# Patient Record
Sex: FEMALE | Race: WHITE | NOT HISPANIC OR LATINO | Employment: OTHER | URBAN - METROPOLITAN AREA
[De-identification: names, ages, dates, MRNs, and addresses within clinical notes are randomized per-mention and may not be internally consistent; named-entity substitution may affect disease eponyms.]

---

## 2020-05-27 DIAGNOSIS — N63.0 BREAST MASS: Primary | ICD-10-CM

## 2020-06-08 ENCOUNTER — HOSPITAL ENCOUNTER (OUTPATIENT)
Dept: RADIOLOGY | Facility: HOSPITAL | Age: 40
Discharge: HOME | End: 2020-06-08
Attending: OBSTETRICS & GYNECOLOGY
Payer: COMMERCIAL

## 2020-06-08 DIAGNOSIS — N63.0 BREAST MASS: ICD-10-CM

## 2020-06-08 PROCEDURE — 77062 BREAST TOMOSYNTHESIS BI: CPT | Mod: TC,PO

## 2020-06-08 PROCEDURE — 76642 ULTRASOUND BREAST LIMITED: CPT | Mod: TC,50,PO

## 2020-06-23 ENCOUNTER — HOSPITAL ENCOUNTER (OUTPATIENT)
Dept: RADIOLOGY | Facility: HOSPITAL | Age: 40
Discharge: HOME | End: 2020-06-23
Attending: OBSTETRICS & GYNECOLOGY
Payer: COMMERCIAL

## 2020-06-23 DIAGNOSIS — R92.8 ABNORMAL MAMMOGRAM: ICD-10-CM

## 2020-06-23 PROCEDURE — 19081 BX BREAST 1ST LESION STRTCTC: CPT | Mod: PO,LT

## 2020-06-23 NOTE — PLAN OF CARE
Tolerated well, minimal bleeding at site.  Steri-strips, gauze and tegaderm dressing placed to procedural site-left breast.  Ice pack provided.  Verbal and written discharge instructions given and patient verbalized understanding. All questions answered  Denies pain or nausea.  Discharged to home via private vehicle

## 2020-07-21 ENCOUNTER — TELEPHONE (OUTPATIENT)
Dept: RADIOLOGY | Facility: HOSPITAL | Age: 40
End: 2020-07-21

## 2020-07-21 NOTE — TELEPHONE ENCOUNTER
I called Dr. Gilmore's office to make sure she received the addendum to patient's breast bx. The nurse stated the addendum was received and the patient does not want to see a surgeon.

## 2020-11-05 ENCOUNTER — TELEPHONE (OUTPATIENT)
Dept: ADMINISTRATIVE | Facility: CLINIC | Age: 40
End: 2020-11-05

## 2020-11-10 ENCOUNTER — TELEPHONE (OUTPATIENT)
Dept: ADMINISTRATIVE | Facility: CLINIC | Age: 40
End: 2020-11-10

## 2020-11-17 ENCOUNTER — TELEPHONE (OUTPATIENT)
Dept: SURGERY | Facility: CLINIC | Age: 40
End: 2020-11-17

## 2020-11-17 NOTE — TELEPHONE ENCOUNTER
Received message that pt wanted to cancel appt with  for tomorrow. Called pt to confirm this & see if she would like to reschedule, no answer, left voicemail.

## 2021-01-15 ENCOUNTER — CLINICAL SUPPORT (OUTPATIENT)
Dept: URGENT CARE | Facility: CLINIC | Age: 41
End: 2021-01-15
Payer: COMMERCIAL

## 2021-01-15 DIAGNOSIS — Z11.52 ENCOUNTER FOR SCREENING FOR COVID-19: Primary | ICD-10-CM

## 2021-01-15 LAB
CTP QC/QA: YES
SARS-COV-2 RDRP RESP QL NAA+PROBE: NEGATIVE

## 2021-01-15 PROCEDURE — U0002: ICD-10-PCS | Mod: QW,S$GLB,, | Performed by: FAMILY MEDICINE

## 2021-01-15 PROCEDURE — U0002 COVID-19 LAB TEST NON-CDC: HCPCS | Mod: QW,S$GLB,, | Performed by: FAMILY MEDICINE

## 2021-01-19 PROBLEM — D05.12 DUCTAL CARCINOMA IN SITU OF LEFT BREAST: Status: ACTIVE | Noted: 2021-01-19

## 2021-01-20 ENCOUNTER — TELEPHONE (OUTPATIENT)
Dept: SURGERY | Facility: CLINIC | Age: 41
End: 2021-01-20

## 2021-01-25 ENCOUNTER — DOCUMENTATION ONLY (OUTPATIENT)
Dept: SURGERY | Facility: CLINIC | Age: 41
End: 2021-01-25

## 2021-01-25 DIAGNOSIS — C50.412 MALIGNANT NEOPLASM OF UPPER-OUTER QUADRANT OF LEFT FEMALE BREAST: Primary | ICD-10-CM

## 2021-01-27 ENCOUNTER — HOSPITAL ENCOUNTER (OUTPATIENT)
Dept: RADIOLOGY | Facility: HOSPITAL | Age: 41
Discharge: HOME | End: 2021-01-27
Attending: SURGERY
Payer: COMMERCIAL

## 2021-01-27 DIAGNOSIS — C50.412 MALIGNANT NEOPLASM OF UPPER-OUTER QUADRANT OF LEFT FEMALE BREAST: ICD-10-CM

## 2021-01-27 PROCEDURE — A9577 INJ MULTIHANCE: HCPCS | Performed by: SURGERY

## 2021-01-27 PROCEDURE — 77049 MRI BREAST C-+ W/CAD BI: CPT | Mod: 26,,, | Performed by: RADIOLOGY

## 2021-01-27 PROCEDURE — 25500020 PHARM REV CODE 255: Performed by: SURGERY

## 2021-01-27 PROCEDURE — 77049 MRI BREAST C-+ W/CAD BI: CPT | Mod: TC

## 2021-01-27 PROCEDURE — 77049 MRI BREAST W/WO CONTRAST, W/CAD, BILATERAL: ICD-10-PCS | Mod: 26,,, | Performed by: RADIOLOGY

## 2021-01-27 RX ADMIN — GADOBENATE DIMEGLUMINE 12 ML: 529 INJECTION, SOLUTION INTRAVENOUS at 11:01

## 2021-01-28 DIAGNOSIS — C50.412 MALIGNANT NEOPLASM OF UPPER-OUTER QUADRANT OF LEFT FEMALE BREAST, UNSPECIFIED ESTROGEN RECEPTOR STATUS: Primary | ICD-10-CM

## 2021-01-28 PROBLEM — C50.912 INVASIVE DUCTAL CARCINOMA OF BREAST, LEFT: Status: ACTIVE | Noted: 2021-01-28

## 2021-01-29 ENCOUNTER — DOCUMENTATION ONLY (OUTPATIENT)
Dept: SURGERY | Facility: CLINIC | Age: 41
End: 2021-01-29

## 2021-02-02 ENCOUNTER — HOSPITAL ENCOUNTER (OUTPATIENT)
Dept: RADIOLOGY | Facility: HOSPITAL | Age: 41
Discharge: HOME | End: 2021-02-02
Attending: SURGERY
Payer: COMMERCIAL

## 2021-02-02 DIAGNOSIS — C50.412 MALIGNANT NEOPLASM OF UPPER-OUTER QUADRANT OF LEFT FEMALE BREAST, UNSPECIFIED ESTROGEN RECEPTOR STATUS: ICD-10-CM

## 2021-02-02 PROBLEM — Z17.1 MALIGNANT NEOPLASM OF UPPER-OUTER QUADRANT OF LEFT BREAST IN FEMALE, ESTROGEN RECEPTOR NEGATIVE: Status: ACTIVE | Noted: 2021-01-28

## 2021-02-02 PROCEDURE — 78815 PET IMAGE W/CT SKULL-THIGH: CPT | Mod: 26,PS,, | Performed by: RADIOLOGY

## 2021-02-02 PROCEDURE — 78815 PET IMAGE W/CT SKULL-THIGH: CPT | Mod: TC

## 2021-02-02 PROCEDURE — 78815 NM PET CT ROUTINE: ICD-10-PCS | Mod: 26,PS,, | Performed by: RADIOLOGY

## 2021-02-03 ENCOUNTER — DOCUMENTATION ONLY (OUTPATIENT)
Dept: SURGERY | Facility: CLINIC | Age: 41
End: 2021-02-03

## 2021-02-03 ENCOUNTER — OFFICE VISIT (OUTPATIENT)
Dept: HEMATOLOGY/ONCOLOGY | Facility: CLINIC | Age: 41
End: 2021-02-03
Payer: COMMERCIAL

## 2021-02-03 ENCOUNTER — OFFICE VISIT (OUTPATIENT)
Dept: SURGERY | Facility: CLINIC | Age: 41
End: 2021-02-03
Payer: COMMERCIAL

## 2021-02-03 VITALS
SYSTOLIC BLOOD PRESSURE: 120 MMHG | RESPIRATION RATE: 18 BRPM | HEART RATE: 73 BPM | HEIGHT: 67 IN | WEIGHT: 121.5 LBS | TEMPERATURE: 97 F | BODY MASS INDEX: 19.07 KG/M2 | DIASTOLIC BLOOD PRESSURE: 60 MMHG | OXYGEN SATURATION: 100 %

## 2021-02-03 DIAGNOSIS — Z17.1 MALIGNANT NEOPLASM OF UPPER-OUTER QUADRANT OF LEFT BREAST IN FEMALE, ESTROGEN RECEPTOR NEGATIVE: Primary | ICD-10-CM

## 2021-02-03 DIAGNOSIS — C50.412 MALIGNANT NEOPLASM OF UPPER-OUTER QUADRANT OF LEFT BREAST IN FEMALE, ESTROGEN RECEPTOR NEGATIVE: Primary | ICD-10-CM

## 2021-02-03 DIAGNOSIS — C50.412 MALIGNANT NEOPLASM OF UPPER-OUTER QUADRANT OF LEFT FEMALE BREAST, UNSPECIFIED ESTROGEN RECEPTOR STATUS: Primary | ICD-10-CM

## 2021-02-03 PROCEDURE — 3008F PR BODY MASS INDEX (BMI) DOCUMENTED: ICD-10-PCS | Mod: CPTII,S$GLB,, | Performed by: INTERNAL MEDICINE

## 2021-02-03 PROCEDURE — 99999 PR PBB SHADOW E&M-EST. PATIENT-LVL I: CPT | Mod: PBBFAC,,, | Performed by: SURGERY

## 2021-02-03 PROCEDURE — 3008F BODY MASS INDEX DOCD: CPT | Mod: CPTII,S$GLB,, | Performed by: INTERNAL MEDICINE

## 2021-02-03 PROCEDURE — 99205 OFFICE O/P NEW HI 60 MIN: CPT | Mod: S$GLB,,, | Performed by: SURGERY

## 2021-02-03 PROCEDURE — 1126F PR PAIN SEVERITY QUANTIFIED, NO PAIN PRESENT: ICD-10-PCS | Mod: S$GLB,,, | Performed by: INTERNAL MEDICINE

## 2021-02-03 PROCEDURE — 99205 OFFICE O/P NEW HI 60 MIN: CPT | Mod: S$GLB,,, | Performed by: INTERNAL MEDICINE

## 2021-02-03 PROCEDURE — 99205 PR OFFICE/OUTPT VISIT, NEW, LEVL V, 60-74 MIN: ICD-10-PCS | Mod: S$GLB,,, | Performed by: SURGERY

## 2021-02-03 PROCEDURE — 99999 PR PBB SHADOW E&M-EST. PATIENT-LVL I: ICD-10-PCS | Mod: PBBFAC,,, | Performed by: SURGERY

## 2021-02-03 PROCEDURE — 99999 PR PBB SHADOW E&M-EST. PATIENT-LVL IV: ICD-10-PCS | Mod: PBBFAC,,, | Performed by: INTERNAL MEDICINE

## 2021-02-03 PROCEDURE — 99999 PR PBB SHADOW E&M-EST. PATIENT-LVL IV: CPT | Mod: PBBFAC,,, | Performed by: INTERNAL MEDICINE

## 2021-02-03 PROCEDURE — 1126F AMNT PAIN NOTED NONE PRSNT: CPT | Mod: S$GLB,,, | Performed by: INTERNAL MEDICINE

## 2021-02-03 PROCEDURE — 99205 PR OFFICE/OUTPT VISIT, NEW, LEVL V, 60-74 MIN: ICD-10-PCS | Mod: S$GLB,,, | Performed by: INTERNAL MEDICINE

## 2021-02-04 ENCOUNTER — TELEPHONE (OUTPATIENT)
Dept: INFUSION THERAPY | Facility: HOSPITAL | Age: 41
End: 2021-02-04

## 2021-02-04 ENCOUNTER — TELEPHONE (OUTPATIENT)
Dept: RADIOLOGY | Facility: HOSPITAL | Age: 41
End: 2021-02-04

## 2021-02-04 DIAGNOSIS — C50.412 MALIGNANT NEOPLASM OF UPPER-OUTER QUADRANT OF LEFT BREAST IN FEMALE, ESTROGEN RECEPTOR NEGATIVE: Primary | ICD-10-CM

## 2021-02-04 DIAGNOSIS — Z17.1 MALIGNANT NEOPLASM OF UPPER-OUTER QUADRANT OF LEFT BREAST IN FEMALE, ESTROGEN RECEPTOR NEGATIVE: Primary | ICD-10-CM

## 2021-02-05 ENCOUNTER — DOCUMENTATION ONLY (OUTPATIENT)
Dept: SURGERY | Facility: CLINIC | Age: 41
End: 2021-02-05

## 2021-02-05 DIAGNOSIS — C50.412 MALIGNANT NEOPLASM OF UPPER-OUTER QUADRANT OF LEFT FEMALE BREAST, UNSPECIFIED ESTROGEN RECEPTOR STATUS: Primary | ICD-10-CM

## 2021-02-08 ENCOUNTER — PATIENT MESSAGE (OUTPATIENT)
Dept: HEMATOLOGY/ONCOLOGY | Facility: CLINIC | Age: 41
End: 2021-02-08

## 2021-02-08 ENCOUNTER — CLINICAL SUPPORT (OUTPATIENT)
Dept: REHABILITATION | Facility: HOSPITAL | Age: 41
End: 2021-02-08
Attending: SURGERY
Payer: COMMERCIAL

## 2021-02-08 ENCOUNTER — PATIENT MESSAGE (OUTPATIENT)
Dept: SURGERY | Facility: CLINIC | Age: 41
End: 2021-02-08

## 2021-02-08 ENCOUNTER — LAB VISIT (OUTPATIENT)
Dept: LAB | Facility: HOSPITAL | Age: 41
End: 2021-02-08
Attending: SURGERY
Payer: COMMERCIAL

## 2021-02-08 ENCOUNTER — TELEPHONE (OUTPATIENT)
Dept: HEMATOLOGY/ONCOLOGY | Facility: CLINIC | Age: 41
End: 2021-02-08

## 2021-02-08 ENCOUNTER — TELEPHONE (OUTPATIENT)
Dept: RADIOLOGY | Facility: HOSPITAL | Age: 41
End: 2021-02-08

## 2021-02-08 DIAGNOSIS — C50.412 MALIGNANT NEOPLASM OF UPPER-OUTER QUADRANT OF LEFT FEMALE BREAST, UNSPECIFIED ESTROGEN RECEPTOR STATUS: Primary | ICD-10-CM

## 2021-02-08 DIAGNOSIS — C50.412 MALIGNANT NEOPLASM OF UPPER-OUTER QUADRANT OF LEFT FEMALE BREAST, UNSPECIFIED ESTROGEN RECEPTOR STATUS: ICD-10-CM

## 2021-02-08 DIAGNOSIS — R26.89 DECREASED FUNCTIONAL MOBILITY: ICD-10-CM

## 2021-02-08 DIAGNOSIS — M25.60 DECREASED RANGE OF MOTION: ICD-10-CM

## 2021-02-08 PROCEDURE — 88321 CONSLTJ&REPRT SLD PREP ELSWR: CPT | Mod: ,,, | Performed by: STUDENT IN AN ORGANIZED HEALTH CARE EDUCATION/TRAINING PROGRAM

## 2021-02-08 PROCEDURE — 88321 PR  MICROSLIDE CONSULT: ICD-10-PCS | Mod: ,,, | Performed by: STUDENT IN AN ORGANIZED HEALTH CARE EDUCATION/TRAINING PROGRAM

## 2021-02-08 PROCEDURE — 97161 PT EVAL LOW COMPLEX 20 MIN: CPT | Mod: PN

## 2021-02-08 PROCEDURE — 88321 CONSLTJ&REPRT SLD PREP ELSWR: CPT | Performed by: STUDENT IN AN ORGANIZED HEALTH CARE EDUCATION/TRAINING PROGRAM

## 2021-02-08 PROCEDURE — 97110 THERAPEUTIC EXERCISES: CPT | Mod: PN

## 2021-02-09 ENCOUNTER — HOSPITAL ENCOUNTER (OUTPATIENT)
Dept: CARDIOLOGY | Facility: CLINIC | Age: 41
Discharge: HOME | End: 2021-02-09
Payer: COMMERCIAL

## 2021-02-09 ENCOUNTER — LAB VISIT (OUTPATIENT)
Dept: INTERNAL MEDICINE | Facility: CLINIC | Age: 41
End: 2021-02-09
Payer: COMMERCIAL

## 2021-02-09 ENCOUNTER — CLINICAL SUPPORT (OUTPATIENT)
Dept: HEMATOLOGY/ONCOLOGY | Facility: CLINIC | Age: 41
End: 2021-02-09
Payer: COMMERCIAL

## 2021-02-09 ENCOUNTER — OFFICE VISIT (OUTPATIENT)
Dept: HEMATOLOGY/ONCOLOGY | Facility: CLINIC | Age: 41
End: 2021-02-09
Payer: COMMERCIAL

## 2021-02-09 ENCOUNTER — HOSPITAL ENCOUNTER (OUTPATIENT)
Dept: CARDIOLOGY | Facility: HOSPITAL | Age: 41
Discharge: HOME | End: 2021-02-09
Attending: INTERNAL MEDICINE
Payer: COMMERCIAL

## 2021-02-09 VITALS
RESPIRATION RATE: 16 BRPM | DIASTOLIC BLOOD PRESSURE: 66 MMHG | SYSTOLIC BLOOD PRESSURE: 115 MMHG | TEMPERATURE: 98 F | HEIGHT: 67 IN | WEIGHT: 123.44 LBS | BODY MASS INDEX: 19.37 KG/M2 | HEART RATE: 76 BPM | OXYGEN SATURATION: 98 %

## 2021-02-09 VITALS
HEIGHT: 67 IN | WEIGHT: 121 LBS | HEART RATE: 58 BPM | SYSTOLIC BLOOD PRESSURE: 110 MMHG | DIASTOLIC BLOOD PRESSURE: 78 MMHG | BODY MASS INDEX: 18.99 KG/M2

## 2021-02-09 DIAGNOSIS — Z71.3 NUTRITIONAL COUNSELING: ICD-10-CM

## 2021-02-09 DIAGNOSIS — C50.412 MALIGNANT NEOPLASM OF UPPER-OUTER QUADRANT OF LEFT BREAST IN FEMALE, ESTROGEN RECEPTOR NEGATIVE: ICD-10-CM

## 2021-02-09 DIAGNOSIS — C50.412 MALIGNANT NEOPLASM OF UPPER-OUTER QUADRANT OF LEFT BREAST IN FEMALE, ESTROGEN RECEPTOR NEGATIVE: Primary | ICD-10-CM

## 2021-02-09 DIAGNOSIS — D05.12 DUCTAL CARCINOMA IN SITU OF LEFT BREAST: ICD-10-CM

## 2021-02-09 DIAGNOSIS — Z01.818 PREOP TESTING: ICD-10-CM

## 2021-02-09 DIAGNOSIS — Z17.1 MALIGNANT NEOPLASM OF UPPER-OUTER QUADRANT OF LEFT BREAST IN FEMALE, ESTROGEN RECEPTOR NEGATIVE: Primary | ICD-10-CM

## 2021-02-09 DIAGNOSIS — Z17.1 MALIGNANT NEOPLASM OF UPPER-OUTER QUADRANT OF LEFT BREAST IN FEMALE, ESTROGEN RECEPTOR NEGATIVE: ICD-10-CM

## 2021-02-09 LAB
ASCENDING AORTA: 2.41 CM
AV INDEX (PROSTH): 0.75
AV MEAN GRADIENT: 3 MMHG
AV PEAK GRADIENT: 5 MMHG
AV VALVE AREA: 2.34 CM2
AV VELOCITY RATIO: 0.84
BSA FOR ECHO PROCEDURE: 1.61 M2
CV ECHO LV RWT: 0.23 CM
DOP CALC AO PEAK VEL: 1.09 M/S
DOP CALC AO VTI: 25.04 CM
DOP CALC LVOT AREA: 3.1 CM2
DOP CALC LVOT DIAMETER: 2 CM
DOP CALC LVOT PEAK VEL: 0.92 M/S
DOP CALC LVOT STROKE VOLUME: 58.62 CM3
DOP CALCLVOT PEAK VEL VTI: 18.67 CM
E WAVE DECELERATION TIME: 203.98 MSEC
E/A RATIO: 2.38
E/E' RATIO: 5.68 M/S
ECHO LV POSTERIOR WALL: 0.53 CM (ref 0.6–1.1)
FRACTIONAL SHORTENING: 31 % (ref 28–44)
INTERVENTRICULAR SEPTUM: 0.59 CM (ref 0.6–1.1)
IVRT: 74.22 MSEC
LA MAJOR: 4.74 CM
LA MINOR: 4.69 CM
LA WIDTH: 3.76 CM
LEFT ATRIUM SIZE: 3.15 CM
LEFT ATRIUM VOLUME INDEX MOD: 24.1 ML/M2
LEFT ATRIUM VOLUME INDEX: 29.1 ML/M2
LEFT ATRIUM VOLUME MOD: 39.34 CM3
LEFT ATRIUM VOLUME: 47.47 CM3
LEFT INTERNAL DIMENSION IN SYSTOLE: 3.21 CM (ref 2.1–4)
LEFT VENTRICLE DIASTOLIC VOLUME INDEX: 60.6 ML/M2
LEFT VENTRICLE DIASTOLIC VOLUME: 98.77 ML
LEFT VENTRICLE MASS INDEX: 47 G/M2
LEFT VENTRICLE SYSTOLIC VOLUME INDEX: 25.4 ML/M2
LEFT VENTRICLE SYSTOLIC VOLUME: 41.41 ML
LEFT VENTRICULAR INTERNAL DIMENSION IN DIASTOLE: 4.63 CM (ref 3.5–6)
LEFT VENTRICULAR MASS: 76.19 G
LV LATERAL E/E' RATIO: 4 M/S
LV SEPTAL E/E' RATIO: 9.78 M/S
MV A" WAVE DURATION": 12.56 MSEC
MV PEAK A VEL: 0.37 M/S
MV PEAK E VEL: 0.88 M/S
PISA MRMAX VEL: 0.05 M/S
PISA TR MAX VEL: 2.34 M/S
PULM VEIN S/D RATIO: 0.85
PV PEAK D VEL: 0.74 M/S
PV PEAK S VEL: 0.63 M/S
RA MAJOR: 4.07 CM
RA PRESSURE: 3 MMHG
RA WIDTH: 3.69 CM
RETIRED EF AND QEF - SEE NOTES: 60 %
RIGHT VENTRICULAR END-DIASTOLIC DIMENSION: 2.86 CM
RV TISSUE DOPPLER FREE WALL SYSTOLIC VELOCITY 1 (APICAL 4 CHAMBER VIEW): 14.22 CM/S
SINUS: 2.59 CM
STJ: 2.37 CM
TDI LATERAL: 0.22 M/S
TDI SEPTAL: 0.09 M/S
TDI: 0.16 M/S
TR MAX PG: 22 MMHG
TRICUSPID ANNULAR PLANE SYSTOLIC EXCURSION: 1.79 CM
TV REST PULMONARY ARTERY PRESSURE: 25 MMHG

## 2021-02-09 PROCEDURE — 93005 ELECTROCARDIOGRAM TRACING: CPT | Mod: S$GLB,,, | Performed by: INTERNAL MEDICINE

## 2021-02-09 PROCEDURE — 1126F AMNT PAIN NOTED NONE PRSNT: CPT | Mod: S$GLB,,, | Performed by: INTERNAL MEDICINE

## 2021-02-09 PROCEDURE — 1126F PR PAIN SEVERITY QUANTIFIED, NO PAIN PRESENT: ICD-10-PCS | Mod: S$GLB,,, | Performed by: INTERNAL MEDICINE

## 2021-02-09 PROCEDURE — U0005 INFEC AGEN DETEC AMPLI PROBE: HCPCS

## 2021-02-09 PROCEDURE — 3008F BODY MASS INDEX DOCD: CPT | Mod: CPTII,S$GLB,, | Performed by: INTERNAL MEDICINE

## 2021-02-09 PROCEDURE — 93005 EKG 12-LEAD: ICD-10-PCS | Mod: S$GLB,,, | Performed by: INTERNAL MEDICINE

## 2021-02-09 PROCEDURE — 97802 PR MED NUTR THER, 1ST, INDIV, EA 15 MIN: ICD-10-PCS | Mod: S$GLB,,, | Performed by: DIETITIAN, REGISTERED

## 2021-02-09 PROCEDURE — 93306 TTE W/DOPPLER COMPLETE: CPT

## 2021-02-09 PROCEDURE — 99999 PR PBB SHADOW E&M-EST. PATIENT-LVL III: CPT | Mod: PBBFAC,,, | Performed by: INTERNAL MEDICINE

## 2021-02-09 PROCEDURE — U0003 INFECTIOUS AGENT DETECTION BY NUCLEIC ACID (DNA OR RNA); SEVERE ACUTE RESPIRATORY SYNDROME CORONAVIRUS 2 (SARS-COV-2) (CORONAVIRUS DISEASE [COVID-19]), AMPLIFIED PROBE TECHNIQUE, MAKING USE OF HIGH THROUGHPUT TECHNOLOGIES AS DESCRIBED BY CMS-2020-01-R: HCPCS

## 2021-02-09 PROCEDURE — 93306 ECHO (CUPID ONLY): ICD-10-PCS | Mod: 26,,, | Performed by: INTERNAL MEDICINE

## 2021-02-09 PROCEDURE — 99999 PR PBB SHADOW E&M-EST. PATIENT-LVL I: ICD-10-PCS | Mod: PBBFAC,,, | Performed by: DIETITIAN, REGISTERED

## 2021-02-09 PROCEDURE — 93306 TTE W/DOPPLER COMPLETE: CPT | Mod: 26,,, | Performed by: INTERNAL MEDICINE

## 2021-02-09 PROCEDURE — 99215 OFFICE O/P EST HI 40 MIN: CPT | Mod: S$GLB,,, | Performed by: INTERNAL MEDICINE

## 2021-02-09 PROCEDURE — 93356 MYOCRD STRAIN IMG SPCKL TRCK: CPT | Mod: ,,, | Performed by: INTERNAL MEDICINE

## 2021-02-09 PROCEDURE — 99999 PR PBB SHADOW E&M-EST. PATIENT-LVL III: ICD-10-PCS | Mod: PBBFAC,,, | Performed by: INTERNAL MEDICINE

## 2021-02-09 PROCEDURE — 99215 PR OFFICE/OUTPT VISIT, EST, LEVL V, 40-54 MIN: ICD-10-PCS | Mod: S$GLB,,, | Performed by: INTERNAL MEDICINE

## 2021-02-09 PROCEDURE — 93010 EKG 12-LEAD: ICD-10-PCS | Mod: S$GLB,,, | Performed by: INTERNAL MEDICINE

## 2021-02-09 PROCEDURE — 99999 PR PBB SHADOW E&M-EST. PATIENT-LVL I: CPT | Mod: PBBFAC,,, | Performed by: DIETITIAN, REGISTERED

## 2021-02-09 PROCEDURE — 93356 ECHO (CUPID ONLY): ICD-10-PCS | Mod: ,,, | Performed by: INTERNAL MEDICINE

## 2021-02-09 PROCEDURE — 93010 ELECTROCARDIOGRAM REPORT: CPT | Mod: S$GLB,,, | Performed by: INTERNAL MEDICINE

## 2021-02-09 PROCEDURE — 97802 MEDICAL NUTRITION INDIV IN: CPT | Mod: S$GLB,,, | Performed by: DIETITIAN, REGISTERED

## 2021-02-09 PROCEDURE — 3008F PR BODY MASS INDEX (BMI) DOCUMENTED: ICD-10-PCS | Mod: CPTII,S$GLB,, | Performed by: INTERNAL MEDICINE

## 2021-02-10 ENCOUNTER — TELEPHONE (OUTPATIENT)
Dept: HEMATOLOGY/ONCOLOGY | Facility: CLINIC | Age: 41
End: 2021-02-10

## 2021-02-10 ENCOUNTER — PATIENT MESSAGE (OUTPATIENT)
Dept: HEMATOLOGY/ONCOLOGY | Facility: CLINIC | Age: 41
End: 2021-02-10

## 2021-02-10 LAB
FINAL PATHOLOGIC DIAGNOSIS: NORMAL
SARS-COV-2 RNA RESP QL NAA+PROBE: NOT DETECTED

## 2021-02-11 ENCOUNTER — TELEPHONE (OUTPATIENT)
Dept: HEMATOLOGY/ONCOLOGY | Facility: CLINIC | Age: 41
End: 2021-02-11

## 2021-02-11 ENCOUNTER — PATIENT MESSAGE (OUTPATIENT)
Dept: HEMATOLOGY/ONCOLOGY | Facility: CLINIC | Age: 41
End: 2021-02-11

## 2021-02-11 ENCOUNTER — TELEPHONE (OUTPATIENT)
Dept: OBSTETRICS AND GYNECOLOGY | Facility: CLINIC | Age: 41
End: 2021-02-11

## 2021-02-11 DIAGNOSIS — Z51.11 CHEMOTHERAPY MANAGEMENT, ENCOUNTER FOR: Primary | ICD-10-CM

## 2021-02-12 ENCOUNTER — PATIENT MESSAGE (OUTPATIENT)
Dept: HEMATOLOGY/ONCOLOGY | Facility: CLINIC | Age: 41
End: 2021-02-12

## 2021-02-12 ENCOUNTER — CLINICAL SUPPORT (OUTPATIENT)
Dept: HEMATOLOGY/ONCOLOGY | Facility: CLINIC | Age: 41
End: 2021-02-12
Payer: COMMERCIAL

## 2021-02-12 DIAGNOSIS — Z17.1 MALIGNANT NEOPLASM OF UPPER-OUTER QUADRANT OF LEFT BREAST IN FEMALE, ESTROGEN RECEPTOR NEGATIVE: ICD-10-CM

## 2021-02-12 DIAGNOSIS — C50.412 MALIGNANT NEOPLASM OF UPPER-OUTER QUADRANT OF LEFT BREAST IN FEMALE, ESTROGEN RECEPTOR NEGATIVE: ICD-10-CM

## 2021-02-12 RX ORDER — LIDOCAINE AND PRILOCAINE 25; 25 MG/G; MG/G
CREAM TOPICAL
Qty: 30 G | Refills: 0 | Status: SHIPPED | OUTPATIENT
Start: 2021-02-12 | End: 2021-06-08 | Stop reason: SDUPTHER

## 2021-02-15 ENCOUNTER — OFFICE VISIT (OUTPATIENT)
Dept: HEMATOLOGY/ONCOLOGY | Facility: CLINIC | Age: 41
End: 2021-02-15
Payer: COMMERCIAL

## 2021-02-15 DIAGNOSIS — D05.12 DUCTAL CARCINOMA IN SITU OF LEFT BREAST: ICD-10-CM

## 2021-02-15 DIAGNOSIS — T45.1X5A CHEMOTHERAPY INDUCED NAUSEA AND VOMITING: ICD-10-CM

## 2021-02-15 DIAGNOSIS — Z17.1 MALIGNANT NEOPLASM OF UPPER-OUTER QUADRANT OF LEFT BREAST IN FEMALE, ESTROGEN RECEPTOR NEGATIVE: Primary | ICD-10-CM

## 2021-02-15 DIAGNOSIS — C50.412 MALIGNANT NEOPLASM OF UPPER-OUTER QUADRANT OF LEFT BREAST IN FEMALE, ESTROGEN RECEPTOR NEGATIVE: Primary | ICD-10-CM

## 2021-02-15 DIAGNOSIS — B00.1 FEVER BLISTER: Primary | ICD-10-CM

## 2021-02-15 DIAGNOSIS — R11.2 CHEMOTHERAPY INDUCED NAUSEA AND VOMITING: ICD-10-CM

## 2021-02-15 PROCEDURE — 99215 PR OFFICE/OUTPT VISIT, EST, LEVL V, 40-54 MIN: ICD-10-PCS | Mod: 95,,, | Performed by: NURSE PRACTITIONER

## 2021-02-15 PROCEDURE — 99215 OFFICE O/P EST HI 40 MIN: CPT | Mod: 95,,, | Performed by: NURSE PRACTITIONER

## 2021-02-15 RX ORDER — PROMETHAZINE HYDROCHLORIDE 25 MG/1
25 TABLET ORAL EVERY 6 HOURS PRN
Qty: 30 TABLET | Refills: 2 | Status: SHIPPED | OUTPATIENT
Start: 2021-02-15 | End: 2021-02-22

## 2021-02-15 RX ORDER — VALACYCLOVIR HYDROCHLORIDE 500 MG/1
500 TABLET, FILM COATED ORAL 2 TIMES DAILY
Qty: 10 TABLET | Refills: 0 | Status: SHIPPED | OUTPATIENT
Start: 2021-02-15 | End: 2021-04-07 | Stop reason: SDUPTHER

## 2021-02-15 RX ORDER — LORAZEPAM 0.5 MG/1
0.5 TABLET ORAL EVERY 8 HOURS PRN
Qty: 30 TABLET | Refills: 0 | Status: SHIPPED | OUTPATIENT
Start: 2021-02-15 | End: 2021-03-16 | Stop reason: SDUPTHER

## 2021-02-15 RX ORDER — ONDANSETRON 8 MG/1
8 TABLET, ORALLY DISINTEGRATING ORAL EVERY 8 HOURS PRN
Qty: 30 TABLET | Refills: 2 | Status: SHIPPED | OUTPATIENT
Start: 2021-02-15 | End: 2021-05-20 | Stop reason: SDUPTHER

## 2021-02-17 ENCOUNTER — PATIENT MESSAGE (OUTPATIENT)
Dept: HEMATOLOGY/ONCOLOGY | Facility: CLINIC | Age: 41
End: 2021-02-17

## 2021-02-18 ENCOUNTER — CLINICAL SUPPORT (OUTPATIENT)
Dept: REHABILITATION | Facility: HOSPITAL | Age: 41
End: 2021-02-18
Payer: COMMERCIAL

## 2021-02-18 DIAGNOSIS — R26.89 DECREASED FUNCTIONAL MOBILITY: ICD-10-CM

## 2021-02-18 DIAGNOSIS — M25.60 DECREASED RANGE OF MOTION: ICD-10-CM

## 2021-02-18 PROCEDURE — 97110 THERAPEUTIC EXERCISES: CPT | Mod: PN,97

## 2021-02-22 ENCOUNTER — CLINICAL SUPPORT (OUTPATIENT)
Dept: REHABILITATION | Facility: HOSPITAL | Age: 41
End: 2021-02-22
Payer: COMMERCIAL

## 2021-02-22 DIAGNOSIS — M25.60 DECREASED RANGE OF MOTION: ICD-10-CM

## 2021-02-22 DIAGNOSIS — R26.89 DECREASED FUNCTIONAL MOBILITY: ICD-10-CM

## 2021-02-22 PROCEDURE — 97140 MANUAL THERAPY 1/> REGIONS: CPT | Mod: PN,97

## 2021-02-22 PROCEDURE — 97110 THERAPEUTIC EXERCISES: CPT | Mod: PN

## 2021-02-24 ENCOUNTER — PATIENT MESSAGE (OUTPATIENT)
Dept: HEMATOLOGY/ONCOLOGY | Facility: CLINIC | Age: 41
End: 2021-02-24

## 2021-02-24 ENCOUNTER — LAB VISIT (OUTPATIENT)
Dept: LAB | Facility: HOSPITAL | Age: 41
End: 2021-02-24
Attending: NURSE PRACTITIONER
Payer: COMMERCIAL

## 2021-02-24 DIAGNOSIS — C50.412 MALIGNANT NEOPLASM OF UPPER-OUTER QUADRANT OF LEFT BREAST IN FEMALE, ESTROGEN RECEPTOR NEGATIVE: ICD-10-CM

## 2021-02-24 DIAGNOSIS — Z17.1 MALIGNANT NEOPLASM OF UPPER-OUTER QUADRANT OF LEFT BREAST IN FEMALE, ESTROGEN RECEPTOR NEGATIVE: ICD-10-CM

## 2021-02-24 LAB
ALBUMIN SERPL BCP-MCNC: 4.4 G/DL (ref 3.5–5.2)
ALP SERPL-CCNC: 67 U/L (ref 55–135)
ALT SERPL W/O P-5'-P-CCNC: 19 U/L (ref 10–44)
ANION GAP SERPL CALC-SCNC: 11 MMOL/L (ref 8–16)
AST SERPL-CCNC: 16 U/L (ref 10–40)
BILIRUB SERPL-MCNC: 0.2 MG/DL (ref 0.1–1)
BUN SERPL-MCNC: 8 MG/DL (ref 6–20)
CALCIUM SERPL-MCNC: 9.6 MG/DL (ref 8.7–10.5)
CHLORIDE SERPL-SCNC: 102 MMOL/L (ref 95–110)
CO2 SERPL-SCNC: 26 MMOL/L (ref 23–29)
CREAT SERPL-MCNC: 0.7 MG/DL (ref 0.5–1.4)
ERYTHROCYTE [DISTWIDTH] IN BLOOD BY AUTOMATED COUNT: 12.4 % (ref 11.5–14.5)
EST. GFR  (AFRICAN AMERICAN): >60 ML/MIN/1.73 M^2
EST. GFR  (NON AFRICAN AMERICAN): >60 ML/MIN/1.73 M^2
GLUCOSE SERPL-MCNC: 94 MG/DL (ref 70–110)
HCT VFR BLD AUTO: 40.8 % (ref 37–48.5)
HGB BLD-MCNC: 13.5 G/DL (ref 12–16)
IMM GRANULOCYTES # BLD AUTO: 0.02 K/UL (ref 0–0.04)
MCH RBC QN AUTO: 30.9 PG (ref 27–31)
MCHC RBC AUTO-ENTMCNC: 33.1 G/DL (ref 32–36)
MCV RBC AUTO: 93 FL (ref 82–98)
NEUTROPHILS # BLD AUTO: 4.4 K/UL (ref 1.8–7.7)
PLATELET # BLD AUTO: 224 K/UL (ref 150–350)
PMV BLD AUTO: 10.7 FL (ref 9.2–12.9)
POTASSIUM SERPL-SCNC: 4 MMOL/L (ref 3.5–5.1)
PROT SERPL-MCNC: 7.1 G/DL (ref 6–8.4)
RBC # BLD AUTO: 4.37 M/UL (ref 4–5.4)
SODIUM SERPL-SCNC: 139 MMOL/L (ref 136–145)
WBC # BLD AUTO: 8.43 K/UL (ref 3.9–12.7)

## 2021-02-24 PROCEDURE — 80053 COMPREHEN METABOLIC PANEL: CPT | Mod: PO

## 2021-02-24 PROCEDURE — 36415 COLL VENOUS BLD VENIPUNCTURE: CPT | Mod: PN

## 2021-02-24 PROCEDURE — 84443 ASSAY THYROID STIM HORMONE: CPT

## 2021-02-24 PROCEDURE — 85027 COMPLETE CBC AUTOMATED: CPT | Mod: PO

## 2021-02-24 PROCEDURE — 84439 ASSAY OF FREE THYROXINE: CPT

## 2021-02-25 ENCOUNTER — OFFICE VISIT (OUTPATIENT)
Dept: HEMATOLOGY/ONCOLOGY | Facility: CLINIC | Age: 41
End: 2021-02-25
Payer: COMMERCIAL

## 2021-02-25 ENCOUNTER — PATIENT MESSAGE (OUTPATIENT)
Dept: HEMATOLOGY/ONCOLOGY | Facility: CLINIC | Age: 41
End: 2021-02-25

## 2021-02-25 ENCOUNTER — INFUSION (OUTPATIENT)
Dept: INFUSION THERAPY | Facility: HOSPITAL | Age: 41
End: 2021-02-25
Attending: INTERNAL MEDICINE
Payer: COMMERCIAL

## 2021-02-25 VITALS
HEIGHT: 67 IN | BODY MASS INDEX: 20.03 KG/M2 | TEMPERATURE: 98 F | RESPIRATION RATE: 18 BRPM | HEART RATE: 68 BPM | WEIGHT: 127.63 LBS | SYSTOLIC BLOOD PRESSURE: 108 MMHG | DIASTOLIC BLOOD PRESSURE: 74 MMHG

## 2021-02-25 VITALS
RESPIRATION RATE: 18 BRPM | DIASTOLIC BLOOD PRESSURE: 59 MMHG | WEIGHT: 127.63 LBS | TEMPERATURE: 98 F | BODY MASS INDEX: 20.03 KG/M2 | HEART RATE: 67 BPM | SYSTOLIC BLOOD PRESSURE: 124 MMHG | OXYGEN SATURATION: 100 % | HEIGHT: 67 IN

## 2021-02-25 DIAGNOSIS — D05.12 DUCTAL CARCINOMA IN SITU OF LEFT BREAST: ICD-10-CM

## 2021-02-25 DIAGNOSIS — Z17.1 MALIGNANT NEOPLASM OF UPPER-OUTER QUADRANT OF LEFT BREAST IN FEMALE, ESTROGEN RECEPTOR NEGATIVE: Primary | ICD-10-CM

## 2021-02-25 DIAGNOSIS — N64.4 BREAST PAIN, LEFT: ICD-10-CM

## 2021-02-25 DIAGNOSIS — C50.412 MALIGNANT NEOPLASM OF UPPER-OUTER QUADRANT OF LEFT BREAST IN FEMALE, ESTROGEN RECEPTOR NEGATIVE: Primary | ICD-10-CM

## 2021-02-25 LAB
T4 FREE SERPL-MCNC: 1.04 NG/DL (ref 0.71–1.51)
TSH SERPL DL<=0.005 MIU/L-ACNC: 0.67 UIU/ML (ref 0.4–4)

## 2021-02-25 PROCEDURE — 63600175 PHARM REV CODE 636 W HCPCS: Performed by: INTERNAL MEDICINE

## 2021-02-25 PROCEDURE — 99215 OFFICE O/P EST HI 40 MIN: CPT | Mod: S$GLB,,, | Performed by: NURSE PRACTITIONER

## 2021-02-25 PROCEDURE — 96375 TX/PRO/DX INJ NEW DRUG ADDON: CPT

## 2021-02-25 PROCEDURE — 99999 PR PBB SHADOW E&M-EST. PATIENT-LVL III: ICD-10-PCS | Mod: PBBFAC,,, | Performed by: NURSE PRACTITIONER

## 2021-02-25 PROCEDURE — 3008F BODY MASS INDEX DOCD: CPT | Mod: CPTII,S$GLB,, | Performed by: NURSE PRACTITIONER

## 2021-02-25 PROCEDURE — 99215 PR OFFICE/OUTPT VISIT, EST, LEVL V, 40-54 MIN: ICD-10-PCS | Mod: S$GLB,,, | Performed by: NURSE PRACTITIONER

## 2021-02-25 PROCEDURE — 96413 CHEMO IV INFUSION 1 HR: CPT

## 2021-02-25 PROCEDURE — 96367 TX/PROPH/DG ADDL SEQ IV INF: CPT

## 2021-02-25 PROCEDURE — 1125F AMNT PAIN NOTED PAIN PRSNT: CPT | Mod: S$GLB,,, | Performed by: NURSE PRACTITIONER

## 2021-02-25 PROCEDURE — 25000003 PHARM REV CODE 250: Performed by: INTERNAL MEDICINE

## 2021-02-25 PROCEDURE — 3008F PR BODY MASS INDEX (BMI) DOCUMENTED: ICD-10-PCS | Mod: CPTII,S$GLB,, | Performed by: NURSE PRACTITIONER

## 2021-02-25 PROCEDURE — 96415 CHEMO IV INFUSION ADDL HR: CPT

## 2021-02-25 PROCEDURE — 96417 CHEMO IV INFUS EACH ADDL SEQ: CPT

## 2021-02-25 PROCEDURE — 96361 HYDRATE IV INFUSION ADD-ON: CPT

## 2021-02-25 PROCEDURE — 99999 PR PBB SHADOW E&M-EST. PATIENT-LVL III: CPT | Mod: PBBFAC,,, | Performed by: NURSE PRACTITIONER

## 2021-02-25 PROCEDURE — 1125F PR PAIN SEVERITY QUANTIFIED, PAIN PRESENT: ICD-10-PCS | Mod: S$GLB,,, | Performed by: NURSE PRACTITIONER

## 2021-02-25 PROCEDURE — A4216 STERILE WATER/SALINE, 10 ML: HCPCS | Performed by: INTERNAL MEDICINE

## 2021-02-25 RX ORDER — SODIUM CHLORIDE 0.9 % (FLUSH) 0.9 %
10 SYRINGE (ML) INJECTION
Status: CANCELLED | OUTPATIENT
Start: 2021-02-26

## 2021-02-25 RX ORDER — SODIUM CHLORIDE 0.9 % (FLUSH) 0.9 %
10 SYRINGE (ML) INJECTION
Status: DISCONTINUED | OUTPATIENT
Start: 2021-02-25 | End: 2021-02-25 | Stop reason: HOSPADM

## 2021-02-25 RX ORDER — FAMOTIDINE 10 MG/ML
20 INJECTION INTRAVENOUS
Status: CANCELLED
Start: 2021-02-26

## 2021-02-25 RX ORDER — FAMOTIDINE 10 MG/ML
20 INJECTION INTRAVENOUS
Status: COMPLETED | OUTPATIENT
Start: 2021-02-25 | End: 2021-02-25

## 2021-02-25 RX ORDER — HEPARIN 100 UNIT/ML
500 SYRINGE INTRAVENOUS
Status: CANCELLED | OUTPATIENT
Start: 2021-02-25

## 2021-02-25 RX ORDER — SODIUM CHLORIDE 0.9 % (FLUSH) 0.9 %
10 SYRINGE (ML) INJECTION
Status: CANCELLED | OUTPATIENT
Start: 2021-02-25

## 2021-02-25 RX ORDER — HEPARIN 100 UNIT/ML
500 SYRINGE INTRAVENOUS
Status: CANCELLED | OUTPATIENT
Start: 2021-02-26

## 2021-02-25 RX ORDER — FAMOTIDINE 10 MG/ML
20 INJECTION INTRAVENOUS
Status: CANCELLED
Start: 2021-02-25

## 2021-02-25 RX ORDER — HEPARIN 100 UNIT/ML
500 SYRINGE INTRAVENOUS
Status: DISCONTINUED | OUTPATIENT
Start: 2021-02-25 | End: 2021-02-25 | Stop reason: HOSPADM

## 2021-02-25 RX ORDER — FAMOTIDINE 10 MG/ML
20 INJECTION INTRAVENOUS
Status: CANCELLED
Start: 2021-03-11

## 2021-02-25 RX ORDER — HEPARIN 100 UNIT/ML
500 SYRINGE INTRAVENOUS
Status: CANCELLED | OUTPATIENT
Start: 2021-03-11

## 2021-02-25 RX ORDER — SODIUM CHLORIDE 0.9 % (FLUSH) 0.9 %
10 SYRINGE (ML) INJECTION
Status: CANCELLED | OUTPATIENT
Start: 2021-03-11

## 2021-02-25 RX ADMIN — APREPITANT 130 MG: 130 INJECTION, EMULSION INTRAVENOUS at 11:02

## 2021-02-25 RX ADMIN — SODIUM CHLORIDE 500 ML: 0.9 INJECTION, SOLUTION INTRAVENOUS at 02:02

## 2021-02-25 RX ADMIN — DEXAMETHASONE SODIUM PHOSPHATE 0.25 MG: 4 INJECTION, SOLUTION INTRA-ARTICULAR; INTRALESIONAL; INTRAMUSCULAR; INTRAVENOUS; SOFT TISSUE at 10:02

## 2021-02-25 RX ADMIN — HEPARIN 500 UNITS: 100 SYRINGE at 04:02

## 2021-02-25 RX ADMIN — PACLITAXEL 132 MG: 6 INJECTION, SOLUTION, CONCENTRATE INTRAVENOUS at 11:02

## 2021-02-25 RX ADMIN — DIPHENHYDRAMINE HYDROCHLORIDE 50 MG: 50 INJECTION, SOLUTION INTRAMUSCULAR; INTRAVENOUS at 10:02

## 2021-02-25 RX ADMIN — Medication 10 ML: at 04:02

## 2021-02-25 RX ADMIN — FAMOTIDINE 20 MG: 10 INJECTION INTRAVENOUS at 11:02

## 2021-02-25 RX ADMIN — CARBOPLATIN 605 MG: 10 INJECTION INTRAVENOUS at 01:02

## 2021-02-25 RX ADMIN — SODIUM CHLORIDE 200 MG: 9 INJECTION, SOLUTION INTRAVENOUS at 09:02

## 2021-02-26 ENCOUNTER — TELEPHONE (OUTPATIENT)
Dept: HEMATOLOGY/ONCOLOGY | Facility: CLINIC | Age: 41
End: 2021-02-26

## 2021-02-26 ENCOUNTER — PATIENT MESSAGE (OUTPATIENT)
Dept: HEMATOLOGY/ONCOLOGY | Facility: CLINIC | Age: 41
End: 2021-02-26

## 2021-02-26 DIAGNOSIS — C50.412 MALIGNANT NEOPLASM OF UPPER-OUTER QUADRANT OF LEFT FEMALE BREAST, UNSPECIFIED ESTROGEN RECEPTOR STATUS: Primary | ICD-10-CM

## 2021-02-26 DIAGNOSIS — R11.0 CHEMOTHERAPY-INDUCED NAUSEA: Primary | ICD-10-CM

## 2021-02-26 DIAGNOSIS — Z17.1 MALIGNANT NEOPLASM OF UPPER-OUTER QUADRANT OF LEFT BREAST IN FEMALE, ESTROGEN RECEPTOR NEGATIVE: ICD-10-CM

## 2021-02-26 DIAGNOSIS — C50.412 MALIGNANT NEOPLASM OF UPPER-OUTER QUADRANT OF LEFT BREAST IN FEMALE, ESTROGEN RECEPTOR NEGATIVE: ICD-10-CM

## 2021-02-26 DIAGNOSIS — T45.1X5A CHEMOTHERAPY-INDUCED NAUSEA: Primary | ICD-10-CM

## 2021-02-26 DIAGNOSIS — R11.0 CHEMOTHERAPY-INDUCED NAUSEA: ICD-10-CM

## 2021-02-26 DIAGNOSIS — T45.1X5A CHEMOTHERAPY-INDUCED NAUSEA: ICD-10-CM

## 2021-02-26 RX ORDER — PROMETHAZINE HYDROCHLORIDE 25 MG/1
25 TABLET ORAL EVERY 6 HOURS PRN
Qty: 30 TABLET | Refills: 2 | Status: SHIPPED | OUTPATIENT
Start: 2021-02-26 | End: 2021-02-26 | Stop reason: SDUPTHER

## 2021-02-26 RX ORDER — PROMETHAZINE HYDROCHLORIDE 25 MG/1
25 TABLET ORAL EVERY 6 HOURS PRN
Qty: 30 TABLET | Refills: 2 | Status: SHIPPED | OUTPATIENT
Start: 2021-02-26 | End: 2021-03-05

## 2021-02-28 ENCOUNTER — PATIENT MESSAGE (OUTPATIENT)
Dept: HEMATOLOGY/ONCOLOGY | Facility: CLINIC | Age: 41
End: 2021-02-28

## 2021-02-28 DIAGNOSIS — T45.1X5A CHEMOTHERAPY INDUCED NAUSEA AND VOMITING: Primary | ICD-10-CM

## 2021-02-28 DIAGNOSIS — R11.2 CHEMOTHERAPY INDUCED NAUSEA AND VOMITING: Primary | ICD-10-CM

## 2021-03-01 ENCOUNTER — PATIENT MESSAGE (OUTPATIENT)
Dept: HEMATOLOGY/ONCOLOGY | Facility: CLINIC | Age: 41
End: 2021-03-01

## 2021-03-01 RX ORDER — OLANZAPINE 5 MG/1
5 TABLET ORAL NIGHTLY
Qty: 30 TABLET | Refills: 2 | Status: SHIPPED | OUTPATIENT
Start: 2021-03-01 | End: 2021-03-16 | Stop reason: SDUPTHER

## 2021-03-03 ENCOUNTER — TELEPHONE (OUTPATIENT)
Dept: HEMATOLOGY/ONCOLOGY | Facility: CLINIC | Age: 41
End: 2021-03-03

## 2021-03-03 ENCOUNTER — INFUSION (OUTPATIENT)
Dept: INFUSION THERAPY | Facility: HOSPITAL | Age: 41
End: 2021-03-03
Attending: INTERNAL MEDICINE
Payer: COMMERCIAL

## 2021-03-03 ENCOUNTER — CLINICAL SUPPORT (OUTPATIENT)
Dept: REHABILITATION | Facility: HOSPITAL | Age: 41
End: 2021-03-03
Payer: COMMERCIAL

## 2021-03-03 DIAGNOSIS — Z17.1 MALIGNANT NEOPLASM OF UPPER-OUTER QUADRANT OF LEFT BREAST IN FEMALE, ESTROGEN RECEPTOR NEGATIVE: Primary | ICD-10-CM

## 2021-03-03 DIAGNOSIS — M25.60 DECREASED RANGE OF MOTION: ICD-10-CM

## 2021-03-03 DIAGNOSIS — C50.412 MALIGNANT NEOPLASM OF UPPER-OUTER QUADRANT OF LEFT BREAST IN FEMALE, ESTROGEN RECEPTOR NEGATIVE: Primary | ICD-10-CM

## 2021-03-03 DIAGNOSIS — R26.89 DECREASED FUNCTIONAL MOBILITY: ICD-10-CM

## 2021-03-03 LAB
ALBUMIN SERPL BCP-MCNC: 4.3 G/DL (ref 3.5–5.2)
ALP SERPL-CCNC: 68 U/L (ref 38–145)
ALT SERPL W/O P-5'-P-CCNC: 15 U/L (ref 0–35)
ANION GAP SERPL CALC-SCNC: 8 MMOL/L (ref 8–16)
AST SERPL-CCNC: 25 U/L (ref 14–36)
BILIRUB SERPL-MCNC: 0.2 MG/DL (ref 0.2–1.3)
CALCIUM SERPL-MCNC: 9 MG/DL (ref 8.4–10.2)
CHLORIDE SERPL-SCNC: 102 MMOL/L (ref 95–110)
CO2 SERPL-SCNC: 27 MMOL/L (ref 22–31)
CREAT SERPL-MCNC: 0.61 MG/DL (ref 0.5–1.4)
ERYTHROCYTE [DISTWIDTH] IN BLOOD BY AUTOMATED COUNT: 11.8 % (ref 11.5–14.5)
EST. GFR  (AFRICAN AMERICAN): >60 ML/MIN/1.73 M^2
EST. GFR  (NON AFRICAN AMERICAN): >60 ML/MIN/1.73 M^2
GLUCOSE SERPL-MCNC: 107 MG/DL (ref 70–110)
HCT VFR BLD AUTO: 39 % (ref 37–48.5)
HGB BLD-MCNC: 13.1 G/DL (ref 12–16)
IMM GRANULOCYTES # BLD AUTO: 0.08 K/UL (ref 0–0.04)
MCH RBC QN AUTO: 30.8 PG (ref 27–31)
MCHC RBC AUTO-ENTMCNC: 33.6 G/DL (ref 32–36)
MCV RBC AUTO: 92 FL (ref 82–98)
NEUTROPHILS # BLD AUTO: 5.7 K/UL (ref 1.8–7.7)
PLATELET # BLD AUTO: 258 K/UL (ref 150–350)
PMV BLD AUTO: 10 FL (ref 9.2–12.9)
POTASSIUM SERPL-SCNC: 4 MMOL/L (ref 3.5–5.1)
PROT SERPL-MCNC: 7.2 G/DL (ref 6–8.4)
RBC # BLD AUTO: 4.26 M/UL (ref 4–5.4)
SODIUM SERPL-SCNC: 137 MMOL/L (ref 136–145)
UUN UR-MCNC: 15 MG/DL (ref 7–18)
WBC # BLD AUTO: 8.24 K/UL (ref 3.9–12.7)

## 2021-03-03 PROCEDURE — 85027 COMPLETE CBC AUTOMATED: CPT | Performed by: NURSE PRACTITIONER

## 2021-03-03 PROCEDURE — 25000003 PHARM REV CODE 250: Mod: PN | Performed by: INTERNAL MEDICINE

## 2021-03-03 PROCEDURE — A4216 STERILE WATER/SALINE, 10 ML: HCPCS | Mod: PN | Performed by: INTERNAL MEDICINE

## 2021-03-03 PROCEDURE — 80053 COMPREHEN METABOLIC PANEL: CPT | Performed by: NURSE PRACTITIONER

## 2021-03-03 PROCEDURE — 85027 COMPLETE CBC AUTOMATED: CPT | Mod: PN | Performed by: NURSE PRACTITIONER

## 2021-03-03 PROCEDURE — 36591 DRAW BLOOD OFF VENOUS DEVICE: CPT | Mod: PN

## 2021-03-03 PROCEDURE — 80053 COMPREHEN METABOLIC PANEL: CPT | Mod: PN | Performed by: NURSE PRACTITIONER

## 2021-03-03 PROCEDURE — 97110 THERAPEUTIC EXERCISES: CPT | Mod: PN,97

## 2021-03-03 PROCEDURE — 63600175 PHARM REV CODE 636 W HCPCS: Mod: PN | Performed by: INTERNAL MEDICINE

## 2021-03-03 RX ORDER — SODIUM CHLORIDE 0.9 % (FLUSH) 0.9 %
10 SYRINGE (ML) INJECTION
Status: DISCONTINUED | OUTPATIENT
Start: 2021-03-03 | End: 2021-03-03 | Stop reason: HOSPADM

## 2021-03-03 RX ORDER — SODIUM CHLORIDE 0.9 % (FLUSH) 0.9 %
10 SYRINGE (ML) INJECTION
Status: CANCELLED | OUTPATIENT
Start: 2021-03-03

## 2021-03-03 RX ORDER — HEPARIN 100 UNIT/ML
500 SYRINGE INTRAVENOUS
Status: DISCONTINUED | OUTPATIENT
Start: 2021-03-03 | End: 2021-03-03 | Stop reason: HOSPADM

## 2021-03-03 RX ORDER — HEPARIN 100 UNIT/ML
500 SYRINGE INTRAVENOUS
Status: CANCELLED | OUTPATIENT
Start: 2021-03-03

## 2021-03-03 RX ADMIN — Medication 10 ML: at 01:03

## 2021-03-03 RX ADMIN — HEPARIN 500 UNITS: 100 SYRINGE at 01:03

## 2021-03-04 ENCOUNTER — INFUSION (OUTPATIENT)
Dept: INFUSION THERAPY | Facility: HOSPITAL | Age: 41
End: 2021-03-04
Payer: COMMERCIAL

## 2021-03-04 VITALS
DIASTOLIC BLOOD PRESSURE: 84 MMHG | HEIGHT: 67 IN | BODY MASS INDEX: 20.1 KG/M2 | RESPIRATION RATE: 18 BRPM | WEIGHT: 128.06 LBS | TEMPERATURE: 98 F | HEART RATE: 82 BPM | SYSTOLIC BLOOD PRESSURE: 132 MMHG

## 2021-03-04 DIAGNOSIS — E86.0 DEHYDRATION: ICD-10-CM

## 2021-03-04 DIAGNOSIS — C50.412 MALIGNANT NEOPLASM OF UPPER-OUTER QUADRANT OF LEFT BREAST IN FEMALE, ESTROGEN RECEPTOR NEGATIVE: Primary | ICD-10-CM

## 2021-03-04 DIAGNOSIS — Z17.1 MALIGNANT NEOPLASM OF UPPER-OUTER QUADRANT OF LEFT BREAST IN FEMALE, ESTROGEN RECEPTOR NEGATIVE: Primary | ICD-10-CM

## 2021-03-04 PROCEDURE — A4216 STERILE WATER/SALINE, 10 ML: HCPCS | Performed by: INTERNAL MEDICINE

## 2021-03-04 PROCEDURE — 96367 TX/PROPH/DG ADDL SEQ IV INF: CPT

## 2021-03-04 PROCEDURE — 96413 CHEMO IV INFUSION 1 HR: CPT

## 2021-03-04 PROCEDURE — 63600175 PHARM REV CODE 636 W HCPCS: Performed by: INTERNAL MEDICINE

## 2021-03-04 PROCEDURE — 96375 TX/PRO/DX INJ NEW DRUG ADDON: CPT

## 2021-03-04 PROCEDURE — 96415 CHEMO IV INFUSION ADDL HR: CPT

## 2021-03-04 PROCEDURE — 25000003 PHARM REV CODE 250: Performed by: INTERNAL MEDICINE

## 2021-03-04 RX ORDER — SODIUM CHLORIDE 9 MG/ML
INJECTION, SOLUTION INTRAVENOUS CONTINUOUS
Status: CANCELLED
Start: 2021-03-04

## 2021-03-04 RX ORDER — SODIUM CHLORIDE 9 MG/ML
INJECTION, SOLUTION INTRAVENOUS CONTINUOUS
Status: DISCONTINUED | OUTPATIENT
Start: 2021-03-04 | End: 2021-03-04 | Stop reason: HOSPADM

## 2021-03-04 RX ORDER — FAMOTIDINE 10 MG/ML
20 INJECTION INTRAVENOUS
Status: COMPLETED | OUTPATIENT
Start: 2021-03-04 | End: 2021-03-04

## 2021-03-04 RX ORDER — SODIUM CHLORIDE 0.9 % (FLUSH) 0.9 %
10 SYRINGE (ML) INJECTION
Status: DISCONTINUED | OUTPATIENT
Start: 2021-03-04 | End: 2021-03-04 | Stop reason: HOSPADM

## 2021-03-04 RX ORDER — HEPARIN 100 UNIT/ML
500 SYRINGE INTRAVENOUS
Status: DISCONTINUED | OUTPATIENT
Start: 2021-03-04 | End: 2021-03-04 | Stop reason: HOSPADM

## 2021-03-04 RX ORDER — HEPARIN 100 UNIT/ML
500 SYRINGE INTRAVENOUS
Status: CANCELLED | OUTPATIENT
Start: 2021-03-04

## 2021-03-04 RX ORDER — SODIUM CHLORIDE 0.9 % (FLUSH) 0.9 %
10 SYRINGE (ML) INJECTION
Status: CANCELLED | OUTPATIENT
Start: 2021-03-04

## 2021-03-04 RX ADMIN — Medication 10 ML: at 02:03

## 2021-03-04 RX ADMIN — SODIUM CHLORIDE: 0.9 INJECTION, SOLUTION INTRAVENOUS at 09:03

## 2021-03-04 RX ADMIN — PACLITAXEL 132 MG: 6 INJECTION, SOLUTION, CONCENTRATE INTRAVENOUS at 10:03

## 2021-03-04 RX ADMIN — FAMOTIDINE 20 MG: 10 INJECTION INTRAVENOUS at 10:03

## 2021-03-04 RX ADMIN — DIPHENHYDRAMINE HYDROCHLORIDE 50 MG: 50 INJECTION, SOLUTION INTRAMUSCULAR; INTRAVENOUS at 10:03

## 2021-03-04 RX ADMIN — DEXAMETHASONE SODIUM PHOSPHATE 10 MG: 4 INJECTION, SOLUTION INTRA-ARTICULAR; INTRALESIONAL; INTRAMUSCULAR; INTRAVENOUS; SOFT TISSUE at 10:03

## 2021-03-04 RX ADMIN — HEPARIN 500 UNITS: 100 SYRINGE at 02:03

## 2021-03-04 RX ADMIN — SODIUM CHLORIDE: 0.9 INJECTION, SOLUTION INTRAVENOUS at 01:03

## 2021-03-05 ENCOUNTER — PATIENT MESSAGE (OUTPATIENT)
Dept: HEMATOLOGY/ONCOLOGY | Facility: CLINIC | Age: 41
End: 2021-03-05

## 2021-03-08 ENCOUNTER — PATIENT MESSAGE (OUTPATIENT)
Dept: HEMATOLOGY/ONCOLOGY | Facility: CLINIC | Age: 41
End: 2021-03-08

## 2021-03-08 ENCOUNTER — TELEPHONE (OUTPATIENT)
Dept: SURGERY | Facility: CLINIC | Age: 41
End: 2021-03-08

## 2021-03-08 DIAGNOSIS — Z15.09 BRCA2 GENE MUTATION POSITIVE IN FEMALE: Primary | ICD-10-CM

## 2021-03-08 DIAGNOSIS — Z15.02 BRCA2 GENE MUTATION POSITIVE IN FEMALE: Primary | ICD-10-CM

## 2021-03-08 DIAGNOSIS — Z15.01 BRCA2 GENE MUTATION POSITIVE IN FEMALE: Primary | ICD-10-CM

## 2021-03-10 ENCOUNTER — INFUSION (OUTPATIENT)
Dept: INFUSION THERAPY | Facility: HOSPITAL | Age: 41
End: 2021-03-10
Attending: INTERNAL MEDICINE
Payer: COMMERCIAL

## 2021-03-10 ENCOUNTER — CLINICAL SUPPORT (OUTPATIENT)
Dept: REHABILITATION | Facility: HOSPITAL | Age: 41
End: 2021-03-10
Payer: COMMERCIAL

## 2021-03-10 DIAGNOSIS — R26.89 DECREASED FUNCTIONAL MOBILITY: ICD-10-CM

## 2021-03-10 DIAGNOSIS — C50.412 MALIGNANT NEOPLASM OF UPPER-OUTER QUADRANT OF LEFT BREAST IN FEMALE, ESTROGEN RECEPTOR NEGATIVE: Primary | ICD-10-CM

## 2021-03-10 DIAGNOSIS — Z17.1 MALIGNANT NEOPLASM OF UPPER-OUTER QUADRANT OF LEFT BREAST IN FEMALE, ESTROGEN RECEPTOR NEGATIVE: Primary | ICD-10-CM

## 2021-03-10 DIAGNOSIS — M25.60 DECREASED RANGE OF MOTION: ICD-10-CM

## 2021-03-10 LAB
25(OH)D3+25(OH)D2 SERPL-MCNC: 62 NG/ML (ref 30–96)
ALBUMIN SERPL BCP-MCNC: 3.9 G/DL (ref 3.5–5.2)
ALP SERPL-CCNC: 63 U/L (ref 38–145)
ALT SERPL W/O P-5'-P-CCNC: 26 U/L (ref 0–35)
ANION GAP SERPL CALC-SCNC: 6 MMOL/L (ref 8–16)
AST SERPL-CCNC: 29 U/L (ref 14–36)
BILIRUB SERPL-MCNC: 0.2 MG/DL (ref 0.2–1.3)
CALCIUM SERPL-MCNC: 8.8 MG/DL (ref 8.4–10.2)
CHLORIDE SERPL-SCNC: 104 MMOL/L (ref 95–110)
CO2 SERPL-SCNC: 29 MMOL/L (ref 22–31)
CREAT SERPL-MCNC: 0.52 MG/DL (ref 0.5–1.4)
ERYTHROCYTE [DISTWIDTH] IN BLOOD BY AUTOMATED COUNT: 12.4 % (ref 11.5–14.5)
EST. GFR  (AFRICAN AMERICAN): >60 ML/MIN/1.73 M^2
EST. GFR  (NON AFRICAN AMERICAN): >60 ML/MIN/1.73 M^2
GLUCOSE SERPL-MCNC: 95 MG/DL (ref 70–110)
HCT VFR BLD AUTO: 34.6 % (ref 37–48.5)
HGB BLD-MCNC: 11.5 G/DL (ref 12–16)
IMM GRANULOCYTES # BLD AUTO: 0.03 K/UL (ref 0–0.04)
MCH RBC QN AUTO: 31.3 PG (ref 27–31)
MCHC RBC AUTO-ENTMCNC: 33.2 G/DL (ref 32–36)
MCV RBC AUTO: 94 FL (ref 82–98)
NEUTROPHILS # BLD AUTO: 2 K/UL (ref 1.8–7.7)
PLATELET # BLD AUTO: 269 K/UL (ref 150–350)
PMV BLD AUTO: 9.5 FL (ref 9.2–12.9)
POTASSIUM SERPL-SCNC: 3.9 MMOL/L (ref 3.5–5.1)
PROT SERPL-MCNC: 6.5 G/DL (ref 6–8.4)
RBC # BLD AUTO: 3.68 M/UL (ref 4–5.4)
SODIUM SERPL-SCNC: 139 MMOL/L (ref 136–145)
UUN UR-MCNC: 11 MG/DL (ref 7–18)
WBC # BLD AUTO: 4.85 K/UL (ref 3.9–12.7)

## 2021-03-10 PROCEDURE — 80053 COMPREHEN METABOLIC PANEL: CPT | Mod: PN | Performed by: INTERNAL MEDICINE

## 2021-03-10 PROCEDURE — 82306 VITAMIN D 25 HYDROXY: CPT | Performed by: INTERNAL MEDICINE

## 2021-03-10 PROCEDURE — A4216 STERILE WATER/SALINE, 10 ML: HCPCS | Mod: PN | Performed by: INTERNAL MEDICINE

## 2021-03-10 PROCEDURE — 85027 COMPLETE CBC AUTOMATED: CPT | Mod: PN | Performed by: INTERNAL MEDICINE

## 2021-03-10 PROCEDURE — 36591 DRAW BLOOD OFF VENOUS DEVICE: CPT | Mod: PN

## 2021-03-10 PROCEDURE — 82306 VITAMIN D 25 HYDROXY: CPT | Mod: PN | Performed by: INTERNAL MEDICINE

## 2021-03-10 PROCEDURE — 25000003 PHARM REV CODE 250: Mod: PN | Performed by: INTERNAL MEDICINE

## 2021-03-10 PROCEDURE — 97110 THERAPEUTIC EXERCISES: CPT | Mod: PN,97

## 2021-03-10 PROCEDURE — 63600175 PHARM REV CODE 636 W HCPCS: Mod: PN | Performed by: INTERNAL MEDICINE

## 2021-03-10 PROCEDURE — 80053 COMPREHEN METABOLIC PANEL: CPT | Performed by: INTERNAL MEDICINE

## 2021-03-10 PROCEDURE — 85027 COMPLETE CBC AUTOMATED: CPT | Performed by: INTERNAL MEDICINE

## 2021-03-10 RX ORDER — HEPARIN 100 UNIT/ML
500 SYRINGE INTRAVENOUS
Status: CANCELLED | OUTPATIENT
Start: 2021-03-10

## 2021-03-10 RX ORDER — SODIUM CHLORIDE 0.9 % (FLUSH) 0.9 %
10 SYRINGE (ML) INJECTION
Status: CANCELLED | OUTPATIENT
Start: 2021-03-10

## 2021-03-10 RX ORDER — HEPARIN 100 UNIT/ML
500 SYRINGE INTRAVENOUS
Status: DISCONTINUED | OUTPATIENT
Start: 2021-03-10 | End: 2021-03-10 | Stop reason: HOSPADM

## 2021-03-10 RX ORDER — SODIUM CHLORIDE 0.9 % (FLUSH) 0.9 %
10 SYRINGE (ML) INJECTION
Status: DISCONTINUED | OUTPATIENT
Start: 2021-03-10 | End: 2021-03-10 | Stop reason: HOSPADM

## 2021-03-10 RX ADMIN — Medication 10 ML: at 04:03

## 2021-03-10 RX ADMIN — HEPARIN 500 UNITS: 100 SYRINGE at 04:03

## 2021-03-11 ENCOUNTER — OFFICE VISIT (OUTPATIENT)
Dept: HEMATOLOGY/ONCOLOGY | Facility: CLINIC | Age: 41
End: 2021-03-11
Payer: COMMERCIAL

## 2021-03-11 ENCOUNTER — PATIENT MESSAGE (OUTPATIENT)
Dept: HEMATOLOGY/ONCOLOGY | Facility: CLINIC | Age: 41
End: 2021-03-11

## 2021-03-11 ENCOUNTER — INFUSION (OUTPATIENT)
Dept: INFUSION THERAPY | Facility: HOSPITAL | Age: 41
End: 2021-03-11
Attending: INTERNAL MEDICINE
Payer: COMMERCIAL

## 2021-03-11 VITALS
HEIGHT: 67 IN | HEART RATE: 80 BPM | RESPIRATION RATE: 18 BRPM | DIASTOLIC BLOOD PRESSURE: 73 MMHG | OXYGEN SATURATION: 98 % | BODY MASS INDEX: 20.76 KG/M2 | WEIGHT: 132.25 LBS | TEMPERATURE: 98 F | SYSTOLIC BLOOD PRESSURE: 127 MMHG

## 2021-03-11 VITALS
RESPIRATION RATE: 17 BRPM | HEART RATE: 70 BPM | SYSTOLIC BLOOD PRESSURE: 132 MMHG | DIASTOLIC BLOOD PRESSURE: 74 MMHG | TEMPERATURE: 98 F | HEIGHT: 67 IN | BODY MASS INDEX: 20.69 KG/M2 | WEIGHT: 131.81 LBS

## 2021-03-11 VITALS
HEART RATE: 71 BPM | DIASTOLIC BLOOD PRESSURE: 68 MMHG | WEIGHT: 131.81 LBS | HEIGHT: 67 IN | SYSTOLIC BLOOD PRESSURE: 120 MMHG | BODY MASS INDEX: 20.69 KG/M2

## 2021-03-11 DIAGNOSIS — Z15.01 BRCA2 GENE MUTATION POSITIVE: ICD-10-CM

## 2021-03-11 DIAGNOSIS — Z17.1 MALIGNANT NEOPLASM OF UPPER-OUTER QUADRANT OF LEFT BREAST IN FEMALE, ESTROGEN RECEPTOR NEGATIVE: Primary | ICD-10-CM

## 2021-03-11 DIAGNOSIS — Z15.09 BRCA2 GENE MUTATION POSITIVE: ICD-10-CM

## 2021-03-11 DIAGNOSIS — D05.12 DUCTAL CARCINOMA IN SITU OF LEFT BREAST: ICD-10-CM

## 2021-03-11 DIAGNOSIS — T45.1X5A CHEMOTHERAPY-INDUCED NAUSEA: ICD-10-CM

## 2021-03-11 DIAGNOSIS — C50.412 MALIGNANT NEOPLASM OF UPPER-OUTER QUADRANT OF LEFT BREAST IN FEMALE, ESTROGEN RECEPTOR NEGATIVE: Primary | ICD-10-CM

## 2021-03-11 DIAGNOSIS — C50.412 MALIGNANT NEOPLASM OF UPPER-OUTER QUADRANT OF LEFT FEMALE BREAST, UNSPECIFIED ESTROGEN RECEPTOR STATUS: ICD-10-CM

## 2021-03-11 DIAGNOSIS — R11.0 CHEMOTHERAPY-INDUCED NAUSEA: ICD-10-CM

## 2021-03-11 DIAGNOSIS — T45.1X5A ANEMIA ASSOCIATED WITH CHEMOTHERAPY: ICD-10-CM

## 2021-03-11 DIAGNOSIS — Z17.1 MALIGNANT NEOPLASM OF UPPER-OUTER QUADRANT OF LEFT BREAST IN FEMALE, ESTROGEN RECEPTOR NEGATIVE: ICD-10-CM

## 2021-03-11 DIAGNOSIS — D64.81 ANEMIA ASSOCIATED WITH CHEMOTHERAPY: ICD-10-CM

## 2021-03-11 DIAGNOSIS — C50.412 MALIGNANT NEOPLASM OF UPPER-OUTER QUADRANT OF LEFT BREAST IN FEMALE, ESTROGEN RECEPTOR NEGATIVE: ICD-10-CM

## 2021-03-11 DIAGNOSIS — F41.9 ANXIETY: Primary | ICD-10-CM

## 2021-03-11 PROCEDURE — 96413 CHEMO IV INFUSION 1 HR: CPT

## 2021-03-11 PROCEDURE — 96375 TX/PRO/DX INJ NEW DRUG ADDON: CPT

## 2021-03-11 PROCEDURE — 99214 PR OFFICE/OUTPT VISIT, EST, LEVL IV, 30-39 MIN: ICD-10-PCS | Mod: S$GLB,,, | Performed by: INTERNAL MEDICINE

## 2021-03-11 PROCEDURE — 1126F PR PAIN SEVERITY QUANTIFIED, NO PAIN PRESENT: ICD-10-PCS | Mod: S$GLB,,, | Performed by: PHYSICIAN ASSISTANT

## 2021-03-11 PROCEDURE — 63600175 PHARM REV CODE 636 W HCPCS: Performed by: INTERNAL MEDICINE

## 2021-03-11 PROCEDURE — 1126F PR PAIN SEVERITY QUANTIFIED, NO PAIN PRESENT: ICD-10-PCS | Mod: S$GLB,,, | Performed by: INTERNAL MEDICINE

## 2021-03-11 PROCEDURE — 99999 PR PBB SHADOW E&M-EST. PATIENT-LVL IV: CPT | Mod: PBBFAC,,, | Performed by: INTERNAL MEDICINE

## 2021-03-11 PROCEDURE — 3008F BODY MASS INDEX DOCD: CPT | Mod: CPTII,S$GLB,, | Performed by: INTERNAL MEDICINE

## 2021-03-11 PROCEDURE — 3008F PR BODY MASS INDEX (BMI) DOCUMENTED: ICD-10-PCS | Mod: CPTII,S$GLB,, | Performed by: PHYSICIAN ASSISTANT

## 2021-03-11 PROCEDURE — 99999 PR PBB SHADOW E&M-EST. PATIENT-LVL V: ICD-10-PCS | Mod: PBBFAC,,, | Performed by: PHYSICIAN ASSISTANT

## 2021-03-11 PROCEDURE — 99999 PR PBB SHADOW E&M-EST. PATIENT-LVL V: CPT | Mod: PBBFAC,,, | Performed by: PHYSICIAN ASSISTANT

## 2021-03-11 PROCEDURE — 99204 OFFICE O/P NEW MOD 45 MIN: CPT | Mod: S$GLB,,, | Performed by: PHYSICIAN ASSISTANT

## 2021-03-11 PROCEDURE — 96367 TX/PROPH/DG ADDL SEQ IV INF: CPT

## 2021-03-11 PROCEDURE — 1126F AMNT PAIN NOTED NONE PRSNT: CPT | Mod: S$GLB,,, | Performed by: PHYSICIAN ASSISTANT

## 2021-03-11 PROCEDURE — 3008F BODY MASS INDEX DOCD: CPT | Mod: CPTII,S$GLB,, | Performed by: PHYSICIAN ASSISTANT

## 2021-03-11 PROCEDURE — 99214 OFFICE O/P EST MOD 30 MIN: CPT | Mod: S$GLB,,, | Performed by: INTERNAL MEDICINE

## 2021-03-11 PROCEDURE — 99204 PR OFFICE/OUTPT VISIT, NEW, LEVL IV, 45-59 MIN: ICD-10-PCS | Mod: S$GLB,,, | Performed by: PHYSICIAN ASSISTANT

## 2021-03-11 PROCEDURE — 3008F PR BODY MASS INDEX (BMI) DOCUMENTED: ICD-10-PCS | Mod: CPTII,S$GLB,, | Performed by: INTERNAL MEDICINE

## 2021-03-11 PROCEDURE — 99999 PR PBB SHADOW E&M-EST. PATIENT-LVL IV: ICD-10-PCS | Mod: PBBFAC,,, | Performed by: INTERNAL MEDICINE

## 2021-03-11 PROCEDURE — 25000003 PHARM REV CODE 250: Performed by: INTERNAL MEDICINE

## 2021-03-11 PROCEDURE — 1126F AMNT PAIN NOTED NONE PRSNT: CPT | Mod: S$GLB,,, | Performed by: INTERNAL MEDICINE

## 2021-03-11 PROCEDURE — A4216 STERILE WATER/SALINE, 10 ML: HCPCS | Performed by: INTERNAL MEDICINE

## 2021-03-11 RX ORDER — SODIUM CHLORIDE 0.9 % (FLUSH) 0.9 %
10 SYRINGE (ML) INJECTION
Status: DISCONTINUED | OUTPATIENT
Start: 2021-03-11 | End: 2021-03-11 | Stop reason: HOSPADM

## 2021-03-11 RX ORDER — HEPARIN 100 UNIT/ML
500 SYRINGE INTRAVENOUS
Status: DISCONTINUED | OUTPATIENT
Start: 2021-03-11 | End: 2021-03-11 | Stop reason: HOSPADM

## 2021-03-11 RX ORDER — FAMOTIDINE 10 MG/ML
20 INJECTION INTRAVENOUS
Status: COMPLETED | OUTPATIENT
Start: 2021-03-11 | End: 2021-03-11

## 2021-03-11 RX ADMIN — DEXAMETHASONE SODIUM PHOSPHATE 10 MG: 4 INJECTION, SOLUTION INTRA-ARTICULAR; INTRALESIONAL; INTRAMUSCULAR; INTRAVENOUS; SOFT TISSUE at 11:03

## 2021-03-11 RX ADMIN — HEPARIN 500 UNITS: 100 SYRINGE at 03:03

## 2021-03-11 RX ADMIN — SODIUM CHLORIDE: 0.9 INJECTION, SOLUTION INTRAVENOUS at 11:03

## 2021-03-11 RX ADMIN — DIPHENHYDRAMINE HYDROCHLORIDE 50 MG: 50 INJECTION, SOLUTION INTRAMUSCULAR; INTRAVENOUS at 12:03

## 2021-03-11 RX ADMIN — Medication 10 ML: at 03:03

## 2021-03-11 RX ADMIN — PACLITAXEL 132 MG: 6 INJECTION, SOLUTION, CONCENTRATE INTRAVENOUS at 12:03

## 2021-03-11 RX ADMIN — FAMOTIDINE 20 MG: 10 INJECTION INTRAVENOUS at 11:03

## 2021-03-12 ENCOUNTER — PATIENT MESSAGE (OUTPATIENT)
Dept: HEMATOLOGY/ONCOLOGY | Facility: CLINIC | Age: 41
End: 2021-03-12

## 2021-03-14 ENCOUNTER — PATIENT MESSAGE (OUTPATIENT)
Dept: HEMATOLOGY/ONCOLOGY | Facility: CLINIC | Age: 41
End: 2021-03-14

## 2021-03-15 PROBLEM — Z15.01 BRCA2 GENE MUTATION POSITIVE: Status: ACTIVE | Noted: 2021-03-15

## 2021-03-15 PROBLEM — Z15.09 BRCA2 GENE MUTATION POSITIVE: Status: ACTIVE | Noted: 2021-03-15

## 2021-03-16 DIAGNOSIS — Z17.1 MALIGNANT NEOPLASM OF UPPER-OUTER QUADRANT OF LEFT BREAST IN FEMALE, ESTROGEN RECEPTOR NEGATIVE: ICD-10-CM

## 2021-03-16 DIAGNOSIS — T45.1X5A CHEMOTHERAPY INDUCED NAUSEA AND VOMITING: ICD-10-CM

## 2021-03-16 DIAGNOSIS — C50.412 MALIGNANT NEOPLASM OF UPPER-OUTER QUADRANT OF LEFT BREAST IN FEMALE, ESTROGEN RECEPTOR NEGATIVE: ICD-10-CM

## 2021-03-16 DIAGNOSIS — R11.2 CHEMOTHERAPY INDUCED NAUSEA AND VOMITING: ICD-10-CM

## 2021-03-16 RX ORDER — OLANZAPINE 5 MG/1
5 TABLET ORAL NIGHTLY
Qty: 30 TABLET | Refills: 2 | Status: SHIPPED | OUTPATIENT
Start: 2021-03-16 | End: 2021-05-01 | Stop reason: SDUPTHER

## 2021-03-16 RX ORDER — LORAZEPAM 0.5 MG/1
0.5 TABLET ORAL EVERY 8 HOURS PRN
Qty: 30 TABLET | Refills: 0 | Status: SHIPPED | OUTPATIENT
Start: 2021-03-16 | End: 2021-05-20 | Stop reason: SDUPTHER

## 2021-03-17 ENCOUNTER — TELEPHONE (OUTPATIENT)
Dept: HEMATOLOGY/ONCOLOGY | Facility: CLINIC | Age: 41
End: 2021-03-17

## 2021-03-17 ENCOUNTER — CLINICAL SUPPORT (OUTPATIENT)
Dept: URGENT CARE | Facility: CLINIC | Age: 41
End: 2021-03-17
Payer: COMMERCIAL

## 2021-03-17 ENCOUNTER — INFUSION (OUTPATIENT)
Dept: INFUSION THERAPY | Facility: HOSPITAL | Age: 41
End: 2021-03-17
Attending: INTERNAL MEDICINE
Payer: COMMERCIAL

## 2021-03-17 ENCOUNTER — PATIENT MESSAGE (OUTPATIENT)
Dept: HEMATOLOGY/ONCOLOGY | Facility: CLINIC | Age: 41
End: 2021-03-17

## 2021-03-17 DIAGNOSIS — Z17.1 MALIGNANT NEOPLASM OF UPPER-OUTER QUADRANT OF LEFT BREAST IN FEMALE, ESTROGEN RECEPTOR NEGATIVE: Primary | ICD-10-CM

## 2021-03-17 DIAGNOSIS — C50.412 MALIGNANT NEOPLASM OF UPPER-OUTER QUADRANT OF LEFT BREAST IN FEMALE, ESTROGEN RECEPTOR NEGATIVE: Primary | ICD-10-CM

## 2021-03-17 DIAGNOSIS — Z11.52 ENCOUNTER FOR SCREENING FOR COVID-19: Primary | ICD-10-CM

## 2021-03-17 LAB
ALBUMIN SERPL BCP-MCNC: 4.3 G/DL (ref 3.5–5.2)
ALP SERPL-CCNC: 71 U/L (ref 38–145)
ALT SERPL W/O P-5'-P-CCNC: 44 U/L (ref 0–35)
ANION GAP SERPL CALC-SCNC: 8 MMOL/L (ref 8–16)
AST SERPL-CCNC: 37 U/L (ref 14–36)
BILIRUB SERPL-MCNC: 0.2 MG/DL (ref 0.2–1.3)
CALCIUM SERPL-MCNC: 9.2 MG/DL (ref 8.4–10.2)
CHLORIDE SERPL-SCNC: 102 MMOL/L (ref 95–110)
CO2 SERPL-SCNC: 27 MMOL/L (ref 22–31)
CREAT SERPL-MCNC: 0.68 MG/DL (ref 0.5–1.4)
CTP QC/QA: YES
ERYTHROCYTE [DISTWIDTH] IN BLOOD BY AUTOMATED COUNT: 12.9 % (ref 11.5–14.5)
EST. GFR  (AFRICAN AMERICAN): >60 ML/MIN/1.73 M^2
EST. GFR  (NON AFRICAN AMERICAN): >60 ML/MIN/1.73 M^2
GLUCOSE SERPL-MCNC: 92 MG/DL (ref 70–110)
HCT VFR BLD AUTO: 37.1 % (ref 37–48.5)
HGB BLD-MCNC: 12 G/DL (ref 12–16)
IMM GRANULOCYTES # BLD AUTO: 0.06 K/UL (ref 0–0.04)
MCH RBC QN AUTO: 30.6 PG (ref 27–31)
MCHC RBC AUTO-ENTMCNC: 32.3 G/DL (ref 32–36)
MCV RBC AUTO: 95 FL (ref 82–98)
NEUTROPHILS # BLD AUTO: 2.2 K/UL (ref 1.8–7.7)
PLATELET # BLD AUTO: 256 K/UL (ref 150–350)
PMV BLD AUTO: 9.6 FL (ref 9.2–12.9)
POTASSIUM SERPL-SCNC: 3.8 MMOL/L (ref 3.5–5.1)
PROT SERPL-MCNC: 7 G/DL (ref 6–8.4)
RBC # BLD AUTO: 3.92 M/UL (ref 4–5.4)
SARS-COV-2 RDRP RESP QL NAA+PROBE: NEGATIVE
SODIUM SERPL-SCNC: 137 MMOL/L (ref 136–145)
UUN UR-MCNC: 13 MG/DL (ref 7–18)
WBC # BLD AUTO: 5.53 K/UL (ref 3.9–12.7)

## 2021-03-17 PROCEDURE — 85027 COMPLETE CBC AUTOMATED: CPT | Performed by: NURSE PRACTITIONER

## 2021-03-17 PROCEDURE — U0002 COVID-19 LAB TEST NON-CDC: HCPCS | Mod: QW,S$GLB,, | Performed by: FAMILY MEDICINE

## 2021-03-17 PROCEDURE — 36591 DRAW BLOOD OFF VENOUS DEVICE: CPT | Mod: PN

## 2021-03-17 PROCEDURE — A4216 STERILE WATER/SALINE, 10 ML: HCPCS | Mod: PN | Performed by: INTERNAL MEDICINE

## 2021-03-17 PROCEDURE — 25000003 PHARM REV CODE 250: Mod: PN | Performed by: INTERNAL MEDICINE

## 2021-03-17 PROCEDURE — 80053 COMPREHEN METABOLIC PANEL: CPT | Performed by: NURSE PRACTITIONER

## 2021-03-17 PROCEDURE — 85027 COMPLETE CBC AUTOMATED: CPT | Mod: PN | Performed by: NURSE PRACTITIONER

## 2021-03-17 PROCEDURE — 80053 COMPREHEN METABOLIC PANEL: CPT | Mod: PN | Performed by: NURSE PRACTITIONER

## 2021-03-17 PROCEDURE — U0002: ICD-10-PCS | Mod: QW,S$GLB,, | Performed by: FAMILY MEDICINE

## 2021-03-17 RX ORDER — SODIUM CHLORIDE 0.9 % (FLUSH) 0.9 %
10 SYRINGE (ML) INJECTION
Status: DISCONTINUED | OUTPATIENT
Start: 2021-03-17 | End: 2021-03-17 | Stop reason: HOSPADM

## 2021-03-17 RX ORDER — HEPARIN 100 UNIT/ML
500 SYRINGE INTRAVENOUS
Status: CANCELLED | OUTPATIENT
Start: 2021-03-17

## 2021-03-17 RX ORDER — SODIUM CHLORIDE 0.9 % (FLUSH) 0.9 %
10 SYRINGE (ML) INJECTION
Status: CANCELLED | OUTPATIENT
Start: 2021-03-17

## 2021-03-17 RX ADMIN — Medication 10 ML: at 04:03

## 2021-03-18 ENCOUNTER — DOCUMENTATION ONLY (OUTPATIENT)
Dept: HEMATOLOGY/ONCOLOGY | Facility: CLINIC | Age: 41
End: 2021-03-18

## 2021-03-19 ENCOUNTER — PATIENT MESSAGE (OUTPATIENT)
Dept: HEMATOLOGY/ONCOLOGY | Facility: CLINIC | Age: 41
End: 2021-03-19

## 2021-03-19 DIAGNOSIS — E07.9 THYROID DISORDER: Primary | ICD-10-CM

## 2021-03-19 RX ORDER — SODIUM CHLORIDE 0.9 % (FLUSH) 0.9 %
10 SYRINGE (ML) INJECTION
Status: CANCELLED | OUTPATIENT
Start: 2021-03-30

## 2021-03-19 RX ORDER — HEPARIN 100 UNIT/ML
500 SYRINGE INTRAVENOUS
Status: CANCELLED | OUTPATIENT
Start: 2021-03-22

## 2021-03-19 RX ORDER — HEPARIN 100 UNIT/ML
500 SYRINGE INTRAVENOUS
Status: CANCELLED | OUTPATIENT
Start: 2021-03-30

## 2021-03-19 RX ORDER — SODIUM CHLORIDE 0.9 % (FLUSH) 0.9 %
10 SYRINGE (ML) INJECTION
Status: CANCELLED | OUTPATIENT
Start: 2021-03-22

## 2021-03-19 RX ORDER — FAMOTIDINE 10 MG/ML
20 INJECTION INTRAVENOUS
Status: CANCELLED
Start: 2021-03-30

## 2021-03-19 RX ORDER — SODIUM CHLORIDE 0.9 % (FLUSH) 0.9 %
10 SYRINGE (ML) INJECTION
Status: CANCELLED | OUTPATIENT
Start: 2021-04-06

## 2021-03-19 RX ORDER — FAMOTIDINE 10 MG/ML
20 INJECTION INTRAVENOUS
Status: CANCELLED
Start: 2021-04-06

## 2021-03-19 RX ORDER — HEPARIN 100 UNIT/ML
500 SYRINGE INTRAVENOUS
Status: CANCELLED | OUTPATIENT
Start: 2021-04-06

## 2021-03-19 RX ORDER — FAMOTIDINE 10 MG/ML
20 INJECTION INTRAVENOUS
Status: CANCELLED
Start: 2021-03-22

## 2021-03-20 ENCOUNTER — PATIENT MESSAGE (OUTPATIENT)
Dept: HEMATOLOGY/ONCOLOGY | Facility: CLINIC | Age: 41
End: 2021-03-20

## 2021-03-22 ENCOUNTER — CLINICAL SUPPORT (OUTPATIENT)
Dept: HEMATOLOGY/ONCOLOGY | Facility: CLINIC | Age: 41
End: 2021-03-22
Payer: COMMERCIAL

## 2021-03-22 ENCOUNTER — OFFICE VISIT (OUTPATIENT)
Dept: HEMATOLOGY/ONCOLOGY | Facility: CLINIC | Age: 41
End: 2021-03-22
Payer: COMMERCIAL

## 2021-03-22 ENCOUNTER — INFUSION (OUTPATIENT)
Dept: INFUSION THERAPY | Facility: HOSPITAL | Age: 41
End: 2021-03-22
Attending: INTERNAL MEDICINE
Payer: COMMERCIAL

## 2021-03-22 ENCOUNTER — LAB VISIT (OUTPATIENT)
Dept: LAB | Facility: HOSPITAL | Age: 41
End: 2021-03-22
Attending: INTERNAL MEDICINE
Payer: COMMERCIAL

## 2021-03-22 VITALS
DIASTOLIC BLOOD PRESSURE: 74 MMHG | RESPIRATION RATE: 18 BRPM | TEMPERATURE: 98 F | HEART RATE: 74 BPM | WEIGHT: 136.69 LBS | BODY MASS INDEX: 21.45 KG/M2 | HEIGHT: 67 IN | SYSTOLIC BLOOD PRESSURE: 132 MMHG

## 2021-03-22 VITALS
WEIGHT: 136.69 LBS | OXYGEN SATURATION: 100 % | HEART RATE: 69 BPM | HEIGHT: 67 IN | RESPIRATION RATE: 18 BRPM | DIASTOLIC BLOOD PRESSURE: 76 MMHG | BODY MASS INDEX: 21.45 KG/M2 | TEMPERATURE: 98 F | SYSTOLIC BLOOD PRESSURE: 122 MMHG

## 2021-03-22 DIAGNOSIS — Z17.1 MALIGNANT NEOPLASM OF UPPER-OUTER QUADRANT OF LEFT BREAST IN FEMALE, ESTROGEN RECEPTOR NEGATIVE: Primary | ICD-10-CM

## 2021-03-22 DIAGNOSIS — Z17.1 MALIGNANT NEOPLASM OF UPPER-OUTER QUADRANT OF LEFT BREAST IN FEMALE, ESTROGEN RECEPTOR NEGATIVE: ICD-10-CM

## 2021-03-22 DIAGNOSIS — T45.1X5A ANEMIA ASSOCIATED WITH CHEMOTHERAPY: ICD-10-CM

## 2021-03-22 DIAGNOSIS — Z13.9 ENCOUNTER FOR SCREENING: Primary | ICD-10-CM

## 2021-03-22 DIAGNOSIS — C50.412 MALIGNANT NEOPLASM OF UPPER-OUTER QUADRANT OF LEFT BREAST IN FEMALE, ESTROGEN RECEPTOR NEGATIVE: ICD-10-CM

## 2021-03-22 DIAGNOSIS — R11.0 CHEMOTHERAPY-INDUCED NAUSEA: ICD-10-CM

## 2021-03-22 DIAGNOSIS — Z15.01 BRCA2 GENE MUTATION POSITIVE: ICD-10-CM

## 2021-03-22 DIAGNOSIS — D64.81 ANEMIA ASSOCIATED WITH CHEMOTHERAPY: ICD-10-CM

## 2021-03-22 DIAGNOSIS — T45.1X5A CHEMOTHERAPY-INDUCED NAUSEA: ICD-10-CM

## 2021-03-22 DIAGNOSIS — C50.412 MALIGNANT NEOPLASM OF UPPER-OUTER QUADRANT OF LEFT BREAST IN FEMALE, ESTROGEN RECEPTOR NEGATIVE: Primary | ICD-10-CM

## 2021-03-22 DIAGNOSIS — Z51.11 ENCOUNTER FOR ANTINEOPLASTIC CHEMOTHERAPY: ICD-10-CM

## 2021-03-22 DIAGNOSIS — E07.9 THYROID DISORDER: ICD-10-CM

## 2021-03-22 DIAGNOSIS — Z15.09 BRCA2 GENE MUTATION POSITIVE: ICD-10-CM

## 2021-03-22 LAB
ALBUMIN SERPL BCP-MCNC: 3.7 G/DL (ref 3.5–5.2)
ALP SERPL-CCNC: 87 U/L (ref 55–135)
ALT SERPL W/O P-5'-P-CCNC: 39 U/L (ref 10–44)
ANION GAP SERPL CALC-SCNC: 11 MMOL/L (ref 8–16)
AST SERPL-CCNC: 22 U/L (ref 10–40)
BILIRUB SERPL-MCNC: 0.2 MG/DL (ref 0.1–1)
BUN SERPL-MCNC: 13 MG/DL (ref 6–20)
CALCIUM SERPL-MCNC: 8.7 MG/DL (ref 8.7–10.5)
CHLORIDE SERPL-SCNC: 108 MMOL/L (ref 95–110)
CO2 SERPL-SCNC: 24 MMOL/L (ref 23–29)
CREAT SERPL-MCNC: 0.7 MG/DL (ref 0.5–1.4)
EST. GFR  (AFRICAN AMERICAN): >60 ML/MIN/1.73 M^2
EST. GFR  (NON AFRICAN AMERICAN): >60 ML/MIN/1.73 M^2
GLUCOSE SERPL-MCNC: 66 MG/DL (ref 70–110)
POTASSIUM SERPL-SCNC: 4.2 MMOL/L (ref 3.5–5.1)
PROT SERPL-MCNC: 7 G/DL (ref 6–8.4)
SARS-COV-2 RDRP RESP QL NAA+PROBE: NEGATIVE
SODIUM SERPL-SCNC: 143 MMOL/L (ref 136–145)
T4 FREE SERPL-MCNC: 0.93 NG/DL (ref 0.71–1.51)
TSH SERPL DL<=0.005 MIU/L-ACNC: 1.68 UIU/ML (ref 0.4–4)

## 2021-03-22 PROCEDURE — 96375 TX/PRO/DX INJ NEW DRUG ADDON: CPT

## 2021-03-22 PROCEDURE — 99999 PR PBB SHADOW E&M-EST. PATIENT-LVL III: CPT | Mod: PBBFAC,,, | Performed by: NURSE PRACTITIONER

## 2021-03-22 PROCEDURE — 36415 COLL VENOUS BLD VENIPUNCTURE: CPT | Performed by: INTERNAL MEDICINE

## 2021-03-22 PROCEDURE — 84439 ASSAY OF FREE THYROXINE: CPT | Performed by: NURSE PRACTITIONER

## 2021-03-22 PROCEDURE — 25000003 PHARM REV CODE 250: Performed by: INTERNAL MEDICINE

## 2021-03-22 PROCEDURE — 96367 TX/PROPH/DG ADDL SEQ IV INF: CPT

## 2021-03-22 PROCEDURE — 3008F PR BODY MASS INDEX (BMI) DOCUMENTED: ICD-10-PCS | Mod: CPTII,S$GLB,, | Performed by: NURSE PRACTITIONER

## 2021-03-22 PROCEDURE — 96413 CHEMO IV INFUSION 1 HR: CPT

## 2021-03-22 PROCEDURE — 80053 COMPREHEN METABOLIC PANEL: CPT | Performed by: INTERNAL MEDICINE

## 2021-03-22 PROCEDURE — U0002 COVID-19 LAB TEST NON-CDC: HCPCS | Performed by: NURSE PRACTITIONER

## 2021-03-22 PROCEDURE — 96361 HYDRATE IV INFUSION ADD-ON: CPT

## 2021-03-22 PROCEDURE — 99999 PR PBB SHADOW E&M-EST. PATIENT-LVL III: ICD-10-PCS | Mod: PBBFAC,,, | Performed by: NURSE PRACTITIONER

## 2021-03-22 PROCEDURE — 3008F BODY MASS INDEX DOCD: CPT | Mod: CPTII,S$GLB,, | Performed by: NURSE PRACTITIONER

## 2021-03-22 PROCEDURE — 99215 OFFICE O/P EST HI 40 MIN: CPT | Mod: S$GLB,,, | Performed by: NURSE PRACTITIONER

## 2021-03-22 PROCEDURE — 99215 PR OFFICE/OUTPT VISIT, EST, LEVL V, 40-54 MIN: ICD-10-PCS | Mod: S$GLB,,, | Performed by: NURSE PRACTITIONER

## 2021-03-22 PROCEDURE — 96360 HYDRATION IV INFUSION INIT: CPT

## 2021-03-22 PROCEDURE — 84443 ASSAY THYROID STIM HORMONE: CPT | Performed by: NURSE PRACTITIONER

## 2021-03-22 PROCEDURE — 96417 CHEMO IV INFUS EACH ADDL SEQ: CPT

## 2021-03-22 PROCEDURE — A4216 STERILE WATER/SALINE, 10 ML: HCPCS | Performed by: INTERNAL MEDICINE

## 2021-03-22 PROCEDURE — 63600175 PHARM REV CODE 636 W HCPCS: Performed by: INTERNAL MEDICINE

## 2021-03-22 PROCEDURE — 25000003 PHARM REV CODE 250: Performed by: NURSE PRACTITIONER

## 2021-03-22 RX ORDER — FAMOTIDINE 10 MG/ML
20 INJECTION INTRAVENOUS
Status: COMPLETED | OUTPATIENT
Start: 2021-03-22 | End: 2021-03-22

## 2021-03-22 RX ORDER — SODIUM CHLORIDE 0.9 % (FLUSH) 0.9 %
10 SYRINGE (ML) INJECTION
Status: DISCONTINUED | OUTPATIENT
Start: 2021-03-22 | End: 2021-03-22 | Stop reason: HOSPADM

## 2021-03-22 RX ORDER — HEPARIN 100 UNIT/ML
500 SYRINGE INTRAVENOUS
Status: DISCONTINUED | OUTPATIENT
Start: 2021-03-22 | End: 2021-03-22 | Stop reason: HOSPADM

## 2021-03-22 RX ADMIN — SODIUM CHLORIDE 200 MG: 9 INJECTION, SOLUTION INTRAVENOUS at 10:03

## 2021-03-22 RX ADMIN — APREPITANT 130 MG: 130 INJECTION, EMULSION INTRAVENOUS at 11:03

## 2021-03-22 RX ADMIN — DIPHENHYDRAMINE HYDROCHLORIDE 50 MG: 50 INJECTION, SOLUTION INTRAMUSCULAR; INTRAVENOUS at 11:03

## 2021-03-22 RX ADMIN — FAMOTIDINE 20 MG: 10 INJECTION INTRAVENOUS at 11:03

## 2021-03-22 RX ADMIN — HEPARIN 500 UNITS: 100 SYRINGE at 04:03

## 2021-03-22 RX ADMIN — SODIUM CHLORIDE 1000 ML: 0.9 INJECTION, SOLUTION INTRAVENOUS at 01:03

## 2021-03-22 RX ADMIN — Medication 10 ML: at 04:03

## 2021-03-22 RX ADMIN — SODIUM CHLORIDE: 0.9 INJECTION, SOLUTION INTRAVENOUS at 10:03

## 2021-03-22 RX ADMIN — CARBOPLATIN 630 MG: 10 INJECTION INTRAVENOUS at 01:03

## 2021-03-22 RX ADMIN — DEXAMETHASONE SODIUM PHOSPHATE 0.25 MG: 4 INJECTION, SOLUTION INTRA-ARTICULAR; INTRALESIONAL; INTRAMUSCULAR; INTRAVENOUS; SOFT TISSUE at 11:03

## 2021-03-22 RX ADMIN — PACLITAXEL 138 MG: 6 INJECTION, SOLUTION, CONCENTRATE INTRAVENOUS at 12:03

## 2021-03-23 ENCOUNTER — PATIENT MESSAGE (OUTPATIENT)
Dept: HEMATOLOGY/ONCOLOGY | Facility: CLINIC | Age: 41
End: 2021-03-23

## 2021-03-23 DIAGNOSIS — Z13.9 ENCOUNTER FOR SCREENING: Primary | ICD-10-CM

## 2021-03-24 ENCOUNTER — PATIENT MESSAGE (OUTPATIENT)
Dept: HEMATOLOGY/ONCOLOGY | Facility: CLINIC | Age: 41
End: 2021-03-24

## 2021-03-24 ENCOUNTER — OFFICE VISIT (OUTPATIENT)
Dept: HEMATOLOGY/ONCOLOGY | Facility: CLINIC | Age: 41
End: 2021-03-24
Payer: COMMERCIAL

## 2021-03-24 ENCOUNTER — TELEPHONE (OUTPATIENT)
Dept: DERMATOLOGY | Facility: CLINIC | Age: 41
End: 2021-03-24

## 2021-03-24 DIAGNOSIS — Z15.09 MONOALLELIC MUTATION OF BRCA2 GENE: ICD-10-CM

## 2021-03-24 DIAGNOSIS — Z71.83 ENCOUNTER FOR NONPROCREATIVE GENETIC COUNSELING: Primary | ICD-10-CM

## 2021-03-24 DIAGNOSIS — Z15.01 MONOALLELIC MUTATION OF BRCA2 GENE: ICD-10-CM

## 2021-03-24 DIAGNOSIS — C50.412 MALIGNANT NEOPLASM OF UPPER-OUTER QUADRANT OF LEFT BREAST IN FEMALE, ESTROGEN RECEPTOR NEGATIVE: ICD-10-CM

## 2021-03-24 DIAGNOSIS — Z17.1 MALIGNANT NEOPLASM OF UPPER-OUTER QUADRANT OF LEFT BREAST IN FEMALE, ESTROGEN RECEPTOR NEGATIVE: ICD-10-CM

## 2021-03-24 PROCEDURE — 99215 OFFICE O/P EST HI 40 MIN: CPT | Mod: 95,,, | Performed by: NURSE PRACTITIONER

## 2021-03-24 PROCEDURE — 99417 PR PROLONGED SVC, OUTPT, W/WO DIRECT PT CONTACT,  EA ADDTL 15 MIN: ICD-10-PCS | Mod: 95,,, | Performed by: NURSE PRACTITIONER

## 2021-03-24 PROCEDURE — 99215 PR OFFICE/OUTPT VISIT, EST, LEVL V, 40-54 MIN: ICD-10-PCS | Mod: 95,,, | Performed by: NURSE PRACTITIONER

## 2021-03-24 PROCEDURE — 99417 PROLNG OP E/M EACH 15 MIN: CPT | Mod: 95,,, | Performed by: NURSE PRACTITIONER

## 2021-03-25 ENCOUNTER — PATIENT MESSAGE (OUTPATIENT)
Dept: HEMATOLOGY/ONCOLOGY | Facility: CLINIC | Age: 41
End: 2021-03-25

## 2021-03-25 ENCOUNTER — PATIENT MESSAGE (OUTPATIENT)
Dept: GYNECOLOGIC ONCOLOGY | Facility: CLINIC | Age: 41
End: 2021-03-25

## 2021-03-25 ENCOUNTER — TELEPHONE (OUTPATIENT)
Dept: GYNECOLOGIC ONCOLOGY | Facility: CLINIC | Age: 41
End: 2021-03-25

## 2021-03-26 ENCOUNTER — PATIENT MESSAGE (OUTPATIENT)
Dept: HEMATOLOGY/ONCOLOGY | Facility: CLINIC | Age: 41
End: 2021-03-26

## 2021-03-26 ENCOUNTER — TELEPHONE (OUTPATIENT)
Dept: HEMATOLOGY/ONCOLOGY | Facility: CLINIC | Age: 41
End: 2021-03-26

## 2021-03-26 ENCOUNTER — PATIENT MESSAGE (OUTPATIENT)
Dept: REHABILITATION | Facility: HOSPITAL | Age: 41
End: 2021-03-26

## 2021-03-26 DIAGNOSIS — C50.412 MALIGNANT NEOPLASM OF UPPER-OUTER QUADRANT OF LEFT BREAST IN FEMALE, ESTROGEN RECEPTOR NEGATIVE: Primary | ICD-10-CM

## 2021-03-26 DIAGNOSIS — Z17.1 MALIGNANT NEOPLASM OF UPPER-OUTER QUADRANT OF LEFT BREAST IN FEMALE, ESTROGEN RECEPTOR NEGATIVE: Primary | ICD-10-CM

## 2021-03-30 ENCOUNTER — INFUSION (OUTPATIENT)
Dept: INFUSION THERAPY | Facility: HOSPITAL | Age: 41
End: 2021-03-30
Attending: INTERNAL MEDICINE
Payer: COMMERCIAL

## 2021-03-30 ENCOUNTER — OFFICE VISIT (OUTPATIENT)
Dept: HEMATOLOGY/ONCOLOGY | Facility: CLINIC | Age: 41
End: 2021-03-30
Payer: COMMERCIAL

## 2021-03-30 VITALS
TEMPERATURE: 98 F | BODY MASS INDEX: 21.77 KG/M2 | WEIGHT: 138.69 LBS | SYSTOLIC BLOOD PRESSURE: 122 MMHG | HEIGHT: 67 IN | HEART RATE: 70 BPM | DIASTOLIC BLOOD PRESSURE: 82 MMHG

## 2021-03-30 VITALS
DIASTOLIC BLOOD PRESSURE: 73 MMHG | HEART RATE: 73 BPM | SYSTOLIC BLOOD PRESSURE: 121 MMHG | RESPIRATION RATE: 18 BRPM | TEMPERATURE: 99 F

## 2021-03-30 DIAGNOSIS — R74.01 ELEVATED TRANSAMINASE LEVEL: ICD-10-CM

## 2021-03-30 DIAGNOSIS — C50.412 MALIGNANT NEOPLASM OF UPPER-OUTER QUADRANT OF LEFT BREAST IN FEMALE, ESTROGEN RECEPTOR NEGATIVE: Primary | ICD-10-CM

## 2021-03-30 DIAGNOSIS — R14.0 ABDOMINAL BLOATING: ICD-10-CM

## 2021-03-30 DIAGNOSIS — Z17.1 MALIGNANT NEOPLASM OF UPPER-OUTER QUADRANT OF LEFT BREAST IN FEMALE, ESTROGEN RECEPTOR NEGATIVE: Primary | ICD-10-CM

## 2021-03-30 DIAGNOSIS — T45.1X5A CHEMOTHERAPY-INDUCED NAUSEA: ICD-10-CM

## 2021-03-30 DIAGNOSIS — Z15.01 BRCA2 GENE MUTATION POSITIVE: Chronic | ICD-10-CM

## 2021-03-30 DIAGNOSIS — T45.1X5A ANEMIA ASSOCIATED WITH CHEMOTHERAPY: ICD-10-CM

## 2021-03-30 DIAGNOSIS — D64.81 ANEMIA ASSOCIATED WITH CHEMOTHERAPY: ICD-10-CM

## 2021-03-30 DIAGNOSIS — R11.0 CHEMOTHERAPY-INDUCED NAUSEA: ICD-10-CM

## 2021-03-30 DIAGNOSIS — Z13.9 ENCOUNTER FOR SCREENING: ICD-10-CM

## 2021-03-30 DIAGNOSIS — Z15.09 BRCA2 GENE MUTATION POSITIVE: Chronic | ICD-10-CM

## 2021-03-30 LAB
ALBUMIN SERPL BCP-MCNC: 3.9 G/DL (ref 3.5–5.2)
ALP SERPL-CCNC: 86 U/L (ref 55–135)
ALT SERPL W/O P-5'-P-CCNC: 48 U/L (ref 10–44)
ANION GAP SERPL CALC-SCNC: 8 MMOL/L (ref 8–16)
AST SERPL-CCNC: 27 U/L (ref 10–40)
BILIRUB SERPL-MCNC: 0.2 MG/DL (ref 0.1–1)
BUN SERPL-MCNC: 11 MG/DL (ref 6–20)
CALCIUM SERPL-MCNC: 8.8 MG/DL (ref 8.7–10.5)
CHLORIDE SERPL-SCNC: 106 MMOL/L (ref 95–110)
CO2 SERPL-SCNC: 26 MMOL/L (ref 23–29)
CREAT SERPL-MCNC: 0.6 MG/DL (ref 0.5–1.4)
ERYTHROCYTE [DISTWIDTH] IN BLOOD BY AUTOMATED COUNT: 13.2 % (ref 11.5–14.5)
EST. GFR  (AFRICAN AMERICAN): >60 ML/MIN/1.73 M^2
EST. GFR  (NON AFRICAN AMERICAN): >60 ML/MIN/1.73 M^2
ESTIMATED AVG GLUCOSE: 100 MG/DL (ref 68–131)
FOLATE SERPL-MCNC: 8.8 NG/ML (ref 4–24)
GLUCOSE SERPL-MCNC: 90 MG/DL (ref 70–110)
HBA1C MFR BLD: 5.1 % (ref 4–5.6)
HCT VFR BLD AUTO: 34.4 % (ref 37–48.5)
HCYS SERPL-SCNC: 5.1 UMOL/L (ref 4–15.5)
HGB BLD-MCNC: 11.4 G/DL (ref 12–16)
IMM GRANULOCYTES # BLD AUTO: 0.06 K/UL (ref 0–0.04)
MCH RBC QN AUTO: 30.6 PG (ref 27–31)
MCHC RBC AUTO-ENTMCNC: 33.1 G/DL (ref 32–36)
MCV RBC AUTO: 93 FL (ref 82–98)
NEUTROPHILS # BLD AUTO: 2.9 K/UL (ref 1.8–7.7)
PLATELET # BLD AUTO: 172 K/UL (ref 150–450)
PMV BLD AUTO: 9.6 FL (ref 9.2–12.9)
POTASSIUM SERPL-SCNC: 4 MMOL/L (ref 3.5–5.1)
PROT SERPL-MCNC: 7.1 G/DL (ref 6–8.4)
RBC # BLD AUTO: 3.72 M/UL (ref 4–5.4)
SARS-COV-2 IGG SERPLBLD QL IA.RAPID: NEGATIVE
SODIUM SERPL-SCNC: 140 MMOL/L (ref 136–145)
VIT B12 SERPL-MCNC: >2000 PG/ML (ref 210–950)
WBC # BLD AUTO: 5.67 K/UL (ref 3.9–12.7)

## 2021-03-30 PROCEDURE — 1126F AMNT PAIN NOTED NONE PRSNT: CPT | Mod: S$GLB,,, | Performed by: NURSE PRACTITIONER

## 2021-03-30 PROCEDURE — 80053 COMPREHEN METABOLIC PANEL: CPT | Performed by: NURSE PRACTITIONER

## 2021-03-30 PROCEDURE — 25000003 PHARM REV CODE 250: Performed by: INTERNAL MEDICINE

## 2021-03-30 PROCEDURE — 85027 COMPLETE CBC AUTOMATED: CPT | Performed by: NURSE PRACTITIONER

## 2021-03-30 PROCEDURE — 3008F PR BODY MASS INDEX (BMI) DOCUMENTED: ICD-10-PCS | Mod: CPTII,S$GLB,, | Performed by: NURSE PRACTITIONER

## 2021-03-30 PROCEDURE — 96375 TX/PRO/DX INJ NEW DRUG ADDON: CPT

## 2021-03-30 PROCEDURE — 63600175 PHARM REV CODE 636 W HCPCS: Performed by: INTERNAL MEDICINE

## 2021-03-30 PROCEDURE — 99999 PR PBB SHADOW E&M-EST. PATIENT-LVL III: CPT | Mod: PBBFAC,,, | Performed by: NURSE PRACTITIONER

## 2021-03-30 PROCEDURE — 1126F PR PAIN SEVERITY QUANTIFIED, NO PAIN PRESENT: ICD-10-PCS | Mod: S$GLB,,, | Performed by: NURSE PRACTITIONER

## 2021-03-30 PROCEDURE — 36415 COLL VENOUS BLD VENIPUNCTURE: CPT | Performed by: NURSE PRACTITIONER

## 2021-03-30 PROCEDURE — 96367 TX/PROPH/DG ADDL SEQ IV INF: CPT

## 2021-03-30 PROCEDURE — 25000003 PHARM REV CODE 250: Performed by: NURSE PRACTITIONER

## 2021-03-30 PROCEDURE — 82746 ASSAY OF FOLIC ACID SERUM: CPT | Performed by: NURSE PRACTITIONER

## 2021-03-30 PROCEDURE — A4216 STERILE WATER/SALINE, 10 ML: HCPCS | Performed by: INTERNAL MEDICINE

## 2021-03-30 PROCEDURE — 96361 HYDRATE IV INFUSION ADD-ON: CPT

## 2021-03-30 PROCEDURE — 3008F BODY MASS INDEX DOCD: CPT | Mod: CPTII,S$GLB,, | Performed by: NURSE PRACTITIONER

## 2021-03-30 PROCEDURE — 86769 SARS-COV-2 COVID-19 ANTIBODY: CPT | Performed by: NURSE PRACTITIONER

## 2021-03-30 PROCEDURE — 96413 CHEMO IV INFUSION 1 HR: CPT

## 2021-03-30 PROCEDURE — 99215 PR OFFICE/OUTPT VISIT, EST, LEVL V, 40-54 MIN: ICD-10-PCS | Mod: S$GLB,,, | Performed by: NURSE PRACTITIONER

## 2021-03-30 PROCEDURE — 82607 VITAMIN B-12: CPT | Performed by: NURSE PRACTITIONER

## 2021-03-30 PROCEDURE — 83036 HEMOGLOBIN GLYCOSYLATED A1C: CPT | Performed by: NURSE PRACTITIONER

## 2021-03-30 PROCEDURE — 83090 ASSAY OF HOMOCYSTEINE: CPT | Performed by: NURSE PRACTITIONER

## 2021-03-30 PROCEDURE — 63600175 PHARM REV CODE 636 W HCPCS: Performed by: NURSE PRACTITIONER

## 2021-03-30 PROCEDURE — 99215 OFFICE O/P EST HI 40 MIN: CPT | Mod: S$GLB,,, | Performed by: NURSE PRACTITIONER

## 2021-03-30 PROCEDURE — 99999 PR PBB SHADOW E&M-EST. PATIENT-LVL III: ICD-10-PCS | Mod: PBBFAC,,, | Performed by: NURSE PRACTITIONER

## 2021-03-30 RX ORDER — HEPARIN 100 UNIT/ML
500 SYRINGE INTRAVENOUS
Status: DISCONTINUED | OUTPATIENT
Start: 2021-03-30 | End: 2021-03-30 | Stop reason: HOSPADM

## 2021-03-30 RX ORDER — HEPARIN 100 UNIT/ML
500 SYRINGE INTRAVENOUS
Status: CANCELLED | OUTPATIENT
Start: 2021-03-30

## 2021-03-30 RX ORDER — ONDANSETRON 2 MG/ML
4 INJECTION INTRAMUSCULAR; INTRAVENOUS
Status: COMPLETED | OUTPATIENT
Start: 2021-03-30 | End: 2021-03-30

## 2021-03-30 RX ORDER — SODIUM CHLORIDE 0.9 % (FLUSH) 0.9 %
10 SYRINGE (ML) INJECTION
Status: CANCELLED | OUTPATIENT
Start: 2021-03-30

## 2021-03-30 RX ORDER — FAMOTIDINE 10 MG/ML
20 INJECTION INTRAVENOUS
Status: COMPLETED | OUTPATIENT
Start: 2021-03-30 | End: 2021-03-30

## 2021-03-30 RX ORDER — SODIUM CHLORIDE 0.9 % (FLUSH) 0.9 %
10 SYRINGE (ML) INJECTION
Status: DISCONTINUED | OUTPATIENT
Start: 2021-03-30 | End: 2021-03-30 | Stop reason: HOSPADM

## 2021-03-30 RX ADMIN — PACLITAXEL 138 MG: 6 INJECTION, SOLUTION INTRAVENOUS at 12:03

## 2021-03-30 RX ADMIN — SODIUM CHLORIDE 500 ML: 0.9 INJECTION, SOLUTION INTRAVENOUS at 01:03

## 2021-03-30 RX ADMIN — Medication 10 ML: at 08:03

## 2021-03-30 RX ADMIN — HEPARIN 500 UNITS: 100 SYRINGE at 08:03

## 2021-03-30 RX ADMIN — SODIUM CHLORIDE: 0.9 INJECTION, SOLUTION INTRAVENOUS at 11:03

## 2021-03-30 RX ADMIN — FAMOTIDINE 20 MG: 10 INJECTION INTRAVENOUS at 12:03

## 2021-03-30 RX ADMIN — DIPHENHYDRAMINE HYDROCHLORIDE 50 MG: 50 INJECTION, SOLUTION INTRAMUSCULAR; INTRAVENOUS at 11:03

## 2021-03-30 RX ADMIN — DEXAMETHASONE SODIUM PHOSPHATE 10 MG: 4 INJECTION, SOLUTION INTRA-ARTICULAR; INTRALESIONAL; INTRAMUSCULAR; INTRAVENOUS; SOFT TISSUE at 12:03

## 2021-03-30 RX ADMIN — ONDANSETRON 4 MG: 2 INJECTION, SOLUTION INTRAMUSCULAR; INTRAVENOUS at 12:03

## 2021-03-31 ENCOUNTER — TELEPHONE (OUTPATIENT)
Dept: GYNECOLOGIC ONCOLOGY | Facility: CLINIC | Age: 41
End: 2021-03-31

## 2021-03-31 ENCOUNTER — PATIENT MESSAGE (OUTPATIENT)
Dept: HEMATOLOGY/ONCOLOGY | Facility: CLINIC | Age: 41
End: 2021-03-31

## 2021-04-05 ENCOUNTER — TELEPHONE (OUTPATIENT)
Dept: HEMATOLOGY/ONCOLOGY | Facility: CLINIC | Age: 41
End: 2021-04-05

## 2021-04-05 ENCOUNTER — TELEPHONE (OUTPATIENT)
Dept: GYNECOLOGIC ONCOLOGY | Facility: CLINIC | Age: 41
End: 2021-04-05

## 2021-04-06 ENCOUNTER — INFUSION (OUTPATIENT)
Dept: INFUSION THERAPY | Facility: HOSPITAL | Age: 41
End: 2021-04-06
Attending: INTERNAL MEDICINE
Payer: COMMERCIAL

## 2021-04-06 ENCOUNTER — OFFICE VISIT (OUTPATIENT)
Dept: HEMATOLOGY/ONCOLOGY | Facility: CLINIC | Age: 41
End: 2021-04-06
Payer: COMMERCIAL

## 2021-04-06 VITALS
SYSTOLIC BLOOD PRESSURE: 121 MMHG | BODY MASS INDEX: 21.93 KG/M2 | HEIGHT: 67 IN | OXYGEN SATURATION: 98 % | HEART RATE: 73 BPM | RESPIRATION RATE: 18 BRPM | WEIGHT: 139.75 LBS | TEMPERATURE: 97 F | DIASTOLIC BLOOD PRESSURE: 70 MMHG

## 2021-04-06 VITALS
SYSTOLIC BLOOD PRESSURE: 116 MMHG | TEMPERATURE: 98 F | DIASTOLIC BLOOD PRESSURE: 71 MMHG | RESPIRATION RATE: 18 BRPM | HEART RATE: 74 BPM

## 2021-04-06 DIAGNOSIS — C50.412 MALIGNANT NEOPLASM OF UPPER-OUTER QUADRANT OF LEFT BREAST IN FEMALE, ESTROGEN RECEPTOR NEGATIVE: Primary | ICD-10-CM

## 2021-04-06 DIAGNOSIS — Z17.1 MALIGNANT NEOPLASM OF UPPER-OUTER QUADRANT OF LEFT BREAST IN FEMALE, ESTROGEN RECEPTOR NEGATIVE: Primary | ICD-10-CM

## 2021-04-06 DIAGNOSIS — Z15.09 BRCA2 GENE MUTATION POSITIVE: Chronic | ICD-10-CM

## 2021-04-06 DIAGNOSIS — D64.81 ANEMIA ASSOCIATED WITH CHEMOTHERAPY: ICD-10-CM

## 2021-04-06 DIAGNOSIS — T45.1X5A ANEMIA ASSOCIATED WITH CHEMOTHERAPY: ICD-10-CM

## 2021-04-06 DIAGNOSIS — Z15.01 BRCA2 GENE MUTATION POSITIVE: Chronic | ICD-10-CM

## 2021-04-06 LAB
ALBUMIN SERPL BCP-MCNC: 3.7 G/DL (ref 3.5–5.2)
ALP SERPL-CCNC: 86 U/L (ref 55–135)
ALT SERPL W/O P-5'-P-CCNC: 48 U/L (ref 10–44)
ANION GAP SERPL CALC-SCNC: 10 MMOL/L (ref 8–16)
AST SERPL-CCNC: 25 U/L (ref 10–40)
BILIRUB SERPL-MCNC: 0.2 MG/DL (ref 0.1–1)
BUN SERPL-MCNC: 13 MG/DL (ref 6–20)
CALCIUM SERPL-MCNC: 9 MG/DL (ref 8.7–10.5)
CHLORIDE SERPL-SCNC: 106 MMOL/L (ref 95–110)
CO2 SERPL-SCNC: 24 MMOL/L (ref 23–29)
CREAT SERPL-MCNC: 0.6 MG/DL (ref 0.5–1.4)
ERYTHROCYTE [DISTWIDTH] IN BLOOD BY AUTOMATED COUNT: 13.7 % (ref 11.5–14.5)
EST. GFR  (AFRICAN AMERICAN): >60 ML/MIN/1.73 M^2
EST. GFR  (NON AFRICAN AMERICAN): >60 ML/MIN/1.73 M^2
GLUCOSE SERPL-MCNC: 94 MG/DL (ref 70–110)
HCT VFR BLD AUTO: 32.8 % (ref 37–48.5)
HGB BLD-MCNC: 10.9 G/DL (ref 12–16)
IMM GRANULOCYTES # BLD AUTO: 0.07 K/UL (ref 0–0.04)
MCH RBC QN AUTO: 31.4 PG (ref 27–31)
MCHC RBC AUTO-ENTMCNC: 33.2 G/DL (ref 32–36)
MCV RBC AUTO: 95 FL (ref 82–98)
NEUTROPHILS # BLD AUTO: 1.8 K/UL (ref 1.8–7.7)
PLATELET # BLD AUTO: 244 K/UL (ref 150–450)
PMV BLD AUTO: 9.3 FL (ref 9.2–12.9)
POTASSIUM SERPL-SCNC: 4.1 MMOL/L (ref 3.5–5.1)
PROT SERPL-MCNC: 6.7 G/DL (ref 6–8.4)
RBC # BLD AUTO: 3.47 M/UL (ref 4–5.4)
SODIUM SERPL-SCNC: 140 MMOL/L (ref 136–145)
WBC # BLD AUTO: 4.57 K/UL (ref 3.9–12.7)

## 2021-04-06 PROCEDURE — 1126F AMNT PAIN NOTED NONE PRSNT: CPT | Mod: S$GLB,,, | Performed by: INTERNAL MEDICINE

## 2021-04-06 PROCEDURE — 63600175 PHARM REV CODE 636 W HCPCS: Performed by: INTERNAL MEDICINE

## 2021-04-06 PROCEDURE — A4216 STERILE WATER/SALINE, 10 ML: HCPCS | Performed by: INTERNAL MEDICINE

## 2021-04-06 PROCEDURE — 25000003 PHARM REV CODE 250: Performed by: INTERNAL MEDICINE

## 2021-04-06 PROCEDURE — 96361 HYDRATE IV INFUSION ADD-ON: CPT

## 2021-04-06 PROCEDURE — 96375 TX/PRO/DX INJ NEW DRUG ADDON: CPT

## 2021-04-06 PROCEDURE — 99999 PR PBB SHADOW E&M-EST. PATIENT-LVL III: CPT | Mod: PBBFAC,,, | Performed by: INTERNAL MEDICINE

## 2021-04-06 PROCEDURE — 96413 CHEMO IV INFUSION 1 HR: CPT

## 2021-04-06 PROCEDURE — 99215 PR OFFICE/OUTPT VISIT, EST, LEVL V, 40-54 MIN: ICD-10-PCS | Mod: S$GLB,,, | Performed by: INTERNAL MEDICINE

## 2021-04-06 PROCEDURE — 99215 OFFICE O/P EST HI 40 MIN: CPT | Mod: S$GLB,,, | Performed by: INTERNAL MEDICINE

## 2021-04-06 PROCEDURE — 3008F PR BODY MASS INDEX (BMI) DOCUMENTED: ICD-10-PCS | Mod: CPTII,S$GLB,, | Performed by: INTERNAL MEDICINE

## 2021-04-06 PROCEDURE — 1126F PR PAIN SEVERITY QUANTIFIED, NO PAIN PRESENT: ICD-10-PCS | Mod: S$GLB,,, | Performed by: INTERNAL MEDICINE

## 2021-04-06 PROCEDURE — 3008F BODY MASS INDEX DOCD: CPT | Mod: CPTII,S$GLB,, | Performed by: INTERNAL MEDICINE

## 2021-04-06 PROCEDURE — 80053 COMPREHEN METABOLIC PANEL: CPT | Performed by: NURSE PRACTITIONER

## 2021-04-06 PROCEDURE — 99999 PR PBB SHADOW E&M-EST. PATIENT-LVL III: ICD-10-PCS | Mod: PBBFAC,,, | Performed by: INTERNAL MEDICINE

## 2021-04-06 PROCEDURE — 36415 COLL VENOUS BLD VENIPUNCTURE: CPT | Performed by: NURSE PRACTITIONER

## 2021-04-06 PROCEDURE — 85027 COMPLETE CBC AUTOMATED: CPT | Performed by: NURSE PRACTITIONER

## 2021-04-06 PROCEDURE — 96367 TX/PROPH/DG ADDL SEQ IV INF: CPT

## 2021-04-06 RX ORDER — FAMOTIDINE 10 MG/ML
20 INJECTION INTRAVENOUS
Status: COMPLETED | OUTPATIENT
Start: 2021-04-06 | End: 2021-04-06

## 2021-04-06 RX ORDER — SODIUM CHLORIDE 0.9 % (FLUSH) 0.9 %
10 SYRINGE (ML) INJECTION
Status: DISCONTINUED | OUTPATIENT
Start: 2021-04-06 | End: 2021-04-06 | Stop reason: HOSPADM

## 2021-04-06 RX ORDER — HEPARIN 100 UNIT/ML
500 SYRINGE INTRAVENOUS
Status: CANCELLED | OUTPATIENT
Start: 2021-04-06

## 2021-04-06 RX ORDER — HEPARIN 100 UNIT/ML
500 SYRINGE INTRAVENOUS
Status: DISCONTINUED | OUTPATIENT
Start: 2021-04-06 | End: 2021-04-06 | Stop reason: HOSPADM

## 2021-04-06 RX ORDER — SODIUM CHLORIDE 0.9 % (FLUSH) 0.9 %
10 SYRINGE (ML) INJECTION
Status: CANCELLED | OUTPATIENT
Start: 2021-04-06

## 2021-04-06 RX ADMIN — Medication 10 ML: at 09:04

## 2021-04-06 RX ADMIN — HEPARIN 500 UNITS: 100 SYRINGE at 02:04

## 2021-04-06 RX ADMIN — DIPHENHYDRAMINE HYDROCHLORIDE 50 MG: 50 INJECTION, SOLUTION INTRAMUSCULAR; INTRAVENOUS at 11:04

## 2021-04-06 RX ADMIN — FAMOTIDINE 20 MG: 10 INJECTION INTRAVENOUS at 11:04

## 2021-04-06 RX ADMIN — HEPARIN 500 UNITS: 100 SYRINGE at 09:04

## 2021-04-06 RX ADMIN — SODIUM CHLORIDE: 0.9 INJECTION, SOLUTION INTRAVENOUS at 11:04

## 2021-04-06 RX ADMIN — SODIUM CHLORIDE 500 ML: 0.9 INJECTION, SOLUTION INTRAVENOUS at 01:04

## 2021-04-06 RX ADMIN — DEXAMETHASONE SODIUM PHOSPHATE 10 MG: 4 INJECTION, SOLUTION INTRA-ARTICULAR; INTRALESIONAL; INTRAMUSCULAR; INTRAVENOUS; SOFT TISSUE at 11:04

## 2021-04-06 RX ADMIN — PACLITAXEL 138 MG: 6 INJECTION, SOLUTION INTRAVENOUS at 12:04

## 2021-04-06 RX ADMIN — Medication 10 ML: at 02:04

## 2021-04-07 ENCOUNTER — PATIENT MESSAGE (OUTPATIENT)
Dept: HEMATOLOGY/ONCOLOGY | Facility: CLINIC | Age: 41
End: 2021-04-07

## 2021-04-07 DIAGNOSIS — B00.1 FEVER BLISTER: ICD-10-CM

## 2021-04-07 RX ORDER — VALACYCLOVIR HYDROCHLORIDE 500 MG/1
500 TABLET, FILM COATED ORAL 2 TIMES DAILY
Qty: 10 TABLET | Refills: 0 | Status: SHIPPED | OUTPATIENT
Start: 2021-04-07 | End: 2021-04-12

## 2021-04-08 ENCOUNTER — PATIENT MESSAGE (OUTPATIENT)
Dept: HEMATOLOGY/ONCOLOGY | Facility: CLINIC | Age: 41
End: 2021-04-08

## 2021-04-09 ENCOUNTER — PATIENT MESSAGE (OUTPATIENT)
Dept: HEMATOLOGY/ONCOLOGY | Facility: CLINIC | Age: 41
End: 2021-04-09

## 2021-04-09 DIAGNOSIS — Z17.1 MALIGNANT NEOPLASM OF UPPER-OUTER QUADRANT OF LEFT BREAST IN FEMALE, ESTROGEN RECEPTOR NEGATIVE: Primary | ICD-10-CM

## 2021-04-09 DIAGNOSIS — C50.412 MALIGNANT NEOPLASM OF UPPER-OUTER QUADRANT OF LEFT BREAST IN FEMALE, ESTROGEN RECEPTOR NEGATIVE: Primary | ICD-10-CM

## 2021-04-11 ENCOUNTER — PATIENT MESSAGE (OUTPATIENT)
Dept: HEMATOLOGY/ONCOLOGY | Facility: CLINIC | Age: 41
End: 2021-04-11

## 2021-04-13 ENCOUNTER — INFUSION (OUTPATIENT)
Dept: INFUSION THERAPY | Facility: HOSPITAL | Age: 41
End: 2021-04-13
Payer: COMMERCIAL

## 2021-04-13 ENCOUNTER — OFFICE VISIT (OUTPATIENT)
Dept: HEMATOLOGY/ONCOLOGY | Facility: CLINIC | Age: 41
End: 2021-04-13
Payer: COMMERCIAL

## 2021-04-13 VITALS
DIASTOLIC BLOOD PRESSURE: 66 MMHG | SYSTOLIC BLOOD PRESSURE: 110 MMHG | HEART RATE: 72 BPM | OXYGEN SATURATION: 100 % | TEMPERATURE: 98 F | RESPIRATION RATE: 16 BRPM

## 2021-04-13 VITALS
SYSTOLIC BLOOD PRESSURE: 118 MMHG | DIASTOLIC BLOOD PRESSURE: 65 MMHG | TEMPERATURE: 98 F | BODY MASS INDEX: 22.66 KG/M2 | RESPIRATION RATE: 16 BRPM | WEIGHT: 144.38 LBS | HEIGHT: 67 IN | OXYGEN SATURATION: 100 % | HEART RATE: 76 BPM

## 2021-04-13 DIAGNOSIS — T45.1X5A ANEMIA ASSOCIATED WITH CHEMOTHERAPY: ICD-10-CM

## 2021-04-13 DIAGNOSIS — Z15.01 BRCA2 GENE MUTATION POSITIVE: Chronic | ICD-10-CM

## 2021-04-13 DIAGNOSIS — Z17.1 MALIGNANT NEOPLASM OF UPPER-OUTER QUADRANT OF LEFT BREAST IN FEMALE, ESTROGEN RECEPTOR NEGATIVE: Primary | ICD-10-CM

## 2021-04-13 DIAGNOSIS — C50.412 MALIGNANT NEOPLASM OF UPPER-OUTER QUADRANT OF LEFT BREAST IN FEMALE, ESTROGEN RECEPTOR NEGATIVE: Primary | ICD-10-CM

## 2021-04-13 DIAGNOSIS — D64.81 ANEMIA ASSOCIATED WITH CHEMOTHERAPY: ICD-10-CM

## 2021-04-13 DIAGNOSIS — D05.12 DUCTAL CARCINOMA IN SITU OF LEFT BREAST: ICD-10-CM

## 2021-04-13 DIAGNOSIS — Z15.09 BRCA2 GENE MUTATION POSITIVE: Chronic | ICD-10-CM

## 2021-04-13 LAB
ALBUMIN SERPL BCP-MCNC: 3.6 G/DL (ref 3.5–5.2)
ALP SERPL-CCNC: 88 U/L (ref 55–135)
ALT SERPL W/O P-5'-P-CCNC: 36 U/L (ref 10–44)
ANION GAP SERPL CALC-SCNC: 8 MMOL/L (ref 8–16)
AST SERPL-CCNC: 17 U/L (ref 10–40)
BILIRUB SERPL-MCNC: 0.2 MG/DL (ref 0.1–1)
BUN SERPL-MCNC: 12 MG/DL (ref 6–20)
CALCIUM SERPL-MCNC: 8.8 MG/DL (ref 8.7–10.5)
CHLORIDE SERPL-SCNC: 108 MMOL/L (ref 95–110)
CO2 SERPL-SCNC: 24 MMOL/L (ref 23–29)
CREAT SERPL-MCNC: 0.6 MG/DL (ref 0.5–1.4)
ERYTHROCYTE [DISTWIDTH] IN BLOOD BY AUTOMATED COUNT: 14.2 % (ref 11.5–14.5)
EST. GFR  (AFRICAN AMERICAN): >60 ML/MIN/1.73 M^2
EST. GFR  (NON AFRICAN AMERICAN): >60 ML/MIN/1.73 M^2
GLUCOSE SERPL-MCNC: 93 MG/DL (ref 70–110)
HCT VFR BLD AUTO: 32.5 % (ref 37–48.5)
HGB BLD-MCNC: 11.2 G/DL (ref 12–16)
IMM GRANULOCYTES # BLD AUTO: 0.12 K/UL (ref 0–0.04)
MCH RBC QN AUTO: 32.2 PG (ref 27–31)
MCHC RBC AUTO-ENTMCNC: 34.5 G/DL (ref 32–36)
MCV RBC AUTO: 93 FL (ref 82–98)
NEUTROPHILS # BLD AUTO: 2.1 K/UL (ref 1.8–7.7)
PLATELET # BLD AUTO: 212 K/UL (ref 150–450)
PMV BLD AUTO: 9.2 FL (ref 9.2–12.9)
POTASSIUM SERPL-SCNC: 4.2 MMOL/L (ref 3.5–5.1)
PROT SERPL-MCNC: 6.6 G/DL (ref 6–8.4)
RBC # BLD AUTO: 3.48 M/UL (ref 4–5.4)
SODIUM SERPL-SCNC: 140 MMOL/L (ref 136–145)
T4 FREE SERPL-MCNC: 0.88 NG/DL (ref 0.71–1.51)
TSH SERPL DL<=0.005 MIU/L-ACNC: 0.48 UIU/ML (ref 0.4–4)
WBC # BLD AUTO: 4.98 K/UL (ref 3.9–12.7)

## 2021-04-13 PROCEDURE — 1126F AMNT PAIN NOTED NONE PRSNT: CPT | Mod: S$GLB,,, | Performed by: NURSE PRACTITIONER

## 2021-04-13 PROCEDURE — 99999 PR PBB SHADOW E&M-EST. PATIENT-LVL III: CPT | Mod: PBBFAC,,, | Performed by: NURSE PRACTITIONER

## 2021-04-13 PROCEDURE — 96361 HYDRATE IV INFUSION ADD-ON: CPT

## 2021-04-13 PROCEDURE — 84443 ASSAY THYROID STIM HORMONE: CPT | Performed by: INTERNAL MEDICINE

## 2021-04-13 PROCEDURE — 84439 ASSAY OF FREE THYROXINE: CPT | Performed by: INTERNAL MEDICINE

## 2021-04-13 PROCEDURE — 3008F PR BODY MASS INDEX (BMI) DOCUMENTED: ICD-10-PCS | Mod: CPTII,S$GLB,, | Performed by: NURSE PRACTITIONER

## 2021-04-13 PROCEDURE — A4216 STERILE WATER/SALINE, 10 ML: HCPCS | Performed by: INTERNAL MEDICINE

## 2021-04-13 PROCEDURE — 63600175 PHARM REV CODE 636 W HCPCS: Performed by: INTERNAL MEDICINE

## 2021-04-13 PROCEDURE — 96375 TX/PRO/DX INJ NEW DRUG ADDON: CPT

## 2021-04-13 PROCEDURE — 25000003 PHARM REV CODE 250: Performed by: INTERNAL MEDICINE

## 2021-04-13 PROCEDURE — 99214 PR OFFICE/OUTPT VISIT, EST, LEVL IV, 30-39 MIN: ICD-10-PCS | Mod: S$GLB,,, | Performed by: NURSE PRACTITIONER

## 2021-04-13 PROCEDURE — 1126F PR PAIN SEVERITY QUANTIFIED, NO PAIN PRESENT: ICD-10-PCS | Mod: S$GLB,,, | Performed by: NURSE PRACTITIONER

## 2021-04-13 PROCEDURE — 63600175 PHARM REV CODE 636 W HCPCS: Mod: JG | Performed by: INTERNAL MEDICINE

## 2021-04-13 PROCEDURE — 3008F BODY MASS INDEX DOCD: CPT | Mod: CPTII,S$GLB,, | Performed by: NURSE PRACTITIONER

## 2021-04-13 PROCEDURE — 99999 PR PBB SHADOW E&M-EST. PATIENT-LVL III: ICD-10-PCS | Mod: PBBFAC,,, | Performed by: NURSE PRACTITIONER

## 2021-04-13 PROCEDURE — 85027 COMPLETE CBC AUTOMATED: CPT | Performed by: INTERNAL MEDICINE

## 2021-04-13 PROCEDURE — 96367 TX/PROPH/DG ADDL SEQ IV INF: CPT

## 2021-04-13 PROCEDURE — 96413 CHEMO IV INFUSION 1 HR: CPT

## 2021-04-13 PROCEDURE — 80053 COMPREHEN METABOLIC PANEL: CPT | Performed by: INTERNAL MEDICINE

## 2021-04-13 PROCEDURE — 96417 CHEMO IV INFUS EACH ADDL SEQ: CPT

## 2021-04-13 PROCEDURE — 99214 OFFICE O/P EST MOD 30 MIN: CPT | Mod: S$GLB,,, | Performed by: NURSE PRACTITIONER

## 2021-04-13 RX ORDER — SODIUM CHLORIDE 0.9 % (FLUSH) 0.9 %
10 SYRINGE (ML) INJECTION
Status: CANCELLED | OUTPATIENT
Start: 2021-04-13

## 2021-04-13 RX ORDER — HEPARIN 100 UNIT/ML
500 SYRINGE INTRAVENOUS
Status: CANCELLED | OUTPATIENT
Start: 2021-04-13

## 2021-04-13 RX ORDER — FAMOTIDINE 10 MG/ML
20 INJECTION INTRAVENOUS
Status: COMPLETED | OUTPATIENT
Start: 2021-04-13 | End: 2021-04-13

## 2021-04-13 RX ORDER — HEPARIN 100 UNIT/ML
500 SYRINGE INTRAVENOUS
Status: CANCELLED | OUTPATIENT
Start: 2021-04-27

## 2021-04-13 RX ORDER — HEPARIN 100 UNIT/ML
500 SYRINGE INTRAVENOUS
Status: DISCONTINUED | OUTPATIENT
Start: 2021-04-13 | End: 2021-04-13 | Stop reason: HOSPADM

## 2021-04-13 RX ORDER — FAMOTIDINE 10 MG/ML
20 INJECTION INTRAVENOUS
Status: CANCELLED
Start: 2021-04-20

## 2021-04-13 RX ORDER — SODIUM CHLORIDE 0.9 % (FLUSH) 0.9 %
10 SYRINGE (ML) INJECTION
Status: DISCONTINUED | OUTPATIENT
Start: 2021-04-13 | End: 2021-04-13 | Stop reason: HOSPADM

## 2021-04-13 RX ORDER — HEPARIN 100 UNIT/ML
500 SYRINGE INTRAVENOUS
Status: CANCELLED | OUTPATIENT
Start: 2021-04-20

## 2021-04-13 RX ORDER — SODIUM CHLORIDE 0.9 % (FLUSH) 0.9 %
10 SYRINGE (ML) INJECTION
Status: CANCELLED | OUTPATIENT
Start: 2021-04-20

## 2021-04-13 RX ORDER — FAMOTIDINE 10 MG/ML
20 INJECTION INTRAVENOUS
Status: CANCELLED
Start: 2021-04-27

## 2021-04-13 RX ORDER — FAMOTIDINE 10 MG/ML
20 INJECTION INTRAVENOUS
Status: CANCELLED
Start: 2021-04-13

## 2021-04-13 RX ORDER — SODIUM CHLORIDE 0.9 % (FLUSH) 0.9 %
10 SYRINGE (ML) INJECTION
Status: CANCELLED | OUTPATIENT
Start: 2021-04-27

## 2021-04-13 RX ADMIN — SODIUM CHLORIDE 200 MG: 9 INJECTION, SOLUTION INTRAVENOUS at 11:04

## 2021-04-13 RX ADMIN — Medication 10 ML: at 09:04

## 2021-04-13 RX ADMIN — SODIUM CHLORIDE: 0.9 INJECTION, SOLUTION INTRAVENOUS at 10:04

## 2021-04-13 RX ADMIN — DEXAMETHASONE SODIUM PHOSPHATE 0.25 MG: 4 INJECTION, SOLUTION INTRA-ARTICULAR; INTRALESIONAL; INTRAMUSCULAR; INTRAVENOUS; SOFT TISSUE at 11:04

## 2021-04-13 RX ADMIN — CARBOPLATIN 750 MG: 10 INJECTION INTRAVENOUS at 01:04

## 2021-04-13 RX ADMIN — HEPARIN 500 UNITS: 100 SYRINGE at 02:04

## 2021-04-13 RX ADMIN — APREPITANT 130 MG: 130 INJECTION, EMULSION INTRAVENOUS at 11:04

## 2021-04-13 RX ADMIN — FAMOTIDINE 20 MG: 10 INJECTION INTRAVENOUS at 11:04

## 2021-04-13 RX ADMIN — SODIUM CHLORIDE 1000 ML: 0.9 INJECTION, SOLUTION INTRAVENOUS at 10:04

## 2021-04-13 RX ADMIN — HEPARIN 500 UNITS: 100 SYRINGE at 09:04

## 2021-04-13 RX ADMIN — PACLITAXEL 138 MG: 6 INJECTION, SOLUTION, CONCENTRATE INTRAVENOUS at 12:04

## 2021-04-13 RX ADMIN — DIPHENHYDRAMINE HYDROCHLORIDE 50 MG: 50 INJECTION, SOLUTION INTRAMUSCULAR; INTRAVENOUS at 12:04

## 2021-04-14 ENCOUNTER — PATIENT MESSAGE (OUTPATIENT)
Dept: HEMATOLOGY/ONCOLOGY | Facility: CLINIC | Age: 41
End: 2021-04-14

## 2021-04-14 ENCOUNTER — TELEPHONE (OUTPATIENT)
Dept: HEMATOLOGY/ONCOLOGY | Facility: CLINIC | Age: 41
End: 2021-04-14

## 2021-04-15 ENCOUNTER — PATIENT MESSAGE (OUTPATIENT)
Dept: HEMATOLOGY/ONCOLOGY | Facility: CLINIC | Age: 41
End: 2021-04-15

## 2021-04-16 ENCOUNTER — TELEPHONE (OUTPATIENT)
Dept: ENDOSCOPY | Facility: HOSPITAL | Age: 41
End: 2021-04-16

## 2021-04-16 ENCOUNTER — PATIENT MESSAGE (OUTPATIENT)
Dept: ENDOSCOPY | Facility: HOSPITAL | Age: 41
End: 2021-04-16

## 2021-04-20 ENCOUNTER — INFUSION (OUTPATIENT)
Dept: INFUSION THERAPY | Facility: HOSPITAL | Age: 41
End: 2021-04-20
Attending: INTERNAL MEDICINE
Payer: COMMERCIAL

## 2021-04-20 ENCOUNTER — OFFICE VISIT (OUTPATIENT)
Dept: HEMATOLOGY/ONCOLOGY | Facility: CLINIC | Age: 41
End: 2021-04-20
Payer: COMMERCIAL

## 2021-04-20 VITALS
TEMPERATURE: 98 F | HEART RATE: 79 BPM | DIASTOLIC BLOOD PRESSURE: 73 MMHG | RESPIRATION RATE: 18 BRPM | SYSTOLIC BLOOD PRESSURE: 128 MMHG

## 2021-04-20 VITALS
SYSTOLIC BLOOD PRESSURE: 129 MMHG | BODY MASS INDEX: 22.7 KG/M2 | DIASTOLIC BLOOD PRESSURE: 71 MMHG | OXYGEN SATURATION: 100 % | HEART RATE: 73 BPM | RESPIRATION RATE: 18 BRPM | WEIGHT: 144.63 LBS | HEIGHT: 67 IN | TEMPERATURE: 98 F

## 2021-04-20 DIAGNOSIS — D05.12 DUCTAL CARCINOMA IN SITU OF LEFT BREAST: ICD-10-CM

## 2021-04-20 DIAGNOSIS — Z15.01 BRCA2 GENE MUTATION POSITIVE: Chronic | ICD-10-CM

## 2021-04-20 DIAGNOSIS — T45.1X5A ANEMIA ASSOCIATED WITH CHEMOTHERAPY: ICD-10-CM

## 2021-04-20 DIAGNOSIS — Z17.1 MALIGNANT NEOPLASM OF UPPER-OUTER QUADRANT OF LEFT BREAST IN FEMALE, ESTROGEN RECEPTOR NEGATIVE: Primary | ICD-10-CM

## 2021-04-20 DIAGNOSIS — Z15.09 BRCA2 GENE MUTATION POSITIVE: Chronic | ICD-10-CM

## 2021-04-20 DIAGNOSIS — C50.412 MALIGNANT NEOPLASM OF UPPER-OUTER QUADRANT OF LEFT BREAST IN FEMALE, ESTROGEN RECEPTOR NEGATIVE: Primary | ICD-10-CM

## 2021-04-20 DIAGNOSIS — D64.81 ANEMIA ASSOCIATED WITH CHEMOTHERAPY: ICD-10-CM

## 2021-04-20 LAB
ALBUMIN SERPL BCP-MCNC: 3.8 G/DL (ref 3.5–5.2)
ALP SERPL-CCNC: 87 U/L (ref 55–135)
ALT SERPL W/O P-5'-P-CCNC: 33 U/L (ref 10–44)
ANION GAP SERPL CALC-SCNC: 6 MMOL/L (ref 8–16)
AST SERPL-CCNC: 21 U/L (ref 10–40)
BASOPHILS # BLD AUTO: 0.01 K/UL (ref 0–0.2)
BASOPHILS NFR BLD: 0.2 % (ref 0–1.9)
BILIRUB SERPL-MCNC: 0.3 MG/DL (ref 0.1–1)
BUN SERPL-MCNC: 13 MG/DL (ref 6–20)
CALCIUM SERPL-MCNC: 9 MG/DL (ref 8.7–10.5)
CHLORIDE SERPL-SCNC: 104 MMOL/L (ref 95–110)
CO2 SERPL-SCNC: 28 MMOL/L (ref 23–29)
CREAT SERPL-MCNC: 0.5 MG/DL (ref 0.5–1.4)
DIFFERENTIAL METHOD: ABNORMAL
EOSINOPHIL # BLD AUTO: 0.1 K/UL (ref 0–0.5)
EOSINOPHIL NFR BLD: 2.1 % (ref 0–8)
ERYTHROCYTE [DISTWIDTH] IN BLOOD BY AUTOMATED COUNT: 14.1 % (ref 11.5–14.5)
EST. GFR  (AFRICAN AMERICAN): >60 ML/MIN/1.73 M^2
EST. GFR  (NON AFRICAN AMERICAN): >60 ML/MIN/1.73 M^2
GLUCOSE SERPL-MCNC: 87 MG/DL (ref 70–110)
HCT VFR BLD AUTO: 32.6 % (ref 37–48.5)
HGB BLD-MCNC: 11.2 G/DL (ref 12–16)
IMM GRANULOCYTES # BLD AUTO: 0.02 K/UL (ref 0–0.04)
IMM GRANULOCYTES NFR BLD AUTO: 0.5 % (ref 0–0.5)
LYMPHOCYTES # BLD AUTO: 1.8 K/UL (ref 1–4.8)
LYMPHOCYTES NFR BLD: 41.3 % (ref 18–48)
MCH RBC QN AUTO: 32.2 PG (ref 27–31)
MCHC RBC AUTO-ENTMCNC: 34.4 G/DL (ref 32–36)
MCV RBC AUTO: 94 FL (ref 82–98)
MONOCYTES # BLD AUTO: 0.3 K/UL (ref 0.3–1)
MONOCYTES NFR BLD: 7.8 % (ref 4–15)
NEUTROPHILS # BLD AUTO: 2.1 K/UL (ref 1.8–7.7)
NEUTROPHILS NFR BLD: 48.1 % (ref 38–73)
NRBC BLD-RTO: 0 /100 WBC
PLATELET # BLD AUTO: 193 K/UL (ref 150–450)
PMV BLD AUTO: 9.6 FL (ref 9.2–12.9)
POTASSIUM SERPL-SCNC: 4.1 MMOL/L (ref 3.5–5.1)
PROT SERPL-MCNC: 7 G/DL (ref 6–8.4)
RBC # BLD AUTO: 3.48 M/UL (ref 4–5.4)
SODIUM SERPL-SCNC: 138 MMOL/L (ref 136–145)
WBC # BLD AUTO: 4.38 K/UL (ref 3.9–12.7)

## 2021-04-20 PROCEDURE — A4216 STERILE WATER/SALINE, 10 ML: HCPCS | Performed by: INTERNAL MEDICINE

## 2021-04-20 PROCEDURE — 96361 HYDRATE IV INFUSION ADD-ON: CPT

## 2021-04-20 PROCEDURE — 63600175 PHARM REV CODE 636 W HCPCS: Performed by: INTERNAL MEDICINE

## 2021-04-20 PROCEDURE — 99215 PR OFFICE/OUTPT VISIT, EST, LEVL V, 40-54 MIN: ICD-10-PCS | Mod: S$GLB,,, | Performed by: NURSE PRACTITIONER

## 2021-04-20 PROCEDURE — 99999 PR PBB SHADOW E&M-EST. PATIENT-LVL III: CPT | Mod: PBBFAC,,, | Performed by: NURSE PRACTITIONER

## 2021-04-20 PROCEDURE — 99215 OFFICE O/P EST HI 40 MIN: CPT | Mod: S$GLB,,, | Performed by: NURSE PRACTITIONER

## 2021-04-20 PROCEDURE — 80053 COMPREHEN METABOLIC PANEL: CPT | Performed by: INTERNAL MEDICINE

## 2021-04-20 PROCEDURE — 1126F PR PAIN SEVERITY QUANTIFIED, NO PAIN PRESENT: ICD-10-PCS | Mod: S$GLB,,, | Performed by: NURSE PRACTITIONER

## 2021-04-20 PROCEDURE — 96375 TX/PRO/DX INJ NEW DRUG ADDON: CPT

## 2021-04-20 PROCEDURE — 99999 PR PBB SHADOW E&M-EST. PATIENT-LVL III: ICD-10-PCS | Mod: PBBFAC,,, | Performed by: NURSE PRACTITIONER

## 2021-04-20 PROCEDURE — 96413 CHEMO IV INFUSION 1 HR: CPT

## 2021-04-20 PROCEDURE — 25000003 PHARM REV CODE 250: Performed by: INTERNAL MEDICINE

## 2021-04-20 PROCEDURE — 1126F AMNT PAIN NOTED NONE PRSNT: CPT | Mod: S$GLB,,, | Performed by: NURSE PRACTITIONER

## 2021-04-20 PROCEDURE — 96367 TX/PROPH/DG ADDL SEQ IV INF: CPT

## 2021-04-20 PROCEDURE — 85025 COMPLETE CBC W/AUTO DIFF WBC: CPT | Performed by: INTERNAL MEDICINE

## 2021-04-20 PROCEDURE — 3008F PR BODY MASS INDEX (BMI) DOCUMENTED: ICD-10-PCS | Mod: CPTII,S$GLB,, | Performed by: NURSE PRACTITIONER

## 2021-04-20 PROCEDURE — 3008F BODY MASS INDEX DOCD: CPT | Mod: CPTII,S$GLB,, | Performed by: NURSE PRACTITIONER

## 2021-04-20 RX ORDER — FAMOTIDINE 10 MG/ML
20 INJECTION INTRAVENOUS
Status: COMPLETED | OUTPATIENT
Start: 2021-04-20 | End: 2021-04-20

## 2021-04-20 RX ORDER — SODIUM CHLORIDE 0.9 % (FLUSH) 0.9 %
10 SYRINGE (ML) INJECTION
Status: DISCONTINUED | OUTPATIENT
Start: 2021-04-20 | End: 2021-04-20 | Stop reason: HOSPADM

## 2021-04-20 RX ORDER — DEXTROAMPHETAMINE SACCHARATE, AMPHETAMINE ASPARTATE, DEXTROAMPHETAMINE SULFATE AND AMPHETAMINE SULFATE 2.5; 2.5; 2.5; 2.5 MG/1; MG/1; MG/1; MG/1
1 TABLET ORAL DAILY
COMMUNITY
Start: 2021-04-19 | End: 2023-05-30

## 2021-04-20 RX ORDER — HEPARIN 100 UNIT/ML
500 SYRINGE INTRAVENOUS
Status: CANCELLED | OUTPATIENT
Start: 2021-04-20

## 2021-04-20 RX ORDER — HEPARIN 100 UNIT/ML
500 SYRINGE INTRAVENOUS
Status: DISCONTINUED | OUTPATIENT
Start: 2021-04-20 | End: 2021-04-20 | Stop reason: HOSPADM

## 2021-04-20 RX ORDER — SODIUM CHLORIDE 0.9 % (FLUSH) 0.9 %
10 SYRINGE (ML) INJECTION
Status: CANCELLED | OUTPATIENT
Start: 2021-04-20

## 2021-04-20 RX ADMIN — Medication 10 ML: at 08:04

## 2021-04-20 RX ADMIN — DEXAMETHASONE SODIUM PHOSPHATE 10 MG: 4 INJECTION, SOLUTION INTRAMUSCULAR; INTRAVENOUS at 11:04

## 2021-04-20 RX ADMIN — SODIUM CHLORIDE 500 ML: 0.9 INJECTION, SOLUTION INTRAVENOUS at 01:04

## 2021-04-20 RX ADMIN — DIPHENHYDRAMINE HYDROCHLORIDE 50 MG: 50 INJECTION INTRAMUSCULAR; INTRAVENOUS at 11:04

## 2021-04-20 RX ADMIN — SODIUM CHLORIDE: 0.9 INJECTION, SOLUTION INTRAVENOUS at 11:04

## 2021-04-20 RX ADMIN — HEPARIN 500 UNITS: 100 SYRINGE at 02:04

## 2021-04-20 RX ADMIN — HEPARIN 500 UNITS: 100 SYRINGE at 08:04

## 2021-04-20 RX ADMIN — Medication 10 ML: at 02:04

## 2021-04-20 RX ADMIN — FAMOTIDINE 20 MG: 10 INJECTION INTRAVENOUS at 11:04

## 2021-04-20 RX ADMIN — PACLITAXEL 138 MG: 6 INJECTION, SOLUTION, CONCENTRATE INTRAVENOUS at 12:04

## 2021-04-22 ENCOUNTER — PATIENT MESSAGE (OUTPATIENT)
Dept: HEMATOLOGY/ONCOLOGY | Facility: CLINIC | Age: 41
End: 2021-04-22

## 2021-04-22 ENCOUNTER — TELEPHONE (OUTPATIENT)
Dept: HEMATOLOGY/ONCOLOGY | Facility: CLINIC | Age: 41
End: 2021-04-22

## 2021-04-23 ENCOUNTER — PATIENT MESSAGE (OUTPATIENT)
Dept: HEMATOLOGY/ONCOLOGY | Facility: CLINIC | Age: 41
End: 2021-04-23

## 2021-04-26 ENCOUNTER — TELEPHONE (OUTPATIENT)
Dept: SURGERY | Facility: CLINIC | Age: 41
End: 2021-04-26

## 2021-04-26 ENCOUNTER — PATIENT MESSAGE (OUTPATIENT)
Dept: HEMATOLOGY/ONCOLOGY | Facility: CLINIC | Age: 41
End: 2021-04-26

## 2021-04-26 ENCOUNTER — TELEPHONE (OUTPATIENT)
Dept: HEMATOLOGY/ONCOLOGY | Facility: CLINIC | Age: 41
End: 2021-04-26

## 2021-04-27 ENCOUNTER — INFUSION (OUTPATIENT)
Dept: INFUSION THERAPY | Facility: HOSPITAL | Age: 41
End: 2021-04-27
Payer: COMMERCIAL

## 2021-04-27 ENCOUNTER — PATIENT MESSAGE (OUTPATIENT)
Dept: HEMATOLOGY/ONCOLOGY | Facility: CLINIC | Age: 41
End: 2021-04-27

## 2021-04-27 ENCOUNTER — INFUSION (OUTPATIENT)
Dept: INFUSION THERAPY | Facility: HOSPITAL | Age: 41
End: 2021-04-27
Attending: INTERNAL MEDICINE
Payer: COMMERCIAL

## 2021-04-27 ENCOUNTER — OFFICE VISIT (OUTPATIENT)
Dept: HEMATOLOGY/ONCOLOGY | Facility: CLINIC | Age: 41
End: 2021-04-27
Payer: COMMERCIAL

## 2021-04-27 VITALS
SYSTOLIC BLOOD PRESSURE: 134 MMHG | HEIGHT: 67 IN | BODY MASS INDEX: 23.18 KG/M2 | RESPIRATION RATE: 18 BRPM | TEMPERATURE: 98 F | HEART RATE: 94 BPM | DIASTOLIC BLOOD PRESSURE: 73 MMHG | WEIGHT: 147.69 LBS | OXYGEN SATURATION: 95 %

## 2021-04-27 VITALS
DIASTOLIC BLOOD PRESSURE: 73 MMHG | SYSTOLIC BLOOD PRESSURE: 115 MMHG | TEMPERATURE: 98 F | HEART RATE: 74 BPM | RESPIRATION RATE: 18 BRPM

## 2021-04-27 DIAGNOSIS — C50.412 MALIGNANT NEOPLASM OF UPPER-OUTER QUADRANT OF LEFT BREAST IN FEMALE, ESTROGEN RECEPTOR NEGATIVE: Primary | ICD-10-CM

## 2021-04-27 DIAGNOSIS — R11.0 CHEMOTHERAPY-INDUCED NAUSEA: ICD-10-CM

## 2021-04-27 DIAGNOSIS — T45.1X5A ANEMIA ASSOCIATED WITH CHEMOTHERAPY: ICD-10-CM

## 2021-04-27 DIAGNOSIS — Z15.09 BRCA2 GENE MUTATION POSITIVE: Chronic | ICD-10-CM

## 2021-04-27 DIAGNOSIS — D64.81 ANEMIA ASSOCIATED WITH CHEMOTHERAPY: ICD-10-CM

## 2021-04-27 DIAGNOSIS — Z17.1 MALIGNANT NEOPLASM OF UPPER-OUTER QUADRANT OF LEFT BREAST IN FEMALE, ESTROGEN RECEPTOR NEGATIVE: Primary | ICD-10-CM

## 2021-04-27 DIAGNOSIS — D05.12 DUCTAL CARCINOMA IN SITU OF LEFT BREAST: ICD-10-CM

## 2021-04-27 DIAGNOSIS — Z15.01 BRCA2 GENE MUTATION POSITIVE: Chronic | ICD-10-CM

## 2021-04-27 DIAGNOSIS — T45.1X5A CHEMOTHERAPY-INDUCED NAUSEA: ICD-10-CM

## 2021-04-27 LAB
ALBUMIN SERPL BCP-MCNC: 3.6 G/DL (ref 3.5–5.2)
ALP SERPL-CCNC: 87 U/L (ref 55–135)
ALT SERPL W/O P-5'-P-CCNC: 41 U/L (ref 10–44)
ANION GAP SERPL CALC-SCNC: 7 MMOL/L (ref 8–16)
AST SERPL-CCNC: 21 U/L (ref 10–40)
BASOPHILS # BLD AUTO: 0.02 K/UL (ref 0–0.2)
BASOPHILS NFR BLD: 0.5 % (ref 0–1.9)
BILIRUB SERPL-MCNC: 0.2 MG/DL (ref 0.1–1)
BUN SERPL-MCNC: 12 MG/DL (ref 6–20)
CALCIUM SERPL-MCNC: 8.7 MG/DL (ref 8.7–10.5)
CHLORIDE SERPL-SCNC: 108 MMOL/L (ref 95–110)
CO2 SERPL-SCNC: 28 MMOL/L (ref 23–29)
CREAT SERPL-MCNC: 0.6 MG/DL (ref 0.5–1.4)
DIFFERENTIAL METHOD: ABNORMAL
EOSINOPHIL # BLD AUTO: 0.1 K/UL (ref 0–0.5)
EOSINOPHIL NFR BLD: 1.9 % (ref 0–8)
ERYTHROCYTE [DISTWIDTH] IN BLOOD BY AUTOMATED COUNT: 15.2 % (ref 11.5–14.5)
EST. GFR  (AFRICAN AMERICAN): >60 ML/MIN/1.73 M^2
EST. GFR  (NON AFRICAN AMERICAN): >60 ML/MIN/1.73 M^2
GLUCOSE SERPL-MCNC: 96 MG/DL (ref 70–110)
HCT VFR BLD AUTO: 29.8 % (ref 37–48.5)
HGB BLD-MCNC: 10.2 G/DL (ref 12–16)
IMM GRANULOCYTES # BLD AUTO: 0.14 K/UL (ref 0–0.04)
IMM GRANULOCYTES NFR BLD AUTO: 3.3 % (ref 0–0.5)
LYMPHOCYTES # BLD AUTO: 1.9 K/UL (ref 1–4.8)
LYMPHOCYTES NFR BLD: 44.4 % (ref 18–48)
MCH RBC QN AUTO: 32.1 PG (ref 27–31)
MCHC RBC AUTO-ENTMCNC: 34.2 G/DL (ref 32–36)
MCV RBC AUTO: 94 FL (ref 82–98)
MONOCYTES # BLD AUTO: 0.5 K/UL (ref 0.3–1)
MONOCYTES NFR BLD: 11.2 % (ref 4–15)
NEUTROPHILS # BLD AUTO: 1.7 K/UL (ref 1.8–7.7)
NEUTROPHILS NFR BLD: 38.7 % (ref 38–73)
NRBC BLD-RTO: 1 /100 WBC
PLATELET # BLD AUTO: 208 K/UL (ref 150–450)
PMV BLD AUTO: 9.1 FL (ref 9.2–12.9)
POTASSIUM SERPL-SCNC: 3.9 MMOL/L (ref 3.5–5.1)
PROT SERPL-MCNC: 6.6 G/DL (ref 6–8.4)
RBC # BLD AUTO: 3.18 M/UL (ref 4–5.4)
SODIUM SERPL-SCNC: 143 MMOL/L (ref 136–145)
WBC # BLD AUTO: 4.3 K/UL (ref 3.9–12.7)

## 2021-04-27 PROCEDURE — 1126F PR PAIN SEVERITY QUANTIFIED, NO PAIN PRESENT: ICD-10-PCS | Mod: S$GLB,,, | Performed by: INTERNAL MEDICINE

## 2021-04-27 PROCEDURE — 96361 HYDRATE IV INFUSION ADD-ON: CPT

## 2021-04-27 PROCEDURE — 3008F PR BODY MASS INDEX (BMI) DOCUMENTED: ICD-10-PCS | Mod: CPTII,S$GLB,, | Performed by: INTERNAL MEDICINE

## 2021-04-27 PROCEDURE — 1126F AMNT PAIN NOTED NONE PRSNT: CPT | Mod: S$GLB,,, | Performed by: INTERNAL MEDICINE

## 2021-04-27 PROCEDURE — 63600175 PHARM REV CODE 636 W HCPCS: Performed by: INTERNAL MEDICINE

## 2021-04-27 PROCEDURE — 25000003 PHARM REV CODE 250: Performed by: INTERNAL MEDICINE

## 2021-04-27 PROCEDURE — 96367 TX/PROPH/DG ADDL SEQ IV INF: CPT

## 2021-04-27 PROCEDURE — 96413 CHEMO IV INFUSION 1 HR: CPT

## 2021-04-27 PROCEDURE — A4216 STERILE WATER/SALINE, 10 ML: HCPCS | Performed by: INTERNAL MEDICINE

## 2021-04-27 PROCEDURE — 85025 COMPLETE CBC W/AUTO DIFF WBC: CPT | Performed by: INTERNAL MEDICINE

## 2021-04-27 PROCEDURE — 99999 PR PBB SHADOW E&M-EST. PATIENT-LVL IV: CPT | Mod: PBBFAC,,, | Performed by: INTERNAL MEDICINE

## 2021-04-27 PROCEDURE — 99999 PR PBB SHADOW E&M-EST. PATIENT-LVL IV: ICD-10-PCS | Mod: PBBFAC,,, | Performed by: INTERNAL MEDICINE

## 2021-04-27 PROCEDURE — 3008F BODY MASS INDEX DOCD: CPT | Mod: CPTII,S$GLB,, | Performed by: INTERNAL MEDICINE

## 2021-04-27 PROCEDURE — 96375 TX/PRO/DX INJ NEW DRUG ADDON: CPT

## 2021-04-27 PROCEDURE — 80053 COMPREHEN METABOLIC PANEL: CPT | Performed by: INTERNAL MEDICINE

## 2021-04-27 PROCEDURE — 99214 OFFICE O/P EST MOD 30 MIN: CPT | Mod: S$GLB,,, | Performed by: INTERNAL MEDICINE

## 2021-04-27 PROCEDURE — 99214 PR OFFICE/OUTPT VISIT, EST, LEVL IV, 30-39 MIN: ICD-10-PCS | Mod: S$GLB,,, | Performed by: INTERNAL MEDICINE

## 2021-04-27 RX ORDER — HEPARIN 100 UNIT/ML
500 SYRINGE INTRAVENOUS
Status: CANCELLED | OUTPATIENT
Start: 2021-04-27

## 2021-04-27 RX ORDER — SODIUM CHLORIDE 0.9 % (FLUSH) 0.9 %
10 SYRINGE (ML) INJECTION
Status: DISCONTINUED | OUTPATIENT
Start: 2021-04-27 | End: 2021-04-27 | Stop reason: HOSPADM

## 2021-04-27 RX ORDER — FAMOTIDINE 10 MG/ML
20 INJECTION INTRAVENOUS
Status: COMPLETED | OUTPATIENT
Start: 2021-04-27 | End: 2021-04-27

## 2021-04-27 RX ORDER — HEPARIN 100 UNIT/ML
500 SYRINGE INTRAVENOUS
Status: DISCONTINUED | OUTPATIENT
Start: 2021-04-27 | End: 2021-04-27 | Stop reason: HOSPADM

## 2021-04-27 RX ORDER — SODIUM CHLORIDE 0.9 % (FLUSH) 0.9 %
10 SYRINGE (ML) INJECTION
Status: CANCELLED | OUTPATIENT
Start: 2021-04-27

## 2021-04-27 RX ADMIN — HEPARIN 500 UNITS: 100 SYRINGE at 01:04

## 2021-04-27 RX ADMIN — DEXAMETHASONE SODIUM PHOSPHATE 10 MG: 4 INJECTION, SOLUTION INTRA-ARTICULAR; INTRALESIONAL; INTRAMUSCULAR; INTRAVENOUS; SOFT TISSUE at 10:04

## 2021-04-27 RX ADMIN — Medication 10 ML: at 08:04

## 2021-04-27 RX ADMIN — DIPHENHYDRAMINE HYDROCHLORIDE 50 MG: 50 INJECTION, SOLUTION INTRAMUSCULAR; INTRAVENOUS at 10:04

## 2021-04-27 RX ADMIN — FAMOTIDINE 20 MG: 10 INJECTION INTRAVENOUS at 10:04

## 2021-04-27 RX ADMIN — Medication 10 ML: at 01:04

## 2021-04-27 RX ADMIN — SODIUM CHLORIDE 500 ML: 0.9 INJECTION, SOLUTION INTRAVENOUS at 12:04

## 2021-04-27 RX ADMIN — SODIUM CHLORIDE: 0.9 INJECTION, SOLUTION INTRAVENOUS at 10:04

## 2021-04-27 RX ADMIN — PACLITAXEL 144 MG: 6 INJECTION, SOLUTION INTRAVENOUS at 11:04

## 2021-04-27 RX ADMIN — HEPARIN 500 UNITS: 100 SYRINGE at 08:04

## 2021-04-28 ENCOUNTER — DOCUMENTATION ONLY (OUTPATIENT)
Dept: INFUSION THERAPY | Facility: HOSPITAL | Age: 41
End: 2021-04-28

## 2021-04-28 ENCOUNTER — PATIENT MESSAGE (OUTPATIENT)
Dept: HEMATOLOGY/ONCOLOGY | Facility: CLINIC | Age: 41
End: 2021-04-28

## 2021-04-28 PROBLEM — M25.60 DECREASED RANGE OF MOTION: Status: RESOLVED | Noted: 2021-02-08 | Resolved: 2021-04-28

## 2021-04-28 PROBLEM — R26.89 DECREASED FUNCTIONAL MOBILITY: Status: RESOLVED | Noted: 2021-02-08 | Resolved: 2021-04-28

## 2021-04-29 ENCOUNTER — DOCUMENTATION ONLY (OUTPATIENT)
Dept: INFUSION THERAPY | Facility: HOSPITAL | Age: 41
End: 2021-04-29

## 2021-04-29 DIAGNOSIS — C80.1 MALIGNANT (PRIMARY) NEOPLASM, UNSPECIFIED: ICD-10-CM

## 2021-04-30 ENCOUNTER — PATIENT MESSAGE (OUTPATIENT)
Dept: HEMATOLOGY/ONCOLOGY | Facility: CLINIC | Age: 41
End: 2021-04-30

## 2021-05-04 ENCOUNTER — INFUSION (OUTPATIENT)
Dept: INFUSION THERAPY | Facility: HOSPITAL | Age: 41
End: 2021-05-04
Attending: INTERNAL MEDICINE
Payer: COMMERCIAL

## 2021-05-04 ENCOUNTER — OFFICE VISIT (OUTPATIENT)
Dept: HEMATOLOGY/ONCOLOGY | Facility: CLINIC | Age: 41
End: 2021-05-04
Payer: COMMERCIAL

## 2021-05-04 VITALS
DIASTOLIC BLOOD PRESSURE: 76 MMHG | WEIGHT: 147.06 LBS | HEART RATE: 96 BPM | BODY MASS INDEX: 23.08 KG/M2 | HEIGHT: 67 IN | TEMPERATURE: 98 F | RESPIRATION RATE: 16 BRPM | OXYGEN SATURATION: 99 % | SYSTOLIC BLOOD PRESSURE: 135 MMHG

## 2021-05-04 VITALS
HEART RATE: 72 BPM | RESPIRATION RATE: 18 BRPM | TEMPERATURE: 98 F | SYSTOLIC BLOOD PRESSURE: 102 MMHG | DIASTOLIC BLOOD PRESSURE: 65 MMHG

## 2021-05-04 DIAGNOSIS — T45.1X5A ANEMIA ASSOCIATED WITH CHEMOTHERAPY: ICD-10-CM

## 2021-05-04 DIAGNOSIS — Z17.1 MALIGNANT NEOPLASM OF UPPER-OUTER QUADRANT OF LEFT BREAST IN FEMALE, ESTROGEN RECEPTOR NEGATIVE: Primary | ICD-10-CM

## 2021-05-04 DIAGNOSIS — C50.412 MALIGNANT NEOPLASM OF UPPER-OUTER QUADRANT OF LEFT BREAST IN FEMALE, ESTROGEN RECEPTOR NEGATIVE: Primary | ICD-10-CM

## 2021-05-04 DIAGNOSIS — K21.9 GASTROESOPHAGEAL REFLUX DISEASE WITHOUT ESOPHAGITIS: ICD-10-CM

## 2021-05-04 DIAGNOSIS — Z15.09 BRCA2 GENE MUTATION POSITIVE: Chronic | ICD-10-CM

## 2021-05-04 DIAGNOSIS — D05.12 DUCTAL CARCINOMA IN SITU OF LEFT BREAST: ICD-10-CM

## 2021-05-04 DIAGNOSIS — Z15.01 BRCA2 GENE MUTATION POSITIVE: Chronic | ICD-10-CM

## 2021-05-04 DIAGNOSIS — D64.81 ANEMIA ASSOCIATED WITH CHEMOTHERAPY: ICD-10-CM

## 2021-05-04 LAB
ALBUMIN SERPL BCP-MCNC: 3.8 G/DL (ref 3.5–5.2)
ALP SERPL-CCNC: 96 U/L (ref 55–135)
ALT SERPL W/O P-5'-P-CCNC: 40 U/L (ref 10–44)
ANION GAP SERPL CALC-SCNC: 8 MMOL/L (ref 8–16)
ANISOCYTOSIS BLD QL SMEAR: SLIGHT
AST SERPL-CCNC: 22 U/L (ref 10–40)
BASOPHILS NFR BLD: 1 % (ref 0–1.9)
BILIRUB SERPL-MCNC: 0.2 MG/DL (ref 0.1–1)
BUN SERPL-MCNC: 14 MG/DL (ref 6–20)
CALCIUM SERPL-MCNC: 9.5 MG/DL (ref 8.7–10.5)
CHLORIDE SERPL-SCNC: 105 MMOL/L (ref 95–110)
CO2 SERPL-SCNC: 27 MMOL/L (ref 23–29)
CREAT SERPL-MCNC: 0.6 MG/DL (ref 0.5–1.4)
DIFFERENTIAL METHOD: ABNORMAL
EOSINOPHIL NFR BLD: 2 % (ref 0–8)
ERYTHROCYTE [DISTWIDTH] IN BLOOD BY AUTOMATED COUNT: 16.7 % (ref 11.5–14.5)
EST. GFR  (AFRICAN AMERICAN): >60 ML/MIN/1.73 M^2
EST. GFR  (NON AFRICAN AMERICAN): >60 ML/MIN/1.73 M^2
GLUCOSE SERPL-MCNC: 81 MG/DL (ref 70–110)
HCT VFR BLD AUTO: 31.7 % (ref 37–48.5)
HGB BLD-MCNC: 10.8 G/DL (ref 12–16)
IMM GRANULOCYTES # BLD AUTO: ABNORMAL K/UL (ref 0–0.04)
IMM GRANULOCYTES NFR BLD AUTO: ABNORMAL % (ref 0–0.5)
LYMPHOCYTES NFR BLD: 36 % (ref 18–48)
MCH RBC QN AUTO: 32.5 PG (ref 27–31)
MCHC RBC AUTO-ENTMCNC: 34.1 G/DL (ref 32–36)
MCV RBC AUTO: 96 FL (ref 82–98)
MONOCYTES NFR BLD: 4 % (ref 4–15)
MYELOCYTES NFR BLD MANUAL: 1 %
NEUTROPHILS NFR BLD: 56 % (ref 38–73)
NRBC BLD-RTO: 1 /100 WBC
PLATELET # BLD AUTO: 184 K/UL (ref 150–450)
PLATELET BLD QL SMEAR: ABNORMAL
PMV BLD AUTO: 9 FL (ref 9.2–12.9)
POTASSIUM SERPL-SCNC: 4.2 MMOL/L (ref 3.5–5.1)
PROT SERPL-MCNC: 7.1 G/DL (ref 6–8.4)
RBC # BLD AUTO: 3.32 M/UL (ref 4–5.4)
SODIUM SERPL-SCNC: 140 MMOL/L (ref 136–145)
T4 FREE SERPL-MCNC: 0.88 NG/DL (ref 0.71–1.51)
TSH SERPL DL<=0.005 MIU/L-ACNC: 0.46 UIU/ML (ref 0.4–4)
WBC # BLD AUTO: 5.59 K/UL (ref 3.9–12.7)

## 2021-05-04 PROCEDURE — 96361 HYDRATE IV INFUSION ADD-ON: CPT

## 2021-05-04 PROCEDURE — 1126F AMNT PAIN NOTED NONE PRSNT: CPT | Mod: S$GLB,,, | Performed by: NURSE PRACTITIONER

## 2021-05-04 PROCEDURE — 99999 PR PBB SHADOW E&M-EST. PATIENT-LVL III: ICD-10-PCS | Mod: PBBFAC,,, | Performed by: NURSE PRACTITIONER

## 2021-05-04 PROCEDURE — 84439 ASSAY OF FREE THYROXINE: CPT | Performed by: INTERNAL MEDICINE

## 2021-05-04 PROCEDURE — 99999 PR PBB SHADOW E&M-EST. PATIENT-LVL III: CPT | Mod: PBBFAC,,, | Performed by: NURSE PRACTITIONER

## 2021-05-04 PROCEDURE — 63600175 PHARM REV CODE 636 W HCPCS: Performed by: INTERNAL MEDICINE

## 2021-05-04 PROCEDURE — A4216 STERILE WATER/SALINE, 10 ML: HCPCS | Performed by: INTERNAL MEDICINE

## 2021-05-04 PROCEDURE — 85027 COMPLETE CBC AUTOMATED: CPT | Performed by: INTERNAL MEDICINE

## 2021-05-04 PROCEDURE — 96417 CHEMO IV INFUS EACH ADDL SEQ: CPT

## 2021-05-04 PROCEDURE — 96375 TX/PRO/DX INJ NEW DRUG ADDON: CPT

## 2021-05-04 PROCEDURE — 96413 CHEMO IV INFUSION 1 HR: CPT

## 2021-05-04 PROCEDURE — 85007 BL SMEAR W/DIFF WBC COUNT: CPT | Performed by: INTERNAL MEDICINE

## 2021-05-04 PROCEDURE — 1126F PR PAIN SEVERITY QUANTIFIED, NO PAIN PRESENT: ICD-10-PCS | Mod: S$GLB,,, | Performed by: NURSE PRACTITIONER

## 2021-05-04 PROCEDURE — 96367 TX/PROPH/DG ADDL SEQ IV INF: CPT

## 2021-05-04 PROCEDURE — 25000003 PHARM REV CODE 250: Performed by: INTERNAL MEDICINE

## 2021-05-04 PROCEDURE — 99215 PR OFFICE/OUTPT VISIT, EST, LEVL V, 40-54 MIN: ICD-10-PCS | Mod: S$GLB,,, | Performed by: NURSE PRACTITIONER

## 2021-05-04 PROCEDURE — 99215 OFFICE O/P EST HI 40 MIN: CPT | Mod: S$GLB,,, | Performed by: NURSE PRACTITIONER

## 2021-05-04 PROCEDURE — 84443 ASSAY THYROID STIM HORMONE: CPT | Performed by: INTERNAL MEDICINE

## 2021-05-04 PROCEDURE — 80053 COMPREHEN METABOLIC PANEL: CPT | Performed by: INTERNAL MEDICINE

## 2021-05-04 PROCEDURE — 3008F PR BODY MASS INDEX (BMI) DOCUMENTED: ICD-10-PCS | Mod: CPTII,S$GLB,, | Performed by: NURSE PRACTITIONER

## 2021-05-04 PROCEDURE — 3008F BODY MASS INDEX DOCD: CPT | Mod: CPTII,S$GLB,, | Performed by: NURSE PRACTITIONER

## 2021-05-04 RX ORDER — HEPARIN 100 UNIT/ML
500 SYRINGE INTRAVENOUS
Status: CANCELLED | OUTPATIENT
Start: 2021-05-04

## 2021-05-04 RX ORDER — SODIUM CHLORIDE 0.9 % (FLUSH) 0.9 %
10 SYRINGE (ML) INJECTION
Status: CANCELLED | OUTPATIENT
Start: 2021-05-04

## 2021-05-04 RX ORDER — PROMETHAZINE HYDROCHLORIDE 25 MG/1
25 TABLET ORAL EVERY 6 HOURS PRN
COMMUNITY
Start: 2021-04-30 | End: 2022-03-17

## 2021-05-04 RX ORDER — SODIUM CHLORIDE 0.9 % (FLUSH) 0.9 %
10 SYRINGE (ML) INJECTION
Status: DISCONTINUED | OUTPATIENT
Start: 2021-05-04 | End: 2021-05-04 | Stop reason: HOSPADM

## 2021-05-04 RX ORDER — SODIUM CHLORIDE 0.9 % (FLUSH) 0.9 %
10 SYRINGE (ML) INJECTION
Status: CANCELLED | OUTPATIENT
Start: 2021-05-11

## 2021-05-04 RX ORDER — FAMOTIDINE 10 MG/ML
20 INJECTION INTRAVENOUS
Status: COMPLETED | OUTPATIENT
Start: 2021-05-04 | End: 2021-05-04

## 2021-05-04 RX ORDER — FAMOTIDINE 10 MG/ML
20 INJECTION INTRAVENOUS
Status: CANCELLED
Start: 2021-05-11

## 2021-05-04 RX ORDER — HEPARIN 100 UNIT/ML
500 SYRINGE INTRAVENOUS
Status: DISCONTINUED | OUTPATIENT
Start: 2021-05-04 | End: 2021-05-04 | Stop reason: HOSPADM

## 2021-05-04 RX ORDER — SODIUM CHLORIDE 0.9 % (FLUSH) 0.9 %
10 SYRINGE (ML) INJECTION
Status: CANCELLED | OUTPATIENT
Start: 2021-05-18

## 2021-05-04 RX ORDER — FAMOTIDINE 10 MG/ML
20 INJECTION INTRAVENOUS
Status: CANCELLED
Start: 2021-05-18

## 2021-05-04 RX ORDER — FAMOTIDINE 10 MG/ML
20 INJECTION INTRAVENOUS
Status: CANCELLED
Start: 2021-05-04

## 2021-05-04 RX ORDER — HEPARIN 100 UNIT/ML
500 SYRINGE INTRAVENOUS
Status: CANCELLED | OUTPATIENT
Start: 2021-05-18

## 2021-05-04 RX ORDER — HEPARIN 100 UNIT/ML
500 SYRINGE INTRAVENOUS
Status: CANCELLED | OUTPATIENT
Start: 2021-05-11

## 2021-05-04 RX ADMIN — HEPARIN 500 UNITS: 100 SYRINGE at 05:05

## 2021-05-04 RX ADMIN — PACLITAXEL 144 MG: 6 INJECTION, SOLUTION INTRAVENOUS at 01:05

## 2021-05-04 RX ADMIN — DEXAMETHASONE SODIUM PHOSPHATE: 4 INJECTION, SOLUTION INTRA-ARTICULAR; INTRALESIONAL; INTRAMUSCULAR; INTRAVENOUS; SOFT TISSUE at 01:05

## 2021-05-04 RX ADMIN — DIPHENHYDRAMINE HYDROCHLORIDE 50 MG: 50 INJECTION, SOLUTION INTRAMUSCULAR; INTRAVENOUS at 01:05

## 2021-05-04 RX ADMIN — SODIUM CHLORIDE 1000 ML: 0.9 INJECTION, SOLUTION INTRAVENOUS at 03:05

## 2021-05-04 RX ADMIN — HEPARIN 500 UNITS: 100 SYRINGE at 10:05

## 2021-05-04 RX ADMIN — Medication 10 ML: at 05:05

## 2021-05-04 RX ADMIN — SODIUM CHLORIDE 200 MG: 9 INJECTION, SOLUTION INTRAVENOUS at 12:05

## 2021-05-04 RX ADMIN — Medication 10 ML: at 10:05

## 2021-05-04 RX ADMIN — APREPITANT 130 MG: 130 INJECTION, EMULSION INTRAVENOUS at 12:05

## 2021-05-04 RX ADMIN — FAMOTIDINE 20 MG: 10 INJECTION INTRAVENOUS at 12:05

## 2021-05-04 RX ADMIN — SODIUM CHLORIDE: 0.9 INJECTION, SOLUTION INTRAVENOUS at 12:05

## 2021-05-04 RX ADMIN — CARBOPLATIN 750 MG: 10 INJECTION INTRAVENOUS at 03:05

## 2021-05-07 ENCOUNTER — TELEPHONE (OUTPATIENT)
Dept: HEMATOLOGY/ONCOLOGY | Facility: CLINIC | Age: 41
End: 2021-05-07

## 2021-05-10 ENCOUNTER — PATIENT MESSAGE (OUTPATIENT)
Dept: INFUSION THERAPY | Facility: HOSPITAL | Age: 41
End: 2021-05-10

## 2021-05-10 ENCOUNTER — PATIENT MESSAGE (OUTPATIENT)
Dept: RESEARCH | Facility: HOSPITAL | Age: 41
End: 2021-05-10

## 2021-05-11 ENCOUNTER — INFUSION (OUTPATIENT)
Dept: INFUSION THERAPY | Facility: HOSPITAL | Age: 41
End: 2021-05-11
Attending: INTERNAL MEDICINE
Payer: COMMERCIAL

## 2021-05-11 ENCOUNTER — OFFICE VISIT (OUTPATIENT)
Dept: HEMATOLOGY/ONCOLOGY | Facility: CLINIC | Age: 41
End: 2021-05-11
Payer: COMMERCIAL

## 2021-05-11 ENCOUNTER — INFUSION (OUTPATIENT)
Dept: INFUSION THERAPY | Facility: HOSPITAL | Age: 41
End: 2021-05-11
Payer: COMMERCIAL

## 2021-05-11 VITALS
RESPIRATION RATE: 18 BRPM | HEIGHT: 67 IN | BODY MASS INDEX: 23.77 KG/M2 | OXYGEN SATURATION: 98 % | WEIGHT: 151.44 LBS | TEMPERATURE: 98 F | DIASTOLIC BLOOD PRESSURE: 76 MMHG | SYSTOLIC BLOOD PRESSURE: 139 MMHG | HEART RATE: 84 BPM

## 2021-05-11 VITALS
SYSTOLIC BLOOD PRESSURE: 112 MMHG | RESPIRATION RATE: 18 BRPM | DIASTOLIC BLOOD PRESSURE: 76 MMHG | HEART RATE: 80 BPM | TEMPERATURE: 98 F

## 2021-05-11 DIAGNOSIS — T45.1X5A ANEMIA ASSOCIATED WITH CHEMOTHERAPY: ICD-10-CM

## 2021-05-11 DIAGNOSIS — D64.81 ANEMIA ASSOCIATED WITH CHEMOTHERAPY: ICD-10-CM

## 2021-05-11 DIAGNOSIS — Z15.09 BRCA2 GENE MUTATION POSITIVE: Chronic | ICD-10-CM

## 2021-05-11 DIAGNOSIS — C50.412 MALIGNANT NEOPLASM OF UPPER-OUTER QUADRANT OF LEFT BREAST IN FEMALE, ESTROGEN RECEPTOR NEGATIVE: Primary | ICD-10-CM

## 2021-05-11 DIAGNOSIS — B00.1 FEVER BLISTER: Primary | ICD-10-CM

## 2021-05-11 DIAGNOSIS — C50.412 MALIGNANT NEOPLASM OF UPPER-OUTER QUADRANT OF LEFT BREAST IN FEMALE, ESTROGEN RECEPTOR NEGATIVE: ICD-10-CM

## 2021-05-11 DIAGNOSIS — Z17.1 MALIGNANT NEOPLASM OF UPPER-OUTER QUADRANT OF LEFT BREAST IN FEMALE, ESTROGEN RECEPTOR NEGATIVE: ICD-10-CM

## 2021-05-11 DIAGNOSIS — T45.1X5A CHEMOTHERAPY-INDUCED NAUSEA: ICD-10-CM

## 2021-05-11 DIAGNOSIS — R11.0 CHEMOTHERAPY-INDUCED NAUSEA: ICD-10-CM

## 2021-05-11 DIAGNOSIS — Z17.1 MALIGNANT NEOPLASM OF UPPER-OUTER QUADRANT OF LEFT BREAST IN FEMALE, ESTROGEN RECEPTOR NEGATIVE: Primary | ICD-10-CM

## 2021-05-11 DIAGNOSIS — Z15.01 BRCA2 GENE MUTATION POSITIVE: Chronic | ICD-10-CM

## 2021-05-11 LAB
ALBUMIN SERPL BCP-MCNC: 3.9 G/DL (ref 3.5–5.2)
ALP SERPL-CCNC: 88 U/L (ref 55–135)
ALT SERPL W/O P-5'-P-CCNC: 32 U/L (ref 10–44)
ANION GAP SERPL CALC-SCNC: 9 MMOL/L (ref 8–16)
AST SERPL-CCNC: 18 U/L (ref 10–40)
BILIRUB SERPL-MCNC: 0.2 MG/DL (ref 0.1–1)
BUN SERPL-MCNC: 16 MG/DL (ref 6–20)
CALCIUM SERPL-MCNC: 9.5 MG/DL (ref 8.7–10.5)
CHLORIDE SERPL-SCNC: 105 MMOL/L (ref 95–110)
CO2 SERPL-SCNC: 28 MMOL/L (ref 23–29)
CREAT SERPL-MCNC: 0.6 MG/DL (ref 0.5–1.4)
ERYTHROCYTE [DISTWIDTH] IN BLOOD BY AUTOMATED COUNT: 16.2 % (ref 11.5–14.5)
EST. GFR  (AFRICAN AMERICAN): >60 ML/MIN/1.73 M^2
EST. GFR  (NON AFRICAN AMERICAN): >60 ML/MIN/1.73 M^2
GLUCOSE SERPL-MCNC: 90 MG/DL (ref 70–110)
HCT VFR BLD AUTO: 31.3 % (ref 37–48.5)
HGB BLD-MCNC: 10.6 G/DL (ref 12–16)
IMM GRANULOCYTES # BLD AUTO: 0.1 K/UL (ref 0–0.04)
MCH RBC QN AUTO: 32.4 PG (ref 27–31)
MCHC RBC AUTO-ENTMCNC: 33.9 G/DL (ref 32–36)
MCV RBC AUTO: 96 FL (ref 82–98)
NEUTROPHILS # BLD AUTO: 2.1 K/UL (ref 1.8–7.7)
PLATELET # BLD AUTO: 163 K/UL (ref 150–450)
PMV BLD AUTO: 9.5 FL (ref 9.2–12.9)
POTASSIUM SERPL-SCNC: 3.9 MMOL/L (ref 3.5–5.1)
PROT SERPL-MCNC: 7.1 G/DL (ref 6–8.4)
RBC # BLD AUTO: 3.27 M/UL (ref 4–5.4)
SODIUM SERPL-SCNC: 142 MMOL/L (ref 136–145)
T4 FREE SERPL-MCNC: 0.8 NG/DL (ref 0.71–1.51)
TSH SERPL DL<=0.005 MIU/L-ACNC: 1.3 UIU/ML (ref 0.4–4)
WBC # BLD AUTO: 5.01 K/UL (ref 3.9–12.7)

## 2021-05-11 PROCEDURE — 25000003 PHARM REV CODE 250: Performed by: INTERNAL MEDICINE

## 2021-05-11 PROCEDURE — 1126F AMNT PAIN NOTED NONE PRSNT: CPT | Mod: S$GLB,,, | Performed by: INTERNAL MEDICINE

## 2021-05-11 PROCEDURE — 99214 OFFICE O/P EST MOD 30 MIN: CPT | Mod: S$GLB,,, | Performed by: INTERNAL MEDICINE

## 2021-05-11 PROCEDURE — 96361 HYDRATE IV INFUSION ADD-ON: CPT

## 2021-05-11 PROCEDURE — 96367 TX/PROPH/DG ADDL SEQ IV INF: CPT

## 2021-05-11 PROCEDURE — 96413 CHEMO IV INFUSION 1 HR: CPT

## 2021-05-11 PROCEDURE — A4216 STERILE WATER/SALINE, 10 ML: HCPCS | Performed by: INTERNAL MEDICINE

## 2021-05-11 PROCEDURE — 99999 PR PBB SHADOW E&M-EST. PATIENT-LVL IV: ICD-10-PCS | Mod: PBBFAC,,, | Performed by: INTERNAL MEDICINE

## 2021-05-11 PROCEDURE — 1126F PR PAIN SEVERITY QUANTIFIED, NO PAIN PRESENT: ICD-10-PCS | Mod: S$GLB,,, | Performed by: INTERNAL MEDICINE

## 2021-05-11 PROCEDURE — 99214 PR OFFICE/OUTPT VISIT, EST, LEVL IV, 30-39 MIN: ICD-10-PCS | Mod: S$GLB,,, | Performed by: INTERNAL MEDICINE

## 2021-05-11 PROCEDURE — 85027 COMPLETE CBC AUTOMATED: CPT | Performed by: INTERNAL MEDICINE

## 2021-05-11 PROCEDURE — 63600175 PHARM REV CODE 636 W HCPCS: Performed by: INTERNAL MEDICINE

## 2021-05-11 PROCEDURE — 84439 ASSAY OF FREE THYROXINE: CPT | Performed by: INTERNAL MEDICINE

## 2021-05-11 PROCEDURE — 99999 PR PBB SHADOW E&M-EST. PATIENT-LVL IV: CPT | Mod: PBBFAC,,, | Performed by: INTERNAL MEDICINE

## 2021-05-11 PROCEDURE — 3008F BODY MASS INDEX DOCD: CPT | Mod: CPTII,S$GLB,, | Performed by: INTERNAL MEDICINE

## 2021-05-11 PROCEDURE — 96375 TX/PRO/DX INJ NEW DRUG ADDON: CPT

## 2021-05-11 PROCEDURE — 80053 COMPREHEN METABOLIC PANEL: CPT | Performed by: INTERNAL MEDICINE

## 2021-05-11 PROCEDURE — 84443 ASSAY THYROID STIM HORMONE: CPT | Performed by: INTERNAL MEDICINE

## 2021-05-11 PROCEDURE — 3008F PR BODY MASS INDEX (BMI) DOCUMENTED: ICD-10-PCS | Mod: CPTII,S$GLB,, | Performed by: INTERNAL MEDICINE

## 2021-05-11 RX ORDER — HEPARIN 100 UNIT/ML
500 SYRINGE INTRAVENOUS
Status: DISCONTINUED | OUTPATIENT
Start: 2021-05-11 | End: 2021-05-11 | Stop reason: HOSPADM

## 2021-05-11 RX ORDER — SODIUM CHLORIDE 0.9 % (FLUSH) 0.9 %
10 SYRINGE (ML) INJECTION
Status: DISCONTINUED | OUTPATIENT
Start: 2021-05-11 | End: 2021-05-11 | Stop reason: HOSPADM

## 2021-05-11 RX ORDER — SODIUM CHLORIDE 0.9 % (FLUSH) 0.9 %
10 SYRINGE (ML) INJECTION
Status: CANCELLED | OUTPATIENT
Start: 2021-05-11

## 2021-05-11 RX ORDER — FAMOTIDINE 10 MG/ML
20 INJECTION INTRAVENOUS
Status: COMPLETED | OUTPATIENT
Start: 2021-05-11 | End: 2021-05-11

## 2021-05-11 RX ORDER — HEPARIN 100 UNIT/ML
500 SYRINGE INTRAVENOUS
Status: CANCELLED | OUTPATIENT
Start: 2021-05-11

## 2021-05-11 RX ORDER — VALACYCLOVIR HYDROCHLORIDE 500 MG/1
500 TABLET, FILM COATED ORAL ONCE
Qty: 1 TABLET | Refills: 0 | Status: SHIPPED | OUTPATIENT
Start: 2021-05-11 | End: 2021-05-18 | Stop reason: SDUPTHER

## 2021-05-11 RX ADMIN — HEPARIN 500 UNITS: 100 SYRINGE at 05:05

## 2021-05-11 RX ADMIN — Medication 10 ML: at 12:05

## 2021-05-11 RX ADMIN — FAMOTIDINE 20 MG: 10 INJECTION INTRAVENOUS at 03:05

## 2021-05-11 RX ADMIN — DEXAMETHASONE SODIUM PHOSPHATE 10 MG: 4 INJECTION, SOLUTION INTRAMUSCULAR; INTRAVENOUS at 03:05

## 2021-05-11 RX ADMIN — PACLITAXEL 144 MG: 6 INJECTION, SOLUTION, CONCENTRATE INTRAVENOUS at 03:05

## 2021-05-11 RX ADMIN — HEPARIN 500 UNITS: 100 SYRINGE at 12:05

## 2021-05-11 RX ADMIN — SODIUM CHLORIDE: 0.9 INJECTION, SOLUTION INTRAVENOUS at 03:05

## 2021-05-11 RX ADMIN — DIPHENHYDRAMINE HYDROCHLORIDE 50 MG: 50 INJECTION INTRAMUSCULAR; INTRAVENOUS at 03:05

## 2021-05-11 RX ADMIN — SODIUM CHLORIDE 500 ML: 0.9 INJECTION, SOLUTION INTRAVENOUS at 05:05

## 2021-05-17 ENCOUNTER — PATIENT MESSAGE (OUTPATIENT)
Dept: HEMATOLOGY/ONCOLOGY | Facility: CLINIC | Age: 41
End: 2021-05-17

## 2021-05-18 ENCOUNTER — PATIENT MESSAGE (OUTPATIENT)
Dept: HEMATOLOGY/ONCOLOGY | Facility: CLINIC | Age: 41
End: 2021-05-18

## 2021-05-18 ENCOUNTER — INFUSION (OUTPATIENT)
Dept: INFUSION THERAPY | Facility: HOSPITAL | Age: 41
End: 2021-05-18
Payer: COMMERCIAL

## 2021-05-18 ENCOUNTER — INFUSION (OUTPATIENT)
Dept: INFUSION THERAPY | Facility: HOSPITAL | Age: 41
End: 2021-05-18
Attending: INTERNAL MEDICINE
Payer: COMMERCIAL

## 2021-05-18 ENCOUNTER — PATIENT MESSAGE (OUTPATIENT)
Dept: OPHTHALMOLOGY | Facility: CLINIC | Age: 41
End: 2021-05-18

## 2021-05-18 VITALS
WEIGHT: 151.44 LBS | RESPIRATION RATE: 18 BRPM | DIASTOLIC BLOOD PRESSURE: 72 MMHG | HEART RATE: 70 BPM | TEMPERATURE: 98 F | HEIGHT: 67 IN | BODY MASS INDEX: 23.77 KG/M2 | SYSTOLIC BLOOD PRESSURE: 110 MMHG

## 2021-05-18 DIAGNOSIS — Z17.1 MALIGNANT NEOPLASM OF UPPER-OUTER QUADRANT OF LEFT BREAST IN FEMALE, ESTROGEN RECEPTOR NEGATIVE: Primary | ICD-10-CM

## 2021-05-18 DIAGNOSIS — B00.1 FEVER BLISTER: ICD-10-CM

## 2021-05-18 DIAGNOSIS — C50.412 MALIGNANT NEOPLASM OF UPPER-OUTER QUADRANT OF LEFT BREAST IN FEMALE, ESTROGEN RECEPTOR NEGATIVE: Primary | ICD-10-CM

## 2021-05-18 LAB
ALBUMIN SERPL BCP-MCNC: 3.7 G/DL (ref 3.5–5.2)
ALP SERPL-CCNC: 80 U/L (ref 55–135)
ALT SERPL W/O P-5'-P-CCNC: 30 U/L (ref 10–44)
ANION GAP SERPL CALC-SCNC: 8 MMOL/L (ref 8–16)
AST SERPL-CCNC: 18 U/L (ref 10–40)
BILIRUB SERPL-MCNC: 0.2 MG/DL (ref 0.1–1)
BUN SERPL-MCNC: 15 MG/DL (ref 6–20)
CALCIUM SERPL-MCNC: 9.6 MG/DL (ref 8.7–10.5)
CHLORIDE SERPL-SCNC: 103 MMOL/L (ref 95–110)
CO2 SERPL-SCNC: 28 MMOL/L (ref 23–29)
CREAT SERPL-MCNC: 0.7 MG/DL (ref 0.5–1.4)
ERYTHROCYTE [DISTWIDTH] IN BLOOD BY AUTOMATED COUNT: 18.2 % (ref 11.5–14.5)
EST. GFR  (AFRICAN AMERICAN): >60 ML/MIN/1.73 M^2
EST. GFR  (NON AFRICAN AMERICAN): >60 ML/MIN/1.73 M^2
GLUCOSE SERPL-MCNC: 109 MG/DL (ref 70–110)
HCT VFR BLD AUTO: 28.8 % (ref 37–48.5)
HGB BLD-MCNC: 9.5 G/DL (ref 12–16)
IMM GRANULOCYTES # BLD AUTO: 0.26 K/UL (ref 0–0.04)
MCH RBC QN AUTO: 32.4 PG (ref 27–31)
MCHC RBC AUTO-ENTMCNC: 33 G/DL (ref 32–36)
MCV RBC AUTO: 98 FL (ref 82–98)
NEUTROPHILS # BLD AUTO: 2.1 K/UL (ref 1.8–7.7)
PLATELET # BLD AUTO: 209 K/UL (ref 150–450)
PMV BLD AUTO: 9.2 FL (ref 9.2–12.9)
POTASSIUM SERPL-SCNC: 3.9 MMOL/L (ref 3.5–5.1)
PROT SERPL-MCNC: 6.7 G/DL (ref 6–8.4)
RBC # BLD AUTO: 2.93 M/UL (ref 4–5.4)
SODIUM SERPL-SCNC: 139 MMOL/L (ref 136–145)
WBC # BLD AUTO: 4.51 K/UL (ref 3.9–12.7)

## 2021-05-18 PROCEDURE — 96411 CHEMO IV PUSH ADDL DRUG: CPT

## 2021-05-18 PROCEDURE — 96413 CHEMO IV INFUSION 1 HR: CPT

## 2021-05-18 PROCEDURE — 25000003 PHARM REV CODE 250: Performed by: INTERNAL MEDICINE

## 2021-05-18 PROCEDURE — 80053 COMPREHEN METABOLIC PANEL: CPT | Performed by: INTERNAL MEDICINE

## 2021-05-18 PROCEDURE — 63600175 PHARM REV CODE 636 W HCPCS: Performed by: INTERNAL MEDICINE

## 2021-05-18 PROCEDURE — A4216 STERILE WATER/SALINE, 10 ML: HCPCS | Performed by: INTERNAL MEDICINE

## 2021-05-18 PROCEDURE — 85027 COMPLETE CBC AUTOMATED: CPT | Performed by: INTERNAL MEDICINE

## 2021-05-18 PROCEDURE — 96367 TX/PROPH/DG ADDL SEQ IV INF: CPT

## 2021-05-18 PROCEDURE — 96361 HYDRATE IV INFUSION ADD-ON: CPT

## 2021-05-18 PROCEDURE — 96375 TX/PRO/DX INJ NEW DRUG ADDON: CPT

## 2021-05-18 RX ORDER — SODIUM CHLORIDE 0.9 % (FLUSH) 0.9 %
10 SYRINGE (ML) INJECTION
Status: DISCONTINUED | OUTPATIENT
Start: 2021-05-18 | End: 2021-05-18 | Stop reason: HOSPADM

## 2021-05-18 RX ORDER — FAMOTIDINE 10 MG/ML
20 INJECTION INTRAVENOUS
Status: COMPLETED | OUTPATIENT
Start: 2021-05-18 | End: 2021-05-18

## 2021-05-18 RX ORDER — HEPARIN 100 UNIT/ML
500 SYRINGE INTRAVENOUS
Status: DISCONTINUED | OUTPATIENT
Start: 2021-05-18 | End: 2021-05-18 | Stop reason: HOSPADM

## 2021-05-18 RX ORDER — SODIUM CHLORIDE 0.9 % (FLUSH) 0.9 %
10 SYRINGE (ML) INJECTION
Status: CANCELLED | OUTPATIENT
Start: 2021-05-18

## 2021-05-18 RX ORDER — HEPARIN 100 UNIT/ML
500 SYRINGE INTRAVENOUS
Status: CANCELLED | OUTPATIENT
Start: 2021-05-18

## 2021-05-18 RX ORDER — VALACYCLOVIR HYDROCHLORIDE 500 MG/1
500 TABLET, FILM COATED ORAL ONCE
Qty: 1 TABLET | Refills: 0 | Status: SHIPPED | OUTPATIENT
Start: 2021-05-18 | End: 2021-05-24 | Stop reason: SDUPTHER

## 2021-05-18 RX ADMIN — SODIUM CHLORIDE 500 ML: 0.9 INJECTION, SOLUTION INTRAVENOUS at 03:05

## 2021-05-18 RX ADMIN — SODIUM CHLORIDE: 0.9 INJECTION, SOLUTION INTRAVENOUS at 01:05

## 2021-05-18 RX ADMIN — PACLITAXEL 144 MG: 6 INJECTION, SOLUTION, CONCENTRATE INTRAVENOUS at 02:05

## 2021-05-18 RX ADMIN — Medication 10 ML: at 11:05

## 2021-05-18 RX ADMIN — DIPHENHYDRAMINE HYDROCHLORIDE 50 MG: 50 INJECTION, SOLUTION INTRAMUSCULAR; INTRAVENOUS at 02:05

## 2021-05-18 RX ADMIN — DEXAMETHASONE SODIUM PHOSPHATE 10 MG: 4 INJECTION, SOLUTION INTRA-ARTICULAR; INTRALESIONAL; INTRAMUSCULAR; INTRAVENOUS; SOFT TISSUE at 01:05

## 2021-05-18 RX ADMIN — FAMOTIDINE 20 MG: 10 INJECTION INTRAVENOUS at 01:05

## 2021-05-18 RX ADMIN — HEPARIN 500 UNITS: 100 SYRINGE at 11:05

## 2021-05-19 ENCOUNTER — PATIENT MESSAGE (OUTPATIENT)
Dept: HEMATOLOGY/ONCOLOGY | Facility: CLINIC | Age: 41
End: 2021-05-19

## 2021-05-20 ENCOUNTER — PATIENT MESSAGE (OUTPATIENT)
Dept: HEMATOLOGY/ONCOLOGY | Facility: CLINIC | Age: 41
End: 2021-05-20

## 2021-05-20 DIAGNOSIS — Z17.1 MALIGNANT NEOPLASM OF UPPER-OUTER QUADRANT OF LEFT BREAST IN FEMALE, ESTROGEN RECEPTOR NEGATIVE: ICD-10-CM

## 2021-05-20 DIAGNOSIS — C50.412 MALIGNANT NEOPLASM OF UPPER-OUTER QUADRANT OF LEFT BREAST IN FEMALE, ESTROGEN RECEPTOR NEGATIVE: ICD-10-CM

## 2021-05-20 DIAGNOSIS — R11.2 CHEMOTHERAPY INDUCED NAUSEA AND VOMITING: ICD-10-CM

## 2021-05-20 DIAGNOSIS — T45.1X5A CHEMOTHERAPY INDUCED NAUSEA AND VOMITING: ICD-10-CM

## 2021-05-20 RX ORDER — HEPARIN 100 UNIT/ML
500 SYRINGE INTRAVENOUS
Status: CANCELLED | OUTPATIENT
Start: 2021-05-25

## 2021-05-20 RX ORDER — SODIUM CHLORIDE 0.9 % (FLUSH) 0.9 %
10 SYRINGE (ML) INJECTION
Status: CANCELLED | OUTPATIENT
Start: 2021-05-25

## 2021-05-20 RX ORDER — DOXORUBICIN HYDROCHLORIDE 2 MG/ML
60 INJECTION, SOLUTION INTRAVENOUS ONCE
Status: CANCELLED
Start: 2021-05-25

## 2021-05-21 ENCOUNTER — PATIENT MESSAGE (OUTPATIENT)
Dept: HEMATOLOGY/ONCOLOGY | Facility: CLINIC | Age: 41
End: 2021-05-21

## 2021-05-21 RX ORDER — LORAZEPAM 0.5 MG/1
0.5 TABLET ORAL EVERY 8 HOURS PRN
Qty: 30 TABLET | Refills: 0 | Status: SHIPPED | OUTPATIENT
Start: 2021-05-21 | End: 2022-01-18 | Stop reason: SDUPTHER

## 2021-05-21 RX ORDER — ONDANSETRON 8 MG/1
8 TABLET, ORALLY DISINTEGRATING ORAL EVERY 8 HOURS PRN
Qty: 30 TABLET | Refills: 2 | Status: SHIPPED | OUTPATIENT
Start: 2021-05-21 | End: 2021-06-30 | Stop reason: SDUPTHER

## 2021-05-22 ENCOUNTER — PATIENT MESSAGE (OUTPATIENT)
Dept: HEMATOLOGY/ONCOLOGY | Facility: CLINIC | Age: 41
End: 2021-05-22

## 2021-05-24 ENCOUNTER — PATIENT MESSAGE (OUTPATIENT)
Dept: HEMATOLOGY/ONCOLOGY | Facility: CLINIC | Age: 41
End: 2021-05-24

## 2021-05-24 ENCOUNTER — TELEPHONE (OUTPATIENT)
Dept: HEMATOLOGY/ONCOLOGY | Facility: CLINIC | Age: 41
End: 2021-05-24

## 2021-05-24 ENCOUNTER — TELEPHONE (OUTPATIENT)
Dept: SURGERY | Facility: CLINIC | Age: 41
End: 2021-05-24

## 2021-05-24 DIAGNOSIS — Z15.01 BRCA2 GENE MUTATION POSITIVE: Primary | ICD-10-CM

## 2021-05-24 DIAGNOSIS — Z15.09 BRCA2 GENE MUTATION POSITIVE: Primary | ICD-10-CM

## 2021-05-24 DIAGNOSIS — B00.1 FEVER BLISTER: ICD-10-CM

## 2021-05-24 RX ORDER — VALACYCLOVIR HYDROCHLORIDE 500 MG/1
500 TABLET, FILM COATED ORAL ONCE
Qty: 30 TABLET | Refills: 0 | Status: SHIPPED | OUTPATIENT
Start: 2021-05-24 | End: 2022-03-17

## 2021-05-25 ENCOUNTER — INFUSION (OUTPATIENT)
Dept: INFUSION THERAPY | Facility: HOSPITAL | Age: 41
End: 2021-05-25
Attending: INTERNAL MEDICINE
Payer: COMMERCIAL

## 2021-05-25 ENCOUNTER — PATIENT MESSAGE (OUTPATIENT)
Dept: HEMATOLOGY/ONCOLOGY | Facility: CLINIC | Age: 41
End: 2021-05-25

## 2021-05-25 ENCOUNTER — OFFICE VISIT (OUTPATIENT)
Dept: HEMATOLOGY/ONCOLOGY | Facility: CLINIC | Age: 41
End: 2021-05-25
Payer: COMMERCIAL

## 2021-05-25 ENCOUNTER — INFUSION (OUTPATIENT)
Dept: INFUSION THERAPY | Facility: HOSPITAL | Age: 41
End: 2021-05-25
Payer: COMMERCIAL

## 2021-05-25 VITALS
RESPIRATION RATE: 18 BRPM | HEIGHT: 67 IN | TEMPERATURE: 98 F | SYSTOLIC BLOOD PRESSURE: 127 MMHG | WEIGHT: 160.06 LBS | BODY MASS INDEX: 25.12 KG/M2 | DIASTOLIC BLOOD PRESSURE: 66 MMHG | HEART RATE: 79 BPM

## 2021-05-25 VITALS
BODY MASS INDEX: 25.12 KG/M2 | HEART RATE: 82 BPM | RESPIRATION RATE: 18 BRPM | TEMPERATURE: 98 F | OXYGEN SATURATION: 99 % | WEIGHT: 160.06 LBS | HEIGHT: 67 IN | DIASTOLIC BLOOD PRESSURE: 72 MMHG | SYSTOLIC BLOOD PRESSURE: 142 MMHG

## 2021-05-25 DIAGNOSIS — D64.81 ANEMIA ASSOCIATED WITH CHEMOTHERAPY: ICD-10-CM

## 2021-05-25 DIAGNOSIS — T45.1X5A CHEMOTHERAPY-INDUCED NAUSEA: ICD-10-CM

## 2021-05-25 DIAGNOSIS — C50.412 MALIGNANT NEOPLASM OF UPPER-OUTER QUADRANT OF LEFT BREAST IN FEMALE, ESTROGEN RECEPTOR NEGATIVE: Primary | ICD-10-CM

## 2021-05-25 DIAGNOSIS — Z15.01 BRCA2 GENE MUTATION POSITIVE: Chronic | ICD-10-CM

## 2021-05-25 DIAGNOSIS — Z17.1 MALIGNANT NEOPLASM OF UPPER-OUTER QUADRANT OF LEFT BREAST IN FEMALE, ESTROGEN RECEPTOR NEGATIVE: Primary | ICD-10-CM

## 2021-05-25 DIAGNOSIS — R11.0 CHEMOTHERAPY-INDUCED NAUSEA: ICD-10-CM

## 2021-05-25 DIAGNOSIS — Z15.09 BRCA2 GENE MUTATION POSITIVE: Chronic | ICD-10-CM

## 2021-05-25 DIAGNOSIS — D05.12 DUCTAL CARCINOMA IN SITU OF LEFT BREAST: ICD-10-CM

## 2021-05-25 DIAGNOSIS — T45.1X5A ANEMIA ASSOCIATED WITH CHEMOTHERAPY: ICD-10-CM

## 2021-05-25 LAB
ALBUMIN SERPL BCP-MCNC: 3.8 G/DL (ref 3.5–5.2)
ALP SERPL-CCNC: 79 U/L (ref 55–135)
ALT SERPL W/O P-5'-P-CCNC: 23 U/L (ref 10–44)
ANION GAP SERPL CALC-SCNC: 11 MMOL/L (ref 8–16)
ANISOCYTOSIS BLD QL SMEAR: SLIGHT
AST SERPL-CCNC: 15 U/L (ref 10–40)
BASOPHILS NFR BLD: 0 % (ref 0–1.9)
BILIRUB SERPL-MCNC: 0.2 MG/DL (ref 0.1–1)
BUN SERPL-MCNC: 14 MG/DL (ref 6–20)
CALCIUM SERPL-MCNC: 9.7 MG/DL (ref 8.7–10.5)
CHLORIDE SERPL-SCNC: 106 MMOL/L (ref 95–110)
CO2 SERPL-SCNC: 24 MMOL/L (ref 23–29)
CREAT SERPL-MCNC: 0.7 MG/DL (ref 0.5–1.4)
DIFFERENTIAL METHOD: ABNORMAL
EOSINOPHIL NFR BLD: 0 % (ref 0–8)
ERYTHROCYTE [DISTWIDTH] IN BLOOD BY AUTOMATED COUNT: 19.5 % (ref 11.5–14.5)
EST. GFR  (AFRICAN AMERICAN): >60 ML/MIN/1.73 M^2
EST. GFR  (NON AFRICAN AMERICAN): >60 ML/MIN/1.73 M^2
GLUCOSE SERPL-MCNC: 88 MG/DL (ref 70–110)
HCT VFR BLD AUTO: 30.5 % (ref 37–48.5)
HGB BLD-MCNC: 10.1 G/DL (ref 12–16)
IMM GRANULOCYTES # BLD AUTO: ABNORMAL K/UL (ref 0–0.04)
IMM GRANULOCYTES NFR BLD AUTO: ABNORMAL % (ref 0–0.5)
LYMPHOCYTES NFR BLD: 32 % (ref 18–48)
MCH RBC QN AUTO: 33.8 PG (ref 27–31)
MCHC RBC AUTO-ENTMCNC: 33.1 G/DL (ref 32–36)
MCV RBC AUTO: 102 FL (ref 82–98)
METAMYELOCYTES NFR BLD MANUAL: 1 %
MONOCYTES NFR BLD: 4 % (ref 4–15)
MYELOCYTES NFR BLD MANUAL: 1 %
NEUTROPHILS NFR BLD: 59 % (ref 38–73)
NEUTS BAND NFR BLD MANUAL: 2 %
NRBC BLD-RTO: 2 /100 WBC
OVALOCYTES BLD QL SMEAR: ABNORMAL
PLATELET # BLD AUTO: 175 K/UL (ref 150–450)
PLATELET BLD QL SMEAR: ABNORMAL
PMV BLD AUTO: 9.5 FL (ref 9.2–12.9)
POIKILOCYTOSIS BLD QL SMEAR: SLIGHT
POLYCHROMASIA BLD QL SMEAR: ABNORMAL
POTASSIUM SERPL-SCNC: 4.2 MMOL/L (ref 3.5–5.1)
PROMYELOCYTES NFR BLD MANUAL: 1 %
PROT SERPL-MCNC: 7 G/DL (ref 6–8.4)
RBC # BLD AUTO: 2.99 M/UL (ref 4–5.4)
SODIUM SERPL-SCNC: 141 MMOL/L (ref 136–145)
WBC # BLD AUTO: 5.5 K/UL (ref 3.9–12.7)

## 2021-05-25 PROCEDURE — 99999 PR PBB SHADOW E&M-EST. PATIENT-LVL III: ICD-10-PCS | Mod: PBBFAC,,, | Performed by: INTERNAL MEDICINE

## 2021-05-25 PROCEDURE — A4216 STERILE WATER/SALINE, 10 ML: HCPCS | Performed by: INTERNAL MEDICINE

## 2021-05-25 PROCEDURE — 1126F PR PAIN SEVERITY QUANTIFIED, NO PAIN PRESENT: ICD-10-PCS | Mod: S$GLB,,, | Performed by: INTERNAL MEDICINE

## 2021-05-25 PROCEDURE — 3008F BODY MASS INDEX DOCD: CPT | Mod: CPTII,S$GLB,, | Performed by: INTERNAL MEDICINE

## 2021-05-25 PROCEDURE — 99999 PR PBB SHADOW E&M-EST. PATIENT-LVL III: CPT | Mod: PBBFAC,,, | Performed by: INTERNAL MEDICINE

## 2021-05-25 PROCEDURE — 25000003 PHARM REV CODE 250: Performed by: INTERNAL MEDICINE

## 2021-05-25 PROCEDURE — 85027 COMPLETE CBC AUTOMATED: CPT | Performed by: INTERNAL MEDICINE

## 2021-05-25 PROCEDURE — 99214 PR OFFICE/OUTPT VISIT, EST, LEVL IV, 30-39 MIN: ICD-10-PCS | Mod: S$GLB,,, | Performed by: INTERNAL MEDICINE

## 2021-05-25 PROCEDURE — 96367 TX/PROPH/DG ADDL SEQ IV INF: CPT

## 2021-05-25 PROCEDURE — 3008F PR BODY MASS INDEX (BMI) DOCUMENTED: ICD-10-PCS | Mod: CPTII,S$GLB,, | Performed by: INTERNAL MEDICINE

## 2021-05-25 PROCEDURE — 99214 OFFICE O/P EST MOD 30 MIN: CPT | Mod: S$GLB,,, | Performed by: INTERNAL MEDICINE

## 2021-05-25 PROCEDURE — 1126F AMNT PAIN NOTED NONE PRSNT: CPT | Mod: S$GLB,,, | Performed by: INTERNAL MEDICINE

## 2021-05-25 PROCEDURE — 96375 TX/PRO/DX INJ NEW DRUG ADDON: CPT

## 2021-05-25 PROCEDURE — 63600175 PHARM REV CODE 636 W HCPCS: Performed by: INTERNAL MEDICINE

## 2021-05-25 PROCEDURE — 96411 CHEMO IV PUSH ADDL DRUG: CPT

## 2021-05-25 PROCEDURE — 85007 BL SMEAR W/DIFF WBC COUNT: CPT | Performed by: INTERNAL MEDICINE

## 2021-05-25 PROCEDURE — 80053 COMPREHEN METABOLIC PANEL: CPT | Performed by: INTERNAL MEDICINE

## 2021-05-25 PROCEDURE — 96413 CHEMO IV INFUSION 1 HR: CPT

## 2021-05-25 RX ORDER — SODIUM CHLORIDE 0.9 % (FLUSH) 0.9 %
10 SYRINGE (ML) INJECTION
Status: DISCONTINUED | OUTPATIENT
Start: 2021-05-25 | End: 2021-05-25 | Stop reason: HOSPADM

## 2021-05-25 RX ORDER — SODIUM CHLORIDE 0.9 % (FLUSH) 0.9 %
10 SYRINGE (ML) INJECTION
Status: CANCELLED | OUTPATIENT
Start: 2021-05-25

## 2021-05-25 RX ORDER — DOXORUBICIN HYDROCHLORIDE 2 MG/ML
60 INJECTION, SOLUTION INTRAVENOUS ONCE
Status: COMPLETED | OUTPATIENT
Start: 2021-05-25 | End: 2021-05-25

## 2021-05-25 RX ORDER — HEPARIN 100 UNIT/ML
500 SYRINGE INTRAVENOUS
Status: DISCONTINUED | OUTPATIENT
Start: 2021-05-25 | End: 2021-05-25 | Stop reason: HOSPADM

## 2021-05-25 RX ORDER — HEPARIN 100 UNIT/ML
500 SYRINGE INTRAVENOUS
Status: CANCELLED | OUTPATIENT
Start: 2021-05-25

## 2021-05-25 RX ORDER — LORAZEPAM 2 MG/ML
1 INJECTION INTRAMUSCULAR ONCE
Status: CANCELLED
Start: 2021-05-25

## 2021-05-25 RX ORDER — LORAZEPAM 2 MG/ML
1 INJECTION INTRAMUSCULAR ONCE
Status: COMPLETED | OUTPATIENT
Start: 2021-05-25 | End: 2021-05-25

## 2021-05-25 RX ORDER — DEXAMETHASONE 4 MG/1
4 TABLET ORAL EVERY 12 HOURS
Qty: 20 TABLET | Refills: 0 | Status: SHIPPED | OUTPATIENT
Start: 2021-05-25 | End: 2021-05-27

## 2021-05-25 RX ADMIN — DEXAMETHASONE SODIUM PHOSPHATE 0.25 MG: 4 INJECTION, SOLUTION INTRA-ARTICULAR; INTRALESIONAL; INTRAMUSCULAR; INTRAVENOUS; SOFT TISSUE at 12:05

## 2021-05-25 RX ADMIN — CYCLOPHOSPHAMIDE 1100 MG: 200 INJECTION, SOLUTION INTRAVENOUS at 01:05

## 2021-05-25 RX ADMIN — LORAZEPAM 1 MG: 2 INJECTION INTRAMUSCULAR; INTRAVENOUS at 12:05

## 2021-05-25 RX ADMIN — HEPARIN 500 UNITS: 100 SYRINGE at 04:05

## 2021-05-25 RX ADMIN — DOXORUBICIN HYDROCHLORIDE 112 MG: 2 INJECTION, SOLUTION INTRAVENOUS at 12:05

## 2021-05-25 RX ADMIN — APREPITANT 130 MG: 130 INJECTION, EMULSION INTRAVENOUS at 12:05

## 2021-05-25 RX ADMIN — SODIUM CHLORIDE 500 ML: 0.9 INJECTION, SOLUTION INTRAVENOUS at 02:05

## 2021-05-25 RX ADMIN — SODIUM CHLORIDE: 0.9 INJECTION, SOLUTION INTRAVENOUS at 11:05

## 2021-05-25 RX ADMIN — SODIUM CHLORIDE 200 MG: 9 INJECTION, SOLUTION INTRAVENOUS at 11:05

## 2021-05-25 RX ADMIN — HEPARIN 500 UNITS: 100 SYRINGE at 09:05

## 2021-05-25 RX ADMIN — Medication 10 ML: at 04:05

## 2021-05-25 RX ADMIN — Medication 10 ML: at 09:05

## 2021-05-26 ENCOUNTER — PATIENT MESSAGE (OUTPATIENT)
Dept: HEMATOLOGY/ONCOLOGY | Facility: CLINIC | Age: 41
End: 2021-05-26

## 2021-05-27 ENCOUNTER — PATIENT MESSAGE (OUTPATIENT)
Dept: HEMATOLOGY/ONCOLOGY | Facility: CLINIC | Age: 41
End: 2021-05-27

## 2021-05-27 ENCOUNTER — INFUSION (OUTPATIENT)
Dept: INFUSION THERAPY | Facility: HOSPITAL | Age: 41
End: 2021-05-27
Attending: INTERNAL MEDICINE
Payer: COMMERCIAL

## 2021-05-27 VITALS — HEART RATE: 74 BPM | DIASTOLIC BLOOD PRESSURE: 65 MMHG | RESPIRATION RATE: 16 BRPM | SYSTOLIC BLOOD PRESSURE: 122 MMHG

## 2021-05-27 DIAGNOSIS — R11.0 NAUSEA: ICD-10-CM

## 2021-05-27 DIAGNOSIS — C50.412 MALIGNANT NEOPLASM OF UPPER-OUTER QUADRANT OF LEFT BREAST IN FEMALE, ESTROGEN RECEPTOR NEGATIVE: Primary | ICD-10-CM

## 2021-05-27 DIAGNOSIS — Z17.1 MALIGNANT NEOPLASM OF UPPER-OUTER QUADRANT OF LEFT BREAST IN FEMALE, ESTROGEN RECEPTOR NEGATIVE: Primary | ICD-10-CM

## 2021-05-27 PROCEDURE — 25000003 PHARM REV CODE 250: Mod: PN | Performed by: NURSE PRACTITIONER

## 2021-05-27 PROCEDURE — A4216 STERILE WATER/SALINE, 10 ML: HCPCS | Mod: PN | Performed by: INTERNAL MEDICINE

## 2021-05-27 PROCEDURE — 25000003 PHARM REV CODE 250: Mod: PN | Performed by: INTERNAL MEDICINE

## 2021-05-27 PROCEDURE — 96360 HYDRATION IV INFUSION INIT: CPT | Mod: PN

## 2021-05-27 RX ORDER — ONDANSETRON 2 MG/ML
8 INJECTION INTRAMUSCULAR; INTRAVENOUS
Status: DISCONTINUED | OUTPATIENT
Start: 2021-05-27 | End: 2021-05-27 | Stop reason: HOSPADM

## 2021-05-27 RX ORDER — SODIUM CHLORIDE 0.9 % (FLUSH) 0.9 %
10 SYRINGE (ML) INJECTION
Status: CANCELLED | OUTPATIENT
Start: 2021-05-27

## 2021-05-27 RX ORDER — SODIUM CHLORIDE 0.9 % (FLUSH) 0.9 %
10 SYRINGE (ML) INJECTION
Status: DISCONTINUED | OUTPATIENT
Start: 2021-05-27 | End: 2021-05-27 | Stop reason: HOSPADM

## 2021-05-27 RX ORDER — HEPARIN 100 UNIT/ML
500 SYRINGE INTRAVENOUS
Status: CANCELLED | OUTPATIENT
Start: 2021-05-27

## 2021-05-27 RX ORDER — HEPARIN 100 UNIT/ML
500 SYRINGE INTRAVENOUS
Status: DISCONTINUED | OUTPATIENT
Start: 2021-05-27 | End: 2021-05-27 | Stop reason: HOSPADM

## 2021-05-27 RX ADMIN — SODIUM CHLORIDE 1000 ML: 0.9 INJECTION, SOLUTION INTRAVENOUS at 01:05

## 2021-05-27 RX ADMIN — Medication 10 ML: at 01:05

## 2021-05-27 RX ADMIN — Medication 10 ML: at 03:05

## 2021-05-28 ENCOUNTER — PATIENT MESSAGE (OUTPATIENT)
Dept: HEMATOLOGY/ONCOLOGY | Facility: CLINIC | Age: 41
End: 2021-05-28

## 2021-05-28 ENCOUNTER — PATIENT MESSAGE (OUTPATIENT)
Dept: GASTROENTEROLOGY | Facility: CLINIC | Age: 41
End: 2021-05-28

## 2021-05-28 ENCOUNTER — TELEPHONE (OUTPATIENT)
Dept: ENDOSCOPY | Facility: HOSPITAL | Age: 41
End: 2021-05-28

## 2021-05-28 ENCOUNTER — PATIENT MESSAGE (OUTPATIENT)
Dept: SURGERY | Facility: CLINIC | Age: 41
End: 2021-05-28

## 2021-05-28 ENCOUNTER — TELEPHONE (OUTPATIENT)
Dept: SURGERY | Facility: CLINIC | Age: 41
End: 2021-05-28

## 2021-05-28 ENCOUNTER — PATIENT MESSAGE (OUTPATIENT)
Dept: DERMATOLOGY | Facility: CLINIC | Age: 41
End: 2021-05-28

## 2021-05-28 DIAGNOSIS — Z09 POST CHEMO EVALUATION: Primary | ICD-10-CM

## 2021-06-01 ENCOUNTER — PATIENT MESSAGE (OUTPATIENT)
Dept: HEMATOLOGY/ONCOLOGY | Facility: CLINIC | Age: 41
End: 2021-06-01

## 2021-06-02 ENCOUNTER — PATIENT MESSAGE (OUTPATIENT)
Dept: HEMATOLOGY/ONCOLOGY | Facility: CLINIC | Age: 41
End: 2021-06-02

## 2021-06-03 ENCOUNTER — PATIENT MESSAGE (OUTPATIENT)
Dept: HEMATOLOGY/ONCOLOGY | Facility: CLINIC | Age: 41
End: 2021-06-03

## 2021-06-08 ENCOUNTER — PATIENT MESSAGE (OUTPATIENT)
Dept: HEMATOLOGY/ONCOLOGY | Facility: CLINIC | Age: 41
End: 2021-06-08

## 2021-06-08 DIAGNOSIS — Z51.11 CHEMOTHERAPY MANAGEMENT, ENCOUNTER FOR: ICD-10-CM

## 2021-06-08 RX ORDER — LIDOCAINE AND PRILOCAINE 25; 25 MG/G; MG/G
CREAM TOPICAL
Qty: 30 G | Refills: 0 | Status: SHIPPED | OUTPATIENT
Start: 2021-06-08 | End: 2021-09-05 | Stop reason: SDUPTHER

## 2021-06-12 ENCOUNTER — PATIENT MESSAGE (OUTPATIENT)
Dept: HEMATOLOGY/ONCOLOGY | Facility: CLINIC | Age: 41
End: 2021-06-12

## 2021-06-15 ENCOUNTER — INFUSION (OUTPATIENT)
Dept: INFUSION THERAPY | Facility: HOSPITAL | Age: 41
End: 2021-06-15
Attending: INTERNAL MEDICINE
Payer: COMMERCIAL

## 2021-06-15 ENCOUNTER — OFFICE VISIT (OUTPATIENT)
Dept: HEMATOLOGY/ONCOLOGY | Facility: CLINIC | Age: 41
End: 2021-06-15
Payer: COMMERCIAL

## 2021-06-15 VITALS
OXYGEN SATURATION: 97 % | SYSTOLIC BLOOD PRESSURE: 121 MMHG | TEMPERATURE: 98 F | DIASTOLIC BLOOD PRESSURE: 82 MMHG | WEIGHT: 159.81 LBS | HEART RATE: 102 BPM | HEIGHT: 67 IN | RESPIRATION RATE: 16 BRPM | BODY MASS INDEX: 25.08 KG/M2

## 2021-06-15 VITALS
DIASTOLIC BLOOD PRESSURE: 68 MMHG | HEART RATE: 85 BPM | RESPIRATION RATE: 18 BRPM | SYSTOLIC BLOOD PRESSURE: 131 MMHG | TEMPERATURE: 98 F

## 2021-06-15 DIAGNOSIS — Z17.1 MALIGNANT NEOPLASM OF UPPER-OUTER QUADRANT OF LEFT BREAST IN FEMALE, ESTROGEN RECEPTOR NEGATIVE: Primary | ICD-10-CM

## 2021-06-15 DIAGNOSIS — R45.4 IRRITABILITY: ICD-10-CM

## 2021-06-15 DIAGNOSIS — T45.1X5A ANEMIA ASSOCIATED WITH CHEMOTHERAPY: ICD-10-CM

## 2021-06-15 DIAGNOSIS — C50.412 MALIGNANT NEOPLASM OF UPPER-OUTER QUADRANT OF LEFT BREAST IN FEMALE, ESTROGEN RECEPTOR NEGATIVE: Primary | ICD-10-CM

## 2021-06-15 DIAGNOSIS — D05.10 DUCTAL CARCINOMA IN SITU (DCIS) OF BREAST, UNSPECIFIED LATERALITY: ICD-10-CM

## 2021-06-15 DIAGNOSIS — Z15.09 BRCA2 GENE MUTATION POSITIVE: Chronic | ICD-10-CM

## 2021-06-15 DIAGNOSIS — R21 RASH: ICD-10-CM

## 2021-06-15 DIAGNOSIS — R74.01 ELEVATED TRANSAMINASE LEVEL: ICD-10-CM

## 2021-06-15 DIAGNOSIS — D64.81 ANEMIA ASSOCIATED WITH CHEMOTHERAPY: ICD-10-CM

## 2021-06-15 DIAGNOSIS — Z15.01 BRCA2 GENE MUTATION POSITIVE: Chronic | ICD-10-CM

## 2021-06-15 LAB
ALBUMIN SERPL BCP-MCNC: 3.8 G/DL (ref 3.5–5.2)
ALP SERPL-CCNC: 95 U/L (ref 55–135)
ALT SERPL W/O P-5'-P-CCNC: 48 U/L (ref 10–44)
ANION GAP SERPL CALC-SCNC: 10 MMOL/L (ref 8–16)
AST SERPL-CCNC: 31 U/L (ref 10–40)
BASOPHILS # BLD AUTO: 0.04 K/UL (ref 0–0.2)
BASOPHILS NFR BLD: 0.8 % (ref 0–1.9)
BILIRUB SERPL-MCNC: 0.3 MG/DL (ref 0.1–1)
BUN SERPL-MCNC: 6 MG/DL (ref 6–20)
CALCIUM SERPL-MCNC: 9.8 MG/DL (ref 8.7–10.5)
CHLORIDE SERPL-SCNC: 105 MMOL/L (ref 95–110)
CO2 SERPL-SCNC: 26 MMOL/L (ref 23–29)
CREAT SERPL-MCNC: 0.7 MG/DL (ref 0.5–1.4)
DIFFERENTIAL METHOD: ABNORMAL
EOSINOPHIL # BLD AUTO: 0 K/UL (ref 0–0.5)
EOSINOPHIL NFR BLD: 0.6 % (ref 0–8)
ERYTHROCYTE [DISTWIDTH] IN BLOOD BY AUTOMATED COUNT: 15.8 % (ref 11.5–14.5)
EST. GFR  (AFRICAN AMERICAN): >60 ML/MIN/1.73 M^2
EST. GFR  (NON AFRICAN AMERICAN): >60 ML/MIN/1.73 M^2
GLUCOSE SERPL-MCNC: 92 MG/DL (ref 70–110)
HCT VFR BLD AUTO: 33 % (ref 37–48.5)
HGB BLD-MCNC: 11.4 G/DL (ref 12–16)
IMM GRANULOCYTES # BLD AUTO: 0.16 K/UL (ref 0–0.04)
IMM GRANULOCYTES NFR BLD AUTO: 3.1 % (ref 0–0.5)
LYMPHOCYTES # BLD AUTO: 2.1 K/UL (ref 1–4.8)
LYMPHOCYTES NFR BLD: 40.9 % (ref 18–48)
MCH RBC QN AUTO: 33.9 PG (ref 27–31)
MCHC RBC AUTO-ENTMCNC: 34.5 G/DL (ref 32–36)
MCV RBC AUTO: 98 FL (ref 82–98)
MONOCYTES # BLD AUTO: 0.9 K/UL (ref 0.3–1)
MONOCYTES NFR BLD: 17 % (ref 4–15)
NEUTROPHILS # BLD AUTO: 2 K/UL (ref 1.8–7.7)
NEUTROPHILS NFR BLD: 37.6 % (ref 38–73)
NRBC BLD-RTO: 0 /100 WBC
PLATELET # BLD AUTO: 297 K/UL (ref 150–450)
PMV BLD AUTO: 9.2 FL (ref 9.2–12.9)
POTASSIUM SERPL-SCNC: 4 MMOL/L (ref 3.5–5.1)
PROT SERPL-MCNC: 6.8 G/DL (ref 6–8.4)
RBC # BLD AUTO: 3.36 M/UL (ref 4–5.4)
SODIUM SERPL-SCNC: 141 MMOL/L (ref 136–145)
WBC # BLD AUTO: 5.23 K/UL (ref 3.9–12.7)

## 2021-06-15 PROCEDURE — 96375 TX/PRO/DX INJ NEW DRUG ADDON: CPT

## 2021-06-15 PROCEDURE — 99215 PR OFFICE/OUTPT VISIT, EST, LEVL V, 40-54 MIN: ICD-10-PCS | Mod: S$GLB,,, | Performed by: NURSE PRACTITIONER

## 2021-06-15 PROCEDURE — 1126F PR PAIN SEVERITY QUANTIFIED, NO PAIN PRESENT: ICD-10-PCS | Mod: S$GLB,,, | Performed by: NURSE PRACTITIONER

## 2021-06-15 PROCEDURE — 96367 TX/PROPH/DG ADDL SEQ IV INF: CPT

## 2021-06-15 PROCEDURE — 25000003 PHARM REV CODE 250: Performed by: INTERNAL MEDICINE

## 2021-06-15 PROCEDURE — 80053 COMPREHEN METABOLIC PANEL: CPT | Performed by: INTERNAL MEDICINE

## 2021-06-15 PROCEDURE — 96417 CHEMO IV INFUS EACH ADDL SEQ: CPT

## 2021-06-15 PROCEDURE — 1126F AMNT PAIN NOTED NONE PRSNT: CPT | Mod: S$GLB,,, | Performed by: NURSE PRACTITIONER

## 2021-06-15 PROCEDURE — 96413 CHEMO IV INFUSION 1 HR: CPT

## 2021-06-15 PROCEDURE — 96361 HYDRATE IV INFUSION ADD-ON: CPT

## 2021-06-15 PROCEDURE — A4216 STERILE WATER/SALINE, 10 ML: HCPCS | Performed by: INTERNAL MEDICINE

## 2021-06-15 PROCEDURE — 99999 PR PBB SHADOW E&M-EST. PATIENT-LVL III: ICD-10-PCS | Mod: PBBFAC,,, | Performed by: NURSE PRACTITIONER

## 2021-06-15 PROCEDURE — 3008F BODY MASS INDEX DOCD: CPT | Mod: CPTII,S$GLB,, | Performed by: NURSE PRACTITIONER

## 2021-06-15 PROCEDURE — 99999 PR PBB SHADOW E&M-EST. PATIENT-LVL III: CPT | Mod: PBBFAC,,, | Performed by: NURSE PRACTITIONER

## 2021-06-15 PROCEDURE — 85025 COMPLETE CBC W/AUTO DIFF WBC: CPT | Performed by: INTERNAL MEDICINE

## 2021-06-15 PROCEDURE — 3008F PR BODY MASS INDEX (BMI) DOCUMENTED: ICD-10-PCS | Mod: CPTII,S$GLB,, | Performed by: NURSE PRACTITIONER

## 2021-06-15 PROCEDURE — 99215 OFFICE O/P EST HI 40 MIN: CPT | Mod: S$GLB,,, | Performed by: NURSE PRACTITIONER

## 2021-06-15 PROCEDURE — 25000003 PHARM REV CODE 250: Performed by: NURSE PRACTITIONER

## 2021-06-15 PROCEDURE — 63600175 PHARM REV CODE 636 W HCPCS: Performed by: INTERNAL MEDICINE

## 2021-06-15 PROCEDURE — 96411 CHEMO IV PUSH ADDL DRUG: CPT

## 2021-06-15 RX ORDER — DOXORUBICIN HYDROCHLORIDE 2 MG/ML
60 INJECTION, SOLUTION INTRAVENOUS ONCE
Status: COMPLETED | OUTPATIENT
Start: 2021-06-15 | End: 2021-06-15

## 2021-06-15 RX ORDER — LORAZEPAM 2 MG/ML
1 INJECTION INTRAMUSCULAR ONCE
Status: COMPLETED | OUTPATIENT
Start: 2021-06-15 | End: 2021-06-15

## 2021-06-15 RX ORDER — HEPARIN 100 UNIT/ML
500 SYRINGE INTRAVENOUS
Status: CANCELLED | OUTPATIENT
Start: 2021-06-15

## 2021-06-15 RX ORDER — SODIUM CHLORIDE 0.9 % (FLUSH) 0.9 %
10 SYRINGE (ML) INJECTION
Status: DISCONTINUED | OUTPATIENT
Start: 2021-06-15 | End: 2021-06-15 | Stop reason: HOSPADM

## 2021-06-15 RX ORDER — DOXORUBICIN HYDROCHLORIDE 2 MG/ML
60 INJECTION, SOLUTION INTRAVENOUS ONCE
Status: CANCELLED
Start: 2021-06-15

## 2021-06-15 RX ORDER — LORAZEPAM 2 MG/ML
1 INJECTION INTRAMUSCULAR ONCE
Status: CANCELLED
Start: 2021-06-15

## 2021-06-15 RX ORDER — SODIUM CHLORIDE 0.9 % (FLUSH) 0.9 %
10 SYRINGE (ML) INJECTION
Status: CANCELLED | OUTPATIENT
Start: 2021-06-15

## 2021-06-15 RX ORDER — HEPARIN 100 UNIT/ML
500 SYRINGE INTRAVENOUS
Status: DISCONTINUED | OUTPATIENT
Start: 2021-06-15 | End: 2021-06-15 | Stop reason: HOSPADM

## 2021-06-15 RX ADMIN — CYCLOPHOSPHAMIDE 1100 MG: 200 INJECTION, SOLUTION INTRAVENOUS at 12:06

## 2021-06-15 RX ADMIN — HEPARIN 500 UNITS: 100 SYRINGE at 08:06

## 2021-06-15 RX ADMIN — APREPITANT 130 MG: 130 INJECTION, EMULSION INTRAVENOUS at 12:06

## 2021-06-15 RX ADMIN — LORAZEPAM 1 MG: 2 INJECTION INTRAMUSCULAR; INTRAVENOUS at 12:06

## 2021-06-15 RX ADMIN — DOXORUBICIN HYDROCHLORIDE 112 MG: 2 INJECTION, SOLUTION INTRAVENOUS at 12:06

## 2021-06-15 RX ADMIN — HEPARIN 500 UNITS: 100 SYRINGE at 03:06

## 2021-06-15 RX ADMIN — SODIUM CHLORIDE: 0.9 INJECTION, SOLUTION INTRAVENOUS at 11:06

## 2021-06-15 RX ADMIN — SODIUM CHLORIDE 1000 ML: 0.9 INJECTION, SOLUTION INTRAVENOUS at 01:06

## 2021-06-15 RX ADMIN — DEXAMETHASONE SODIUM PHOSPHATE 0.25 MG: 4 INJECTION, SOLUTION INTRA-ARTICULAR; INTRALESIONAL; INTRAMUSCULAR; INTRAVENOUS; SOFT TISSUE at 12:06

## 2021-06-15 RX ADMIN — Medication 10 ML: at 08:06

## 2021-06-15 RX ADMIN — SODIUM CHLORIDE 200 MG: 9 INJECTION, SOLUTION INTRAVENOUS at 11:06

## 2021-06-16 ENCOUNTER — PATIENT MESSAGE (OUTPATIENT)
Dept: HEMATOLOGY/ONCOLOGY | Facility: CLINIC | Age: 41
End: 2021-06-16

## 2021-06-16 ENCOUNTER — INFUSION (OUTPATIENT)
Dept: INFUSION THERAPY | Facility: HOSPITAL | Age: 41
End: 2021-06-16
Attending: INTERNAL MEDICINE
Payer: COMMERCIAL

## 2021-06-16 VITALS
RESPIRATION RATE: 18 BRPM | WEIGHT: 159.81 LBS | HEIGHT: 67 IN | TEMPERATURE: 98 F | HEART RATE: 20 BPM | SYSTOLIC BLOOD PRESSURE: 108 MMHG | DIASTOLIC BLOOD PRESSURE: 69 MMHG | BODY MASS INDEX: 25.08 KG/M2

## 2021-06-16 DIAGNOSIS — Z17.1 MALIGNANT NEOPLASM OF UPPER-OUTER QUADRANT OF LEFT BREAST IN FEMALE, ESTROGEN RECEPTOR NEGATIVE: Primary | ICD-10-CM

## 2021-06-16 DIAGNOSIS — C50.412 MALIGNANT NEOPLASM OF UPPER-OUTER QUADRANT OF LEFT BREAST IN FEMALE, ESTROGEN RECEPTOR NEGATIVE: Primary | ICD-10-CM

## 2021-06-16 PROCEDURE — 25000003 PHARM REV CODE 250: Mod: PN | Performed by: NURSE PRACTITIONER

## 2021-06-16 PROCEDURE — 96360 HYDRATION IV INFUSION INIT: CPT | Mod: PN

## 2021-06-16 RX ORDER — SODIUM CHLORIDE 0.9 % (FLUSH) 0.9 %
10 SYRINGE (ML) INJECTION
Status: DISCONTINUED | OUTPATIENT
Start: 2021-06-16 | End: 2021-06-16 | Stop reason: HOSPADM

## 2021-06-16 RX ORDER — SODIUM CHLORIDE 9 MG/ML
1000 INJECTION, SOLUTION INTRAVENOUS ONCE
Status: COMPLETED | OUTPATIENT
Start: 2021-06-16 | End: 2021-06-16

## 2021-06-16 RX ORDER — HEPARIN 100 UNIT/ML
500 SYRINGE INTRAVENOUS
Status: DISCONTINUED | OUTPATIENT
Start: 2021-06-16 | End: 2021-06-16 | Stop reason: HOSPADM

## 2021-06-16 RX ORDER — HEPARIN 100 UNIT/ML
500 SYRINGE INTRAVENOUS
Status: CANCELLED | OUTPATIENT
Start: 2021-06-16

## 2021-06-16 RX ORDER — SODIUM CHLORIDE 0.9 % (FLUSH) 0.9 %
10 SYRINGE (ML) INJECTION
Status: CANCELLED | OUTPATIENT
Start: 2021-06-16

## 2021-06-16 RX ADMIN — SODIUM CHLORIDE 1000 ML: 0.9 INJECTION, SOLUTION INTRAVENOUS at 04:06

## 2021-06-17 ENCOUNTER — PATIENT MESSAGE (OUTPATIENT)
Dept: HEMATOLOGY/ONCOLOGY | Facility: CLINIC | Age: 41
End: 2021-06-17

## 2021-06-18 ENCOUNTER — PATIENT MESSAGE (OUTPATIENT)
Dept: HEMATOLOGY/ONCOLOGY | Facility: CLINIC | Age: 41
End: 2021-06-18

## 2021-06-21 ENCOUNTER — TELEPHONE (OUTPATIENT)
Dept: SURGERY | Facility: CLINIC | Age: 41
End: 2021-06-21

## 2021-06-21 ENCOUNTER — TELEPHONE (OUTPATIENT)
Dept: HEMATOLOGY/ONCOLOGY | Facility: CLINIC | Age: 41
End: 2021-06-21

## 2021-06-22 ENCOUNTER — TELEPHONE (OUTPATIENT)
Dept: DERMATOLOGY | Facility: CLINIC | Age: 41
End: 2021-06-22

## 2021-06-23 ENCOUNTER — PATIENT MESSAGE (OUTPATIENT)
Dept: HEMATOLOGY/ONCOLOGY | Facility: CLINIC | Age: 41
End: 2021-06-23

## 2021-06-23 ENCOUNTER — OFFICE VISIT (OUTPATIENT)
Dept: URGENT CARE | Facility: CLINIC | Age: 41
End: 2021-06-23
Payer: COMMERCIAL

## 2021-06-23 ENCOUNTER — INFUSION (OUTPATIENT)
Dept: INFUSION THERAPY | Facility: HOSPITAL | Age: 41
End: 2021-06-23
Attending: INTERNAL MEDICINE
Payer: COMMERCIAL

## 2021-06-23 VITALS
TEMPERATURE: 99 F | HEIGHT: 67 IN | DIASTOLIC BLOOD PRESSURE: 75 MMHG | BODY MASS INDEX: 24.63 KG/M2 | SYSTOLIC BLOOD PRESSURE: 107 MMHG | HEART RATE: 81 BPM | WEIGHT: 156.94 LBS | RESPIRATION RATE: 16 BRPM

## 2021-06-23 VITALS
WEIGHT: 159 LBS | HEART RATE: 110 BPM | DIASTOLIC BLOOD PRESSURE: 72 MMHG | TEMPERATURE: 98 F | SYSTOLIC BLOOD PRESSURE: 101 MMHG | BODY MASS INDEX: 24.96 KG/M2 | OXYGEN SATURATION: 97 % | HEIGHT: 67 IN

## 2021-06-23 DIAGNOSIS — Z17.1 MALIGNANT NEOPLASM OF UPPER-OUTER QUADRANT OF LEFT BREAST IN FEMALE, ESTROGEN RECEPTOR NEGATIVE: Primary | ICD-10-CM

## 2021-06-23 DIAGNOSIS — C50.412 MALIGNANT NEOPLASM OF UPPER-OUTER QUADRANT OF LEFT BREAST IN FEMALE, ESTROGEN RECEPTOR NEGATIVE: Primary | ICD-10-CM

## 2021-06-23 DIAGNOSIS — R53.83 FATIGUE, UNSPECIFIED TYPE: Primary | ICD-10-CM

## 2021-06-23 LAB
CTP QC/QA: YES
SARS-COV-2 RDRP RESP QL NAA+PROBE: NEGATIVE

## 2021-06-23 PROCEDURE — U0002 COVID-19 LAB TEST NON-CDC: HCPCS | Mod: QW,S$GLB,, | Performed by: INTERNAL MEDICINE

## 2021-06-23 PROCEDURE — 25000003 PHARM REV CODE 250: Mod: PN | Performed by: INTERNAL MEDICINE

## 2021-06-23 PROCEDURE — U0002: ICD-10-PCS | Mod: QW,S$GLB,, | Performed by: INTERNAL MEDICINE

## 2021-06-23 PROCEDURE — 25000003 PHARM REV CODE 250: Mod: PN | Performed by: NURSE PRACTITIONER

## 2021-06-23 PROCEDURE — 3008F PR BODY MASS INDEX (BMI) DOCUMENTED: ICD-10-PCS | Mod: CPTII,S$GLB,, | Performed by: INTERNAL MEDICINE

## 2021-06-23 PROCEDURE — 3008F BODY MASS INDEX DOCD: CPT | Mod: CPTII,S$GLB,, | Performed by: INTERNAL MEDICINE

## 2021-06-23 PROCEDURE — 99213 PR OFFICE/OUTPT VISIT, EST, LEVL III, 20-29 MIN: ICD-10-PCS | Mod: S$GLB,CS,, | Performed by: INTERNAL MEDICINE

## 2021-06-23 PROCEDURE — 63600175 PHARM REV CODE 636 W HCPCS: Mod: PN | Performed by: INTERNAL MEDICINE

## 2021-06-23 PROCEDURE — 96360 HYDRATION IV INFUSION INIT: CPT | Mod: PN

## 2021-06-23 PROCEDURE — A4216 STERILE WATER/SALINE, 10 ML: HCPCS | Mod: PN | Performed by: INTERNAL MEDICINE

## 2021-06-23 PROCEDURE — 99213 OFFICE O/P EST LOW 20 MIN: CPT | Mod: S$GLB,CS,, | Performed by: INTERNAL MEDICINE

## 2021-06-23 RX ORDER — HEPARIN 100 UNIT/ML
500 SYRINGE INTRAVENOUS
Status: DISCONTINUED | OUTPATIENT
Start: 2021-06-23 | End: 2021-06-23 | Stop reason: HOSPADM

## 2021-06-23 RX ORDER — SODIUM CHLORIDE 0.9 % (FLUSH) 0.9 %
10 SYRINGE (ML) INJECTION
Status: DISCONTINUED | OUTPATIENT
Start: 2021-06-23 | End: 2021-06-23 | Stop reason: HOSPADM

## 2021-06-23 RX ORDER — SODIUM CHLORIDE 0.9 % (FLUSH) 0.9 %
10 SYRINGE (ML) INJECTION
Status: CANCELLED | OUTPATIENT
Start: 2021-06-23

## 2021-06-23 RX ORDER — HEPARIN 100 UNIT/ML
500 SYRINGE INTRAVENOUS
Status: CANCELLED | OUTPATIENT
Start: 2021-06-23

## 2021-06-23 RX ADMIN — HEPARIN 500 UNITS: 100 SYRINGE at 05:06

## 2021-06-23 RX ADMIN — SODIUM CHLORIDE 1000 ML: 0.9 INJECTION, SOLUTION INTRAVENOUS at 04:06

## 2021-06-23 RX ADMIN — Medication 10 ML: at 05:06

## 2021-06-24 ENCOUNTER — LAB VISIT (OUTPATIENT)
Dept: LAB | Facility: HOSPITAL | Age: 41
End: 2021-06-24
Attending: INTERNAL MEDICINE
Payer: COMMERCIAL

## 2021-06-24 ENCOUNTER — INFUSION (OUTPATIENT)
Dept: INFUSION THERAPY | Facility: HOSPITAL | Age: 41
End: 2021-06-24
Attending: INTERNAL MEDICINE
Payer: COMMERCIAL

## 2021-06-24 VITALS — TEMPERATURE: 98 F

## 2021-06-24 DIAGNOSIS — C50.412 MALIGNANT NEOPLASM OF UPPER-OUTER QUADRANT OF LEFT BREAST IN FEMALE, ESTROGEN RECEPTOR NEGATIVE: Primary | ICD-10-CM

## 2021-06-24 DIAGNOSIS — Z17.1 MALIGNANT NEOPLASM OF UPPER-OUTER QUADRANT OF LEFT BREAST IN FEMALE, ESTROGEN RECEPTOR NEGATIVE: ICD-10-CM

## 2021-06-24 DIAGNOSIS — D64.81 ANEMIA ASSOCIATED WITH CHEMOTHERAPY: ICD-10-CM

## 2021-06-24 DIAGNOSIS — Z17.1 MALIGNANT NEOPLASM OF UPPER-OUTER QUADRANT OF LEFT BREAST IN FEMALE, ESTROGEN RECEPTOR NEGATIVE: Primary | ICD-10-CM

## 2021-06-24 DIAGNOSIS — T45.1X5A ANEMIA ASSOCIATED WITH CHEMOTHERAPY: Primary | ICD-10-CM

## 2021-06-24 DIAGNOSIS — T45.1X5A ANEMIA ASSOCIATED WITH CHEMOTHERAPY: ICD-10-CM

## 2021-06-24 DIAGNOSIS — C50.412 MALIGNANT NEOPLASM OF UPPER-OUTER QUADRANT OF LEFT BREAST IN FEMALE, ESTROGEN RECEPTOR NEGATIVE: ICD-10-CM

## 2021-06-24 DIAGNOSIS — D64.81 ANEMIA ASSOCIATED WITH CHEMOTHERAPY: Primary | ICD-10-CM

## 2021-06-24 LAB
25(OH)D3+25(OH)D2 SERPL-MCNC: 32 NG/ML (ref 30–96)
ALBUMIN SERPL BCP-MCNC: 3.6 G/DL (ref 3.5–5.2)
ALP SERPL-CCNC: 71 U/L (ref 55–135)
ALT SERPL W/O P-5'-P-CCNC: 16 U/L (ref 10–44)
ANION GAP SERPL CALC-SCNC: 12 MMOL/L (ref 8–16)
ANISOCYTOSIS BLD QL SMEAR: SLIGHT
AST SERPL-CCNC: 15 U/L (ref 10–40)
BASOPHILS # BLD AUTO: 0.01 K/UL (ref 0–0.2)
BASOPHILS NFR BLD: 0.4 % (ref 0–1.9)
BILIRUB SERPL-MCNC: 0.3 MG/DL (ref 0.1–1)
BUN SERPL-MCNC: 8 MG/DL (ref 6–20)
CALCIUM SERPL-MCNC: 9.1 MG/DL (ref 8.7–10.5)
CHLORIDE SERPL-SCNC: 102 MMOL/L (ref 95–110)
CO2 SERPL-SCNC: 22 MMOL/L (ref 23–29)
CREAT SERPL-MCNC: 0.7 MG/DL (ref 0.5–1.4)
DIFFERENTIAL METHOD: ABNORMAL
EOSINOPHIL # BLD AUTO: 0 K/UL (ref 0–0.5)
EOSINOPHIL NFR BLD: 1.8 % (ref 0–8)
ERYTHROCYTE [DISTWIDTH] IN BLOOD BY AUTOMATED COUNT: 13.3 % (ref 11.5–14.5)
EST. GFR  (AFRICAN AMERICAN): >60 ML/MIN/1.73 M^2
EST. GFR  (NON AFRICAN AMERICAN): >60 ML/MIN/1.73 M^2
GIANT PLATELETS BLD QL SMEAR: PRESENT
GLUCOSE SERPL-MCNC: 88 MG/DL (ref 70–110)
HCT VFR BLD AUTO: 29.2 % (ref 37–48.5)
HGB BLD-MCNC: 10.1 G/DL (ref 12–16)
IMM GRANULOCYTES # BLD AUTO: 0.01 K/UL (ref 0–0.04)
IMM GRANULOCYTES NFR BLD AUTO: 0.4 % (ref 0–0.5)
IRON SERPL-MCNC: 84 UG/DL (ref 30–160)
LYMPHOCYTES # BLD AUTO: 1.2 K/UL (ref 1–4.8)
LYMPHOCYTES NFR BLD: 53.1 % (ref 18–48)
MAGNESIUM SERPL-MCNC: 1.9 MG/DL (ref 1.6–2.6)
MCH RBC QN AUTO: 33.6 PG (ref 27–31)
MCHC RBC AUTO-ENTMCNC: 34.6 G/DL (ref 32–36)
MCV RBC AUTO: 97 FL (ref 82–98)
MONOCYTES # BLD AUTO: 0.1 K/UL (ref 0.3–1)
MONOCYTES NFR BLD: 4.4 % (ref 4–15)
NEUTROPHILS # BLD AUTO: 0.9 K/UL (ref 1.8–7.7)
NEUTROPHILS NFR BLD: 39.9 % (ref 38–73)
NRBC BLD-RTO: 0 /100 WBC
PLATELET # BLD AUTO: 127 K/UL (ref 150–450)
PLATELET BLD QL SMEAR: ABNORMAL
PMV BLD AUTO: 10.2 FL (ref 9.2–12.9)
POIKILOCYTOSIS BLD QL SMEAR: SLIGHT
POTASSIUM SERPL-SCNC: 3.6 MMOL/L (ref 3.5–5.1)
PROT SERPL-MCNC: 6.3 G/DL (ref 6–8.4)
RBC # BLD AUTO: 3.01 M/UL (ref 4–5.4)
SATURATED IRON: 28 % (ref 20–50)
SODIUM SERPL-SCNC: 136 MMOL/L (ref 136–145)
T4 FREE SERPL-MCNC: 0.91 NG/DL (ref 0.71–1.51)
TOTAL IRON BINDING CAPACITY: 300 UG/DL (ref 250–450)
TRANSFERRIN SERPL-MCNC: 203 MG/DL (ref 200–375)
TSH SERPL DL<=0.005 MIU/L-ACNC: 1.18 UIU/ML (ref 0.4–4)
VIT B12 SERPL-MCNC: >2000 PG/ML (ref 210–950)
WBC # BLD AUTO: 2.28 K/UL (ref 3.9–12.7)

## 2021-06-24 PROCEDURE — 84443 ASSAY THYROID STIM HORMONE: CPT | Performed by: INTERNAL MEDICINE

## 2021-06-24 PROCEDURE — 83540 ASSAY OF IRON: CPT | Performed by: INTERNAL MEDICINE

## 2021-06-24 PROCEDURE — 82306 VITAMIN D 25 HYDROXY: CPT | Performed by: INTERNAL MEDICINE

## 2021-06-24 PROCEDURE — 85025 COMPLETE CBC W/AUTO DIFF WBC: CPT | Mod: PN | Performed by: INTERNAL MEDICINE

## 2021-06-24 PROCEDURE — 83735 ASSAY OF MAGNESIUM: CPT | Mod: PN | Performed by: INTERNAL MEDICINE

## 2021-06-24 PROCEDURE — A4216 STERILE WATER/SALINE, 10 ML: HCPCS | Mod: PN | Performed by: INTERNAL MEDICINE

## 2021-06-24 PROCEDURE — 84439 ASSAY OF FREE THYROXINE: CPT | Performed by: INTERNAL MEDICINE

## 2021-06-24 PROCEDURE — 36591 DRAW BLOOD OFF VENOUS DEVICE: CPT | Mod: PN

## 2021-06-24 PROCEDURE — 80053 COMPREHEN METABOLIC PANEL: CPT | Mod: PN | Performed by: INTERNAL MEDICINE

## 2021-06-24 PROCEDURE — 82607 VITAMIN B-12: CPT | Performed by: INTERNAL MEDICINE

## 2021-06-24 PROCEDURE — 36415 COLL VENOUS BLD VENIPUNCTURE: CPT | Mod: PN | Performed by: INTERNAL MEDICINE

## 2021-06-24 PROCEDURE — 25000003 PHARM REV CODE 250: Mod: PN | Performed by: INTERNAL MEDICINE

## 2021-06-24 RX ORDER — HEPARIN 100 UNIT/ML
500 SYRINGE INTRAVENOUS
Status: DISCONTINUED | OUTPATIENT
Start: 2021-06-24 | End: 2021-06-24 | Stop reason: HOSPADM

## 2021-06-24 RX ORDER — SODIUM CHLORIDE 0.9 % (FLUSH) 0.9 %
10 SYRINGE (ML) INJECTION
Status: DISCONTINUED | OUTPATIENT
Start: 2021-06-24 | End: 2021-06-24 | Stop reason: HOSPADM

## 2021-06-24 RX ORDER — SODIUM CHLORIDE 0.9 % (FLUSH) 0.9 %
10 SYRINGE (ML) INJECTION
Status: CANCELLED | OUTPATIENT
Start: 2021-06-24

## 2021-06-24 RX ORDER — HEPARIN 100 UNIT/ML
500 SYRINGE INTRAVENOUS
Status: CANCELLED | OUTPATIENT
Start: 2021-06-24

## 2021-06-24 RX ADMIN — Medication 10 ML: at 04:06

## 2021-06-25 ENCOUNTER — PATIENT MESSAGE (OUTPATIENT)
Dept: HEMATOLOGY/ONCOLOGY | Facility: CLINIC | Age: 41
End: 2021-06-25

## 2021-06-25 RX ORDER — SODIUM CHLORIDE 0.9 % (FLUSH) 0.9 %
10 SYRINGE (ML) INJECTION
Status: DISCONTINUED | OUTPATIENT
Start: 2021-06-25 | End: 2022-03-17

## 2021-06-28 ENCOUNTER — PATIENT MESSAGE (OUTPATIENT)
Dept: HEMATOLOGY/ONCOLOGY | Facility: CLINIC | Age: 41
End: 2021-06-28

## 2021-06-29 ENCOUNTER — PATIENT MESSAGE (OUTPATIENT)
Dept: HEMATOLOGY/ONCOLOGY | Facility: CLINIC | Age: 41
End: 2021-06-29

## 2021-06-30 ENCOUNTER — PATIENT MESSAGE (OUTPATIENT)
Dept: HEMATOLOGY/ONCOLOGY | Facility: CLINIC | Age: 41
End: 2021-06-30

## 2021-06-30 DIAGNOSIS — Z17.1 MALIGNANT NEOPLASM OF UPPER-OUTER QUADRANT OF LEFT BREAST IN FEMALE, ESTROGEN RECEPTOR NEGATIVE: ICD-10-CM

## 2021-06-30 DIAGNOSIS — T45.1X5A CHEMOTHERAPY INDUCED NAUSEA AND VOMITING: ICD-10-CM

## 2021-06-30 DIAGNOSIS — C50.412 MALIGNANT NEOPLASM OF UPPER-OUTER QUADRANT OF LEFT BREAST IN FEMALE, ESTROGEN RECEPTOR NEGATIVE: ICD-10-CM

## 2021-06-30 DIAGNOSIS — R11.2 CHEMOTHERAPY INDUCED NAUSEA AND VOMITING: ICD-10-CM

## 2021-07-01 RX ORDER — ONDANSETRON 8 MG/1
8 TABLET, ORALLY DISINTEGRATING ORAL EVERY 8 HOURS PRN
Qty: 30 TABLET | Refills: 2 | Status: SHIPPED | OUTPATIENT
Start: 2021-07-01 | End: 2021-07-13 | Stop reason: SDUPTHER

## 2021-07-06 ENCOUNTER — INFUSION (OUTPATIENT)
Dept: INFUSION THERAPY | Facility: HOSPITAL | Age: 41
End: 2021-07-06
Attending: INTERNAL MEDICINE
Payer: COMMERCIAL

## 2021-07-06 ENCOUNTER — OFFICE VISIT (OUTPATIENT)
Dept: HEMATOLOGY/ONCOLOGY | Facility: CLINIC | Age: 41
End: 2021-07-06
Payer: COMMERCIAL

## 2021-07-06 VITALS
RESPIRATION RATE: 18 BRPM | SYSTOLIC BLOOD PRESSURE: 117 MMHG | HEART RATE: 103 BPM | TEMPERATURE: 98 F | DIASTOLIC BLOOD PRESSURE: 65 MMHG

## 2021-07-06 VITALS
HEIGHT: 67 IN | RESPIRATION RATE: 16 BRPM | SYSTOLIC BLOOD PRESSURE: 103 MMHG | DIASTOLIC BLOOD PRESSURE: 69 MMHG | TEMPERATURE: 99 F | OXYGEN SATURATION: 98 % | BODY MASS INDEX: 24.25 KG/M2 | HEART RATE: 102 BPM | WEIGHT: 154.5 LBS

## 2021-07-06 DIAGNOSIS — C50.412 MALIGNANT NEOPLASM OF UPPER-OUTER QUADRANT OF LEFT BREAST IN FEMALE, ESTROGEN RECEPTOR NEGATIVE: Primary | ICD-10-CM

## 2021-07-06 DIAGNOSIS — T45.1X5A ANEMIA ASSOCIATED WITH CHEMOTHERAPY: ICD-10-CM

## 2021-07-06 DIAGNOSIS — D64.81 ANEMIA ASSOCIATED WITH CHEMOTHERAPY: ICD-10-CM

## 2021-07-06 DIAGNOSIS — T45.1X5A CHEMOTHERAPY-INDUCED NAUSEA: ICD-10-CM

## 2021-07-06 DIAGNOSIS — Z17.1 MALIGNANT NEOPLASM OF UPPER-OUTER QUADRANT OF LEFT BREAST IN FEMALE, ESTROGEN RECEPTOR NEGATIVE: Primary | ICD-10-CM

## 2021-07-06 DIAGNOSIS — R11.0 CHEMOTHERAPY-INDUCED NAUSEA: ICD-10-CM

## 2021-07-06 DIAGNOSIS — R74.01 ELEVATED TRANSAMINASE LEVEL: ICD-10-CM

## 2021-07-06 LAB
ALBUMIN SERPL BCP-MCNC: 3.5 G/DL (ref 3.5–5.2)
ALP SERPL-CCNC: 68 U/L (ref 55–135)
ALT SERPL W/O P-5'-P-CCNC: 61 U/L (ref 10–44)
ANION GAP SERPL CALC-SCNC: 9 MMOL/L (ref 8–16)
AST SERPL-CCNC: 37 U/L (ref 10–40)
BASOPHILS # BLD AUTO: 0.03 K/UL (ref 0–0.2)
BASOPHILS NFR BLD: 0.6 % (ref 0–1.9)
BILIRUB SERPL-MCNC: 0.3 MG/DL (ref 0.1–1)
BUN SERPL-MCNC: 4 MG/DL (ref 6–20)
CALCIUM SERPL-MCNC: 9.8 MG/DL (ref 8.7–10.5)
CHLORIDE SERPL-SCNC: 103 MMOL/L (ref 95–110)
CO2 SERPL-SCNC: 25 MMOL/L (ref 23–29)
CREAT SERPL-MCNC: 0.6 MG/DL (ref 0.5–1.4)
DIFFERENTIAL METHOD: ABNORMAL
EOSINOPHIL # BLD AUTO: 0 K/UL (ref 0–0.5)
EOSINOPHIL NFR BLD: 0.4 % (ref 0–8)
ERYTHROCYTE [DISTWIDTH] IN BLOOD BY AUTOMATED COUNT: 13.4 % (ref 11.5–14.5)
EST. GFR  (AFRICAN AMERICAN): >60 ML/MIN/1.73 M^2
EST. GFR  (NON AFRICAN AMERICAN): >60 ML/MIN/1.73 M^2
GLUCOSE SERPL-MCNC: 96 MG/DL (ref 70–110)
HCT VFR BLD AUTO: 32.7 % (ref 37–48.5)
HGB BLD-MCNC: 11 G/DL (ref 12–16)
IMM GRANULOCYTES # BLD AUTO: 0.18 K/UL (ref 0–0.04)
IMM GRANULOCYTES NFR BLD AUTO: 3.8 % (ref 0–0.5)
LYMPHOCYTES # BLD AUTO: 1.8 K/UL (ref 1–4.8)
LYMPHOCYTES NFR BLD: 37.4 % (ref 18–48)
MCH RBC QN AUTO: 32.4 PG (ref 27–31)
MCHC RBC AUTO-ENTMCNC: 33.6 G/DL (ref 32–36)
MCV RBC AUTO: 96 FL (ref 82–98)
MONOCYTES # BLD AUTO: 1 K/UL (ref 0.3–1)
MONOCYTES NFR BLD: 21.6 % (ref 4–15)
NEUTROPHILS # BLD AUTO: 1.7 K/UL (ref 1.8–7.7)
NEUTROPHILS NFR BLD: 36.2 % (ref 38–73)
NRBC BLD-RTO: 0 /100 WBC
PLATELET # BLD AUTO: 445 K/UL (ref 150–450)
PMV BLD AUTO: 8.8 FL (ref 9.2–12.9)
POTASSIUM SERPL-SCNC: 3.8 MMOL/L (ref 3.5–5.1)
PROT SERPL-MCNC: 6.3 G/DL (ref 6–8.4)
RBC # BLD AUTO: 3.4 M/UL (ref 4–5.4)
SODIUM SERPL-SCNC: 137 MMOL/L (ref 136–145)
T4 FREE SERPL-MCNC: 0.95 NG/DL (ref 0.71–1.51)
TSH SERPL DL<=0.005 MIU/L-ACNC: 1.14 UIU/ML (ref 0.4–4)
WBC # BLD AUTO: 4.76 K/UL (ref 3.9–12.7)

## 2021-07-06 PROCEDURE — 1126F AMNT PAIN NOTED NONE PRSNT: CPT | Mod: S$GLB,,, | Performed by: NURSE PRACTITIONER

## 2021-07-06 PROCEDURE — 96413 CHEMO IV INFUSION 1 HR: CPT

## 2021-07-06 PROCEDURE — 96417 CHEMO IV INFUS EACH ADDL SEQ: CPT

## 2021-07-06 PROCEDURE — 96375 TX/PRO/DX INJ NEW DRUG ADDON: CPT

## 2021-07-06 PROCEDURE — 1126F PR PAIN SEVERITY QUANTIFIED, NO PAIN PRESENT: ICD-10-PCS | Mod: S$GLB,,, | Performed by: NURSE PRACTITIONER

## 2021-07-06 PROCEDURE — 96361 HYDRATE IV INFUSION ADD-ON: CPT

## 2021-07-06 PROCEDURE — 63600175 PHARM REV CODE 636 W HCPCS: Mod: JG | Performed by: INTERNAL MEDICINE

## 2021-07-06 PROCEDURE — 63600175 PHARM REV CODE 636 W HCPCS: Performed by: INTERNAL MEDICINE

## 2021-07-06 PROCEDURE — A4216 STERILE WATER/SALINE, 10 ML: HCPCS | Performed by: INTERNAL MEDICINE

## 2021-07-06 PROCEDURE — 99215 OFFICE O/P EST HI 40 MIN: CPT | Mod: S$GLB,,, | Performed by: NURSE PRACTITIONER

## 2021-07-06 PROCEDURE — 99999 PR PBB SHADOW E&M-EST. PATIENT-LVL III: ICD-10-PCS | Mod: PBBFAC,,, | Performed by: NURSE PRACTITIONER

## 2021-07-06 PROCEDURE — 3008F BODY MASS INDEX DOCD: CPT | Mod: CPTII,S$GLB,, | Performed by: NURSE PRACTITIONER

## 2021-07-06 PROCEDURE — 84439 ASSAY OF FREE THYROXINE: CPT | Performed by: INTERNAL MEDICINE

## 2021-07-06 PROCEDURE — 84443 ASSAY THYROID STIM HORMONE: CPT | Performed by: INTERNAL MEDICINE

## 2021-07-06 PROCEDURE — 25000003 PHARM REV CODE 250: Performed by: INTERNAL MEDICINE

## 2021-07-06 PROCEDURE — 99215 PR OFFICE/OUTPT VISIT, EST, LEVL V, 40-54 MIN: ICD-10-PCS | Mod: S$GLB,,, | Performed by: NURSE PRACTITIONER

## 2021-07-06 PROCEDURE — 96367 TX/PROPH/DG ADDL SEQ IV INF: CPT

## 2021-07-06 PROCEDURE — 80053 COMPREHEN METABOLIC PANEL: CPT | Performed by: INTERNAL MEDICINE

## 2021-07-06 PROCEDURE — 85025 COMPLETE CBC W/AUTO DIFF WBC: CPT | Performed by: INTERNAL MEDICINE

## 2021-07-06 PROCEDURE — 96411 CHEMO IV PUSH ADDL DRUG: CPT

## 2021-07-06 PROCEDURE — 25000003 PHARM REV CODE 250: Performed by: NURSE PRACTITIONER

## 2021-07-06 PROCEDURE — 3008F PR BODY MASS INDEX (BMI) DOCUMENTED: ICD-10-PCS | Mod: CPTII,S$GLB,, | Performed by: NURSE PRACTITIONER

## 2021-07-06 PROCEDURE — 99999 PR PBB SHADOW E&M-EST. PATIENT-LVL III: CPT | Mod: PBBFAC,,, | Performed by: NURSE PRACTITIONER

## 2021-07-06 RX ORDER — LORAZEPAM 2 MG/ML
1 INJECTION INTRAMUSCULAR ONCE
Status: CANCELLED
Start: 2021-07-06

## 2021-07-06 RX ORDER — SODIUM CHLORIDE 0.9 % (FLUSH) 0.9 %
10 SYRINGE (ML) INJECTION
Status: DISCONTINUED | OUTPATIENT
Start: 2021-07-06 | End: 2021-07-06 | Stop reason: HOSPADM

## 2021-07-06 RX ORDER — SODIUM CHLORIDE 0.9 % (FLUSH) 0.9 %
10 SYRINGE (ML) INJECTION
Status: CANCELLED | OUTPATIENT
Start: 2021-07-06

## 2021-07-06 RX ORDER — HEPARIN 100 UNIT/ML
500 SYRINGE INTRAVENOUS
Status: DISCONTINUED | OUTPATIENT
Start: 2021-07-06 | End: 2021-07-06 | Stop reason: HOSPADM

## 2021-07-06 RX ORDER — HEPARIN 100 UNIT/ML
500 SYRINGE INTRAVENOUS
Status: CANCELLED | OUTPATIENT
Start: 2021-07-06

## 2021-07-06 RX ORDER — DOXORUBICIN HYDROCHLORIDE 2 MG/ML
60 INJECTION, SOLUTION INTRAVENOUS ONCE
Status: COMPLETED | OUTPATIENT
Start: 2021-07-06 | End: 2021-07-06

## 2021-07-06 RX ORDER — DOXORUBICIN HYDROCHLORIDE 2 MG/ML
60 INJECTION, SOLUTION INTRAVENOUS ONCE
Status: CANCELLED
Start: 2021-07-06

## 2021-07-06 RX ORDER — LORAZEPAM 2 MG/ML
1 INJECTION INTRAMUSCULAR ONCE
Status: COMPLETED | OUTPATIENT
Start: 2021-07-06 | End: 2021-07-06

## 2021-07-06 RX ADMIN — APREPITANT 130 MG: 130 INJECTION, EMULSION INTRAVENOUS at 01:07

## 2021-07-06 RX ADMIN — SODIUM CHLORIDE 200 MG: 9 INJECTION, SOLUTION INTRAVENOUS at 03:07

## 2021-07-06 RX ADMIN — Medication 10 ML: at 09:07

## 2021-07-06 RX ADMIN — CYCLOPHOSPHAMIDE 1100 MG: 200 INJECTION, SOLUTION INTRAVENOUS at 03:07

## 2021-07-06 RX ADMIN — HEPARIN 500 UNITS: 100 SYRINGE at 05:07

## 2021-07-06 RX ADMIN — PALONOSETRON HYDROCHLORIDE: 0.25 INJECTION INTRAVENOUS at 01:07

## 2021-07-06 RX ADMIN — SODIUM CHLORIDE 1000 ML: 0.9 INJECTION, SOLUTION INTRAVENOUS at 03:07

## 2021-07-06 RX ADMIN — LORAZEPAM 1 MG: 2 INJECTION INTRAMUSCULAR; INTRAVENOUS at 01:07

## 2021-07-06 RX ADMIN — SODIUM CHLORIDE: 0.9 INJECTION, SOLUTION INTRAVENOUS at 01:07

## 2021-07-06 RX ADMIN — HEPARIN 500 UNITS: 100 SYRINGE at 09:07

## 2021-07-06 RX ADMIN — DOXORUBICIN HYDROCHLORIDE 110 MG: 2 INJECTION, SOLUTION INTRAVENOUS at 02:07

## 2021-07-07 ENCOUNTER — INFUSION (OUTPATIENT)
Dept: INFUSION THERAPY | Facility: HOSPITAL | Age: 41
End: 2021-07-07
Attending: INTERNAL MEDICINE
Payer: COMMERCIAL

## 2021-07-07 VITALS
TEMPERATURE: 98 F | HEART RATE: 97 BPM | SYSTOLIC BLOOD PRESSURE: 118 MMHG | WEIGHT: 155.19 LBS | DIASTOLIC BLOOD PRESSURE: 63 MMHG | RESPIRATION RATE: 16 BRPM | HEIGHT: 67 IN | BODY MASS INDEX: 24.36 KG/M2

## 2021-07-07 DIAGNOSIS — Z17.1 MALIGNANT NEOPLASM OF UPPER-OUTER QUADRANT OF LEFT BREAST IN FEMALE, ESTROGEN RECEPTOR NEGATIVE: Primary | ICD-10-CM

## 2021-07-07 DIAGNOSIS — C50.412 MALIGNANT NEOPLASM OF UPPER-OUTER QUADRANT OF LEFT BREAST IN FEMALE, ESTROGEN RECEPTOR NEGATIVE: Primary | ICD-10-CM

## 2021-07-07 PROCEDURE — 25000003 PHARM REV CODE 250: Mod: PN | Performed by: NURSE PRACTITIONER

## 2021-07-07 PROCEDURE — 25000003 PHARM REV CODE 250: Mod: PN | Performed by: INTERNAL MEDICINE

## 2021-07-07 PROCEDURE — A4216 STERILE WATER/SALINE, 10 ML: HCPCS | Mod: PN | Performed by: INTERNAL MEDICINE

## 2021-07-07 PROCEDURE — 96360 HYDRATION IV INFUSION INIT: CPT | Mod: PN

## 2021-07-07 RX ORDER — HEPARIN 100 UNIT/ML
500 SYRINGE INTRAVENOUS
Status: CANCELLED | OUTPATIENT
Start: 2021-07-07

## 2021-07-07 RX ORDER — SODIUM CHLORIDE 0.9 % (FLUSH) 0.9 %
10 SYRINGE (ML) INJECTION
Status: DISCONTINUED | OUTPATIENT
Start: 2021-07-07 | End: 2021-07-07 | Stop reason: HOSPADM

## 2021-07-07 RX ORDER — SODIUM CHLORIDE 0.9 % (FLUSH) 0.9 %
10 SYRINGE (ML) INJECTION
Status: CANCELLED | OUTPATIENT
Start: 2021-07-07

## 2021-07-07 RX ADMIN — SODIUM CHLORIDE 1000 ML: 0.9 INJECTION, SOLUTION INTRAVENOUS at 04:07

## 2021-07-07 RX ADMIN — Medication 10 ML: at 05:07

## 2021-07-09 ENCOUNTER — TELEPHONE (OUTPATIENT)
Dept: HEMATOLOGY/ONCOLOGY | Facility: CLINIC | Age: 41
End: 2021-07-09

## 2021-07-09 ENCOUNTER — INFUSION (OUTPATIENT)
Dept: INFUSION THERAPY | Facility: HOSPITAL | Age: 41
End: 2021-07-09
Attending: INTERNAL MEDICINE
Payer: COMMERCIAL

## 2021-07-09 ENCOUNTER — PATIENT MESSAGE (OUTPATIENT)
Dept: HEMATOLOGY/ONCOLOGY | Facility: CLINIC | Age: 41
End: 2021-07-09

## 2021-07-09 VITALS
HEART RATE: 79 BPM | RESPIRATION RATE: 18 BRPM | SYSTOLIC BLOOD PRESSURE: 116 MMHG | TEMPERATURE: 98 F | BODY MASS INDEX: 24.36 KG/M2 | WEIGHT: 155.19 LBS | DIASTOLIC BLOOD PRESSURE: 79 MMHG | HEIGHT: 67 IN

## 2021-07-09 DIAGNOSIS — R06.6 HICCUPS: Primary | ICD-10-CM

## 2021-07-09 DIAGNOSIS — Z17.1 MALIGNANT NEOPLASM OF UPPER-OUTER QUADRANT OF LEFT BREAST IN FEMALE, ESTROGEN RECEPTOR NEGATIVE: Primary | ICD-10-CM

## 2021-07-09 DIAGNOSIS — C50.412 MALIGNANT NEOPLASM OF UPPER-OUTER QUADRANT OF LEFT BREAST IN FEMALE, ESTROGEN RECEPTOR NEGATIVE: Primary | ICD-10-CM

## 2021-07-09 PROCEDURE — 96360 HYDRATION IV INFUSION INIT: CPT | Mod: PN

## 2021-07-09 PROCEDURE — A4216 STERILE WATER/SALINE, 10 ML: HCPCS | Mod: PN | Performed by: INTERNAL MEDICINE

## 2021-07-09 PROCEDURE — 25000003 PHARM REV CODE 250: Mod: PN | Performed by: NURSE PRACTITIONER

## 2021-07-09 PROCEDURE — 25000003 PHARM REV CODE 250: Mod: PN | Performed by: INTERNAL MEDICINE

## 2021-07-09 RX ORDER — SODIUM CHLORIDE 0.9 % (FLUSH) 0.9 %
10 SYRINGE (ML) INJECTION
Status: DISCONTINUED | OUTPATIENT
Start: 2021-07-09 | End: 2021-07-09 | Stop reason: HOSPADM

## 2021-07-09 RX ORDER — SODIUM CHLORIDE 0.9 % (FLUSH) 0.9 %
10 SYRINGE (ML) INJECTION
Status: CANCELLED | OUTPATIENT
Start: 2021-07-09

## 2021-07-09 RX ORDER — HEPARIN 100 UNIT/ML
500 SYRINGE INTRAVENOUS
Status: CANCELLED | OUTPATIENT
Start: 2021-07-09

## 2021-07-09 RX ORDER — DICYCLOMINE HYDROCHLORIDE 10 MG/1
10 CAPSULE ORAL 4 TIMES DAILY PRN
Qty: 60 CAPSULE | Refills: 0 | Status: SHIPPED | OUTPATIENT
Start: 2021-07-09 | End: 2022-03-17

## 2021-07-09 RX ADMIN — SODIUM CHLORIDE 1000 ML: 0.9 INJECTION, SOLUTION INTRAVENOUS at 12:07

## 2021-07-09 RX ADMIN — Medication 10 ML: at 01:07

## 2021-07-12 ENCOUNTER — TELEPHONE (OUTPATIENT)
Dept: HEMATOLOGY/ONCOLOGY | Facility: CLINIC | Age: 41
End: 2021-07-12

## 2021-07-12 ENCOUNTER — DOCUMENTATION ONLY (OUTPATIENT)
Dept: INFUSION THERAPY | Facility: HOSPITAL | Age: 41
End: 2021-07-12

## 2021-07-12 ENCOUNTER — PATIENT MESSAGE (OUTPATIENT)
Dept: INFUSION THERAPY | Facility: HOSPITAL | Age: 41
End: 2021-07-12

## 2021-07-13 ENCOUNTER — PATIENT MESSAGE (OUTPATIENT)
Dept: HEMATOLOGY/ONCOLOGY | Facility: CLINIC | Age: 41
End: 2021-07-13

## 2021-07-13 ENCOUNTER — OFFICE VISIT (OUTPATIENT)
Dept: GYNECOLOGIC ONCOLOGY | Facility: CLINIC | Age: 41
End: 2021-07-13
Payer: COMMERCIAL

## 2021-07-13 VITALS
SYSTOLIC BLOOD PRESSURE: 108 MMHG | HEART RATE: 81 BPM | BODY MASS INDEX: 24.9 KG/M2 | WEIGHT: 158.94 LBS | DIASTOLIC BLOOD PRESSURE: 60 MMHG

## 2021-07-13 DIAGNOSIS — C50.412 MALIGNANT NEOPLASM OF UPPER-OUTER QUADRANT OF LEFT BREAST IN FEMALE, ESTROGEN RECEPTOR NEGATIVE: Primary | ICD-10-CM

## 2021-07-13 DIAGNOSIS — T45.1X5A CHEMOTHERAPY INDUCED NAUSEA AND VOMITING: ICD-10-CM

## 2021-07-13 DIAGNOSIS — Z15.01 BRCA2 GENE MUTATION POSITIVE IN FEMALE: Primary | ICD-10-CM

## 2021-07-13 DIAGNOSIS — R11.2 CHEMOTHERAPY INDUCED NAUSEA AND VOMITING: ICD-10-CM

## 2021-07-13 DIAGNOSIS — Z17.1 MALIGNANT NEOPLASM OF UPPER-OUTER QUADRANT OF LEFT BREAST IN FEMALE, ESTROGEN RECEPTOR NEGATIVE: Primary | ICD-10-CM

## 2021-07-13 DIAGNOSIS — Z15.09 BRCA2 GENE MUTATION POSITIVE IN FEMALE: Primary | ICD-10-CM

## 2021-07-13 DIAGNOSIS — C50.412 MALIGNANT NEOPLASM OF UPPER-OUTER QUADRANT OF LEFT BREAST IN FEMALE, ESTROGEN RECEPTOR NEGATIVE: ICD-10-CM

## 2021-07-13 DIAGNOSIS — Z15.02 BRCA2 GENE MUTATION POSITIVE IN FEMALE: Primary | ICD-10-CM

## 2021-07-13 DIAGNOSIS — Z17.1 MALIGNANT NEOPLASM OF UPPER-OUTER QUADRANT OF LEFT BREAST IN FEMALE, ESTROGEN RECEPTOR NEGATIVE: ICD-10-CM

## 2021-07-13 PROCEDURE — 99999 PR PBB SHADOW E&M-EST. PATIENT-LVL III: ICD-10-PCS | Mod: PBBFAC,,, | Performed by: OBSTETRICS & GYNECOLOGY

## 2021-07-13 PROCEDURE — 3008F PR BODY MASS INDEX (BMI) DOCUMENTED: ICD-10-PCS | Mod: CPTII,S$GLB,, | Performed by: OBSTETRICS & GYNECOLOGY

## 2021-07-13 PROCEDURE — 99244 PR OFFICE CONSULTATION,LEVEL IV: ICD-10-PCS | Mod: S$GLB,,, | Performed by: OBSTETRICS & GYNECOLOGY

## 2021-07-13 PROCEDURE — 3008F BODY MASS INDEX DOCD: CPT | Mod: CPTII,S$GLB,, | Performed by: OBSTETRICS & GYNECOLOGY

## 2021-07-13 PROCEDURE — 99999 PR PBB SHADOW E&M-EST. PATIENT-LVL III: CPT | Mod: PBBFAC,,, | Performed by: OBSTETRICS & GYNECOLOGY

## 2021-07-13 PROCEDURE — 1126F PR PAIN SEVERITY QUANTIFIED, NO PAIN PRESENT: ICD-10-PCS | Mod: S$GLB,,, | Performed by: OBSTETRICS & GYNECOLOGY

## 2021-07-13 PROCEDURE — 99244 OFF/OP CNSLTJ NEW/EST MOD 40: CPT | Mod: S$GLB,,, | Performed by: OBSTETRICS & GYNECOLOGY

## 2021-07-13 PROCEDURE — 1126F AMNT PAIN NOTED NONE PRSNT: CPT | Mod: S$GLB,,, | Performed by: OBSTETRICS & GYNECOLOGY

## 2021-07-14 ENCOUNTER — PATIENT MESSAGE (OUTPATIENT)
Dept: HEMATOLOGY/ONCOLOGY | Facility: CLINIC | Age: 41
End: 2021-07-14

## 2021-07-14 ENCOUNTER — TELEPHONE (OUTPATIENT)
Dept: INFUSION THERAPY | Facility: HOSPITAL | Age: 41
End: 2021-07-14

## 2021-07-14 DIAGNOSIS — R11.2 CHEMOTHERAPY INDUCED NAUSEA AND VOMITING: ICD-10-CM

## 2021-07-14 DIAGNOSIS — T45.1X5A CHEMOTHERAPY INDUCED NAUSEA AND VOMITING: ICD-10-CM

## 2021-07-14 RX ORDER — ONDANSETRON 8 MG/1
8 TABLET, ORALLY DISINTEGRATING ORAL EVERY 8 HOURS PRN
Qty: 30 TABLET | Refills: 2 | Status: SHIPPED | OUTPATIENT
Start: 2021-07-14 | End: 2022-02-07 | Stop reason: SDUPTHER

## 2021-07-15 ENCOUNTER — TELEPHONE (OUTPATIENT)
Dept: GYNECOLOGIC ONCOLOGY | Facility: CLINIC | Age: 41
End: 2021-07-15

## 2021-07-15 ENCOUNTER — PATIENT MESSAGE (OUTPATIENT)
Dept: GYNECOLOGIC ONCOLOGY | Facility: CLINIC | Age: 41
End: 2021-07-15

## 2021-07-15 ENCOUNTER — PATIENT MESSAGE (OUTPATIENT)
Dept: HEMATOLOGY/ONCOLOGY | Facility: CLINIC | Age: 41
End: 2021-07-15

## 2021-07-15 ENCOUNTER — PATIENT MESSAGE (OUTPATIENT)
Dept: OPHTHALMOLOGY | Facility: CLINIC | Age: 41
End: 2021-07-15

## 2021-07-15 DIAGNOSIS — Z15.09 BRCA2 GENE MUTATION POSITIVE IN FEMALE: Primary | ICD-10-CM

## 2021-07-15 DIAGNOSIS — Z15.01 BRCA2 GENE MUTATION POSITIVE IN FEMALE: Primary | ICD-10-CM

## 2021-07-15 DIAGNOSIS — Z15.02 BRCA2 GENE MUTATION POSITIVE IN FEMALE: Primary | ICD-10-CM

## 2021-07-15 RX ORDER — OLANZAPINE 5 MG/1
5 TABLET ORAL NIGHTLY
Qty: 30 TABLET | Refills: 10 | Status: ON HOLD | OUTPATIENT
Start: 2021-07-15 | End: 2021-08-17 | Stop reason: SDUPTHER

## 2021-07-16 ENCOUNTER — TELEPHONE (OUTPATIENT)
Dept: HEMATOLOGY/ONCOLOGY | Facility: CLINIC | Age: 41
End: 2021-07-16

## 2021-07-16 DIAGNOSIS — Z17.1 MALIGNANT NEOPLASM OF UPPER-OUTER QUADRANT OF LEFT BREAST IN FEMALE, ESTROGEN RECEPTOR NEGATIVE: Primary | ICD-10-CM

## 2021-07-16 DIAGNOSIS — C50.412 MALIGNANT NEOPLASM OF UPPER-OUTER QUADRANT OF LEFT BREAST IN FEMALE, ESTROGEN RECEPTOR NEGATIVE: Primary | ICD-10-CM

## 2021-07-19 ENCOUNTER — PATIENT MESSAGE (OUTPATIENT)
Dept: HEMATOLOGY/ONCOLOGY | Facility: CLINIC | Age: 41
End: 2021-07-19

## 2021-07-21 ENCOUNTER — PATIENT MESSAGE (OUTPATIENT)
Dept: HEMATOLOGY/ONCOLOGY | Facility: CLINIC | Age: 41
End: 2021-07-21

## 2021-07-21 ENCOUNTER — TELEPHONE (OUTPATIENT)
Dept: HEMATOLOGY/ONCOLOGY | Facility: CLINIC | Age: 41
End: 2021-07-21

## 2021-07-21 PROBLEM — R19.7 DIARRHEA: Status: ACTIVE | Noted: 2021-07-21

## 2021-07-21 PROBLEM — E86.1 INTRAVASCULAR VOLUME DEPLETION: Status: ACTIVE | Noted: 2021-07-21

## 2021-07-21 PROBLEM — E87.1 HYPONATREMIA: Status: ACTIVE | Noted: 2021-07-21

## 2021-07-22 ENCOUNTER — PATIENT MESSAGE (OUTPATIENT)
Dept: HEMATOLOGY/ONCOLOGY | Facility: CLINIC | Age: 41
End: 2021-07-22

## 2021-07-23 ENCOUNTER — PATIENT MESSAGE (OUTPATIENT)
Dept: HEMATOLOGY/ONCOLOGY | Facility: CLINIC | Age: 41
End: 2021-07-23

## 2021-07-23 ENCOUNTER — PATIENT MESSAGE (OUTPATIENT)
Dept: GYNECOLOGIC ONCOLOGY | Facility: CLINIC | Age: 41
End: 2021-07-23

## 2021-07-25 ENCOUNTER — PATIENT MESSAGE (OUTPATIENT)
Dept: HEMATOLOGY/ONCOLOGY | Facility: CLINIC | Age: 41
End: 2021-07-25

## 2021-07-26 ENCOUNTER — PATIENT MESSAGE (OUTPATIENT)
Dept: HEMATOLOGY/ONCOLOGY | Facility: CLINIC | Age: 41
End: 2021-07-26

## 2021-07-26 ENCOUNTER — PATIENT MESSAGE (OUTPATIENT)
Dept: SURGERY | Facility: CLINIC | Age: 41
End: 2021-07-26

## 2021-07-26 ENCOUNTER — PATIENT MESSAGE (OUTPATIENT)
Dept: GYNECOLOGIC ONCOLOGY | Facility: CLINIC | Age: 41
End: 2021-07-26

## 2021-07-26 DIAGNOSIS — R19.7 DIARRHEA, UNSPECIFIED TYPE: Primary | ICD-10-CM

## 2021-07-26 RX ORDER — DIPHENOXYLATE HYDROCHLORIDE AND ATROPINE SULFATE 2.5; .025 MG/1; MG/1
1 TABLET ORAL 4 TIMES DAILY PRN
Qty: 30 TABLET | Refills: 1 | Status: SHIPPED | OUTPATIENT
Start: 2021-07-26 | End: 2022-03-17

## 2021-07-27 ENCOUNTER — TELEPHONE (OUTPATIENT)
Dept: INFUSION THERAPY | Facility: HOSPITAL | Age: 41
End: 2021-07-27

## 2021-07-27 ENCOUNTER — TELEPHONE (OUTPATIENT)
Dept: HEMATOLOGY/ONCOLOGY | Facility: CLINIC | Age: 41
End: 2021-07-27

## 2021-07-27 ENCOUNTER — INFUSION (OUTPATIENT)
Dept: INFUSION THERAPY | Facility: HOSPITAL | Age: 41
End: 2021-07-27
Attending: INTERNAL MEDICINE
Payer: COMMERCIAL

## 2021-07-27 ENCOUNTER — PATIENT MESSAGE (OUTPATIENT)
Dept: HEMATOLOGY/ONCOLOGY | Facility: CLINIC | Age: 41
End: 2021-07-27

## 2021-07-27 VITALS — TEMPERATURE: 99 F

## 2021-07-27 DIAGNOSIS — C50.412 MALIGNANT NEOPLASM OF UPPER-OUTER QUADRANT OF LEFT BREAST IN FEMALE, ESTROGEN RECEPTOR NEGATIVE: Primary | ICD-10-CM

## 2021-07-27 DIAGNOSIS — Z17.1 MALIGNANT NEOPLASM OF UPPER-OUTER QUADRANT OF LEFT BREAST IN FEMALE, ESTROGEN RECEPTOR NEGATIVE: Primary | ICD-10-CM

## 2021-07-27 LAB
ALBUMIN SERPL BCP-MCNC: 3.3 G/DL (ref 3.5–5.2)
ALP SERPL-CCNC: 61 U/L (ref 55–135)
ALT SERPL W/O P-5'-P-CCNC: 42 U/L (ref 10–44)
ANION GAP SERPL CALC-SCNC: 10 MMOL/L (ref 8–16)
AST SERPL-CCNC: 42 U/L (ref 10–40)
BASOPHILS # BLD AUTO: 0.02 K/UL (ref 0–0.2)
BASOPHILS NFR BLD: 0.3 % (ref 0–1.9)
BILIRUB SERPL-MCNC: 0.4 MG/DL (ref 0.1–1)
BUN SERPL-MCNC: 3 MG/DL (ref 6–20)
CALCIUM SERPL-MCNC: 9.7 MG/DL (ref 8.7–10.5)
CHLORIDE SERPL-SCNC: 104 MMOL/L (ref 95–110)
CO2 SERPL-SCNC: 25 MMOL/L (ref 23–29)
CREAT SERPL-MCNC: 0.6 MG/DL (ref 0.5–1.4)
DIFFERENTIAL METHOD: ABNORMAL
EOSINOPHIL # BLD AUTO: 0 K/UL (ref 0–0.5)
EOSINOPHIL NFR BLD: 0.2 % (ref 0–8)
ERYTHROCYTE [DISTWIDTH] IN BLOOD BY AUTOMATED COUNT: 13.6 % (ref 11.5–14.5)
EST. GFR  (AFRICAN AMERICAN): >60 ML/MIN/1.73 M^2
EST. GFR  (NON AFRICAN AMERICAN): >60 ML/MIN/1.73 M^2
GLUCOSE SERPL-MCNC: 102 MG/DL (ref 70–110)
HCT VFR BLD AUTO: 29.7 % (ref 37–48.5)
HGB BLD-MCNC: 10 G/DL (ref 12–16)
IMM GRANULOCYTES # BLD AUTO: 0.3 K/UL (ref 0–0.04)
IMM GRANULOCYTES NFR BLD AUTO: 4.7 % (ref 0–0.5)
LYMPHOCYTES # BLD AUTO: 1.5 K/UL (ref 1–4.8)
LYMPHOCYTES NFR BLD: 23 % (ref 18–48)
MCH RBC QN AUTO: 31.8 PG (ref 27–31)
MCHC RBC AUTO-ENTMCNC: 33.7 G/DL (ref 32–36)
MCV RBC AUTO: 95 FL (ref 82–98)
MONOCYTES # BLD AUTO: 1.3 K/UL (ref 0.3–1)
MONOCYTES NFR BLD: 20.7 % (ref 4–15)
NEUTROPHILS # BLD AUTO: 3.2 K/UL (ref 1.8–7.7)
NEUTROPHILS NFR BLD: 51.1 % (ref 38–73)
NRBC BLD-RTO: 0 /100 WBC
PLATELET # BLD AUTO: 414 K/UL (ref 150–450)
PMV BLD AUTO: 8.5 FL (ref 9.2–12.9)
POTASSIUM SERPL-SCNC: 3.8 MMOL/L (ref 3.5–5.1)
PROT SERPL-MCNC: 6 G/DL (ref 6–8.4)
RBC # BLD AUTO: 3.14 M/UL (ref 4–5.4)
SODIUM SERPL-SCNC: 139 MMOL/L (ref 136–145)
WBC # BLD AUTO: 6.34 K/UL (ref 3.9–12.7)

## 2021-07-27 PROCEDURE — 84443 ASSAY THYROID STIM HORMONE: CPT | Performed by: INTERNAL MEDICINE

## 2021-07-27 PROCEDURE — 63600175 PHARM REV CODE 636 W HCPCS: Mod: PN | Performed by: INTERNAL MEDICINE

## 2021-07-27 PROCEDURE — 25000003 PHARM REV CODE 250: Mod: PN | Performed by: INTERNAL MEDICINE

## 2021-07-27 PROCEDURE — 36591 DRAW BLOOD OFF VENOUS DEVICE: CPT | Mod: PN

## 2021-07-27 PROCEDURE — 84439 ASSAY OF FREE THYROXINE: CPT | Performed by: INTERNAL MEDICINE

## 2021-07-27 PROCEDURE — 85025 COMPLETE CBC W/AUTO DIFF WBC: CPT | Mod: PN | Performed by: INTERNAL MEDICINE

## 2021-07-27 PROCEDURE — 80053 COMPREHEN METABOLIC PANEL: CPT | Mod: PN | Performed by: INTERNAL MEDICINE

## 2021-07-27 PROCEDURE — A4216 STERILE WATER/SALINE, 10 ML: HCPCS | Mod: PN | Performed by: INTERNAL MEDICINE

## 2021-07-27 RX ORDER — HEPARIN 100 UNIT/ML
500 SYRINGE INTRAVENOUS
Status: DISCONTINUED | OUTPATIENT
Start: 2021-07-27 | End: 2021-07-27 | Stop reason: HOSPADM

## 2021-07-27 RX ORDER — HEPARIN 100 UNIT/ML
500 SYRINGE INTRAVENOUS
Status: CANCELLED | OUTPATIENT
Start: 2021-07-27

## 2021-07-27 RX ORDER — SODIUM CHLORIDE 0.9 % (FLUSH) 0.9 %
10 SYRINGE (ML) INJECTION
Status: CANCELLED | OUTPATIENT
Start: 2021-07-27

## 2021-07-27 RX ORDER — SODIUM CHLORIDE 0.9 % (FLUSH) 0.9 %
10 SYRINGE (ML) INJECTION
Status: DISCONTINUED | OUTPATIENT
Start: 2021-07-27 | End: 2021-07-27 | Stop reason: HOSPADM

## 2021-07-28 ENCOUNTER — PATIENT MESSAGE (OUTPATIENT)
Dept: HEMATOLOGY/ONCOLOGY | Facility: CLINIC | Age: 41
End: 2021-07-28

## 2021-07-28 LAB
T4 FREE SERPL-MCNC: 1.06 NG/DL (ref 0.71–1.51)
TSH SERPL DL<=0.005 MIU/L-ACNC: 0.88 UIU/ML (ref 0.4–4)

## 2021-07-28 RX ADMIN — SODIUM CHLORIDE, PRESERVATIVE FREE 500 UNITS: 5 INJECTION INTRAVENOUS at 10:07

## 2021-07-28 RX ADMIN — Medication 10 ML: at 10:07

## 2021-07-29 DIAGNOSIS — Z15.09 BRCA2 GENE MUTATION POSITIVE: Chronic | ICD-10-CM

## 2021-07-29 DIAGNOSIS — Z15.01 BRCA2 GENE MUTATION POSITIVE: Chronic | ICD-10-CM

## 2021-07-29 DIAGNOSIS — D05.12 DUCTAL CARCINOMA IN SITU OF LEFT BREAST: Primary | ICD-10-CM

## 2021-07-29 DIAGNOSIS — C50.412 MALIGNANT NEOPLASM OF UPPER-OUTER QUADRANT OF LEFT BREAST IN FEMALE, ESTROGEN RECEPTOR NEGATIVE: ICD-10-CM

## 2021-07-29 DIAGNOSIS — Z17.1 MALIGNANT NEOPLASM OF UPPER-OUTER QUADRANT OF LEFT BREAST IN FEMALE, ESTROGEN RECEPTOR NEGATIVE: ICD-10-CM

## 2021-07-30 ENCOUNTER — PATIENT MESSAGE (OUTPATIENT)
Dept: HEMATOLOGY/ONCOLOGY | Facility: CLINIC | Age: 41
End: 2021-07-30

## 2021-07-30 ENCOUNTER — TELEPHONE (OUTPATIENT)
Dept: INFUSION THERAPY | Facility: HOSPITAL | Age: 41
End: 2021-07-30

## 2021-08-01 ENCOUNTER — PATIENT MESSAGE (OUTPATIENT)
Dept: HEMATOLOGY/ONCOLOGY | Facility: CLINIC | Age: 41
End: 2021-08-01

## 2021-08-03 ENCOUNTER — TELEPHONE (OUTPATIENT)
Dept: HEMATOLOGY/ONCOLOGY | Facility: CLINIC | Age: 41
End: 2021-08-03

## 2021-08-03 ENCOUNTER — OFFICE VISIT (OUTPATIENT)
Dept: DERMATOLOGY | Facility: CLINIC | Age: 41
End: 2021-08-03
Payer: COMMERCIAL

## 2021-08-03 VITALS — WEIGHT: 150 LBS | HEIGHT: 67 IN | BODY MASS INDEX: 23.54 KG/M2

## 2021-08-03 DIAGNOSIS — L65.9 ALOPECIA: ICD-10-CM

## 2021-08-03 DIAGNOSIS — L82.1 SEBORRHEIC KERATOSES: ICD-10-CM

## 2021-08-03 DIAGNOSIS — L91.8 CUTANEOUS SKIN TAGS: Primary | ICD-10-CM

## 2021-08-03 DIAGNOSIS — L81.4 LENTIGINES: ICD-10-CM

## 2021-08-03 DIAGNOSIS — D22.9 MULTIPLE BENIGN NEVI: ICD-10-CM

## 2021-08-03 PROCEDURE — 3008F PR BODY MASS INDEX (BMI) DOCUMENTED: ICD-10-PCS | Mod: CPTII,S$GLB,, | Performed by: DERMATOLOGY

## 2021-08-03 PROCEDURE — 99203 PR OFFICE/OUTPT VISIT, NEW, LEVL III, 30-44 MIN: ICD-10-PCS | Mod: S$GLB,,, | Performed by: DERMATOLOGY

## 2021-08-03 PROCEDURE — 1126F AMNT PAIN NOTED NONE PRSNT: CPT | Mod: CPTII,S$GLB,, | Performed by: DERMATOLOGY

## 2021-08-03 PROCEDURE — 3008F BODY MASS INDEX DOCD: CPT | Mod: CPTII,S$GLB,, | Performed by: DERMATOLOGY

## 2021-08-03 PROCEDURE — 99999 PR PBB SHADOW E&M-EST. PATIENT-LVL III: ICD-10-PCS | Mod: PBBFAC,,, | Performed by: DERMATOLOGY

## 2021-08-03 PROCEDURE — 1159F PR MEDICATION LIST DOCUMENTED IN MEDICAL RECORD: ICD-10-PCS | Mod: CPTII,S$GLB,, | Performed by: DERMATOLOGY

## 2021-08-03 PROCEDURE — 3044F PR MOST RECENT HEMOGLOBIN A1C LEVEL <7.0%: ICD-10-PCS | Mod: CPTII,S$GLB,, | Performed by: DERMATOLOGY

## 2021-08-03 PROCEDURE — 1159F MED LIST DOCD IN RCRD: CPT | Mod: CPTII,S$GLB,, | Performed by: DERMATOLOGY

## 2021-08-03 PROCEDURE — 1126F PR PAIN SEVERITY QUANTIFIED, NO PAIN PRESENT: ICD-10-PCS | Mod: CPTII,S$GLB,, | Performed by: DERMATOLOGY

## 2021-08-03 PROCEDURE — 3044F HG A1C LEVEL LT 7.0%: CPT | Mod: CPTII,S$GLB,, | Performed by: DERMATOLOGY

## 2021-08-03 PROCEDURE — 99999 PR PBB SHADOW E&M-EST. PATIENT-LVL III: CPT | Mod: PBBFAC,,, | Performed by: DERMATOLOGY

## 2021-08-03 PROCEDURE — 99203 OFFICE O/P NEW LOW 30 MIN: CPT | Mod: S$GLB,,, | Performed by: DERMATOLOGY

## 2021-08-05 ENCOUNTER — PATIENT MESSAGE (OUTPATIENT)
Dept: HEMATOLOGY/ONCOLOGY | Facility: CLINIC | Age: 41
End: 2021-08-05

## 2021-08-06 ENCOUNTER — PATIENT MESSAGE (OUTPATIENT)
Dept: HEMATOLOGY/ONCOLOGY | Facility: CLINIC | Age: 41
End: 2021-08-06

## 2021-08-08 ENCOUNTER — PATIENT MESSAGE (OUTPATIENT)
Dept: HEMATOLOGY/ONCOLOGY | Facility: CLINIC | Age: 41
End: 2021-08-08

## 2021-08-09 ENCOUNTER — PATIENT MESSAGE (OUTPATIENT)
Dept: HEMATOLOGY/ONCOLOGY | Facility: CLINIC | Age: 41
End: 2021-08-09

## 2021-08-09 ENCOUNTER — TELEPHONE (OUTPATIENT)
Dept: HEMATOLOGY/ONCOLOGY | Facility: CLINIC | Age: 41
End: 2021-08-09

## 2021-08-10 ENCOUNTER — TELEPHONE (OUTPATIENT)
Dept: HEMATOLOGY/ONCOLOGY | Facility: CLINIC | Age: 41
End: 2021-08-10

## 2021-08-10 ENCOUNTER — PATIENT MESSAGE (OUTPATIENT)
Dept: HEMATOLOGY/ONCOLOGY | Facility: CLINIC | Age: 41
End: 2021-08-10

## 2021-08-13 ENCOUNTER — HOSPITAL ENCOUNTER (INPATIENT)
Facility: HOSPITAL | Age: 41
LOS: 4 days | Discharge: HOME | DRG: 392 | End: 2021-08-17
Attending: INTERNAL MEDICINE | Admitting: INTERNAL MEDICINE
Payer: COMMERCIAL

## 2021-08-13 ENCOUNTER — PATIENT MESSAGE (OUTPATIENT)
Dept: HEMATOLOGY/ONCOLOGY | Facility: CLINIC | Age: 41
End: 2021-08-13

## 2021-08-13 ENCOUNTER — TELEPHONE (OUTPATIENT)
Dept: HEMATOLOGY/ONCOLOGY | Facility: CLINIC | Age: 41
End: 2021-08-13

## 2021-08-13 DIAGNOSIS — K52.3 COLITIS, INDETERMINATE: ICD-10-CM

## 2021-08-13 DIAGNOSIS — R07.9 CHEST PAIN: ICD-10-CM

## 2021-08-13 DIAGNOSIS — T45.1X5A CHEMOTHERAPY INDUCED NAUSEA AND VOMITING: ICD-10-CM

## 2021-08-13 DIAGNOSIS — R11.2 CHEMOTHERAPY INDUCED NAUSEA AND VOMITING: ICD-10-CM

## 2021-08-13 DIAGNOSIS — Z91.89 AT RISK FOR PROLONGED QT INTERVAL SYNDROME: ICD-10-CM

## 2021-08-13 DIAGNOSIS — R19.7 DIARRHEA, UNSPECIFIED TYPE: Primary | ICD-10-CM

## 2021-08-13 DIAGNOSIS — R79.89 ELEVATED LFTS: ICD-10-CM

## 2021-08-13 DIAGNOSIS — R11.2 INTRACTABLE VOMITING WITH NAUSEA, UNSPECIFIED VOMITING TYPE: ICD-10-CM

## 2021-08-13 DIAGNOSIS — E86.0 DEHYDRATION: ICD-10-CM

## 2021-08-13 LAB
ALBUMIN SERPL BCP-MCNC: 2.8 G/DL (ref 3.5–5.2)
ALP SERPL-CCNC: 50 U/L (ref 55–135)
ALT SERPL W/O P-5'-P-CCNC: 68 U/L (ref 10–44)
ANION GAP SERPL CALC-SCNC: 9 MMOL/L (ref 8–16)
ANISOCYTOSIS BLD QL SMEAR: SLIGHT
AST SERPL-CCNC: 125 U/L (ref 10–40)
BASOPHILS # BLD AUTO: 0.01 K/UL (ref 0–0.2)
BASOPHILS NFR BLD: 0.4 % (ref 0–1.9)
BILIRUB SERPL-MCNC: 0.6 MG/DL (ref 0.1–1)
BUN SERPL-MCNC: <3 MG/DL (ref 6–20)
CALCIUM SERPL-MCNC: 8.2 MG/DL (ref 8.7–10.5)
CHLORIDE SERPL-SCNC: 104 MMOL/L (ref 95–110)
CO2 SERPL-SCNC: 23 MMOL/L (ref 23–29)
CREAT SERPL-MCNC: 0.4 MG/DL (ref 0.5–1.4)
DIFFERENTIAL METHOD: ABNORMAL
EOSINOPHIL # BLD AUTO: 0.4 K/UL (ref 0–0.5)
EOSINOPHIL NFR BLD: 14.4 % (ref 0–8)
ERYTHROCYTE [DISTWIDTH] IN BLOOD BY AUTOMATED COUNT: 13.7 % (ref 11.5–14.5)
EST. GFR  (AFRICAN AMERICAN): >60 ML/MIN/1.73 M^2
EST. GFR  (NON AFRICAN AMERICAN): >60 ML/MIN/1.73 M^2
GLUCOSE SERPL-MCNC: 88 MG/DL (ref 70–110)
HCT VFR BLD AUTO: 27.3 % (ref 37–48.5)
HGB BLD-MCNC: 9.3 G/DL (ref 12–16)
IMM GRANULOCYTES # BLD AUTO: 0.01 K/UL (ref 0–0.04)
IMM GRANULOCYTES NFR BLD AUTO: 0.4 % (ref 0–0.5)
LACTATE SERPL-SCNC: 0.9 MMOL/L (ref 0.5–2.2)
LYMPHOCYTES # BLD AUTO: 1.1 K/UL (ref 1–4.8)
LYMPHOCYTES NFR BLD: 39.9 % (ref 18–48)
MAGNESIUM SERPL-MCNC: 1.4 MG/DL (ref 1.6–2.6)
MCH RBC QN AUTO: 31.5 PG (ref 27–31)
MCHC RBC AUTO-ENTMCNC: 34.1 G/DL (ref 32–36)
MCV RBC AUTO: 93 FL (ref 82–98)
MONOCYTES # BLD AUTO: 0.4 K/UL (ref 0.3–1)
MONOCYTES NFR BLD: 15.9 % (ref 4–15)
NEUTROPHILS # BLD AUTO: 0.8 K/UL (ref 1.8–7.7)
NEUTROPHILS NFR BLD: 29 % (ref 38–73)
NRBC BLD-RTO: 0 /100 WBC
OVALOCYTES BLD QL SMEAR: ABNORMAL
PHOSPHATE SERPL-MCNC: 4.7 MG/DL (ref 2.7–4.5)
PLATELET # BLD AUTO: 186 K/UL (ref 150–450)
PLATELET BLD QL SMEAR: ABNORMAL
PMV BLD AUTO: 9.6 FL (ref 9.2–12.9)
POIKILOCYTOSIS BLD QL SMEAR: SLIGHT
POLYCHROMASIA BLD QL SMEAR: ABNORMAL
POTASSIUM SERPL-SCNC: 3.1 MMOL/L (ref 3.5–5.1)
PROT SERPL-MCNC: 4.6 G/DL (ref 6–8.4)
RBC # BLD AUTO: 2.95 M/UL (ref 4–5.4)
SCHISTOCYTES BLD QL SMEAR: ABNORMAL
SODIUM SERPL-SCNC: 136 MMOL/L (ref 136–145)
WBC # BLD AUTO: 2.71 K/UL (ref 3.9–12.7)

## 2021-08-13 PROCEDURE — 80053 COMPREHEN METABOLIC PANEL: CPT | Performed by: STUDENT IN AN ORGANIZED HEALTH CARE EDUCATION/TRAINING PROGRAM

## 2021-08-13 PROCEDURE — 93010 ELECTROCARDIOGRAM REPORT: CPT | Mod: ,,, | Performed by: INTERNAL MEDICINE

## 2021-08-13 PROCEDURE — 93010 EKG 12-LEAD: ICD-10-PCS | Mod: ,,, | Performed by: INTERNAL MEDICINE

## 2021-08-13 PROCEDURE — 83735 ASSAY OF MAGNESIUM: CPT | Performed by: STUDENT IN AN ORGANIZED HEALTH CARE EDUCATION/TRAINING PROGRAM

## 2021-08-13 PROCEDURE — 25000003 PHARM REV CODE 250: Performed by: STUDENT IN AN ORGANIZED HEALTH CARE EDUCATION/TRAINING PROGRAM

## 2021-08-13 PROCEDURE — 86900 BLOOD TYPING SEROLOGIC ABO: CPT | Performed by: STUDENT IN AN ORGANIZED HEALTH CARE EDUCATION/TRAINING PROGRAM

## 2021-08-13 PROCEDURE — 84100 ASSAY OF PHOSPHORUS: CPT | Performed by: STUDENT IN AN ORGANIZED HEALTH CARE EDUCATION/TRAINING PROGRAM

## 2021-08-13 PROCEDURE — 25000003 PHARM REV CODE 250: Performed by: INTERNAL MEDICINE

## 2021-08-13 PROCEDURE — 93005 ELECTROCARDIOGRAM TRACING: CPT

## 2021-08-13 PROCEDURE — 85025 COMPLETE CBC W/AUTO DIFF WBC: CPT | Performed by: STUDENT IN AN ORGANIZED HEALTH CARE EDUCATION/TRAINING PROGRAM

## 2021-08-13 PROCEDURE — 83605 ASSAY OF LACTIC ACID: CPT | Performed by: STUDENT IN AN ORGANIZED HEALTH CARE EDUCATION/TRAINING PROGRAM

## 2021-08-13 PROCEDURE — 27000207 HC ISOLATION

## 2021-08-13 PROCEDURE — 63600175 PHARM REV CODE 636 W HCPCS: Performed by: STUDENT IN AN ORGANIZED HEALTH CARE EDUCATION/TRAINING PROGRAM

## 2021-08-13 PROCEDURE — 20600001 HC STEP DOWN PRIVATE ROOM

## 2021-08-13 RX ORDER — LORAZEPAM 0.5 MG/1
0.5 TABLET ORAL EVERY 8 HOURS PRN
Status: DISCONTINUED | OUTPATIENT
Start: 2021-08-13 | End: 2021-08-14

## 2021-08-13 RX ORDER — MAGNESIUM SULFATE HEPTAHYDRATE 40 MG/ML
2 INJECTION, SOLUTION INTRAVENOUS ONCE
Status: COMPLETED | OUTPATIENT
Start: 2021-08-13 | End: 2021-08-14

## 2021-08-13 RX ORDER — DEXTROSE MONOHYDRATE, SODIUM CHLORIDE, AND POTASSIUM CHLORIDE 50; 2.98; 4.5 G/1000ML; G/1000ML; G/1000ML
INJECTION, SOLUTION INTRAVENOUS CONTINUOUS
Status: DISCONTINUED | OUTPATIENT
Start: 2021-08-13 | End: 2021-08-14

## 2021-08-13 RX ORDER — ONDANSETRON 8 MG/1
8 TABLET, ORALLY DISINTEGRATING ORAL EVERY 8 HOURS
Status: DISCONTINUED | OUTPATIENT
Start: 2021-08-13 | End: 2021-08-14

## 2021-08-13 RX ORDER — SODIUM CHLORIDE 0.9 % (FLUSH) 0.9 %
10 SYRINGE (ML) INJECTION
Status: DISCONTINUED | OUTPATIENT
Start: 2021-08-13 | End: 2021-08-17 | Stop reason: HOSPADM

## 2021-08-13 RX ORDER — ONDANSETRON 8 MG/1
8 TABLET, ORALLY DISINTEGRATING ORAL EVERY 8 HOURS PRN
Status: DISCONTINUED | OUTPATIENT
Start: 2021-08-13 | End: 2021-08-13

## 2021-08-13 RX ORDER — ONDANSETRON 4 MG/1
4 TABLET, ORALLY DISINTEGRATING ORAL EVERY 8 HOURS
Status: DISCONTINUED | OUTPATIENT
Start: 2021-08-13 | End: 2021-08-13

## 2021-08-13 RX ORDER — IBUPROFEN 200 MG
24 TABLET ORAL
Status: DISCONTINUED | OUTPATIENT
Start: 2021-08-13 | End: 2021-08-17 | Stop reason: HOSPADM

## 2021-08-13 RX ORDER — SODIUM CHLORIDE 9 MG/ML
INJECTION, SOLUTION INTRAVENOUS
Status: DISCONTINUED | OUTPATIENT
Start: 2021-08-13 | End: 2021-08-17 | Stop reason: HOSPADM

## 2021-08-13 RX ORDER — IBUPROFEN 200 MG
16 TABLET ORAL
Status: DISCONTINUED | OUTPATIENT
Start: 2021-08-13 | End: 2021-08-17 | Stop reason: HOSPADM

## 2021-08-13 RX ORDER — TALC
6 POWDER (GRAM) TOPICAL NIGHTLY
Status: DISCONTINUED | OUTPATIENT
Start: 2021-08-13 | End: 2021-08-17 | Stop reason: HOSPADM

## 2021-08-13 RX ORDER — GLUCAGON 1 MG
1 KIT INJECTION
Status: DISCONTINUED | OUTPATIENT
Start: 2021-08-13 | End: 2021-08-17 | Stop reason: HOSPADM

## 2021-08-13 RX ORDER — PROCHLORPERAZINE EDISYLATE 5 MG/ML
5 INJECTION INTRAMUSCULAR; INTRAVENOUS EVERY 6 HOURS PRN
Status: DISCONTINUED | OUTPATIENT
Start: 2021-08-13 | End: 2021-08-16

## 2021-08-13 RX ORDER — POTASSIUM CHLORIDE 29.8 MG/ML
40 INJECTION INTRAVENOUS ONCE
Status: DISCONTINUED | OUTPATIENT
Start: 2021-08-13 | End: 2021-08-13

## 2021-08-13 RX ORDER — HEPARIN SODIUM 5000 [USP'U]/ML
5000 INJECTION, SOLUTION INTRAVENOUS; SUBCUTANEOUS EVERY 8 HOURS
Status: DISCONTINUED | OUTPATIENT
Start: 2021-08-13 | End: 2021-08-15

## 2021-08-13 RX ORDER — OLANZAPINE 2.5 MG/1
5 TABLET ORAL NIGHTLY PRN
Status: DISCONTINUED | OUTPATIENT
Start: 2021-08-13 | End: 2021-08-13

## 2021-08-13 RX ORDER — POTASSIUM CHLORIDE 7.45 MG/ML
10 INJECTION INTRAVENOUS
Status: COMPLETED | OUTPATIENT
Start: 2021-08-13 | End: 2021-08-14

## 2021-08-13 RX ORDER — TALC
6 POWDER (GRAM) TOPICAL NIGHTLY PRN
Status: DISCONTINUED | OUTPATIENT
Start: 2021-08-13 | End: 2021-08-13

## 2021-08-13 RX ORDER — SCOLOPAMINE TRANSDERMAL SYSTEM 1 MG/1
1 PATCH, EXTENDED RELEASE TRANSDERMAL
Status: DISCONTINUED | OUTPATIENT
Start: 2021-08-13 | End: 2021-08-14

## 2021-08-13 RX ORDER — PROCHLORPERAZINE EDISYLATE 5 MG/ML
5 INJECTION INTRAMUSCULAR; INTRAVENOUS EVERY 6 HOURS PRN
Status: DISCONTINUED | OUTPATIENT
Start: 2021-08-13 | End: 2021-08-13

## 2021-08-13 RX ADMIN — SODIUM CHLORIDE: 0.9 INJECTION, SOLUTION INTRAVENOUS at 11:08

## 2021-08-13 RX ADMIN — POTASSIUM CHLORIDE 10 MEQ: 10 INJECTION, SOLUTION INTRAVENOUS at 11:08

## 2021-08-13 RX ADMIN — MELATONIN TAB 3 MG 6 MG: 3 TAB at 11:08

## 2021-08-13 RX ADMIN — MAGNESIUM SULFATE 2 G: 2 INJECTION INTRAVENOUS at 11:08

## 2021-08-13 RX ADMIN — SCOPOLAMINE 1 PATCH: 1.5 PATCH, EXTENDED RELEASE TRANSDERMAL at 11:08

## 2021-08-13 RX ADMIN — HEPARIN SODIUM 5000 UNITS: 5000 INJECTION INTRAVENOUS; SUBCUTANEOUS at 09:08

## 2021-08-13 RX ADMIN — ONDANSETRON 8 MG: 8 TABLET, ORALLY DISINTEGRATING ORAL at 11:08

## 2021-08-14 PROBLEM — E83.42 HYPOMAGNESEMIA: Status: ACTIVE | Noted: 2021-08-14

## 2021-08-14 PROBLEM — R94.31: Status: ACTIVE | Noted: 2021-08-14

## 2021-08-14 PROBLEM — R11.2 NAUSEA AND VOMITING: Status: ACTIVE | Noted: 2021-03-11

## 2021-08-14 PROBLEM — K52.3 COLITIS, INDETERMINATE: Status: ACTIVE | Noted: 2021-08-14

## 2021-08-14 PROBLEM — R79.89 ELEVATED LFTS: Status: ACTIVE | Noted: 2021-08-14

## 2021-08-14 PROBLEM — E87.6 HYPOKALEMIA: Status: ACTIVE | Noted: 2021-08-14

## 2021-08-14 LAB
ABO + RH BLD: NORMAL
ALBUMIN SERPL BCP-MCNC: 2.5 G/DL (ref 3.5–5.2)
ALP SERPL-CCNC: 50 U/L (ref 55–135)
ALT SERPL W/O P-5'-P-CCNC: 66 U/L (ref 10–44)
ANION GAP SERPL CALC-SCNC: 6 MMOL/L (ref 8–16)
AST SERPL-CCNC: 112 U/L (ref 10–40)
BASOPHILS # BLD AUTO: 0.01 K/UL (ref 0–0.2)
BASOPHILS NFR BLD: 0.4 % (ref 0–1.9)
BILIRUB SERPL-MCNC: 0.5 MG/DL (ref 0.1–1)
BLD GP AB SCN CELLS X3 SERPL QL: NORMAL
BUN SERPL-MCNC: <3 MG/DL (ref 6–20)
CALCIUM SERPL-MCNC: 8.5 MG/DL (ref 8.7–10.5)
CHLORIDE SERPL-SCNC: 106 MMOL/L (ref 95–110)
CO2 SERPL-SCNC: 24 MMOL/L (ref 23–29)
CREAT SERPL-MCNC: 0.5 MG/DL (ref 0.5–1.4)
DIFFERENTIAL METHOD: ABNORMAL
EOSINOPHIL # BLD AUTO: 0.5 K/UL (ref 0–0.5)
EOSINOPHIL NFR BLD: 18 % (ref 0–8)
ERYTHROCYTE [DISTWIDTH] IN BLOOD BY AUTOMATED COUNT: 13.8 % (ref 11.5–14.5)
EST. GFR  (AFRICAN AMERICAN): >60 ML/MIN/1.73 M^2
EST. GFR  (NON AFRICAN AMERICAN): >60 ML/MIN/1.73 M^2
GLUCOSE SERPL-MCNC: 98 MG/DL (ref 70–110)
HCT VFR BLD AUTO: 26.6 % (ref 37–48.5)
HGB BLD-MCNC: 9 G/DL (ref 12–16)
IMM GRANULOCYTES # BLD AUTO: 0.01 K/UL (ref 0–0.04)
IMM GRANULOCYTES NFR BLD AUTO: 0.4 % (ref 0–0.5)
LYMPHOCYTES # BLD AUTO: 1 K/UL (ref 1–4.8)
LYMPHOCYTES NFR BLD: 36.1 % (ref 18–48)
MAGNESIUM SERPL-MCNC: 1.7 MG/DL (ref 1.6–2.6)
MCH RBC QN AUTO: 31 PG (ref 27–31)
MCHC RBC AUTO-ENTMCNC: 33.8 G/DL (ref 32–36)
MCV RBC AUTO: 92 FL (ref 82–98)
MONOCYTES # BLD AUTO: 0.5 K/UL (ref 0.3–1)
MONOCYTES NFR BLD: 18.4 % (ref 4–15)
NEUTROPHILS # BLD AUTO: 0.7 K/UL (ref 1.8–7.7)
NEUTROPHILS NFR BLD: 26.7 % (ref 38–73)
NRBC BLD-RTO: 0 /100 WBC
PHOSPHATE SERPL-MCNC: 4.4 MG/DL (ref 2.7–4.5)
PLATELET # BLD AUTO: 173 K/UL (ref 150–450)
PMV BLD AUTO: 9.4 FL (ref 9.2–12.9)
POTASSIUM SERPL-SCNC: 3.9 MMOL/L (ref 3.5–5.1)
PROT SERPL-MCNC: 4.3 G/DL (ref 6–8.4)
RBC # BLD AUTO: 2.9 M/UL (ref 4–5.4)
SODIUM SERPL-SCNC: 136 MMOL/L (ref 136–145)
TROPONIN I SERPL DL<=0.01 NG/ML-MCNC: 0.12 NG/ML (ref 0–0.03)
TROPONIN I SERPL DL<=0.01 NG/ML-MCNC: 0.13 NG/ML (ref 0–0.03)
TSH SERPL DL<=0.005 MIU/L-ACNC: 2.6 UIU/ML (ref 0.4–4)
WBC # BLD AUTO: 2.66 K/UL (ref 3.9–12.7)

## 2021-08-14 PROCEDURE — 93010 EKG 12-LEAD: ICD-10-PCS | Mod: ,,, | Performed by: INTERNAL MEDICINE

## 2021-08-14 PROCEDURE — 86704 HEP B CORE ANTIBODY TOTAL: CPT | Performed by: INTERNAL MEDICINE

## 2021-08-14 PROCEDURE — 83735 ASSAY OF MAGNESIUM: CPT | Performed by: STUDENT IN AN ORGANIZED HEALTH CARE EDUCATION/TRAINING PROGRAM

## 2021-08-14 PROCEDURE — 84100 ASSAY OF PHOSPHORUS: CPT | Performed by: STUDENT IN AN ORGANIZED HEALTH CARE EDUCATION/TRAINING PROGRAM

## 2021-08-14 PROCEDURE — 87340 HEPATITIS B SURFACE AG IA: CPT | Performed by: INTERNAL MEDICINE

## 2021-08-14 PROCEDURE — 99223 1ST HOSP IP/OBS HIGH 75: CPT | Mod: ,,, | Performed by: INTERNAL MEDICINE

## 2021-08-14 PROCEDURE — 25000003 PHARM REV CODE 250: Performed by: STUDENT IN AN ORGANIZED HEALTH CARE EDUCATION/TRAINING PROGRAM

## 2021-08-14 PROCEDURE — 84443 ASSAY THYROID STIM HORMONE: CPT

## 2021-08-14 PROCEDURE — 85025 COMPLETE CBC W/AUTO DIFF WBC: CPT | Performed by: STUDENT IN AN ORGANIZED HEALTH CARE EDUCATION/TRAINING PROGRAM

## 2021-08-14 PROCEDURE — 20600001 HC STEP DOWN PRIVATE ROOM

## 2021-08-14 PROCEDURE — 97530 THERAPEUTIC ACTIVITIES: CPT

## 2021-08-14 PROCEDURE — 84484 ASSAY OF TROPONIN QUANT: CPT | Performed by: STUDENT IN AN ORGANIZED HEALTH CARE EDUCATION/TRAINING PROGRAM

## 2021-08-14 PROCEDURE — 99223 PR INITIAL HOSPITAL CARE,LEVL III: ICD-10-PCS | Mod: ,,, | Performed by: INTERNAL MEDICINE

## 2021-08-14 PROCEDURE — 97165 OT EVAL LOW COMPLEX 30 MIN: CPT

## 2021-08-14 PROCEDURE — 86706 HEP B SURFACE ANTIBODY: CPT | Performed by: INTERNAL MEDICINE

## 2021-08-14 PROCEDURE — 63600175 PHARM REV CODE 636 W HCPCS

## 2021-08-14 PROCEDURE — 93005 ELECTROCARDIOGRAM TRACING: CPT

## 2021-08-14 PROCEDURE — 86803 HEPATITIS C AB TEST: CPT | Performed by: INTERNAL MEDICINE

## 2021-08-14 PROCEDURE — 93010 ELECTROCARDIOGRAM REPORT: CPT | Mod: ,,, | Performed by: INTERNAL MEDICINE

## 2021-08-14 PROCEDURE — 80053 COMPREHEN METABOLIC PANEL: CPT | Performed by: STUDENT IN AN ORGANIZED HEALTH CARE EDUCATION/TRAINING PROGRAM

## 2021-08-14 PROCEDURE — 80074 ACUTE HEPATITIS PANEL: CPT | Performed by: STUDENT IN AN ORGANIZED HEALTH CARE EDUCATION/TRAINING PROGRAM

## 2021-08-14 PROCEDURE — 25000003 PHARM REV CODE 250

## 2021-08-14 PROCEDURE — 97161 PT EVAL LOW COMPLEX 20 MIN: CPT

## 2021-08-14 PROCEDURE — 27000207 HC ISOLATION

## 2021-08-14 PROCEDURE — 63600175 PHARM REV CODE 636 W HCPCS: Performed by: STUDENT IN AN ORGANIZED HEALTH CARE EDUCATION/TRAINING PROGRAM

## 2021-08-14 RX ORDER — DEXAMETHASONE SODIUM PHOSPHATE 4 MG/ML
4 INJECTION, SOLUTION INTRA-ARTICULAR; INTRALESIONAL; INTRAMUSCULAR; INTRAVENOUS; SOFT TISSUE DAILY
Status: DISCONTINUED | OUTPATIENT
Start: 2021-08-15 | End: 2021-08-15

## 2021-08-14 RX ORDER — ONDANSETRON 2 MG/ML
8 INJECTION INTRAMUSCULAR; INTRAVENOUS EVERY 8 HOURS PRN
Status: DISCONTINUED | OUTPATIENT
Start: 2021-08-14 | End: 2021-08-14

## 2021-08-14 RX ORDER — OLANZAPINE 5 MG/1
5 TABLET ORAL DAILY
Status: DISCONTINUED | OUTPATIENT
Start: 2021-08-14 | End: 2021-08-15

## 2021-08-14 RX ORDER — ONDANSETRON 2 MG/ML
8 INJECTION INTRAMUSCULAR; INTRAVENOUS EVERY 8 HOURS
Status: DISCONTINUED | OUTPATIENT
Start: 2021-08-14 | End: 2021-08-16

## 2021-08-14 RX ORDER — DEXAMETHASONE SODIUM PHOSPHATE 4 MG/ML
4 INJECTION, SOLUTION INTRA-ARTICULAR; INTRALESIONAL; INTRAMUSCULAR; INTRAVENOUS; SOFT TISSUE ONCE
Status: COMPLETED | OUTPATIENT
Start: 2021-08-14 | End: 2021-08-14

## 2021-08-14 RX ORDER — DEXTROSE MONOHYDRATE, SODIUM CHLORIDE, AND POTASSIUM CHLORIDE 50; 2.98; 4.5 G/1000ML; G/1000ML; G/1000ML
INJECTION, SOLUTION INTRAVENOUS CONTINUOUS
Status: DISCONTINUED | OUTPATIENT
Start: 2021-08-14 | End: 2021-08-14

## 2021-08-14 RX ADMIN — OLANZAPINE 5 MG: 5 TABLET, FILM COATED ORAL at 09:08

## 2021-08-14 RX ADMIN — ONDANSETRON 8 MG: 2 INJECTION INTRAMUSCULAR; INTRAVENOUS at 01:08

## 2021-08-14 RX ADMIN — DEXAMETHASONE SODIUM PHOSPHATE 4 MG: 4 INJECTION INTRA-ARTICULAR; INTRALESIONAL; INTRAMUSCULAR; INTRAVENOUS; SOFT TISSUE at 12:08

## 2021-08-14 RX ADMIN — HEPARIN SODIUM 5000 UNITS: 5000 INJECTION INTRAVENOUS; SUBCUTANEOUS at 09:08

## 2021-08-14 RX ADMIN — POTASSIUM CHLORIDE 10 MEQ: 10 INJECTION, SOLUTION INTRAVENOUS at 01:08

## 2021-08-14 RX ADMIN — ONDANSETRON 8 MG: 2 INJECTION INTRAMUSCULAR; INTRAVENOUS at 09:08

## 2021-08-14 RX ADMIN — DEXTROSE MONOHYDRATE, SODIUM CHLORIDE, AND POTASSIUM CHLORIDE: 50; 4.5; 2.98 INJECTION, SOLUTION INTRAVENOUS at 10:08

## 2021-08-14 RX ADMIN — POTASSIUM CHLORIDE 10 MEQ: 10 INJECTION, SOLUTION INTRAVENOUS at 02:08

## 2021-08-14 RX ADMIN — ONDANSETRON 8 MG: 8 TABLET, ORALLY DISINTEGRATING ORAL at 05:08

## 2021-08-14 RX ADMIN — SODIUM CHLORIDE, SODIUM LACTATE, POTASSIUM CHLORIDE, AND CALCIUM CHLORIDE 1000 ML: .6; .31; .03; .02 INJECTION, SOLUTION INTRAVENOUS at 12:08

## 2021-08-14 RX ADMIN — HEPARIN SODIUM 5000 UNITS: 5000 INJECTION INTRAVENOUS; SUBCUTANEOUS at 01:08

## 2021-08-14 RX ADMIN — PROCHLORPERAZINE EDISYLATE 5 MG: 5 INJECTION INTRAMUSCULAR; INTRAVENOUS at 12:08

## 2021-08-14 RX ADMIN — MELATONIN TAB 3 MG 6 MG: 3 TAB at 09:08

## 2021-08-14 RX ADMIN — HEPARIN SODIUM 5000 UNITS: 5000 INJECTION INTRAVENOUS; SUBCUTANEOUS at 05:08

## 2021-08-14 RX ADMIN — POTASSIUM CHLORIDE 10 MEQ: 10 INJECTION, SOLUTION INTRAVENOUS at 12:08

## 2021-08-14 RX ADMIN — DEXTROSE MONOHYDRATE, SODIUM CHLORIDE, AND POTASSIUM CHLORIDE: 50; 4.5; 2.98 INJECTION, SOLUTION INTRAVENOUS at 12:08

## 2021-08-15 ENCOUNTER — ANESTHESIA EVENT (OUTPATIENT)
Dept: ENDOSCOPY | Facility: HOSPITAL | Age: 41
DRG: 392 | End: 2021-08-15
Payer: COMMERCIAL

## 2021-08-15 LAB
ALBUMIN SERPL BCP-MCNC: 2.9 G/DL (ref 3.5–5.2)
ALP SERPL-CCNC: 58 U/L (ref 55–135)
ALT SERPL W/O P-5'-P-CCNC: 100 U/L (ref 10–44)
ANION GAP SERPL CALC-SCNC: 7 MMOL/L (ref 8–16)
AST SERPL-CCNC: 137 U/L (ref 10–40)
BASOPHILS # BLD AUTO: 0 K/UL (ref 0–0.2)
BASOPHILS NFR BLD: 0 % (ref 0–1.9)
BILIRUB SERPL-MCNC: 0.5 MG/DL (ref 0.1–1)
BUN SERPL-MCNC: <3 MG/DL (ref 6–20)
CALCIUM SERPL-MCNC: 8.7 MG/DL (ref 8.7–10.5)
CHLORIDE SERPL-SCNC: 108 MMOL/L (ref 95–110)
CO2 SERPL-SCNC: 25 MMOL/L (ref 23–29)
CORTIS SERPL-MCNC: <1 UG/DL (ref 4.3–22.4)
CREAT SERPL-MCNC: 0.5 MG/DL (ref 0.5–1.4)
DIFFERENTIAL METHOD: ABNORMAL
EOSINOPHIL # BLD AUTO: 0 K/UL (ref 0–0.5)
EOSINOPHIL NFR BLD: 0 % (ref 0–8)
ERYTHROCYTE [DISTWIDTH] IN BLOOD BY AUTOMATED COUNT: 13.7 % (ref 11.5–14.5)
EST. GFR  (AFRICAN AMERICAN): >60 ML/MIN/1.73 M^2
EST. GFR  (NON AFRICAN AMERICAN): >60 ML/MIN/1.73 M^2
GLUCOSE SERPL-MCNC: 117 MG/DL (ref 70–110)
HCT VFR BLD AUTO: 28.8 % (ref 37–48.5)
HGB BLD-MCNC: 9.7 G/DL (ref 12–16)
IMM GRANULOCYTES # BLD AUTO: 0.01 K/UL (ref 0–0.04)
IMM GRANULOCYTES NFR BLD AUTO: 0.4 % (ref 0–0.5)
LYMPHOCYTES # BLD AUTO: 0.8 K/UL (ref 1–4.8)
LYMPHOCYTES NFR BLD: 29.7 % (ref 18–48)
MAGNESIUM SERPL-MCNC: 1.5 MG/DL (ref 1.6–2.6)
MCH RBC QN AUTO: 31.7 PG (ref 27–31)
MCHC RBC AUTO-ENTMCNC: 33.7 G/DL (ref 32–36)
MCV RBC AUTO: 94 FL (ref 82–98)
MONOCYTES # BLD AUTO: 0.3 K/UL (ref 0.3–1)
MONOCYTES NFR BLD: 10 % (ref 4–15)
NEUTROPHILS # BLD AUTO: 1.7 K/UL (ref 1.8–7.7)
NEUTROPHILS NFR BLD: 59.9 % (ref 38–73)
NRBC BLD-RTO: 0 /100 WBC
PHOSPHATE SERPL-MCNC: 3.6 MG/DL (ref 2.7–4.5)
PLATELET # BLD AUTO: 244 K/UL (ref 150–450)
PMV BLD AUTO: 9.8 FL (ref 9.2–12.9)
POTASSIUM SERPL-SCNC: 4 MMOL/L (ref 3.5–5.1)
PROT SERPL-MCNC: 5 G/DL (ref 6–8.4)
RBC # BLD AUTO: 3.06 M/UL (ref 4–5.4)
SARS-COV-2 RDRP RESP QL NAA+PROBE: NEGATIVE
SODIUM SERPL-SCNC: 140 MMOL/L (ref 136–145)
WBC # BLD AUTO: 2.79 K/UL (ref 3.9–12.7)

## 2021-08-15 PROCEDURE — 25000003 PHARM REV CODE 250: Performed by: STUDENT IN AN ORGANIZED HEALTH CARE EDUCATION/TRAINING PROGRAM

## 2021-08-15 PROCEDURE — 85025 COMPLETE CBC W/AUTO DIFF WBC: CPT | Performed by: STUDENT IN AN ORGANIZED HEALTH CARE EDUCATION/TRAINING PROGRAM

## 2021-08-15 PROCEDURE — 25000003 PHARM REV CODE 250

## 2021-08-15 PROCEDURE — 93010 ELECTROCARDIOGRAM REPORT: CPT | Mod: ,,, | Performed by: INTERNAL MEDICINE

## 2021-08-15 PROCEDURE — 99233 PR SUBSEQUENT HOSPITAL CARE,LEVL III: ICD-10-PCS | Mod: ,,, | Performed by: INTERNAL MEDICINE

## 2021-08-15 PROCEDURE — 63600175 PHARM REV CODE 636 W HCPCS

## 2021-08-15 PROCEDURE — 93005 ELECTROCARDIOGRAM TRACING: CPT

## 2021-08-15 PROCEDURE — 82533 TOTAL CORTISOL: CPT | Performed by: INTERNAL MEDICINE

## 2021-08-15 PROCEDURE — 84100 ASSAY OF PHOSPHORUS: CPT | Performed by: STUDENT IN AN ORGANIZED HEALTH CARE EDUCATION/TRAINING PROGRAM

## 2021-08-15 PROCEDURE — 80053 COMPREHEN METABOLIC PANEL: CPT | Performed by: STUDENT IN AN ORGANIZED HEALTH CARE EDUCATION/TRAINING PROGRAM

## 2021-08-15 PROCEDURE — 63600175 PHARM REV CODE 636 W HCPCS: Performed by: STUDENT IN AN ORGANIZED HEALTH CARE EDUCATION/TRAINING PROGRAM

## 2021-08-15 PROCEDURE — 99233 SBSQ HOSP IP/OBS HIGH 50: CPT | Mod: ,,, | Performed by: INTERNAL MEDICINE

## 2021-08-15 PROCEDURE — U0002 COVID-19 LAB TEST NON-CDC: HCPCS

## 2021-08-15 PROCEDURE — 25000003 PHARM REV CODE 250: Performed by: INTERNAL MEDICINE

## 2021-08-15 PROCEDURE — 93010 EKG 12-LEAD: ICD-10-PCS | Mod: ,,, | Performed by: INTERNAL MEDICINE

## 2021-08-15 PROCEDURE — 20600001 HC STEP DOWN PRIVATE ROOM

## 2021-08-15 PROCEDURE — 83735 ASSAY OF MAGNESIUM: CPT | Performed by: STUDENT IN AN ORGANIZED HEALTH CARE EDUCATION/TRAINING PROGRAM

## 2021-08-15 RX ORDER — DICYCLOMINE HYDROCHLORIDE 10 MG/1
10 CAPSULE ORAL 4 TIMES DAILY PRN
Status: DISCONTINUED | OUTPATIENT
Start: 2021-08-15 | End: 2021-08-17 | Stop reason: HOSPADM

## 2021-08-15 RX ORDER — OLANZAPINE 2.5 MG/1
10 TABLET ORAL DAILY
Status: DISCONTINUED | OUTPATIENT
Start: 2021-08-15 | End: 2021-08-17 | Stop reason: HOSPADM

## 2021-08-15 RX ORDER — TRAZODONE HYDROCHLORIDE 50 MG/1
50 TABLET ORAL ONCE
Status: COMPLETED | OUTPATIENT
Start: 2021-08-15 | End: 2021-08-15

## 2021-08-15 RX ORDER — POLYETHYLENE GLYCOL 3350, SODIUM SULFATE ANHYDROUS, SODIUM BICARBONATE, SODIUM CHLORIDE, POTASSIUM CHLORIDE 236; 22.74; 6.74; 5.86; 2.97 G/4L; G/4L; G/4L; G/4L; G/4L
4000 POWDER, FOR SOLUTION ORAL ONCE
Status: COMPLETED | OUTPATIENT
Start: 2021-08-15 | End: 2021-08-15

## 2021-08-15 RX ORDER — DEXAMETHASONE 4 MG/1
4 TABLET ORAL DAILY
Status: DISCONTINUED | OUTPATIENT
Start: 2021-08-16 | End: 2021-08-17

## 2021-08-15 RX ADMIN — OLANZAPINE 10 MG: 2.5 TABLET ORAL at 09:08

## 2021-08-15 RX ADMIN — TRAZODONE HYDROCHLORIDE 50 MG: 50 TABLET ORAL at 09:08

## 2021-08-15 RX ADMIN — DEXAMETHASONE SODIUM PHOSPHATE 4 MG: 4 INJECTION INTRA-ARTICULAR; INTRALESIONAL; INTRAMUSCULAR; INTRAVENOUS; SOFT TISSUE at 08:08

## 2021-08-15 RX ADMIN — ONDANSETRON 8 MG: 2 INJECTION INTRAMUSCULAR; INTRAVENOUS at 02:08

## 2021-08-15 RX ADMIN — ONDANSETRON 8 MG: 2 INJECTION INTRAMUSCULAR; INTRAVENOUS at 05:08

## 2021-08-15 RX ADMIN — ONDANSETRON 8 MG: 2 INJECTION INTRAMUSCULAR; INTRAVENOUS at 10:08

## 2021-08-15 RX ADMIN — POLYETHYLENE GLYCOL 3350, SODIUM SULFATE ANHYDROUS, SODIUM BICARBONATE, SODIUM CHLORIDE, POTASSIUM CHLORIDE 4000 ML: 236; 22.74; 6.74; 5.86; 2.97 POWDER, FOR SOLUTION ORAL at 06:08

## 2021-08-15 RX ADMIN — HEPARIN SODIUM 5000 UNITS: 5000 INJECTION INTRAVENOUS; SUBCUTANEOUS at 05:08

## 2021-08-16 ENCOUNTER — ANESTHESIA (OUTPATIENT)
Dept: ENDOSCOPY | Facility: HOSPITAL | Age: 41
DRG: 392 | End: 2021-08-16
Payer: COMMERCIAL

## 2021-08-16 LAB
ABO + RH BLD: NORMAL
ALBUMIN SERPL BCP-MCNC: 3.1 G/DL (ref 3.5–5.2)
ALP SERPL-CCNC: 48 U/L (ref 55–135)
ALT SERPL W/O P-5'-P-CCNC: 62 U/L (ref 10–44)
ANION GAP SERPL CALC-SCNC: 11 MMOL/L (ref 8–16)
AST SERPL-CCNC: 51 U/L (ref 10–40)
BASOPHILS # BLD AUTO: 0.02 K/UL (ref 0–0.2)
BASOPHILS NFR BLD: 0.4 % (ref 0–1.9)
BILIRUB SERPL-MCNC: 0.4 MG/DL (ref 0.1–1)
BLD GP AB SCN CELLS X3 SERPL QL: NORMAL
BUN SERPL-MCNC: 5 MG/DL (ref 6–20)
CALCIUM SERPL-MCNC: 8.8 MG/DL (ref 8.7–10.5)
CHLORIDE SERPL-SCNC: 107 MMOL/L (ref 95–110)
CO2 SERPL-SCNC: 24 MMOL/L (ref 23–29)
CREAT SERPL-MCNC: 0.6 MG/DL (ref 0.5–1.4)
DIFFERENTIAL METHOD: ABNORMAL
EOSINOPHIL # BLD AUTO: 0 K/UL (ref 0–0.5)
EOSINOPHIL NFR BLD: 0.2 % (ref 0–8)
ERYTHROCYTE [DISTWIDTH] IN BLOOD BY AUTOMATED COUNT: 14.2 % (ref 11.5–14.5)
EST. GFR  (AFRICAN AMERICAN): >60 ML/MIN/1.73 M^2
EST. GFR  (NON AFRICAN AMERICAN): >60 ML/MIN/1.73 M^2
GLUCOSE SERPL-MCNC: 89 MG/DL (ref 70–110)
HAV IGM SERPL QL IA: NEGATIVE
HBV CORE AB SERPL QL IA: NEGATIVE
HBV CORE IGM SERPL QL IA: NEGATIVE
HBV SURFACE AB SER-ACNC: NEGATIVE M[IU]/ML
HBV SURFACE AG SERPL QL IA: NEGATIVE
HBV SURFACE AG SERPL QL IA: NEGATIVE
HCT VFR BLD AUTO: 26.4 % (ref 37–48.5)
HCV AB SERPL QL IA: NEGATIVE
HCV AB SERPL QL IA: NEGATIVE
HGB BLD-MCNC: 8.6 G/DL (ref 12–16)
IMM GRANULOCYTES # BLD AUTO: 0.04 K/UL (ref 0–0.04)
IMM GRANULOCYTES NFR BLD AUTO: 0.7 % (ref 0–0.5)
LYMPHOCYTES # BLD AUTO: 1.6 K/UL (ref 1–4.8)
LYMPHOCYTES NFR BLD: 27.8 % (ref 18–48)
MAGNESIUM SERPL-MCNC: 1.8 MG/DL (ref 1.6–2.6)
MCH RBC QN AUTO: 30.9 PG (ref 27–31)
MCHC RBC AUTO-ENTMCNC: 32.6 G/DL (ref 32–36)
MCV RBC AUTO: 95 FL (ref 82–98)
MONOCYTES # BLD AUTO: 0.7 K/UL (ref 0.3–1)
MONOCYTES NFR BLD: 12.1 % (ref 4–15)
NEUTROPHILS # BLD AUTO: 3.3 K/UL (ref 1.8–7.7)
NEUTROPHILS NFR BLD: 58.8 % (ref 38–73)
NRBC BLD-RTO: 0 /100 WBC
PHOSPHATE SERPL-MCNC: 4.4 MG/DL (ref 2.7–4.5)
PLATELET # BLD AUTO: 227 K/UL (ref 150–450)
PMV BLD AUTO: 9.3 FL (ref 9.2–12.9)
POTASSIUM SERPL-SCNC: 3.4 MMOL/L (ref 3.5–5.1)
PROT SERPL-MCNC: 5 G/DL (ref 6–8.4)
RBC # BLD AUTO: 2.78 M/UL (ref 4–5.4)
SODIUM SERPL-SCNC: 142 MMOL/L (ref 136–145)
WBC # BLD AUTO: 5.62 K/UL (ref 3.9–12.7)

## 2021-08-16 PROCEDURE — 88305 TISSUE EXAM BY PATHOLOGIST: ICD-10-PCS | Mod: 26,,, | Performed by: PATHOLOGY

## 2021-08-16 PROCEDURE — 63600175 PHARM REV CODE 636 W HCPCS: Performed by: INTERNAL MEDICINE

## 2021-08-16 PROCEDURE — 86900 BLOOD TYPING SEROLOGIC ABO: CPT | Performed by: STUDENT IN AN ORGANIZED HEALTH CARE EDUCATION/TRAINING PROGRAM

## 2021-08-16 PROCEDURE — D9220A PRA ANESTHESIA: ICD-10-PCS | Mod: ANES,,, | Performed by: ANESTHESIOLOGY

## 2021-08-16 PROCEDURE — 37000009 HC ANESTHESIA EA ADD 15 MINS: Performed by: INTERNAL MEDICINE

## 2021-08-16 PROCEDURE — 99232 PR SUBSEQUENT HOSPITAL CARE,LEVL II: ICD-10-PCS | Mod: ,,, | Performed by: INTERNAL MEDICINE

## 2021-08-16 PROCEDURE — 94761 N-INVAS EAR/PLS OXIMETRY MLT: CPT

## 2021-08-16 PROCEDURE — 88305 TISSUE EXAM BY PATHOLOGIST: CPT | Mod: 59 | Performed by: PATHOLOGY

## 2021-08-16 PROCEDURE — 99233 SBSQ HOSP IP/OBS HIGH 50: CPT | Mod: ,,, | Performed by: INTERNAL MEDICINE

## 2021-08-16 PROCEDURE — 88342 IMHCHEM/IMCYTCHM 1ST ANTB: CPT | Performed by: PATHOLOGY

## 2021-08-16 PROCEDURE — 45380 COLONOSCOPY AND BIOPSY: CPT | Performed by: INTERNAL MEDICINE

## 2021-08-16 PROCEDURE — 80053 COMPREHEN METABOLIC PANEL: CPT | Performed by: STUDENT IN AN ORGANIZED HEALTH CARE EDUCATION/TRAINING PROGRAM

## 2021-08-16 PROCEDURE — 93010 EKG 12-LEAD: ICD-10-PCS | Mod: ,,, | Performed by: INTERNAL MEDICINE

## 2021-08-16 PROCEDURE — 93005 ELECTROCARDIOGRAM TRACING: CPT

## 2021-08-16 PROCEDURE — 99233 PR SUBSEQUENT HOSPITAL CARE,LEVL III: ICD-10-PCS | Mod: ,,, | Performed by: INTERNAL MEDICINE

## 2021-08-16 PROCEDURE — D9220A PRA ANESTHESIA: Mod: CRNA,,, | Performed by: NURSE ANESTHETIST, CERTIFIED REGISTERED

## 2021-08-16 PROCEDURE — 37000008 HC ANESTHESIA 1ST 15 MINUTES: Performed by: INTERNAL MEDICINE

## 2021-08-16 PROCEDURE — 88342 IMHCHEM/IMCYTCHM 1ST ANTB: CPT | Mod: 26,,, | Performed by: PATHOLOGY

## 2021-08-16 PROCEDURE — 43239 EGD BIOPSY SINGLE/MULTIPLE: CPT | Performed by: INTERNAL MEDICINE

## 2021-08-16 PROCEDURE — 63600175 PHARM REV CODE 636 W HCPCS

## 2021-08-16 PROCEDURE — 43239 EGD BIOPSY SINGLE/MULTIPLE: CPT | Mod: 51,,, | Performed by: INTERNAL MEDICINE

## 2021-08-16 PROCEDURE — D9220A PRA ANESTHESIA: Mod: ANES,,, | Performed by: ANESTHESIOLOGY

## 2021-08-16 PROCEDURE — 88342 CHG IMMUNOCYTOCHEMISTRY: ICD-10-PCS | Mod: 26,,, | Performed by: PATHOLOGY

## 2021-08-16 PROCEDURE — 93010 ELECTROCARDIOGRAM REPORT: CPT | Mod: ,,, | Performed by: INTERNAL MEDICINE

## 2021-08-16 PROCEDURE — 27201012 HC FORCEPS, HOT/COLD, DISP: Performed by: INTERNAL MEDICINE

## 2021-08-16 PROCEDURE — 63600175 PHARM REV CODE 636 W HCPCS: Performed by: NURSE ANESTHETIST, CERTIFIED REGISTERED

## 2021-08-16 PROCEDURE — 99232 SBSQ HOSP IP/OBS MODERATE 35: CPT | Mod: ,,, | Performed by: INTERNAL MEDICINE

## 2021-08-16 PROCEDURE — D9220A PRA ANESTHESIA: ICD-10-PCS | Mod: CRNA,,, | Performed by: NURSE ANESTHETIST, CERTIFIED REGISTERED

## 2021-08-16 PROCEDURE — 83735 ASSAY OF MAGNESIUM: CPT | Performed by: STUDENT IN AN ORGANIZED HEALTH CARE EDUCATION/TRAINING PROGRAM

## 2021-08-16 PROCEDURE — 25000003 PHARM REV CODE 250: Performed by: NURSE ANESTHETIST, CERTIFIED REGISTERED

## 2021-08-16 PROCEDURE — 84100 ASSAY OF PHOSPHORUS: CPT | Performed by: STUDENT IN AN ORGANIZED HEALTH CARE EDUCATION/TRAINING PROGRAM

## 2021-08-16 PROCEDURE — 20600001 HC STEP DOWN PRIVATE ROOM

## 2021-08-16 PROCEDURE — 86481 TB AG RESPONSE T-CELL SUSP: CPT

## 2021-08-16 PROCEDURE — 85025 COMPLETE CBC W/AUTO DIFF WBC: CPT | Performed by: STUDENT IN AN ORGANIZED HEALTH CARE EDUCATION/TRAINING PROGRAM

## 2021-08-16 PROCEDURE — 25000003 PHARM REV CODE 250: Performed by: STUDENT IN AN ORGANIZED HEALTH CARE EDUCATION/TRAINING PROGRAM

## 2021-08-16 PROCEDURE — 88305 TISSUE EXAM BY PATHOLOGIST: CPT | Mod: 26,,, | Performed by: PATHOLOGY

## 2021-08-16 PROCEDURE — 45380 PR COLONOSCOPY,BIOPSY: ICD-10-PCS | Mod: ,,, | Performed by: INTERNAL MEDICINE

## 2021-08-16 PROCEDURE — 45380 COLONOSCOPY AND BIOPSY: CPT | Mod: ,,, | Performed by: INTERNAL MEDICINE

## 2021-08-16 PROCEDURE — 25000003 PHARM REV CODE 250

## 2021-08-16 PROCEDURE — 43239 PR EGD, FLEX, W/BIOPSY, SGL/MULTI: ICD-10-PCS | Mod: 51,,, | Performed by: INTERNAL MEDICINE

## 2021-08-16 RX ORDER — ONDANSETRON 2 MG/ML
8 INJECTION INTRAMUSCULAR; INTRAVENOUS EVERY 8 HOURS PRN
Status: DISCONTINUED | OUTPATIENT
Start: 2021-08-16 | End: 2021-08-17 | Stop reason: HOSPADM

## 2021-08-16 RX ORDER — TRAZODONE HYDROCHLORIDE 50 MG/1
50 TABLET ORAL NIGHTLY
Status: COMPLETED | OUTPATIENT
Start: 2021-08-16 | End: 2021-08-16

## 2021-08-16 RX ORDER — MAGNESIUM SULFATE HEPTAHYDRATE 40 MG/ML
2 INJECTION, SOLUTION INTRAVENOUS ONCE
Status: COMPLETED | OUTPATIENT
Start: 2021-08-16 | End: 2021-08-16

## 2021-08-16 RX ORDER — LIDOCAINE HYDROCHLORIDE 20 MG/ML
INJECTION, SOLUTION EPIDURAL; INFILTRATION; INTRACAUDAL; PERINEURAL
Status: DISCONTINUED | OUTPATIENT
Start: 2021-08-16 | End: 2021-08-16

## 2021-08-16 RX ORDER — POTASSIUM CHLORIDE 7.45 MG/ML
10 INJECTION INTRAVENOUS ONCE
Status: COMPLETED | OUTPATIENT
Start: 2021-08-16 | End: 2021-08-16

## 2021-08-16 RX ORDER — SODIUM CHLORIDE 0.9 % (FLUSH) 0.9 %
10 SYRINGE (ML) INJECTION
Status: DISCONTINUED | OUTPATIENT
Start: 2021-08-16 | End: 2021-08-16 | Stop reason: HOSPADM

## 2021-08-16 RX ORDER — PROPOFOL 10 MG/ML
VIAL (ML) INTRAVENOUS CONTINUOUS PRN
Status: DISCONTINUED | OUTPATIENT
Start: 2021-08-16 | End: 2021-08-16

## 2021-08-16 RX ORDER — PROCHLORPERAZINE EDISYLATE 5 MG/ML
5 INJECTION INTRAMUSCULAR; INTRAVENOUS EVERY 6 HOURS PRN
Status: DISCONTINUED | OUTPATIENT
Start: 2021-08-16 | End: 2021-08-17 | Stop reason: HOSPADM

## 2021-08-16 RX ORDER — PROPOFOL 10 MG/ML
VIAL (ML) INTRAVENOUS
Status: DISCONTINUED | OUTPATIENT
Start: 2021-08-16 | End: 2021-08-16

## 2021-08-16 RX ADMIN — DEXAMETHASONE 4 MG: 4 TABLET ORAL at 08:08

## 2021-08-16 RX ADMIN — GLYCOPYRROLATE 0.1 MG: 0.2 INJECTION, SOLUTION INTRAMUSCULAR; INTRAVITREAL at 11:08

## 2021-08-16 RX ADMIN — OLANZAPINE 10 MG: 2.5 TABLET ORAL at 08:08

## 2021-08-16 RX ADMIN — POTASSIUM CHLORIDE 10 MEQ: 7.46 INJECTION, SOLUTION INTRAVENOUS at 02:08

## 2021-08-16 RX ADMIN — TRAZODONE HYDROCHLORIDE 50 MG: 50 TABLET ORAL at 08:08

## 2021-08-16 RX ADMIN — PROPOFOL 50 MG: 10 INJECTION, EMULSION INTRAVENOUS at 11:08

## 2021-08-16 RX ADMIN — ONDANSETRON 8 MG: 2 INJECTION INTRAMUSCULAR; INTRAVENOUS at 02:08

## 2021-08-16 RX ADMIN — SODIUM CHLORIDE: 9 INJECTION, SOLUTION INTRAVENOUS at 11:08

## 2021-08-16 RX ADMIN — MAGNESIUM SULFATE 2 G: 2 INJECTION INTRAVENOUS at 08:08

## 2021-08-16 RX ADMIN — PROPOFOL 20 MG: 10 INJECTION, EMULSION INTRAVENOUS at 11:08

## 2021-08-16 RX ADMIN — Medication 150 MCG/KG/MIN: at 11:08

## 2021-08-16 RX ADMIN — LIDOCAINE HYDROCHLORIDE 100 MG: 20 INJECTION, SOLUTION EPIDURAL; INFILTRATION; INTRACAUDAL at 11:08

## 2021-08-17 ENCOUNTER — PATIENT MESSAGE (OUTPATIENT)
Dept: HEMATOLOGY/ONCOLOGY | Facility: CLINIC | Age: 41
End: 2021-08-17

## 2021-08-17 VITALS
WEIGHT: 159.81 LBS | TEMPERATURE: 98 F | BODY MASS INDEX: 25.08 KG/M2 | HEART RATE: 77 BPM | HEIGHT: 67 IN | SYSTOLIC BLOOD PRESSURE: 95 MMHG | DIASTOLIC BLOOD PRESSURE: 55 MMHG | OXYGEN SATURATION: 98 % | RESPIRATION RATE: 17 BRPM

## 2021-08-17 LAB
ALBUMIN SERPL BCP-MCNC: 3.1 G/DL (ref 3.5–5.2)
ALP SERPL-CCNC: 53 U/L (ref 55–135)
ALT SERPL W/O P-5'-P-CCNC: 55 U/L (ref 10–44)
ANION GAP SERPL CALC-SCNC: 7 MMOL/L (ref 8–16)
AST SERPL-CCNC: 40 U/L (ref 10–40)
BASOPHILS # BLD AUTO: 0.02 K/UL (ref 0–0.2)
BASOPHILS NFR BLD: 0.4 % (ref 0–1.9)
BILIRUB SERPL-MCNC: 0.3 MG/DL (ref 0.1–1)
BUN SERPL-MCNC: 7 MG/DL (ref 6–20)
CALCIUM SERPL-MCNC: 8.8 MG/DL (ref 8.7–10.5)
CHLORIDE SERPL-SCNC: 109 MMOL/L (ref 95–110)
CO2 SERPL-SCNC: 27 MMOL/L (ref 23–29)
CREAT SERPL-MCNC: 0.6 MG/DL (ref 0.5–1.4)
DIFFERENTIAL METHOD: ABNORMAL
EOSINOPHIL # BLD AUTO: 0 K/UL (ref 0–0.5)
EOSINOPHIL NFR BLD: 0.2 % (ref 0–8)
ERYTHROCYTE [DISTWIDTH] IN BLOOD BY AUTOMATED COUNT: 13.9 % (ref 11.5–14.5)
EST. GFR  (AFRICAN AMERICAN): >60 ML/MIN/1.73 M^2
EST. GFR  (NON AFRICAN AMERICAN): >60 ML/MIN/1.73 M^2
GLUCOSE SERPL-MCNC: 84 MG/DL (ref 70–110)
HCT VFR BLD AUTO: 27.2 % (ref 37–48.5)
HGB BLD-MCNC: 8.8 G/DL (ref 12–16)
IMM GRANULOCYTES # BLD AUTO: 0.07 K/UL (ref 0–0.04)
IMM GRANULOCYTES NFR BLD AUTO: 1.3 % (ref 0–0.5)
LYMPHOCYTES # BLD AUTO: 1.6 K/UL (ref 1–4.8)
LYMPHOCYTES NFR BLD: 29.7 % (ref 18–48)
MAGNESIUM SERPL-MCNC: 2 MG/DL (ref 1.6–2.6)
MCH RBC QN AUTO: 31.2 PG (ref 27–31)
MCHC RBC AUTO-ENTMCNC: 32.4 G/DL (ref 32–36)
MCV RBC AUTO: 97 FL (ref 82–98)
MONOCYTES # BLD AUTO: 0.6 K/UL (ref 0.3–1)
MONOCYTES NFR BLD: 10.9 % (ref 4–15)
NEUTROPHILS # BLD AUTO: 3.1 K/UL (ref 1.8–7.7)
NEUTROPHILS NFR BLD: 57.5 % (ref 38–73)
NRBC BLD-RTO: 0 /100 WBC
PHOSPHATE SERPL-MCNC: 4.6 MG/DL (ref 2.7–4.5)
PLATELET # BLD AUTO: 240 K/UL (ref 150–450)
PMV BLD AUTO: 9.3 FL (ref 9.2–12.9)
POTASSIUM SERPL-SCNC: 3.6 MMOL/L (ref 3.5–5.1)
PROT SERPL-MCNC: 5.1 G/DL (ref 6–8.4)
RBC # BLD AUTO: 2.82 M/UL (ref 4–5.4)
SODIUM SERPL-SCNC: 143 MMOL/L (ref 136–145)
WBC # BLD AUTO: 5.42 K/UL (ref 3.9–12.7)

## 2021-08-17 PROCEDURE — 85025 COMPLETE CBC W/AUTO DIFF WBC: CPT | Performed by: STUDENT IN AN ORGANIZED HEALTH CARE EDUCATION/TRAINING PROGRAM

## 2021-08-17 PROCEDURE — 84100 ASSAY OF PHOSPHORUS: CPT | Performed by: STUDENT IN AN ORGANIZED HEALTH CARE EDUCATION/TRAINING PROGRAM

## 2021-08-17 PROCEDURE — 93010 EKG 12-LEAD: ICD-10-PCS | Mod: ,,, | Performed by: INTERNAL MEDICINE

## 2021-08-17 PROCEDURE — 99239 HOSP IP/OBS DSCHRG MGMT >30: CPT | Mod: ,,, | Performed by: INTERNAL MEDICINE

## 2021-08-17 PROCEDURE — 99239 PR HOSPITAL DISCHARGE DAY,>30 MIN: ICD-10-PCS | Mod: ,,, | Performed by: INTERNAL MEDICINE

## 2021-08-17 PROCEDURE — 97535 SELF CARE MNGMENT TRAINING: CPT

## 2021-08-17 PROCEDURE — 63600175 PHARM REV CODE 636 W HCPCS

## 2021-08-17 PROCEDURE — 83735 ASSAY OF MAGNESIUM: CPT | Performed by: STUDENT IN AN ORGANIZED HEALTH CARE EDUCATION/TRAINING PROGRAM

## 2021-08-17 PROCEDURE — 93005 ELECTROCARDIOGRAM TRACING: CPT

## 2021-08-17 PROCEDURE — 93010 ELECTROCARDIOGRAM REPORT: CPT | Mod: ,,, | Performed by: INTERNAL MEDICINE

## 2021-08-17 PROCEDURE — 63600175 PHARM REV CODE 636 W HCPCS: Performed by: INTERNAL MEDICINE

## 2021-08-17 PROCEDURE — 80053 COMPREHEN METABOLIC PANEL: CPT | Performed by: STUDENT IN AN ORGANIZED HEALTH CARE EDUCATION/TRAINING PROGRAM

## 2021-08-17 PROCEDURE — 97116 GAIT TRAINING THERAPY: CPT | Mod: CQ

## 2021-08-17 PROCEDURE — 93005 ELECTROCARDIOGRAM TRACING: CPT | Performed by: INTERNAL MEDICINE

## 2021-08-17 RX ORDER — SCOLOPAMINE TRANSDERMAL SYSTEM 1 MG/1
1 PATCH, EXTENDED RELEASE TRANSDERMAL
Qty: 10 PATCH | Refills: 0 | Status: CANCELLED | OUTPATIENT
Start: 2021-08-17

## 2021-08-17 RX ORDER — DEXAMETHASONE 1 MG/1
TABLET ORAL
Qty: 6 TABLET | Refills: 0 | Status: SHIPPED | OUTPATIENT
Start: 2021-08-17 | End: 2021-08-21

## 2021-08-17 RX ORDER — OLANZAPINE 10 MG/1
10 TABLET ORAL NIGHTLY
Qty: 30 TABLET | Refills: 0 | Status: SHIPPED | OUTPATIENT
Start: 2021-08-17 | End: 2022-03-17

## 2021-08-17 RX ORDER — HEPARIN 100 UNIT/ML
5 SYRINGE INTRAVENOUS ONCE
Status: COMPLETED | OUTPATIENT
Start: 2021-08-17 | End: 2021-08-17

## 2021-08-17 RX ADMIN — DEXAMETHASONE 4 MG: 4 TABLET ORAL at 08:08

## 2021-08-17 RX ADMIN — HEPARIN 500 UNITS: 100 SYRINGE at 02:08

## 2021-08-18 ENCOUNTER — TELEPHONE (OUTPATIENT)
Dept: SURGERY | Facility: CLINIC | Age: 41
End: 2021-08-18

## 2021-08-18 LAB — M TB IFN-G BLD-IMP: NEGATIVE

## 2021-08-19 ENCOUNTER — PATIENT MESSAGE (OUTPATIENT)
Dept: HEMATOLOGY/ONCOLOGY | Facility: CLINIC | Age: 41
End: 2021-08-19

## 2021-08-20 ENCOUNTER — PATIENT MESSAGE (OUTPATIENT)
Dept: HEMATOLOGY/ONCOLOGY | Facility: CLINIC | Age: 41
End: 2021-08-20

## 2021-08-20 LAB
FINAL PATHOLOGIC DIAGNOSIS: NORMAL
GROSS: NORMAL
Lab: NORMAL

## 2021-08-23 ENCOUNTER — PATIENT MESSAGE (OUTPATIENT)
Dept: HEMATOLOGY/ONCOLOGY | Facility: CLINIC | Age: 41
End: 2021-08-23

## 2021-08-24 DIAGNOSIS — T45.1X5A CHEMOTHERAPY INDUCED NAUSEA AND VOMITING: Primary | ICD-10-CM

## 2021-08-24 DIAGNOSIS — R11.2 CHEMOTHERAPY INDUCED NAUSEA AND VOMITING: Primary | ICD-10-CM

## 2021-08-25 ENCOUNTER — TELEPHONE (OUTPATIENT)
Dept: HEMATOLOGY/ONCOLOGY | Facility: CLINIC | Age: 41
End: 2021-08-25

## 2021-08-25 ENCOUNTER — INFUSION (OUTPATIENT)
Dept: INFUSION THERAPY | Facility: HOSPITAL | Age: 41
End: 2021-08-25
Attending: INTERNAL MEDICINE
Payer: COMMERCIAL

## 2021-08-25 ENCOUNTER — PATIENT MESSAGE (OUTPATIENT)
Dept: HEMATOLOGY/ONCOLOGY | Facility: CLINIC | Age: 41
End: 2021-08-25

## 2021-08-25 ENCOUNTER — HOSPITAL ENCOUNTER (OUTPATIENT)
Dept: RADIOLOGY | Facility: HOSPITAL | Age: 41
Discharge: HOME | End: 2021-08-25
Attending: NURSE PRACTITIONER
Payer: COMMERCIAL

## 2021-08-25 VITALS
WEIGHT: 143.94 LBS | TEMPERATURE: 98 F | HEIGHT: 67 IN | BODY MASS INDEX: 22.59 KG/M2 | HEART RATE: 82 BPM | SYSTOLIC BLOOD PRESSURE: 88 MMHG | DIASTOLIC BLOOD PRESSURE: 58 MMHG

## 2021-08-25 DIAGNOSIS — Z17.1 MALIGNANT NEOPLASM OF UPPER-OUTER QUADRANT OF LEFT BREAST IN FEMALE, ESTROGEN RECEPTOR NEGATIVE: ICD-10-CM

## 2021-08-25 DIAGNOSIS — Z17.1 MALIGNANT NEOPLASM OF UPPER-OUTER QUADRANT OF LEFT BREAST IN FEMALE, ESTROGEN RECEPTOR NEGATIVE: Primary | ICD-10-CM

## 2021-08-25 DIAGNOSIS — J06.9 VIRAL UPPER RESPIRATORY TRACT INFECTION: Primary | ICD-10-CM

## 2021-08-25 DIAGNOSIS — C50.412 MALIGNANT NEOPLASM OF UPPER-OUTER QUADRANT OF LEFT BREAST IN FEMALE, ESTROGEN RECEPTOR NEGATIVE: ICD-10-CM

## 2021-08-25 DIAGNOSIS — C50.412 MALIGNANT NEOPLASM OF UPPER-OUTER QUADRANT OF LEFT BREAST IN FEMALE, ESTROGEN RECEPTOR NEGATIVE: Primary | ICD-10-CM

## 2021-08-25 DIAGNOSIS — Z15.09 BRCA2 GENE MUTATION POSITIVE: ICD-10-CM

## 2021-08-25 DIAGNOSIS — D05.12 DUCTAL CARCINOMA IN SITU OF LEFT BREAST: ICD-10-CM

## 2021-08-25 DIAGNOSIS — Z15.01 BRCA2 GENE MUTATION POSITIVE: ICD-10-CM

## 2021-08-25 LAB — GLUCOSE SERPL-MCNC: 88 MG/DL (ref 70–110)

## 2021-08-25 PROCEDURE — A9552 F18 FDG: HCPCS | Mod: PN

## 2021-08-25 PROCEDURE — 78815 PET IMAGE W/CT SKULL-THIGH: CPT | Mod: 26,PS,, | Performed by: RADIOLOGY

## 2021-08-25 PROCEDURE — 78815 NM PET CT ROUTINE: ICD-10-PCS | Mod: 26,PS,, | Performed by: RADIOLOGY

## 2021-08-25 PROCEDURE — 78815 PET IMAGE W/CT SKULL-THIGH: CPT | Mod: TC,PN

## 2021-08-25 PROCEDURE — 25000003 PHARM REV CODE 250: Mod: PN | Performed by: NURSE PRACTITIONER

## 2021-08-25 PROCEDURE — 96360 HYDRATION IV INFUSION INIT: CPT | Mod: PN

## 2021-08-25 RX ORDER — AZITHROMYCIN 250 MG/1
TABLET, FILM COATED ORAL
Qty: 6 TABLET | Refills: 0 | Status: SHIPPED | OUTPATIENT
Start: 2021-08-25 | End: 2021-08-30

## 2021-08-25 RX ORDER — SODIUM CHLORIDE 0.9 % (FLUSH) 0.9 %
10 SYRINGE (ML) INJECTION
Status: DISCONTINUED | OUTPATIENT
Start: 2021-08-25 | End: 2021-08-25 | Stop reason: HOSPADM

## 2021-08-25 RX ORDER — SODIUM CHLORIDE 0.9 % (FLUSH) 0.9 %
10 SYRINGE (ML) INJECTION
Status: CANCELLED | OUTPATIENT
Start: 2021-08-25

## 2021-08-25 RX ORDER — SODIUM CHLORIDE 9 MG/ML
1000 INJECTION, SOLUTION INTRAVENOUS ONCE
Status: COMPLETED | OUTPATIENT
Start: 2021-08-25 | End: 2021-08-25

## 2021-08-25 RX ORDER — HEPARIN 100 UNIT/ML
500 SYRINGE INTRAVENOUS
Status: CANCELLED | OUTPATIENT
Start: 2021-08-25

## 2021-08-25 RX ADMIN — SODIUM CHLORIDE 1000 ML: 0.9 INJECTION, SOLUTION INTRAVENOUS at 01:08

## 2021-08-31 ENCOUNTER — PATIENT MESSAGE (OUTPATIENT)
Dept: HEMATOLOGY/ONCOLOGY | Facility: CLINIC | Age: 41
End: 2021-08-31

## 2021-09-02 ENCOUNTER — OFFICE VISIT (OUTPATIENT)
Dept: HEMATOLOGY/ONCOLOGY | Facility: CLINIC | Age: 41
End: 2021-09-02
Payer: COMMERCIAL

## 2021-09-02 ENCOUNTER — PATIENT MESSAGE (OUTPATIENT)
Dept: HEMATOLOGY/ONCOLOGY | Facility: CLINIC | Age: 41
End: 2021-09-02

## 2021-09-02 ENCOUNTER — INFUSION (OUTPATIENT)
Dept: INFUSION THERAPY | Facility: HOSPITAL | Age: 41
End: 2021-09-02
Payer: COMMERCIAL

## 2021-09-02 DIAGNOSIS — C50.412 MALIGNANT NEOPLASM OF UPPER-OUTER QUADRANT OF LEFT BREAST IN FEMALE, ESTROGEN RECEPTOR NEGATIVE: Primary | ICD-10-CM

## 2021-09-02 DIAGNOSIS — C50.412 MALIGNANT NEOPLASM OF UPPER-OUTER QUADRANT OF LEFT BREAST IN FEMALE, ESTROGEN RECEPTOR NEGATIVE: ICD-10-CM

## 2021-09-02 DIAGNOSIS — R79.89 ELEVATED LFTS: ICD-10-CM

## 2021-09-02 DIAGNOSIS — T45.1X5A ANEMIA ASSOCIATED WITH CHEMOTHERAPY: ICD-10-CM

## 2021-09-02 DIAGNOSIS — D64.81 ANEMIA ASSOCIATED WITH CHEMOTHERAPY: ICD-10-CM

## 2021-09-02 DIAGNOSIS — E88.09 HYPOALBUMINEMIA: ICD-10-CM

## 2021-09-02 DIAGNOSIS — R11.2 NAUSEA AND VOMITING, INTRACTABILITY OF VOMITING NOT SPECIFIED, UNSPECIFIED VOMITING TYPE: ICD-10-CM

## 2021-09-02 DIAGNOSIS — E83.39 HYPERPHOSPHATEMIA: ICD-10-CM

## 2021-09-02 DIAGNOSIS — R19.7 DIARRHEA, UNSPECIFIED TYPE: ICD-10-CM

## 2021-09-02 DIAGNOSIS — Z17.1 MALIGNANT NEOPLASM OF UPPER-OUTER QUADRANT OF LEFT BREAST IN FEMALE, ESTROGEN RECEPTOR NEGATIVE: ICD-10-CM

## 2021-09-02 DIAGNOSIS — Z17.1 MALIGNANT NEOPLASM OF UPPER-OUTER QUADRANT OF LEFT BREAST IN FEMALE, ESTROGEN RECEPTOR NEGATIVE: Primary | ICD-10-CM

## 2021-09-02 DIAGNOSIS — R53.83 FATIGUE, UNSPECIFIED TYPE: ICD-10-CM

## 2021-09-02 LAB
ALBUMIN SERPL BCP-MCNC: 3.5 G/DL (ref 3.5–5.2)
ALP SERPL-CCNC: 91 U/L (ref 55–135)
ALT SERPL W/O P-5'-P-CCNC: 36 U/L (ref 10–44)
ANION GAP SERPL CALC-SCNC: 9 MMOL/L (ref 8–16)
AST SERPL-CCNC: 39 U/L (ref 10–40)
BASOPHILS # BLD AUTO: 0.02 K/UL (ref 0–0.2)
BASOPHILS NFR BLD: 0.4 % (ref 0–1.9)
BILIRUB SERPL-MCNC: 1 MG/DL (ref 0.1–1)
BUN SERPL-MCNC: 7 MG/DL (ref 6–20)
CALCIUM SERPL-MCNC: 9.7 MG/DL (ref 8.7–10.5)
CHLORIDE SERPL-SCNC: 92 MMOL/L (ref 95–110)
CO2 SERPL-SCNC: 24 MMOL/L (ref 23–29)
CREAT SERPL-MCNC: 0.6 MG/DL (ref 0.5–1.4)
DIFFERENTIAL METHOD: ABNORMAL
EOSINOPHIL # BLD AUTO: 0.3 K/UL (ref 0–0.5)
EOSINOPHIL NFR BLD: 5.2 % (ref 0–8)
ERYTHROCYTE [DISTWIDTH] IN BLOOD BY AUTOMATED COUNT: 13 % (ref 11.5–14.5)
EST. GFR  (AFRICAN AMERICAN): >60 ML/MIN/1.73 M^2
EST. GFR  (NON AFRICAN AMERICAN): >60 ML/MIN/1.73 M^2
GLUCOSE SERPL-MCNC: 93 MG/DL (ref 70–110)
HCT VFR BLD AUTO: 32.8 % (ref 37–48.5)
HGB BLD-MCNC: 11.6 G/DL (ref 12–16)
IMM GRANULOCYTES # BLD AUTO: 0.01 K/UL (ref 0–0.04)
IMM GRANULOCYTES NFR BLD AUTO: 0.2 % (ref 0–0.5)
LYMPHOCYTES # BLD AUTO: 1.6 K/UL (ref 1–4.8)
LYMPHOCYTES NFR BLD: 34 % (ref 18–48)
MAGNESIUM SERPL-MCNC: 1.4 MG/DL (ref 1.6–2.6)
MCH RBC QN AUTO: 31.4 PG (ref 27–31)
MCHC RBC AUTO-ENTMCNC: 35.4 G/DL (ref 32–36)
MCV RBC AUTO: 89 FL (ref 82–98)
MONOCYTES # BLD AUTO: 0.8 K/UL (ref 0.3–1)
MONOCYTES NFR BLD: 15.9 % (ref 4–15)
NEUTROPHILS # BLD AUTO: 2.1 K/UL (ref 1.8–7.7)
NEUTROPHILS NFR BLD: 44.3 % (ref 38–73)
NRBC BLD-RTO: 0 /100 WBC
PHOSPHATE SERPL-MCNC: 4.5 MG/DL (ref 2.7–4.5)
PLATELET # BLD AUTO: 217 K/UL (ref 150–450)
PMV BLD AUTO: 8.9 FL (ref 9.2–12.9)
POTASSIUM SERPL-SCNC: 3.2 MMOL/L (ref 3.5–5.1)
PROT SERPL-MCNC: 6.3 G/DL (ref 6–8.4)
RBC # BLD AUTO: 3.69 M/UL (ref 4–5.4)
SODIUM SERPL-SCNC: 125 MMOL/L (ref 136–145)
WBC # BLD AUTO: 4.83 K/UL (ref 3.9–12.7)

## 2021-09-02 PROCEDURE — 1111F DSCHRG MED/CURRENT MED MERGE: CPT | Mod: CPTII,,, | Performed by: NURSE PRACTITIONER

## 2021-09-02 PROCEDURE — 80053 COMPREHEN METABOLIC PANEL: CPT | Performed by: NURSE PRACTITIONER

## 2021-09-02 PROCEDURE — 99215 OFFICE O/P EST HI 40 MIN: CPT | Mod: 95,,, | Performed by: NURSE PRACTITIONER

## 2021-09-02 PROCEDURE — 1111F PR DISCHARGE MEDS RECONCILED W/ CURRENT OUTPATIENT MED LIST: ICD-10-PCS | Mod: CPTII,,, | Performed by: NURSE PRACTITIONER

## 2021-09-02 PROCEDURE — 3044F PR MOST RECENT HEMOGLOBIN A1C LEVEL <7.0%: ICD-10-PCS | Mod: CPTII,,, | Performed by: NURSE PRACTITIONER

## 2021-09-02 PROCEDURE — 36415 COLL VENOUS BLD VENIPUNCTURE: CPT | Performed by: NURSE PRACTITIONER

## 2021-09-02 PROCEDURE — 85025 COMPLETE CBC W/AUTO DIFF WBC: CPT | Performed by: NURSE PRACTITIONER

## 2021-09-02 PROCEDURE — 3044F HG A1C LEVEL LT 7.0%: CPT | Mod: CPTII,,, | Performed by: NURSE PRACTITIONER

## 2021-09-02 PROCEDURE — 99215 PR OFFICE/OUTPT VISIT, EST, LEVL V, 40-54 MIN: ICD-10-PCS | Mod: 95,,, | Performed by: NURSE PRACTITIONER

## 2021-09-02 PROCEDURE — 83735 ASSAY OF MAGNESIUM: CPT | Performed by: NURSE PRACTITIONER

## 2021-09-02 PROCEDURE — 84100 ASSAY OF PHOSPHORUS: CPT | Performed by: NURSE PRACTITIONER

## 2021-09-02 PROCEDURE — 36591 DRAW BLOOD OFF VENOUS DEVICE: CPT

## 2021-09-02 RX ORDER — PANTOPRAZOLE SODIUM 40 MG/1
40 TABLET, DELAYED RELEASE ORAL DAILY
Qty: 30 TABLET | Refills: 1 | Status: SHIPPED | OUTPATIENT
Start: 2021-09-02 | End: 2021-09-03 | Stop reason: SDUPTHER

## 2021-09-02 RX ORDER — PREDNISONE 10 MG/1
TABLET ORAL
Qty: 95 TABLET | Refills: 0 | Status: SHIPPED | OUTPATIENT
Start: 2021-09-02 | End: 2021-10-07

## 2021-09-03 ENCOUNTER — PATIENT MESSAGE (OUTPATIENT)
Dept: HEMATOLOGY/ONCOLOGY | Facility: CLINIC | Age: 41
End: 2021-09-03

## 2021-09-03 ENCOUNTER — OFFICE VISIT (OUTPATIENT)
Dept: HEMATOLOGY/ONCOLOGY | Facility: CLINIC | Age: 41
End: 2021-09-03
Payer: COMMERCIAL

## 2021-09-03 DIAGNOSIS — C50.412 MALIGNANT NEOPLASM OF UPPER-OUTER QUADRANT OF LEFT BREAST IN FEMALE, ESTROGEN RECEPTOR NEGATIVE: Primary | ICD-10-CM

## 2021-09-03 DIAGNOSIS — Z17.1 MALIGNANT NEOPLASM OF UPPER-OUTER QUADRANT OF LEFT BREAST IN FEMALE, ESTROGEN RECEPTOR NEGATIVE: Primary | ICD-10-CM

## 2021-09-03 PROCEDURE — 1111F DSCHRG MED/CURRENT MED MERGE: CPT | Mod: CPTII,,, | Performed by: NURSE PRACTITIONER

## 2021-09-03 PROCEDURE — 3044F PR MOST RECENT HEMOGLOBIN A1C LEVEL <7.0%: ICD-10-PCS | Mod: CPTII,,, | Performed by: NURSE PRACTITIONER

## 2021-09-03 PROCEDURE — 99213 PR OFFICE/OUTPT VISIT, EST, LEVL III, 20-29 MIN: ICD-10-PCS | Mod: 95,,, | Performed by: NURSE PRACTITIONER

## 2021-09-03 PROCEDURE — 1111F PR DISCHARGE MEDS RECONCILED W/ CURRENT OUTPATIENT MED LIST: ICD-10-PCS | Mod: CPTII,,, | Performed by: NURSE PRACTITIONER

## 2021-09-03 PROCEDURE — 3044F HG A1C LEVEL LT 7.0%: CPT | Mod: CPTII,,, | Performed by: NURSE PRACTITIONER

## 2021-09-03 PROCEDURE — 99213 OFFICE O/P EST LOW 20 MIN: CPT | Mod: 95,,, | Performed by: NURSE PRACTITIONER

## 2021-09-03 RX ORDER — PANTOPRAZOLE SODIUM 40 MG/1
40 TABLET, DELAYED RELEASE ORAL DAILY
Qty: 30 TABLET | Refills: 1 | Status: SHIPPED | OUTPATIENT
Start: 2021-09-03 | End: 2022-03-17

## 2021-09-04 ENCOUNTER — PATIENT MESSAGE (OUTPATIENT)
Dept: HEMATOLOGY/ONCOLOGY | Facility: CLINIC | Age: 41
End: 2021-09-04

## 2021-09-05 ENCOUNTER — PATIENT MESSAGE (OUTPATIENT)
Dept: HEMATOLOGY/ONCOLOGY | Facility: CLINIC | Age: 41
End: 2021-09-05

## 2021-09-07 ENCOUNTER — TELEPHONE (OUTPATIENT)
Dept: HEMATOLOGY/ONCOLOGY | Facility: CLINIC | Age: 41
End: 2021-09-07

## 2021-09-07 ENCOUNTER — PATIENT MESSAGE (OUTPATIENT)
Dept: HEMATOLOGY/ONCOLOGY | Facility: CLINIC | Age: 41
End: 2021-09-07

## 2021-09-07 ENCOUNTER — INFUSION (OUTPATIENT)
Dept: INFUSION THERAPY | Facility: HOSPITAL | Age: 41
End: 2021-09-07
Payer: COMMERCIAL

## 2021-09-07 ENCOUNTER — OFFICE VISIT (OUTPATIENT)
Dept: HEMATOLOGY/ONCOLOGY | Facility: CLINIC | Age: 41
End: 2021-09-07
Payer: COMMERCIAL

## 2021-09-07 VITALS
TEMPERATURE: 97 F | RESPIRATION RATE: 18 BRPM | DIASTOLIC BLOOD PRESSURE: 70 MMHG | HEART RATE: 75 BPM | OXYGEN SATURATION: 100 % | SYSTOLIC BLOOD PRESSURE: 121 MMHG

## 2021-09-07 DIAGNOSIS — E88.09 HYPOALBUMINEMIA: ICD-10-CM

## 2021-09-07 DIAGNOSIS — Z17.1 MALIGNANT NEOPLASM OF UPPER-OUTER QUADRANT OF LEFT BREAST IN FEMALE, ESTROGEN RECEPTOR NEGATIVE: Primary | ICD-10-CM

## 2021-09-07 DIAGNOSIS — C50.412 MALIGNANT NEOPLASM OF UPPER-OUTER QUADRANT OF LEFT BREAST IN FEMALE, ESTROGEN RECEPTOR NEGATIVE: Primary | ICD-10-CM

## 2021-09-07 DIAGNOSIS — D64.81 ANEMIA ASSOCIATED WITH CHEMOTHERAPY: ICD-10-CM

## 2021-09-07 DIAGNOSIS — E87.6 HYPOKALEMIA: ICD-10-CM

## 2021-09-07 DIAGNOSIS — T45.1X5A ANEMIA ASSOCIATED WITH CHEMOTHERAPY: ICD-10-CM

## 2021-09-07 LAB
ALBUMIN SERPL BCP-MCNC: 3.4 G/DL (ref 3.5–5.2)
ALP SERPL-CCNC: 83 U/L (ref 55–135)
ALT SERPL W/O P-5'-P-CCNC: 21 U/L (ref 10–44)
ANION GAP SERPL CALC-SCNC: 12 MMOL/L (ref 8–16)
AST SERPL-CCNC: 14 U/L (ref 10–40)
BASOPHILS # BLD AUTO: 0.03 K/UL (ref 0–0.2)
BASOPHILS NFR BLD: 0.4 % (ref 0–1.9)
BILIRUB SERPL-MCNC: 0.2 MG/DL (ref 0.1–1)
BUN SERPL-MCNC: 9 MG/DL (ref 6–20)
CALCIUM SERPL-MCNC: 9.3 MG/DL (ref 8.7–10.5)
CHLORIDE SERPL-SCNC: 109 MMOL/L (ref 95–110)
CO2 SERPL-SCNC: 21 MMOL/L (ref 23–29)
CORTIS SERPL-MCNC: 9.3 UG/DL
CREAT SERPL-MCNC: 0.7 MG/DL (ref 0.5–1.4)
DIFFERENTIAL METHOD: ABNORMAL
EOSINOPHIL # BLD AUTO: 0 K/UL (ref 0–0.5)
EOSINOPHIL NFR BLD: 0.5 % (ref 0–8)
ERYTHROCYTE [DISTWIDTH] IN BLOOD BY AUTOMATED COUNT: 13.2 % (ref 11.5–14.5)
EST. GFR  (AFRICAN AMERICAN): >60 ML/MIN/1.73 M^2
EST. GFR  (NON AFRICAN AMERICAN): >60 ML/MIN/1.73 M^2
GLUCOSE SERPL-MCNC: 95 MG/DL (ref 70–110)
HCT VFR BLD AUTO: 31.3 % (ref 37–48.5)
HGB BLD-MCNC: 10.4 G/DL (ref 12–16)
IMM GRANULOCYTES # BLD AUTO: 0.15 K/UL (ref 0–0.04)
IMM GRANULOCYTES NFR BLD AUTO: 1.8 % (ref 0–0.5)
LYMPHOCYTES # BLD AUTO: 2.3 K/UL (ref 1–4.8)
LYMPHOCYTES NFR BLD: 27.3 % (ref 18–48)
MAGNESIUM SERPL-MCNC: 1.7 MG/DL (ref 1.6–2.6)
MCH RBC QN AUTO: 31.9 PG (ref 27–31)
MCHC RBC AUTO-ENTMCNC: 33.2 G/DL (ref 32–36)
MCV RBC AUTO: 96 FL (ref 82–98)
MONOCYTES # BLD AUTO: 0.8 K/UL (ref 0.3–1)
MONOCYTES NFR BLD: 10.1 % (ref 4–15)
NEUTROPHILS # BLD AUTO: 5 K/UL (ref 1.8–7.7)
NEUTROPHILS NFR BLD: 59.9 % (ref 38–73)
NRBC BLD-RTO: 0 /100 WBC
PLATELET # BLD AUTO: 306 K/UL (ref 150–450)
PMV BLD AUTO: 9.1 FL (ref 9.2–12.9)
POTASSIUM SERPL-SCNC: 3 MMOL/L (ref 3.5–5.1)
PROT SERPL-MCNC: 6 G/DL (ref 6–8.4)
RBC # BLD AUTO: 3.26 M/UL (ref 4–5.4)
SODIUM SERPL-SCNC: 142 MMOL/L (ref 136–145)
T4 FREE SERPL-MCNC: 0.88 NG/DL (ref 0.71–1.51)
TSH SERPL DL<=0.005 MIU/L-ACNC: 0.57 UIU/ML (ref 0.4–4)
WBC # BLD AUTO: 8.29 K/UL (ref 3.9–12.7)

## 2021-09-07 PROCEDURE — 84443 ASSAY THYROID STIM HORMONE: CPT | Performed by: NURSE PRACTITIONER

## 2021-09-07 PROCEDURE — 36591 DRAW BLOOD OFF VENOUS DEVICE: CPT

## 2021-09-07 PROCEDURE — 99215 PR OFFICE/OUTPT VISIT, EST, LEVL V, 40-54 MIN: ICD-10-PCS | Mod: 95,,, | Performed by: NURSE PRACTITIONER

## 2021-09-07 PROCEDURE — 25000003 PHARM REV CODE 250: Performed by: INTERNAL MEDICINE

## 2021-09-07 PROCEDURE — A4216 STERILE WATER/SALINE, 10 ML: HCPCS | Performed by: INTERNAL MEDICINE

## 2021-09-07 PROCEDURE — 3044F HG A1C LEVEL LT 7.0%: CPT | Mod: CPTII,,, | Performed by: NURSE PRACTITIONER

## 2021-09-07 PROCEDURE — 83735 ASSAY OF MAGNESIUM: CPT | Performed by: NURSE PRACTITIONER

## 2021-09-07 PROCEDURE — 99215 OFFICE O/P EST HI 40 MIN: CPT | Mod: 95,,, | Performed by: NURSE PRACTITIONER

## 2021-09-07 PROCEDURE — 85025 COMPLETE CBC W/AUTO DIFF WBC: CPT | Performed by: INTERNAL MEDICINE

## 2021-09-07 PROCEDURE — 1111F DSCHRG MED/CURRENT MED MERGE: CPT | Mod: CPTII,,, | Performed by: NURSE PRACTITIONER

## 2021-09-07 PROCEDURE — 36415 COLL VENOUS BLD VENIPUNCTURE: CPT | Performed by: INTERNAL MEDICINE

## 2021-09-07 PROCEDURE — 63600175 PHARM REV CODE 636 W HCPCS: Performed by: INTERNAL MEDICINE

## 2021-09-07 PROCEDURE — 84439 ASSAY OF FREE THYROXINE: CPT | Performed by: NURSE PRACTITIONER

## 2021-09-07 PROCEDURE — 82533 TOTAL CORTISOL: CPT | Performed by: NURSE PRACTITIONER

## 2021-09-07 PROCEDURE — 80053 COMPREHEN METABOLIC PANEL: CPT | Performed by: INTERNAL MEDICINE

## 2021-09-07 PROCEDURE — 3044F PR MOST RECENT HEMOGLOBIN A1C LEVEL <7.0%: ICD-10-PCS | Mod: CPTII,,, | Performed by: NURSE PRACTITIONER

## 2021-09-07 PROCEDURE — 1111F PR DISCHARGE MEDS RECONCILED W/ CURRENT OUTPATIENT MED LIST: ICD-10-PCS | Mod: CPTII,,, | Performed by: NURSE PRACTITIONER

## 2021-09-07 RX ORDER — SODIUM CHLORIDE 0.9 % (FLUSH) 0.9 %
10 SYRINGE (ML) INJECTION
Status: DISCONTINUED | OUTPATIENT
Start: 2021-09-07 | End: 2021-09-07 | Stop reason: HOSPADM

## 2021-09-07 RX ORDER — SODIUM CHLORIDE 0.9 % (FLUSH) 0.9 %
10 SYRINGE (ML) INJECTION
Status: CANCELLED | OUTPATIENT
Start: 2021-09-07

## 2021-09-07 RX ORDER — HEPARIN 100 UNIT/ML
500 SYRINGE INTRAVENOUS
Status: CANCELLED | OUTPATIENT
Start: 2021-09-07

## 2021-09-07 RX ORDER — POTASSIUM CHLORIDE 750 MG/1
CAPSULE, EXTENDED RELEASE ORAL
Qty: 30 CAPSULE | Refills: 0 | Status: SHIPPED | OUTPATIENT
Start: 2021-09-07 | End: 2021-10-08

## 2021-09-07 RX ORDER — POTASSIUM CHLORIDE 750 MG/1
CAPSULE, EXTENDED RELEASE ORAL
Qty: 30 CAPSULE | Refills: 0 | Status: SHIPPED | OUTPATIENT
Start: 2021-09-07 | End: 2021-09-07 | Stop reason: SDUPTHER

## 2021-09-07 RX ORDER — HEPARIN 100 UNIT/ML
500 SYRINGE INTRAVENOUS
Status: DISCONTINUED | OUTPATIENT
Start: 2021-09-07 | End: 2021-09-07 | Stop reason: HOSPADM

## 2021-09-07 RX ADMIN — Medication 10 ML: at 08:09

## 2021-09-07 RX ADMIN — HEPARIN 500 UNITS: 100 SYRINGE at 08:09

## 2021-09-08 ENCOUNTER — PATIENT MESSAGE (OUTPATIENT)
Dept: HEMATOLOGY/ONCOLOGY | Facility: CLINIC | Age: 41
End: 2021-09-08

## 2021-09-08 ENCOUNTER — TELEPHONE (OUTPATIENT)
Dept: HEMATOLOGY/ONCOLOGY | Facility: CLINIC | Age: 41
End: 2021-09-08

## 2021-09-08 DIAGNOSIS — C50.412 MALIGNANT NEOPLASM OF UPPER-OUTER QUADRANT OF LEFT BREAST IN FEMALE, ESTROGEN RECEPTOR NEGATIVE: Primary | ICD-10-CM

## 2021-09-08 DIAGNOSIS — Z17.1 MALIGNANT NEOPLASM OF UPPER-OUTER QUADRANT OF LEFT BREAST IN FEMALE, ESTROGEN RECEPTOR NEGATIVE: Primary | ICD-10-CM

## 2021-09-09 ENCOUNTER — TELEPHONE (OUTPATIENT)
Dept: HEMATOLOGY/ONCOLOGY | Facility: CLINIC | Age: 41
End: 2021-09-09

## 2021-09-10 ENCOUNTER — LAB VISIT (OUTPATIENT)
Dept: LAB | Facility: HOSPITAL | Age: 41
End: 2021-09-10
Attending: NURSE PRACTITIONER
Payer: COMMERCIAL

## 2021-09-10 ENCOUNTER — TELEPHONE (OUTPATIENT)
Dept: HEMATOLOGY/ONCOLOGY | Facility: CLINIC | Age: 41
End: 2021-09-10

## 2021-09-10 ENCOUNTER — INFUSION (OUTPATIENT)
Dept: INFUSION THERAPY | Facility: HOSPITAL | Age: 41
End: 2021-09-10
Attending: NURSE PRACTITIONER
Payer: COMMERCIAL

## 2021-09-10 VITALS
RESPIRATION RATE: 18 BRPM | DIASTOLIC BLOOD PRESSURE: 70 MMHG | TEMPERATURE: 98 F | HEART RATE: 82 BPM | OXYGEN SATURATION: 99 % | SYSTOLIC BLOOD PRESSURE: 111 MMHG

## 2021-09-10 DIAGNOSIS — C50.412 MALIGNANT NEOPLASM OF UPPER-OUTER QUADRANT OF LEFT BREAST IN FEMALE, ESTROGEN RECEPTOR NEGATIVE: ICD-10-CM

## 2021-09-10 DIAGNOSIS — C50.412 MALIGNANT NEOPLASM OF UPPER-OUTER QUADRANT OF LEFT BREAST IN FEMALE, ESTROGEN RECEPTOR NEGATIVE: Primary | ICD-10-CM

## 2021-09-10 DIAGNOSIS — Z17.1 MALIGNANT NEOPLASM OF UPPER-OUTER QUADRANT OF LEFT BREAST IN FEMALE, ESTROGEN RECEPTOR NEGATIVE: ICD-10-CM

## 2021-09-10 DIAGNOSIS — E87.6 HYPOKALEMIA: ICD-10-CM

## 2021-09-10 DIAGNOSIS — Z17.1 MALIGNANT NEOPLASM OF UPPER-OUTER QUADRANT OF LEFT BREAST IN FEMALE, ESTROGEN RECEPTOR NEGATIVE: Primary | ICD-10-CM

## 2021-09-10 LAB
ALBUMIN SERPL BCP-MCNC: 3.3 G/DL (ref 3.5–5.2)
ALP SERPL-CCNC: 76 U/L (ref 55–135)
ALT SERPL W/O P-5'-P-CCNC: 15 U/L (ref 10–44)
ANION GAP SERPL CALC-SCNC: 12 MMOL/L (ref 8–16)
ANION GAP SERPL CALC-SCNC: 12 MMOL/L (ref 8–16)
AST SERPL-CCNC: 13 U/L (ref 10–40)
BASOPHILS # BLD AUTO: 0.04 K/UL (ref 0–0.2)
BASOPHILS NFR BLD: 0.6 % (ref 0–1.9)
BILIRUB SERPL-MCNC: 0.2 MG/DL (ref 0.1–1)
BUN SERPL-MCNC: 13 MG/DL (ref 6–20)
BUN SERPL-MCNC: 13 MG/DL (ref 6–20)
CALCIUM SERPL-MCNC: 9 MG/DL (ref 8.7–10.5)
CALCIUM SERPL-MCNC: 9 MG/DL (ref 8.7–10.5)
CHLORIDE SERPL-SCNC: 105 MMOL/L (ref 95–110)
CHLORIDE SERPL-SCNC: 105 MMOL/L (ref 95–110)
CO2 SERPL-SCNC: 24 MMOL/L (ref 23–29)
CO2 SERPL-SCNC: 24 MMOL/L (ref 23–29)
CORTIS SERPL-MCNC: <1 UG/DL
CREAT SERPL-MCNC: 0.7 MG/DL (ref 0.5–1.4)
CREAT SERPL-MCNC: 0.7 MG/DL (ref 0.5–1.4)
DIFFERENTIAL METHOD: ABNORMAL
EOSINOPHIL # BLD AUTO: 0.1 K/UL (ref 0–0.5)
EOSINOPHIL NFR BLD: 1.4 % (ref 0–8)
ERYTHROCYTE [DISTWIDTH] IN BLOOD BY AUTOMATED COUNT: 14.1 % (ref 11.5–14.5)
EST. GFR  (AFRICAN AMERICAN): >60 ML/MIN/1.73 M^2
EST. GFR  (AFRICAN AMERICAN): >60 ML/MIN/1.73 M^2
EST. GFR  (NON AFRICAN AMERICAN): >60 ML/MIN/1.73 M^2
EST. GFR  (NON AFRICAN AMERICAN): >60 ML/MIN/1.73 M^2
GLUCOSE SERPL-MCNC: 71 MG/DL (ref 70–110)
GLUCOSE SERPL-MCNC: 71 MG/DL (ref 70–110)
HCT VFR BLD AUTO: 34.3 % (ref 37–48.5)
HGB BLD-MCNC: 11.4 G/DL (ref 12–16)
IMM GRANULOCYTES # BLD AUTO: 0.19 K/UL (ref 0–0.04)
IMM GRANULOCYTES NFR BLD AUTO: 2.9 % (ref 0–0.5)
LYMPHOCYTES # BLD AUTO: 2.2 K/UL (ref 1–4.8)
LYMPHOCYTES NFR BLD: 32.9 % (ref 18–48)
MAGNESIUM SERPL-MCNC: 1.7 MG/DL (ref 1.6–2.6)
MCH RBC QN AUTO: 31.4 PG (ref 27–31)
MCHC RBC AUTO-ENTMCNC: 33.2 G/DL (ref 32–36)
MCV RBC AUTO: 95 FL (ref 82–98)
MONOCYTES # BLD AUTO: 0.7 K/UL (ref 0.3–1)
MONOCYTES NFR BLD: 10.3 % (ref 4–15)
NEUTROPHILS # BLD AUTO: 3.4 K/UL (ref 1.8–7.7)
NEUTROPHILS NFR BLD: 51.9 % (ref 38–73)
NRBC BLD-RTO: 0 /100 WBC
PLATELET # BLD AUTO: 252 K/UL (ref 150–450)
PMV BLD AUTO: 8.6 FL (ref 9.2–12.9)
POTASSIUM SERPL-SCNC: 3.1 MMOL/L (ref 3.5–5.1)
POTASSIUM SERPL-SCNC: 3.1 MMOL/L (ref 3.5–5.1)
PROT SERPL-MCNC: 5.9 G/DL (ref 6–8.4)
RBC # BLD AUTO: 3.63 M/UL (ref 4–5.4)
SODIUM SERPL-SCNC: 141 MMOL/L (ref 136–145)
SODIUM SERPL-SCNC: 141 MMOL/L (ref 136–145)
T4 FREE SERPL-MCNC: 0.88 NG/DL (ref 0.71–1.51)
TSH SERPL DL<=0.005 MIU/L-ACNC: 0.98 UIU/ML (ref 0.4–4)
WBC # BLD AUTO: 6.59 K/UL (ref 3.9–12.7)

## 2021-09-10 PROCEDURE — 84439 ASSAY OF FREE THYROXINE: CPT | Performed by: NURSE PRACTITIONER

## 2021-09-10 PROCEDURE — 63600175 PHARM REV CODE 636 W HCPCS: Performed by: INTERNAL MEDICINE

## 2021-09-10 PROCEDURE — 82533 TOTAL CORTISOL: CPT | Performed by: NURSE PRACTITIONER

## 2021-09-10 PROCEDURE — 80053 COMPREHEN METABOLIC PANEL: CPT | Performed by: NURSE PRACTITIONER

## 2021-09-10 PROCEDURE — 83735 ASSAY OF MAGNESIUM: CPT | Performed by: NURSE PRACTITIONER

## 2021-09-10 PROCEDURE — 85025 COMPLETE CBC W/AUTO DIFF WBC: CPT | Performed by: NURSE PRACTITIONER

## 2021-09-10 PROCEDURE — 36591 DRAW BLOOD OFF VENOUS DEVICE: CPT

## 2021-09-10 PROCEDURE — 84443 ASSAY THYROID STIM HORMONE: CPT | Performed by: NURSE PRACTITIONER

## 2021-09-10 RX ORDER — HEPARIN 100 UNIT/ML
500 SYRINGE INTRAVENOUS
Status: CANCELLED | OUTPATIENT
Start: 2021-09-10

## 2021-09-10 RX ORDER — HEPARIN 100 UNIT/ML
500 SYRINGE INTRAVENOUS
Status: DISCONTINUED | OUTPATIENT
Start: 2021-09-10 | End: 2021-09-10 | Stop reason: HOSPADM

## 2021-09-10 RX ORDER — SODIUM CHLORIDE 0.9 % (FLUSH) 0.9 %
10 SYRINGE (ML) INJECTION
Status: DISCONTINUED | OUTPATIENT
Start: 2021-09-10 | End: 2021-09-10 | Stop reason: HOSPADM

## 2021-09-10 RX ORDER — SODIUM CHLORIDE 0.9 % (FLUSH) 0.9 %
10 SYRINGE (ML) INJECTION
Status: CANCELLED | OUTPATIENT
Start: 2021-09-10

## 2021-09-10 RX ADMIN — HEPARIN 500 UNITS: 100 SYRINGE at 09:09

## 2021-09-12 ENCOUNTER — PATIENT MESSAGE (OUTPATIENT)
Dept: HEMATOLOGY/ONCOLOGY | Facility: CLINIC | Age: 41
End: 2021-09-12

## 2021-09-13 ENCOUNTER — TELEPHONE (OUTPATIENT)
Dept: HEMATOLOGY/ONCOLOGY | Facility: CLINIC | Age: 41
End: 2021-09-13

## 2021-09-13 ENCOUNTER — OFFICE VISIT (OUTPATIENT)
Dept: HEMATOLOGY/ONCOLOGY | Facility: CLINIC | Age: 41
End: 2021-09-13
Payer: COMMERCIAL

## 2021-09-13 ENCOUNTER — PATIENT MESSAGE (OUTPATIENT)
Dept: HEMATOLOGY/ONCOLOGY | Facility: CLINIC | Age: 41
End: 2021-09-13

## 2021-09-13 VITALS
DIASTOLIC BLOOD PRESSURE: 75 MMHG | RESPIRATION RATE: 16 BRPM | HEIGHT: 67 IN | BODY MASS INDEX: 22.84 KG/M2 | HEART RATE: 86 BPM | TEMPERATURE: 98 F | OXYGEN SATURATION: 100 % | WEIGHT: 145.5 LBS | SYSTOLIC BLOOD PRESSURE: 128 MMHG

## 2021-09-13 DIAGNOSIS — Z17.1 MALIGNANT NEOPLASM OF UPPER-OUTER QUADRANT OF LEFT BREAST IN FEMALE, ESTROGEN RECEPTOR NEGATIVE: Primary | ICD-10-CM

## 2021-09-13 DIAGNOSIS — J98.4 PNEUMONITIS: ICD-10-CM

## 2021-09-13 DIAGNOSIS — K52.1 DIARRHEA DUE TO DRUG: ICD-10-CM

## 2021-09-13 DIAGNOSIS — C50.412 MALIGNANT NEOPLASM OF UPPER-OUTER QUADRANT OF LEFT BREAST IN FEMALE, ESTROGEN RECEPTOR NEGATIVE: Primary | ICD-10-CM

## 2021-09-13 DIAGNOSIS — Z15.01 BRCA2 GENE MUTATION POSITIVE: Chronic | ICD-10-CM

## 2021-09-13 DIAGNOSIS — Z29.89 ENCOUNTER FOR IMMUNOTHERAPY: ICD-10-CM

## 2021-09-13 DIAGNOSIS — Z15.09 BRCA2 GENE MUTATION POSITIVE: Chronic | ICD-10-CM

## 2021-09-13 PROCEDURE — 3044F HG A1C LEVEL LT 7.0%: CPT | Mod: CPTII,S$GLB,, | Performed by: INTERNAL MEDICINE

## 2021-09-13 PROCEDURE — 3078F PR MOST RECENT DIASTOLIC BLOOD PRESSURE < 80 MM HG: ICD-10-PCS | Mod: CPTII,S$GLB,, | Performed by: INTERNAL MEDICINE

## 2021-09-13 PROCEDURE — 1111F PR DISCHARGE MEDS RECONCILED W/ CURRENT OUTPATIENT MED LIST: ICD-10-PCS | Mod: CPTII,S$GLB,, | Performed by: INTERNAL MEDICINE

## 2021-09-13 PROCEDURE — 1160F RVW MEDS BY RX/DR IN RCRD: CPT | Mod: CPTII,S$GLB,, | Performed by: INTERNAL MEDICINE

## 2021-09-13 PROCEDURE — 1159F PR MEDICATION LIST DOCUMENTED IN MEDICAL RECORD: ICD-10-PCS | Mod: CPTII,S$GLB,, | Performed by: INTERNAL MEDICINE

## 2021-09-13 PROCEDURE — 3044F PR MOST RECENT HEMOGLOBIN A1C LEVEL <7.0%: ICD-10-PCS | Mod: CPTII,S$GLB,, | Performed by: INTERNAL MEDICINE

## 2021-09-13 PROCEDURE — 99999 PR PBB SHADOW E&M-EST. PATIENT-LVL IV: ICD-10-PCS | Mod: PBBFAC,,, | Performed by: INTERNAL MEDICINE

## 2021-09-13 PROCEDURE — 3074F PR MOST RECENT SYSTOLIC BLOOD PRESSURE < 130 MM HG: ICD-10-PCS | Mod: CPTII,S$GLB,, | Performed by: INTERNAL MEDICINE

## 2021-09-13 PROCEDURE — 3074F SYST BP LT 130 MM HG: CPT | Mod: CPTII,S$GLB,, | Performed by: INTERNAL MEDICINE

## 2021-09-13 PROCEDURE — 99214 OFFICE O/P EST MOD 30 MIN: CPT | Mod: S$GLB,,, | Performed by: INTERNAL MEDICINE

## 2021-09-13 PROCEDURE — 3008F PR BODY MASS INDEX (BMI) DOCUMENTED: ICD-10-PCS | Mod: CPTII,S$GLB,, | Performed by: INTERNAL MEDICINE

## 2021-09-13 PROCEDURE — 1111F DSCHRG MED/CURRENT MED MERGE: CPT | Mod: CPTII,S$GLB,, | Performed by: INTERNAL MEDICINE

## 2021-09-13 PROCEDURE — 99214 PR OFFICE/OUTPT VISIT, EST, LEVL IV, 30-39 MIN: ICD-10-PCS | Mod: S$GLB,,, | Performed by: INTERNAL MEDICINE

## 2021-09-13 PROCEDURE — 1159F MED LIST DOCD IN RCRD: CPT | Mod: CPTII,S$GLB,, | Performed by: INTERNAL MEDICINE

## 2021-09-13 PROCEDURE — 99999 PR PBB SHADOW E&M-EST. PATIENT-LVL IV: CPT | Mod: PBBFAC,,, | Performed by: INTERNAL MEDICINE

## 2021-09-13 PROCEDURE — 3008F BODY MASS INDEX DOCD: CPT | Mod: CPTII,S$GLB,, | Performed by: INTERNAL MEDICINE

## 2021-09-13 PROCEDURE — 3078F DIAST BP <80 MM HG: CPT | Mod: CPTII,S$GLB,, | Performed by: INTERNAL MEDICINE

## 2021-09-13 PROCEDURE — 1160F PR REVIEW ALL MEDS BY PRESCRIBER/CLIN PHARMACIST DOCUMENTED: ICD-10-PCS | Mod: CPTII,S$GLB,, | Performed by: INTERNAL MEDICINE

## 2021-09-14 DIAGNOSIS — C50.412 MALIGNANT NEOPLASM OF UPPER-OUTER QUADRANT OF LEFT BREAST IN FEMALE, ESTROGEN RECEPTOR NEGATIVE: Primary | ICD-10-CM

## 2021-09-14 DIAGNOSIS — Z17.1 MALIGNANT NEOPLASM OF UPPER-OUTER QUADRANT OF LEFT BREAST IN FEMALE, ESTROGEN RECEPTOR NEGATIVE: Primary | ICD-10-CM

## 2021-09-16 ENCOUNTER — INFUSION (OUTPATIENT)
Dept: INFUSION THERAPY | Facility: HOSPITAL | Age: 41
End: 2021-09-16
Attending: INTERNAL MEDICINE
Payer: COMMERCIAL

## 2021-09-16 ENCOUNTER — HOSPITAL ENCOUNTER (OUTPATIENT)
Dept: RADIOLOGY | Facility: HOSPITAL | Age: 41
Discharge: HOME | End: 2021-09-16
Attending: INTERNAL MEDICINE
Payer: COMMERCIAL

## 2021-09-16 DIAGNOSIS — C50.412 MALIGNANT NEOPLASM OF UPPER-OUTER QUADRANT OF LEFT BREAST IN FEMALE, ESTROGEN RECEPTOR NEGATIVE: ICD-10-CM

## 2021-09-16 DIAGNOSIS — C50.412 MALIGNANT NEOPLASM OF UPPER-OUTER QUADRANT OF LEFT BREAST IN FEMALE, ESTROGEN RECEPTOR NEGATIVE: Primary | ICD-10-CM

## 2021-09-16 DIAGNOSIS — Z17.1 MALIGNANT NEOPLASM OF UPPER-OUTER QUADRANT OF LEFT BREAST IN FEMALE, ESTROGEN RECEPTOR NEGATIVE: ICD-10-CM

## 2021-09-16 DIAGNOSIS — Z17.1 MALIGNANT NEOPLASM OF UPPER-OUTER QUADRANT OF LEFT BREAST IN FEMALE, ESTROGEN RECEPTOR NEGATIVE: Primary | ICD-10-CM

## 2021-09-16 LAB
ANION GAP SERPL CALC-SCNC: 12 MMOL/L (ref 8–16)
BUN SERPL-MCNC: 16 MG/DL (ref 6–20)
CALCIUM SERPL-MCNC: 9.2 MG/DL (ref 8.7–10.5)
CHLORIDE SERPL-SCNC: 105 MMOL/L (ref 95–110)
CO2 SERPL-SCNC: 23 MMOL/L (ref 23–29)
CREAT SERPL-MCNC: 0.8 MG/DL (ref 0.5–1.4)
EST. GFR  (AFRICAN AMERICAN): >60 ML/MIN/1.73 M^2
EST. GFR  (NON AFRICAN AMERICAN): >60 ML/MIN/1.73 M^2
GLUCOSE SERPL-MCNC: 88 MG/DL (ref 70–110)
POTASSIUM SERPL-SCNC: 4.2 MMOL/L (ref 3.5–5.1)
SODIUM SERPL-SCNC: 140 MMOL/L (ref 136–145)

## 2021-09-16 PROCEDURE — 71046 X-RAY EXAM CHEST 2 VIEWS: CPT | Mod: TC,FY,PO

## 2021-09-16 PROCEDURE — 36591 DRAW BLOOD OFF VENOUS DEVICE: CPT | Mod: PN

## 2021-09-16 PROCEDURE — 71046 X-RAY EXAM CHEST 2 VIEWS: CPT | Mod: 26,,, | Performed by: RADIOLOGY

## 2021-09-16 PROCEDURE — 96523 IRRIG DRUG DELIVERY DEVICE: CPT | Mod: PN

## 2021-09-16 PROCEDURE — 71046 XR CHEST PA AND LATERAL: ICD-10-PCS | Mod: 26,,, | Performed by: RADIOLOGY

## 2021-09-16 PROCEDURE — 80048 BASIC METABOLIC PNL TOTAL CA: CPT | Mod: PN | Performed by: INTERNAL MEDICINE

## 2021-09-16 RX ORDER — HEPARIN 100 UNIT/ML
500 SYRINGE INTRAVENOUS
Status: CANCELLED | OUTPATIENT
Start: 2021-09-16

## 2021-09-16 RX ORDER — SODIUM CHLORIDE 0.9 % (FLUSH) 0.9 %
10 SYRINGE (ML) INJECTION
Status: DISCONTINUED | OUTPATIENT
Start: 2021-09-16 | End: 2021-09-16 | Stop reason: HOSPADM

## 2021-09-16 RX ORDER — HEPARIN 100 UNIT/ML
500 SYRINGE INTRAVENOUS
Status: DISCONTINUED | OUTPATIENT
Start: 2021-09-16 | End: 2021-09-16 | Stop reason: HOSPADM

## 2021-09-16 RX ORDER — SODIUM CHLORIDE 0.9 % (FLUSH) 0.9 %
10 SYRINGE (ML) INJECTION
Status: CANCELLED | OUTPATIENT
Start: 2021-09-16

## 2021-09-17 ENCOUNTER — PATIENT MESSAGE (OUTPATIENT)
Dept: HEMATOLOGY/ONCOLOGY | Facility: CLINIC | Age: 41
End: 2021-09-17

## 2021-09-21 ENCOUNTER — PATIENT MESSAGE (OUTPATIENT)
Dept: HEMATOLOGY/ONCOLOGY | Facility: CLINIC | Age: 41
End: 2021-09-21

## 2021-09-29 ENCOUNTER — PATIENT MESSAGE (OUTPATIENT)
Dept: HEMATOLOGY/ONCOLOGY | Facility: CLINIC | Age: 41
End: 2021-09-29

## 2021-10-04 ENCOUNTER — PATIENT MESSAGE (OUTPATIENT)
Dept: HEMATOLOGY/ONCOLOGY | Facility: CLINIC | Age: 41
End: 2021-10-04

## 2021-10-11 ENCOUNTER — TELEPHONE (OUTPATIENT)
Dept: HEMATOLOGY/ONCOLOGY | Facility: CLINIC | Age: 41
End: 2021-10-11

## 2021-10-14 ENCOUNTER — OFFICE VISIT (OUTPATIENT)
Dept: HEMATOLOGY/ONCOLOGY | Facility: CLINIC | Age: 41
End: 2021-10-14
Payer: COMMERCIAL

## 2021-10-14 ENCOUNTER — INFUSION (OUTPATIENT)
Dept: INFUSION THERAPY | Facility: HOSPITAL | Age: 41
End: 2021-10-14
Attending: INTERNAL MEDICINE
Payer: COMMERCIAL

## 2021-10-14 VITALS
RESPIRATION RATE: 16 BRPM | OXYGEN SATURATION: 99 % | SYSTOLIC BLOOD PRESSURE: 123 MMHG | TEMPERATURE: 98 F | BODY MASS INDEX: 21.93 KG/M2 | HEART RATE: 92 BPM | HEIGHT: 67 IN | DIASTOLIC BLOOD PRESSURE: 82 MMHG | WEIGHT: 139.75 LBS

## 2021-10-14 DIAGNOSIS — Z15.01 BRCA2 GENE MUTATION POSITIVE: Chronic | ICD-10-CM

## 2021-10-14 DIAGNOSIS — R19.7 DIARRHEA, UNSPECIFIED TYPE: ICD-10-CM

## 2021-10-14 DIAGNOSIS — Z15.09 BRCA2 GENE MUTATION POSITIVE: Chronic | ICD-10-CM

## 2021-10-14 DIAGNOSIS — C50.412 MALIGNANT NEOPLASM OF UPPER-OUTER QUADRANT OF LEFT BREAST IN FEMALE, ESTROGEN RECEPTOR NEGATIVE: Primary | ICD-10-CM

## 2021-10-14 DIAGNOSIS — Z98.890 PONV (POSTOPERATIVE NAUSEA AND VOMITING): ICD-10-CM

## 2021-10-14 DIAGNOSIS — Z17.1 MALIGNANT NEOPLASM OF UPPER-OUTER QUADRANT OF LEFT BREAST IN FEMALE, ESTROGEN RECEPTOR NEGATIVE: Primary | ICD-10-CM

## 2021-10-14 DIAGNOSIS — R11.2 PONV (POSTOPERATIVE NAUSEA AND VOMITING): ICD-10-CM

## 2021-10-14 LAB
ALBUMIN SERPL BCP-MCNC: 3.2 G/DL (ref 3.5–5.2)
ALP SERPL-CCNC: 87 U/L (ref 55–135)
ALT SERPL W/O P-5'-P-CCNC: 18 U/L (ref 10–44)
ANION GAP SERPL CALC-SCNC: 14 MMOL/L (ref 8–16)
AST SERPL-CCNC: 20 U/L (ref 10–40)
BASOPHILS # BLD AUTO: 0.02 K/UL (ref 0–0.2)
BASOPHILS NFR BLD: 0.3 % (ref 0–1.9)
BILIRUB SERPL-MCNC: 0.2 MG/DL (ref 0.1–1)
BUN SERPL-MCNC: 9 MG/DL (ref 6–20)
CALCIUM SERPL-MCNC: 9 MG/DL (ref 8.7–10.5)
CHLORIDE SERPL-SCNC: 102 MMOL/L (ref 95–110)
CO2 SERPL-SCNC: 19 MMOL/L (ref 23–29)
CREAT SERPL-MCNC: 0.7 MG/DL (ref 0.5–1.4)
DIFFERENTIAL METHOD: ABNORMAL
EOSINOPHIL # BLD AUTO: 0 K/UL (ref 0–0.5)
EOSINOPHIL NFR BLD: 0.1 % (ref 0–8)
ERYTHROCYTE [DISTWIDTH] IN BLOOD BY AUTOMATED COUNT: 13.5 % (ref 11.5–14.5)
EST. GFR  (AFRICAN AMERICAN): >60 ML/MIN/1.73 M^2
EST. GFR  (NON AFRICAN AMERICAN): >60 ML/MIN/1.73 M^2
GLUCOSE SERPL-MCNC: 95 MG/DL (ref 70–110)
HCT VFR BLD AUTO: 35.2 % (ref 37–48.5)
HGB BLD-MCNC: 11.7 G/DL (ref 12–16)
IMM GRANULOCYTES # BLD AUTO: 0.08 K/UL (ref 0–0.04)
IMM GRANULOCYTES NFR BLD AUTO: 1.1 % (ref 0–0.5)
LYMPHOCYTES # BLD AUTO: 1.4 K/UL (ref 1–4.8)
LYMPHOCYTES NFR BLD: 19.3 % (ref 18–48)
MCH RBC QN AUTO: 30.5 PG (ref 27–31)
MCHC RBC AUTO-ENTMCNC: 33.2 G/DL (ref 32–36)
MCV RBC AUTO: 92 FL (ref 82–98)
MONOCYTES # BLD AUTO: 0.5 K/UL (ref 0.3–1)
MONOCYTES NFR BLD: 7.4 % (ref 4–15)
NEUTROPHILS # BLD AUTO: 5.2 K/UL (ref 1.8–7.7)
NEUTROPHILS NFR BLD: 71.8 % (ref 38–73)
NRBC BLD-RTO: 0 /100 WBC
PLATELET # BLD AUTO: 449 K/UL (ref 150–450)
PMV BLD AUTO: 8.8 FL (ref 9.2–12.9)
POTASSIUM SERPL-SCNC: 3.9 MMOL/L (ref 3.5–5.1)
PROT SERPL-MCNC: 6.3 G/DL (ref 6–8.4)
RBC # BLD AUTO: 3.84 M/UL (ref 4–5.4)
SODIUM SERPL-SCNC: 135 MMOL/L (ref 136–145)
T4 FREE SERPL-MCNC: 1.1 NG/DL (ref 0.71–1.51)
TSH SERPL DL<=0.005 MIU/L-ACNC: 0.78 UIU/ML (ref 0.4–4)
WBC # BLD AUTO: 7.27 K/UL (ref 3.9–12.7)

## 2021-10-14 PROCEDURE — 84439 ASSAY OF FREE THYROXINE: CPT | Performed by: INTERNAL MEDICINE

## 2021-10-14 PROCEDURE — 3074F PR MOST RECENT SYSTOLIC BLOOD PRESSURE < 130 MM HG: ICD-10-PCS | Mod: CPTII,S$GLB,, | Performed by: INTERNAL MEDICINE

## 2021-10-14 PROCEDURE — A4216 STERILE WATER/SALINE, 10 ML: HCPCS | Performed by: INTERNAL MEDICINE

## 2021-10-14 PROCEDURE — 25000003 PHARM REV CODE 250: Performed by: INTERNAL MEDICINE

## 2021-10-14 PROCEDURE — 85025 COMPLETE CBC W/AUTO DIFF WBC: CPT | Performed by: INTERNAL MEDICINE

## 2021-10-14 PROCEDURE — 99999 PR PBB SHADOW E&M-EST. PATIENT-LVL V: CPT | Mod: PBBFAC,,, | Performed by: INTERNAL MEDICINE

## 2021-10-14 PROCEDURE — 99214 OFFICE O/P EST MOD 30 MIN: CPT | Mod: S$GLB,,, | Performed by: INTERNAL MEDICINE

## 2021-10-14 PROCEDURE — 3044F PR MOST RECENT HEMOGLOBIN A1C LEVEL <7.0%: ICD-10-PCS | Mod: CPTII,S$GLB,, | Performed by: INTERNAL MEDICINE

## 2021-10-14 PROCEDURE — 3079F DIAST BP 80-89 MM HG: CPT | Mod: CPTII,S$GLB,, | Performed by: INTERNAL MEDICINE

## 2021-10-14 PROCEDURE — 3044F HG A1C LEVEL LT 7.0%: CPT | Mod: CPTII,S$GLB,, | Performed by: INTERNAL MEDICINE

## 2021-10-14 PROCEDURE — 3008F PR BODY MASS INDEX (BMI) DOCUMENTED: ICD-10-PCS | Mod: CPTII,S$GLB,, | Performed by: INTERNAL MEDICINE

## 2021-10-14 PROCEDURE — 80053 COMPREHEN METABOLIC PANEL: CPT | Performed by: INTERNAL MEDICINE

## 2021-10-14 PROCEDURE — 99999 PR PBB SHADOW E&M-EST. PATIENT-LVL V: ICD-10-PCS | Mod: PBBFAC,,, | Performed by: INTERNAL MEDICINE

## 2021-10-14 PROCEDURE — 63600175 PHARM REV CODE 636 W HCPCS: Performed by: INTERNAL MEDICINE

## 2021-10-14 PROCEDURE — 99214 PR OFFICE/OUTPT VISIT, EST, LEVL IV, 30-39 MIN: ICD-10-PCS | Mod: S$GLB,,, | Performed by: INTERNAL MEDICINE

## 2021-10-14 PROCEDURE — 3074F SYST BP LT 130 MM HG: CPT | Mod: CPTII,S$GLB,, | Performed by: INTERNAL MEDICINE

## 2021-10-14 PROCEDURE — 3079F PR MOST RECENT DIASTOLIC BLOOD PRESSURE 80-89 MM HG: ICD-10-PCS | Mod: CPTII,S$GLB,, | Performed by: INTERNAL MEDICINE

## 2021-10-14 PROCEDURE — 3008F BODY MASS INDEX DOCD: CPT | Mod: CPTII,S$GLB,, | Performed by: INTERNAL MEDICINE

## 2021-10-14 PROCEDURE — 84443 ASSAY THYROID STIM HORMONE: CPT | Performed by: INTERNAL MEDICINE

## 2021-10-14 PROCEDURE — 36591 DRAW BLOOD OFF VENOUS DEVICE: CPT

## 2021-10-14 RX ORDER — SODIUM CHLORIDE 0.9 % (FLUSH) 0.9 %
10 SYRINGE (ML) INJECTION
Status: CANCELLED | OUTPATIENT
Start: 2021-10-14

## 2021-10-14 RX ORDER — HEPARIN 100 UNIT/ML
500 SYRINGE INTRAVENOUS
Status: CANCELLED | OUTPATIENT
Start: 2021-10-14

## 2021-10-14 RX ORDER — SODIUM CHLORIDE 0.9 % (FLUSH) 0.9 %
10 SYRINGE (ML) INJECTION
Status: DISCONTINUED | OUTPATIENT
Start: 2021-10-14 | End: 2021-10-14 | Stop reason: HOSPADM

## 2021-10-14 RX ORDER — HEPARIN 100 UNIT/ML
500 SYRINGE INTRAVENOUS
Status: DISCONTINUED | OUTPATIENT
Start: 2021-10-14 | End: 2021-10-14 | Stop reason: HOSPADM

## 2021-10-14 RX ADMIN — Medication 10 ML: at 02:10

## 2021-10-14 RX ADMIN — HEPARIN 500 UNITS: 100 SYRINGE at 02:10

## 2021-10-15 ENCOUNTER — PATIENT MESSAGE (OUTPATIENT)
Dept: HEMATOLOGY/ONCOLOGY | Facility: CLINIC | Age: 41
End: 2021-10-15

## 2021-10-18 ENCOUNTER — PATIENT MESSAGE (OUTPATIENT)
Dept: HEMATOLOGY/ONCOLOGY | Facility: CLINIC | Age: 41
End: 2021-10-18

## 2021-10-18 ENCOUNTER — PATIENT MESSAGE (OUTPATIENT)
Dept: HEMATOLOGY/ONCOLOGY | Facility: CLINIC | Age: 41
End: 2021-10-18
Payer: COMMERCIAL

## 2021-10-20 ENCOUNTER — PATIENT MESSAGE (OUTPATIENT)
Dept: HEMATOLOGY/ONCOLOGY | Facility: CLINIC | Age: 41
End: 2021-10-20

## 2021-10-20 ENCOUNTER — PATIENT MESSAGE (OUTPATIENT)
Dept: HEMATOLOGY/ONCOLOGY | Facility: CLINIC | Age: 41
End: 2021-10-20
Payer: COMMERCIAL

## 2021-10-20 ENCOUNTER — TELEPHONE (OUTPATIENT)
Dept: HEMATOLOGY/ONCOLOGY | Facility: CLINIC | Age: 41
End: 2021-10-20

## 2021-10-21 ENCOUNTER — OFFICE VISIT (OUTPATIENT)
Dept: OPHTHALMOLOGY | Facility: CLINIC | Age: 41
End: 2021-10-21
Payer: COMMERCIAL

## 2021-10-21 DIAGNOSIS — Z15.01 MONOALLELIC MUTATION OF BRCA2 GENE: Primary | ICD-10-CM

## 2021-10-21 DIAGNOSIS — Z15.01 BRCA2 GENE MUTATION POSITIVE: Chronic | ICD-10-CM

## 2021-10-21 DIAGNOSIS — Z15.09 BRCA2 GENE MUTATION POSITIVE: Chronic | ICD-10-CM

## 2021-10-21 DIAGNOSIS — Z15.09 MONOALLELIC MUTATION OF BRCA2 GENE: Primary | ICD-10-CM

## 2021-10-21 PROCEDURE — 1159F MED LIST DOCD IN RCRD: CPT | Mod: CPTII,S$GLB,, | Performed by: OPHTHALMOLOGY

## 2021-10-21 PROCEDURE — 92004 PR EYE EXAM, NEW PATIENT,COMPREHESV: ICD-10-PCS | Mod: S$GLB,,, | Performed by: OPHTHALMOLOGY

## 2021-10-21 PROCEDURE — 99999 PR PBB SHADOW E&M-EST. PATIENT-LVL IV: CPT | Mod: PBBFAC,,, | Performed by: OPHTHALMOLOGY

## 2021-10-21 PROCEDURE — 3044F PR MOST RECENT HEMOGLOBIN A1C LEVEL <7.0%: ICD-10-PCS | Mod: CPTII,S$GLB,, | Performed by: OPHTHALMOLOGY

## 2021-10-21 PROCEDURE — 1160F RVW MEDS BY RX/DR IN RCRD: CPT | Mod: CPTII,S$GLB,, | Performed by: OPHTHALMOLOGY

## 2021-10-21 PROCEDURE — 1160F PR REVIEW ALL MEDS BY PRESCRIBER/CLIN PHARMACIST DOCUMENTED: ICD-10-PCS | Mod: CPTII,S$GLB,, | Performed by: OPHTHALMOLOGY

## 2021-10-21 PROCEDURE — 92004 COMPRE OPH EXAM NEW PT 1/>: CPT | Mod: S$GLB,,, | Performed by: OPHTHALMOLOGY

## 2021-10-21 PROCEDURE — 99999 PR PBB SHADOW E&M-EST. PATIENT-LVL IV: ICD-10-PCS | Mod: PBBFAC,,, | Performed by: OPHTHALMOLOGY

## 2021-10-21 PROCEDURE — 1159F PR MEDICATION LIST DOCUMENTED IN MEDICAL RECORD: ICD-10-PCS | Mod: CPTII,S$GLB,, | Performed by: OPHTHALMOLOGY

## 2021-10-21 PROCEDURE — 3044F HG A1C LEVEL LT 7.0%: CPT | Mod: CPTII,S$GLB,, | Performed by: OPHTHALMOLOGY

## 2021-10-21 RX ORDER — PREDNISONE 10 MG/1
TABLET ORAL
COMMUNITY
Start: 2021-10-12 | End: 2021-11-01 | Stop reason: SDUPTHER

## 2021-10-21 RX ORDER — PROCHLORPERAZINE MALEATE 10 MG
TABLET ORAL
COMMUNITY
Start: 2021-10-09 | End: 2022-02-24

## 2021-10-22 ENCOUNTER — OFFICE VISIT (OUTPATIENT)
Dept: GASTROENTEROLOGY | Facility: CLINIC | Age: 41
End: 2021-10-22
Payer: COMMERCIAL

## 2021-10-22 ENCOUNTER — OFFICE VISIT (OUTPATIENT)
Dept: HEMATOLOGY/ONCOLOGY | Facility: CLINIC | Age: 41
End: 2021-10-22
Payer: COMMERCIAL

## 2021-10-22 VITALS
HEIGHT: 67 IN | OXYGEN SATURATION: 99 % | SYSTOLIC BLOOD PRESSURE: 108 MMHG | BODY MASS INDEX: 21.11 KG/M2 | TEMPERATURE: 98 F | HEART RATE: 81 BPM | WEIGHT: 134.5 LBS | DIASTOLIC BLOOD PRESSURE: 55 MMHG

## 2021-10-22 VITALS
DIASTOLIC BLOOD PRESSURE: 71 MMHG | HEART RATE: 86 BPM | HEIGHT: 67 IN | BODY MASS INDEX: 21.11 KG/M2 | SYSTOLIC BLOOD PRESSURE: 104 MMHG | WEIGHT: 134.5 LBS

## 2021-10-22 DIAGNOSIS — Z17.1 MALIGNANT NEOPLASM OF UPPER-OUTER QUADRANT OF LEFT BREAST IN FEMALE, ESTROGEN RECEPTOR NEGATIVE: Primary | ICD-10-CM

## 2021-10-22 DIAGNOSIS — K52.3 COLITIS, INDETERMINATE: Primary | ICD-10-CM

## 2021-10-22 DIAGNOSIS — Z15.01 BRCA2 GENE MUTATION POSITIVE: ICD-10-CM

## 2021-10-22 DIAGNOSIS — Z15.09 BRCA2 GENE MUTATION POSITIVE: ICD-10-CM

## 2021-10-22 DIAGNOSIS — C50.412 MALIGNANT NEOPLASM OF UPPER-OUTER QUADRANT OF LEFT BREAST IN FEMALE, ESTROGEN RECEPTOR NEGATIVE: Primary | ICD-10-CM

## 2021-10-22 DIAGNOSIS — R11.2 NON-INTRACTABLE VOMITING WITH NAUSEA, UNSPECIFIED VOMITING TYPE: ICD-10-CM

## 2021-10-22 PROCEDURE — 3078F PR MOST RECENT DIASTOLIC BLOOD PRESSURE < 80 MM HG: ICD-10-PCS | Mod: CPTII,S$GLB,, | Performed by: INTERNAL MEDICINE

## 2021-10-22 PROCEDURE — 3074F PR MOST RECENT SYSTOLIC BLOOD PRESSURE < 130 MM HG: ICD-10-PCS | Mod: CPTII,S$GLB,, | Performed by: NURSE PRACTITIONER

## 2021-10-22 PROCEDURE — 3044F PR MOST RECENT HEMOGLOBIN A1C LEVEL <7.0%: ICD-10-PCS | Mod: CPTII,S$GLB,, | Performed by: NURSE PRACTITIONER

## 2021-10-22 PROCEDURE — 99214 OFFICE O/P EST MOD 30 MIN: CPT | Mod: S$GLB,,, | Performed by: NURSE PRACTITIONER

## 2021-10-22 PROCEDURE — 3074F PR MOST RECENT SYSTOLIC BLOOD PRESSURE < 130 MM HG: ICD-10-PCS | Mod: CPTII,S$GLB,, | Performed by: INTERNAL MEDICINE

## 2021-10-22 PROCEDURE — 3078F DIAST BP <80 MM HG: CPT | Mod: CPTII,S$GLB,, | Performed by: INTERNAL MEDICINE

## 2021-10-22 PROCEDURE — 99999 PR PBB SHADOW E&M-EST. PATIENT-LVL IV: ICD-10-PCS | Mod: PBBFAC,,, | Performed by: NURSE PRACTITIONER

## 2021-10-22 PROCEDURE — 99214 PR OFFICE/OUTPT VISIT, EST, LEVL IV, 30-39 MIN: ICD-10-PCS | Mod: S$GLB,,, | Performed by: NURSE PRACTITIONER

## 2021-10-22 PROCEDURE — 3008F PR BODY MASS INDEX (BMI) DOCUMENTED: ICD-10-PCS | Mod: CPTII,S$GLB,, | Performed by: NURSE PRACTITIONER

## 2021-10-22 PROCEDURE — 3008F BODY MASS INDEX DOCD: CPT | Mod: CPTII,S$GLB,, | Performed by: NURSE PRACTITIONER

## 2021-10-22 PROCEDURE — 3078F DIAST BP <80 MM HG: CPT | Mod: CPTII,S$GLB,, | Performed by: NURSE PRACTITIONER

## 2021-10-22 PROCEDURE — 3078F PR MOST RECENT DIASTOLIC BLOOD PRESSURE < 80 MM HG: ICD-10-PCS | Mod: CPTII,S$GLB,, | Performed by: NURSE PRACTITIONER

## 2021-10-22 PROCEDURE — 99214 OFFICE O/P EST MOD 30 MIN: CPT | Mod: S$GLB,,, | Performed by: INTERNAL MEDICINE

## 2021-10-22 PROCEDURE — 99214 PR OFFICE/OUTPT VISIT, EST, LEVL IV, 30-39 MIN: ICD-10-PCS | Mod: S$GLB,,, | Performed by: INTERNAL MEDICINE

## 2021-10-22 PROCEDURE — 99999 PR PBB SHADOW E&M-EST. PATIENT-LVL III: CPT | Mod: PBBFAC,,, | Performed by: INTERNAL MEDICINE

## 2021-10-22 PROCEDURE — 3044F HG A1C LEVEL LT 7.0%: CPT | Mod: CPTII,S$GLB,, | Performed by: INTERNAL MEDICINE

## 2021-10-22 PROCEDURE — 99999 PR PBB SHADOW E&M-EST. PATIENT-LVL IV: CPT | Mod: PBBFAC,,, | Performed by: NURSE PRACTITIONER

## 2021-10-22 PROCEDURE — 3008F PR BODY MASS INDEX (BMI) DOCUMENTED: ICD-10-PCS | Mod: CPTII,S$GLB,, | Performed by: INTERNAL MEDICINE

## 2021-10-22 PROCEDURE — 3074F SYST BP LT 130 MM HG: CPT | Mod: CPTII,S$GLB,, | Performed by: NURSE PRACTITIONER

## 2021-10-22 PROCEDURE — 3074F SYST BP LT 130 MM HG: CPT | Mod: CPTII,S$GLB,, | Performed by: INTERNAL MEDICINE

## 2021-10-22 PROCEDURE — 1159F PR MEDICATION LIST DOCUMENTED IN MEDICAL RECORD: ICD-10-PCS | Mod: CPTII,S$GLB,, | Performed by: NURSE PRACTITIONER

## 2021-10-22 PROCEDURE — 1159F MED LIST DOCD IN RCRD: CPT | Mod: CPTII,S$GLB,, | Performed by: NURSE PRACTITIONER

## 2021-10-22 PROCEDURE — 3044F PR MOST RECENT HEMOGLOBIN A1C LEVEL <7.0%: ICD-10-PCS | Mod: CPTII,S$GLB,, | Performed by: INTERNAL MEDICINE

## 2021-10-22 PROCEDURE — 99999 PR PBB SHADOW E&M-EST. PATIENT-LVL III: ICD-10-PCS | Mod: PBBFAC,,, | Performed by: INTERNAL MEDICINE

## 2021-10-22 PROCEDURE — 3008F BODY MASS INDEX DOCD: CPT | Mod: CPTII,S$GLB,, | Performed by: INTERNAL MEDICINE

## 2021-10-22 PROCEDURE — 3044F HG A1C LEVEL LT 7.0%: CPT | Mod: CPTII,S$GLB,, | Performed by: NURSE PRACTITIONER

## 2021-10-31 ENCOUNTER — PATIENT MESSAGE (OUTPATIENT)
Dept: HEMATOLOGY/ONCOLOGY | Facility: CLINIC | Age: 41
End: 2021-10-31
Payer: COMMERCIAL

## 2021-10-31 DIAGNOSIS — Z17.1 MALIGNANT NEOPLASM OF UPPER-OUTER QUADRANT OF LEFT BREAST IN FEMALE, ESTROGEN RECEPTOR NEGATIVE: Primary | ICD-10-CM

## 2021-10-31 DIAGNOSIS — C50.412 MALIGNANT NEOPLASM OF UPPER-OUTER QUADRANT OF LEFT BREAST IN FEMALE, ESTROGEN RECEPTOR NEGATIVE: Primary | ICD-10-CM

## 2021-11-01 ENCOUNTER — CLINICAL SUPPORT (OUTPATIENT)
Dept: REHABILITATION | Facility: HOSPITAL | Age: 41
End: 2021-11-01
Attending: INTERNAL MEDICINE
Payer: COMMERCIAL

## 2021-11-01 ENCOUNTER — TELEPHONE (OUTPATIENT)
Dept: RADIATION ONCOLOGY | Facility: CLINIC | Age: 41
End: 2021-11-01
Payer: COMMERCIAL

## 2021-11-01 ENCOUNTER — TELEPHONE (OUTPATIENT)
Dept: HEMATOLOGY/ONCOLOGY | Facility: CLINIC | Age: 41
End: 2021-11-01
Payer: COMMERCIAL

## 2021-11-01 DIAGNOSIS — R26.89 DECREASED FUNCTIONAL MOBILITY: ICD-10-CM

## 2021-11-01 DIAGNOSIS — C50.412 MALIGNANT NEOPLASM OF UPPER-OUTER QUADRANT OF LEFT BREAST IN FEMALE, ESTROGEN RECEPTOR NEGATIVE: Primary | ICD-10-CM

## 2021-11-01 DIAGNOSIS — M25.60 DECREASED RANGE OF MOTION: Primary | ICD-10-CM

## 2021-11-01 DIAGNOSIS — Z17.1 MALIGNANT NEOPLASM OF UPPER-OUTER QUADRANT OF LEFT BREAST IN FEMALE, ESTROGEN RECEPTOR NEGATIVE: ICD-10-CM

## 2021-11-01 DIAGNOSIS — Z17.1 MALIGNANT NEOPLASM OF UPPER-OUTER QUADRANT OF LEFT BREAST IN FEMALE, ESTROGEN RECEPTOR NEGATIVE: Primary | ICD-10-CM

## 2021-11-01 DIAGNOSIS — C50.412 MALIGNANT NEOPLASM OF UPPER-OUTER QUADRANT OF LEFT BREAST IN FEMALE, ESTROGEN RECEPTOR NEGATIVE: ICD-10-CM

## 2021-11-01 PROCEDURE — 97162 PT EVAL MOD COMPLEX 30 MIN: CPT | Mod: PN

## 2021-11-01 RX ORDER — PREDNISONE 10 MG/1
TABLET ORAL
Qty: 30 TABLET | Refills: 0 | Status: SHIPPED | OUTPATIENT
Start: 2021-11-01 | End: 2021-11-30 | Stop reason: SDUPTHER

## 2021-11-03 ENCOUNTER — TELEPHONE (OUTPATIENT)
Dept: HEMATOLOGY/ONCOLOGY | Facility: CLINIC | Age: 41
End: 2021-11-03
Payer: COMMERCIAL

## 2021-11-03 ENCOUNTER — PATIENT MESSAGE (OUTPATIENT)
Dept: HEMATOLOGY/ONCOLOGY | Facility: CLINIC | Age: 41
End: 2021-11-03
Payer: COMMERCIAL

## 2021-11-08 ENCOUNTER — PATIENT MESSAGE (OUTPATIENT)
Dept: HEMATOLOGY/ONCOLOGY | Facility: CLINIC | Age: 41
End: 2021-11-08
Payer: COMMERCIAL

## 2021-11-08 ENCOUNTER — CLINICAL SUPPORT (OUTPATIENT)
Dept: REHABILITATION | Facility: HOSPITAL | Age: 41
End: 2021-11-08
Attending: INTERNAL MEDICINE
Payer: COMMERCIAL

## 2021-11-08 DIAGNOSIS — C50.412 MALIGNANT NEOPLASM OF UPPER-OUTER QUADRANT OF LEFT BREAST IN FEMALE, ESTROGEN RECEPTOR NEGATIVE: ICD-10-CM

## 2021-11-08 DIAGNOSIS — Z17.1 MALIGNANT NEOPLASM OF UPPER-OUTER QUADRANT OF LEFT BREAST IN FEMALE, ESTROGEN RECEPTOR NEGATIVE: ICD-10-CM

## 2021-11-08 PROCEDURE — 97110 THERAPEUTIC EXERCISES: CPT | Mod: PN

## 2021-11-08 PROCEDURE — 97140 MANUAL THERAPY 1/> REGIONS: CPT | Mod: PN,97

## 2021-11-09 ENCOUNTER — CLINICAL SUPPORT (OUTPATIENT)
Dept: REHABILITATION | Facility: HOSPITAL | Age: 41
End: 2021-11-09
Attending: INTERNAL MEDICINE
Payer: COMMERCIAL

## 2021-11-09 DIAGNOSIS — Z17.1 MALIGNANT NEOPLASM OF UPPER-OUTER QUADRANT OF LEFT BREAST IN FEMALE, ESTROGEN RECEPTOR NEGATIVE: Primary | ICD-10-CM

## 2021-11-09 DIAGNOSIS — C50.412 MALIGNANT NEOPLASM OF UPPER-OUTER QUADRANT OF LEFT BREAST IN FEMALE, ESTROGEN RECEPTOR NEGATIVE: Primary | ICD-10-CM

## 2021-11-09 PROCEDURE — 97110 THERAPEUTIC EXERCISES: CPT | Mod: PN,CQ,97

## 2021-11-09 PROCEDURE — 97140 MANUAL THERAPY 1/> REGIONS: CPT | Mod: PN,CQ

## 2021-11-10 ENCOUNTER — TELEPHONE (OUTPATIENT)
Dept: RADIATION ONCOLOGY | Facility: CLINIC | Age: 41
End: 2021-11-10
Payer: COMMERCIAL

## 2021-11-11 ENCOUNTER — OFFICE VISIT (OUTPATIENT)
Dept: RADIATION ONCOLOGY | Facility: CLINIC | Age: 41
End: 2021-11-11
Payer: COMMERCIAL

## 2021-11-11 ENCOUNTER — CLINICAL SUPPORT (OUTPATIENT)
Dept: REHABILITATION | Facility: HOSPITAL | Age: 41
End: 2021-11-11
Attending: INTERNAL MEDICINE
Payer: COMMERCIAL

## 2021-11-11 ENCOUNTER — PATIENT MESSAGE (OUTPATIENT)
Dept: HEMATOLOGY/ONCOLOGY | Facility: CLINIC | Age: 41
End: 2021-11-11
Payer: COMMERCIAL

## 2021-11-11 VITALS
HEART RATE: 83 BPM | SYSTOLIC BLOOD PRESSURE: 119 MMHG | DIASTOLIC BLOOD PRESSURE: 72 MMHG | HEIGHT: 67 IN | BODY MASS INDEX: 20.97 KG/M2 | TEMPERATURE: 98 F | WEIGHT: 133.63 LBS | OXYGEN SATURATION: 100 % | RESPIRATION RATE: 20 BRPM

## 2021-11-11 DIAGNOSIS — R26.89 DECREASED FUNCTIONAL MOBILITY: ICD-10-CM

## 2021-11-11 DIAGNOSIS — Z17.1 MALIGNANT NEOPLASM OF UPPER-OUTER QUADRANT OF LEFT BREAST IN FEMALE, ESTROGEN RECEPTOR NEGATIVE: Primary | ICD-10-CM

## 2021-11-11 DIAGNOSIS — M25.60 DECREASED RANGE OF MOTION: ICD-10-CM

## 2021-11-11 DIAGNOSIS — C50.412 MALIGNANT NEOPLASM OF UPPER-OUTER QUADRANT OF LEFT BREAST IN FEMALE, ESTROGEN RECEPTOR NEGATIVE: Primary | ICD-10-CM

## 2021-11-11 PROCEDURE — 1160F PR REVIEW ALL MEDS BY PRESCRIBER/CLIN PHARMACIST DOCUMENTED: ICD-10-PCS | Mod: CPTII,S$GLB,, | Performed by: RADIOLOGY

## 2021-11-11 PROCEDURE — 3008F PR BODY MASS INDEX (BMI) DOCUMENTED: ICD-10-PCS | Mod: CPTII,S$GLB,, | Performed by: RADIOLOGY

## 2021-11-11 PROCEDURE — 3078F PR MOST RECENT DIASTOLIC BLOOD PRESSURE < 80 MM HG: ICD-10-PCS | Mod: CPTII,S$GLB,, | Performed by: RADIOLOGY

## 2021-11-11 PROCEDURE — 3078F DIAST BP <80 MM HG: CPT | Mod: CPTII,S$GLB,, | Performed by: RADIOLOGY

## 2021-11-11 PROCEDURE — 1159F MED LIST DOCD IN RCRD: CPT | Mod: CPTII,S$GLB,, | Performed by: RADIOLOGY

## 2021-11-11 PROCEDURE — 3044F HG A1C LEVEL LT 7.0%: CPT | Mod: CPTII,S$GLB,, | Performed by: RADIOLOGY

## 2021-11-11 PROCEDURE — 99204 OFFICE O/P NEW MOD 45 MIN: CPT | Mod: S$GLB,,, | Performed by: RADIOLOGY

## 2021-11-11 PROCEDURE — 1160F RVW MEDS BY RX/DR IN RCRD: CPT | Mod: CPTII,S$GLB,, | Performed by: RADIOLOGY

## 2021-11-11 PROCEDURE — 99999 PR PBB SHADOW E&M-EST. PATIENT-LVL III: CPT | Mod: PBBFAC,,, | Performed by: RADIOLOGY

## 2021-11-11 PROCEDURE — 1159F PR MEDICATION LIST DOCUMENTED IN MEDICAL RECORD: ICD-10-PCS | Mod: CPTII,S$GLB,, | Performed by: RADIOLOGY

## 2021-11-11 PROCEDURE — 3008F BODY MASS INDEX DOCD: CPT | Mod: CPTII,S$GLB,, | Performed by: RADIOLOGY

## 2021-11-11 PROCEDURE — 99999 PR PBB SHADOW E&M-EST. PATIENT-LVL III: ICD-10-PCS | Mod: PBBFAC,,, | Performed by: RADIOLOGY

## 2021-11-11 PROCEDURE — 3074F SYST BP LT 130 MM HG: CPT | Mod: CPTII,S$GLB,, | Performed by: RADIOLOGY

## 2021-11-11 PROCEDURE — 97140 MANUAL THERAPY 1/> REGIONS: CPT | Mod: PN,CQ,97

## 2021-11-11 PROCEDURE — 3044F PR MOST RECENT HEMOGLOBIN A1C LEVEL <7.0%: ICD-10-PCS | Mod: CPTII,S$GLB,, | Performed by: RADIOLOGY

## 2021-11-11 PROCEDURE — 3074F PR MOST RECENT SYSTOLIC BLOOD PRESSURE < 130 MM HG: ICD-10-PCS | Mod: CPTII,S$GLB,, | Performed by: RADIOLOGY

## 2021-11-11 PROCEDURE — 99204 PR OFFICE/OUTPT VISIT, NEW, LEVL IV, 45-59 MIN: ICD-10-PCS | Mod: S$GLB,,, | Performed by: RADIOLOGY

## 2021-11-12 ENCOUNTER — LAB VISIT (OUTPATIENT)
Dept: LAB | Facility: HOSPITAL | Age: 41
End: 2021-11-12
Payer: COMMERCIAL

## 2021-11-12 ENCOUNTER — TELEPHONE (OUTPATIENT)
Dept: HEMATOLOGY/ONCOLOGY | Facility: CLINIC | Age: 41
End: 2021-11-12
Payer: COMMERCIAL

## 2021-11-12 ENCOUNTER — HOSPITAL ENCOUNTER (OUTPATIENT)
Dept: RADIATION THERAPY | Facility: HOSPITAL | Age: 41
Discharge: HOME | End: 2021-11-12
Attending: RADIOLOGY
Payer: COMMERCIAL

## 2021-11-12 ENCOUNTER — INFUSION (OUTPATIENT)
Dept: INFUSION THERAPY | Facility: HOSPITAL | Age: 41
End: 2021-11-12
Payer: COMMERCIAL

## 2021-11-12 ENCOUNTER — PATIENT MESSAGE (OUTPATIENT)
Dept: HEMATOLOGY/ONCOLOGY | Facility: CLINIC | Age: 41
End: 2021-11-12
Payer: COMMERCIAL

## 2021-11-12 ENCOUNTER — OFFICE VISIT (OUTPATIENT)
Dept: HEMATOLOGY/ONCOLOGY | Facility: CLINIC | Age: 41
End: 2021-11-12
Payer: COMMERCIAL

## 2021-11-12 VITALS
TEMPERATURE: 98 F | HEART RATE: 87 BPM | BODY MASS INDEX: 21.07 KG/M2 | OXYGEN SATURATION: 99 % | WEIGHT: 134.25 LBS | HEIGHT: 67 IN | SYSTOLIC BLOOD PRESSURE: 124 MMHG | DIASTOLIC BLOOD PRESSURE: 72 MMHG | RESPIRATION RATE: 16 BRPM

## 2021-11-12 DIAGNOSIS — Z17.1 MALIGNANT NEOPLASM OF UPPER-OUTER QUADRANT OF LEFT BREAST IN FEMALE, ESTROGEN RECEPTOR NEGATIVE: Primary | ICD-10-CM

## 2021-11-12 DIAGNOSIS — C50.412 MALIGNANT NEOPLASM OF UPPER-OUTER QUADRANT OF LEFT BREAST IN FEMALE, ESTROGEN RECEPTOR NEGATIVE: Primary | ICD-10-CM

## 2021-11-12 DIAGNOSIS — K52.3 COLITIS, INDETERMINATE: ICD-10-CM

## 2021-11-12 DIAGNOSIS — M25.60 DECREASED RANGE OF MOTION: ICD-10-CM

## 2021-11-12 DIAGNOSIS — Z15.01 BRCA2 GENE MUTATION POSITIVE: Chronic | ICD-10-CM

## 2021-11-12 DIAGNOSIS — Z17.1 MALIGNANT NEOPLASM OF UPPER-OUTER QUADRANT OF LEFT BREAST IN FEMALE, ESTROGEN RECEPTOR NEGATIVE: ICD-10-CM

## 2021-11-12 DIAGNOSIS — Z15.09 BRCA2 GENE MUTATION POSITIVE: Chronic | ICD-10-CM

## 2021-11-12 DIAGNOSIS — R53.83 FATIGUE, UNSPECIFIED TYPE: ICD-10-CM

## 2021-11-12 DIAGNOSIS — C50.412 MALIGNANT NEOPLASM OF UPPER-OUTER QUADRANT OF LEFT BREAST IN FEMALE, ESTROGEN RECEPTOR NEGATIVE: ICD-10-CM

## 2021-11-12 LAB
ALBUMIN SERPL BCP-MCNC: 4.1 G/DL (ref 3.5–5.2)
ALP SERPL-CCNC: 87 U/L (ref 55–135)
ALT SERPL W/O P-5'-P-CCNC: 30 U/L (ref 10–44)
ANION GAP SERPL CALC-SCNC: 10 MMOL/L (ref 8–16)
AST SERPL-CCNC: 19 U/L (ref 10–40)
BILIRUB SERPL-MCNC: 0.4 MG/DL (ref 0.1–1)
BUN SERPL-MCNC: 6 MG/DL (ref 6–20)
CALCIUM SERPL-MCNC: 9.7 MG/DL (ref 8.7–10.5)
CHLORIDE SERPL-SCNC: 103 MMOL/L (ref 95–110)
CO2 SERPL-SCNC: 26 MMOL/L (ref 23–29)
CREAT SERPL-MCNC: 0.7 MG/DL (ref 0.5–1.4)
ERYTHROCYTE [DISTWIDTH] IN BLOOD BY AUTOMATED COUNT: 14.3 % (ref 11.5–14.5)
EST. GFR  (AFRICAN AMERICAN): >60 ML/MIN/1.73 M^2
EST. GFR  (NON AFRICAN AMERICAN): >60 ML/MIN/1.73 M^2
GLUCOSE SERPL-MCNC: 110 MG/DL (ref 70–110)
HCT VFR BLD AUTO: 40.1 % (ref 37–48.5)
HGB BLD-MCNC: 13.3 G/DL (ref 12–16)
IMM GRANULOCYTES # BLD AUTO: 0.08 K/UL (ref 0–0.04)
MCH RBC QN AUTO: 30.2 PG (ref 27–31)
MCHC RBC AUTO-ENTMCNC: 33.2 G/DL (ref 32–36)
MCV RBC AUTO: 91 FL (ref 82–98)
NEUTROPHILS # BLD AUTO: 6.8 K/UL (ref 1.8–7.7)
PLATELET # BLD AUTO: 220 K/UL (ref 150–450)
PMV BLD AUTO: 8.6 FL (ref 9.2–12.9)
POTASSIUM SERPL-SCNC: 3.9 MMOL/L (ref 3.5–5.1)
PROT SERPL-MCNC: 6.8 G/DL (ref 6–8.4)
RBC # BLD AUTO: 4.4 M/UL (ref 4–5.4)
SODIUM SERPL-SCNC: 139 MMOL/L (ref 136–145)
TROPONIN I SERPL DL<=0.01 NG/ML-MCNC: <0.006 NG/ML (ref 0–0.03)
WBC # BLD AUTO: 8.3 K/UL (ref 3.9–12.7)

## 2021-11-12 PROCEDURE — 77334 RADIATION TREATMENT AID(S): CPT | Mod: TC,PN | Performed by: RADIOLOGY

## 2021-11-12 PROCEDURE — 3008F PR BODY MASS INDEX (BMI) DOCUMENTED: ICD-10-PCS | Mod: CPTII,S$GLB,, | Performed by: INTERNAL MEDICINE

## 2021-11-12 PROCEDURE — 3008F BODY MASS INDEX DOCD: CPT | Mod: CPTII,S$GLB,, | Performed by: INTERNAL MEDICINE

## 2021-11-12 PROCEDURE — 77290 PR  SET RADN THERAPY FIELD COMPLEX: ICD-10-PCS | Mod: 26,,, | Performed by: RADIOLOGY

## 2021-11-12 PROCEDURE — 3074F PR MOST RECENT SYSTOLIC BLOOD PRESSURE < 130 MM HG: ICD-10-PCS | Mod: CPTII,S$GLB,, | Performed by: INTERNAL MEDICINE

## 2021-11-12 PROCEDURE — 1159F PR MEDICATION LIST DOCUMENTED IN MEDICAL RECORD: ICD-10-PCS | Mod: CPTII,S$GLB,, | Performed by: INTERNAL MEDICINE

## 2021-11-12 PROCEDURE — A4216 STERILE WATER/SALINE, 10 ML: HCPCS | Performed by: INTERNAL MEDICINE

## 2021-11-12 PROCEDURE — 99999 PR PBB SHADOW E&M-EST. PATIENT-LVL IV: CPT | Mod: PBBFAC,,, | Performed by: INTERNAL MEDICINE

## 2021-11-12 PROCEDURE — 77334 RADIATION TREATMENT AID(S): CPT | Mod: 26,,, | Performed by: RADIOLOGY

## 2021-11-12 PROCEDURE — 25000003 PHARM REV CODE 250: Performed by: INTERNAL MEDICINE

## 2021-11-12 PROCEDURE — 77290 THER RAD SIMULAJ FIELD CPLX: CPT | Mod: 26,,, | Performed by: RADIOLOGY

## 2021-11-12 PROCEDURE — 3074F SYST BP LT 130 MM HG: CPT | Mod: CPTII,S$GLB,, | Performed by: INTERNAL MEDICINE

## 2021-11-12 PROCEDURE — 99214 PR OFFICE/OUTPT VISIT, EST, LEVL IV, 30-39 MIN: ICD-10-PCS | Mod: S$GLB,,, | Performed by: INTERNAL MEDICINE

## 2021-11-12 PROCEDURE — 77290 THER RAD SIMULAJ FIELD CPLX: CPT | Mod: TC,PN | Performed by: RADIOLOGY

## 2021-11-12 PROCEDURE — 3044F PR MOST RECENT HEMOGLOBIN A1C LEVEL <7.0%: ICD-10-PCS | Mod: CPTII,S$GLB,, | Performed by: INTERNAL MEDICINE

## 2021-11-12 PROCEDURE — 84484 ASSAY OF TROPONIN QUANT: CPT | Performed by: INTERNAL MEDICINE

## 2021-11-12 PROCEDURE — 77334 PR  RADN TREATMENT AID(S) COMPLX: ICD-10-PCS | Mod: 26,,, | Performed by: RADIOLOGY

## 2021-11-12 PROCEDURE — 99214 OFFICE O/P EST MOD 30 MIN: CPT | Mod: S$GLB,,, | Performed by: INTERNAL MEDICINE

## 2021-11-12 PROCEDURE — 63600175 PHARM REV CODE 636 W HCPCS: Performed by: INTERNAL MEDICINE

## 2021-11-12 PROCEDURE — 77263 PR  RADIATION THERAPY PLAN COMPLEX: ICD-10-PCS | Mod: ,,, | Performed by: RADIOLOGY

## 2021-11-12 PROCEDURE — 3078F DIAST BP <80 MM HG: CPT | Mod: CPTII,S$GLB,, | Performed by: INTERNAL MEDICINE

## 2021-11-12 PROCEDURE — 80053 COMPREHEN METABOLIC PANEL: CPT | Performed by: INTERNAL MEDICINE

## 2021-11-12 PROCEDURE — 99999 PR PBB SHADOW E&M-EST. PATIENT-LVL IV: ICD-10-PCS | Mod: PBBFAC,,, | Performed by: INTERNAL MEDICINE

## 2021-11-12 PROCEDURE — 3044F HG A1C LEVEL LT 7.0%: CPT | Mod: CPTII,S$GLB,, | Performed by: INTERNAL MEDICINE

## 2021-11-12 PROCEDURE — 77263 THER RADIOLOGY TX PLNG CPLX: CPT | Mod: ,,, | Performed by: RADIOLOGY

## 2021-11-12 PROCEDURE — 36591 DRAW BLOOD OFF VENOUS DEVICE: CPT

## 2021-11-12 PROCEDURE — 77014 HC CT GUIDANCE RADIATION THERAPY FLDS PLACEMENT: CPT | Mod: TC,PN | Performed by: RADIOLOGY

## 2021-11-12 PROCEDURE — 1160F RVW MEDS BY RX/DR IN RCRD: CPT | Mod: CPTII,S$GLB,, | Performed by: INTERNAL MEDICINE

## 2021-11-12 PROCEDURE — 1159F MED LIST DOCD IN RCRD: CPT | Mod: CPTII,S$GLB,, | Performed by: INTERNAL MEDICINE

## 2021-11-12 PROCEDURE — 1160F PR REVIEW ALL MEDS BY PRESCRIBER/CLIN PHARMACIST DOCUMENTED: ICD-10-PCS | Mod: CPTII,S$GLB,, | Performed by: INTERNAL MEDICINE

## 2021-11-12 PROCEDURE — 85027 COMPLETE CBC AUTOMATED: CPT | Performed by: INTERNAL MEDICINE

## 2021-11-12 PROCEDURE — 3078F PR MOST RECENT DIASTOLIC BLOOD PRESSURE < 80 MM HG: ICD-10-PCS | Mod: CPTII,S$GLB,, | Performed by: INTERNAL MEDICINE

## 2021-11-12 RX ORDER — HEPARIN 100 UNIT/ML
500 SYRINGE INTRAVENOUS
Status: CANCELLED | OUTPATIENT
Start: 2021-11-12

## 2021-11-12 RX ORDER — SODIUM CHLORIDE 0.9 % (FLUSH) 0.9 %
10 SYRINGE (ML) INJECTION
Status: CANCELLED | OUTPATIENT
Start: 2021-11-12

## 2021-11-12 RX ORDER — HEPARIN 100 UNIT/ML
500 SYRINGE INTRAVENOUS
Status: DISCONTINUED | OUTPATIENT
Start: 2021-11-12 | End: 2021-11-12 | Stop reason: HOSPADM

## 2021-11-12 RX ORDER — SODIUM CHLORIDE 0.9 % (FLUSH) 0.9 %
10 SYRINGE (ML) INJECTION
Status: DISCONTINUED | OUTPATIENT
Start: 2021-11-12 | End: 2021-11-12 | Stop reason: HOSPADM

## 2021-11-12 RX ADMIN — HEPARIN SODIUM (PORCINE) LOCK FLUSH IV SOLN 100 UNIT/ML 500 UNITS: 100 SOLUTION at 01:11

## 2021-11-12 RX ADMIN — Medication 10 ML: at 01:11

## 2021-11-15 ENCOUNTER — PATIENT MESSAGE (OUTPATIENT)
Dept: HEMATOLOGY/ONCOLOGY | Facility: CLINIC | Age: 41
End: 2021-11-15
Payer: COMMERCIAL

## 2021-11-15 ENCOUNTER — TELEPHONE (OUTPATIENT)
Dept: HEMATOLOGY/ONCOLOGY | Facility: CLINIC | Age: 41
End: 2021-11-15
Payer: COMMERCIAL

## 2021-11-15 DIAGNOSIS — Z17.1 MALIGNANT NEOPLASM OF UPPER-OUTER QUADRANT OF LEFT BREAST IN FEMALE, ESTROGEN RECEPTOR NEGATIVE: Primary | ICD-10-CM

## 2021-11-15 DIAGNOSIS — M25.60 DECREASED RANGE OF MOTION: ICD-10-CM

## 2021-11-15 DIAGNOSIS — C50.412 MALIGNANT NEOPLASM OF UPPER-OUTER QUADRANT OF LEFT BREAST IN FEMALE, ESTROGEN RECEPTOR NEGATIVE: Primary | ICD-10-CM

## 2021-11-15 DIAGNOSIS — R53.83 FATIGUE, UNSPECIFIED TYPE: ICD-10-CM

## 2021-11-16 ENCOUNTER — PATIENT MESSAGE (OUTPATIENT)
Dept: HEMATOLOGY/ONCOLOGY | Facility: CLINIC | Age: 41
End: 2021-11-16
Payer: COMMERCIAL

## 2021-11-16 ENCOUNTER — CLINICAL SUPPORT (OUTPATIENT)
Dept: REHABILITATION | Facility: HOSPITAL | Age: 41
End: 2021-11-16
Attending: INTERNAL MEDICINE
Payer: COMMERCIAL

## 2021-11-16 DIAGNOSIS — C50.412 MALIGNANT NEOPLASM OF UPPER-OUTER QUADRANT OF LEFT BREAST IN FEMALE, ESTROGEN RECEPTOR NEGATIVE: Primary | ICD-10-CM

## 2021-11-16 DIAGNOSIS — M25.60 DECREASED RANGE OF MOTION: ICD-10-CM

## 2021-11-16 DIAGNOSIS — Z17.1 MALIGNANT NEOPLASM OF UPPER-OUTER QUADRANT OF LEFT BREAST IN FEMALE, ESTROGEN RECEPTOR NEGATIVE: Primary | ICD-10-CM

## 2021-11-16 DIAGNOSIS — R26.89 DECREASED FUNCTIONAL MOBILITY: ICD-10-CM

## 2021-11-16 DIAGNOSIS — R53.83 FATIGUE, UNSPECIFIED TYPE: ICD-10-CM

## 2021-11-16 PROCEDURE — 97140 MANUAL THERAPY 1/> REGIONS: CPT | Mod: PN,CQ

## 2021-11-17 DIAGNOSIS — Z17.1 MALIGNANT NEOPLASM OF UPPER-OUTER QUADRANT OF LEFT BREAST IN FEMALE, ESTROGEN RECEPTOR NEGATIVE: Primary | ICD-10-CM

## 2021-11-17 DIAGNOSIS — M25.60 DECREASED RANGE OF MOTION: ICD-10-CM

## 2021-11-17 DIAGNOSIS — C50.412 MALIGNANT NEOPLASM OF UPPER-OUTER QUADRANT OF LEFT BREAST IN FEMALE, ESTROGEN RECEPTOR NEGATIVE: Primary | ICD-10-CM

## 2021-11-18 ENCOUNTER — CLINICAL SUPPORT (OUTPATIENT)
Dept: REHABILITATION | Facility: HOSPITAL | Age: 41
End: 2021-11-18
Attending: INTERNAL MEDICINE
Payer: COMMERCIAL

## 2021-11-18 DIAGNOSIS — Z17.1 MALIGNANT NEOPLASM OF UPPER-OUTER QUADRANT OF LEFT BREAST IN FEMALE, ESTROGEN RECEPTOR NEGATIVE: Primary | ICD-10-CM

## 2021-11-18 DIAGNOSIS — R26.89 DECREASED FUNCTIONAL MOBILITY: ICD-10-CM

## 2021-11-18 DIAGNOSIS — C50.412 MALIGNANT NEOPLASM OF UPPER-OUTER QUADRANT OF LEFT BREAST IN FEMALE, ESTROGEN RECEPTOR NEGATIVE: Primary | ICD-10-CM

## 2021-11-18 DIAGNOSIS — M25.60 DECREASED RANGE OF MOTION: ICD-10-CM

## 2021-11-18 PROCEDURE — 77300 RADIATION THERAPY DOSE PLAN: CPT | Mod: 26,,, | Performed by: RADIOLOGY

## 2021-11-18 PROCEDURE — 77334 PR  RADN TREATMENT AID(S) COMPLX: ICD-10-PCS | Mod: 26,,, | Performed by: RADIOLOGY

## 2021-11-18 PROCEDURE — 77295 3-D RADIOTHERAPY PLAN: CPT | Mod: 26,,, | Performed by: RADIOLOGY

## 2021-11-18 PROCEDURE — 77293 RESPIRATOR MOTION MGMT SIMUL: CPT | Mod: TC,PN | Performed by: RADIOLOGY

## 2021-11-18 PROCEDURE — 77293 PR RESPIRATORY MOTION MGMT SIMULATION: ICD-10-PCS | Mod: 26,,, | Performed by: RADIOLOGY

## 2021-11-18 PROCEDURE — 77293 RESPIRATOR MOTION MGMT SIMUL: CPT | Mod: 26,,, | Performed by: RADIOLOGY

## 2021-11-18 PROCEDURE — 77334 RADIATION TREATMENT AID(S): CPT | Mod: TC,PN | Performed by: RADIOLOGY

## 2021-11-18 PROCEDURE — 97140 MANUAL THERAPY 1/> REGIONS: CPT | Mod: PN,CQ

## 2021-11-18 PROCEDURE — 77295 3-D RADIOTHERAPY PLAN: CPT | Mod: TC,PN | Performed by: RADIOLOGY

## 2021-11-18 PROCEDURE — 77300 RADIATION THERAPY DOSE PLAN: CPT | Mod: TC,PN | Performed by: RADIOLOGY

## 2021-11-18 PROCEDURE — 77295 PR 3D RADIOTHERAPY PLAN: ICD-10-PCS | Mod: 26,,, | Performed by: RADIOLOGY

## 2021-11-18 PROCEDURE — 77300 PR RADIATION THERAPY,DOSIMETRY PLAN: ICD-10-PCS | Mod: 26,,, | Performed by: RADIOLOGY

## 2021-11-18 PROCEDURE — 77334 RADIATION TREATMENT AID(S): CPT | Mod: 26,,, | Performed by: RADIOLOGY

## 2021-11-18 PROCEDURE — 97110 THERAPEUTIC EXERCISES: CPT | Mod: PN,CQ,97

## 2021-11-19 ENCOUNTER — DOCUMENTATION ONLY (OUTPATIENT)
Dept: RADIATION ONCOLOGY | Facility: CLINIC | Age: 41
End: 2021-11-19
Payer: COMMERCIAL

## 2021-11-19 PROCEDURE — G6002 STEREOSCOPIC X-RAY GUIDANCE: HCPCS | Mod: 26,,, | Performed by: RADIOLOGY

## 2021-11-19 PROCEDURE — 77417 THER RADIOLOGY PORT IMAGE(S): CPT | Mod: PN | Performed by: RADIOLOGY

## 2021-11-19 PROCEDURE — 77387 GUIDANCE FOR RADJ TX DLVR: CPT | Mod: TC,PN | Performed by: RADIOLOGY

## 2021-11-19 PROCEDURE — 77412 RADIATION TX DELIVERY LVL 3: CPT | Mod: PN | Performed by: RADIOLOGY

## 2021-11-19 PROCEDURE — G6002 PR STEREOSCOPIC XRAY GUIDE FOR RADIATION TX DELIV: ICD-10-PCS | Mod: 26,,, | Performed by: RADIOLOGY

## 2021-11-21 PROCEDURE — 77412 RADIATION TX DELIVERY LVL 3: CPT | Mod: PN | Performed by: RADIOLOGY

## 2021-11-21 PROCEDURE — G6002 STEREOSCOPIC X-RAY GUIDANCE: HCPCS | Mod: 26,,, | Performed by: RADIOLOGY

## 2021-11-21 PROCEDURE — 77387 GUIDANCE FOR RADJ TX DLVR: CPT | Mod: TC,PN | Performed by: RADIOLOGY

## 2021-11-21 PROCEDURE — G6002 PR STEREOSCOPIC XRAY GUIDE FOR RADIATION TX DELIV: ICD-10-PCS | Mod: 26,,, | Performed by: RADIOLOGY

## 2021-11-22 ENCOUNTER — DOCUMENTATION ONLY (OUTPATIENT)
Dept: RADIATION ONCOLOGY | Facility: CLINIC | Age: 41
End: 2021-11-22
Payer: COMMERCIAL

## 2021-11-22 PROCEDURE — 77412 RADIATION TX DELIVERY LVL 3: CPT | Mod: PN | Performed by: RADIOLOGY

## 2021-11-22 PROCEDURE — 77387 GUIDANCE FOR RADJ TX DLVR: CPT | Mod: TC,PN | Performed by: RADIOLOGY

## 2021-11-22 PROCEDURE — G6002 STEREOSCOPIC X-RAY GUIDANCE: HCPCS | Mod: 26,,, | Performed by: RADIOLOGY

## 2021-11-22 PROCEDURE — G6002 PR STEREOSCOPIC XRAY GUIDE FOR RADIATION TX DELIV: ICD-10-PCS | Mod: 26,,, | Performed by: RADIOLOGY

## 2021-11-23 ENCOUNTER — CLINICAL SUPPORT (OUTPATIENT)
Dept: REHABILITATION | Facility: HOSPITAL | Age: 41
End: 2021-11-23
Attending: INTERNAL MEDICINE
Payer: COMMERCIAL

## 2021-11-23 DIAGNOSIS — Z17.1 MALIGNANT NEOPLASM OF UPPER-OUTER QUADRANT OF LEFT BREAST IN FEMALE, ESTROGEN RECEPTOR NEGATIVE: Primary | ICD-10-CM

## 2021-11-23 DIAGNOSIS — M25.60 DECREASED RANGE OF MOTION: ICD-10-CM

## 2021-11-23 DIAGNOSIS — C50.412 MALIGNANT NEOPLASM OF UPPER-OUTER QUADRANT OF LEFT BREAST IN FEMALE, ESTROGEN RECEPTOR NEGATIVE: Primary | ICD-10-CM

## 2021-11-23 DIAGNOSIS — R26.89 DECREASED FUNCTIONAL MOBILITY: ICD-10-CM

## 2021-11-23 PROCEDURE — 77387 GUIDANCE FOR RADJ TX DLVR: CPT | Mod: TC,PN | Performed by: RADIOLOGY

## 2021-11-23 PROCEDURE — 77412 RADIATION TX DELIVERY LVL 3: CPT | Mod: PN | Performed by: RADIOLOGY

## 2021-11-23 PROCEDURE — G6002 STEREOSCOPIC X-RAY GUIDANCE: HCPCS | Mod: 26,,, | Performed by: RADIOLOGY

## 2021-11-23 PROCEDURE — 97140 MANUAL THERAPY 1/> REGIONS: CPT | Mod: PN,CQ

## 2021-11-23 PROCEDURE — G6002 PR STEREOSCOPIC XRAY GUIDE FOR RADIATION TX DELIV: ICD-10-PCS | Mod: 26,,, | Performed by: RADIOLOGY

## 2021-11-24 ENCOUNTER — PATIENT MESSAGE (OUTPATIENT)
Dept: HEMATOLOGY/ONCOLOGY | Facility: CLINIC | Age: 41
End: 2021-11-24
Payer: COMMERCIAL

## 2021-11-24 PROCEDURE — 77412 RADIATION TX DELIVERY LVL 3: CPT | Mod: PN | Performed by: RADIOLOGY

## 2021-11-24 PROCEDURE — G6002 STEREOSCOPIC X-RAY GUIDANCE: HCPCS | Mod: 26,,, | Performed by: RADIOLOGY

## 2021-11-24 PROCEDURE — G6002 PR STEREOSCOPIC XRAY GUIDE FOR RADIATION TX DELIV: ICD-10-PCS | Mod: 26,,, | Performed by: RADIOLOGY

## 2021-11-24 PROCEDURE — 77387 GUIDANCE FOR RADJ TX DLVR: CPT | Mod: TC,PN | Performed by: RADIOLOGY

## 2021-11-26 ENCOUNTER — PATIENT MESSAGE (OUTPATIENT)
Dept: HEMATOLOGY/ONCOLOGY | Facility: CLINIC | Age: 41
End: 2021-11-26
Payer: COMMERCIAL

## 2021-11-26 ENCOUNTER — PATIENT MESSAGE (OUTPATIENT)
Dept: GYNECOLOGIC ONCOLOGY | Facility: CLINIC | Age: 41
End: 2021-11-26
Payer: COMMERCIAL

## 2021-11-27 ENCOUNTER — PATIENT MESSAGE (OUTPATIENT)
Dept: HEMATOLOGY/ONCOLOGY | Facility: CLINIC | Age: 41
End: 2021-11-27
Payer: COMMERCIAL

## 2021-11-27 DIAGNOSIS — Z17.1 MALIGNANT NEOPLASM OF UPPER-OUTER QUADRANT OF LEFT BREAST IN FEMALE, ESTROGEN RECEPTOR NEGATIVE: ICD-10-CM

## 2021-11-27 DIAGNOSIS — C50.412 MALIGNANT NEOPLASM OF UPPER-OUTER QUADRANT OF LEFT BREAST IN FEMALE, ESTROGEN RECEPTOR NEGATIVE: ICD-10-CM

## 2021-11-29 ENCOUNTER — PATIENT MESSAGE (OUTPATIENT)
Dept: HEMATOLOGY/ONCOLOGY | Facility: CLINIC | Age: 41
End: 2021-11-29
Payer: COMMERCIAL

## 2021-11-29 ENCOUNTER — DOCUMENTATION ONLY (OUTPATIENT)
Dept: RADIATION ONCOLOGY | Facility: CLINIC | Age: 41
End: 2021-11-29
Payer: COMMERCIAL

## 2021-11-29 ENCOUNTER — CLINICAL SUPPORT (OUTPATIENT)
Dept: REHABILITATION | Facility: HOSPITAL | Age: 41
End: 2021-11-29
Attending: INTERNAL MEDICINE
Payer: COMMERCIAL

## 2021-11-29 DIAGNOSIS — M25.60 DECREASED RANGE OF MOTION: Primary | ICD-10-CM

## 2021-11-29 DIAGNOSIS — R26.89 DECREASED FUNCTIONAL MOBILITY: ICD-10-CM

## 2021-11-29 PROCEDURE — 77387 GUIDANCE FOR RADJ TX DLVR: CPT | Mod: TC,PN | Performed by: RADIOLOGY

## 2021-11-29 PROCEDURE — 77412 RADIATION TX DELIVERY LVL 3: CPT | Mod: PN | Performed by: RADIOLOGY

## 2021-11-29 PROCEDURE — 97110 THERAPEUTIC EXERCISES: CPT | Mod: PN

## 2021-11-29 PROCEDURE — G6002 STEREOSCOPIC X-RAY GUIDANCE: HCPCS | Mod: 26,,, | Performed by: RADIOLOGY

## 2021-11-29 PROCEDURE — 77417 THER RADIOLOGY PORT IMAGE(S): CPT | Mod: PN | Performed by: RADIOLOGY

## 2021-11-29 PROCEDURE — 77336 RADIATION PHYSICS CONSULT: CPT | Mod: PN | Performed by: RADIOLOGY

## 2021-11-29 PROCEDURE — 97164 PT RE-EVAL EST PLAN CARE: CPT | Mod: PN

## 2021-11-29 PROCEDURE — G6002 PR STEREOSCOPIC XRAY GUIDE FOR RADIATION TX DELIV: ICD-10-PCS | Mod: 26,,, | Performed by: RADIOLOGY

## 2021-11-29 RX ORDER — PREDNISONE 10 MG/1
TABLET ORAL
Qty: 30 TABLET | Refills: 0 | OUTPATIENT
Start: 2021-11-29

## 2021-11-30 ENCOUNTER — PATIENT MESSAGE (OUTPATIENT)
Dept: HEMATOLOGY/ONCOLOGY | Facility: CLINIC | Age: 41
End: 2021-11-30
Payer: COMMERCIAL

## 2021-11-30 ENCOUNTER — PATIENT MESSAGE (OUTPATIENT)
Dept: REHABILITATION | Facility: HOSPITAL | Age: 41
End: 2021-11-30
Payer: COMMERCIAL

## 2021-11-30 ENCOUNTER — TELEPHONE (OUTPATIENT)
Dept: REHABILITATION | Facility: HOSPITAL | Age: 41
End: 2021-11-30
Payer: COMMERCIAL

## 2021-11-30 DIAGNOSIS — C50.412 MALIGNANT NEOPLASM OF UPPER-OUTER QUADRANT OF LEFT BREAST IN FEMALE, ESTROGEN RECEPTOR NEGATIVE: ICD-10-CM

## 2021-11-30 DIAGNOSIS — Z17.1 MALIGNANT NEOPLASM OF UPPER-OUTER QUADRANT OF LEFT BREAST IN FEMALE, ESTROGEN RECEPTOR NEGATIVE: ICD-10-CM

## 2021-11-30 PROCEDURE — G6002 PR STEREOSCOPIC XRAY GUIDE FOR RADIATION TX DELIV: ICD-10-PCS | Mod: 26,,, | Performed by: RADIOLOGY

## 2021-11-30 PROCEDURE — G6002 STEREOSCOPIC X-RAY GUIDANCE: HCPCS | Mod: 26,,, | Performed by: RADIOLOGY

## 2021-11-30 PROCEDURE — 77387 GUIDANCE FOR RADJ TX DLVR: CPT | Mod: TC,PN | Performed by: RADIOLOGY

## 2021-11-30 PROCEDURE — 77412 RADIATION TX DELIVERY LVL 3: CPT | Mod: PN | Performed by: RADIOLOGY

## 2021-11-30 RX ORDER — PREDNISONE 10 MG/1
TABLET ORAL
Qty: 30 TABLET | Refills: 0 | Status: SHIPPED | OUTPATIENT
Start: 2021-11-30 | End: 2021-12-03 | Stop reason: SDUPTHER

## 2021-12-01 ENCOUNTER — HOSPITAL ENCOUNTER (OUTPATIENT)
Dept: RADIATION THERAPY | Facility: HOSPITAL | Age: 41
Discharge: HOME | End: 2021-12-01
Attending: RADIOLOGY
Payer: COMMERCIAL

## 2021-12-01 PROCEDURE — G6002 PR STEREOSCOPIC XRAY GUIDE FOR RADIATION TX DELIV: ICD-10-PCS | Mod: 26,,, | Performed by: RADIOLOGY

## 2021-12-01 PROCEDURE — 77412 RADIATION TX DELIVERY LVL 3: CPT | Mod: PN | Performed by: RADIOLOGY

## 2021-12-01 PROCEDURE — G6002 STEREOSCOPIC X-RAY GUIDANCE: HCPCS | Mod: 26,,, | Performed by: RADIOLOGY

## 2021-12-01 PROCEDURE — 77387 GUIDANCE FOR RADJ TX DLVR: CPT | Mod: TC,PN | Performed by: RADIOLOGY

## 2021-12-02 ENCOUNTER — PATIENT MESSAGE (OUTPATIENT)
Dept: HEMATOLOGY/ONCOLOGY | Facility: CLINIC | Age: 41
End: 2021-12-02
Payer: COMMERCIAL

## 2021-12-02 PROCEDURE — 77412 RADIATION TX DELIVERY LVL 3: CPT | Mod: PN | Performed by: RADIOLOGY

## 2021-12-02 PROCEDURE — G6002 PR STEREOSCOPIC XRAY GUIDE FOR RADIATION TX DELIV: ICD-10-PCS | Mod: 26,,, | Performed by: RADIOLOGY

## 2021-12-02 PROCEDURE — G6002 STEREOSCOPIC X-RAY GUIDANCE: HCPCS | Mod: 26,,, | Performed by: RADIOLOGY

## 2021-12-02 PROCEDURE — 77387 GUIDANCE FOR RADJ TX DLVR: CPT | Mod: TC,PN | Performed by: RADIOLOGY

## 2021-12-03 ENCOUNTER — PATIENT MESSAGE (OUTPATIENT)
Dept: REHABILITATION | Facility: HOSPITAL | Age: 41
End: 2021-12-03
Payer: COMMERCIAL

## 2021-12-03 ENCOUNTER — PATIENT MESSAGE (OUTPATIENT)
Dept: REHABILITATION | Facility: HOSPITAL | Age: 41
End: 2021-12-03

## 2021-12-03 ENCOUNTER — TELEPHONE (OUTPATIENT)
Dept: REHABILITATION | Facility: HOSPITAL | Age: 41
End: 2021-12-03
Payer: COMMERCIAL

## 2021-12-03 ENCOUNTER — CLINICAL SUPPORT (OUTPATIENT)
Dept: REHABILITATION | Facility: HOSPITAL | Age: 41
End: 2021-12-03
Attending: INTERNAL MEDICINE
Payer: COMMERCIAL

## 2021-12-03 DIAGNOSIS — C50.412 MALIGNANT NEOPLASM OF UPPER-OUTER QUADRANT OF LEFT BREAST IN FEMALE, ESTROGEN RECEPTOR NEGATIVE: ICD-10-CM

## 2021-12-03 DIAGNOSIS — R26.89 DECREASED FUNCTIONAL MOBILITY: ICD-10-CM

## 2021-12-03 DIAGNOSIS — Z17.1 MALIGNANT NEOPLASM OF UPPER-OUTER QUADRANT OF LEFT BREAST IN FEMALE, ESTROGEN RECEPTOR NEGATIVE: ICD-10-CM

## 2021-12-03 DIAGNOSIS — M25.60 DECREASED RANGE OF MOTION: Primary | ICD-10-CM

## 2021-12-03 PROCEDURE — 77387 GUIDANCE FOR RADJ TX DLVR: CPT | Mod: TC,PN | Performed by: RADIOLOGY

## 2021-12-03 PROCEDURE — 97140 MANUAL THERAPY 1/> REGIONS: CPT | Mod: PN

## 2021-12-03 PROCEDURE — G6002 PR STEREOSCOPIC XRAY GUIDE FOR RADIATION TX DELIV: ICD-10-PCS | Mod: 26,,, | Performed by: RADIOLOGY

## 2021-12-03 PROCEDURE — 97110 THERAPEUTIC EXERCISES: CPT | Mod: PN

## 2021-12-03 PROCEDURE — 77412 RADIATION TX DELIVERY LVL 3: CPT | Mod: PN | Performed by: RADIOLOGY

## 2021-12-03 PROCEDURE — G6002 STEREOSCOPIC X-RAY GUIDANCE: HCPCS | Mod: 26,,, | Performed by: RADIOLOGY

## 2021-12-03 RX ORDER — PREDNISONE 10 MG/1
TABLET ORAL
Qty: 30 TABLET | Refills: 0 | Status: SHIPPED | OUTPATIENT
Start: 2021-12-03 | End: 2021-12-03 | Stop reason: SDUPTHER

## 2021-12-03 RX ORDER — PREDNISONE 10 MG/1
TABLET ORAL
Qty: 30 TABLET | Refills: 0 | Status: SHIPPED | OUTPATIENT
Start: 2021-12-03 | End: 2021-12-31 | Stop reason: SDUPTHER

## 2021-12-06 ENCOUNTER — DOCUMENTATION ONLY (OUTPATIENT)
Dept: RADIATION ONCOLOGY | Facility: CLINIC | Age: 41
End: 2021-12-06
Payer: COMMERCIAL

## 2021-12-06 PROCEDURE — 77412 RADIATION TX DELIVERY LVL 3: CPT | Mod: PN | Performed by: RADIOLOGY

## 2021-12-06 PROCEDURE — 77336 RADIATION PHYSICS CONSULT: CPT | Mod: PN | Performed by: RADIOLOGY

## 2021-12-06 PROCEDURE — G6002 STEREOSCOPIC X-RAY GUIDANCE: HCPCS | Mod: 26,,, | Performed by: RADIOLOGY

## 2021-12-06 PROCEDURE — 77387 GUIDANCE FOR RADJ TX DLVR: CPT | Mod: TC,PN | Performed by: RADIOLOGY

## 2021-12-06 PROCEDURE — 77417 THER RADIOLOGY PORT IMAGE(S): CPT | Mod: PN | Performed by: RADIOLOGY

## 2021-12-06 PROCEDURE — G6002 PR STEREOSCOPIC XRAY GUIDE FOR RADIATION TX DELIV: ICD-10-PCS | Mod: 26,,, | Performed by: RADIOLOGY

## 2021-12-07 ENCOUNTER — CLINICAL SUPPORT (OUTPATIENT)
Dept: REHABILITATION | Facility: HOSPITAL | Age: 41
End: 2021-12-07
Attending: INTERNAL MEDICINE
Payer: COMMERCIAL

## 2021-12-07 DIAGNOSIS — R26.89 DECREASED FUNCTIONAL MOBILITY: ICD-10-CM

## 2021-12-07 DIAGNOSIS — M25.60 DECREASED RANGE OF MOTION: Primary | ICD-10-CM

## 2021-12-07 PROCEDURE — 97110 THERAPEUTIC EXERCISES: CPT | Mod: PN,CQ

## 2021-12-07 PROCEDURE — 77387 GUIDANCE FOR RADJ TX DLVR: CPT | Mod: TC,PN | Performed by: RADIOLOGY

## 2021-12-07 PROCEDURE — G6002 PR STEREOSCOPIC XRAY GUIDE FOR RADIATION TX DELIV: ICD-10-PCS | Mod: 26,,, | Performed by: RADIOLOGY

## 2021-12-07 PROCEDURE — 77412 RADIATION TX DELIVERY LVL 3: CPT | Mod: PN | Performed by: RADIOLOGY

## 2021-12-07 PROCEDURE — 97140 MANUAL THERAPY 1/> REGIONS: CPT | Mod: PN,CQ

## 2021-12-07 PROCEDURE — G6002 STEREOSCOPIC X-RAY GUIDANCE: HCPCS | Mod: 26,,, | Performed by: RADIOLOGY

## 2021-12-08 PROCEDURE — G6002 STEREOSCOPIC X-RAY GUIDANCE: HCPCS | Mod: 26,,, | Performed by: RADIOLOGY

## 2021-12-08 PROCEDURE — G6002 PR STEREOSCOPIC XRAY GUIDE FOR RADIATION TX DELIV: ICD-10-PCS | Mod: 26,,, | Performed by: RADIOLOGY

## 2021-12-08 PROCEDURE — 77412 RADIATION TX DELIVERY LVL 3: CPT | Mod: PN | Performed by: RADIOLOGY

## 2021-12-08 PROCEDURE — 77387 GUIDANCE FOR RADJ TX DLVR: CPT | Mod: TC,PN | Performed by: RADIOLOGY

## 2021-12-09 PROCEDURE — 77412 RADIATION TX DELIVERY LVL 3: CPT | Mod: PN | Performed by: RADIOLOGY

## 2021-12-09 PROCEDURE — G6002 STEREOSCOPIC X-RAY GUIDANCE: HCPCS | Mod: 26,,, | Performed by: RADIOLOGY

## 2021-12-09 PROCEDURE — G6002 PR STEREOSCOPIC XRAY GUIDE FOR RADIATION TX DELIV: ICD-10-PCS | Mod: 26,,, | Performed by: RADIOLOGY

## 2021-12-09 PROCEDURE — 77387 GUIDANCE FOR RADJ TX DLVR: CPT | Mod: TC,PN | Performed by: RADIOLOGY

## 2021-12-10 PROCEDURE — 77412 RADIATION TX DELIVERY LVL 3: CPT | Mod: PN | Performed by: RADIOLOGY

## 2021-12-10 PROCEDURE — G6002 PR STEREOSCOPIC XRAY GUIDE FOR RADIATION TX DELIV: ICD-10-PCS | Mod: 26,,, | Performed by: RADIOLOGY

## 2021-12-10 PROCEDURE — G6002 STEREOSCOPIC X-RAY GUIDANCE: HCPCS | Mod: 26,,, | Performed by: RADIOLOGY

## 2021-12-10 PROCEDURE — 77387 GUIDANCE FOR RADJ TX DLVR: CPT | Mod: TC,PN | Performed by: RADIOLOGY

## 2021-12-13 ENCOUNTER — CLINICAL SUPPORT (OUTPATIENT)
Dept: REHABILITATION | Facility: HOSPITAL | Age: 41
End: 2021-12-13
Attending: INTERNAL MEDICINE
Payer: COMMERCIAL

## 2021-12-13 DIAGNOSIS — M25.60 DECREASED RANGE OF MOTION: Primary | ICD-10-CM

## 2021-12-13 PROCEDURE — 97140 MANUAL THERAPY 1/> REGIONS: CPT | Mod: PN

## 2021-12-13 PROCEDURE — G6002 STEREOSCOPIC X-RAY GUIDANCE: HCPCS | Mod: 26,,, | Performed by: RADIOLOGY

## 2021-12-13 PROCEDURE — G6002 PR STEREOSCOPIC XRAY GUIDE FOR RADIATION TX DELIV: ICD-10-PCS | Mod: 26,,, | Performed by: RADIOLOGY

## 2021-12-13 PROCEDURE — 97110 THERAPEUTIC EXERCISES: CPT | Mod: PN

## 2021-12-13 PROCEDURE — 77387 GUIDANCE FOR RADJ TX DLVR: CPT | Mod: TC,PN | Performed by: RADIOLOGY

## 2021-12-13 PROCEDURE — 77412 RADIATION TX DELIVERY LVL 3: CPT | Mod: PN | Performed by: RADIOLOGY

## 2021-12-14 ENCOUNTER — PATIENT MESSAGE (OUTPATIENT)
Dept: REHABILITATION | Facility: HOSPITAL | Age: 41
End: 2021-12-14
Payer: COMMERCIAL

## 2021-12-14 PROCEDURE — 77336 RADIATION PHYSICS CONSULT: CPT | Mod: PN | Performed by: RADIOLOGY

## 2021-12-15 ENCOUNTER — PATIENT MESSAGE (OUTPATIENT)
Dept: HEMATOLOGY/ONCOLOGY | Facility: CLINIC | Age: 41
End: 2021-12-15
Payer: COMMERCIAL

## 2021-12-16 ENCOUNTER — CLINICAL SUPPORT (OUTPATIENT)
Dept: REHABILITATION | Facility: HOSPITAL | Age: 41
End: 2021-12-16
Attending: INTERNAL MEDICINE
Payer: COMMERCIAL

## 2021-12-16 ENCOUNTER — DOCUMENTATION ONLY (OUTPATIENT)
Dept: RADIATION ONCOLOGY | Facility: CLINIC | Age: 41
End: 2021-12-16
Payer: COMMERCIAL

## 2021-12-16 ENCOUNTER — PATIENT MESSAGE (OUTPATIENT)
Dept: REHABILITATION | Facility: HOSPITAL | Age: 41
End: 2021-12-16
Payer: COMMERCIAL

## 2021-12-16 DIAGNOSIS — M25.60 DECREASED RANGE OF MOTION: Primary | ICD-10-CM

## 2021-12-16 PROCEDURE — 77387 GUIDANCE FOR RADJ TX DLVR: CPT | Mod: TC,PN | Performed by: RADIOLOGY

## 2021-12-16 PROCEDURE — G6002 STEREOSCOPIC X-RAY GUIDANCE: HCPCS | Mod: 26,,, | Performed by: RADIOLOGY

## 2021-12-16 PROCEDURE — 97110 THERAPEUTIC EXERCISES: CPT | Mod: PN,CQ

## 2021-12-16 PROCEDURE — G6002 PR STEREOSCOPIC XRAY GUIDE FOR RADIATION TX DELIV: ICD-10-PCS | Mod: 26,,, | Performed by: RADIOLOGY

## 2021-12-16 PROCEDURE — 97140 MANUAL THERAPY 1/> REGIONS: CPT | Mod: PN,CQ

## 2021-12-16 PROCEDURE — 77412 RADIATION TX DELIVERY LVL 3: CPT | Mod: PN | Performed by: RADIOLOGY

## 2021-12-16 PROCEDURE — 77417 THER RADIOLOGY PORT IMAGE(S): CPT | Mod: PN | Performed by: RADIOLOGY

## 2021-12-17 ENCOUNTER — TELEPHONE (OUTPATIENT)
Dept: HEMATOLOGY/ONCOLOGY | Facility: CLINIC | Age: 41
End: 2021-12-17
Payer: COMMERCIAL

## 2021-12-17 DIAGNOSIS — Z17.1 MALIGNANT NEOPLASM OF UPPER-OUTER QUADRANT OF LEFT BREAST IN FEMALE, ESTROGEN RECEPTOR NEGATIVE: Primary | ICD-10-CM

## 2021-12-17 DIAGNOSIS — C50.412 MALIGNANT NEOPLASM OF UPPER-OUTER QUADRANT OF LEFT BREAST IN FEMALE, ESTROGEN RECEPTOR NEGATIVE: Primary | ICD-10-CM

## 2021-12-17 PROCEDURE — 77387 GUIDANCE FOR RADJ TX DLVR: CPT | Mod: TC,PN | Performed by: RADIOLOGY

## 2021-12-17 PROCEDURE — G6002 STEREOSCOPIC X-RAY GUIDANCE: HCPCS | Mod: 26,,, | Performed by: RADIOLOGY

## 2021-12-17 PROCEDURE — G6002 PR STEREOSCOPIC XRAY GUIDE FOR RADIATION TX DELIV: ICD-10-PCS | Mod: 26,,, | Performed by: RADIOLOGY

## 2021-12-17 PROCEDURE — 77412 RADIATION TX DELIVERY LVL 3: CPT | Mod: PN | Performed by: RADIOLOGY

## 2021-12-20 PROCEDURE — G6002 PR STEREOSCOPIC XRAY GUIDE FOR RADIATION TX DELIV: ICD-10-PCS | Mod: 26,,, | Performed by: RADIOLOGY

## 2021-12-20 PROCEDURE — G6002 STEREOSCOPIC X-RAY GUIDANCE: HCPCS | Mod: 26,,, | Performed by: RADIOLOGY

## 2021-12-20 PROCEDURE — 77387 GUIDANCE FOR RADJ TX DLVR: CPT | Mod: TC,PN | Performed by: RADIOLOGY

## 2021-12-20 PROCEDURE — 77412 RADIATION TX DELIVERY LVL 3: CPT | Mod: PN | Performed by: RADIOLOGY

## 2021-12-21 ENCOUNTER — DOCUMENTATION ONLY (OUTPATIENT)
Dept: RADIATION ONCOLOGY | Facility: CLINIC | Age: 41
End: 2021-12-21
Payer: COMMERCIAL

## 2021-12-21 PROCEDURE — G6002 STEREOSCOPIC X-RAY GUIDANCE: HCPCS | Mod: 26,,, | Performed by: RADIOLOGY

## 2021-12-21 PROCEDURE — 77387 GUIDANCE FOR RADJ TX DLVR: CPT | Mod: TC,PN | Performed by: RADIOLOGY

## 2021-12-21 PROCEDURE — G6002 PR STEREOSCOPIC XRAY GUIDE FOR RADIATION TX DELIV: ICD-10-PCS | Mod: 26,,, | Performed by: RADIOLOGY

## 2021-12-21 PROCEDURE — 77412 RADIATION TX DELIVERY LVL 3: CPT | Mod: PN | Performed by: RADIOLOGY

## 2021-12-22 PROCEDURE — G6002 STEREOSCOPIC X-RAY GUIDANCE: HCPCS | Mod: 26,,, | Performed by: RADIOLOGY

## 2021-12-22 PROCEDURE — 77387 GUIDANCE FOR RADJ TX DLVR: CPT | Mod: TC,PN | Performed by: RADIOLOGY

## 2021-12-22 PROCEDURE — G6002 PR STEREOSCOPIC XRAY GUIDE FOR RADIATION TX DELIV: ICD-10-PCS | Mod: 26,,, | Performed by: RADIOLOGY

## 2021-12-22 PROCEDURE — 77412 RADIATION TX DELIVERY LVL 3: CPT | Mod: PN | Performed by: RADIOLOGY

## 2021-12-23 PROCEDURE — 77417 THER RADIOLOGY PORT IMAGE(S): CPT | Mod: PN | Performed by: RADIOLOGY

## 2021-12-23 PROCEDURE — 77387 GUIDANCE FOR RADJ TX DLVR: CPT | Mod: TC,PN | Performed by: RADIOLOGY

## 2021-12-23 PROCEDURE — G6002 STEREOSCOPIC X-RAY GUIDANCE: HCPCS | Mod: 26,,, | Performed by: RADIOLOGY

## 2021-12-23 PROCEDURE — 77412 RADIATION TX DELIVERY LVL 3: CPT | Mod: PN | Performed by: RADIOLOGY

## 2021-12-23 PROCEDURE — G6002 PR STEREOSCOPIC XRAY GUIDE FOR RADIATION TX DELIV: ICD-10-PCS | Mod: 26,,, | Performed by: RADIOLOGY

## 2021-12-23 PROCEDURE — 77336 RADIATION PHYSICS CONSULT: CPT | Mod: PN | Performed by: RADIOLOGY

## 2021-12-26 ENCOUNTER — PATIENT MESSAGE (OUTPATIENT)
Dept: HEMATOLOGY/ONCOLOGY | Facility: CLINIC | Age: 41
End: 2021-12-26
Payer: COMMERCIAL

## 2021-12-27 ENCOUNTER — TELEPHONE (OUTPATIENT)
Dept: HEMATOLOGY/ONCOLOGY | Facility: CLINIC | Age: 41
End: 2021-12-27
Payer: COMMERCIAL

## 2021-12-30 ENCOUNTER — TELEPHONE (OUTPATIENT)
Dept: RADIATION ONCOLOGY | Facility: CLINIC | Age: 41
End: 2021-12-30
Payer: COMMERCIAL

## 2022-01-03 ENCOUNTER — PATIENT MESSAGE (OUTPATIENT)
Dept: HEMATOLOGY/ONCOLOGY | Facility: CLINIC | Age: 42
End: 2022-01-03
Payer: COMMERCIAL

## 2022-01-03 ENCOUNTER — HOSPITAL ENCOUNTER (OUTPATIENT)
Dept: RADIATION THERAPY | Facility: HOSPITAL | Age: 42
Discharge: HOME | End: 2022-01-03
Attending: RADIOLOGY
Payer: COMMERCIAL

## 2022-01-03 ENCOUNTER — TELEPHONE (OUTPATIENT)
Dept: HEMATOLOGY/ONCOLOGY | Facility: CLINIC | Age: 42
End: 2022-01-03
Payer: COMMERCIAL

## 2022-01-03 PROCEDURE — G6002 PR STEREOSCOPIC XRAY GUIDE FOR RADIATION TX DELIV: ICD-10-PCS | Mod: 26,,, | Performed by: RADIOLOGY

## 2022-01-03 PROCEDURE — G6002 STEREOSCOPIC X-RAY GUIDANCE: HCPCS | Mod: 26,,, | Performed by: RADIOLOGY

## 2022-01-03 PROCEDURE — 77412 RADIATION TX DELIVERY LVL 3: CPT | Mod: PN | Performed by: RADIOLOGY

## 2022-01-03 PROCEDURE — 77387 GUIDANCE FOR RADJ TX DLVR: CPT | Mod: TC,PN | Performed by: RADIOLOGY

## 2022-01-04 ENCOUNTER — DOCUMENTATION ONLY (OUTPATIENT)
Dept: RADIATION ONCOLOGY | Facility: CLINIC | Age: 42
End: 2022-01-04
Payer: COMMERCIAL

## 2022-01-04 ENCOUNTER — PATIENT MESSAGE (OUTPATIENT)
Dept: HEMATOLOGY/ONCOLOGY | Facility: CLINIC | Age: 42
End: 2022-01-04
Payer: COMMERCIAL

## 2022-01-04 PROCEDURE — G6002 STEREOSCOPIC X-RAY GUIDANCE: HCPCS | Mod: 26,,, | Performed by: RADIOLOGY

## 2022-01-04 PROCEDURE — 77387 GUIDANCE FOR RADJ TX DLVR: CPT | Mod: TC,PN | Performed by: RADIOLOGY

## 2022-01-04 PROCEDURE — 77412 RADIATION TX DELIVERY LVL 3: CPT | Mod: PN | Performed by: RADIOLOGY

## 2022-01-04 PROCEDURE — G6002 PR STEREOSCOPIC XRAY GUIDE FOR RADIATION TX DELIV: ICD-10-PCS | Mod: 26,,, | Performed by: RADIOLOGY

## 2022-01-04 NOTE — PLAN OF CARE
Patient completed all outpatient radiation treatments.  She complains of mild to moderate discomfort in radiation field.  Continues to use Miaderm.  Patient is recovering from COVID but feels better.  All questions/concerns addressed.

## 2022-01-05 PROCEDURE — 77336 RADIATION PHYSICS CONSULT: CPT | Mod: PN | Performed by: RADIOLOGY

## 2022-01-11 ENCOUNTER — PATIENT MESSAGE (OUTPATIENT)
Dept: REHABILITATION | Facility: HOSPITAL | Age: 42
End: 2022-01-11
Payer: COMMERCIAL

## 2022-01-12 ENCOUNTER — PATIENT MESSAGE (OUTPATIENT)
Dept: REHABILITATION | Facility: HOSPITAL | Age: 42
End: 2022-01-12
Payer: COMMERCIAL

## 2022-01-13 ENCOUNTER — HOSPITAL ENCOUNTER (OUTPATIENT)
Dept: RADIOLOGY | Facility: HOSPITAL | Age: 42
Discharge: HOME | End: 2022-01-13
Attending: STUDENT IN AN ORGANIZED HEALTH CARE EDUCATION/TRAINING PROGRAM
Payer: COMMERCIAL

## 2022-01-13 ENCOUNTER — INFUSION (OUTPATIENT)
Dept: INFUSION THERAPY | Facility: HOSPITAL | Age: 42
End: 2022-01-13
Attending: INTERNAL MEDICINE
Payer: COMMERCIAL

## 2022-01-13 ENCOUNTER — HOSPITAL ENCOUNTER (OUTPATIENT)
Dept: RADIOLOGY | Facility: HOSPITAL | Age: 42
Discharge: HOME | End: 2022-01-13
Attending: INTERNAL MEDICINE
Payer: COMMERCIAL

## 2022-01-13 DIAGNOSIS — Z15.01 BRCA2 GENE MUTATION POSITIVE IN FEMALE: ICD-10-CM

## 2022-01-13 DIAGNOSIS — Z17.1 MALIGNANT NEOPLASM OF UPPER-OUTER QUADRANT OF LEFT BREAST IN FEMALE, ESTROGEN RECEPTOR NEGATIVE: ICD-10-CM

## 2022-01-13 DIAGNOSIS — Z17.1 MALIGNANT NEOPLASM OF UPPER-OUTER QUADRANT OF LEFT BREAST IN FEMALE, ESTROGEN RECEPTOR NEGATIVE: Primary | ICD-10-CM

## 2022-01-13 DIAGNOSIS — C50.412 MALIGNANT NEOPLASM OF UPPER-OUTER QUADRANT OF LEFT BREAST IN FEMALE, ESTROGEN RECEPTOR NEGATIVE: Primary | ICD-10-CM

## 2022-01-13 DIAGNOSIS — C50.412 MALIGNANT NEOPLASM OF UPPER-OUTER QUADRANT OF LEFT BREAST IN FEMALE, ESTROGEN RECEPTOR NEGATIVE: ICD-10-CM

## 2022-01-13 DIAGNOSIS — Z15.02 BRCA2 GENE MUTATION POSITIVE IN FEMALE: ICD-10-CM

## 2022-01-13 DIAGNOSIS — Z15.09 BRCA2 GENE MUTATION POSITIVE IN FEMALE: ICD-10-CM

## 2022-01-13 PROCEDURE — 76705 ECHO EXAM OF ABDOMEN: CPT | Mod: 26,,, | Performed by: RADIOLOGY

## 2022-01-13 PROCEDURE — 76856 US PELVIS COMP WITH TRANSVAG NON-OB (XPD): ICD-10-PCS | Mod: 26,,, | Performed by: RADIOLOGY

## 2022-01-13 PROCEDURE — 76830 US PELVIS COMP WITH TRANSVAG NON-OB (XPD): ICD-10-PCS | Mod: 26,,, | Performed by: RADIOLOGY

## 2022-01-13 PROCEDURE — A4216 STERILE WATER/SALINE, 10 ML: HCPCS | Mod: PN | Performed by: INTERNAL MEDICINE

## 2022-01-13 PROCEDURE — 76705 ECHO EXAM OF ABDOMEN: CPT | Mod: TC,PO

## 2022-01-13 PROCEDURE — 25000003 PHARM REV CODE 250: Mod: PN | Performed by: INTERNAL MEDICINE

## 2022-01-13 PROCEDURE — 36591 DRAW BLOOD OFF VENOUS DEVICE: CPT | Mod: PN

## 2022-01-13 PROCEDURE — 76856 US EXAM PELVIC COMPLETE: CPT | Mod: 26,,, | Performed by: RADIOLOGY

## 2022-01-13 PROCEDURE — 76705 US ABDOMEN LIMITED: ICD-10-PCS | Mod: 26,,, | Performed by: RADIOLOGY

## 2022-01-13 PROCEDURE — 76830 TRANSVAGINAL US NON-OB: CPT | Mod: 26,,, | Performed by: RADIOLOGY

## 2022-01-13 PROCEDURE — 76830 TRANSVAGINAL US NON-OB: CPT | Mod: TC,PO

## 2022-01-13 RX ORDER — SODIUM CHLORIDE 0.9 % (FLUSH) 0.9 %
10 SYRINGE (ML) INJECTION
Status: CANCELLED | OUTPATIENT
Start: 2022-01-13

## 2022-01-13 RX ORDER — SODIUM CHLORIDE 0.9 % (FLUSH) 0.9 %
10 SYRINGE (ML) INJECTION
Status: DISCONTINUED | OUTPATIENT
Start: 2022-01-13 | End: 2022-01-13 | Stop reason: HOSPADM

## 2022-01-13 RX ORDER — HEPARIN 100 UNIT/ML
500 SYRINGE INTRAVENOUS
Status: CANCELLED | OUTPATIENT
Start: 2022-01-13

## 2022-01-13 RX ADMIN — Medication 10 ML: at 10:01

## 2022-01-13 NOTE — PLAN OF CARE
Problem: Adult Inpatient Plan of Care  Goal: Plan of Care Review  Outcome: Ongoing, Progressing     Port accessed and labs collected. Normal saline locked.

## 2022-01-15 ENCOUNTER — PATIENT MESSAGE (OUTPATIENT)
Dept: HEMATOLOGY/ONCOLOGY | Facility: CLINIC | Age: 42
End: 2022-01-15
Payer: COMMERCIAL

## 2022-01-17 ENCOUNTER — PATIENT MESSAGE (OUTPATIENT)
Dept: HEMATOLOGY/ONCOLOGY | Facility: CLINIC | Age: 42
End: 2022-01-17
Payer: COMMERCIAL

## 2022-01-18 ENCOUNTER — HOSPITAL ENCOUNTER (OUTPATIENT)
Dept: RADIOLOGY | Facility: HOSPITAL | Age: 42
Discharge: HOME | End: 2022-01-18
Attending: INTERNAL MEDICINE
Payer: COMMERCIAL

## 2022-01-18 ENCOUNTER — PATIENT MESSAGE (OUTPATIENT)
Dept: HEMATOLOGY/ONCOLOGY | Facility: CLINIC | Age: 42
End: 2022-01-18
Payer: COMMERCIAL

## 2022-01-18 DIAGNOSIS — C50.412 MALIGNANT NEOPLASM OF UPPER-OUTER QUADRANT OF LEFT BREAST IN FEMALE, ESTROGEN RECEPTOR NEGATIVE: ICD-10-CM

## 2022-01-18 DIAGNOSIS — Z17.1 MALIGNANT NEOPLASM OF UPPER-OUTER QUADRANT OF LEFT BREAST IN FEMALE, ESTROGEN RECEPTOR NEGATIVE: ICD-10-CM

## 2022-01-18 LAB — GLUCOSE SERPL-MCNC: 80 MG/DL (ref 70–110)

## 2022-01-18 PROCEDURE — 78815 NM PET CT ROUTINE: ICD-10-PCS | Mod: 26,PS,, | Performed by: RADIOLOGY

## 2022-01-18 PROCEDURE — 78815 PET IMAGE W/CT SKULL-THIGH: CPT | Mod: 26,PS,, | Performed by: RADIOLOGY

## 2022-01-18 PROCEDURE — A9552 F18 FDG: HCPCS | Mod: PN

## 2022-01-18 NOTE — PROGRESS NOTES
PET Imaging Questionnaire    1. Are you a Diabetic? Recent Blood Sugar level? No    2. Are you anemic? Bone Marrow Stimulation Meds? No    3. Have you had a CT Scan, if so when & where was your last one? Yes -     4. Have you had a PET Scan, if so when & where was your last one? Yes -     5. Chemotherapy or currently on Chemotherapy? Yes    6. Radiation therapy? Yes    Surgical History:   Past Surgical History:   Procedure Laterality Date    AUGMENTATION OF BREAST  2012    BREAST MASS EXCISION Left 2021    Procedure: EXCISION, MASS, BREAST- 2 oclock left breast with ultrasound guidance;  Surgeon: Rashmi Johnston MD;  Location: [facility] OR;  Service: General;  Laterality: Left;    BREAST SURGERY       SECTION      COLONOSCOPY N/A 2021    Procedure: COLONOSCOPY;  Surgeon: Mason Quintero MD;  Location: [facility] ([address] [place] [address]);  Service: Endoscopy;  Laterality: N/A;    ESOPHAGOGASTRODUODENOSCOPY N/A 2021    Procedure: EGD (ESOPHAGOGASTRODUODENOSCOPY);  Surgeon: Mason Quintero MD;  Location: [facility] ([address] [place] [address]);  Service: Endoscopy;  Laterality: N/A;    INSERTION OF TUNNELED CENTRAL VENOUS CATHETER (CVC) WITH SUBCUTANEOUS PORT N/A 2021    Procedure: [id number]-[id number];  Surgeon: Griffin Becker MD;  Location: [facility] OR;  Service: General;  Laterality: N/A;    SENTINEL LYMPH NODE BIOPSY Left 2021    Procedure: CORE BIOPSY, LYMPH NODE, SENTINEL;  Surgeon: Rashmi Johnston MD;  Location: [facility] OR;  Service: General;  Laterality: Left;    TUBAL LIGATION     7.      8. Have you been fasting for at least 6 hours? Yes    9. Is there any chance you may be pregnant or breastfeeding? No    Assay: 12.0 MCi@:9.53   Injection Site:rt arm     Residual: .364 mCi@: 9.55   Technologist: Eloisa Milner Injected:11.63mCi

## 2022-01-19 ENCOUNTER — OFFICE VISIT (OUTPATIENT)
Dept: GYNECOLOGIC ONCOLOGY | Facility: CLINIC | Age: 42
End: 2022-01-19
Payer: COMMERCIAL

## 2022-01-19 VITALS
BODY MASS INDEX: 20.83 KG/M2 | SYSTOLIC BLOOD PRESSURE: 131 MMHG | HEART RATE: 111 BPM | WEIGHT: 133 LBS | DIASTOLIC BLOOD PRESSURE: 80 MMHG

## 2022-01-19 DIAGNOSIS — Z15.09 BRCA2 GENE MUTATION POSITIVE: Primary | Chronic | ICD-10-CM

## 2022-01-19 DIAGNOSIS — Z15.01 BRCA2 GENE MUTATION POSITIVE: Primary | Chronic | ICD-10-CM

## 2022-01-19 DIAGNOSIS — Z17.1 MALIGNANT NEOPLASM OF UPPER-OUTER QUADRANT OF LEFT BREAST IN FEMALE, ESTROGEN RECEPTOR NEGATIVE: ICD-10-CM

## 2022-01-19 DIAGNOSIS — C50.412 MALIGNANT NEOPLASM OF UPPER-OUTER QUADRANT OF LEFT BREAST IN FEMALE, ESTROGEN RECEPTOR NEGATIVE: ICD-10-CM

## 2022-01-19 PROCEDURE — 99214 PR OFFICE/OUTPT VISIT, EST, LEVL IV, 30-39 MIN: ICD-10-PCS | Mod: S$GLB,,, | Performed by: OBSTETRICS & GYNECOLOGY

## 2022-01-19 PROCEDURE — 99999 PR PBB SHADOW E&M-EST. PATIENT-LVL III: CPT | Mod: PBBFAC,,, | Performed by: OBSTETRICS & GYNECOLOGY

## 2022-01-19 PROCEDURE — 99999 PR PBB SHADOW E&M-EST. PATIENT-LVL III: ICD-10-PCS | Mod: PBBFAC,,, | Performed by: OBSTETRICS & GYNECOLOGY

## 2022-01-19 PROCEDURE — 99214 OFFICE O/P EST MOD 30 MIN: CPT | Mod: ICN,S$GLB,, | Performed by: OBSTETRICS & GYNECOLOGY

## 2022-01-19 RX ORDER — COVID-19 MOLECULAR TEST ASSAY
KIT MISCELLANEOUS
COMMUNITY
Start: 2021-12-23 | End: 2022-03-17

## 2022-01-19 NOTE — PROGRESS NOTES
Subjective:      Patient ID: Alison Swann is a 41 y.o. female.    Chief Complaint: Follow-up      HPI  Presents today for follow up BRCA2 mutation.   Pelvic US 1/2022- unremarkable  The uterus measures 5.4 x 3.2 x 2.3 cm and the endometrium measures 2 mm and thin.  The right ovary is thought to be visualized at 2.1 x 2.0 x 1.0 cm and the left ovary is thought to be visualized at 2.0 x 1.7 x 0.9 cm    1/2022  23    She underwent double mastectomy/reconstruction 9/2021 and completed adjuvant RT 1/4/2022.    She desires to move forward with risk reducing BSO.     Referral history:  42 y/o para 1 referred by FADI Earl for positive BRCA2 mutation. Personal h/o L breast cancer. S/p NACT with Adriamycin/Cytoxan, Pembrolizumab. Planning for surgery on September 17 with [facility] then radiation to follow. Oncologist is Dr. Sandra Sotelo.      Surgical history includes CS x 1 (LTV), BTL, endometrial ablation.      LMP approximately 2017 after endometrial ablation. History of oral contraceptive use for 8 years.     Family history significant for mother with breast cancer (age 50).      No family history of ovarian, colon, or uterine cancer.      No other medical problems.      Denies history of abnormal pap smear. Reports last pap in 2020 was normal. OB/GYN is Dr. Gilmore in [place].        Review of Systems   Constitutional: Negative for appetite change, chills, fatigue and fever.   HENT: Negative for mouth sores.    Respiratory: Negative for cough and shortness of breath.    Cardiovascular: Negative for leg swelling.   Gastrointestinal: Negative for abdominal pain, blood in stool, constipation and diarrhea.   Endocrine: Negative for cold intolerance.   Genitourinary: Negative for dysuria and vaginal bleeding.   Musculoskeletal: Negative for myalgias.   Skin: Negative for rash.   Allergic/Immunologic: Negative.    Neurological: Negative for weakness and numbness.   Hematological: Negative for adenopathy.  Does not bruise/bleed easily.   Psychiatric/Behavioral: Negative for confusion.       Objective:   Physical Exam:   Constitutional: She is oriented to person, place, and time. She appears well-developed and well-nourished.    HENT:   Head: Normocephalic and atraumatic.    Eyes: Pupils are equal, round, and reactive to light. EOM are normal.    Neck: No thyromegaly present.    Cardiovascular: Normal rate, regular rhythm and intact distal pulses.     Pulmonary/Chest: Effort normal and breath sounds normal. No respiratory distress. She has no wheezes.        Abdominal: Soft. Bowel sounds are normal. She exhibits no distension, no ascites and no mass. There is no abdominal tenderness.             Musculoskeletal: Normal range of motion and moves all extremeties.      Lymphadenopathy:     She has no cervical adenopathy.        Right: No supraclavicular adenopathy present.        Left: No supraclavicular adenopathy present.    Neurological: She is alert and oriented to person, place, and time.    Skin: Skin is warm and dry. No rash noted.    Psychiatric: She has a normal mood and affect.       Assessment:     1. BRCA2 gene mutation positive    2. Malignant neoplasm of upper-outer quadrant of left breast in female, estrogen receptor negative        Plan:   No orders of the defined types were placed in this encounter.    Previously discussed risk reducing BSO. She desires to move forward. Planning for 3/4/2022. She is a candidate for minimally invasive approach. Discussed RA BSO as well as R/B/A to concurrent hysterectomy. She will take some time to think about this.     Surgery tentatively planned for 3/4/2022 at [facility].      I spent approximately 30 minutes reviewing the available records and evaluating the patient, out of which over 50% of the time was spent face to face with the patient in counseling and coordinating this patient's care.

## 2022-01-20 ENCOUNTER — INFUSION (OUTPATIENT)
Dept: INFUSION THERAPY | Facility: HOSPITAL | Age: 42
End: 2022-01-20
Attending: INTERNAL MEDICINE
Payer: COMMERCIAL

## 2022-01-20 DIAGNOSIS — C50.412 MALIGNANT NEOPLASM OF UPPER-OUTER QUADRANT OF LEFT BREAST IN FEMALE, ESTROGEN RECEPTOR NEGATIVE: Primary | ICD-10-CM

## 2022-01-20 DIAGNOSIS — Z17.1 MALIGNANT NEOPLASM OF UPPER-OUTER QUADRANT OF LEFT BREAST IN FEMALE, ESTROGEN RECEPTOR NEGATIVE: Primary | ICD-10-CM

## 2022-01-20 LAB
ALBUMIN SERPL BCP-MCNC: 3.8 G/DL (ref 3.5–5.2)
ALP SERPL-CCNC: 74 U/L (ref 55–135)
ALT SERPL W/O P-5'-P-CCNC: 22 U/L (ref 10–44)
ANION GAP SERPL CALC-SCNC: 9 MMOL/L (ref 8–16)
AST SERPL-CCNC: 13 U/L (ref 10–40)
BILIRUB SERPL-MCNC: 0.3 MG/DL (ref 0.1–1)
BUN SERPL-MCNC: 13 MG/DL (ref 6–20)
CALCIUM SERPL-MCNC: 9.3 MG/DL (ref 8.7–10.5)
CHLORIDE SERPL-SCNC: 106 MMOL/L (ref 95–110)
CO2 SERPL-SCNC: 24 MMOL/L (ref 23–29)
CREAT SERPL-MCNC: 0.7 MG/DL (ref 0.5–1.4)
ERYTHROCYTE [DISTWIDTH] IN BLOOD BY AUTOMATED COUNT: 13.6 % (ref 11.5–14.5)
EST. GFR  (AFRICAN AMERICAN): >60 ML/MIN/1.73 M^2
EST. GFR  (NON AFRICAN AMERICAN): >60 ML/MIN/1.73 M^2
GLUCOSE SERPL-MCNC: 98 MG/DL (ref 70–110)
HCT VFR BLD AUTO: 36.5 % (ref 37–48.5)
HGB BLD-MCNC: 12.3 G/DL (ref 12–16)
IMM GRANULOCYTES # BLD AUTO: 0.1 K/UL (ref 0–0.04)
MCH RBC QN AUTO: 31.2 PG (ref 27–31)
MCHC RBC AUTO-ENTMCNC: 33.7 G/DL (ref 32–36)
MCV RBC AUTO: 93 FL (ref 82–98)
NEUTROPHILS # BLD AUTO: 9 K/UL (ref 1.8–7.7)
PLATELET # BLD AUTO: 226 K/UL (ref 150–450)
PMV BLD AUTO: 8.5 FL (ref 9.2–12.9)
POTASSIUM SERPL-SCNC: 3.5 MMOL/L (ref 3.5–5.1)
PROT SERPL-MCNC: 6.6 G/DL (ref 6–8.4)
RBC # BLD AUTO: 3.94 M/UL (ref 4–5.4)
SODIUM SERPL-SCNC: 139 MMOL/L (ref 136–145)
WBC # BLD AUTO: 10.34 K/UL (ref 3.9–12.7)

## 2022-01-20 PROCEDURE — A4216 STERILE WATER/SALINE, 10 ML: HCPCS | Mod: PN | Performed by: INTERNAL MEDICINE

## 2022-01-20 PROCEDURE — 96523 IRRIG DRUG DELIVERY DEVICE: CPT | Mod: PN

## 2022-01-20 PROCEDURE — 25000003 PHARM REV CODE 250: Mod: PN | Performed by: INTERNAL MEDICINE

## 2022-01-20 PROCEDURE — 80053 COMPREHEN METABOLIC PANEL: CPT | Mod: PN | Performed by: INTERNAL MEDICINE

## 2022-01-20 PROCEDURE — 85027 COMPLETE CBC AUTOMATED: CPT | Mod: PN | Performed by: INTERNAL MEDICINE

## 2022-01-20 RX ORDER — HEPARIN 100 UNIT/ML
500 SYRINGE INTRAVENOUS
Status: CANCELLED | OUTPATIENT
Start: 2022-01-20

## 2022-01-20 RX ORDER — SODIUM CHLORIDE 0.9 % (FLUSH) 0.9 %
10 SYRINGE (ML) INJECTION
Status: CANCELLED | OUTPATIENT
Start: 2022-01-20

## 2022-01-20 RX ORDER — HEPARIN 100 UNIT/ML
500 SYRINGE INTRAVENOUS
Status: DISCONTINUED | OUTPATIENT
Start: 2022-01-20 | End: 2022-01-20 | Stop reason: HOSPADM

## 2022-01-20 RX ORDER — SODIUM CHLORIDE 0.9 % (FLUSH) 0.9 %
10 SYRINGE (ML) INJECTION
Status: DISCONTINUED | OUTPATIENT
Start: 2022-01-20 | End: 2022-01-20 | Stop reason: HOSPADM

## 2022-01-20 RX ADMIN — Medication 10 ML: at 12:01

## 2022-01-21 ENCOUNTER — OFFICE VISIT (OUTPATIENT)
Dept: HEMATOLOGY/ONCOLOGY | Facility: CLINIC | Age: 42
End: 2022-01-21
Payer: COMMERCIAL

## 2022-01-21 ENCOUNTER — TELEPHONE (OUTPATIENT)
Dept: GYNECOLOGIC ONCOLOGY | Facility: CLINIC | Age: 42
End: 2022-01-21
Payer: COMMERCIAL

## 2022-01-21 DIAGNOSIS — Z15.09 BRCA2 GENE MUTATION POSITIVE: Primary | ICD-10-CM

## 2022-01-21 DIAGNOSIS — Z15.01 BRCA2 GENE MUTATION POSITIVE: Chronic | ICD-10-CM

## 2022-01-21 DIAGNOSIS — Z15.09 BRCA2 GENE MUTATION POSITIVE: Chronic | ICD-10-CM

## 2022-01-21 DIAGNOSIS — R26.89 DECREASED FUNCTIONAL MOBILITY: ICD-10-CM

## 2022-01-21 DIAGNOSIS — Z15.01 BRCA2 GENE MUTATION POSITIVE: Primary | ICD-10-CM

## 2022-01-21 DIAGNOSIS — G62.9 NEUROPATHY: ICD-10-CM

## 2022-01-21 DIAGNOSIS — C50.412 MALIGNANT NEOPLASM OF UPPER-OUTER QUADRANT OF LEFT BREAST IN FEMALE, ESTROGEN RECEPTOR NEGATIVE: Primary | ICD-10-CM

## 2022-01-21 DIAGNOSIS — Z17.1 MALIGNANT NEOPLASM OF UPPER-OUTER QUADRANT OF LEFT BREAST IN FEMALE, ESTROGEN RECEPTOR NEGATIVE: Primary | ICD-10-CM

## 2022-01-21 PROCEDURE — 99214 OFFICE O/P EST MOD 30 MIN: CPT | Mod: 95,,, | Performed by: INTERNAL MEDICINE

## 2022-01-21 PROCEDURE — 1160F PR REVIEW ALL MEDS BY PRESCRIBER/CLIN PHARMACIST DOCUMENTED: ICD-10-PCS | Mod: CPTII,95,, | Performed by: INTERNAL MEDICINE

## 2022-01-21 PROCEDURE — 1159F MED LIST DOCD IN RCRD: CPT | Mod: CPTII,95,, | Performed by: INTERNAL MEDICINE

## 2022-01-21 PROCEDURE — 1159F PR MEDICATION LIST DOCUMENTED IN MEDICAL RECORD: ICD-10-PCS | Mod: CPTII,95,, | Performed by: INTERNAL MEDICINE

## 2022-01-21 PROCEDURE — 99214 PR OFFICE/OUTPT VISIT, EST, LEVL IV, 30-39 MIN: ICD-10-PCS | Mod: 95,,, | Performed by: INTERNAL MEDICINE

## 2022-01-21 PROCEDURE — 1160F RVW MEDS BY RX/DR IN RCRD: CPT | Mod: CPTII,95,, | Performed by: INTERNAL MEDICINE

## 2022-01-21 NOTE — TELEPHONE ENCOUNTER
----- Message from Carmella Galvez MD sent at 1/20/2022  8:40 AM CST -----  Regarding: surgery scheduling  Let's go ahead and schedule robotic hyts/bso for BRCA2 gene mutation on Friday 3/4 at [facility], [place]. Please schedule pre op anesthesia and a visit with me to sign consents just prior to surgery date.     Thank you.

## 2022-01-26 ENCOUNTER — TELEPHONE (OUTPATIENT)
Dept: HEMATOLOGY/ONCOLOGY | Facility: CLINIC | Age: 42
End: 2022-01-26
Payer: COMMERCIAL

## 2022-01-26 ENCOUNTER — PATIENT MESSAGE (OUTPATIENT)
Dept: HEMATOLOGY/ONCOLOGY | Facility: CLINIC | Age: 42
End: 2022-01-26
Payer: COMMERCIAL

## 2022-01-26 NOTE — TELEPHONE ENCOUNTER
"Alison Swann ("Alison") verbally provided me with her permission for me to share with her relative (daughter, MRN [id number]) Alison's genetic information.    "

## 2022-02-02 ENCOUNTER — DOCUMENTATION ONLY (OUTPATIENT)
Dept: HEMATOLOGY/ONCOLOGY | Facility: CLINIC | Age: 42
End: 2022-02-02
Payer: COMMERCIAL

## 2022-02-02 ENCOUNTER — OFFICE VISIT (OUTPATIENT)
Dept: RADIATION ONCOLOGY | Facility: CLINIC | Age: 42
End: 2022-02-02
Payer: COMMERCIAL

## 2022-02-02 VITALS
RESPIRATION RATE: 16 BRPM | OXYGEN SATURATION: 100 % | DIASTOLIC BLOOD PRESSURE: 76 MMHG | BODY MASS INDEX: 20.38 KG/M2 | WEIGHT: 129.88 LBS | SYSTOLIC BLOOD PRESSURE: 118 MMHG | HEART RATE: 94 BPM | TEMPERATURE: 98 F | HEIGHT: 67 IN

## 2022-02-02 DIAGNOSIS — C50.412 MALIGNANT NEOPLASM OF UPPER-OUTER QUADRANT OF LEFT BREAST IN FEMALE, ESTROGEN RECEPTOR NEGATIVE: ICD-10-CM

## 2022-02-02 DIAGNOSIS — Z15.01 BRCA2 GENE MUTATION POSITIVE: Primary | Chronic | ICD-10-CM

## 2022-02-02 DIAGNOSIS — R19.7 DIARRHEA, UNSPECIFIED TYPE: ICD-10-CM

## 2022-02-02 DIAGNOSIS — D05.12 DUCTAL CARCINOMA IN SITU OF LEFT BREAST: ICD-10-CM

## 2022-02-02 DIAGNOSIS — Z17.1 MALIGNANT NEOPLASM OF UPPER-OUTER QUADRANT OF LEFT BREAST IN FEMALE, ESTROGEN RECEPTOR NEGATIVE: ICD-10-CM

## 2022-02-02 DIAGNOSIS — Z15.09 BRCA2 GENE MUTATION POSITIVE: Primary | Chronic | ICD-10-CM

## 2022-02-02 PROCEDURE — 99999 PR PBB SHADOW E&M-EST. PATIENT-LVL V: CPT | Mod: PBBFAC,,, | Performed by: RADIOLOGY

## 2022-02-02 PROCEDURE — 99999 PR PBB SHADOW E&M-EST. PATIENT-LVL V: ICD-10-PCS | Mod: PBBFAC,,, | Performed by: RADIOLOGY

## 2022-02-02 PROCEDURE — 99215 PR OFFICE/OUTPT VISIT, EST, LEVL V, 40-54 MIN: ICD-10-PCS | Mod: S$GLB,ICN,, | Performed by: RADIOLOGY

## 2022-02-02 PROCEDURE — 99215 OFFICE O/P EST HI 40 MIN: CPT | Mod: S$GLB,ICN,, | Performed by: RADIOLOGY

## 2022-02-02 NOTE — PROGRESS NOTES
[facility] Department of Radiation Oncology  Follow Up Visit Note    Diagnosis:  Alison Swann is a 41 y.o. female with a(n) Stage IIIB ([id number], G3, -/-/-) invasive ductal carcinoma of the LEFT upper outer quadrant breast, BRCA2+, status post lumpectomy 1/19/21, with residual larisa disease, followed by  adjuvant chemo-immunotherapy with carbo-taxol and pembrolizumab, followed by AC with pembrolizumab, followed by adjuvant pemprolizumab complicated by ongoing, chronic colitis/diarrhea necessitating long-term prednisone.  Underwent bilateral mastectomy 9/29/21 with no residual carcinoma identified in breast of 14 sampled nodes (ypN0). She completed adjuvant LEFT breast and regional larisa radiation therapy to a dose of 50Gy 1/4/22.     Interval History  The patient presents today for a regularly scheduled follow up visit.  She was last seen in our clinic on 1/4/22.   At that time, she was completing radiation therapy with minimal skin toxicity noted.  She continues with regular skincare, and has started slow steroid taper for immunotherapy-induced chronic diarrhea.  She reports some increased fatigue since starting taper.  Plan for NICOLETTE/BSO 3/2022.        Review of Systems   Review of Systems   Constitutional: Positive for malaise/fatigue. Negative for chills and fever.   HENT: Negative.    Eyes: Negative.    Respiratory: Negative.    Cardiovascular: Negative.    Gastrointestinal: Negative.    Genitourinary: Negative.    Musculoskeletal: Negative.    Skin: Negative for rash.   Psychiatric/Behavioral: Negative.      Physical Exam  Vitals reviewed.   Constitutional:       General: She is not in acute distress.     Appearance: Normal appearance. She is normal weight. She is not ill-appearing or toxic-appearing.   HENT:      Head: Normocephalic and atraumatic.      Nose: Nose normal.   Eyes:      General: No scleral icterus.     Conjunctiva/sclera: Conjunctivae normal.      Pupils: Pupils are equal, round, and reactive to  "light.   Pulmonary:      Effort: Pulmonary effort is normal.   Chest:      Comments: Status post bilat mastectomy and recon.  No palpable masses bilat recon breasts, axilla, SCFs.  Slight volume loss in medial RIGHT breast causing contour asymmetry    Abdominal:      General: Abdomen is flat.   Musculoskeletal:         General: Normal range of motion.      Cervical back: No rigidity.   Skin:     General: Skin is warm.      Findings: No bruising.   Neurological:      General: No focal deficit present.      Mental Status: She is alert and oriented to person, place, and time.   Psychiatric:         Mood and Affect: Mood normal.         Behavior: Behavior normal.         Thought Content: Thought content normal.         Judgment: Judgment normal.         Social History:  Social History     Tobacco Use    Smoking status: Never Smoker    Smokeless tobacco: Never Used   Substance Use Topics    Alcohol use: Not Currently     Comment:  last drink 1/15/2021    Drug use: Never       Family History:  Cancer-related family history includes Breast cancer (age of onset: 52) in her mother; Cancer in her other; Pancreatic cancer in her other. There is no history of Esophageal cancer.    Exam:  Vitals:    02/02/22 1030   BP: 118/76   BP Location: Right arm   Patient Position: Sitting   Pulse: 94   Resp: 16   Temp: 98.3 °F (36.8 °C)   SpO2: 100%   Weight: 58.9 kg (129 lb 13.6 oz)   Height: 5' 7" (1.702 m)         Assessment:   Recoverred well from acute radiation therapy-related toxicites   ECOG: (0) Fully active, able to carry on all predisease performance without restriction    Plan:   Follow up in 4 months   BSO scheduled for 3/4/2022   She was given our contact information, and she was told that she could call our clinic at anytime if she has any questions or concerns.   Follow up with other providers as directed        "

## 2022-02-02 NOTE — PROGRESS NOTES
"Alison Swann ("Alison") provided me, in writing, via [facility] message, with her permission for me to share with her relative (her daughter, MRN [id number]) Alison's personal and family health information that Alison previously provided to me pertaining to cancer genetics (i.e., her and her family's cancer history and other pertinent health information).      "

## 2022-02-04 ENCOUNTER — PATIENT MESSAGE (OUTPATIENT)
Dept: GYNECOLOGIC ONCOLOGY | Facility: CLINIC | Age: 42
End: 2022-02-04
Payer: COMMERCIAL

## 2022-02-04 ENCOUNTER — TELEPHONE (OUTPATIENT)
Dept: HEMATOLOGY/ONCOLOGY | Facility: CLINIC | Age: 42
End: 2022-02-04
Payer: COMMERCIAL

## 2022-02-09 ENCOUNTER — TELEPHONE (OUTPATIENT)
Dept: GYNECOLOGIC ONCOLOGY | Facility: CLINIC | Age: 42
End: 2022-02-09
Payer: COMMERCIAL

## 2022-02-09 ENCOUNTER — TELEPHONE (OUTPATIENT)
Dept: HEMATOLOGY/ONCOLOGY | Facility: CLINIC | Age: 42
End: 2022-02-09
Payer: COMMERCIAL

## 2022-02-09 NOTE — TELEPHONE ENCOUNTER
Spoke with pt regarding her missing her appointment for consents signing, Patient has been rescheduled to a new date and time where she can sign consents and do pre admit. Patient is aware she will need a negative covid test within 72 hours of surgery and has a facility she will go to and upload results into portal prior to surgery, pt voiced understanding and call was ended.

## 2022-02-09 NOTE — TELEPHONE ENCOUNTER
----- Message from Marisel Morrell sent at 2/9/2022 11:03 AM CST -----  Regarding: appointment  Name of Who is Calling: JACQUELINE MONTES [[id number]]      What is the request in detail: Patient is requesting a call back she states she was advised her appt today was a virtual she is requesting a call back to discuss asap       Can the clinic reply by [facility]: no      What Number to [facility] Back [facility]: [phone]

## 2022-02-09 NOTE — TELEPHONE ENCOUNTER
----- Message from Chioma Dempsey MA sent at 2/9/2022  3:24 PM CST -----  Regarding: pt requesting a call back  Name of Who is Calling: JACQUELINE MONTES [[id number]]           What is the request in detail: pt requesting a call back needs to speak with someone in office about a missed appt             Can the clinic reply by [facility]: no            What Number to Call Back [facility] not [facility]: [phone]

## 2022-02-10 ENCOUNTER — TELEPHONE (OUTPATIENT)
Dept: HEMATOLOGY/ONCOLOGY | Facility: CLINIC | Age: 42
End: 2022-02-10
Payer: COMMERCIAL

## 2022-02-10 NOTE — TELEPHONE ENCOUNTER
"----- Message from Faiza Shea RN sent at 2/10/2022 11:18 AM CST -----    ----- Message -----  From: Asa Strauss  Sent: 2/10/2022   9:29 AM CST  To: Ashleigh SMITH Staff    Consult/Advisory:          Name Of Caller: Self      Contact Preference?:  [phone]        Provider Name: Ashleigh      Does patient feel the need to be seen today? No      What is the nature of the call?: Returning call to [facility].          Additional Notes:  "Thank you for all that you do for our patients'"        "

## 2022-02-14 ENCOUNTER — TELEPHONE (OUTPATIENT)
Dept: HEMATOLOGY/ONCOLOGY | Facility: CLINIC | Age: 42
End: 2022-02-14
Payer: COMMERCIAL

## 2022-02-17 ENCOUNTER — TELEPHONE (OUTPATIENT)
Dept: HEMATOLOGY/ONCOLOGY | Facility: CLINIC | Age: 42
End: 2022-02-17
Payer: COMMERCIAL

## 2022-02-17 NOTE — TELEPHONE ENCOUNTER
Left vm for patient informing her that there are no available appointment for requested date and to return call or send a message via portal to discuss rescheduling at a later date.

## 2022-02-17 NOTE — TELEPHONE ENCOUNTER
"----- Message from Asa Strauss sent at 2/17/2022  3:25 PM CST -----  Reschedule Existing Appointment        Appt Date: 2/24    Type of appt : RTC 4-6 weeks virtual    Physician: Ashleigh    Reason for rescheduling? Requesting to r/s for 2/28    Caller: Self    Contact Preference: [phone]              Additional Information:  "Thank you for all that you do for our patients'"     "

## 2022-02-21 ENCOUNTER — TELEPHONE (OUTPATIENT)
Dept: HEMATOLOGY/ONCOLOGY | Facility: CLINIC | Age: 42
End: 2022-02-21
Payer: COMMERCIAL

## 2022-02-23 NOTE — PROGRESS NOTES
Subjective:      Patient ID: Alison Swann is a 41 y.o. female.    Chief Complaint: consents      HPI  Presents today for follow up BRCA2 mutation.   Pelvic US 1/2022- unremarkable  The uterus measures 5.4 x 3.2 x 2.3 cm and the endometrium measures 2 mm and thin.  The right ovary is thought to be visualized at 2.1 x 2.0 x 1.0 cm and the left ovary is thought to be visualized at 2.0 x 1.7 x 0.9 cm     1/2022  23     She underwent double mastectomy/reconstruction 9/2021 and completed adjuvant RT 1/4/2022.     She desires to move forward with risk reducing BSO.   Discussed RA BSO as well as R/B/A to concurrent hysterectomy. She desires concurrent hysterectomy.      Referral history:  42 y/o para 1 referred by FADI Earl for positive BRCA2 mutation. Personal h/o L breast cancer. S/p NACT with Adriamycin/Cytoxan, Pembrolizumab. Planning for surgery on September 17 with [facility] then radiation to follow. Oncologist is Dr. Sandra Sotelo.      Surgical history includes CS x 1 (LTV), BTL, endometrial ablation.      LMP approximately 2017 after endometrial ablation. History of oral contraceptive use for 8 years.     Family history significant for mother with breast cancer (age 50).      No family history of ovarian, colon, or uterine cancer.      No other medical problems.      Denies history of abnormal pap smear. Reports last pap in 2020 was normal. OB/GYN is Dr. Gilmore in [place].  Review of Systems   Constitutional: Negative for appetite change, chills, fatigue and fever.   HENT: Negative for mouth sores.    Respiratory: Negative for cough and shortness of breath.    Cardiovascular: Negative for leg swelling.   Gastrointestinal: Negative for abdominal pain, blood in stool, constipation and diarrhea.   Endocrine: Negative for cold intolerance.   Genitourinary: Negative for dysuria and vaginal bleeding.   Musculoskeletal: Negative for myalgias.   Skin: Negative for rash.   Allergic/Immunologic:  Negative.    Neurological: Negative for weakness and numbness.   Hematological: Negative for adenopathy. Does not bruise/bleed easily.   Psychiatric/Behavioral: Negative for confusion.       Objective:   Physical Exam:   Constitutional: She is oriented to person, place, and time. She appears well-developed and well-nourished.    HENT:   Head: Normocephalic and atraumatic.    Eyes: Pupils are equal, round, and reactive to light. EOM are normal.    Neck: No thyromegaly present.    Cardiovascular: Normal rate, regular rhythm and intact distal pulses.     Pulmonary/Chest: Effort normal and breath sounds normal. No respiratory distress. She has no wheezes.        Abdominal: Soft. Bowel sounds are normal. She exhibits no distension and no mass. There is no abdominal tenderness.             Musculoskeletal: Normal range of motion and moves all extremeties.      Lymphadenopathy:     She has no cervical adenopathy.        Right: No supraclavicular adenopathy present.        Left: No supraclavicular adenopathy present.    Neurological: She is alert and oriented to person, place, and time.    Skin: Skin is warm and dry. No rash noted.    Psychiatric: She has a normal mood and affect.       Assessment:     1. BRCA2 gene mutation positive        Plan:   No orders of the defined types were placed in this encounter.    Plan for risk reducing RTLH/BSO    Surgery 3/4/2022 [facility]  Pre op anesthesia    The risks, benefits, and indications of the procedure were discussed with the patient and her family members if present.  These included bleeding, transfusion, infection, damage to surrounding tissues (bowel, bladder, ureter), wound separation, lymphedema, conversion to laparotomy if laparoscopic, perioperative cardiac events, VTE, pneumonia, and possible death.  We discussed possible need for bowel or urologic resection, temporary or permanent ostomies. She voiced understanding, all questions were answered and consents were  signed.

## 2022-02-23 NOTE — H&P (VIEW-ONLY)
Subjective:      Patient ID: Alison Swann is a 41 y.o. female.    Chief Complaint: consents      HPI  Presents today for follow up BRCA2 mutation.   Pelvic US 1/2022- unremarkable  The uterus measures 5.4 x 3.2 x 2.3 cm and the endometrium measures 2 mm and thin.  The right ovary is thought to be visualized at 2.1 x 2.0 x 1.0 cm and the left ovary is thought to be visualized at 2.0 x 1.7 x 0.9 cm     1/2022  23     She underwent double mastectomy/reconstruction 9/2021 and completed adjuvant RT 1/4/2022.     She desires to move forward with risk reducing BSO.   Discussed RA BSO as well as R/B/A to concurrent hysterectomy. She desires concurrent hysterectomy.      Referral history:  42 y/o para 1 referred by FADI Earl for positive BRCA2 mutation. Personal h/o L breast cancer. S/p NACT with Adriamycin/Cytoxan, Pembrolizumab. Planning for surgery on September 17 with [facility] then radiation to follow. Oncologist is Dr. Sandra Sotelo.      Surgical history includes CS x 1 (LTV), BTL, endometrial ablation.      LMP approximately 2017 after endometrial ablation. History of oral contraceptive use for 8 years.     Family history significant for mother with breast cancer (age 50).      No family history of ovarian, colon, or uterine cancer.      No other medical problems.      Denies history of abnormal pap smear. Reports last pap in 2020 was normal. OB/GYN is Dr. Gilmore in [place].  Review of Systems   Constitutional: Negative for appetite change, chills, fatigue and fever.   HENT: Negative for mouth sores.    Respiratory: Negative for cough and shortness of breath.    Cardiovascular: Negative for leg swelling.   Gastrointestinal: Negative for abdominal pain, blood in stool, constipation and diarrhea.   Endocrine: Negative for cold intolerance.   Genitourinary: Negative for dysuria and vaginal bleeding.   Musculoskeletal: Negative for myalgias.   Skin: Negative for rash.   Allergic/Immunologic:  Negative.    Neurological: Negative for weakness and numbness.   Hematological: Negative for adenopathy. Does not bruise/bleed easily.   Psychiatric/Behavioral: Negative for confusion.       Objective:   Physical Exam:   Constitutional: She is oriented to person, place, and time. She appears well-developed and well-nourished.    HENT:   Head: Normocephalic and atraumatic.    Eyes: Pupils are equal, round, and reactive to light. EOM are normal.    Neck: No thyromegaly present.    Cardiovascular: Normal rate, regular rhythm and intact distal pulses.     Pulmonary/Chest: Effort normal and breath sounds normal. No respiratory distress. She has no wheezes.        Abdominal: Soft. Bowel sounds are normal. She exhibits no distension and no mass. There is no abdominal tenderness.             Musculoskeletal: Normal range of motion and moves all extremeties.      Lymphadenopathy:     She has no cervical adenopathy.        Right: No supraclavicular adenopathy present.        Left: No supraclavicular adenopathy present.    Neurological: She is alert and oriented to person, place, and time.    Skin: Skin is warm and dry. No rash noted.    Psychiatric: She has a normal mood and affect.       Assessment:     1. BRCA2 gene mutation positive        Plan:   No orders of the defined types were placed in this encounter.    Plan for risk reducing RTLH/BSO    Surgery 3/4/2022 [facility]  Pre op anesthesia    The risks, benefits, and indications of the procedure were discussed with the patient and her family members if present.  These included bleeding, transfusion, infection, damage to surrounding tissues (bowel, bladder, ureter), wound separation, lymphedema, conversion to laparotomy if laparoscopic, perioperative cardiac events, VTE, pneumonia, and possible death.  We discussed possible need for bowel or urologic resection, temporary or permanent ostomies. She voiced understanding, all questions were answered and consents were  signed.

## 2022-02-24 ENCOUNTER — TELEPHONE (OUTPATIENT)
Dept: GYNECOLOGIC ONCOLOGY | Facility: CLINIC | Age: 42
End: 2022-02-24
Payer: COMMERCIAL

## 2022-02-24 ENCOUNTER — ANESTHESIA EVENT (OUTPATIENT)
Dept: SURGERY | Facility: OTHER | Age: 42
End: 2022-02-24
Payer: COMMERCIAL

## 2022-02-24 ENCOUNTER — HOSPITAL ENCOUNTER (OUTPATIENT)
Dept: PREADMISSION TESTING | Facility: OTHER | Age: 42
Discharge: HOME | End: 2022-02-24
Attending: OBSTETRICS & GYNECOLOGY
Payer: COMMERCIAL

## 2022-02-24 ENCOUNTER — OFFICE VISIT (OUTPATIENT)
Dept: GYNECOLOGIC ONCOLOGY | Facility: CLINIC | Age: 42
End: 2022-02-24
Payer: COMMERCIAL

## 2022-02-24 VITALS
DIASTOLIC BLOOD PRESSURE: 69 MMHG | TEMPERATURE: 102 F | BODY MASS INDEX: 20.05 KG/M2 | WEIGHT: 128 LBS | SYSTOLIC BLOOD PRESSURE: 119 MMHG

## 2022-02-24 VITALS
HEART RATE: 93 BPM | SYSTOLIC BLOOD PRESSURE: 98 MMHG | TEMPERATURE: 98 F | OXYGEN SATURATION: 98 % | BODY MASS INDEX: 20.09 KG/M2 | HEIGHT: 67 IN | WEIGHT: 128 LBS | DIASTOLIC BLOOD PRESSURE: 61 MMHG

## 2022-02-24 DIAGNOSIS — Z01.818 PRE-OP TESTING: ICD-10-CM

## 2022-02-24 DIAGNOSIS — Z15.01 BRCA2 GENE MUTATION POSITIVE: Primary | Chronic | ICD-10-CM

## 2022-02-24 DIAGNOSIS — Z15.09 BRCA2 GENE MUTATION POSITIVE: Primary | Chronic | ICD-10-CM

## 2022-02-24 PROCEDURE — 99213 PR OFFICE/OUTPT VISIT, EST, LEVL III, 20-29 MIN: ICD-10-PCS | Mod: S$GLB,,, | Performed by: OBSTETRICS & GYNECOLOGY

## 2022-02-24 PROCEDURE — 99999 PR PBB SHADOW E&M-EST. PATIENT-LVL III: ICD-10-PCS | Mod: PBBFAC,,, | Performed by: OBSTETRICS & GYNECOLOGY

## 2022-02-24 PROCEDURE — 99999 PR PBB SHADOW E&M-EST. PATIENT-LVL III: CPT | Mod: PBBFAC,,, | Performed by: OBSTETRICS & GYNECOLOGY

## 2022-02-24 PROCEDURE — 99213 OFFICE O/P EST LOW 20 MIN: CPT | Mod: ICN,S$GLB,, | Performed by: OBSTETRICS & GYNECOLOGY

## 2022-02-24 RX ORDER — LIDOCAINE HYDROCHLORIDE 10 MG/ML
0.5 INJECTION, SOLUTION EPIDURAL; INFILTRATION; INTRACAUDAL; PERINEURAL ONCE
Status: CANCELLED | OUTPATIENT
Start: 2022-02-24 | End: 2022-02-24

## 2022-02-24 RX ORDER — PREGABALIN 50 MG/1
50 CAPSULE ORAL ONCE
Status: CANCELLED | OUTPATIENT
Start: 2022-02-24 | End: 2022-02-24

## 2022-02-24 RX ORDER — SCOLOPAMINE TRANSDERMAL SYSTEM 1 MG/1
1 PATCH, EXTENDED RELEASE TRANSDERMAL ONCE
Status: CANCELLED | OUTPATIENT
Start: 2022-02-24 | End: 2022-02-24

## 2022-02-24 RX ORDER — ACETAMINOPHEN 500 MG
1000 TABLET ORAL
Status: CANCELLED | OUTPATIENT
Start: 2022-02-24 | End: 2022-02-24

## 2022-02-24 RX ORDER — SODIUM CHLORIDE, SODIUM LACTATE, POTASSIUM CHLORIDE, CALCIUM CHLORIDE 600; 310; 30; 20 MG/100ML; MG/100ML; MG/100ML; MG/100ML
INJECTION, SOLUTION INTRAVENOUS CONTINUOUS
Status: CANCELLED | OUTPATIENT
Start: 2022-02-24

## 2022-02-24 NOTE — DISCHARGE INSTRUCTIONS
Information to Prepare you for your Surgery    PRE-ADMIT TESTING -  [phone]    [address] [facility]          Your surgery has been scheduled at [facility]. We are pleased to have the opportunity to serve you. For Further Information please call [phone].    On the day of surgery please report to the Information Desk on the 1st floor.    CONTACT YOUR PHYSICIAN'S OFFICE THE DAY PRIOR TO YOUR SURGERY TO OBTAIN YOUR ARRIVAL TIME.     The evening before surgery do not eat anything after 9 p.m. ( this includes hard candy, chewing gum and mints).  You may only have GATORADE, POWERADE AND WATER  from 9 p.m. until you leave your home.   DO NOT DRINK ANY LIQUIDS ON THE WAY TO THE HOSPITAL.      Why does your anesthesiologist allow you to drink Gatorade/Powerade before surgery?  Gatorade/Powerade helps to increase your comfort before surgery and to decrease your nausea after surgery. The carbohydrates in Gatorade/Powerade help reduce your body's stress response to surgery.  If you are a diabetic-drink only water prior to surgery.      Current Visitor policy(12/27/2021) - Patients may have 1 adult visitor pre and post procedure. Only 1 visitor will be allowed in the Surgical building with the patient.     SPECIAL MEDICATION INSTRUCTIONS: TAKE medications checked off by the Anesthesiologist on your Medication List.    Angiogram Patients: Take medications as instructed by your physician, including aspirin.     Surgery Patients:    If you take ASPIRIN - Your PHYSICIAN/SURGEON will need to inform you IF/OR when you need to stop taking aspirin prior to your surgery.     Do Not take any medications containing IBUPROFEN.    Do Not Wear any make-up (especially eye make-up) to surgery. Please remove any false eyelashes or eyelash extensions. If you arrive the day of surgery with makeup/eyelashes on you will be required to remove prior to surgery. (There is a risk of  corneal abrasions if eye makeup/eyelash extensions are not removed)      Leave all valuables at home.   Do Not wear any jewelry or watches, including any metal in body piercings. Jewelry must be removed prior to coming to the hospital.  There is a possibility that rings that are unable to be removed may be cut off if they are on the surgical extremity.    Please remove all hair extensions, wigs, clips and any other metal accessories/ ornaments from your hair.  These items may pose a flammable/fire risk in Surgery and must be removed.    Do not shave your surgical area at least 5 days prior to your surgery. The surgical prep will be performed at the hospital according to Infection Control regulations.    Contact Lens must be removed before surgery. Either do not wear the contact lens or bring a case and solution for storage.  Please bring a container for eyeglasses or dentures as required.  Bring any paperwork your physician has provided, such as consent forms,  history and physicals, doctor's orders, etc.   Bring comfortable clothes that are loose fitting to wear upon discharge. Take into consideration the type of surgery being performed.  Maintain your diet as advised per your physician the day prior to surgery.      Adequate rest the night before surgery is advised.   Park in the Parking lot behind the hospital or in the [place] [facility] across the street from the parking lot. Parking is complimentary.  If you will be discharged the same day as your procedure, please arrange for a responsible adult to drive you home or to accompany you if traveling by taxi.   YOU WILL NOT BE PERMITTED TO DRIVE OR TO LEAVE THE HOSPITAL ALONE AFTER SURGERY.   If you are being discharged the same day, it is strongly recommended that you arrange for someone to remain with you for the first 24 hrs following your surgery.    The Surgeon will speak to your family/visitor after your surgery regarding the outcome of your surgery and  post op care.  The Surgeon may speak to you after your surgery, but there is a possibility you may not remember the details.  Please check with your family members regarding the conversation with the Surgeon.    We strongly recommend whoever is bringing you home be present for discharge instructions.  This will ensure a thorough understanding for your post op home care.    ALL CHILDREN MUST ALWAYS BE ACCOMPANIED BY AN ADULT.    Visitors-Refer to current Visitor policy handouts.    Thank you for your cooperation.  The Staff of [facility].            Bathing Instructions with Hibiclens    Shower the evening before and morning of your procedure with Hibiclens:  Wash your face with water and your regular face wash/soap  Apply Hibiclens directly on your skin or on a wet washcloth and wash gently. When showering: Move away from the shower stream when applying Hibiclens to avoid rinsing off too soon.  Rinse thoroughly with warm water  Do not dilute Hibiclens        Dry off as usual, do not use any deodorant, powder, body lotions, perfume, after shave or cologne.               t                       Information to Prepare you for your Surgery    PRE-ADMIT TESTING -  [phone]    [facility]          Your surgery has been scheduled at [facility]. We are pleased to have the opportunity to serve you. For Further Information please call [phone].    On the day of surgery please report to the Information Desk on the 1st floor.    CONTACT YOUR PHYSICIAN'S OFFICE THE DAY PRIOR TO YOUR SURGERY TO OBTAIN YOUR ARRIVAL TIME.     The evening before surgery do not eat anything after 9 p.m. ( this includes hard candy, chewing gum and mints).  You may only have GATORADE, POWERADE AND WATER  from 9 p.m. until you leave your home.   DO NOT DRINK ANY LIQUIDS ON THE WAY TO THE HOSPITAL.      Why does your anesthesiologist allow you to drink Gatorade/Powerade before  surgery?  Gatorade/Powerade helps to increase your comfort before surgery and to decrease your nausea after surgery. The carbohydrates in Gatorade/Powerade help reduce your body's stress response to surgery.  If you are a diabetic-drink only water prior to surgery.      Current Visitor policy(12/27/2021) - Patients may have 1 adult visitor pre and post procedure. Only 1 visitor will be allowed in the Surgical building with the patient.     SPECIAL MEDICATION INSTRUCTIONS: TAKE medications checked off by the Anesthesiologist on your Medication List.    Angiogram Patients: Take medications as instructed by your physician, including aspirin.     Surgery Patients:    If you take ASPIRIN - Your PHYSICIAN/SURGEON will need to inform you IF/OR when you need to stop taking aspirin prior to your surgery.     Do Not take any medications containing IBUPROFEN.    Do Not Wear any make-up (especially eye make-up) to surgery. Please remove any false eyelashes or eyelash extensions. If you arrive the day of surgery with makeup/eyelashes on you will be required to remove prior to surgery. (There is a risk of corneal abrasions if eye makeup/eyelash extensions are not removed)      Leave all valuables at home.   Do Not wear any jewelry or watches, including any metal in body piercings. Jewelry must be removed prior to coming to the hospital.  There is a possibility that rings that are unable to be removed may be cut off if they are on the surgical extremity.    Please remove all hair extensions, wigs, clips and any other metal accessories/ ornaments from your hair.  These items may pose a flammable/fire risk in Surgery and must be removed.    Do not shave your surgical area at least 5 days prior to your surgery. The surgical prep will be performed at the hospital according to Infection Control regulations.    Contact Lens must be removed before surgery. Either do not wear the contact lens or bring a case and solution for  storage.  Please bring a container for eyeglasses or dentures as required.  Bring any paperwork your physician has provided, such as consent forms,  history and physicals, doctor's orders, etc.   Bring comfortable clothes that are loose fitting to wear upon discharge. Take into consideration the type of surgery being performed.  Maintain your diet as advised per your physician the day prior to surgery.      Adequate rest the night before surgery is advised.   Park in the Parking lot behind the hospital or in the [place] [facility] across the street from the parking lot. Parking is complimentary.  If you will be discharged the same day as your procedure, please arrange for a responsible adult to drive you home or to accompany you if traveling by taxi.   YOU WILL NOT BE PERMITTED TO DRIVE OR TO LEAVE THE HOSPITAL ALONE AFTER SURGERY.   If you are being discharged the same day, it is strongly recommended that you arrange for someone to remain with you for the first 24 hrs following your surgery.    The Surgeon will speak to your family/visitor after your surgery regarding the outcome of your surgery and post op care.  The Surgeon may speak to you after your surgery, but there is a possibility you may not remember the details.  Please check with your family members regarding the conversation with the Surgeon.    We strongly recommend whoever is bringing you home be present for discharge instructions.  This will ensure a thorough understanding for your post op home care.    ALL CHILDREN MUST ALWAYS BE ACCOMPANIED BY AN ADULT.    Visitors-Refer to current Visitor policy handouts.    Thank you for your cooperation.  The Staff of [facility].            Bathing Instructions with Hibiclens    Shower the evening before and morning of your procedure with Hibiclens:  Wash your face with water and your regular face wash/soap  Apply Hibiclens directly on your skin or on a wet washcloth and wash gently.  When showering: Move away from the shower stream when applying Hibiclens to avoid rinsing off too soon.  Rinse thoroughly with warm water  Do not dilute Hibiclens        Dry off as usual, do not use any deodorant, powder, body lotions, perfume, after shave or cologne.

## 2022-02-24 NOTE — ANESTHESIA PREPROCEDURE EVALUATION
02/24/2022  Alison Swann is a 41 y.o., female.      Pre-op Assessment    I have reviewed the Patient Summary Reports.     I have reviewed the Nursing Notes.    I have reviewed the Medications.     Review of Systems  Anesthesia Hx:  Denies Family Hx of Anesthesia complications.  Personal Hx of Anesthesia complications, Post-Operative Nausea/Vomiting   Social:  Non-Smoker    Hematology/Oncology:  Hematology Normal      Current/Recent Cancer. Breast left axillary node dissection no lymphedema chemotherapy and surgery Oncology Comments: BRAC positive     EENT/Dental:EENT/Dental Normal   Cardiovascular:  Cardiovascular Normal Exercise tolerance: good     Pulmonary:  Pulmonary Normal    Renal/:  Renal/ Normal     Hepatic/GI:  Hepatic/GI Normal    Musculoskeletal:  Musculoskeletal Normal    Neurological:  Neurology Normal    Endocrine:  Endocrine Normal    Dermatological:  Skin Normal    Psych:  Psychiatric Normal           Physical Exam  General: Well nourished, Cooperative, Alert and Oriented    Airway:  Mallampati: II   Mouth Opening: Normal  TM Distance: Normal  Neck ROM: Normal ROM    Dental:  Intact        Anesthesia Plan  Type of Anesthesia, risks & benefits discussed:    Anesthesia Type: Gen ETT  Intra-op Monitoring Plan: Standard ASA Monitors  Post Op Pain Control Plan: multimodal analgesia and IV/PO Opioids PRN  Induction:  IV  Airway Plan: Video  Informed Consent: Informed consent signed with the Patient and all parties understand the risks and agree with anesthesia plan.  All questions answered.   ASA Score: 2    Ready For Surgery From Anesthesia Perspective.     .

## 2022-02-24 NOTE — PRE ADMISSION SCREENING
Patient states wants T&S drawn through her port.informed that she will have T&S drawn AM of surgery.

## 2022-02-25 ENCOUNTER — PATIENT MESSAGE (OUTPATIENT)
Dept: HEMATOLOGY/ONCOLOGY | Facility: CLINIC | Age: 42
End: 2022-02-25
Payer: COMMERCIAL

## 2022-02-25 ENCOUNTER — PATIENT MESSAGE (OUTPATIENT)
Dept: SURGERY | Facility: OTHER | Age: 42
End: 2022-02-25
Payer: COMMERCIAL

## 2022-02-25 RX ORDER — LIDOCAINE HYDROCHLORIDE 10 MG/ML
1 INJECTION, SOLUTION EPIDURAL; INFILTRATION; INTRACAUDAL; PERINEURAL ONCE
Status: CANCELLED | OUTPATIENT
Start: 2022-02-25 | End: 2022-02-25

## 2022-02-25 RX ORDER — MUPIROCIN 20 MG/G
OINTMENT TOPICAL
Status: CANCELLED | OUTPATIENT
Start: 2022-02-25

## 2022-02-25 RX ORDER — SODIUM CHLORIDE 9 MG/ML
INJECTION, SOLUTION INTRAVENOUS CONTINUOUS
Status: CANCELLED | OUTPATIENT
Start: 2022-02-25

## 2022-02-26 ENCOUNTER — PATIENT MESSAGE (OUTPATIENT)
Dept: SURGERY | Facility: OTHER | Age: 42
End: 2022-02-26
Payer: COMMERCIAL

## 2022-03-02 ENCOUNTER — PATIENT MESSAGE (OUTPATIENT)
Dept: HEMATOLOGY/ONCOLOGY | Facility: CLINIC | Age: 42
End: 2022-03-02
Payer: COMMERCIAL

## 2022-03-02 ENCOUNTER — LAB VISIT (OUTPATIENT)
Dept: FAMILY MEDICINE | Facility: CLINIC | Age: 42
End: 2022-03-02
Payer: COMMERCIAL

## 2022-03-02 ENCOUNTER — PATIENT MESSAGE (OUTPATIENT)
Dept: SURGERY | Facility: OTHER | Age: 42
End: 2022-03-02
Payer: COMMERCIAL

## 2022-03-02 DIAGNOSIS — Z01.818 PRE-OP TESTING: ICD-10-CM

## 2022-03-02 PROCEDURE — U0005 INFEC AGEN DETEC AMPLI PROBE: HCPCS | Performed by: OBSTETRICS & GYNECOLOGY

## 2022-03-02 PROCEDURE — U0003 INFECTIOUS AGENT DETECTION BY NUCLEIC ACID (DNA OR RNA); SEVERE ACUTE RESPIRATORY SYNDROME CORONAVIRUS 2 (SARS-COV-2) (CORONAVIRUS DISEASE [COVID-19]), AMPLIFIED PROBE TECHNIQUE, MAKING USE OF HIGH THROUGHPUT TECHNOLOGIES AS DESCRIBED BY CMS-2020-01-R: HCPCS | Performed by: OBSTETRICS & GYNECOLOGY

## 2022-03-03 ENCOUNTER — PATIENT MESSAGE (OUTPATIENT)
Dept: SURGERY | Facility: OTHER | Age: 42
End: 2022-03-03
Payer: COMMERCIAL

## 2022-03-03 LAB
SARS-COV-2 RNA RESP QL NAA+PROBE: NOT DETECTED
SARS-COV-2- CYCLE NUMBER: NORMAL

## 2022-03-04 ENCOUNTER — HOSPITAL ENCOUNTER (OUTPATIENT)
Facility: OTHER | Age: 42
Discharge: HOME | End: 2022-03-04
Attending: OBSTETRICS & GYNECOLOGY | Admitting: OBSTETRICS & GYNECOLOGY
Payer: COMMERCIAL

## 2022-03-04 ENCOUNTER — ANESTHESIA (OUTPATIENT)
Dept: SURGERY | Facility: OTHER | Age: 42
End: 2022-03-04
Payer: COMMERCIAL

## 2022-03-04 VITALS
TEMPERATURE: 98 F | OXYGEN SATURATION: 96 % | WEIGHT: 128 LBS | BODY MASS INDEX: 20.09 KG/M2 | RESPIRATION RATE: 16 BRPM | DIASTOLIC BLOOD PRESSURE: 68 MMHG | HEART RATE: 86 BPM | SYSTOLIC BLOOD PRESSURE: 115 MMHG | HEIGHT: 67 IN

## 2022-03-04 DIAGNOSIS — Z15.09 BRCA2 GENE MUTATION POSITIVE: Chronic | ICD-10-CM

## 2022-03-04 DIAGNOSIS — Z01.818 PRE-OP TESTING: ICD-10-CM

## 2022-03-04 DIAGNOSIS — Z90.710 S/P LAPAROSCOPIC HYSTERECTOMY: Primary | ICD-10-CM

## 2022-03-04 DIAGNOSIS — Z15.01 BRCA2 GENE MUTATION POSITIVE: Chronic | ICD-10-CM

## 2022-03-04 LAB
ABO + RH BLD: NORMAL
B-HCG UR QL: NEGATIVE
BLD GP AB SCN CELLS X3 SERPL QL: NORMAL
CTP QC/QA: YES
POCT GLUCOSE: 89 MG/DL (ref 70–110)

## 2022-03-04 PROCEDURE — 71000039 HC RECOVERY, EACH ADD'L HOUR: Performed by: OBSTETRICS & GYNECOLOGY

## 2022-03-04 PROCEDURE — P9045 ALBUMIN (HUMAN), 5%, 250 ML: HCPCS | Mod: JG | Performed by: NURSE ANESTHETIST, CERTIFIED REGISTERED

## 2022-03-04 PROCEDURE — 88305 TISSUE EXAM BY PATHOLOGIST: CPT | Performed by: PATHOLOGY

## 2022-03-04 PROCEDURE — 88309 TISSUE EXAM BY PATHOLOGIST: CPT | Mod: 26,,, | Performed by: PATHOLOGY

## 2022-03-04 PROCEDURE — 88112 PR  CYTOPATH, CELL ENHANCE TECH: ICD-10-PCS | Mod: 26,,, | Performed by: PATHOLOGY

## 2022-03-04 PROCEDURE — 36415 COLL VENOUS BLD VENIPUNCTURE: CPT | Performed by: OBSTETRICS & GYNECOLOGY

## 2022-03-04 PROCEDURE — 71000015 HC POSTOP RECOV 1ST HR: Performed by: OBSTETRICS & GYNECOLOGY

## 2022-03-04 PROCEDURE — 63600175 PHARM REV CODE 636 W HCPCS: Performed by: ANESTHESIOLOGY

## 2022-03-04 PROCEDURE — 58571 TLH W/T/O 250 G OR LESS: CPT | Mod: ,,, | Performed by: OBSTETRICS & GYNECOLOGY

## 2022-03-04 PROCEDURE — 88112 CYTOPATH CELL ENHANCE TECH: CPT | Mod: 26,,, | Performed by: PATHOLOGY

## 2022-03-04 PROCEDURE — 37000008 HC ANESTHESIA 1ST 15 MINUTES: Performed by: OBSTETRICS & GYNECOLOGY

## 2022-03-04 PROCEDURE — 58571 PR LAPAROSCOPY W TOT HYSTERECTUTERUS <=250 GRAM  W TUBE/OVARY: ICD-10-PCS | Mod: AS,ICN,, | Performed by: NURSE PRACTITIONER

## 2022-03-04 PROCEDURE — 88112 CYTOPATH CELL ENHANCE TECH: CPT | Performed by: PATHOLOGY

## 2022-03-04 PROCEDURE — 36000713 HC OR TIME LEV V EA ADD 15 MIN: Performed by: OBSTETRICS & GYNECOLOGY

## 2022-03-04 PROCEDURE — 36000712 HC OR TIME LEV V 1ST 15 MIN: Performed by: OBSTETRICS & GYNECOLOGY

## 2022-03-04 PROCEDURE — 25000003 PHARM REV CODE 250: Performed by: ANESTHESIOLOGY

## 2022-03-04 PROCEDURE — 25000003 PHARM REV CODE 250: Performed by: OBSTETRICS & GYNECOLOGY

## 2022-03-04 PROCEDURE — 81025 URINE PREGNANCY TEST: CPT | Performed by: ANESTHESIOLOGY

## 2022-03-04 PROCEDURE — 88309 TISSUE EXAM BY PATHOLOGIST: CPT | Performed by: PATHOLOGY

## 2022-03-04 PROCEDURE — 88305 TISSUE EXAM BY PATHOLOGIST: CPT | Mod: 26,,, | Performed by: PATHOLOGY

## 2022-03-04 PROCEDURE — 63600175 PHARM REV CODE 636 W HCPCS: Performed by: OBSTETRICS & GYNECOLOGY

## 2022-03-04 PROCEDURE — 71000033 HC RECOVERY, INTIAL HOUR: Performed by: OBSTETRICS & GYNECOLOGY

## 2022-03-04 PROCEDURE — 88309 PR  SURG PATH,LEVEL VI: ICD-10-PCS | Mod: 26,,, | Performed by: PATHOLOGY

## 2022-03-04 PROCEDURE — 58571 PR LAPAROSCOPY W TOT HYSTERECTUTERUS <=250 GRAM  W TUBE/OVARY: ICD-10-PCS | Mod: ,,, | Performed by: OBSTETRICS & GYNECOLOGY

## 2022-03-04 PROCEDURE — 63600175 PHARM REV CODE 636 W HCPCS: Mod: JG | Performed by: NURSE ANESTHETIST, CERTIFIED REGISTERED

## 2022-03-04 PROCEDURE — 58571 TLH W/T/O 250 G OR LESS: CPT | Mod: AS,ICN,, | Performed by: NURSE PRACTITIONER

## 2022-03-04 PROCEDURE — 88305 TISSUE EXAM BY PATHOLOGIST: ICD-10-PCS | Mod: 26,,, | Performed by: PATHOLOGY

## 2022-03-04 PROCEDURE — 27201423 OPTIME MED/SURG SUP & DEVICES STERILE SUPPLY: Performed by: OBSTETRICS & GYNECOLOGY

## 2022-03-04 PROCEDURE — 25000003 PHARM REV CODE 250: Performed by: NURSE ANESTHETIST, CERTIFIED REGISTERED

## 2022-03-04 PROCEDURE — 86901 BLOOD TYPING SEROLOGIC RH(D): CPT | Performed by: OBSTETRICS & GYNECOLOGY

## 2022-03-04 PROCEDURE — 71000016 HC POSTOP RECOV ADDL HR: Performed by: OBSTETRICS & GYNECOLOGY

## 2022-03-04 PROCEDURE — 37000009 HC ANESTHESIA EA ADD 15 MINS: Performed by: OBSTETRICS & GYNECOLOGY

## 2022-03-04 RX ORDER — SODIUM CHLORIDE 0.9 % (FLUSH) 0.9 %
3 SYRINGE (ML) INJECTION
Status: DISCONTINUED | OUTPATIENT
Start: 2022-03-04 | End: 2022-03-04 | Stop reason: HOSPADM

## 2022-03-04 RX ORDER — SODIUM CHLORIDE 9 MG/ML
INJECTION, SOLUTION INTRAVENOUS CONTINUOUS
Status: DISCONTINUED | OUTPATIENT
Start: 2022-03-04 | End: 2022-03-04 | Stop reason: HOSPADM

## 2022-03-04 RX ORDER — ROCURONIUM BROMIDE 10 MG/ML
INJECTION, SOLUTION INTRAVENOUS
Status: DISCONTINUED | OUTPATIENT
Start: 2022-03-04 | End: 2022-03-04

## 2022-03-04 RX ORDER — HYDROMORPHONE HYDROCHLORIDE 2 MG/ML
INJECTION, SOLUTION INTRAMUSCULAR; INTRAVENOUS; SUBCUTANEOUS
Status: DISCONTINUED | OUTPATIENT
Start: 2022-03-04 | End: 2022-03-04

## 2022-03-04 RX ORDER — PHENYLEPHRINE HYDROCHLORIDE 10 MG/ML
INJECTION INTRAVENOUS
Status: DISCONTINUED | OUTPATIENT
Start: 2022-03-04 | End: 2022-03-04

## 2022-03-04 RX ORDER — OXYCODONE HYDROCHLORIDE 5 MG/1
5 TABLET ORAL
Status: DISCONTINUED | OUTPATIENT
Start: 2022-03-04 | End: 2022-03-04 | Stop reason: HOSPADM

## 2022-03-04 RX ORDER — KETOROLAC TROMETHAMINE 30 MG/ML
INJECTION, SOLUTION INTRAMUSCULAR; INTRAVENOUS
Status: DISCONTINUED | OUTPATIENT
Start: 2022-03-04 | End: 2022-03-04

## 2022-03-04 RX ORDER — ONDANSETRON 2 MG/ML
4 INJECTION INTRAMUSCULAR; INTRAVENOUS EVERY 4 HOURS PRN
Status: DISCONTINUED | OUTPATIENT
Start: 2022-03-04 | End: 2022-03-04 | Stop reason: HOSPADM

## 2022-03-04 RX ORDER — ALBUMIN HUMAN 50 G/1000ML
SOLUTION INTRAVENOUS CONTINUOUS PRN
Status: DISCONTINUED | OUTPATIENT
Start: 2022-03-04 | End: 2022-03-04

## 2022-03-04 RX ORDER — ONDANSETRON 2 MG/ML
INJECTION INTRAMUSCULAR; INTRAVENOUS
Status: DISCONTINUED | OUTPATIENT
Start: 2022-03-04 | End: 2022-03-04

## 2022-03-04 RX ORDER — METOCLOPRAMIDE HYDROCHLORIDE 5 MG/ML
INJECTION INTRAMUSCULAR; INTRAVENOUS
Status: DISCONTINUED | OUTPATIENT
Start: 2022-03-04 | End: 2022-03-04

## 2022-03-04 RX ORDER — OXYCODONE HYDROCHLORIDE 5 MG/1
5 TABLET ORAL EVERY 4 HOURS PRN
Qty: 20 TABLET | Refills: 0 | Status: SHIPPED | OUTPATIENT
Start: 2022-03-04 | End: 2022-03-17

## 2022-03-04 RX ORDER — LIDOCAINE HYDROCHLORIDE 10 MG/ML
0.5 INJECTION, SOLUTION EPIDURAL; INFILTRATION; INTRACAUDAL; PERINEURAL ONCE
Status: DISCONTINUED | OUTPATIENT
Start: 2022-03-04 | End: 2022-03-04 | Stop reason: HOSPADM

## 2022-03-04 RX ORDER — OXYCODONE HYDROCHLORIDE 5 MG/1
5 TABLET ORAL EVERY 4 HOURS PRN
Status: DISCONTINUED | OUTPATIENT
Start: 2022-03-04 | End: 2022-03-04 | Stop reason: HOSPADM

## 2022-03-04 RX ORDER — SCOLOPAMINE TRANSDERMAL SYSTEM 1 MG/1
1 PATCH, EXTENDED RELEASE TRANSDERMAL ONCE
Status: COMPLETED | OUTPATIENT
Start: 2022-03-04 | End: 2022-03-04

## 2022-03-04 RX ORDER — PROCHLORPERAZINE EDISYLATE 5 MG/ML
5 INJECTION INTRAMUSCULAR; INTRAVENOUS EVERY 30 MIN PRN
Status: DISCONTINUED | OUTPATIENT
Start: 2022-03-04 | End: 2022-03-04 | Stop reason: HOSPADM

## 2022-03-04 RX ORDER — MEPERIDINE HYDROCHLORIDE 25 MG/ML
12.5 INJECTION INTRAMUSCULAR; INTRAVENOUS; SUBCUTANEOUS ONCE AS NEEDED
Status: DISCONTINUED | OUTPATIENT
Start: 2022-03-04 | End: 2022-03-04 | Stop reason: HOSPADM

## 2022-03-04 RX ORDER — CLINDAMYCIN PHOSPHATE 900 MG/50ML
900 INJECTION, SOLUTION INTRAVENOUS
Status: COMPLETED | OUTPATIENT
Start: 2022-03-04 | End: 2022-03-04

## 2022-03-04 RX ORDER — HYDROMORPHONE HYDROCHLORIDE 2 MG/ML
0.4 INJECTION, SOLUTION INTRAMUSCULAR; INTRAVENOUS; SUBCUTANEOUS EVERY 5 MIN PRN
Status: DISCONTINUED | OUTPATIENT
Start: 2022-03-04 | End: 2022-03-04 | Stop reason: HOSPADM

## 2022-03-04 RX ORDER — DEXAMETHASONE SODIUM PHOSPHATE 4 MG/ML
INJECTION, SOLUTION INTRA-ARTICULAR; INTRALESIONAL; INTRAMUSCULAR; INTRAVENOUS; SOFT TISSUE
Status: DISCONTINUED | OUTPATIENT
Start: 2022-03-04 | End: 2022-03-04

## 2022-03-04 RX ORDER — IBUPROFEN 600 MG/1
600 TABLET ORAL EVERY 6 HOURS PRN
Qty: 60 TABLET | Refills: 1 | Status: SHIPPED | OUTPATIENT
Start: 2022-03-04 | End: 2022-03-17

## 2022-03-04 RX ORDER — FENTANYL CITRATE 50 UG/ML
INJECTION, SOLUTION INTRAMUSCULAR; INTRAVENOUS
Status: DISCONTINUED | OUTPATIENT
Start: 2022-03-04 | End: 2022-03-04

## 2022-03-04 RX ORDER — OXYCODONE HYDROCHLORIDE 5 MG/1
5 TABLET ORAL ONCE
Status: COMPLETED | OUTPATIENT
Start: 2022-03-04 | End: 2022-03-04

## 2022-03-04 RX ORDER — MUPIROCIN 20 MG/G
OINTMENT TOPICAL
Status: DISCONTINUED | OUTPATIENT
Start: 2022-03-04 | End: 2022-03-04 | Stop reason: HOSPADM

## 2022-03-04 RX ORDER — LIDOCAINE HYDROCHLORIDE 20 MG/ML
INJECTION INTRAVENOUS
Status: DISCONTINUED | OUTPATIENT
Start: 2022-03-04 | End: 2022-03-04

## 2022-03-04 RX ORDER — ACETAMINOPHEN 500 MG
1000 TABLET ORAL EVERY 6 HOURS PRN
Status: DISCONTINUED | OUTPATIENT
Start: 2022-03-04 | End: 2022-03-04 | Stop reason: HOSPADM

## 2022-03-04 RX ORDER — HYDROMORPHONE HYDROCHLORIDE 2 MG/ML
0.2 INJECTION, SOLUTION INTRAMUSCULAR; INTRAVENOUS; SUBCUTANEOUS
Status: DISCONTINUED | OUTPATIENT
Start: 2022-03-04 | End: 2022-03-04 | Stop reason: HOSPADM

## 2022-03-04 RX ORDER — LIDOCAINE HYDROCHLORIDE 10 MG/ML
1 INJECTION, SOLUTION EPIDURAL; INFILTRATION; INTRACAUDAL; PERINEURAL ONCE
Status: DISCONTINUED | OUTPATIENT
Start: 2022-03-04 | End: 2022-03-04 | Stop reason: HOSPADM

## 2022-03-04 RX ORDER — SODIUM CHLORIDE, SODIUM LACTATE, POTASSIUM CHLORIDE, CALCIUM CHLORIDE 600; 310; 30; 20 MG/100ML; MG/100ML; MG/100ML; MG/100ML
INJECTION, SOLUTION INTRAVENOUS CONTINUOUS
Status: DISCONTINUED | OUTPATIENT
Start: 2022-03-04 | End: 2022-03-04 | Stop reason: HOSPADM

## 2022-03-04 RX ORDER — ACETAMINOPHEN 500 MG
1000 TABLET ORAL
Status: COMPLETED | OUTPATIENT
Start: 2022-03-04 | End: 2022-03-04

## 2022-03-04 RX ORDER — DEXTROMETHORPHAN HYDROBROMIDE, GUAIFENESIN 5; 100 MG/5ML; MG/5ML
650 LIQUID ORAL
Qty: 60 TABLET | Refills: 1 | Status: SHIPPED | OUTPATIENT
Start: 2022-03-04 | End: 2022-03-17

## 2022-03-04 RX ORDER — KETOROLAC TROMETHAMINE 30 MG/ML
15 INJECTION, SOLUTION INTRAMUSCULAR; INTRAVENOUS EVERY 6 HOURS PRN
Status: DISCONTINUED | OUTPATIENT
Start: 2022-03-04 | End: 2022-03-04 | Stop reason: HOSPADM

## 2022-03-04 RX ORDER — MIDAZOLAM HYDROCHLORIDE 1 MG/ML
INJECTION INTRAMUSCULAR; INTRAVENOUS
Status: DISCONTINUED | OUTPATIENT
Start: 2022-03-04 | End: 2022-03-04

## 2022-03-04 RX ORDER — PROPOFOL 10 MG/ML
VIAL (ML) INTRAVENOUS
Status: DISCONTINUED | OUTPATIENT
Start: 2022-03-04 | End: 2022-03-04

## 2022-03-04 RX ORDER — PREGABALIN 50 MG/1
50 CAPSULE ORAL ONCE
Status: COMPLETED | OUTPATIENT
Start: 2022-03-04 | End: 2022-03-04

## 2022-03-04 RX ADMIN — PHENYLEPHRINE HYDROCHLORIDE 200 MCG: 10 INJECTION INTRAVENOUS at 12:03

## 2022-03-04 RX ADMIN — PROPOFOL 160 MG: 10 INJECTION, EMULSION INTRAVENOUS at 10:03

## 2022-03-04 RX ADMIN — CLINDAMYCIN PHOSPHATE 900 MG: 18 INJECTION, SOLUTION INTRAVENOUS at 10:03

## 2022-03-04 RX ADMIN — HYDROMORPHONE HYDROCHLORIDE 0.4 MG: 2 INJECTION, SOLUTION INTRAMUSCULAR; INTRAVENOUS; SUBCUTANEOUS at 11:03

## 2022-03-04 RX ADMIN — PHENYLEPHRINE HYDROCHLORIDE 100 MCG: 10 INJECTION INTRAVENOUS at 11:03

## 2022-03-04 RX ADMIN — LIDOCAINE HYDROCHLORIDE 40 MG: 20 INJECTION, SOLUTION INTRAVENOUS at 10:03

## 2022-03-04 RX ADMIN — MIDAZOLAM HYDROCHLORIDE 2 MG: 1 INJECTION, SOLUTION INTRAMUSCULAR; INTRAVENOUS at 10:03

## 2022-03-04 RX ADMIN — GENTAMICIN SULFATE 290.4 MG: 40 INJECTION, SOLUTION INTRAMUSCULAR; INTRAVENOUS at 10:03

## 2022-03-04 RX ADMIN — ALBUMIN (HUMAN): 12.5 SOLUTION INTRAVENOUS at 10:03

## 2022-03-04 RX ADMIN — PHENYLEPHRINE HYDROCHLORIDE 200 MCG: 10 INJECTION INTRAVENOUS at 10:03

## 2022-03-04 RX ADMIN — ROCURONIUM BROMIDE 50 MG: 10 SOLUTION INTRAVENOUS at 10:03

## 2022-03-04 RX ADMIN — KETOROLAC TROMETHAMINE 30 MG: 30 INJECTION, SOLUTION INTRAMUSCULAR; INTRAVENOUS at 11:03

## 2022-03-04 RX ADMIN — OXYCODONE 5 MG: 5 TABLET ORAL at 12:03

## 2022-03-04 RX ADMIN — ROCURONIUM BROMIDE 20 MG: 10 SOLUTION INTRAVENOUS at 11:03

## 2022-03-04 RX ADMIN — SCOLOPAMINE TRANSDERMAL SYSTEM 1 PATCH: 1 PATCH, EXTENDED RELEASE TRANSDERMAL at 09:03

## 2022-03-04 RX ADMIN — METOCLOPRAMIDE 10 MG: 5 INJECTION, SOLUTION INTRAMUSCULAR; INTRAVENOUS at 11:03

## 2022-03-04 RX ADMIN — SUGAMMADEX 200 MG: 100 INJECTION, SOLUTION INTRAVENOUS at 11:03

## 2022-03-04 RX ADMIN — PREGABALIN 50 MG: 50 CAPSULE ORAL at 09:03

## 2022-03-04 RX ADMIN — PHENYLEPHRINE HYDROCHLORIDE 100 MCG: 10 INJECTION INTRAVENOUS at 10:03

## 2022-03-04 RX ADMIN — ONDANSETRON HYDROCHLORIDE 4 MG: 2 INJECTION INTRAMUSCULAR; INTRAVENOUS at 11:03

## 2022-03-04 RX ADMIN — ALBUMIN (HUMAN): 12.5 SOLUTION INTRAVENOUS at 11:03

## 2022-03-04 RX ADMIN — PHENYLEPHRINE HYDROCHLORIDE 100 MCG: 10 INJECTION INTRAVENOUS at 12:03

## 2022-03-04 RX ADMIN — ROCURONIUM BROMIDE 10 MG: 10 SOLUTION INTRAVENOUS at 10:03

## 2022-03-04 RX ADMIN — MUPIROCIN: 20 OINTMENT TOPICAL at 09:03

## 2022-03-04 RX ADMIN — OXYCODONE 5 MG: 5 TABLET ORAL at 02:03

## 2022-03-04 RX ADMIN — ONDANSETRON HYDROCHLORIDE 4 MG: 2 INJECTION INTRAMUSCULAR; INTRAVENOUS at 10:03

## 2022-03-04 RX ADMIN — DEXAMETHASONE SODIUM PHOSPHATE 8 MG: 4 INJECTION, SOLUTION INTRAMUSCULAR; INTRAVENOUS at 10:03

## 2022-03-04 RX ADMIN — FENTANYL CITRATE 100 MCG: 50 INJECTION, SOLUTION INTRAMUSCULAR; INTRAVENOUS at 10:03

## 2022-03-04 RX ADMIN — SODIUM CHLORIDE, SODIUM LACTATE, POTASSIUM CHLORIDE, AND CALCIUM CHLORIDE: 600; 310; 30; 20 INJECTION, SOLUTION INTRAVENOUS at 09:03

## 2022-03-04 RX ADMIN — ACETAMINOPHEN 1000 MG: 500 TABLET, FILM COATED ORAL at 09:03

## 2022-03-04 NOTE — OR NURSING
Requests additional pain pill prior to discharge.  Dr. Parsons called, order received.  Pt has met all discharge criteria. Dr. Burkett called , updated on pt status, states pt may be discharged.

## 2022-03-04 NOTE — PLAN OF CARE
Alison MANTILLA Hue has met all discharge criteria from Phase II. Vital Signs are stable, ambulating  without difficulty.Pain is now under control and tolerable for the pt. Pain score 3 at this time.  Discharge instructions given, patient verbalized understanding. Discharged from facility via wheelchair in stable condition.

## 2022-03-04 NOTE — ANESTHESIA PROCEDURE NOTES
Intubation    Date/Time: 3/4/2022 10:21 AM  Performed by: Emilee Rogers  Authorized by: Bob Samson MD     Intubation:     Induction:  Intravenous    Intubated:  Postinduction    Mask Ventilation:  Easy mask    Attempts:  1    Attempted By:  CRNA    Method of Intubation:  Video laryngoscopy    Blade:  Morales 3    Laryngeal View Grade: Grade I - full view of cords      Difficult Airway Encountered?: No      Complications:  None    Airway Device:  Oral endotracheal tube    Airway Device Size:  7.5    Style/Cuff Inflation:  Cuffed (inflated to minimal occlusive pressure)    Tube secured:  21    Secured at:  The lips    Placement Verified By:  Capnometry    Complicating Factors:  None    Findings Post-Intubation:  BS equal bilateral and atraumatic/condition of teeth unchanged

## 2022-03-04 NOTE — INTERVAL H&P NOTE
The patient has been examined and the H&P has been reviewed:    I concur with the findings and no changes have occurred since H&P was written.    Surgery risks, benefits and alternative options discussed and understood by patient/family.          There are no hospital problems to display for this patient.    ALEAH Bush MD  OBGYN PGY-3

## 2022-03-04 NOTE — PLAN OF CARE
After getting dressed, pt notices a raised firm, bruised area at lap site to the Right of medial abdomen.  Dr. Burkett notified.  States she will be in to assess.  Ice pack ap;ied

## 2022-03-04 NOTE — TRANSFER OF CARE
"Anesthesia Transfer of Care Note    Patient: Alison Swann    Procedure(s) Performed: Procedure(s) (LRB):  XI ROBOTIC HYSTERECTOMY (N/A)  XI ROBOTIC SALPINGO-OOPHORECTOMY (Bilateral)    Patient location: PACU    Anesthesia Type: general    Transport from OR: Transported from OR on 6-10 L/min O2 by face mask with adequate spontaneous ventilation    Post pain: adequate analgesia    Post assessment: no apparent anesthetic complications and tolerated procedure well    Post vital signs: stable    Level of consciousness: responds to stimulation    Nausea/Vomiting: no nausea/vomiting    Complications: none    Transfer of care protocol was followed      Last vitals:   Visit Vitals  /77 (BP Location: Right arm, Patient Position: Lying)   Pulse 67   Temp 36.4 °C (97.5 °F) (Oral)   Resp 16   Ht 5' 7" (1.702 m)   Wt 58.1 kg (128 lb)   SpO2 100%   Breastfeeding No   BMI 20.05 kg/m²     "

## 2022-03-04 NOTE — OR NURSING
VSS on RA. Pt states pain is tolerable. Dressings and PIV C/D/I. Meets Phase I discharge criteria. Ready for transfer to Phase II. Family notified.

## 2022-03-04 NOTE — OPERATIVE NOTE ADDENDUM
Certification of Assistant at Surgery       Surgery Date: 3/4/2022     Participating Surgeons:  Surgeon(s) and Role:     * Carmella Galvez MD - Primary     * Destin Bush MD - Resident - Assisting    Procedures:  Procedure(s) (LRB):  XI ROBOTIC HYSTERECTOMY (N/A)  XI ROBOTIC SALPINGO-OOPHORECTOMY (Bilateral)    Assistant Surgeon's Certification of Necessity:  I understand that section 1842 (b) (6) (d) of the Social Security Act generally prohibits Medicare Part B reasonable charge payment for the services of assistants at surgery in teaching hospitals when qualified residents are available to furnish such services. I certify that the services for which payment is claimed were medically necessary, and that no qualified resident was available to perform the services. I further understand that these services are subject to post-payment review by the Medicare carrier.      Amaris Jaramillo NP    03/04/2022  12:58 PM

## 2022-03-04 NOTE — DISCHARGE INSTRUCTIONS
Remove Scopolamine patch on post op day 2.    Ex (surgery on Mon, remove on Wed)    Wash hands thoroughly after removing patch and wash area where patch was placed.    Fold in half and discard in garbage.     Anesthesia: After Your Surgery  Youve just had surgery. During surgery, you received medication called anesthesia to keep you comfortable and pain-free. After surgery, you may experience some pain or nausea. This is common. Here are some tips for feeling better and recovering after surgery.    Going home  Your doctor or nurse will show you how to take care of yourself when you go home. He or she will also answer your questions. Have an adult family member or friend drive you home. For the first 24 hours after your surgery:  Do not drive or use heavy equipment.  Do not make important decisions or sign legal documents.  Avoid alcohol.  Have someone stay with you, if needed. He or she can watch for problems and help keep you safe.  Take your time getting up from a seated or lying position. You may experience dizziness for 24 hours  Be sure to keep all follow-up appointments with your doctor. And rest after your procedure for as long as your doctor tells you to.    Coping with pain  If you have pain after surgery, pain medication will help you feel better. Take it as directed, before pain becomes severe. Also, ask your doctor or pharmacist about other ways to control pain, such as with heat, ice, and relaxation. And follow any other instructions your surgeon or nurse gives you.    URINARY RETENTION  Should you experience a decrease in your urine output or are unable to urinate following surgery, this can be due to the medications given during surgery.  We recommend you going to the nearest Emergency Department.    Tips for taking pain medication  To get the best relief possible, remember these points:  Pain medications can upset your stomach. Taking them with a little food may help.  Most pain relievers taken by  mouth need at least 20 to 30 minutes to take effect.  Taking medication on a schedule can help you remember to take it. Try to time your medication so that you can take it before beginning an activity, such as dressing, walking, or sitting down for dinner.  Constipation is a common side effect of pain medications. Contact your doctor before taking any medications like laxatives or stool softeners to help relieve constipation. Also ask about any dietary restrictions, because drinking lots of fluids and eating foods like fruits and vegetables that are high in fiber can also help. Remember, dont take laxatives unless your surgeon has prescribed them.  Mixing alcohol and pain medication can cause dizziness and slow your breathing. It can even be fatal. Dont drink alcohol while taking pain medication.  Pain medication can slow your reflexes. Dont drive or operate machinery while taking pain medication.  If your health care provider tells you to take acetaminophen to help relieve your pain, ask him or her how much you are supposed to take each day. (Acetaminophen is the generic name for Tylenol and other brand-name pain relievers.) Acetaminophen or other pain relievers may interact with your prescription medicines or other over-the-counter (OTC) drugs. Some prescription medications contain acetaminophen along with other active ingredients. Using both prescription and OTC acetaminophen for pain can cause you to overdose. The FDA recommends that you read the labels on your OTC medications carefully. This will help you to clearly understand the list of active ingredients, dosing instructions, and any warnings. It may also help you avoid taking too much acetaminophen. If you have questions or don't understand the information, ask your pharmacist or health care provider to explain it to you before you take the OTC medication.    Managing nausea  Some people have an upset stomach after surgery. This is often due to  anesthesia, pain, pain medications, or the stress of surgery. The following tips will help you manage nausea and get good nutrition as you recover. If you were on a special diet before surgery, ask your doctor if you should follow it during recovery. These tips may help:  Dont push yourself to eat. Your body will tell you when to eat and how much.  Start off with clear liquids and soup. They are easier to digest.  Progress to semi-solid foods (mashed potatoes, applesauce, and gelatin) as you feel ready.  Slowly move to solid foods. Dont eat fatty, rich, or spicy foods at first.  Dont force yourself to have three large meals a day. Instead, eat smaller amounts more often.  Take pain medications with a small amount of solid food, such as crackers or toast to avoid nausea.      Call your surgeon if    You feel too sleepy, dizzy, or groggy (medication may be too strong).  You have side effects like nausea, vomiting, or skin changes (rash, itching, or hives).   © 2056-1621 [employer]. [address] [place] [address], [place] [address]. All rights reserved. This information is not intended as a substitute for professional medical care. Always follow your healthcare professional's instructions.

## 2022-03-04 NOTE — DISCHARGE SUMMARY
[facility] - Surgery ([place])  Brief Operative Note     SUMMARY     Surgery Date: 3/4/2022     Surgeon(s) and Role:     * Carmella Galvez MD - Primary    Assisting Surgeon: None    Pre-op Diagnosis:  BRCA2 gene mutation positive [Z15.01, Z15.09]    Post-op Diagnosis:  Post-Op Diagnosis Codes:     * BRCA2 gene mutation positive [Z15.01, Z15.09]    Procedure(s) (LRB):  XI ROBOTIC HYSTERECTOMY (N/A)  XI ROBOTIC SALPINGO-OOPHORECTOMY (Bilateral)    Anesthesia: General    Findings/Key Components:   6 week size uterus, normal cervix. Normal appearing fallopian tubes and ovaries. Evidence of prior tubal ligation. No ascites. No adenopathy. No obvious evidence of intraperitoneal disease. Mild adhesive disease of bladder to anterior uterus from prior  delivery taken down with sharp dissection.     IVF: See anesthesia report    Complications: None         Specimens:   Specimen (24h ago, onward)    Pelvic washings  Uterus, cervix, bilateral fallopian tubes, bilateral ovaries                  Discharge Note    SUMMARY     Admit Date: 3/4/2022    Discharge Date: 3/4/2022  3:30 PM    Hospital Course: Patient was admitted for the above mentioned procedure.  Procedure was performed without difficulty.  Please see operative report for further details.  She was transferred to recovery in stable condition and discharged home the same day.      Final Diagnosis: Post-Op Diagnosis Codes:     * BRCA2 gene mutation positive [Z15.01, Z15.09]    Disposition: Home or Self Care    Follow Up/Patient Instructions: see below    Medications:  Reconciled Home Medications:      Medication List      START taking these medications    ibuprofen 600 MG tablet  Commonly known as: ADVIL,MOTRIN  Take 1 tablet (600 mg total) by mouth every 6 (six) hours as needed for Pain. Alternate every 3 hours with tylenol     MAPAP ARTHRITIS PAIN 650 MG Tbsr  Generic drug: acetaminophen  Take 1 tablet (650 mg total) by mouth every 6 to 8 hours as needed (Pain).  Alternate every 3 hours with ibuprofen.     oxyCODONE 5 MG immediate release tablet  Commonly known as: ROXICODONE  Take 1 tablet (5 mg total) by mouth every 4 (four) hours as needed for Pain.        CHANGE how you take these medications    valACYclovir 500 MG tablet  Commonly known as: VALTREX  Take 1 tablet (500 mg total) by mouth once. for 1 dose  What changed: additional instructions        CONTINUE taking these medications    (MAGIC MOUTHWASH) 1:1:1 BENADRYL 12.5MG/5ML LIQ, ALUMINUM & MAGNESIUM  Swish and spit 10 mLs every 4 (four) hours as needed. for mouth sores     dextroamphetamine-amphetamine 10 mg Tab  Take 1 tablet by mouth once daily.     dicyclomine 10 MG capsule  Commonly known as: BentyL  Take 1 capsule (10 mg total) by mouth 4 (four) times daily as needed (Hiccups).     diphenoxylate-atropine 2.5-0.025 mg 2.5-0.025 mg per tablet  Commonly known as: LomotiL  Take 1 tablet by mouth 4 (four) times daily as needed for Diarrhea.     famotidine 10 MG tablet  Commonly known as: PEPCID  Take 10 mg by mouth daily as needed for Heartburn.     ID NOW COVID-19 TEST KIT Kit  Generic drug: COVID-19 molecular test assay  TEST AS DIRECTED TODAY     LIDOcaine-prilocaine cream  Commonly known as: EMLA  Apply topically as needed.     LORazepam 0.5 MG tablet  Commonly known as: ATIVAN  Take 1 tablet (0.5 mg total) by mouth every 8 (eight) hours as needed (for nausea, anxiety or sleep).     OLANZapine 10 MG tablet  Commonly known as: ZyPREXA  Take 1 tablet (10 mg total) by mouth nightly.     ondansetron 8 MG Tbdl  Commonly known as: ZOFRAN-ODT  Take 1 tablet (8 mg total) by mouth every 8 (eight) hours as needed (nausea).     pantoprazole 40 MG tablet  Commonly known as: PROTONIX  Take 1 tablet (40 mg total) by mouth once daily.     predniSONE 10 MG tablet  Commonly known as: DELTASONE  1 po daily     promethazine 25 MG tablet  Commonly known as: PHENERGAN  Take 25 mg by mouth every 6 (six) hours as needed for Nausea.           Discharge Procedure Orders   Diet general     Lifting restrictions   Order Comments: No lifting greater than 15 pounds for six weeks.     Other restrictions (specify):   Order Comments: PELVIC REST:  No douching, tampons, or intercourse for 6 weeks.    If prescribed, vaginal estrogen cream may be used during the postoperative period.     DRIVING:  No driving while on narcotics. Driving may be resumed initially with a competent passenger one to two weeks after surgery if no longer taking narcotics.     EXERCISE:  For six weeks your exercise should be limited to walking. You may walk as far as you wish, as long as you increase your level of exertion gradually and avoid slippery surfaces. You may climb stairs as needed to get around, but should not use stair climbing for exercise.     Remove dressing in 24 hours   Order Comments: If you have a bandage on wound, you may remove it the day after dismissal.  If you had steri-strips remove them once they begin to peel off (usually 2 weeks). Keep incision clean and dry.  Inspect the incision daily for signs and symptoms of infection.     Wound care routine (specify)   Order Comments: WOUND CARE:  If you have a band-aid or bandage on your wound, you may remove it the day after dismissal.  You may wash the wound with mild soap and water.   You may shower at any time but should avoid immersing any abdominal incisions in water for at least two weeks after surgery or until the wound is completely healed. If given, please shower with Hibiclens soap until bottle is completely finished. Keep your wound clean and dry.  You should observe your incision for signs of infection which include redness, warmth, drainage or fever.     Call MD for:  temperature >100.4     Call MD for:  persistent nausea and vomiting     Call MD for:  severe uncontrolled pain     Call MD for:  difficulty breathing, headache or visual disturbances     Call MD for:  redness, tenderness, or signs of  infection (pain, swelling, redness, odor or green/yellow discharge around incision site)     Call MD for:  hives     Call MD for:   Order Comments: inability to void,urine is ketchup colored or you have large clots, vaginal bleeding is heavier than a period.    VAGINAL DISCHARGE: You may develop a vaginal discharge and intermittent vaginal spotting after surgery and up to 6 weeks postoperatively.  The discharge may have an odor and may change in color but it is normal.  This is due to dissolving stiches.  Contact your surgical team if you develop vaginal or vulvar irritation along with a discharge.  Also contact your surgical team if you have vaginal discharge that smells like urine or stool.    CONSTIPATION REMEDIES: Patients are often constipated after surgery or with use of oral narcotic medicine. You should continue to take the stool softener, Senokot-S during the next six weeks, and consume adequate amounts of water.  If you have not had a bowel movement for 3 days after dismissal, or are uncomfortable and unable to pass stool, please try one or all of the following measures:  1.  Milk of Magnesia - 30 cc by mouth every 12 hours   2.  Dulcolax suppository - One suppository per rectum every 4-6 hours   3.  Metamucil, Fibercon or other bulk former - use as directed  4.  Fleets Enema        PAIN MEDICATIONS:     Take your pain medications as instructed. It is best to take pain medications before your pain becomes severe. This will allow you to take less medication yet have better pain relief. For the first 2 or 3 days it may be helpful to take your pain medications on a regular schedule (e.g. every 4 to 6 hours). This will help you to keep your pain under better control. You should then begin to take fewer medications each day until you no longer need them. Do not take pain medication on an empty stomach. This may lead to nausea and vomiting.     Activity as tolerated   Order Comments: Return to normal activity  slowly as you feel able.  For 6 weeks your exercise should be limited to walking.  You may walk as far as you wish, as long as you increase your level of exertion gradually and avoid slippery surfaces.    If you had a hysterectomy at the surgery do not insert anything in your vagina for 9 weeks.     Shower on day dressing removed (No bath)   Order Comments: You may shower at any time but should avoid immersing any abdominal incisions in water for at least 2 weeks after surgery or until the wound is completely healed.  If given, please shower with Hibaclens soap until bottle is completely finish      Follow-up Information     Carmella Galvez MD Follow up in 4 week(s).    Specialty: Gynecologic Oncology  Why: Postop visit  Contact information:  [address] [facility] [place] [address]  [phone]                           Loretta Burkett MD  OBGYN PGY-4

## 2022-03-04 NOTE — OR NURSING
Dr Burkett in to assess pt.  Applies pressure dressing to area.  States pt is cleared for discharge.

## 2022-03-04 NOTE — OR NURSING
Port Accessed to right anterior chest with 20 G mason needle, 1 inch using sterile technique. + blood return from port, flushing well. Pt tolerated well.

## 2022-03-04 NOTE — ANESTHESIA POSTPROCEDURE EVALUATION
Anesthesia Post Evaluation    Patient: Alison Swann    Procedure(s) Performed: Procedure(s) (LRB):  XI ROBOTIC HYSTERECTOMY (N/A)  XI ROBOTIC SALPINGO-OOPHORECTOMY (Bilateral)    Final Anesthesia Type: general      Patient location during evaluation: PACU  Patient participation: Yes- Able to Participate  Level of consciousness: awake and alert  Post-procedure vital signs: reviewed and stable  Pain management: adequate  Airway patency: patent    PONV status at discharge: No PONV  Anesthetic complications: no      Cardiovascular status: blood pressure returned to baseline  Respiratory status: unassisted and spontaneous ventilation  Hydration status: euvolemic  Follow-up not needed.          Vitals Value Taken Time   /69 03/04/22 1405   Temp 36.6 °C (97.8 °F) 03/04/22 1335   Pulse 92 03/04/22 1405   Resp 16 03/04/22 1448   SpO2 95 % 03/04/22 1405         Event Time   Out of Recovery 13:31:00         Pain/Jesse Score: Pain Rating Prior to Med Admin: 5 (3/4/2022  2:48 PM)  Pain Rating Post Med Admin: 0 (3/4/2022  1:15 PM)  Jesse Score: 10 (3/4/2022  2:05 PM)

## 2022-03-07 NOTE — OP NOTE
DATE OF PROCEDURE:  3/4/2022     SURGEON:  Carmella Galvez M.D.     ASSISTANTS: LANA Platt (certified first assist)- no qualified resident was available for her portion of the procedure. Destin Bush MD (RES) and Catalina English MD (RES)       PREOPERATIVE DIAGNOSIS:  1. BRCA2 mutation  2. Risk reducing surgery      POSTOPERATIVE DIAGNOSES:    1. BRCA2 mutation  2. Risk reducing surgery     PROCEDURE PERFORMED:  Robotic-assisted total laparoscopic hysterectomy,   bilateral salpingo-oophorectomy     ANESTHESIA:  General endotracheal anesthesia.     SPECIMENS REMOVED:  1.  Uterus and cervix.  2.  Bilateral fallopian tubes/ovaries, IP ligaments   3.  Pelvic washings     ESTIMATED BLOOD LOSS:  <25cc     COMPLICATIONS:  None.     FINDINGS: 6 week size uterus, normal cervix. Normal appearing fallopian tubes and ovaries. Evidence of prior tubal ligation. No ascites. No adenopathy. No obvious evidence of intraperitoneal disease. Mild adhesive disease of bladder to anterior uterus from prior  delivery taken down with sharp dissection.      PROCEDURE IN DETAIL:  The patient was taken to the Operating Room.  Informed consent had been obtained. She underwent general endotracheal anesthesia without difficulty, was prepped and draped in the normal sterile fashion in a dorsal lithotomy position.  Timeout was performed.  All parties agreed to the planned procedure. Perioperative antibiotics were administered.  Salter catheter was placed under sterile conditions. The VCare uterine manipulator was secured in place in a standard fashion for uterine manipulation. Gloves were changed and attention was turned to the patient's abdomen.      Veress needle was gently inserted.  Intra-abdominal placement was confirmed with low CO2 pressure and water drop test.  Abdomen was insufflated and pneumoperitoneum was obtained.  Skin incision was made superior to the umbilicus.  Robotic trocar was introduced.  Intra-abdominal  placement was confirmed.  Additional trocars were placed, 2 robotic trocars to the left of the camera, 1 robotic trocar to the right of the camera and an additional 8 mm assist port to the right of the camera.  The patient was placed in steep Trendelenburg. Robot was docked and operating surgeon reported to the console.      Pelvic washings were obtained. Bilateral round ligaments were identified.  These were cauterized and transected. The posterior leaf of the broad ligament was then opened bilaterally facilitating access to the retroperitoneum. Bilateral IP ligaments were then skeletonized to the level of the pelvic brim. At the level of the pelvic brim they were cauterized and transected. The ureters were noted well below. The anterior leaf of the broad ligament was then opened circumferentially.  Proper plane for the bladder flap was identified and the bladder was gently reflected off of the anterior aspect of the uterus and cervix to the level of the cervicovaginal junction. Bilateral uterine arteries were then skeletonized.  These were cauterized and transected.  The remainder of the uterosacral and cardinal ligaments were then also serially cauterized and transected. Colpotomy was initiated and carried around circumferentially.  The uterus, cervix, bilateral fallopian tubes/oaveries and IP ligaments were then removed through the vagina. We then closed the vaginal cuff with a V-Loc suture in a running fashion.      The robotic trocars were then removed under direct visualization and the robot was undocked. Skin incisions were then rendered hemostatic.  These were closed with 4-0 Monocryl in a subcuticular fashion and topped with sterile Dermabond.  The patient tolerated the procedure well.  Sponge, lap, needle and instrument counts were correct x2 as reported by the circulating nurse.  She was awakened from anesthesia and taken to recovery in stable condition.

## 2022-03-08 LAB
FINAL PATHOLOGIC DIAGNOSIS: NORMAL
Lab: NORMAL

## 2022-03-09 ENCOUNTER — PATIENT MESSAGE (OUTPATIENT)
Dept: GYNECOLOGIC ONCOLOGY | Facility: CLINIC | Age: 42
End: 2022-03-09
Payer: COMMERCIAL

## 2022-03-10 ENCOUNTER — PATIENT MESSAGE (OUTPATIENT)
Dept: GYNECOLOGIC ONCOLOGY | Facility: CLINIC | Age: 42
End: 2022-03-10
Payer: COMMERCIAL

## 2022-03-11 ENCOUNTER — TELEPHONE (OUTPATIENT)
Dept: GYNECOLOGIC ONCOLOGY | Facility: CLINIC | Age: 42
End: 2022-03-11
Payer: COMMERCIAL

## 2022-03-11 ENCOUNTER — HOSPITAL ENCOUNTER (INPATIENT)
Facility: OTHER | Age: 42
LOS: 3 days | Discharge: HOME | DRG: 644 | End: 2022-03-14
Attending: OBSTETRICS & GYNECOLOGY | Admitting: OBSTETRICS & GYNECOLOGY
Payer: COMMERCIAL

## 2022-03-11 ENCOUNTER — PATIENT MESSAGE (OUTPATIENT)
Dept: GYNECOLOGIC ONCOLOGY | Facility: CLINIC | Age: 42
End: 2022-03-11
Payer: COMMERCIAL

## 2022-03-11 ENCOUNTER — HOSPITAL ENCOUNTER (EMERGENCY)
Facility: HOSPITAL | Age: 42
Discharge: ANOTHER HOSPITAL | End: 2022-03-11
Attending: EMERGENCY MEDICINE
Payer: COMMERCIAL

## 2022-03-11 VITALS
RESPIRATION RATE: 26 BRPM | SYSTOLIC BLOOD PRESSURE: 138 MMHG | TEMPERATURE: 99 F | DIASTOLIC BLOOD PRESSURE: 76 MMHG | BODY MASS INDEX: 19.78 KG/M2 | OXYGEN SATURATION: 100 % | WEIGHT: 126 LBS | HEART RATE: 80 BPM | HEIGHT: 67 IN

## 2022-03-11 DIAGNOSIS — I31.39 PERICARDIAL EFFUSION: ICD-10-CM

## 2022-03-11 DIAGNOSIS — A41.9 SEPSIS, DUE TO UNSPECIFIED ORGANISM, UNSPECIFIED WHETHER ACUTE ORGAN DYSFUNCTION PRESENT: Primary | ICD-10-CM

## 2022-03-11 DIAGNOSIS — I95.9 HYPOTENSION, UNSPECIFIED HYPOTENSION TYPE: ICD-10-CM

## 2022-03-11 DIAGNOSIS — R11.2 NAUSEA, VOMITING AND DIARRHEA: ICD-10-CM

## 2022-03-11 DIAGNOSIS — N17.9 ACUTE KIDNEY INJURY: ICD-10-CM

## 2022-03-11 DIAGNOSIS — E27.2 ADRENAL CRISIS: ICD-10-CM

## 2022-03-11 DIAGNOSIS — Z17.1 MALIGNANT NEOPLASM OF UPPER-OUTER QUADRANT OF LEFT BREAST IN FEMALE, ESTROGEN RECEPTOR NEGATIVE: ICD-10-CM

## 2022-03-11 DIAGNOSIS — R07.89 CHEST PRESSURE: ICD-10-CM

## 2022-03-11 DIAGNOSIS — E87.1 HYPONATREMIA: ICD-10-CM

## 2022-03-11 DIAGNOSIS — A41.9 SEPSIS: ICD-10-CM

## 2022-03-11 DIAGNOSIS — R07.9 CHEST PAIN: ICD-10-CM

## 2022-03-11 DIAGNOSIS — R19.7 NAUSEA, VOMITING AND DIARRHEA: ICD-10-CM

## 2022-03-11 DIAGNOSIS — R79.89 ELEVATED D-DIMER: ICD-10-CM

## 2022-03-11 DIAGNOSIS — Z90.710 S/P LAPAROSCOPIC HYSTERECTOMY: Primary | ICD-10-CM

## 2022-03-11 DIAGNOSIS — I95.9 HYPOTENSION: ICD-10-CM

## 2022-03-11 DIAGNOSIS — R50.9 FEVER, UNSPECIFIED FEVER CAUSE: ICD-10-CM

## 2022-03-11 DIAGNOSIS — C50.412 MALIGNANT NEOPLASM OF UPPER-OUTER QUADRANT OF LEFT BREAST IN FEMALE, ESTROGEN RECEPTOR NEGATIVE: ICD-10-CM

## 2022-03-11 LAB
ALBUMIN SERPL BCP-MCNC: 3.6 G/DL (ref 3.5–5.2)
ALP SERPL-CCNC: 77 U/L (ref 55–135)
ALT SERPL W/O P-5'-P-CCNC: 12 U/L (ref 10–44)
AMORPH CRY URNS QL MICRO: ABNORMAL
AMPHET+METHAMPHET UR QL: NEGATIVE
ANION GAP SERPL CALC-SCNC: 10 MMOL/L (ref 8–16)
AST SERPL-CCNC: 17 U/L (ref 10–40)
BACTERIA #/AREA URNS HPF: ABNORMAL /HPF
BARBITURATES UR QL SCN>200 NG/ML: NEGATIVE
BASOPHILS # BLD AUTO: 0.03 K/UL (ref 0–0.2)
BASOPHILS NFR BLD: 0.3 % (ref 0–1.9)
BENZODIAZ UR QL SCN>200 NG/ML: NEGATIVE
BILIRUB SERPL-MCNC: 1.1 MG/DL (ref 0.1–1)
BILIRUB UR QL STRIP: ABNORMAL
BNP SERPL-MCNC: 32 PG/ML (ref 0–99)
BUN SERPL-MCNC: 17 MG/DL (ref 6–20)
BZE UR QL SCN: NEGATIVE
CALCIUM SERPL-MCNC: 9.3 MG/DL (ref 8.7–10.5)
CANNABINOIDS UR QL SCN: NEGATIVE
CHLORIDE SERPL-SCNC: 95 MMOL/L (ref 95–110)
CLARITY UR: CLEAR
CO2 SERPL-SCNC: 24 MMOL/L (ref 23–29)
COLOR UR: YELLOW
CREAT SERPL-MCNC: 1.8 MG/DL (ref 0.5–1.4)
CREAT UR-MCNC: 163.6 MG/DL (ref 15–325)
D DIMER PPP IA.FEU-MCNC: 1.44 MG/L FEU
DIFFERENTIAL METHOD: ABNORMAL
EOSINOPHIL # BLD AUTO: 0.2 K/UL (ref 0–0.5)
EOSINOPHIL NFR BLD: 1.9 % (ref 0–8)
ERYTHROCYTE [DISTWIDTH] IN BLOOD BY AUTOMATED COUNT: 11.9 % (ref 11.5–14.5)
EST. GFR  (AFRICAN AMERICAN): 39.2 ML/MIN/1.73 M^2
EST. GFR  (NON AFRICAN AMERICAN): 34 ML/MIN/1.73 M^2
FINAL PATHOLOGIC DIAGNOSIS: NORMAL
GLUCOSE SERPL-MCNC: 85 MG/DL (ref 70–110)
GLUCOSE UR QL STRIP: NEGATIVE
GROSS: NORMAL
HCT VFR BLD AUTO: 32.7 % (ref 37–48.5)
HGB BLD-MCNC: 11.3 G/DL (ref 12–16)
HGB UR QL STRIP: NEGATIVE
HYALINE CASTS #/AREA URNS LPF: 4 /LPF
IMM GRANULOCYTES # BLD AUTO: 0.09 K/UL (ref 0–0.04)
IMM GRANULOCYTES NFR BLD AUTO: 1 % (ref 0–0.5)
KETONES UR QL STRIP: NEGATIVE
LACTATE SERPL-SCNC: 0.6 MMOL/L (ref 0.5–2.2)
LACTATE SERPL-SCNC: 0.8 MMOL/L (ref 0.5–2.2)
LEUKOCYTE ESTERASE UR QL STRIP: NEGATIVE
LYMPHOCYTES # BLD AUTO: 1.5 K/UL (ref 1–4.8)
LYMPHOCYTES NFR BLD: 16.5 % (ref 18–48)
Lab: NORMAL
MAGNESIUM SERPL-MCNC: 1.5 MG/DL (ref 1.6–2.6)
MCH RBC QN AUTO: 31.3 PG (ref 27–31)
MCHC RBC AUTO-ENTMCNC: 34.6 G/DL (ref 32–36)
MCV RBC AUTO: 91 FL (ref 82–98)
METHADONE UR QL SCN>300 NG/ML: NEGATIVE
MICROSCOPIC COMMENT: ABNORMAL
MONOCYTES # BLD AUTO: 1.4 K/UL (ref 0.3–1)
MONOCYTES NFR BLD: 14.7 % (ref 4–15)
NEUTROPHILS # BLD AUTO: 6.1 K/UL (ref 1.8–7.7)
NEUTROPHILS NFR BLD: 65.6 % (ref 38–73)
NITRITE UR QL STRIP: NEGATIVE
NRBC BLD-RTO: 0 /100 WBC
OPIATES UR QL SCN: NEGATIVE
PCP UR QL SCN>25 NG/ML: NEGATIVE
PH UR STRIP: 7 [PH] (ref 5–8)
PLATELET # BLD AUTO: 205 K/UL (ref 150–450)
PMV BLD AUTO: 8.5 FL (ref 9.2–12.9)
POTASSIUM SERPL-SCNC: 3.7 MMOL/L (ref 3.5–5.1)
PROT SERPL-MCNC: 5.9 G/DL (ref 6–8.4)
PROT UR QL STRIP: ABNORMAL
RBC # BLD AUTO: 3.61 M/UL (ref 4–5.4)
RBC #/AREA URNS HPF: 0 /HPF (ref 0–4)
SARS-COV-2 RDRP RESP QL NAA+PROBE: NEGATIVE
SODIUM SERPL-SCNC: 129 MMOL/L (ref 136–145)
SP GR UR STRIP: 1.02 (ref 1–1.03)
TOXICOLOGY INFORMATION: NORMAL
TROPONIN I SERPL DL<=0.01 NG/ML-MCNC: 0.02 NG/ML (ref 0–0.03)
URN SPEC COLLECT METH UR: ABNORMAL
UROBILINOGEN UR STRIP-ACNC: NEGATIVE EU/DL
WBC # BLD AUTO: 9.35 K/UL (ref 3.9–12.7)
WBC #/AREA URNS HPF: 3 /HPF (ref 0–5)

## 2022-03-11 PROCEDURE — 71045 X-RAY EXAM CHEST 1 VIEW: CPT | Mod: 26,,, | Performed by: RADIOLOGY

## 2022-03-11 PROCEDURE — 71045 X-RAY EXAM CHEST 1 VIEW: CPT | Mod: TC,FY

## 2022-03-11 PROCEDURE — 99291 CRITICAL CARE FIRST HOUR: CPT | Mod: 25

## 2022-03-11 PROCEDURE — U0002 COVID-19 LAB TEST NON-CDC: HCPCS | Performed by: EMERGENCY MEDICINE

## 2022-03-11 PROCEDURE — 81000 URINALYSIS NONAUTO W/SCOPE: CPT | Mod: 59 | Performed by: EMERGENCY MEDICINE

## 2022-03-11 PROCEDURE — 93005 ELECTROCARDIOGRAM TRACING: CPT

## 2022-03-11 PROCEDURE — 74176 CT CHEST ABDOMEN PELVIS WITHOUT CONTRAST(XPD): ICD-10-PCS | Mod: 26,,, | Performed by: RADIOLOGY

## 2022-03-11 PROCEDURE — 63600175 PHARM REV CODE 636 W HCPCS: Performed by: EMERGENCY MEDICINE

## 2022-03-11 PROCEDURE — 99292 CRITICAL CARE ADDL 30 MIN: CPT | Mod: 25

## 2022-03-11 PROCEDURE — 71045 XR CHEST AP PORTABLE: ICD-10-PCS | Mod: 26,,, | Performed by: RADIOLOGY

## 2022-03-11 PROCEDURE — 71250 CT THORAX DX C-: CPT | Mod: 26,,, | Performed by: RADIOLOGY

## 2022-03-11 PROCEDURE — 83880 ASSAY OF NATRIURETIC PEPTIDE: CPT | Performed by: EMERGENCY MEDICINE

## 2022-03-11 PROCEDURE — 83605 ASSAY OF LACTIC ACID: CPT | Mod: 91 | Performed by: EMERGENCY MEDICINE

## 2022-03-11 PROCEDURE — 84484 ASSAY OF TROPONIN QUANT: CPT | Performed by: EMERGENCY MEDICINE

## 2022-03-11 PROCEDURE — 20000000 HC ICU ROOM

## 2022-03-11 PROCEDURE — 80053 COMPREHEN METABOLIC PANEL: CPT | Performed by: EMERGENCY MEDICINE

## 2022-03-11 PROCEDURE — 74176 CT ABD & PELVIS W/O CONTRAST: CPT | Mod: TC

## 2022-03-11 PROCEDURE — 83735 ASSAY OF MAGNESIUM: CPT | Performed by: EMERGENCY MEDICINE

## 2022-03-11 PROCEDURE — 27000221 HC OXYGEN, UP TO 24 HOURS

## 2022-03-11 PROCEDURE — 36415 COLL VENOUS BLD VENIPUNCTURE: CPT | Performed by: EMERGENCY MEDICINE

## 2022-03-11 PROCEDURE — 93010 EKG 12-LEAD: ICD-10-PCS | Mod: ,,, | Performed by: INTERNAL MEDICINE

## 2022-03-11 PROCEDURE — 96365 THER/PROPH/DIAG IV INF INIT: CPT

## 2022-03-11 PROCEDURE — 74176 CT ABD & PELVIS W/O CONTRAST: CPT | Mod: 26,,, | Performed by: RADIOLOGY

## 2022-03-11 PROCEDURE — 87040 BLOOD CULTURE FOR BACTERIA: CPT | Performed by: EMERGENCY MEDICINE

## 2022-03-11 PROCEDURE — 96367 TX/PROPH/DG ADDL SEQ IV INF: CPT

## 2022-03-11 PROCEDURE — 85379 FIBRIN DEGRADATION QUANT: CPT | Performed by: EMERGENCY MEDICINE

## 2022-03-11 PROCEDURE — 80307 DRUG TEST PRSMV CHEM ANLYZR: CPT | Performed by: EMERGENCY MEDICINE

## 2022-03-11 PROCEDURE — P9612 CATHETERIZE FOR URINE SPEC: HCPCS

## 2022-03-11 PROCEDURE — 25000003 PHARM REV CODE 250: Performed by: EMERGENCY MEDICINE

## 2022-03-11 PROCEDURE — 71250 CT CHEST ABDOMEN PELVIS WITHOUT CONTRAST(XPD): ICD-10-PCS | Mod: 26,,, | Performed by: RADIOLOGY

## 2022-03-11 PROCEDURE — 93010 ELECTROCARDIOGRAM REPORT: CPT | Mod: ,,, | Performed by: INTERNAL MEDICINE

## 2022-03-11 PROCEDURE — 94761 N-INVAS EAR/PLS OXIMETRY MLT: CPT

## 2022-03-11 PROCEDURE — 96361 HYDRATE IV INFUSION ADD-ON: CPT

## 2022-03-11 PROCEDURE — 96375 TX/PRO/DX INJ NEW DRUG ADDON: CPT

## 2022-03-11 PROCEDURE — 85025 COMPLETE CBC W/AUTO DIFF WBC: CPT | Performed by: EMERGENCY MEDICINE

## 2022-03-11 RX ORDER — NOREPINEPHRINE BITARTRATE/D5W 4MG/250ML
0-.5 PLASTIC BAG, INJECTION (ML) INTRAVENOUS CONTINUOUS
Status: DISCONTINUED | OUTPATIENT
Start: 2022-03-11 | End: 2022-03-12

## 2022-03-11 RX ORDER — VANCOMYCIN HCL IN 5 % DEXTROSE 1G/250ML
1000 PLASTIC BAG, INJECTION (ML) INTRAVENOUS
Status: COMPLETED | OUTPATIENT
Start: 2022-03-11 | End: 2022-03-11

## 2022-03-11 RX ORDER — ACETAMINOPHEN 650 MG/20.3ML
650 LIQUID ORAL
Status: COMPLETED | OUTPATIENT
Start: 2022-03-11 | End: 2022-03-11

## 2022-03-11 RX ORDER — NOREPINEPHRINE BITARTRATE/D5W 4MG/250ML
0-3 PLASTIC BAG, INJECTION (ML) INTRAVENOUS CONTINUOUS
Status: DISCONTINUED | OUTPATIENT
Start: 2022-03-11 | End: 2022-03-11 | Stop reason: HOSPADM

## 2022-03-11 RX ORDER — SODIUM CHLORIDE 0.9 % (FLUSH) 0.9 %
10 SYRINGE (ML) INJECTION
Status: DISCONTINUED | OUTPATIENT
Start: 2022-03-12 | End: 2022-03-14 | Stop reason: HOSPADM

## 2022-03-11 RX ORDER — LORAZEPAM 0.5 MG/1
0.5 TABLET ORAL EVERY 8 HOURS PRN
Status: DISCONTINUED | OUTPATIENT
Start: 2022-03-12 | End: 2022-03-14 | Stop reason: HOSPADM

## 2022-03-11 RX ORDER — SODIUM CHLORIDE 9 MG/ML
1000 INJECTION, SOLUTION INTRAVENOUS
Status: COMPLETED | OUTPATIENT
Start: 2022-03-11 | End: 2022-03-11

## 2022-03-11 RX ORDER — SODIUM CHLORIDE 9 MG/ML
INJECTION, SOLUTION INTRAVENOUS CONTINUOUS
Status: DISCONTINUED | OUTPATIENT
Start: 2022-03-12 | End: 2022-03-13

## 2022-03-11 RX ORDER — ONDANSETRON 2 MG/ML
4 INJECTION INTRAMUSCULAR; INTRAVENOUS
Status: COMPLETED | OUTPATIENT
Start: 2022-03-11 | End: 2022-03-11

## 2022-03-11 RX ORDER — OLANZAPINE 5 MG/1
10 TABLET ORAL NIGHTLY
Status: DISCONTINUED | OUTPATIENT
Start: 2022-03-11 | End: 2022-03-14 | Stop reason: HOSPADM

## 2022-03-11 RX ORDER — IBUPROFEN 600 MG/1
600 TABLET ORAL EVERY 6 HOURS PRN
Status: DISCONTINUED | OUTPATIENT
Start: 2022-03-12 | End: 2022-03-14 | Stop reason: HOSPADM

## 2022-03-11 RX ORDER — ONDANSETRON 8 MG/1
8 TABLET, ORALLY DISINTEGRATING ORAL EVERY 8 HOURS PRN
Status: DISCONTINUED | OUTPATIENT
Start: 2022-03-12 | End: 2022-03-14 | Stop reason: HOSPADM

## 2022-03-11 RX ORDER — FAMOTIDINE 20 MG/1
20 TABLET, FILM COATED ORAL DAILY PRN
Status: DISCONTINUED | OUTPATIENT
Start: 2022-03-12 | End: 2022-03-14 | Stop reason: HOSPADM

## 2022-03-11 RX ORDER — CEFEPIME HYDROCHLORIDE 1 G/50ML
2 INJECTION, SOLUTION INTRAVENOUS
Status: DISCONTINUED | OUTPATIENT
Start: 2022-03-12 | End: 2022-03-12

## 2022-03-11 RX ADMIN — SODIUM CHLORIDE, SODIUM LACTATE, POTASSIUM CHLORIDE, AND CALCIUM CHLORIDE 1716 ML: .6; .31; .03; .02 INJECTION, SOLUTION INTRAVENOUS at 08:03

## 2022-03-11 RX ADMIN — ONDANSETRON 4 MG: 2 INJECTION INTRAMUSCULAR; INTRAVENOUS at 09:03

## 2022-03-11 RX ADMIN — VANCOMYCIN HYDROCHLORIDE 1000 MG: 1 INJECTION, POWDER, LYOPHILIZED, FOR SOLUTION INTRAVENOUS at 12:03

## 2022-03-11 RX ADMIN — SODIUM CHLORIDE 1000 ML: 0.9 INJECTION, SOLUTION INTRAVENOUS at 11:03

## 2022-03-11 RX ADMIN — HYDROCORTISONE SODIUM SUCCINATE 125 MG: 250 INJECTION, POWDER, FOR SOLUTION INTRAMUSCULAR; INTRAVENOUS at 03:03

## 2022-03-11 RX ADMIN — SODIUM CHLORIDE 1000 ML: 0.9 INJECTION, SOLUTION INTRAVENOUS at 12:03

## 2022-03-11 RX ADMIN — ACETAMINOPHEN ORAL SOLUTION 650 MG: 650 SOLUTION ORAL at 12:03

## 2022-03-11 RX ADMIN — Medication 0.1 MCG/KG/MIN: at 05:03

## 2022-03-11 RX ADMIN — AZITHROMYCIN MONOHYDRATE 500 MG: 500 INJECTION, POWDER, LYOPHILIZED, FOR SOLUTION INTRAVENOUS at 03:03

## 2022-03-11 RX ADMIN — PROMETHAZINE HYDROCHLORIDE 12.5 MG: 25 INJECTION INTRAMUSCULAR; INTRAVENOUS at 02:03

## 2022-03-11 NOTE — ED NOTES
Patient had one episode of vomiting onto the floor. Dr. Badillo notified. Patient now sleeping again.

## 2022-03-11 NOTE — ED NOTES
"Patient drank half of the dose of tylenol. Refuses to drink the rest "because its nasty." Reports that she can not take a tylenol pill. Dr. Badillo aware.   "

## 2022-03-11 NOTE — ED NOTES
Patient stood, got onto potty chair without assistance. Had one large, loose BM. Dr. Badillo notified. Pt returned to bed and is resting comfortably. Pt 96% on room air.

## 2022-03-11 NOTE — ED TRIAGE NOTES
"Patient arrives to ER#8 via stretcher accompanied by Paramedic Aamir with [facility]. Patient complains of chest pressure, nausea, vomiting, fever, and weakness that started yesterday. Patient reports she had a hysterectomy 7 days ago at [facility]. Patient reports she took tylenol prior to arrival for oral temperature of 99.3 at home. Patient rates pain 5/10 described as constant "someone sitting on my chest". Denies any difficulty urinating. LBM this morning. Respirations shallow, even, and unlabored. No acute distress noted.   "

## 2022-03-11 NOTE — ED PROVIDER NOTES
"Encounter Date: 3/11/2022       History     Chief Complaint   Patient presents with    Weakness     Onset yesterday. Patient reports she had a hysterectomy 7 days ago at [facility].     Chest Pain     Onset yesterday. Described as constant "someone sitting on my chest."    Fever     Patient reports fever that started yesterday and took tylenol prior to arrival.     Nausea     Onset yesterday.     Vomiting     Onset yesterday.      41-year-old female, here complaining of weakness and not feeling well since yesterday.  Also complains of chest discomfort states she feels as if someone is sitting on her chest.  Patient has had fever at home.  She is nauseous and has been vomiting at home as well.  Nothing makes her feel any better or worse.  Patient has a recent history of hysterectomy at [facility] 1 week ago.  Denies any        Review of patient's allergies indicates:   Allergen Reactions    Penicillins Hives     As a child      Past Medical History:   Diagnosis Date    Attention Deficit Hyperactivity Disorder     Family H/O Diabetes Mellitus     Her Father    Generalized Anxiety     Left Breast Cancer S.P Lumpectomy In 2021     Dr. Dominga Johnston; Dr. Sandra Sotelo (Heme/Onc); 21 On CMT; Is Estrogen Receptor Negative; She Is BRCA2 Gene Mutation Positive, Andf Her Mother Also With A H/O Breast Cancer    Macrocytic Anemia     Associated With Her Chemotherapy    Postoperative Nausea And Vomiting     Scopolamine Patches Work Well For Her For This    Scoliosis     Therapeutic Drug Monitoring     Wellness Visit 2021      Past Surgical History:   Procedure Laterality Date    AUGMENTATION OF BREAST  2012    BREAST MASS EXCISION Left 2021    Procedure: EXCISION, MASS, BREAST- 2 oclock left breast with ultrasound guidance;  Surgeon: Rashmi Johnston MD;  Location: [facility];  Service: General;  Laterality: Left;    BREAST SURGERY       SECTION      COLONOSCOPY N/A " 8/16/2021    Procedure: COLONOSCOPY;  Surgeon: Mason Quintero MD;  [facility]  Service: Endoscopy;  Laterality: N/A;    ESOPHAGOGASTRODUODENOSCOPY N/A 8/16/2021    Procedure: EGD (ESOPHAGOGASTRODUODENOSCOPY);  Surgeon: Mason Quintero MD;  [facility]  Service: Endoscopy;  Laterality: N/A;    INSERTION OF TUNNELED CENTRAL VENOUS CATHETER (CVC) WITH SUBCUTANEOUS PORT N/A 2/11/2021    Procedure: [id number];  Surgeon: Griffin Becker MD;  Location: [facility];  Service: General;  Laterality: N/A;    ROBOT-ASSISTED LAPAROSCOPIC ABDOMINAL HYSTERECTOMY USING DA NATALIIA XI N/A 3/4/2022    Procedure: XI ROBOTIC HYSTERECTOMY;  Surgeon: Carmella Galvez MD;  Location: [facility];  Service: OB/GYN;  Laterality: N/A;    ROBOT-ASSISTED LAPAROSCOPIC SALPINGO-OOPHORECTOMY USING DA NATALIIA XI Bilateral 3/4/2022    Procedure: XI ROBOTIC SALPINGO-OOPHORECTOMY;  Surgeon: Carmella Galvez MD;  Location: [facility];  Service: OB/GYN;  Laterality: Bilateral;    SENTINEL LYMPH NODE BIOPSY Left 1/19/2021    Procedure: CORE BIOPSY, LYMPH NODE, SENTINEL;  Surgeon: Rashmi Johnston MD;  Location: [facility];  Service: General;  Laterality: Left;    TUBAL LIGATION       Family History   Problem Relation Age of Onset    Breast cancer Mother 52    Depression Mother     Stroke Father         50s, multiple    Diabetes Father     Anemia Father     Hyperlipidemia Father     Cancer Other         unk abdominal origin- dx 20s    COPD Maternal Grandmother     Pancreatic cancer Other         suspected    Colon cancer Neg Hx     Esophageal cancer Neg Hx      Social History     Tobacco Use    Smoking status: Never Smoker    Smokeless tobacco: Never Used   Substance Use Topics    Alcohol use: Yes     Comment: occasionally    Drug use: Never     Review of Systems   Constitutional: Positive for chills, fatigue and fever.   HENT: Negative.    Eyes: Negative.    Respiratory: Positive for chest  tightness and shortness of breath.    Cardiovascular: Positive for chest pain. Negative for palpitations and leg swelling.   Gastrointestinal: Positive for nausea and vomiting. Negative for abdominal pain, constipation and diarrhea.   Endocrine: Negative for polydipsia and polyuria.   Genitourinary: Negative for dysuria, enuresis, flank pain, frequency and hematuria.   Musculoskeletal: Negative for arthralgias, myalgias, neck pain and neck stiffness.   Skin: Negative for pallor and rash.   Neurological: Negative for dizziness, syncope, weakness, light-headedness, numbness and headaches.   Psychiatric/Behavioral: Negative for confusion. The patient is not nervous/anxious.        Physical Exam     Initial Vitals [03/11/22 0800]   BP Pulse Resp Temp SpO2   (!) 75/38 84 16 98.8 °F (37.1 °C) 98 %      MAP       --         Physical Exam    Nursing note and vitals reviewed.  Constitutional: She appears well-developed and well-nourished. She is not diaphoretic. No distress.   HENT:   Head: Normocephalic and atraumatic.   Right Ear: External ear normal.   Left Ear: External ear normal.   Nose: Nose normal.   Mouth/Throat: Oropharynx is clear and moist. No oropharyngeal exudate.   Eyes: Conjunctivae and EOM are normal. Pupils are equal, round, and reactive to light. No scleral icterus.   Neck: Neck supple. No JVD present.   Normal range of motion.  Cardiovascular: Normal rate, regular rhythm, normal heart sounds and intact distal pulses.   No murmur heard.  Pulmonary/Chest: Breath sounds normal. No stridor. No respiratory distress. She has no wheezes. She exhibits no tenderness.   Abdominal: Abdomen is soft. Bowel sounds are normal. She exhibits no distension. There is abdominal tenderness (Mild tenderness around surgical incisions.  Incisions are clean and dry and appear to be healingwell without evidence of infection.). There is no rebound and no guarding.   Musculoskeletal:         General: No tenderness or edema. Normal  range of motion.      Cervical back: Normal range of motion and neck supple.     Neurological: She is alert and oriented to person, place, and time. She has normal strength and normal reflexes. No cranial nerve deficit or sensory deficit. GCS score is 15. GCS eye subscore is 4. GCS verbal subscore is 5. GCS motor subscore is 6.   Skin: Skin is warm and dry. Capillary refill takes less than 2 seconds. No rash noted. No erythema.   Psychiatric: She has a normal mood and affect. Her behavior is normal.         ED Course   Critical Care    Date/Time: 3/11/2022 5:25 PM  Performed by: Dimitrios Badillo MD  Authorized by: Dimitrios Badillo MD   Direct patient critical care time: 110 minutes  Additional history critical care time: 15 minutes  Ordering / reviewing critical care time: 18 minutes  Documentation critical care time: 24 minutes  Consulting other physicians critical care time: 11 minutes  Total critical care time (exclusive of procedural time) : 178 minutes  Critical care time was exclusive of separately billable procedures and treating other patients.  Critical care was necessary to treat or prevent imminent or life-threatening deterioration of the following conditions: circulatory failure, endocrine crisis, renal failure, dehydration, sepsis and shock.  Critical care was time spent personally by me on the following activities: discussions with consultants, evaluation of patient's response to treatment, obtaining history from patient or surrogate, ordering and review of laboratory studies, pulse oximetry, review of old charts, development of treatment plan with patient or surrogate, discussions with primary provider, examination of patient, ordering and performing treatments and interventions, ordering and review of radiographic studies and re-evaluation of patient's condition.        Labs Reviewed   CBC W/ AUTO DIFFERENTIAL - Abnormal; Notable for the following components:       Result Value    RBC 3.61 (*)      Hemoglobin 11.3 (*)     Hematocrit 32.7 (*)     MCH 31.3 (*)     MPV 8.5 (*)     Immature Granulocytes 1.0 (*)     Immature Grans (Abs) 0.09 (*)     Mono # 1.4 (*)     Lymph % 16.5 (*)     All other components within normal limits   COMPREHENSIVE METABOLIC PANEL - Abnormal; Notable for the following components:    Sodium 129 (*)     Creatinine 1.8 (*)     Total Protein 5.9 (*)     Total Bilirubin 1.1 (*)     eGFR if  39.2 (*)     eGFR if non  34.0 (*)     All other components within normal limits   URINALYSIS, REFLEX TO URINE CULTURE - Abnormal; Notable for the following components:    Protein, UA 1+ (*)     Bilirubin (UA) 1+ (*)     All other components within normal limits    Narrative:     Preferred Collection Type->Urine, Clean Catch  Specimen Source->Urine   MAGNESIUM - Abnormal; Notable for the following components:    Magnesium 1.5 (*)     All other components within normal limits   URINALYSIS MICROSCOPIC - Abnormal; Notable for the following components:    Bacteria Few (*)     Hyaline Casts, UA 4 (*)     Amorphous, UA Many (*)     All other components within normal limits    Narrative:     Preferred Collection Type->Urine, Clean Catch  Specimen Source->Urine   D DIMER, QUANTITATIVE - Abnormal; Notable for the following components:    D-Dimer 1.44 (*)     All other components within normal limits   CULTURE, BLOOD   CULTURE, BLOOD   LACTIC ACID, PLASMA   B-TYPE NATRIURETIC PEPTIDE   TROPONIN I   DRUG SCREEN PANEL, URINE EMERGENCY   LACTIC ACID, PLASMA    Narrative:     Recoll. [id number] by JOAN at 03/11/2022 11:21, reason: Specimen   hemolyzed   DRUG SCREEN PANEL, URINE EMERGENCY    Narrative:     Preferred Collection Type->Urine, Clean Catch  Specimen Source->Urine   SARS-COV-2 RNA AMPLIFICATION, QUAL     EKG Readings: (Independently Interpreted)   EKG, personally reviewed by me shows normal sinus rhythm.  Nonspecific T-wave changes.  83 beats per minute.  TN interval 146, QT  390. No ST elevations or depressions.  No obvious heart strain.     ECG Results          EKG 12-lead (Final result)  Result time 03/11/22 15:01:42    Final result by Melly Bingham In [facility] (03/11/22 15:01:42)                 Narrative:    Test Reason : R07.9,    Vent. Rate : 083 BPM     Atrial Rate : 083 BPM     P-R Int : 146 ms          QRS Dur : 080 ms      QT Int : 390 ms       P-R-T Axes : 027 051 056 degrees     QTc Int : 458 ms    Normal sinus rhythm  Nonspecific T wave abnormality  Abnormal ECG  When compared with ECG of 17-AUG-2021 06:17,  Vent. rate has increased BY  31 BPM  Nonspecific T wave abnormality has replaced inverted T waves in Anterior  leads  Confirmed by Destin Torres MD ([id number]) on 3/11/2022 3:01:34 PM    Referred By: [email or web address]   SELF           Confirmed By:Destin Torres MD                            Imaging Results          CT Chest Abdomen Pelvis Without Contrast (XPD) (Final result)  Result time 03/11/22 14:01:52    Final result by Tressa Sequeira MD (03/11/22 14:01:52)                 Impression:      No evidence of complication following recent NICOLETTE/BSO.    Findings in the chest most suggestive of volume overload with interstitial edema, pericardial and pleural effusions.  Differential for the ground-glass opacities includes an atypical infection or interstitial pneumonitis.    Findings at the lung bases are likely due to atelectasis with less likely differential of pneumonia.    Dense consolidation with air bronchograms in the left upper lobe has an appearance most suggestive of radiation fibrosis.    New nodular density in the right lung suspected to be related to atelectasis.  Follow-up recommended.      Electronically signed by: Tressa Sequeira  Date:    03/11/2022  Time:    14:01             Narrative:    EXAMINATION:  CT CHEST ABDOMEN PELVIS WITHOUT CONTRAST(XPD)    CLINICAL HISTORY:  Sepsis; patient with history of stage III B triple negative left breast cancer treated with surgery,  radiation therapy,  chemo/immunotherapy.  Patient is status post robotic assisted TAHBSO on 03/04/2022    TECHNIQUE:  Low dose axial images, sagittal and coronal reformations were obtained from the thoracic inlet to the pubic symphysis; neither oral nor IV contrast was administered    COMPARISON:  PET-CT 01/18/2022    FINDINGS:  Chest: Since the previous examination the patient has developed a small pericardial effusion as well as small bilateral pleural effusions.  A right-sided medication port is present.  No mediastinal nor axillary adenopathy is present.  There is 2.1 cm seroma in the left axilla unchanged.  Postsurgical changes of bilateral mastectomy and flap reconstruction are again noted.    Interlobular septal thickening and ground-glass opacities have developed throughout the lungs; this combination of findings is suggestive of interstitial edema.  There is dependent consolidation at the lung bases, atelectasis versus pneumonia.  There is more dense consolidation with air bronchograms in the subpleural aspect of the anterolateral left upper lobe and lingula.  This is associated with volume loss and is suspected to be due to radiation fibrosis.  There is a new 2.3 cm somewhat nodular focus of consolidation in the left apex on series 3, image 11, which may be result of radiation fibrosis as well.  The previously reported subpleural nodules in the left upper lobe and lingula have been come engulfed by the larger area of a consolidation throughout the upper lobe and lingula.    There is 6 mm subpleural band like density in the lateral right middle lobe series 3, image 59, new.  This is suspected to be related to atelectasis rather than a lung nodule.  However, follow-up is recommended.    Abdomen/pelvis: The liver, gallbladder, spleen, adrenal glands, pancreas, kidneys are normal.    The urinary bladder is unremarkable.  The uterus is surgically absent.  There is no pelvic hematoma/fluid collection.  There is  a small amount of pelvic free fluid.  There is no free air.  There are no dilated loops of bowel.    There are postsurgical changes in the anterior abdominal wall from previous flap harvest.  There is increased fat stranding throughout the abdominal wall, likely related to the recent surgery.  In the right upper quadrant there is an 11 mm subcutaneous nodular density series 4, image 76 which may be a small hematoma/seroma at the location of a laparoscopic port.  There is also a bubble of air in the left upper quadrant anterior abdominal wall likely related to the recent surgery.    No abnormalities noted in the bones.                               X-Ray Chest AP Portable (Final result)  Result time 03/11/22 08:29:46    Final result by Tressa Sequeira MD (03/11/22 08:29:46)                 Impression:      Hazy opacification of the left lung, suspected to be related to chest wall radiation.  Pneumonia, metastatic disease less likely differential possibilities.      Electronically signed by: Tressa Sequeira  Date:    03/11/2022  Time:    08:29             Narrative:    EXAMINATION:  XR CHEST AP PORTABLE    CLINICAL HISTORY:  Sepsis;    TECHNIQUE:  Single frontal view of the chest was performed.    COMPARISON:  09/16/2021 x-ray, PET-CT 01/18/2022    FINDINGS:  The cardiomediastinal silhouette is within normal limits.  Right-sided medication port is again noted. The right lung remains clear. There is hazy opacification of the left mid and lower lung zones which has developed since the prior chest x-ray, likely corresponding to the changes of probable radiation fibrosis in the left lung reported on the PET-CT.    Postsurgical changes in the anterior chest wall are consistent with a history of bilateral mastectomy and flap reconstruction.                              X-Rays:   Independently Interpreted Readings:   Other Readings:  Chest x-ray personally reviewed by me shows what appears to be scarring in the left  lung from previous radiation therapy.  No pneumothorax.  Normal cardiac silhouette, normal skeletal structures.    CT of the chest, abdomen, and pelvis showed no specific findings which would explain her symptoms.  She does have a slight pericardial effusion, she has scarring in the left lung which could represent previous radiation exposure damage but could also represent underlying infectious process.  In the abdomen and pelvis there were no alarming findings.      Medications   hydrocortisone sod succ (PF) injection 125 mg (125 mg Intravenous Given 3/11/22 1520)   NORepinephrine 4 mg in dextrose 5% 250 mL infusion (premix) (titrating) (0.1 mcg/kg/min × 57.2 kg Intravenous New Bag 3/11/22 1702)   lactated ringers bolus 1,716 mL (0 mL/kg × 57.2 kg Intravenous Stopped 3/11/22 1033)   ondansetron injection 4 mg (4 mg Intravenous Given 3/11/22 0902)   sodium chloride 0.9% bolus 1,000 mL (0 mLs Intravenous Stopped 3/11/22 1200)   0.9%  NaCl infusion (1,000 mLs Intravenous New Bag 3/11/22 1240)   vancomycin in dextrose 5 % 1 gram/250 mL IVPB 1,000 mg (0 mg Intravenous Stopped 3/11/22 1519)   acetaminophen oral solution 650 mg (650 mg Oral Given 3/11/22 1255)   promethazine (PHENERGAN) 12.5 mg in dextrose 5 % 50 mL IVPB (0 mg Intravenous Stopped 3/11/22 1425)   azithromycin 500 mg in dextrose 5 % 250 mL IVPB (ready to mix system) (0 mg Intravenous Stopped 3/11/22 1629)     Medical Decision Making:   Differential Diagnosis:   Over medication, viral illness, urinary tract infection, internal hemorrhage, pneumonia, pneumothorax, myocardial infarction, pulmonary embolus, adrenal crisis, etc.  ED Management:  Patient has been febrile until she was given Tylenol here.  She is very weak and she is somewhat somnolent.  She is hypotensive despite being given a pr approximately 2 units of lactated Ringer's and almost 2 units of normal saline.  Her labs show that she has a normal white blood cell count and lactic acid was normal  x2.  She has an acute kidney injury and she is hyponatremic.  A chest x-ray showed what appeared to be chronic scarring, likely secondary to radiation therapy.  She does have an elevated D-dimer, but because of her kidney injury she cannot undergo a CT of the chest with contrast and a V/Q scan is not available here today.  A CT of the chest, abdomen, and pelvis without contrast was performed which showed a slight pericardial effusion, and changes in the left lung which could be attributable to radiation therapy, but may also represent early infiltrates.  Patient has been given IV vancomycin and azithromycin.  Patient had mentioned that she had been on steroids and had difficulty getting off of them, but states that it has been about 3 weeks since her last dose and she has been doing well until now.  It sounds unlikely, but she may be suffering from some sort of adrenal crisis so she was given 125 mg Solu-Cortef.  Despite the fluids in the steroids, the patient's blood pressure remains low, so she has been started on low-dose Levophed.  I spoke to our hospitalist about this patient and it is felt that this patient is too complicated and needs higher level of care than what can be provided at this facility.  The [facility] referral center was contacted, and I was eventually able to speak to Dr. Galvez, the patient's oncologist is states that she will accept the patient in transfer to [facility].                      Clinical Impression:   Final diagnoses:  [R07.9] Chest pain  [E87.1] Hyponatremia  [I95.9] Hypotension, unspecified hypotension type  [R50.9] Fever, unspecified fever cause  [R11.2, R19.7] Nausea, vomiting and diarrhea  [N17.9] Acute kidney injury  [A41.9] Sepsis, due to unspecified organism, unspecified whether acute organ dysfunction present (Primary)  [R79.89] Elevated d-dimer          ED Disposition Condition    Transfer to Another Facility Stable              Dimitrios Badillo MD  03/11/22 [id number]        Dimitrios Badillo MD  03/11/22 [id number]

## 2022-03-11 NOTE — ED NOTES
Per Dr. Badillo we are going to give the patient the ordered steroids and see if her BP improves. I am waiting for pharmacy to deliver the steroids at this time. Patients  updated. He is going to get something to eat and will come back soon.

## 2022-03-12 ENCOUNTER — PATIENT MESSAGE (OUTPATIENT)
Dept: HEMATOLOGY/ONCOLOGY | Facility: CLINIC | Age: 42
End: 2022-03-12
Payer: COMMERCIAL

## 2022-03-12 PROBLEM — R07.9 CHEST PAIN: Status: ACTIVE | Noted: 2022-03-12

## 2022-03-12 LAB
ALBUMIN SERPL BCP-MCNC: 3 G/DL (ref 3.5–5.2)
ALP SERPL-CCNC: 85 U/L (ref 55–135)
ALT SERPL W/O P-5'-P-CCNC: 10 U/L (ref 10–44)
ANION GAP SERPL CALC-SCNC: 9 MMOL/L (ref 8–16)
APTT BLDCRRT: 41.9 SEC (ref 21–32)
ASCENDING AORTA: 2.58 CM
AST SERPL-CCNC: 16 U/L (ref 10–40)
AV INDEX (PROSTH): 0.93
AV MEAN GRADIENT: 5 MMHG
AV PEAK GRADIENT: 9 MMHG
AV VALVE AREA: 2.58 CM2
AV VELOCITY RATIO: 0.97
BASOPHILS # BLD AUTO: 0 K/UL (ref 0–0.2)
BASOPHILS NFR BLD: 0 % (ref 0–1.9)
BILIRUB SERPL-MCNC: 0.7 MG/DL (ref 0.1–1)
BSA FOR ECHO PROCEDURE: 1.69 M2
BUN SERPL-MCNC: 10 MG/DL (ref 6–20)
CALCIUM SERPL-MCNC: 9.1 MG/DL (ref 8.7–10.5)
CHLORIDE SERPL-SCNC: 107 MMOL/L (ref 95–110)
CK SERPL-CCNC: 45 U/L (ref 20–180)
CO2 SERPL-SCNC: 22 MMOL/L (ref 23–29)
CORTIS SERPL-MCNC: 19.2 UG/DL
CREAT SERPL-MCNC: 1.1 MG/DL (ref 0.5–1.4)
CV ECHO LV RWT: 0.36 CM
DIFFERENTIAL METHOD: ABNORMAL
DOP CALC AO PEAK VEL: 1.52 M/S
DOP CALC AO VTI: 27.83 CM
DOP CALC LVOT AREA: 2.8 CM2
DOP CALC LVOT DIAMETER: 1.88 CM
DOP CALC LVOT PEAK VEL: 1.47 M/S
DOP CALC LVOT STROKE VOLUME: 71.69 CM3
DOP CALCLVOT PEAK VEL VTI: 25.84 CM
E WAVE DECELERATION TIME: 167.89 MSEC
E/A RATIO: 1.53
E/E' RATIO: 10.18 M/S
ECHO LV POSTERIOR WALL: 0.81 CM (ref 0.6–1.1)
EJECTION FRACTION: 65 %
EOSINOPHIL # BLD AUTO: 0 K/UL (ref 0–0.5)
EOSINOPHIL NFR BLD: 0 % (ref 0–8)
ERYTHROCYTE [DISTWIDTH] IN BLOOD BY AUTOMATED COUNT: 11.9 % (ref 11.5–14.5)
EST. GFR  (AFRICAN AMERICAN): >60 ML/MIN/1.73 M^2
EST. GFR  (NON AFRICAN AMERICAN): >60 ML/MIN/1.73 M^2
FRACTIONAL SHORTENING: 39 % (ref 28–44)
GLUCOSE SERPL-MCNC: 144 MG/DL (ref 70–110)
HCT VFR BLD AUTO: 29.6 % (ref 37–48.5)
HGB BLD-MCNC: 10.4 G/DL (ref 12–16)
IMM GRANULOCYTES # BLD AUTO: 0.06 K/UL (ref 0–0.04)
IMM GRANULOCYTES NFR BLD AUTO: 0.8 % (ref 0–0.5)
INR PPP: 1.2 (ref 0.8–1.2)
INTERVENTRICULAR SEPTUM: 0.77 CM (ref 0.6–1.1)
IVRT: 91.34 MSEC
LA MAJOR: 5.22 CM
LA MINOR: 5.05 CM
LA WIDTH: 3.81 CM
LEFT ATRIUM SIZE: 3.43 CM
LEFT ATRIUM VOLUME INDEX MOD: 25.9 ML/M2
LEFT ATRIUM VOLUME INDEX: 34.4 ML/M2
LEFT ATRIUM VOLUME MOD: 43 CM3
LEFT ATRIUM VOLUME: 57.02 CM3
LEFT INTERNAL DIMENSION IN SYSTOLE: 2.77 CM (ref 2.1–4)
LEFT VENTRICLE DIASTOLIC VOLUME INDEX: 57.01 ML/M2
LEFT VENTRICLE DIASTOLIC VOLUME: 94.64 ML
LEFT VENTRICLE MASS INDEX: 69 G/M2
LEFT VENTRICLE SYSTOLIC VOLUME INDEX: 17.3 ML/M2
LEFT VENTRICLE SYSTOLIC VOLUME: 28.76 ML
LEFT VENTRICULAR INTERNAL DIMENSION IN DIASTOLE: 4.55 CM (ref 3.5–6)
LEFT VENTRICULAR MASS: 113.88 G
LV LATERAL E/E' RATIO: 8.62 M/S
LV SEPTAL E/E' RATIO: 12.44 M/S
LYMPHOCYTES # BLD AUTO: 0.6 K/UL (ref 1–4.8)
LYMPHOCYTES NFR BLD: 7.4 % (ref 18–48)
MCH RBC QN AUTO: 31.8 PG (ref 27–31)
MCHC RBC AUTO-ENTMCNC: 35.1 G/DL (ref 32–36)
MCV RBC AUTO: 91 FL (ref 82–98)
MONOCYTES # BLD AUTO: 0.4 K/UL (ref 0.3–1)
MONOCYTES NFR BLD: 5 % (ref 4–15)
MV A" WAVE DURATION": 13.99 MSEC
MV PEAK A VEL: 0.73 M/S
MV PEAK E VEL: 1.12 M/S
MV STENOSIS PRESSURE HALF TIME: 48.69 MS
MV VALVE AREA P 1/2 METHOD: 4.52 CM2
NEUTROPHILS # BLD AUTO: 6.9 K/UL (ref 1.8–7.7)
NEUTROPHILS NFR BLD: 86.8 % (ref 38–73)
NRBC BLD-RTO: 0 /100 WBC
PISA MRMAX VEL: 0.05 M/S
PISA TR MAX VEL: 2.86 M/S
PLATELET # BLD AUTO: 186 K/UL (ref 150–450)
PMV BLD AUTO: 8.5 FL (ref 9.2–12.9)
POTASSIUM SERPL-SCNC: 3.6 MMOL/L (ref 3.5–5.1)
PROT SERPL-MCNC: 5.7 G/DL (ref 6–8.4)
PROTHROMBIN TIME: 13.2 SEC (ref 9–12.5)
PULM VEIN S/D RATIO: 1.11
PV PEAK D VEL: 0.73 M/S
PV PEAK S VEL: 0.81 M/S
PV PEAK VELOCITY: 0.76 CM/S
RA MAJOR: 5.07 CM
RA PRESSURE: 3 MMHG
RA WIDTH: 4.16 CM
RBC # BLD AUTO: 3.27 M/UL (ref 4–5.4)
RIGHT VENTRICULAR END-DIASTOLIC DIMENSION: 3.31 CM
RV TISSUE DOPPLER FREE WALL SYSTOLIC VELOCITY 1 (APICAL 4 CHAMBER VIEW): 16.71 CM/S
SINUS: 2.7 CM
SODIUM SERPL-SCNC: 138 MMOL/L (ref 136–145)
STJ: 2.47 CM
TDI LATERAL: 0.13 M/S
TDI SEPTAL: 0.09 M/S
TDI: 0.11 M/S
TR MAX PG: 33 MMHG
TRICUSPID ANNULAR PLANE SYSTOLIC EXCURSION: 1.96 CM
TROPONIN I SERPL DL<=0.01 NG/ML-MCNC: 0.21 NG/ML (ref 0–0.03)
TROPONIN I SERPL DL<=0.01 NG/ML-MCNC: 0.31 NG/ML (ref 0–0.03)
TV REST PULMONARY ARTERY PRESSURE: 36 MMHG
WBC # BLD AUTO: 7.98 K/UL (ref 3.9–12.7)

## 2022-03-12 PROCEDURE — 99232 SBSQ HOSP IP/OBS MODERATE 35: CPT | Mod: 24,,, | Performed by: OBSTETRICS & GYNECOLOGY

## 2022-03-12 PROCEDURE — 84484 ASSAY OF TROPONIN QUANT: CPT | Performed by: INTERNAL MEDICINE

## 2022-03-12 PROCEDURE — 63600175 PHARM REV CODE 636 W HCPCS: Performed by: STUDENT IN AN ORGANIZED HEALTH CARE EDUCATION/TRAINING PROGRAM

## 2022-03-12 PROCEDURE — A4216 STERILE WATER/SALINE, 10 ML: HCPCS | Performed by: STUDENT IN AN ORGANIZED HEALTH CARE EDUCATION/TRAINING PROGRAM

## 2022-03-12 PROCEDURE — 25000003 PHARM REV CODE 250: Performed by: STUDENT IN AN ORGANIZED HEALTH CARE EDUCATION/TRAINING PROGRAM

## 2022-03-12 PROCEDURE — 82550 ASSAY OF CK (CPK): CPT | Performed by: INTERNAL MEDICINE

## 2022-03-12 PROCEDURE — 27000221 HC OXYGEN, UP TO 24 HOURS

## 2022-03-12 PROCEDURE — 85025 COMPLETE CBC W/AUTO DIFF WBC: CPT | Performed by: STUDENT IN AN ORGANIZED HEALTH CARE EDUCATION/TRAINING PROGRAM

## 2022-03-12 PROCEDURE — 94761 N-INVAS EAR/PLS OXIMETRY MLT: CPT

## 2022-03-12 PROCEDURE — 80053 COMPREHEN METABOLIC PANEL: CPT | Performed by: STUDENT IN AN ORGANIZED HEALTH CARE EDUCATION/TRAINING PROGRAM

## 2022-03-12 PROCEDURE — 20000000 HC ICU ROOM

## 2022-03-12 PROCEDURE — 84484 ASSAY OF TROPONIN QUANT: CPT | Mod: 91 | Performed by: STUDENT IN AN ORGANIZED HEALTH CARE EDUCATION/TRAINING PROGRAM

## 2022-03-12 PROCEDURE — 85610 PROTHROMBIN TIME: CPT | Performed by: STUDENT IN AN ORGANIZED HEALTH CARE EDUCATION/TRAINING PROGRAM

## 2022-03-12 PROCEDURE — 99232 PR SUBSEQUENT HOSPITAL CARE,LEVL II: ICD-10-PCS | Mod: 24,,, | Performed by: OBSTETRICS & GYNECOLOGY

## 2022-03-12 PROCEDURE — 36569 INSJ PICC 5 YR+ W/O IMAGING: CPT

## 2022-03-12 PROCEDURE — 82533 TOTAL CORTISOL: CPT | Performed by: INTERNAL MEDICINE

## 2022-03-12 PROCEDURE — 85730 THROMBOPLASTIN TIME PARTIAL: CPT | Performed by: OBSTETRICS & GYNECOLOGY

## 2022-03-12 PROCEDURE — C1751 CATH, INF, PER/CENT/MIDLINE: HCPCS

## 2022-03-12 PROCEDURE — 63600175 PHARM REV CODE 636 W HCPCS: Performed by: OBSTETRICS & GYNECOLOGY

## 2022-03-12 RX ORDER — ENOXAPARIN SODIUM 100 MG/ML
40 INJECTION SUBCUTANEOUS EVERY 24 HOURS
Status: DISCONTINUED | OUTPATIENT
Start: 2022-03-12 | End: 2022-03-14 | Stop reason: HOSPADM

## 2022-03-12 RX ORDER — NOREPINEPHRINE BITARTRATE/D5W 4MG/250ML
0-.5 PLASTIC BAG, INJECTION (ML) INTRAVENOUS CONTINUOUS
Status: DISCONTINUED | OUTPATIENT
Start: 2022-03-12 | End: 2022-03-13

## 2022-03-12 RX ORDER — SODIUM CHLORIDE 0.9 % (FLUSH) 0.9 %
10 SYRINGE (ML) INJECTION EVERY 6 HOURS
Status: DISCONTINUED | OUTPATIENT
Start: 2022-03-12 | End: 2022-03-14 | Stop reason: HOSPADM

## 2022-03-12 RX ORDER — CEFOXITIN SODIUM 2 G/50ML
2 INJECTION, SOLUTION INTRAVENOUS
Status: DISCONTINUED | OUTPATIENT
Start: 2022-03-12 | End: 2022-03-12

## 2022-03-12 RX ORDER — PREDNISONE 5 MG/1
10 TABLET ORAL NIGHTLY
Status: DISCONTINUED | OUTPATIENT
Start: 2022-03-12 | End: 2022-03-14 | Stop reason: HOSPADM

## 2022-03-12 RX ORDER — SODIUM CHLORIDE 0.9 % (FLUSH) 0.9 %
10 SYRINGE (ML) INJECTION
Status: DISCONTINUED | OUTPATIENT
Start: 2022-03-12 | End: 2022-03-14 | Stop reason: HOSPADM

## 2022-03-12 RX ORDER — HEPARIN SODIUM,PORCINE/D5W 25000/250
0-40 INTRAVENOUS SOLUTION INTRAVENOUS CONTINUOUS
Status: DISCONTINUED | OUTPATIENT
Start: 2022-03-12 | End: 2022-03-12

## 2022-03-12 RX ORDER — CEFOXITIN SODIUM 2 G/50ML
2 INJECTION, SOLUTION INTRAVENOUS
Status: DISCONTINUED | OUTPATIENT
Start: 2022-03-12 | End: 2022-03-13

## 2022-03-12 RX ADMIN — CEFOXITIN SODIUM 2 G: 2 INJECTION, SOLUTION INTRAVENOUS at 08:03

## 2022-03-12 RX ADMIN — LORAZEPAM 0.5 MG: 0.5 TABLET ORAL at 10:03

## 2022-03-12 RX ADMIN — LORAZEPAM 0.5 MG: 0.5 TABLET ORAL at 02:03

## 2022-03-12 RX ADMIN — HYDROCORTISONE SODIUM SUCCINATE 50 MG: 100 INJECTION, POWDER, FOR SOLUTION INTRAMUSCULAR; INTRAVENOUS at 06:03

## 2022-03-12 RX ADMIN — ENOXAPARIN SODIUM 40 MG: 100 INJECTION SUBCUTANEOUS at 05:03

## 2022-03-12 RX ADMIN — PREDNISONE 10 MG: 5 TABLET ORAL at 08:03

## 2022-03-12 RX ADMIN — VANCOMYCIN HYDROCHLORIDE 750 MG: 750 INJECTION, POWDER, LYOPHILIZED, FOR SOLUTION INTRAVENOUS at 12:03

## 2022-03-12 RX ADMIN — ONDANSETRON 8 MG: 8 TABLET, ORALLY DISINTEGRATING ORAL at 02:03

## 2022-03-12 RX ADMIN — CEFOXITIN SODIUM 2 G: 2 INJECTION, SOLUTION INTRAVENOUS at 02:03

## 2022-03-12 RX ADMIN — HYDROCORTISONE SODIUM SUCCINATE 50 MG: 100 INJECTION, POWDER, FOR SOLUTION INTRAMUSCULAR; INTRAVENOUS at 12:03

## 2022-03-12 RX ADMIN — HEPARIN SODIUM 12 UNITS/KG/HR: 5000 INJECTION INTRAVENOUS; SUBCUTANEOUS at 02:03

## 2022-03-12 RX ADMIN — SODIUM CHLORIDE, PRESERVATIVE FREE 10 ML: 5 INJECTION INTRAVENOUS at 07:03

## 2022-03-12 RX ADMIN — CEFOXITIN SODIUM 2 G: 2 INJECTION, SOLUTION INTRAVENOUS at 03:03

## 2022-03-12 RX ADMIN — SODIUM CHLORIDE: 0.9 INJECTION, SOLUTION INTRAVENOUS at 12:03

## 2022-03-12 RX ADMIN — LORAZEPAM 0.5 MG: 0.5 TABLET ORAL at 07:03

## 2022-03-12 RX ADMIN — SODIUM CHLORIDE, PRESERVATIVE FREE 10 ML: 5 INJECTION INTRAVENOUS at 12:03

## 2022-03-12 RX ADMIN — Medication 0.06 MCG/KG/MIN: at 12:03

## 2022-03-12 RX ADMIN — SODIUM CHLORIDE: 0.9 INJECTION, SOLUTION INTRAVENOUS at 09:03

## 2022-03-12 RX ADMIN — ONDANSETRON 8 MG: 8 TABLET, ORALLY DISINTEGRATING ORAL at 11:03

## 2022-03-12 RX ADMIN — HYDROCORTISONE SODIUM SUCCINATE 50 MG: 100 INJECTION, POWDER, FOR SOLUTION INTRAMUSCULAR; INTRAVENOUS at 05:03

## 2022-03-12 NOTE — ASSESSMENT & PLAN NOTE
- H/o Stag IIIB invasive ductal carcinoma of L breast  - Now POD#8 s/p RR-NATALIE/DEEPO w/ Dr. 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

## 2022-03-12 NOTE — ASSESSMENT & PLAN NOTE
RESOLVED  - Cr 1.8 at OSH> 1.1 this AM   - Continue IVF   - Renally dose medications   - Strict I&Os

## 2022-03-12 NOTE — SUBJECTIVE & OBJECTIVE
Oncology Treatment Plan:   OP BREAST PACLITAXEL WEEKLY + CARBOPLATIN (AUC 5) Q3W FOLLOWED BY AC WITH PEMBROLIZUMAB FOLLOWED BY PEMBROLIZUMAB     Oncology History:   - RATLH/BSO on 3/4/22 for history of BRCA2 mutation       Past Medical History:   Diagnosis Date    Attention Deficit Hyperactivity Disorder     Family H/O Diabetes Mellitus     Her Father    Generalized Anxiety     Left Breast Cancer S.P Lumpectomy In 2021     Dr. Dominga Johnston; Dr. Sandra Sotelo (Heme/Onc); 21 On CMT; Is Estrogen Receptor Negative; She Is BRCA2 Gene Mutation Positive, Andf Her Mother Also With A H/O Breast Cancer    Macrocytic Anemia     Associated With Her Chemotherapy    Postoperative Nausea And Vomiting     Scopolamine Patches Work Well For Her For This    Scoliosis     Therapeutic Drug Monitoring     Wellness Visit 2021      Past Surgical History:   Procedure Laterality Date    AUGMENTATION OF BREAST  2012    BREAST MASS EXCISION Left 2021    Procedure: EXCISION, MASS, BREAST- 2 oclock left breast with ultrasound guidance;  Surgeon: Rashmi Johnston MD;  Location: [facility] OR;  Service: General;  Laterality: Left;    BREAST SURGERY       SECTION      COLONOSCOPY N/A 2021    Procedure: COLONOSCOPY;  Surgeon: Mason Quintero MD;  [facility]  Service: Endoscopy;  Laterality: N/A;    ESOPHAGOGASTRODUODENOSCOPY N/A 2021    Procedure: EGD (ESOPHAGOGASTRODUODENOSCOPY);  Surgeon: Mason Quintero MD;  [facility]  Service: Endoscopy;  Laterality: N/A;    INSERTION OF TUNNELED CENTRAL VENOUS CATHETER (CVC) WITH SUBCUTANEOUS PORT N/A 2021    Procedure: LCGIFWALK-PHAH-J-CATH;  Surgeon: Griffin Becker MD;  Location: [facility] OR;  Service: General;  Laterality: N/A;    ROBOT-ASSISTED LAPAROSCOPIC ABDOMINAL HYSTERECTOMY USING DA NATALIIA XI N/A 3/4/2022    Procedure: XI ROBOTIC HYSTERECTOMY;  Surgeon: Carmella Galvez MD;  Location: [facility] OR;  Service: OB/GYN;   Laterality: N/A;    ROBOT-ASSISTED LAPAROSCOPIC SALPINGO-OOPHORECTOMY USING DA NATALIIA XI Bilateral 3/4/2022    Procedure: XI ROBOTIC SALPINGO-OOPHORECTOMY;  Surgeon: Carmella Galvez MD;  Location: [facility] OR;  Service: OB/GYN;  Laterality: Bilateral;    SENTINEL LYMPH NODE BIOPSY Left 1/19/2021    Procedure: CORE BIOPSY, LYMPH NODE, SENTINEL;  Surgeon: Rashmi Johnston MD;  Location: [facility] OR;  Service: General;  Laterality: Left;    TUBAL LIGATION       Family History       Problem Relation (Age of Onset)    Anemia Father    Breast cancer Mother (52)    COPD Maternal Grandmother    Cancer Other    Depression Mother    Diabetes Father    Hyperlipidemia Father    Pancreatic cancer Other    Stroke Father          Tobacco Use    Smoking status: Never Smoker    Smokeless tobacco: Never Used   Substance and Sexual Activity    Alcohol use: Yes     Comment: occasionally    Drug use: Never    Sexual activity: Not Currently     Birth control/protection: See Surgical Hx       Facility-Administered Medications Prior to Admission   Medication    sodium chloride 0.9% bolus 1,000 mL     PTA Medications   Medication Sig    ibuprofen (ADVIL,MOTRIN) 600 MG tablet Take 1 tablet (600 mg total) by mouth every 6 (six) hours as needed for Pain. Alternate every 3 hours with tylenol    LORazepam (ATIVAN) 0.5 MG tablet Take 1 tablet (0.5 mg total) by mouth every 8 (eight) hours as needed (for nausea, anxiety or sleep).    OLANZapine (ZYPREXA) 10 MG tablet Take 1 tablet (10 mg total) by mouth nightly.    ondansetron (ZOFRAN-ODT) 8 MG TbDL Take 1 tablet (8 mg total) by mouth every 8 (eight) hours as needed (nausea).    pantoprazole (PROTONIX) 40 MG tablet Take 1 tablet (40 mg total) by mouth once daily.    promethazine (PHENERGAN) 25 MG tablet Take 25 mg by mouth every 6 (six) hours as needed for Nausea.     (Magic mouthwash) 1:1:1 Benadryl 12.5mg/5ml liq, aluminum & magnesium hydroxide-simehticone (Maalox), LIDOcaine viscous 2% Swish and  spit 10 mLs every 4 (four) hours as needed. for mouth sores    acetaminophen (TYLENOL) 650 MG TbSR Take 1 tablet (650 mg total) by mouth every 6 to 8 hours as needed (Pain). Alternate every 3 hours with ibuprofen.    dextroamphetamine-amphetamine 10 mg Tab Take 1 tablet by mouth once daily.     dicyclomine (BENTYL) 10 MG capsule Take 1 capsule (10 mg total) by mouth 4 (four) times daily as needed (Hiccups).    diphenoxylate-atropine 2.5-0.025 mg (LOMOTIL) 2.5-0.025 mg per tablet Take 1 tablet by mouth 4 (four) times daily as needed for Diarrhea.    famotidine (PEPCID) 10 MG tablet Take 10 mg by mouth daily as needed for Heartburn.    ID NOW COVID-19 TEST KIT Kit TEST AS DIRECTED TODAY    LIDOcaine-prilocaine (EMLA) cream Apply topically as needed.    oxyCODONE (ROXICODONE) 5 MG immediate release tablet Take 1 tablet (5 mg total) by mouth every 4 (four) hours as needed for Pain.    predniSONE (DELTASONE) 10 MG tablet 1 po daily    valACYclovir (VALTREX) 500 MG tablet Take 1 tablet (500 mg total) by mouth once. for 1 dose (Patient taking differently: Take 500 mg by mouth once. During chemo to prevent cold sores.)       Review of patient's allergies indicates:   Allergen Reactions    Penicillins Hives     As a child        Review of Systems   Constitutional:  Positive for appetite change and fever.   HENT:  Negative for nasal congestion.    Respiratory:  Positive for shortness of breath. Negative for cough.    Cardiovascular:  Positive for chest pain. Negative for leg swelling.   Gastrointestinal:  Positive for diarrhea. Negative for abdominal pain.   Genitourinary:  Negative for vaginal bleeding.   Musculoskeletal:  Negative for back pain.   Neurological:  Negative for headaches.   Psychiatric/Behavioral:  The patient is not nervous/anxious.     Objective:     Vital Signs (Most Recent):  Pulse: 76 (03/11/22 2315)  Resp: (!) 28 (03/11/22 2315)  BP: 137/74 (03/11/22 2315)  SpO2: 98 % (03/11/22 2315)   Vital Signs (24h  Range):  Temp:  [98.8 °F (37.1 °C)-100.2 °F (37.9 °C)] 99.1 °F (37.3 °C)  Pulse:  [] 76  Resp:  [10-77] 28  SpO2:  [91 %-100 %] 98 %  BP: ()/(25-91) 137/74     Weight: 57.2 kg (126 lb 1.7 oz)  Body mass index is 19.75 kg/m².    Physical Exam:   Constitutional: She is oriented to person, place, and time. She appears well-developed and well-nourished. She has a sickly appearance.    HENT:   Head: Normocephalic and atraumatic.      Cardiovascular:  Normal rate.             Pulmonary/Chest: Effort normal and breath sounds normal.        Abdominal: Soft. She exhibits abdominal incision (incision c/d/i with overlying dermabond). She exhibits no distension.             Musculoskeletal: No tenderness or edema.       Neurological: She is alert and oriented to person, place, and time.    Skin: Skin is warm and dry.    Psychiatric: She has a normal mood and affect. Her behavior is normal.     Laboratory:  BMP:   Recent Labs   Lab 03/11/22  0830   GLU 85   *   K 3.7   CL 95   CO2 24   BUN 17   CREATININE 1.8*   CALCIUM 9.3   MG 1.5*    and CBC:   Recent Labs   Lab 03/11/22  0830   WBC 9.35   HGB 11.3*   HCT 32.7*          D Dimer: 1.44       Diagnostic Results:    X-Ray Chest AP Portable  Order: [id number]  Status: Final result    Visible to patient: Yes (not seen)    Next appt: 04/29/2022 at 01:30 PM in Lab (LAB, [facility])    0 Result Notes    Details    Reading Physician Reading Date Result Priority   Tressa Sequeira MD  [phone] 3/11/2022 STAT     Narrative & Impression  EXAMINATION:  XR CHEST AP PORTABLE     CLINICAL HISTORY:  Sepsis;     TECHNIQUE:  Single frontal view of the chest was performed.     COMPARISON:  09/16/2021 x-ray, PET-CT 01/18/2022     FINDINGS:  The cardiomediastinal silhouette is within normal limits.  Right-sided medication port is again noted. The right lung remains clear. There is hazy opacification of the left mid and lower lung zones which has developed since  the prior chest x-ray, likely corresponding to the changes of probable radiation fibrosis in the left lung reported on the PET-CT.     Postsurgical changes in the anterior chest wall are consistent with a history of bilateral mastectomy and flap reconstruction.     Impression:     Hazy opacification of the left lung, suspected to be related to chest wall radiation.  Pneumonia, metastatic disease less likely differential possibilities.        Electronically signed by: Tressa Sequeira  Date:                                            03/11/2022  Time:                                           08:29             Exam Ended: 03/11/22 08:25 Last Resulted: 03/11/22 08:29           CT Chest Abdomen Pelvis Without Contrast (XPD)  Order: [id number]  Status: Final result    Visible to patient: Yes (not seen)    Next appt: 04/29/2022 at 01:30 PM in Lab (LAB, [facility])    0 Result Notes    Details    Reading Physician Reading Date Result Priority   Tressa Sequeira MD  [phone] 3/11/2022 STAT     Narrative & Impression  EXAMINATION:  CT CHEST ABDOMEN PELVIS WITHOUT CONTRAST(XPD)     CLINICAL HISTORY:  Sepsis; patient with history of stage III B triple negative left breast cancer treated with surgery, radiation therapy,  chemo/immunotherapy.  Patient is status post robotic assisted TAHBSO on 03/04/2022     TECHNIQUE:  Low dose axial images, sagittal and coronal reformations were obtained from the thoracic inlet to the pubic symphysis; neither oral nor IV contrast was administered     COMPARISON:  PET-CT 01/18/2022     FINDINGS:  Chest: Since the previous examination the patient has developed a small pericardial effusion as well as small bilateral pleural effusions.  A right-sided medication port is present.  No mediastinal nor axillary adenopathy is present.  There is 2.1 cm seroma in the left axilla unchanged.  Postsurgical changes of bilateral mastectomy and flap reconstruction are again noted.      Interlobular septal thickening and ground-glass opacities have developed throughout the lungs; this combination of findings is suggestive of interstitial edema.  There is dependent consolidation at the lung bases, atelectasis versus pneumonia.  There is more dense consolidation with air bronchograms in the subpleural aspect of the anterolateral left upper lobe and lingula.  This is associated with volume loss and is suspected to be due to radiation fibrosis.  There is a new 2.3 cm somewhat nodular focus of consolidation in the left apex on series 3, image 11, which may be result of radiation fibrosis as well.  The previously reported subpleural nodules in the left upper lobe and lingula have been come engulfed by the larger area of a consolidation throughout the upper lobe and lingula.     There is 6 mm subpleural band like density in the lateral right middle lobe series 3, image 59, new.  This is suspected to be related to atelectasis rather than a lung nodule.  However, follow-up is recommended.     Abdomen/pelvis: The liver, gallbladder, spleen, adrenal glands, pancreas, kidneys are normal.     The urinary bladder is unremarkable.  The uterus is surgically absent.  There is no pelvic hematoma/fluid collection.  There is a small amount of pelvic free fluid.  There is no free air.  There are no dilated loops of bowel.     There are postsurgical changes in the anterior abdominal wall from previous flap harvest.  There is increased fat stranding throughout the abdominal wall, likely related to the recent surgery.  In the right upper quadrant there is an 11 mm subcutaneous nodular density series 4, image 76 which may be a small hematoma/seroma at the location of a laparoscopic port.  There is also a bubble of air in the left upper quadrant anterior abdominal wall likely related to the recent surgery.     No abnormalities noted in the bones.     Impression:     No evidence of complication following recent NICOLETTE/BSO.      Findings in the chest most suggestive of volume overload with interstitial edema, pericardial and pleural effusions.  Differential for the ground-glass opacities includes an atypical infection or interstitial pneumonitis.     Findings at the lung bases are likely due to atelectasis with less likely differential of pneumonia.     Dense consolidation with air bronchograms in the left upper lobe has an appearance most suggestive of radiation fibrosis.     New nodular density in the right lung suspected to be related to atelectasis.  Follow-up recommended.        Electronically signed by: Tressa Sequeira  Date:                                            03/11/2022  Time:                                           14:01             Exam Ended: 03/11/22 13:28 Last Resulted: 03/11/22 14:01

## 2022-03-12 NOTE — SUBJECTIVE & OBJECTIVE
Interval History: NAEON. Chest discomfort improving this AM. Tolerating crackers and water last night, asking for breakfast today. Denies nausea, vomiting. Has not had further BM. No abdominal pain. Overall reports she is feeling much better.     Scheduled Meds:   ceFOXItin (MEFOXIN) IVPB  2 g Intravenous Q6H    hydrocortisone sodium succinate  50 mg Intravenous Q6H    OLANZapine  10 mg Oral Nightly    sodium chloride 0.9%  10 mL Intravenous Q6H    vancomycin (VANCOCIN) IVPB  750 mg Intravenous Q24H     Continuous Infusions:   sodium chloride 0.9% 125 mL/hr at 03/12/22 0659    heparin (porcine) in D5W 12 Units/kg/hr (03/12/22 0659)    NORepinephrine bitartrate-D5W 0.04 mcg/kg/min (03/12/22 0659)     PRN Meds:famotidine, heparin (PORCINE), heparin (PORCINE), ibuprofen, LORazepam, ondansetron, sodium chloride 0.9%, Flushing PICC Protocol **AND** sodium chloride 0.9% **AND** sodium chloride 0.9%, Pharmacy to dose Vancomycin consult **AND** vancomycin - pharmacy to dose    Review of patient's allergies indicates:   Allergen Reactions    Penicillins Hives     As a child        Objective:     Vital Signs (Most Recent):  Temp: 98.6 °F (37 °C) (03/12/22 0715)  Pulse: 74 (03/12/22 0715)  Resp: (!) 22 (03/12/22 0715)  BP: (!) 126/56 (03/12/22 0715)  SpO2: 97 % (03/12/22 0715)   Vital Signs (24h Range):  Temp:  [98.1 °F (36.7 °C)-100.2 °F (37.9 °C)] 98.6 °F (37 °C)  Pulse:  [] 74  Resp:  [10-77] 22  SpO2:  [90 %-100 %] 97 %  BP: ()/(25-91) 126/56     Weight: 57.2 kg (126 lb 1.7 oz)  Body mass index is 19.75 kg/m².    Intake/Output - Last 3 Shifts         03/10 0700  03/11 0659 03/11 0700  03/12 0659 03/12 0700  03/13 0659    I.V. (mL/kg)  819.1 (14.3)     IV Piggyback  84.3     Total Intake(mL/kg)  903.4 (15.8)     Urine (mL/kg/hr)  650 350 (7.9)    Total Output  650 350    Net  +253.4 -350                      Physical Exam:   Constitutional: She is oriented to person, place, and time. She appears well-developed  and well-nourished.    HENT:   Head: Normocephalic and atraumatic.      Cardiovascular:  Normal rate.             Pulmonary/Chest: Effort normal and breath sounds normal.        Abdominal: Soft. She exhibits abdominal incision (c/d/i).             Musculoskeletal: No tenderness or edema.       Neurological: She is alert and oriented to person, place, and time.    Skin: Skin is warm and dry.    Psychiatric: She has a normal mood and affect. Her behavior is normal.     Lines/Drains/Airways       Peripherally Inserted Central Catheter Line  Duration             PICC Triple Lumen 03/12/22 0135 right brachial <1 day              Central Venous Catheter Line  Duration             Port A Cath Single Lumen 02/25/21 0949 right subclavian 379 days                    Laboratory:  CMP     CMP  Sodium   Date Value Ref Range Status   03/12/2022 138 136 - 145 mmol/L Final     Potassium   Date Value Ref Range Status   03/12/2022 3.6 3.5 - 5.1 mmol/L Final     Chloride   Date Value Ref Range Status   03/12/2022 107 95 - 110 mmol/L Final     CO2   Date Value Ref Range Status   03/12/2022 22 (L) 23 - 29 mmol/L Final     Glucose   Date Value Ref Range Status   03/12/2022 144 (H) 70 - 110 mg/dL Final     BUN   Date Value Ref Range Status   03/12/2022 10 6 - 20 mg/dL Final     Creatinine   Date Value Ref Range Status   03/12/2022 1.1 0.5 - 1.4 mg/dL Final     Calcium   Date Value Ref Range Status   03/12/2022 9.1 8.7 - 10.5 mg/dL Final     Total Protein   Date Value Ref Range Status   03/12/2022 5.7 (L) 6.0 - 8.4 g/dL Final     Albumin   Date Value Ref Range Status   03/12/2022 3.0 (L) 3.5 - 5.2 g/dL Final     Total Bilirubin   Date Value Ref Range Status   03/12/2022 0.7 0.1 - 1.0 mg/dL Final     Comment:     For infants and newborns, interpretation of results should be based  on gestational age, weight and in agreement with clinical  observations.    Premature Infant recommended reference ranges:  Up to 24 hours.............<8.0  mg/dL  Up to 48 hours............<12.0 mg/dL  3-5 days..................<15.0 mg/dL  6-29 days.................<15.0 mg/dL       Alkaline Phosphatase   Date Value Ref Range Status   03/12/2022 85 55 - 135 U/L Final     AST   Date Value Ref Range Status   03/12/2022 16 10 - 40 U/L Final     ALT   Date Value Ref Range Status   03/12/2022 10 10 - 44 U/L Final     Anion Gap   Date Value Ref Range Status   03/12/2022 9 8 - 16 mmol/L Final     eGFR if    Date Value Ref Range Status   03/12/2022 >60 >60 mL/min/1.73 m^2 Final     eGFR if non    Date Value Ref Range Status   03/12/2022 >60 >60 mL/min/1.73 m^2 Final     Comment:     Calculation used to obtain the estimated glomerular filtration  rate (eGFR) is the CKD-EPI equation.          , CBC:   Recent Labs   Lab 03/11/22  0830 03/12/22  0320   WBC 9.35 7.98   HGB 11.3* 10.4*   HCT 32.7* 29.6*    186   , and All pertinent labs from the last 24 hours have been reviewed.      Troponin I : 0.017> 0.309 > 0.209     Diagnostic Results:  X-Ray Chest 1 View  Order: [id number]  Status: Final result    Visible to patient: Yes (not seen)    Next appt: 04/29/2022 at 01:30 PM in Lab (LAB, [facility])    0 Result Notes    Details    Reading Physician Reading Date Result Priority   Amberly Haque MD  [phone]  [phone] 3/12/2022 STAT     Narrative & Impression  EXAMINATION:  XR CHEST 1 VIEW     CLINICAL HISTORY:  line placement;     TECHNIQUE:  Single frontal view of the chest was performed.     COMPARISON:  Chest radiograph and CT chest, abdomen and pelvis 03/11/2022     FINDINGS:  Interval placement of a new right upper extremity PICC line, the tip of which projects over the region of the SVC.  Stably positioned right-sided chest port in place.  The cardiomediastinal silhouette is unchanged in size and configuration.  There is increased opacification in the left mid and lower lung zones likely relating to combination of pleural  fluid and atelectatic/consolidative change.  The right lung demonstrates slight increased interstitial attenuation compared to prior study.  Multiple surgical clips project over the thorax.  No evidence of new or enlarging pneumothorax.     Impression:     Please see above.        Electronically signed by: Amberly Haque MD  Date:                                            03/12/2022  Time:                                           02:28             Exam Ended: 03/12/22 01:57 Last Resulted: 03/12/22 02:28

## 2022-03-12 NOTE — PROGRESS NOTES
Pharmacist Renal Dose Adjustment Note    Alison Swann is a 41 y.o. female being treated with the medication Cefoxitin.    Patient Data:    Vital Signs (Most Recent):  Temp: 98.9 °F (37.2 °C) (03/12/22 0400)  Pulse: 70 (03/12/22 0645)  Resp: (!) 23 (03/12/22 0645)  BP: 129/60 (03/12/22 0645)  SpO2: 97 % (03/12/22 0645)   Vital Signs (72h Range):  Temp:  [98.1 °F (36.7 °C)-100.2 °F (37.9 °C)]   Pulse:  []   Resp:  [10-77]   BP: ()/(25-91)   SpO2:  [90 %-100 %]      Recent Labs   Lab 03/11/22  0830 03/12/22  0320   CREATININE 1.8* 1.1     Serum creatinine: 1.1 mg/dL 03/12/22 0320  Estimated creatinine clearance: 59.5 mL/min    Medication:Cefoxitin dose: 2 g frequency every 8 hours will be changed to medication:Cefoxitin dose:2g frequency:every 6 hours.    Pharmacist's Name: Marlene Martinez  Pharmacist's Extension: [phone]

## 2022-03-12 NOTE — HOSPITAL COURSE
03/12/2022 - NAEON. Elevated troponin noted, downtrending. No significant EKG changes on Tele. Chest discomfort/SOB improving. Decision made to stop heparin drip and cancel CTA chest as suspicion for PTE low. Echo WNL. IV abx continued. Tolerating minimal PO, ADAT.   03/13/2022 - NAEON. Intermittent hypertension overnight. Chest discomfort improving. Minimal appetite. Transitioned to PO prednisone. Abx discontinued. Stepped down from ICU to floor. JA and hyponatremia resolved.  03/14/2022 - NAEON, intermittent hypertension again noted. Otherwise VSS with adequate UOP. Reports appetite improving, tolerating regular diet without N/V. Feeling much improved overall. Denies any F/C. Chest discomfort essentially resolved. Blood cultures remain NGTD. Patient stable for discharge home with Prednisone 10 mg daily and close follow up with Dr. Sotelo and Endocrinology (outpatient consultation placed). Strict ED return precautions discussed.

## 2022-03-12 NOTE — HPI
"Alison Swann is a 41-year-old female now POD#7 s/p risk-reducing RA-TLH/BSO (on 3/4/22) for h/o BRCA2 mutation/breast cancer who presents as a transfer of care from [facility] for sepsis of unknown origin and suspected adrenal crisis.     Patient reports that starting yesterday afternoon, she began to feel "terrible" and like she could not get out of bed. States she felt very weak, tired, and lightheaded intermittently and reports that she began to develop shortness of breath that concerned her enough to go to the Emergency Room. 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 She reports that her current symptoms are similar to those she had with previous episode, but that her symptoms now are much worse. She reports having subjective fevers at home, but denies cough or congestion, abdominal pain, leg swelling. Endorses intermittent nausea and a few episodes of vomiting. Also reports that she was having issues with constipation for several days but states she had a large, loose bowel movement yesterday after taking Miralax for a few days. She reports that her pain has been very well controlled at home and she denies any vaginal bleeding. She does report having intermittent episodes of substernal chest pain as well.   "

## 2022-03-12 NOTE — ED NOTES
And complained of some heaviness in the neck and shin, she was witnessed to have bradycardia proximally 46 beats per minute temporarily EKG shows normal sinus rhythm 81 normal QRS normal ST this was compared from EKG at 8:01 a.m. waveform was not remarkably changed     Elie Sánchez MD  03/11/22 8022

## 2022-03-12 NOTE — PROGRESS NOTES
Pharmacokinetic Initial Assessment: IV Vancomycin    Assessment/Plan:    Initiate intravenous vancomycin with loading dose of 1000 mg once followed by a maintenance dose of vancomycin 750mg IV every 24 hours  Desired empiric serum trough concentration is 10 to 20 mcg/mL  Draw vancomycin trough level 30 min prior to third dose on 3/13/22 at approximately 1230  Pharmacy will continue to follow and monitor vancomycin.      Please contact pharmacy at extension [phone] with any questions regarding this assessment.     Thank you for the consult,   Minerva Micheline       Patient brief summary:  Alison Swann is a 41 y.o. female initiated on antimicrobial therapy with IV Vancomycin for treatment of suspected bacteremia    Drug Allergies:   Review of patient's allergies indicates:   Allergen Reactions    Penicillins Hives     As a child        Actual Body Weight:   57.2 kg    Renal Function:   Estimated Creatinine Clearance: 36.4 mL/min (A) (based on SCr of 1.8 mg/dL (H)).,     Dialysis Method (if applicable):  N/A    CBC (last 72 hours):  Recent Labs   Lab Result Units 03/11/22  0830   WBC K/uL 9.35   Hemoglobin g/dL 11.3*   Hematocrit % 32.7*   Platelets K/uL 205   Gran % % 65.6   Lymph % % 16.5*   Mono % % 14.7   Eosinophil % % 1.9   Basophil % % 0.3   Differential Method  Automated       Metabolic Panel (last 72 hours):  Recent Labs   Lab Result Units 03/11/22  0830 03/11/22  1212 03/11/22  1255   Sodium mmol/L 129*  --   --    Potassium mmol/L 3.7  --   --    Chloride mmol/L 95  --   --    CO2 mmol/L 24  --   --    Glucose mg/dL 85  --   --    Glucose, UA   --  Negative  --    BUN mg/dL 17  --   --    Creatinine mg/dL 1.8*  --   --    Creatinine, Urine mg/dL  --   --  163.6   Albumin g/dL 3.6  --   --    Total Bilirubin mg/dL 1.1*  --   --    Alkaline Phosphatase U/L 77  --   --    AST U/L 17  --   --    ALT U/L 12  --   --    Magnesium mg/dL 1.5*  --   --        Drug levels (last 3 results):  No results for input(s):  VANCOMYCINRA, VANCOMYCINPE, VANCOMYCINTR in the last 72 hours.    Microbiologic Results:  Microbiology Results (last 7 days)     ** No results found for the last 168 hours. **

## 2022-03-12 NOTE — ASSESSMENT & PLAN NOTE
- New onset substernal chest pain since this afternoon with associated SOB  - Negative CTAP at OSH   - CXR with findings concerning for L sided PNA vs. Radiation changes  - EKG at OSH NSR   - Broad spectrum abx initiated as noted in sepsis plan     Plan:   - Telemetry while in ICU   - VQ scan ordered   - Heparin GTT while awaiting results of VQ scan given multiple risk factors for VTE (recent surgery, active chemo, etc)  - Echo ordered to assess new finding of pericardial effusion on CTAP from OSH

## 2022-03-12 NOTE — ASSESSMENT & PLAN NOTE
- H/o Stag IIIB invasive ductal carcinoma of L breast  - Now POD#7 s/p RR-NATALIE/RAJINDER w/ Dr. 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

## 2022-03-12 NOTE — PROCEDURES
"Alison Swann is a 41 y.o. female patient.    Temp: 98.4 °F (36.9 °C) (03/11/22 2315)  Pulse: 82 (03/12/22 0100)  Resp: (!) 25 (03/12/22 0100)  BP: 123/69 (03/12/22 0100)  SpO2: (!) 94 % (03/12/22 0100)  Weight: 57.2 kg (126 lb 1.7 oz) (03/11/22 2337)  Height: 5' 7" (170.2 cm) (03/11/22 2300)    PICC  Date/Time: 3/12/2022 1:35 AM  Performed by: Timoteo Pereira RN  Consent Done: Yes  Time out: Immediately prior to procedure a time out was called to verify the correct patient, procedure, equipment, support staff and site/side marked as required  Indications: med administration and vascular access  Anesthesia: local infiltration  Local anesthetic: lidocaine 1% without epinephrine  Anesthetic Total (mL): 5  Preparation: skin prepped with ChloraPrep  Skin prep agent dried: skin prep agent completely dried prior to procedure  Sterile barriers: all five maximum sterile barriers used - cap, mask, sterile gown, sterile gloves, and large sterile sheet  Hand hygiene: hand hygiene performed prior to central venous catheter insertion  Location details: right brachial  Catheter type: triple lumen  Catheter size: 5 Fr  Catheter Length: 34cm    Ultrasound guidance: yes  Vessel Caliber: medium, compressibility normal  Needle advanced into vessel with real time Ultrasound guidance.  Guidewire confirmed in vessel.  Sterile sheath used.  Number of attempts: 1  Post-procedure: blood return through all ports, chlorhexidine patch and sterile dressing applied            Name timoteo pereira  3/12/2022  "

## 2022-03-12 NOTE — ASSESSMENT & PLAN NOTE
- No leukocytosis at OSH   - CXR w/ findings concerning for possible PNA vs. Radiation changes   - Vanc/Cefepime started for broad spectrum coverage, tailor abx therapy as indicated

## 2022-03-12 NOTE — ASSESSMENT & PLAN NOTE
- History of adrenal crisis s/p breast lumpectomy in 2021 necessitating IV steroids and hospital admission   - This admission symptoms similar to previous episode   - Hyponatremia and hypotension noted at OSH, requiring pressor support     Plan:   - continue pressor support as needed, wean as tolerated  - NS infusion   - AM labs improved, hyponatremia and JA resolved, anemia stable   - S/p hydrocortisone 125mg IV bolus at OSH, continue 50mg IV Q6hrs until symptoms resolved. Transition to PO steroids later today

## 2022-03-12 NOTE — H&P
"[facility] [place] [facility]  Gynecologic Oncology  H&P    Patient Name: Alison Swann  MRN: [id number]  Admission Date: 3/11/2022  Primary Care Provider: Carmella Galvez MD   Principal Problem: Adrenal crisis    Subjective:     Chief Complaint/Reason for Admission: Hypotension, Hyponatremia, Chest pain, SOB    History of Present Illness:  Alison Dotson is a 41-year-old female now POD#7 s/p RATLH/BSO for h/o BRCA2 mutation/breast cancer who presents as a transfer of care from [place], [place] for sepsis of unknown origin and suspected adrenal crisis.     Patient reports that starting yesterday afternoon, she began to feel "terrible" and like she could not get out of bed. States she felt very weak, tired, and lightheaded intermittently and reports that she began to develop shortness of breath that concerned her enough to go to the emergency room. Of note, she has a history of adrenal crisis after her mastectomy/reconstruction in 2021 for which she was hospitalized. She reports that these symptoms are similar to those she had with previous episode, but that her symptoms now are much worse. She reports having fevers at home, but denies cough or congestion, abdominal pain, leg swelling. Endorses intermittent nausea and a few episodes of vomiting. Also reports that she was having issues with constipation for several days but states she had a large, loose bowel movement yesterday after taking miralax for a few days. She reports that her pain has been very well controlled at home and she denies any vaginal bleeding. She does report having intermittent episodes of substernal chest pain as well.       Hospital Course:  No notes on file    Oncology Treatment Plan:   OP BREAST PACLITAXEL WEEKLY + CARBOPLATIN (AUC 5) Q3W FOLLOWED BY AC WITH PEMBROLIZUMAB FOLLOWED BY PEMBROLIZUMAB     Oncology History:   - RATLH/BSO on 3/4/22 for history of BRCA2 mutation       Past Medical History:   Diagnosis Date    Attention Deficit " Hyperactivity Disorder     Family H/O Diabetes Mellitus     Her Father    Generalized Anxiety     Left Breast Cancer S.P Lumpectomy In 2021     Dr. Dominga Johnston; Dr. Sandra Sotelo (Heme/Onc); 21 On CMT; Is Estrogen Receptor Negative; She Is BRCA2 Gene Mutation Positive, Andf Her Mother Also With A H/O Breast Cancer    Macrocytic Anemia     Associated With Her Chemotherapy    Postoperative Nausea And Vomiting     Scopolamine Patches Work Well For Her For This    Scoliosis     Therapeutic Drug Monitoring     Wellness Visit 2021      Past Surgical History:   Procedure Laterality Date    AUGMENTATION OF BREAST  2012    BREAST MASS EXCISION Left 2021    Procedure: EXCISION, MASS, BREAST- 2 oclock left breast with ultrasound guidance;  Surgeon: Rashmi Johnston MD;  Location: [facility] OR;  Service: General;  Laterality: Left;    BREAST SURGERY       SECTION      COLONOSCOPY N/A 2021    Procedure: COLONOSCOPY;  Surgeon: Mason Quintero MD;  [facility]  Service: Endoscopy;  Laterality: N/A;    ESOPHAGOGASTRODUODENOSCOPY N/A 2021    Procedure: EGD (ESOPHAGOGASTRODUODENOSCOPY);  Surgeon: Mason Quintero MD;  [facility]  Service: Endoscopy;  Laterality: N/A;    INSERTION OF TUNNELED CENTRAL VENOUS CATHETER (CVC) WITH SUBCUTANEOUS PORT N/A 2021    Procedure: [id number];  Surgeon: Griffin Becker MD;  Location: [facility] OR;  Service: General;  Laterality: N/A;    ROBOT-ASSISTED LAPAROSCOPIC ABDOMINAL HYSTERECTOMY USING DA NATALIIA XI N/A 3/4/2022    Procedure: XI ROBOTIC HYSTERECTOMY;  Surgeon: Carmella Galevz MD;  Location: [facility] OR;  Service: OB/GYN;  Laterality: N/A;    ROBOT-ASSISTED LAPAROSCOPIC SALPINGO-OOPHORECTOMY USING DA NATALIIA XI Bilateral 3/4/2022    Procedure: XI ROBOTIC SALPINGO-OOPHORECTOMY;  Surgeon: Carmella Galvez MD;  Location: [facility];  Service: OB/GYN;  Laterality: Bilateral;    SENTINEL LYMPH NODE  BIOPSY Left 1/19/2021    Procedure: CORE BIOPSY, LYMPH NODE, SENTINEL;  Surgeon: Rashmi Johnston MD;  Location: [facility] OR;  Service: General;  Laterality: Left;    TUBAL LIGATION       Family History       Problem Relation (Age of Onset)    Anemia Father    Breast cancer Mother (52)    COPD Maternal Grandmother    Cancer Other    Depression Mother    Diabetes Father    Hyperlipidemia Father    Pancreatic cancer Other    Stroke Father          Tobacco Use    Smoking status: Never Smoker    Smokeless tobacco: Never Used   Substance and Sexual Activity    Alcohol use: Yes     Comment: occasionally    Drug use: Never    Sexual activity: Not Currently     Birth control/protection: See Surgical Hx       Facility-Administered Medications Prior to Admission   Medication    sodium chloride 0.9% bolus 1,000 mL     PTA Medications   Medication Sig    ibuprofen (ADVIL,MOTRIN) 600 MG tablet Take 1 tablet (600 mg total) by mouth every 6 (six) hours as needed for Pain. Alternate every 3 hours with tylenol    LORazepam (ATIVAN) 0.5 MG tablet Take 1 tablet (0.5 mg total) by mouth every 8 (eight) hours as needed (for nausea, anxiety or sleep).    OLANZapine (ZYPREXA) 10 MG tablet Take 1 tablet (10 mg total) by mouth nightly.    ondansetron (ZOFRAN-ODT) 8 MG TbDL Take 1 tablet (8 mg total) by mouth every 8 (eight) hours as needed (nausea).    pantoprazole (PROTONIX) 40 MG tablet Take 1 tablet (40 mg total) by mouth once daily.    promethazine (PHENERGAN) 25 MG tablet Take 25 mg by mouth every 6 (six) hours as needed for Nausea.     (Magic mouthwash) 1:1:1 Benadryl 12.5mg/5ml liq, aluminum & magnesium hydroxide-simehticone (Maalox), LIDOcaine viscous 2% Swish and spit 10 mLs every 4 (four) hours as needed. for mouth sores    acetaminophen (TYLENOL) 650 MG TbSR Take 1 tablet (650 mg total) by mouth every 6 to 8 hours as needed (Pain). Alternate every 3 hours with ibuprofen.    dextroamphetamine-amphetamine 10 mg Tab  Take 1 tablet by mouth once daily.     dicyclomine (BENTYL) 10 MG capsule Take 1 capsule (10 mg total) by mouth 4 (four) times daily as needed (Hiccups).    diphenoxylate-atropine 2.5-0.025 mg (LOMOTIL) 2.5-0.025 mg per tablet Take 1 tablet by mouth 4 (four) times daily as needed for Diarrhea.    famotidine (PEPCID) 10 MG tablet Take 10 mg by mouth daily as needed for Heartburn.    ID NOW COVID-19 TEST KIT Kit TEST AS DIRECTED TODAY    LIDOcaine-prilocaine (EMLA) cream Apply topically as needed.    oxyCODONE (ROXICODONE) 5 MG immediate release tablet Take 1 tablet (5 mg total) by mouth every 4 (four) hours as needed for Pain.    predniSONE (DELTASONE) 10 MG tablet 1 po daily    valACYclovir (VALTREX) 500 MG tablet Take 1 tablet (500 mg total) by mouth once. for 1 dose (Patient taking differently: Take 500 mg by mouth once. During chemo to prevent cold sores.)       Review of patient's allergies indicates:   Allergen Reactions    Penicillins Hives     As a child        Review of Systems   Constitutional:  Positive for appetite change and fever.   HENT:  Negative for nasal congestion.    Respiratory:  Positive for shortness of breath. Negative for cough.    Cardiovascular:  Positive for chest pain. Negative for leg swelling.   Gastrointestinal:  Positive for diarrhea. Negative for abdominal pain.   Genitourinary:  Negative for vaginal bleeding.   Musculoskeletal:  Negative for back pain.   Neurological:  Negative for headaches.   Psychiatric/Behavioral:  The patient is not nervous/anxious.     Objective:     Vital Signs (Most Recent):  Pulse: 76 (03/11/22 2315)  Resp: (!) 28 (03/11/22 2315)  BP: 137/74 (03/11/22 2315)  SpO2: 98 % (03/11/22 2315)   Vital Signs (24h Range):  Temp:  [98.8 °F (37.1 °C)-100.2 °F (37.9 °C)] 99.1 °F (37.3 °C)  Pulse:  [] 76  Resp:  [10-77] 28  SpO2:  [91 %-100 %] 98 %  BP: ()/(25-91) 137/74     Weight: 57.2 kg (126 lb 1.7 oz)  Body mass index is 19.75 kg/m².    Physical  Exam:   Constitutional: She is oriented to person, place, and time. She appears well-developed and well-nourished. She has a sickly appearance.    HENT:   Head: Normocephalic and atraumatic.      Cardiovascular:  Normal rate.             Pulmonary/Chest: Effort normal and breath sounds normal.        Abdominal: Soft. She exhibits abdominal incision (incision c/d/i with overlying dermabond). She exhibits no distension.             Musculoskeletal: No tenderness or edema.       Neurological: She is alert and oriented to person, place, and time.    Skin: Skin is warm and dry.    Psychiatric: She has a normal mood and affect. Her behavior is normal.     Laboratory:  BMP:   Recent Labs   Lab 03/11/22  0830   GLU 85   *   K 3.7   CL 95   CO2 24   BUN 17   CREATININE 1.8*   CALCIUM 9.3   MG 1.5*    and CBC:   Recent Labs   Lab 03/11/22  0830   WBC 9.35   HGB 11.3*   HCT 32.7*          D Dimer: 1.44       Diagnostic Results:    X-Ray Chest AP Portable  Order: [id number]   Status: Final result     Visible to patient: Yes (not seen)     Next appt: 04/29/2022 at 01:30 PM in Lab (LAB, [facility])     0 Result Notes    Details    Reading Physician Reading Date Result Priority   Tressa Sequeira MD  [phone] 3/11/2022 STAT     Narrative & Impression  EXAMINATION:  XR CHEST AP PORTABLE     CLINICAL HISTORY:  Sepsis;     TECHNIQUE:  Single frontal view of the chest was performed.     COMPARISON:  09/16/2021 x-ray, PET-CT 01/18/2022     FINDINGS:  The cardiomediastinal silhouette is within normal limits.  Right-sided medication port is again noted. The right lung remains clear. There is hazy opacification of the left mid and lower lung zones which has developed since the prior chest x-ray, likely corresponding to the changes of probable radiation fibrosis in the left lung reported on the PET-CT.     Postsurgical changes in the anterior chest wall are consistent with a history of bilateral mastectomy and flap  reconstruction.     Impression:     Hazy opacification of the left lung, suspected to be related to chest wall radiation.  Pneumonia, metastatic disease less likely differential possibilities.        Electronically signed by: Tressa Sequeira  Date:                                            03/11/2022  Time:                                           08:29             Exam Ended: 03/11/22 08:25 Last Resulted: 03/11/22 08:29           CT Chest Abdomen Pelvis Without Contrast (XPD)  Order: [id number]   Status: Final result     Visible to patient: Yes (not seen)     Next appt: 04/29/2022 at 01:30 PM in Lab (LAB, [facility])     0 Result Notes    Details    Reading Physician Reading Date Result Priority   Tressa Sequeira MD  [phone] 3/11/2022 STAT     Narrative & Impression  EXAMINATION:  CT CHEST ABDOMEN PELVIS WITHOUT CONTRAST(XPD)     CLINICAL HISTORY:  Sepsis; patient with history of stage III B triple negative left breast cancer treated with surgery, radiation therapy,  chemo/immunotherapy.  Patient is status post robotic assisted TAHBSO on 03/04/2022     TECHNIQUE:  Low dose axial images, sagittal and coronal reformations were obtained from the thoracic inlet to the pubic symphysis; neither oral nor IV contrast was administered     COMPARISON:  PET-CT 01/18/2022     FINDINGS:  Chest: Since the previous examination the patient has developed a small pericardial effusion as well as small bilateral pleural effusions.  A right-sided medication port is present.  No mediastinal nor axillary adenopathy is present.  There is 2.1 cm seroma in the left axilla unchanged.  Postsurgical changes of bilateral mastectomy and flap reconstruction are again noted.     Interlobular septal thickening and ground-glass opacities have developed throughout the lungs; this combination of findings is suggestive of interstitial edema.  There is dependent consolidation at the lung bases, atelectasis versus pneumonia.  There  is more dense consolidation with air bronchograms in the subpleural aspect of the anterolateral left upper lobe and lingula.  This is associated with volume loss and is suspected to be due to radiation fibrosis.  There is a new 2.3 cm somewhat nodular focus of consolidation in the left apex on series 3, image 11, which may be result of radiation fibrosis as well.  The previously reported subpleural nodules in the left upper lobe and lingula have been come engulfed by the larger area of a consolidation throughout the upper lobe and lingula.     There is 6 mm subpleural band like density in the lateral right middle lobe series 3, image 59, new.  This is suspected to be related to atelectasis rather than a lung nodule.  However, follow-up is recommended.     Abdomen/pelvis: The liver, gallbladder, spleen, adrenal glands, pancreas, kidneys are normal.     The urinary bladder is unremarkable.  The uterus is surgically absent.  There is no pelvic hematoma/fluid collection.  There is a small amount of pelvic free fluid.  There is no free air.  There are no dilated loops of bowel.     There are postsurgical changes in the anterior abdominal wall from previous flap harvest.  There is increased fat stranding throughout the abdominal wall, likely related to the recent surgery.  In the right upper quadrant there is an 11 mm subcutaneous nodular density series 4, image 76 which may be a small hematoma/seroma at the location of a laparoscopic port.  There is also a bubble of air in the left upper quadrant anterior abdominal wall likely related to the recent surgery.     No abnormalities noted in the bones.     Impression:     No evidence of complication following recent NICOLETTE/BSO.     Findings in the chest most suggestive of volume overload with interstitial edema, pericardial and pleural effusions.  Differential for the ground-glass opacities includes an atypical infection or interstitial pneumonitis.     Findings at the lung  bases are likely due to atelectasis with less likely differential of pneumonia.     Dense consolidation with air bronchograms in the left upper lobe has an appearance most suggestive of radiation fibrosis.     New nodular density in the right lung suspected to be related to atelectasis.  Follow-up recommended.        Electronically signed by: Tressa Sequeira  Date:                                            03/11/2022  Time:                                           14:01             Exam Ended: 03/11/22 13:28 Last Resulted: 03/11/22 14:01           Assessment/Plan:     * Adrenal crisis  - History of adrenal crisis s/p breast lumpectomy in 2021 necessitating IV steroids and hospital admission   - This admission symptoms similar to previous episode   - Hyponatremia and hypotension noted at OSH, requiring pressor support     Plan:   - continue pressor support as needed   - NS infusion   - Repeat CBC/BMP in AM   - S/p hydrocortisone 125mg IV bolus at OSH, continue 50mg IV Q6hrs until symptoms resolved     Chest pain  - New onset substernal chest pain since this afternoon with associated SOB  - Negative CTAP at OSH   - CXR with findings concerning for L sided PNA vs. Radiation changes  - EKG at OSH NSR   - Broad spectrum abx initiated as noted in sepsis plan     Plan:   - Telemetry while in ICU   - VQ scan ordered   - Heparin GTT while awaiting results of VQ scan given multiple risk factors for VTE (recent surgery, active chemo, etc)  - Echo ordered to assess new finding of pericardial effusion on CTAP from OSH     Sepsis  - No leukocytosis at OSH   - CXR w/ findings concerning for possible PNA vs. Radiation changes   - Vanc/Cefepime started for broad spectrum coverage, tailor abx therapy as indicated     JA (acute kidney injury)  - Cr 1.8 at OSH, baseline 0.7   - Continue IVF   - Renally dose medications   - Strict I&Os    Hyponatremia  - Na 129 at OSH   - NS at 125cc/hr   - suspected secondary to adrenal  process, see below     BRCA2 gene mutation positive  - H/o Stag IIIB invasive ductal carcinoma of L breast  - Now POD#7 s/p RR-NATALIE/RAJINDER w/ Dr. 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

## 2022-03-12 NOTE — ASSESSMENT & PLAN NOTE
- History of adrenal crisis s/p breast lumpectomy in 2021 necessitating IV steroids and hospital admission   - This admission symptoms similar to previous episode   - Hyponatremia and hypotension noted at OSH, requiring pressor support     Plan:   - continue pressor support as needed   - NS infusion   - Repeat CBC/BMP in AM   - S/p hydrocortisone 125mg IV bolus at OSH, continue 50mg IV Q6hrs until symptoms resolved

## 2022-03-12 NOTE — ASSESSMENT & PLAN NOTE
- No leukocytosis at OSH   - CXR w/ findings concerning for possible PNA vs. Radiation changes   - Vanc/Cefepime started for broad spectrum coverage, continue for at least 24 hours then consider discontinuation if no further signs of developing infection

## 2022-03-12 NOTE — ASSESSMENT & PLAN NOTE
Resolved   - Na 129 at OSH, 138 this AM   - NS at 125cc/hr   - suspected secondary to adrenal process, see below

## 2022-03-12 NOTE — ASSESSMENT & PLAN NOTE
- New onset substernal chest pain since this afternoon with associated SOB  - Negative CTAP at OSH   - CXR with findings concerning for L sided PNA vs. Radiation changes  - EKG at OSH NSR   - Broad spectrum abx initiated as noted in sepsis plan     Plan:   - Telemetry while in ICU, NAEON  - Decision made to hold off on CTPE at this time given low suspicion for PE given improvement in symptoms with supportive care and IV steroids   - Will obtain further imaging if clinical status changes   - d/c Heparin this AM   - Echo ordered to assess new finding of pericardial effusion on CTAP from OSH

## 2022-03-12 NOTE — EICU
eICU Note    Subjective: 41 y old woman with nausea, chest pressure and recent surgery for hysterectomy. Prior breast cancer post mastectomy, chemo, RT. Prior lung radiation fibrosis suspected. Now transferred from outside facility with concern for low BP from sepsis/afrenal crisis.  Started on hydrocortisone  LA nl 0.6  JA with Cr 1.8    /78   Pulse 78   Resp (!) 25   Wt 57.2 kg (126 lb 1.7 oz)   SpO2 97%   BMI 19.75 kg/m²   Awake, resting       Objective:  Data review done   Trop I 0.017  BNP 32  Na 129    Video review done      Assessment:  Sepsis  Adrenal insufficiency suspected    Plan:  1 Check EKG, trop I repeat; new pericardial effusion on CT ? Cause for EKG changes and pain; echo to be done and to consider cardiology input   2 D-dimer elevated- VQ scan as cant do CT with contrast due to JA; consider empiric anticoagulation if no significant bleeding risk  3 Empiric sepsis antibiotics- wound clean per notes    DVT prophylaxis SCD  GI prophylaxis na  Ventilator settings ba    Communication with RN and resident MD

## 2022-03-13 LAB
ANION GAP SERPL CALC-SCNC: 7 MMOL/L (ref 8–16)
BASOPHILS # BLD AUTO: 0.01 K/UL (ref 0–0.2)
BASOPHILS NFR BLD: 0.1 % (ref 0–1.9)
BUN SERPL-MCNC: 7 MG/DL (ref 6–20)
CALCIUM SERPL-MCNC: 8.9 MG/DL (ref 8.7–10.5)
CHLORIDE SERPL-SCNC: 111 MMOL/L (ref 95–110)
CO2 SERPL-SCNC: 23 MMOL/L (ref 23–29)
CREAT SERPL-MCNC: 0.6 MG/DL (ref 0.5–1.4)
DIFFERENTIAL METHOD: ABNORMAL
EOSINOPHIL # BLD AUTO: 0 K/UL (ref 0–0.5)
EOSINOPHIL NFR BLD: 0 % (ref 0–8)
ERYTHROCYTE [DISTWIDTH] IN BLOOD BY AUTOMATED COUNT: 12.1 % (ref 11.5–14.5)
EST. GFR  (AFRICAN AMERICAN): >60 ML/MIN/1.73 M^2
EST. GFR  (NON AFRICAN AMERICAN): >60 ML/MIN/1.73 M^2
GLUCOSE SERPL-MCNC: 98 MG/DL (ref 70–110)
HCT VFR BLD AUTO: 25.8 % (ref 37–48.5)
HGB BLD-MCNC: 8.7 G/DL (ref 12–16)
IMM GRANULOCYTES # BLD AUTO: 0.07 K/UL (ref 0–0.04)
IMM GRANULOCYTES NFR BLD AUTO: 1 % (ref 0–0.5)
LYMPHOCYTES # BLD AUTO: 1.2 K/UL (ref 1–4.8)
LYMPHOCYTES NFR BLD: 17.7 % (ref 18–48)
MCH RBC QN AUTO: 31.5 PG (ref 27–31)
MCHC RBC AUTO-ENTMCNC: 33.7 G/DL (ref 32–36)
MCV RBC AUTO: 94 FL (ref 82–98)
MONOCYTES # BLD AUTO: 0.8 K/UL (ref 0.3–1)
MONOCYTES NFR BLD: 11.8 % (ref 4–15)
NEUTROPHILS # BLD AUTO: 4.7 K/UL (ref 1.8–7.7)
NEUTROPHILS NFR BLD: 69.4 % (ref 38–73)
NRBC BLD-RTO: 0 /100 WBC
PLATELET # BLD AUTO: 180 K/UL (ref 150–450)
PMV BLD AUTO: 9.3 FL (ref 9.2–12.9)
POTASSIUM SERPL-SCNC: 3.4 MMOL/L (ref 3.5–5.1)
RBC # BLD AUTO: 2.76 M/UL (ref 4–5.4)
SODIUM SERPL-SCNC: 141 MMOL/L (ref 136–145)
WBC # BLD AUTO: 6.77 K/UL (ref 3.9–12.7)

## 2022-03-13 PROCEDURE — A4216 STERILE WATER/SALINE, 10 ML: HCPCS | Performed by: STUDENT IN AN ORGANIZED HEALTH CARE EDUCATION/TRAINING PROGRAM

## 2022-03-13 PROCEDURE — 63600175 PHARM REV CODE 636 W HCPCS: Performed by: OBSTETRICS & GYNECOLOGY

## 2022-03-13 PROCEDURE — 93005 ELECTROCARDIOGRAM TRACING: CPT

## 2022-03-13 PROCEDURE — 11000001 HC ACUTE MED/SURG PRIVATE ROOM

## 2022-03-13 PROCEDURE — 63600175 PHARM REV CODE 636 W HCPCS: Performed by: STUDENT IN AN ORGANIZED HEALTH CARE EDUCATION/TRAINING PROGRAM

## 2022-03-13 PROCEDURE — 25000003 PHARM REV CODE 250: Performed by: STUDENT IN AN ORGANIZED HEALTH CARE EDUCATION/TRAINING PROGRAM

## 2022-03-13 PROCEDURE — 80048 BASIC METABOLIC PNL TOTAL CA: CPT | Performed by: STUDENT IN AN ORGANIZED HEALTH CARE EDUCATION/TRAINING PROGRAM

## 2022-03-13 PROCEDURE — 93010 ELECTROCARDIOGRAM REPORT: CPT | Mod: ,,, | Performed by: INTERNAL MEDICINE

## 2022-03-13 PROCEDURE — 93010 EKG 12-LEAD: ICD-10-PCS | Mod: ,,, | Performed by: INTERNAL MEDICINE

## 2022-03-13 PROCEDURE — 94761 N-INVAS EAR/PLS OXIMETRY MLT: CPT

## 2022-03-13 PROCEDURE — 85025 COMPLETE CBC W/AUTO DIFF WBC: CPT | Performed by: STUDENT IN AN ORGANIZED HEALTH CARE EDUCATION/TRAINING PROGRAM

## 2022-03-13 RX ORDER — HYDRALAZINE HYDROCHLORIDE 20 MG/ML
5 INJECTION INTRAMUSCULAR; INTRAVENOUS EVERY 6 HOURS PRN
Status: DISCONTINUED | OUTPATIENT
Start: 2022-03-13 | End: 2022-03-14 | Stop reason: HOSPADM

## 2022-03-13 RX ADMIN — ONDANSETRON 8 MG: 8 TABLET, ORALLY DISINTEGRATING ORAL at 08:03

## 2022-03-13 RX ADMIN — ENOXAPARIN SODIUM 40 MG: 100 INJECTION SUBCUTANEOUS at 05:03

## 2022-03-13 RX ADMIN — LORAZEPAM 0.5 MG: 0.5 TABLET ORAL at 08:03

## 2022-03-13 RX ADMIN — SODIUM CHLORIDE, PRESERVATIVE FREE 10 ML: 5 INJECTION INTRAVENOUS at 06:03

## 2022-03-13 RX ADMIN — CEFOXITIN SODIUM 2 G: 2 INJECTION, SOLUTION INTRAVENOUS at 04:03

## 2022-03-13 RX ADMIN — SODIUM CHLORIDE: 0.9 INJECTION, SOLUTION INTRAVENOUS at 01:03

## 2022-03-13 RX ADMIN — SODIUM CHLORIDE, PRESERVATIVE FREE 10 ML: 5 INJECTION INTRAVENOUS at 12:03

## 2022-03-13 RX ADMIN — PREDNISONE 10 MG: 5 TABLET ORAL at 08:03

## 2022-03-13 RX ADMIN — SODIUM CHLORIDE, PRESERVATIVE FREE 10 ML: 5 INJECTION INTRAVENOUS at 05:03

## 2022-03-13 NOTE — SUBJECTIVE & OBJECTIVE
Interval History: NAEON. Chest discomfort continues to improve. Reports she has an appetite but is only able to tolerate a few bites of any food before she feels full. Denies nausea associated with eating, no emesis. Does report feeling nauseated when she sits up, but states this isn't associated with lightheadedness. Has not had further BM. No abdominal pain. Voiding at baseline. Ambulating to in room commode.     Scheduled Meds:   ceFOXItin (MEFOXIN) IVPB  2 g Intravenous Q6H    enoxaparin  40 mg Subcutaneous Daily    OLANZapine  10 mg Oral Nightly    predniSONE  10 mg Oral QHS    sodium chloride 0.9%  10 mL Intravenous Q6H    vancomycin (VANCOCIN) IVPB  750 mg Intravenous Q24H     Continuous Infusions:   sodium chloride 0.9% 125 mL/hr at 03/13/22 0612    NORepinephrine bitartrate-D5W Stopped (03/12/22 1549)     PRN Meds:famotidine, ibuprofen, LORazepam, ondansetron, sodium chloride 0.9%, Flushing PICC Protocol **AND** sodium chloride 0.9% **AND** sodium chloride 0.9%, Pharmacy to dose Vancomycin consult **AND** vancomycin - pharmacy to dose    Review of patient's allergies indicates:   Allergen Reactions    Penicillins Hives     As a child        Objective:     Vital Signs (Most Recent):  Temp: 98.8 °F (37.1 °C) (03/13/22 0400)  Pulse: 79 (03/13/22 0600)  Resp: (!) 27 (03/13/22 0600)  BP: (!) 174/74 (03/13/22 0600)  SpO2: (!) 94 % (03/13/22 0600)   Vital Signs (24h Range):  Temp:  [98.6 °F (37 °C)-99.1 °F (37.3 °C)] 98.8 °F (37.1 °C)  Pulse:  [66-91] 79  Resp:  [20-34] 27  SpO2:  [91 %-98 %] 94 %  BP: ()/(46-87) 174/74     Weight: 57.2 kg (126 lb 1.7 oz)  Body mass index is 19.75 kg/m².    Intake/Output - Last 3 Shifts         03/11 0700  03/12 0659 03/12 0700 03/13 0659 03/13 0700  03/14 0659    P.O.  820     I.V. (mL/kg) 819.1 (14.3) 2590.6 (45.3)     IV Piggyback 84.3 465.3     Total Intake(mL/kg) 903.4 (15.8) 3875.9 (67.8)     Urine (mL/kg/hr) 650 1250 (0.9)     Total Output 650 1250     Net +253.4  +2625.9            Urine Occurrence  1 x                Physical Exam:   Constitutional: She is oriented to person, place, and time. She appears well-developed and well-nourished.    HENT:   Head: Normocephalic and atraumatic.      Cardiovascular:  Normal rate.             Pulmonary/Chest: Effort normal and breath sounds normal.        Abdominal: Soft. She exhibits abdominal incision (robotic incisions c/d/i).             Musculoskeletal: No tenderness or edema.       Neurological: She is alert and oriented to person, place, and time.    Skin: Skin is warm and dry.    Psychiatric: She has a normal mood and affect. Her behavior is normal.     Lines/Drains/Airways       Peripherally Inserted Central Catheter Line  Duration             PICC Triple Lumen 03/12/22 0135 right brachial 1 day              Central Venous Catheter Line  Duration             Port A Cath Single Lumen 02/25/21 0949 right subclavian 380 days                    Laboratory:  CMP     CMP  Sodium   Date Value Ref Range Status   03/12/2022 138 136 - 145 mmol/L Final     Potassium   Date Value Ref Range Status   03/12/2022 3.6 3.5 - 5.1 mmol/L Final     Chloride   Date Value Ref Range Status   03/12/2022 107 95 - 110 mmol/L Final     CO2   Date Value Ref Range Status   03/12/2022 22 (L) 23 - 29 mmol/L Final     Glucose   Date Value Ref Range Status   03/12/2022 144 (H) 70 - 110 mg/dL Final     BUN   Date Value Ref Range Status   03/12/2022 10 6 - 20 mg/dL Final     Creatinine   Date Value Ref Range Status   03/12/2022 1.1 0.5 - 1.4 mg/dL Final     Calcium   Date Value Ref Range Status   03/12/2022 9.1 8.7 - 10.5 mg/dL Final     Total Protein   Date Value Ref Range Status   03/12/2022 5.7 (L) 6.0 - 8.4 g/dL Final     Albumin   Date Value Ref Range Status   03/12/2022 3.0 (L) 3.5 - 5.2 g/dL Final     Total Bilirubin   Date Value Ref Range Status   03/12/2022 0.7 0.1 - 1.0 mg/dL Final     Comment:     For infants and newborns, interpretation of results  should be based  on gestational age, weight and in agreement with clinical  observations.    Premature Infant recommended reference ranges:  Up to 24 hours.............<8.0 mg/dL  Up to 48 hours............<12.0 mg/dL  3-5 days..................<15.0 mg/dL  6-29 days.................<15.0 mg/dL       Alkaline Phosphatase   Date Value Ref Range Status   03/12/2022 85 55 - 135 U/L Final     AST   Date Value Ref Range Status   03/12/2022 16 10 - 40 U/L Final     ALT   Date Value Ref Range Status   03/12/2022 10 10 - 44 U/L Final     Anion Gap   Date Value Ref Range Status   03/12/2022 9 8 - 16 mmol/L Final     eGFR if    Date Value Ref Range Status   03/12/2022 >60 >60 mL/min/1.73 m^2 Final     eGFR if non    Date Value Ref Range Status   03/12/2022 >60 >60 mL/min/1.73 m^2 Final     Comment:     Calculation used to obtain the estimated glomerular filtration  rate (eGFR) is the CKD-EPI equation.          , CBC:   Recent Labs   Lab 03/11/22  0830 03/12/22  0320   WBC 9.35 7.98   HGB 11.3* 10.4*   HCT 32.7* 29.6*    186     , and All pertinent labs from the last 24 hours have been reviewed.      Troponin I : 0.017> 0.309 > 0.209     Diagnostic Results:    Echocardiography Findings      Left Ventricle    The left ventricle is normal in size with normal systolic function. The estimated ejection fraction is 65%. The wall thickness is normal. There is normal diastolic function. There is normal wall motion.     Right Ventricle    Normal cavity size, wall thickness and systolic function. Wall motion normal.     Left Atrium    The left atrial volume index is normal. Left atrial volume index is 25.9 mL/m2.     Right Atrium    The right atrium is normal in size.     Aortic Valve    The aortic valve appears structurally normal. The valve is trileaflet. There is normal leaflet mobility. No regurgitation. There is no stenosis.     Mitral Valve    The mitral valve appears structurally normal.  There is normal leaflet mobility.  There is trace mitral valve regurgitation. There is no stenosis. The estimated mitral valve area by pressure half time is 4.52 cm2.     Tricuspid Valve    The tricuspid valve appears structurally normal. There is normal leaflet mobility. There is trace regurgitation. There is no tricuspid valve stenosis. The estimated PA systolic pressure is greater than 33 mmHg.     Pulmonic Valve    Pulmonic valve is structurally normal. There is normal leaflet mobility.     IVC/SVC    Normal central venous pressure (3 mm Hg).     Ascending Aorta    The aortic root and ascending aorta are normal in size.     Pericardium and Other Findings    There is a small circumferential pericardial effusion. There is no evidence of tamponade.

## 2022-03-13 NOTE — ASSESSMENT & PLAN NOTE
RESOLVED   - No leukocytosis at OSH, WBC remains wnl   - CXR w/ findings concerning for possible PNA vs. Radiation changes   - Plan to d/c vanc/cefepime today given no further signs of developing infection

## 2022-03-13 NOTE — NURSING
Patient transfer from icu received report.  Skin assessment times two nurses completed  at the bedside.  Patient orientated to room and room service.  Will continue with current plan of care

## 2022-03-13 NOTE — ASSESSMENT & PLAN NOTE
- History of adrenal crisis s/p breast lumpectomy in 2021 necessitating IV steroids and hospital admission   - This admission symptoms similar to previous episode   - Hyponatremia and hypotension noted at OSH, requiring pressor support     Plan:   - Off pressor support   - Continue IVF until tolerating PO    - hyponatremia and JA resolved, anemia stable   - Transitioned to PO prednisone 10mg daily on 3/12   - Anticipate stepdown from ICU within 24 hours   - Anticipate follow up with endocrine upon discharge

## 2022-03-13 NOTE — ASSESSMENT & PLAN NOTE
- H/o Stag IIIB invasive ductal carcinoma of L breast  - Now POD#9 s/p RR-NATALIE/RAJINDER w/ Dr. 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

## 2022-03-13 NOTE — ASSESSMENT & PLAN NOTE
Resolved   - Na 129 at OSH, 138 this AM   - NS at 75cc/hr  - suspected secondary to adrenal process, see below

## 2022-03-13 NOTE — PLAN OF CARE
[facility] - Med Surg ([facility])  Initial Discharge Assessment       Primary Care Provider: Carmella Galvez MD    Admission Diagnosis: Sepsis [A41.9]    Admission Date: 3/11/2022  Expected Discharge Date:     Discharge Barriers Identified: None    Payor: [employer] / Plan: [employer] HMO OPEN ACCESS / Product Type: HMO /     Extended Emergency Contact Information  Primary Emergency Contact: Jean Pierre Swann  Address: [address] [place] [address] [facility]  Home Phone: [phone]  Mobile Phone: [phone]  Relation: Spouse  Secondary Emergency Contact: Silvia Bar  Work Phone: [phone]  Mobile Phone: [phone]  Relation: Mother    Discharge Plan A: Home  Discharge Plan B: Home with family      [id number] - [place], [place] - [address] [facility]  [address]  [facility] [address]  Phone: [phone] Fax: [phone]    [id number] - [place] - [address]  [address] [place] [address]  Phone: [phone] Fax: [phone]    [facility] - [place] - [address]  [address]  [place] [address]  Phone: [phone] Fax: [phone]    [id number] [place], [place] - [address] AT [facility] [address]   [address]  [facility] [address]  Phone: [phone] Fax: [phone]    [id number] - [place], [place] - [address] [facility] [address]  [address]  [place] [address]  Phone: [phone] Fax: [phone]    SW met with patient and patient's spouse Jean Pierre Swann at bedside to complete discharge planning assessment.  Patient alert and oriented xs 4.  Patient verified all demographic information on facesheet is correct.  Patient verified she has NO PCP but has Dr. Lenny listed (OB/GYN).  Patient verified primary health insurance is [employer].  Patient with NO home health or DME.  Patient with POA (spouse) and Living Will.  Patient not on  dialysis or medication coumadin.  Patient with no 30 day admission.  Patient with no financial issues at this time.  Patient family will provide transportation upon discharge from facility.  Patient independent with ADLs, live with spouse, drives self.      Initial Assessment (most recent)     Adult Discharge Assessment - 03/13/22 1506        Discharge Assessment    Assessment Type Discharge Planning Assessment     Confirmed/corrected address, phone number and insurance Yes     Confirmed Demographics Correct on Facesheet     Source of Information patient     Communicated REBECCA with patient/caregiver Date not available/Unable to determine     Lives With spouse     Facility Arrived From: home     Do you expect to return to your current living situation? Yes     Do you have help at home or someone to help you manage your care at home? Yes     Who are your caregiver(s) and their phone number(s)? spouse     Prior to hospitilization cognitive status: Alert/Oriented     Current cognitive status: Alert/Oriented     Walking or Climbing Stairs Difficulty none     Dressing/Bathing Difficulty none     Equipment Currently Used at Home none     Readmission within 30 days? No     Patient currently being followed by outpatient case management? No     Do you currently have service(s) that help you manage your care at home? No     Do you take prescription medications? Yes     Do you have prescription coverage? Yes     Do you have any problems affording any of your prescribed medications? No     Is the patient taking medications as prescribed? yes     Who is going to help you get home at discharge? spouse     How do you get to doctors appointments? car, drives self     Are you on dialysis? No     Do you take coumadin? No     Discharge Plan A Home     Discharge Plan B Home with family     DME Needed Upon Discharge  none     Discharge Plan discussed with: Patient;Spouse/sig other     Discharge Barriers Identified None

## 2022-03-13 NOTE — PROGRESS NOTES
"[facility] - Intensive Care [facility]  Gynecologic Oncology  Progress Note      Patient Name: Alison Swann  MRN: [id number]  Admission Date: 3/11/2022  Hospital Length of Stay: 2 days  Attending Provider: Carmella Galvez MD  Primary Care Provider: Carmella Galvez MD  Principal Problem: Adrenal crisis    Follow-up For: * No surgery found *  Post-Operative Day:    Subjective:      History of Present Illness:  Alison Dotson is a 41-year-old female now POD#7 s/p RATLH/BSO for h/o BRCA2 mutation/breast cancer who presents as a transfer of care from [place], [place] for sepsis of unknown origin and suspected adrenal crisis.     Patient reports that starting yesterday afternoon, she began to feel "terrible" and like she could not get out of bed. States she felt very weak, tired, and lightheaded intermittently and reports that she began to develop shortness of breath that concerned her enough to go to the emergency room. Of note, she has a history of adrenal crisis after her mastectomy/reconstruction in 2021 for which she was hospitalized. She reports that these symptoms are similar to those she had with previous episode, but that her symptoms now are much worse. She reports having fevers at home, but denies cough or congestion, abdominal pain, leg swelling. Endorses intermittent nausea and a few episodes of vomiting. Also reports that she was having issues with constipation for several days but states she had a large, loose bowel movement yesterday after taking miralax for a few days. She reports that her pain has been very well controlled at home and she denies any vaginal bleeding. She does report having intermittent episodes of substernal chest pain as well.       Hospital Course:  03/12/2022 - NAEON. Elevated troponin noted, downtrending. No significant EKG changes on Tele. Chest discomfort/SOB improving. CTPE and echo pending this AM. Continue Heparin GTT. Tolerating minimal PO, ADAT.   03/13/2022 - NAEON. Intermittent " hypertension overnight. Chest discomfort improving. Minimal appetite. Echo on 3/12 wnl. Transitioned to PO prednisone. Anticipate step down in the next 24 hours.       Interval History: NAEON. Chest discomfort continues to improve. Reports she has an appetite but is only able to tolerate a few bites of any food before she feels full. Denies nausea associated with eating, no emesis. Does report feeling nauseated when she sits up, but states this isn't associated with lightheadedness. Has not had further BM. No abdominal pain. Voiding at baseline. Ambulating to in room commode.     Scheduled Meds:   ceFOXItin (MEFOXIN) IVPB  2 g Intravenous Q6H    enoxaparin  40 mg Subcutaneous Daily    OLANZapine  10 mg Oral Nightly    predniSONE  10 mg Oral QHS    sodium chloride 0.9%  10 mL Intravenous Q6H    vancomycin (VANCOCIN) IVPB  750 mg Intravenous Q24H     Continuous Infusions:   sodium chloride 0.9% 125 mL/hr at 03/13/22 0612    NORepinephrine bitartrate-D5W Stopped (03/12/22 1549)     PRN Meds:famotidine, ibuprofen, LORazepam, ondansetron, sodium chloride 0.9%, Flushing PICC Protocol **AND** sodium chloride 0.9% **AND** sodium chloride 0.9%, Pharmacy to dose Vancomycin consult **AND** vancomycin - pharmacy to dose    Review of patient's allergies indicates:   Allergen Reactions    Penicillins Hives     As a child        Objective:     Vital Signs (Most Recent):  Temp: 98.8 °F (37.1 °C) (03/13/22 0400)  Pulse: 79 (03/13/22 0600)  Resp: (!) 27 (03/13/22 0600)  BP: (!) 174/74 (03/13/22 0600)  SpO2: (!) 94 % (03/13/22 0600)   Vital Signs (24h Range):  Temp:  [98.6 °F (37 °C)-99.1 °F (37.3 °C)] 98.8 °F (37.1 °C)  Pulse:  [66-91] 79  Resp:  [20-34] 27  SpO2:  [91 %-98 %] 94 %  BP: ()/(46-87) 174/74     Weight: 57.2 kg (126 lb 1.7 oz)  Body mass index is 19.75 kg/m².    Intake/Output - Last 3 Shifts         03/11 0700 03/12 0659 03/12 0700 03/13 0659 03/13 0700 03/14 0659    P.O.  820     I.V. (mL/kg) 819.1  (14.3) 2590.6 (45.3)     IV Piggyback 84.3 465.3     Total Intake(mL/kg) 903.4 (15.8) 3875.9 (67.8)     Urine (mL/kg/hr) 650 1250 (0.9)     Total Output 650 1250     Net +253.4 +2625.9            Urine Occurrence  1 x                Physical Exam:   Constitutional: She is oriented to person, place, and time. She appears well-developed and well-nourished.    HENT:   Head: Normocephalic and atraumatic.      Cardiovascular:  Normal rate.             Pulmonary/Chest: Effort normal and breath sounds normal.        Abdominal: Soft. She exhibits abdominal incision (robotic incisions c/d/i).             Musculoskeletal: No tenderness or edema.       Neurological: She is alert and oriented to person, place, and time.    Skin: Skin is warm and dry.    Psychiatric: She has a normal mood and affect. Her behavior is normal.     Lines/Drains/Airways       Peripherally Inserted Central Catheter Line  Duration             PICC Triple Lumen 03/12/22 0135 right brachial 1 day              Central Venous Catheter Line  Duration             Port A Cath Single Lumen 02/25/21 0949 right subclavian 380 days                    Laboratory:  CMP     CMP  Sodium   Date Value Ref Range Status   03/12/2022 138 136 - 145 mmol/L Final     Potassium   Date Value Ref Range Status   03/12/2022 3.6 3.5 - 5.1 mmol/L Final     Chloride   Date Value Ref Range Status   03/12/2022 107 95 - 110 mmol/L Final     CO2   Date Value Ref Range Status   03/12/2022 22 (L) 23 - 29 mmol/L Final     Glucose   Date Value Ref Range Status   03/12/2022 144 (H) 70 - 110 mg/dL Final     BUN   Date Value Ref Range Status   03/12/2022 10 6 - 20 mg/dL Final     Creatinine   Date Value Ref Range Status   03/12/2022 1.1 0.5 - 1.4 mg/dL Final     Calcium   Date Value Ref Range Status   03/12/2022 9.1 8.7 - 10.5 mg/dL Final     Total Protein   Date Value Ref Range Status   03/12/2022 5.7 (L) 6.0 - 8.4 g/dL Final     Albumin   Date Value Ref Range Status   03/12/2022 3.0 (L) 3.5  - 5.2 g/dL Final     Total Bilirubin   Date Value Ref Range Status   03/12/2022 0.7 0.1 - 1.0 mg/dL Final     Comment:     For infants and newborns, interpretation of results should be based  on gestational age, weight and in agreement with clinical  observations.    Premature Infant recommended reference ranges:  Up to 24 hours.............<8.0 mg/dL  Up to 48 hours............<12.0 mg/dL  3-5 days..................<15.0 mg/dL  6-29 days.................<15.0 mg/dL       Alkaline Phosphatase   Date Value Ref Range Status   03/12/2022 85 55 - 135 U/L Final     AST   Date Value Ref Range Status   03/12/2022 16 10 - 40 U/L Final     ALT   Date Value Ref Range Status   03/12/2022 10 10 - 44 U/L Final     Anion Gap   Date Value Ref Range Status   03/12/2022 9 8 - 16 mmol/L Final     eGFR if    Date Value Ref Range Status   03/12/2022 >60 >60 mL/min/1.73 m^2 Final     eGFR if non    Date Value Ref Range Status   03/12/2022 >60 >60 mL/min/1.73 m^2 Final     Comment:     Calculation used to obtain the estimated glomerular filtration  rate (eGFR) is the CKD-EPI equation.          , CBC:   Recent Labs   Lab 03/11/22  0830 03/12/22  0320   WBC 9.35 7.98   HGB 11.3* 10.4*   HCT 32.7* 29.6*    186     , and All pertinent labs from the last 24 hours have been reviewed.      Troponin I : 0.017> 0.309 > 0.209     Diagnostic Results:    Echocardiography Findings      Left Ventricle    The left ventricle is normal in size with normal systolic function. The estimated ejection fraction is 65%. The wall thickness is normal. There is normal diastolic function. There is normal wall motion.     Right Ventricle    Normal cavity size, wall thickness and systolic function. Wall motion normal.     Left Atrium    The left atrial volume index is normal. Left atrial volume index is 25.9 mL/m2.     Right Atrium    The right atrium is normal in size.     Aortic Valve    The aortic valve appears structurally  normal. The valve is trileaflet. There is normal leaflet mobility. No regurgitation. There is no stenosis.     Mitral Valve    The mitral valve appears structurally normal. There is normal leaflet mobility.  There is trace mitral valve regurgitation. There is no stenosis. The estimated mitral valve area by pressure half time is 4.52 cm2.     Tricuspid Valve    The tricuspid valve appears structurally normal. There is normal leaflet mobility. There is trace regurgitation. There is no tricuspid valve stenosis. The estimated PA systolic pressure is greater than 33 mmHg.     Pulmonic Valve    Pulmonic valve is structurally normal. There is normal leaflet mobility.     IVC/SVC    Normal central venous pressure (3 mm Hg).     Ascending Aorta    The aortic root and ascending aorta are normal in size.     Pericardium and Other Findings    There is a small circumferential pericardial effusion. There is no evidence of tamponade.     Assessment/Plan:     * Adrenal crisis  - History of adrenal crisis s/p breast lumpectomy in 2021 necessitating IV steroids and hospital admission   - This admission symptoms similar to previous episode   - Hyponatremia and hypotension noted at OSH, requiring pressor support     Plan:   - Off pressor support   - Continue IVF until tolerating PO    - hyponatremia and JA resolved, anemia stable   - Transitioned to PO prednisone 10mg daily on 3/12   - Anticipate stepdown from ICU within 24 hours   - Anticipate follow up with endocrine upon discharge    Chest pain  - New onset substernal chest pain since this afternoon with associated SOB  - Negative CTAP at OSH   - CXR with findings concerning for L sided PNA vs. Radiation changes  - EKG at OSH NSR   - Broad spectrum abx initiated as noted in sepsis plan     Plan:   - Telemetry while in ICU, NAEON  - Decision made to hold off on CTPE at this time given low suspicion for PE given improvement in symptoms with supportive care and IV steroids   - Will  obtain further imaging if clinical status changes   - d/c Heparin this AM   - Echo ordered to assess new finding of pericardial effusion on CTAP from OSH     Sepsis  RESOLVED   - No leukocytosis at OSH, WBC remains wnl   - CXR w/ findings concerning for possible PNA vs. Radiation changes   - Plan to d/c vanc/cefepime today given no further signs of developing infection     JA (acute kidney injury)  RESOLVED  - Cr trend as above   - Continue IVF   - Renally dose medications   - Strict I&Os    Hyponatremia  Resolved   - Na 129 at OSH, 138 this AM   - NS at 75cc/hr  - suspected secondary to adrenal process, see below     BRCA2 gene mutation positive  - H/o Stag IIIB invasive ductal carcinoma of L breast  - Now POD#9 s/p RR-NATALIE/RAJINDER w/ Dr. 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

## 2022-03-14 ENCOUNTER — PATIENT MESSAGE (OUTPATIENT)
Dept: HEMATOLOGY/ONCOLOGY | Facility: CLINIC | Age: 42
End: 2022-03-14
Payer: COMMERCIAL

## 2022-03-14 VITALS
RESPIRATION RATE: 16 BRPM | OXYGEN SATURATION: 97 % | TEMPERATURE: 98 F | BODY MASS INDEX: 19.8 KG/M2 | SYSTOLIC BLOOD PRESSURE: 177 MMHG | HEIGHT: 67 IN | HEART RATE: 68 BPM | DIASTOLIC BLOOD PRESSURE: 84 MMHG | WEIGHT: 126.13 LBS

## 2022-03-14 PROCEDURE — 94761 N-INVAS EAR/PLS OXIMETRY MLT: CPT

## 2022-03-14 PROCEDURE — A4216 STERILE WATER/SALINE, 10 ML: HCPCS | Performed by: STUDENT IN AN ORGANIZED HEALTH CARE EDUCATION/TRAINING PROGRAM

## 2022-03-14 PROCEDURE — 25000003 PHARM REV CODE 250: Performed by: STUDENT IN AN ORGANIZED HEALTH CARE EDUCATION/TRAINING PROGRAM

## 2022-03-14 PROCEDURE — 99238 PR HOSPITAL DISCHARGE DAY,<30 MIN: ICD-10-PCS | Mod: 24,,, | Performed by: OBSTETRICS & GYNECOLOGY

## 2022-03-14 PROCEDURE — 99238 HOSP IP/OBS DSCHRG MGMT 30/<: CPT | Mod: 24,,, | Performed by: OBSTETRICS & GYNECOLOGY

## 2022-03-14 RX ORDER — PREDNISONE 10 MG/1
10 TABLET ORAL DAILY
Qty: 30 TABLET | Refills: 1 | Status: SHIPPED | OUTPATIENT
Start: 2022-03-14 | End: 2022-03-30

## 2022-03-14 RX ADMIN — SODIUM CHLORIDE, PRESERVATIVE FREE 10 ML: 5 INJECTION INTRAVENOUS at 06:03

## 2022-03-14 RX ADMIN — SODIUM CHLORIDE, PRESERVATIVE FREE 10 ML: 5 INJECTION INTRAVENOUS at 12:03

## 2022-03-14 NOTE — PLAN OF CARE
Patient discharge to home.   at bedside.  Removed picc-line.  Patient denies pain.  Went over discharge instruction patient received discharge medication.  Patient verbalized understanding.  No noted distress.

## 2022-03-14 NOTE — PROGRESS NOTES
"[facility]  Gynecologic Oncology  Progress Note      Patient Name: Alison Swann  MRN: [id number]  Admission Date: 3/11/2022  Hospital Length of Stay: 3 days  Attending Provider: Carmella Galvez MD  Primary Care Provider: Carmella Galvez MD  Principal Problem: Adrenal crisis    Subjective:      History of Present Illness:  Alison Swann is a 41-year-old female now POD#7 s/p risk-reducing RA-TLH/BSO (on 3/4/22) for h/o BRCA2 mutation/breast cancer who presents as a transfer of care from [place], [place] for sepsis of unknown origin and suspected adrenal crisis.     Patient reports that starting yesterday afternoon, she began to feel "terrible" and like she could not get out of bed. States she felt very weak, tired, and lightheaded intermittently and reports that she began to develop shortness of breath that concerned her enough to go to the Emergency Room. 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 She reports that her current symptoms are similar to those she had with previous episode, but that her symptoms now are much worse. She reports having subjective fevers at home, but denies cough or congestion, abdominal pain, leg swelling. Endorses intermittent nausea and a few episodes of vomiting. Also reports that she was having issues with constipation for several days but states she had a large, loose bowel movement yesterday after taking Miralax for a few days. She reports that her pain has been very well controlled at home and she denies any vaginal bleeding. She does report having intermittent episodes of substernal chest pain as well.       Hospital Course:  03/12/2022 - NAEON. Elevated troponin noted, downtrending. No significant EKG changes on Tele. Chest discomfort/SOB improving. Decision made to stop heparin drip and cancel CTA chest as suspicion for PTE low. Echo WNL. IV abx continued. Tolerating minimal PO, " ADAT.   03/13/2022 - NAEON. Intermittent hypertension overnight. Chest discomfort improving. Minimal appetite. Transitioned to PO prednisone. Abx discontinued. Stepped down from ICU to floor.  03/14/2022 - NAEON, intermittent hypertension again noted. Otherwise VSS with adequate UOP. Reports appetite improving, tolerating regular diet without N/V. Denies any F/C. Chest discomfort essentially resolved. Blood cultures remain NGTD. Plan for possible discharge home later this afternoon.      Interval History: NAEON. Chest discomfort continues to improve. Reports appetite is slowing improving. Denies any episodes of nausea or vomiting. Denies any abdominal pain. Voiding independently.    Scheduled Meds:   enoxaparin  40 mg Subcutaneous Daily    OLANZapine  10 mg Oral Nightly    predniSONE  10 mg Oral QHS    sodium chloride 0.9%  10 mL Intravenous Q6H     Continuous Infusions:      PRN Meds:famotidine, hydrALAZINE, ibuprofen, LORazepam, ondansetron, sodium chloride 0.9%, Flushing PICC Protocol **AND** sodium chloride 0.9% **AND** sodium chloride 0.9%    Review of patient's allergies indicates:   Allergen Reactions    Penicillins Hives     As a child        Objective:     Vital Signs (Most Recent):  Temp: 98 °F (36.7 °C) (03/14/22 0724)  Pulse: 71 (03/14/22 0724)  Resp: 18 (03/14/22 0724)  BP: (!) 171/81 (03/14/22 0724)  SpO2: 96 % (03/14/22 0746)   Vital Signs (24h Range):  Temp:  [97.9 °F (36.6 °C)-98.6 °F (37 °C)] 98 °F (36.7 °C)  Pulse:  [67-72] 71  Resp:  [18-20] 18  SpO2:  [93 %-97 %] 96 %  BP: (125-174)/(63-81) 171/81     Weight: 57.2 kg (126 lb 1.7 oz)  Body mass index is 19.75 kg/m².    Intake/Output - Last 3 Shifts         03/12 0700  03/13 0659 03/13 0700 03/14 0659 03/14 0700  03/15 0659    P.O. 820 1380     I.V. (mL/kg) 2590.6 (45.3) 10 (0.2)     IV Piggyback 465.3      Total Intake(mL/kg) 3875.9 (67.8) 1390 (24.3)     Urine (mL/kg/hr) 1250 (0.9) 1200 (0.9)     Total Output 1250 1200     Net +2625.9 +190             Urine Occurrence 1 x 1 x                Physical Exam:   Constitutional: She is oriented to person, place, and time. She appears well-developed and well-nourished.    HENT:   Head: Normocephalic and atraumatic.    Eyes: EOM are normal.     Cardiovascular:  Normal rate.             Pulmonary/Chest: Effort normal and breath sounds normal.        Abdominal: Soft. She exhibits abdominal incision (robotic port site incisions c/d/i with overlying Dermabond). She exhibits no mass. There is no abdominal tenderness. There is no rebound and no guarding.             Musculoskeletal: No tenderness or edema.       Neurological: She is alert and oriented to person, place, and time.    Skin: Skin is warm and dry.    Psychiatric: She has a normal mood and affect. Her behavior is normal.     Lines/Drains/Airways       Peripherally Inserted Central Catheter Line  Duration             PICC Triple Lumen 03/12/22 0135 right brachial 2 days              Central Venous Catheter Line  Duration             Port A Cath Single Lumen 02/25/21 0949 right subclavian 381 days                    Laboratory:  CBC  Recent Labs   Lab 03/11/22  0830 03/12/22  0320 03/13/22  0701   WBC 9.35 7.98 6.77   HGB 11.3* 10.4* 8.7*   HCT 32.7* 29.6* 25.8*   MCV 91 91 94    186 180        Recent Labs   Lab 03/11/22  0830 03/12/22  0320 03/13/22  0701   * 138 141   K 3.7 3.6 3.4*   CL 95 107 111*   CO2 24 22* 23   BUN 17 10 7   CREATININE 1.8* 1.1 0.6   GLU 85 144* 98   PROT 5.9* 5.7*  --    BILITOT 1.1* 0.7  --    ALKPHOS 77 85  --    ALT 12 10  --    AST 17 16  --    MG 1.5*  --   --         Troponin I : 0.017> 0.309 > 0.209     All pertinent labs from the last 24 hours have been reviewed.    Diagnostic Results:    Echocardiography Findings      Left Ventricle    The left ventricle is normal in size with normal systolic function. The estimated ejection fraction is 65%. The wall thickness is normal. There is normal diastolic function.  There is normal wall motion.     Right Ventricle    Normal cavity size, wall thickness and systolic function. Wall motion normal.     Left Atrium    The left atrial volume index is normal. Left atrial volume index is 25.9 mL/m2.     Right Atrium    The right atrium is normal in size.     Aortic Valve    The aortic valve appears structurally normal. The valve is trileaflet. There is normal leaflet mobility. No regurgitation. There is no stenosis.     Mitral Valve    The mitral valve appears structurally normal. There is normal leaflet mobility.  There is trace mitral valve regurgitation. There is no stenosis. The estimated mitral valve area by pressure half time is 4.52 cm2.     Tricuspid Valve    The tricuspid valve appears structurally normal. There is normal leaflet mobility. There is trace regurgitation. There is no tricuspid valve stenosis. The estimated PA systolic pressure is greater than 33 mmHg.     Pulmonic Valve    Pulmonic valve is structurally normal. There is normal leaflet mobility.     IVC/SVC    Normal central venous pressure (3 mm Hg).     Ascending Aorta    The aortic root and ascending aorta are normal in size.     Pericardium and Other Findings    There is a small circumferential pericardial effusion. There is no evidence of tamponade.     Assessment/Plan:     * Adrenal crisis  - History of adrenal crisis s/p breast lumpectomy in 2021 necessitating IV steroids and hospital admission   - Presented with symptoms similar to previous episode   - Hyponatremia and hypotension noted at OSH, requiring pressor support     Plan:   - Now off pressor support and IVF, stepped down from ICU on 3/13  - Hyponatremia and JA resolved, anemia stable   - Hydralazine PRN for SBP > 180  - Transitioned to p.o. prednisone 10mg daily on 3/12, will discuss with staff regarding steroid dosing upon hospital D/C  - Will set up to follow up closely with Heme/Onc Dr. Sotelo, will also consider endocrinology follow up    Chest  pain  - New onset substernal chest pain son admission with associated SOB  - CTAP at OSH with evidence of small pericardial effusion   - CXR with findings concerning for L sided PNA vs. radiation changes  - EKG at OSH NSR   - Broad spectrum abx initiated as noted in sepsis plan     Plan:   - S/p telemetry while in ICU with no acute events  - Decision made to hold off on CTPE given low suspicion for PE 2/2 improvement in symptoms with supportive care and steroid administrations  - S/p heparin gtt >> transitioned to prophylactic lovenox  - Troponins trended, as above  - Echo WNL, as above  - Symptoms markedly improved this AM    Sepsis  RESOLVED   - No leukocytosis at OSH, WBC remains WNL  - CXR w/ findings concerning for possible PNA vs. radiation changes   - Blood cultures from OSH remain NGTD  - S/p vanc/cefoxitin (DC'd on 3/13) given no signs of active or developing infection     JA (acute kidney injury)  RESOLVED  - Cr trend as above, most recently 0.6  - Renally dose medications   - Strict I&Os  - Adequate UOP overnight at 1.2 cc/kg/hour    Hyponatremia  RESOLVED  - Na 129 at OSH, 141 on 3/13  - Suspected secondary to adrenal process, see above    BRCA2 gene mutation positive  - H/o Stag IIIB invasive ductal carcinoma of L breast  - Now POD#10 s/p RR-RA-TLH/BSO w/ Dr. Galvez   - Currently on pembrolizumab, radiation therapy -- completed 1/2022   - H/o longstanding chemotherapy-induced colitis requiring longterm steroid use     Plan:   - No evidence of acute intra-abdominal process on CTAP at OSH   - Post-operative pain well-controlled, meeting all milestones with benign examination   - PRN ibuprofen for pain   - PRN anti-emetics, has not required    Anemia associated with chemotherapy  - H/H trend as above  - Asx for anemia     VTE Risk Mitigation (From admission, onward)         Ordered     enoxaparin injection 40 mg  Daily         03/12/22 0846     IP VTE HIGH RISK PATIENT  Once         03/11/22 2332     Place  sequential compression device  Until discontinued         03/11/22 5124                  Dominique Hitchcock MD   PGY-3  Gynecologic Oncology  [facility] - [facility])

## 2022-03-14 NOTE — ASSESSMENT & PLAN NOTE
- New onset substernal chest pain son admission with associated SOB  - CTAP at OSH with evidence of small pericardial effusion   - CXR with findings concerning for L sided PNA vs. radiation changes  - EKG at OSH NSR   - Broad spectrum abx initiated as noted in sepsis plan     Plan:   - S/p telemetry while in ICU with no acute events  - Decision made to hold off on CTPE given low suspicion for PE 2/2 improvement in symptoms with supportive care and steroid administrations  - S/p heparin gtt >> transitioned to prophylactic lovenox  - Troponins trended, as above  - Echo WNL, as above  - Symptoms markedly improved this AM

## 2022-03-14 NOTE — ASSESSMENT & PLAN NOTE
- H/o Stag IIIB invasive ductal carcinoma of L breast  - Now POD#10 s/p RR-RA-TLH/BSO w/ Dr. Galvez   - Currently on pembrolizumab, radiation therapy -- completed 1/2022   - H/o longstanding chemotherapy-induced colitis requiring longterm steroid use     Plan:   - No evidence of acute intra-abdominal process on CTAP at OSH   - Post-operative pain well-controlled, meeting all milestones with benign examination   - PRN ibuprofen for pain   - PRN anti-emetics, has not required

## 2022-03-14 NOTE — PLAN OF CARE
03/14/22 0924   Discharge Reassessment   Assessment Type Discharge Planning Reassessment   Communicated REBECCA with patient/caregiver Date not available/Unable to determine   Discharge Plan A Home with family;Home   Discharge Plan B Home   DME Needed Upon Discharge  none   Discharge Barriers Identified None   Post-Acute Status   Discharge Delays   (medical clearance)

## 2022-03-14 NOTE — DISCHARGE SUMMARY
"[facility]  Gynecologic Oncology  Discharge Summary    Patient Name: Alison Swann  MRN: [id number]  Admission Date: 3/11/2022  Hospital Length of Stay: 3 days  Discharge Date and Time:  03/14/2022 12:01 PM  Attending Physician: Carmella Galvez MD   Discharging Provider: Dominique Hitchcock MD  Primary Care Provider: Carmella Galvez MD    HPI:   Alison Swann is a 41-year-old female now POD#7 s/p risk-reducing RA-TLH/BSO (on 3/4/22) for h/o BRCA2 mutation/breast cancer who presents as a transfer of care from [place], [place] for sepsis of unknown origin and suspected adrenal crisis.     Patient reports that starting yesterday afternoon, she began to feel "terrible" and like she could not get out of bed. States she felt very weak, tired, and lightheaded intermittently and reports that she began to develop shortness of breath that concerned her enough to go to the Emergency Room. 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 She reports that her current symptoms are similar to those she had with previous episode, but that her symptoms now are much worse. She reports having subjective fevers at home, but denies cough or congestion, abdominal pain, leg swelling. Endorses intermittent nausea and a few episodes of vomiting. Also reports that she was having issues with constipation for several days but states she had a large, loose bowel movement yesterday after taking Miralax for a few days. She reports that her pain has been very well controlled at home and she denies any vaginal bleeding. She does report having intermittent episodes of substernal chest pain as well.       Hospital Course:  03/12/2022 - NAEON. Elevated troponin noted, downtrending. No significant EKG changes on Tele. Chest discomfort/SOB improving. Decision made to stop heparin drip and cancel CTA chest as suspicion for PTE low. Echo WNL. IV abx continued. " Tolerating minimal PO, ADAT.   03/13/2022 - NAEON. Intermittent hypertension overnight. Chest discomfort improving. Minimal appetite. Transitioned to PO prednisone. Abx discontinued. Stepped down from ICU to floor. JA and hyponatremia resolved.  03/14/2022 - NAEON, intermittent hypertension again noted. Otherwise VSS with adequate UOP. Reports appetite improving, tolerating regular diet without N/V. Feeling much improved overall. Denies any F/C. Chest discomfort essentially resolved. Blood cultures remain NGTD. Patient stable for discharge home with Prednisone 10 mg daily and close follow up with Dr. Sotelo and Endocrinology (outpatient consultation placed). Strict ED return precautions discussed.      Goals of Care Treatment Preferences:  Code Status: Full Code      Consults (From admission, onward)        Status Ordering Provider     Inpatient consult to PICC team (HAO)  Once        Provider:  (Not yet assigned)    NARENDRA Moreno          Significant Diagnostic Studies:   Recent Labs   Lab 03/11/22  0830 03/12/22  0320 03/13/22  0701   WBC 9.35 7.98 6.77   HGB 11.3* 10.4* 8.7*   HCT 32.7* 29.6* 25.8*   MCV 91 91 94    186 180      Recent Labs   Lab 03/11/22  0830 03/12/22  0320 03/13/22  0701   * 138 141   K 3.7 3.6 3.4*   CL 95 107 111*   CO2 24 22* 23   BUN 17 10 7   CREATININE 1.8* 1.1 0.6   GLU 85 144* 98   PROT 5.9* 5.7*  --    BILITOT 1.1* 0.7  --    ALKPHOS 77 85  --    ALT 12 10  --    AST 17 16  --    MG 1.5*  --   --         ECHO:  · The left ventricle is normal in size with normal systolic function.  · The estimated ejection fraction is 65%.  · Normal left ventricular diastolic function.  · Normal right ventricular size with normal right ventricular systolic function.  · The estimated PA systolic pressure is 36 mmHg.  · Normal central venous pressure (3 mmHg).  · Small circumferential pericardial effusion.        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 Schedule an appointment as soon as possible for a visit in 1 week(s).    Specialties: Hematology and Oncology, Hematology  Why: Hospital Follow Up  Contact information:  [address] [facility] [address]  [phone]             Endocrinology. Schedule an appointment as soon as possible for a visit in 1 week(s).    Why: Hospital Follow Up -- Clinic will reach out to schedule           Carmella Galvez MD. Schedule an appointment as soon as possible for a visit in 4 week(s).    Specialty: Gynecologic Oncology  Why: Post-operative Visit  Contact information:  [address] [facility] [id number] [phone]                       Patient Instructions:      Ambulatory referral/consult to Endocrinology   Standing Status: Future   Referral Priority: Urgent Referral Type: Consultation   Requested Specialty: Endocrinology   Number of Visits Requested: 1     Diet general     Lifting restrictions   Order Comments: No lifting greater than 15 pounds for six weeks.     Other restrictions (specify):   Order Comments: PELVIC REST:  No douching, tampons, or intercourse for 6 weeks.     DRIVING:  No driving while on narcotics. Driving may be resumed initially with a competent passenger one to two weeks after surgery if no longer taking narcotics.     EXERCISE:  For six weeks your exercise should be limited to walking. You may walk as far as  you wish, as long as you increase your level of exertion gradually and avoid slippery surfaces. You may climb stairs as needed to get around, but should not use stair climbing for exercise.     Wound care routine (specify)   Order Comments: WOUND CARE:  You may wash the wound with mild soap and water. You may shower at any time but should avoid immersing any abdominal incisions in water for at least two weeks after surgery or until the wound is completely healed. Keep your wound clean and dry. You should observe your incision for signs of infection which include redness, warmth, drainage or fever.     Call MD for:  temperature >100.4     Call MD for:  persistent nausea and vomiting     Call MD for:  severe uncontrolled pain     Call MD for:  difficulty breathing, headache or visual disturbances     Call MD for:  redness, tenderness, or signs of infection (pain, swelling, redness, odor or green/yellow discharge around incision site)     Call MD for:  hives     Call MD for:   Order Comments: Inability to void,urine is ketchup colored or you have large clots, vaginal bleeding is heavier than a period.    VAGINAL DISCHARGE: You may develop a vaginal discharge and intermittent vaginal spotting after surgery and up to 6 weeks postoperatively.  The discharge may have an odor and may change in color but it is normal.  This is due to dissolving stiches.  Contact your surgical team if you develop vaginal or vulvar irritation along with a discharge.  Also contact your surgical team if you have vaginal discharge that smells like urine or stool.    CONSTIPATION REMEDIES: Patients are often constipated after surgery or with use of oral narcotic medicine. You should continue to take a stool softener during the next six weeks, and consume adequate amounts of water.  If you have not had a bowel movement for 3 days after dismissal, or are uncomfortable and unable to pass stool, please try one or all of the following measures:  1.   Milk of Magnesia - 30 cc by mouth every 12 hours   2.  Dulcolax suppository - One suppository per rectum every 4-6 hours   3.  Metamucil, Fibercon or other bulk former - use as directed  4.  Fleets Enema      PAIN MEDICATIONS:   Take your pain medications as instructed. It is best to take pain medications before your pain becomes severe. This will allow you to take less medication yet have better pain relief. For the first 2 or 3 days it may be helpful to take your pain medications on a regular schedule (e.g. every 4 to 6 hours). This will help you to keep your pain under better control. You should then begin to take fewer medications each day until you no longer need them. Do not take pain medication on an empty stomach. This may lead to nausea and vomiting.     Call MD for:  persistent dizziness or light-headedness     Call MD for:  extreme fatigue     Activity as tolerated   Order Comments: PELVIC REST:  No douching, tampons, or intercourse for 6 weeks.    DRIVING:  No driving while on narcotics. Driving may be resumed initially with a competent passenger one to two weeks after surgery if no longer taking narcotics.     EXERCISE:  For six weeks your exercise should be limited to walking. You may walk as far as you wish, as long as you increase your level of exertion gradually and avoid slippery surfaces. You may climb stairs as needed to get around, but should not use stair climbing for exercise.     Shower on day dressing removed (No bath)   Order Comments: You may shower at any time but should avoid immersing any abdominal incisions in water for at least 2 weeks after surgery or until the wound is completely healed.     Medications:  Reconciled Home Medications:      Medication List      CHANGE how you take these medications    predniSONE 10 MG tablet  Commonly known as: DELTASONE  Take 1 tablet (10 mg total) by mouth once daily.  What changed:   · how much to take  · how to take this  · when to take  this  · additional instructions     valACYclovir 500 MG tablet  Commonly known as: VALTREX  Take 1 tablet (500 mg total) by mouth once. for 1 dose  What changed: additional instructions        CONTINUE taking these medications    (MAGIC MOUTHWASH) 1:1:1 BENADRYL 12.5MG/5ML LIQ, ALUMINUM & MAGNESIUM  Swish and spit 10 mLs every 4 (four) hours as needed. for mouth sores     dextroamphetamine-amphetamine 10 mg Tab  Take 1 tablet by mouth once daily.     dicyclomine 10 MG capsule  Commonly known as: BentyL  Take 1 capsule (10 mg total) by mouth 4 (four) times daily as needed (Hiccups).     diphenoxylate-atropine 2.5-0.025 mg 2.5-0.025 mg per tablet  Commonly known as: LomotiL  Take 1 tablet by mouth 4 (four) times daily as needed for Diarrhea.     famotidine 10 MG tablet  Commonly known as: PEPCID  Take 10 mg by mouth daily as needed for Heartburn.     ibuprofen 600 MG tablet  Commonly known as: ADVIL,MOTRIN  Take 1 tablet (600 mg total) by mouth every 6 (six) hours as needed for Pain. Alternate every 3 hours with tylenol     ID NOW COVID-19 TEST KIT Kit  Generic drug: COVID-19 molecular test assay  TEST AS DIRECTED TODAY     LIDOcaine-prilocaine cream  Commonly known as: EMLA  Apply topically as needed.     LORazepam 0.5 MG tablet  Commonly known as: ATIVAN  Take 1 tablet (0.5 mg total) by mouth every 8 (eight) hours as needed (for nausea, anxiety or sleep).     MAPAP ARTHRITIS PAIN 650 MG Tbsr  Generic drug: acetaminophen  Take 1 tablet (650 mg total) by mouth every 6 to 8 hours as needed (Pain). Alternate every 3 hours with ibuprofen.     OLANZapine 10 MG tablet  Commonly known as: ZyPREXA  Take 1 tablet (10 mg total) by mouth nightly.     ondansetron 8 MG Tbdl  Commonly known as: ZOFRAN-ODT  Take 1 tablet (8 mg total) by mouth every 8 (eight) hours as needed (nausea).     oxyCODONE 5 MG immediate release tablet  Commonly known as: ROXICODONE  Take 1 tablet (5 mg total) by mouth every 4 (four) hours as needed for  Pain.     pantoprazole 40 MG tablet  Commonly known as: PROTONIX  Take 1 tablet (40 mg total) by mouth once daily.     promethazine 25 MG tablet  Commonly known as: PHENERGAN  Take 25 mg by mouth every 6 (six) hours as needed for Nausea.            Dominique Hitchcock MD   PGY-3  Gynecologic Oncology  [facility] ([facility]   How Severe Is Your Cyst?: mild Is This A New Presentation, Or A Follow-Up?: Cyst

## 2022-03-14 NOTE — ASSESSMENT & PLAN NOTE
RESOLVED   - No leukocytosis at OSH, WBC remains WNL  - CXR w/ findings concerning for possible PNA vs. radiation changes   - Blood cultures from OSH remain NGTD  - S/p vanc/cefoxitin (DC'd on 3/13) given no signs of active or developing infection

## 2022-03-14 NOTE — PLAN OF CARE
Patient discharged to home. Transportation to be provided by family.   03/14/22 1520   Final Note   Assessment Type Final Discharge Note   Anticipated Discharge Disposition Home   Post-Acute Status   Discharge Delays None known at this time

## 2022-03-14 NOTE — SUBJECTIVE & OBJECTIVE
Interval History: NAEON. Chest discomfort continues to improve. Reports appetite is slowing improving. Denies any episodes of nausea or vomiting. Denies any abdominal pain. Voiding independently.    Scheduled Meds:   enoxaparin  40 mg Subcutaneous Daily    OLANZapine  10 mg Oral Nightly    predniSONE  10 mg Oral QHS    sodium chloride 0.9%  10 mL Intravenous Q6H     Continuous Infusions:      PRN Meds:famotidine, hydrALAZINE, ibuprofen, LORazepam, ondansetron, sodium chloride 0.9%, Flushing PICC Protocol **AND** sodium chloride 0.9% **AND** sodium chloride 0.9%    Review of patient's allergies indicates:   Allergen Reactions    Penicillins Hives     As a child        Objective:     Vital Signs (Most Recent):  Temp: 98 °F (36.7 °C) (03/14/22 0724)  Pulse: 71 (03/14/22 0724)  Resp: 18 (03/14/22 0724)  BP: (!) 171/81 (03/14/22 0724)  SpO2: 96 % (03/14/22 0746)   Vital Signs (24h Range):  Temp:  [97.9 °F (36.6 °C)-98.6 °F (37 °C)] 98 °F (36.7 °C)  Pulse:  [67-72] 71  Resp:  [18-20] 18  SpO2:  [93 %-97 %] 96 %  BP: (125-174)/(63-81) 171/81     Weight: 57.2 kg (126 lb 1.7 oz)  Body mass index is 19.75 kg/m².    Intake/Output - Last 3 Shifts         03/12 0700 03/13 0659 03/13 0700 03/14 0659 03/14 0700  03/15 0659    P.O. 820 1380     I.V. (mL/kg) 2590.6 (45.3) 10 (0.2)     IV Piggyback 465.3      Total Intake(mL/kg) 3875.9 (67.8) 1390 (24.3)     Urine (mL/kg/hr) 1250 (0.9) 1200 (0.9)     Total Output 1250 1200     Net +2625.9 +190            Urine Occurrence 1 x 1 x                Physical Exam:   Constitutional: She is oriented to person, place, and time. She appears well-developed and well-nourished.    HENT:   Head: Normocephalic and atraumatic.    Eyes: EOM are normal.     Cardiovascular:  Normal rate.             Pulmonary/Chest: Effort normal and breath sounds normal.        Abdominal: Soft. She exhibits abdominal incision (robotic port site incisions c/d/i with overlying Dermabond). She exhibits no mass. There  is no abdominal tenderness. There is no rebound and no guarding.             Musculoskeletal: No tenderness or edema.       Neurological: She is alert and oriented to person, place, and time.    Skin: Skin is warm and dry.    Psychiatric: She has a normal mood and affect. Her behavior is normal.     Lines/Drains/Airways       Peripherally Inserted Central Catheter Line  Duration             PICC Triple Lumen 03/12/22 0135 right brachial 2 days              Central Venous Catheter Line  Duration             Port A Cath Single Lumen 02/25/21 0949 right subclavian 381 days                    Laboratory:  CBC  Recent Labs   Lab 03/11/22  0830 03/12/22  0320 03/13/22  0701   WBC 9.35 7.98 6.77   HGB 11.3* 10.4* 8.7*   HCT 32.7* 29.6* 25.8*   MCV 91 91 94    186 180        Recent Labs   Lab 03/11/22 0830 03/12/22 0320 03/13/22  0701   * 138 141   K 3.7 3.6 3.4*   CL 95 107 111*   CO2 24 22* 23   BUN 17 10 7   CREATININE 1.8* 1.1 0.6   GLU 85 144* 98   PROT 5.9* 5.7*  --    BILITOT 1.1* 0.7  --    ALKPHOS 77 85  --    ALT 12 10  --    AST 17 16  --    MG 1.5*  --   --         Troponin I : 0.017> 0.309 > 0.209     All pertinent labs from the last 24 hours have been reviewed.    Diagnostic Results:    Echocardiography Findings      Left Ventricle    The left ventricle is normal in size with normal systolic function. The estimated ejection fraction is 65%. The wall thickness is normal. There is normal diastolic function. There is normal wall motion.     Right Ventricle    Normal cavity size, wall thickness and systolic function. Wall motion normal.     Left Atrium    The left atrial volume index is normal. Left atrial volume index is 25.9 mL/m2.     Right Atrium    The right atrium is normal in size.     Aortic Valve    The aortic valve appears structurally normal. The valve is trileaflet. There is normal leaflet mobility. No regurgitation. There is no stenosis.     Mitral Valve    The mitral valve appears  structurally normal. There is normal leaflet mobility.  There is trace mitral valve regurgitation. There is no stenosis. The estimated mitral valve area by pressure half time is 4.52 cm2.     Tricuspid Valve    The tricuspid valve appears structurally normal. There is normal leaflet mobility. There is trace regurgitation. There is no tricuspid valve stenosis. The estimated PA systolic pressure is greater than 33 mmHg.     Pulmonic Valve    Pulmonic valve is structurally normal. There is normal leaflet mobility.     IVC/SVC    Normal central venous pressure (3 mm Hg).     Ascending Aorta    The aortic root and ascending aorta are normal in size.     Pericardium and Other Findings    There is a small circumferential pericardial effusion. There is no evidence of tamponade.

## 2022-03-14 NOTE — ASSESSMENT & PLAN NOTE
- History of adrenal crisis s/p breast lumpectomy in 2021 necessitating IV steroids and hospital admission   - Presented with symptoms similar to previous episode   - Hyponatremia and hypotension noted at OSH, requiring pressor support     Plan:   - Now off pressor support and IVF, stepped down from ICU on 3/13  - Hyponatremia and JA resolved, anemia stable   - Hydralazine PRN for SBP > 180  - Transitioned to p.o. prednisone 10mg daily on 3/12, will discuss with staff regarding steroid dosing upon hospital D/C  - Will set up to follow up closely with Heme/Onc Dr. Sotelo, will also consider endocrinology follow up

## 2022-03-14 NOTE — ASSESSMENT & PLAN NOTE
RESOLVED  - Cr trend as above, most recently 0.6  - Renally dose medications   - Strict I&Os  - Adequate UOP overnight at 1.2 cc/kg/hour

## 2022-03-15 ENCOUNTER — TELEPHONE (OUTPATIENT)
Dept: ORTHOPEDICS | Facility: CLINIC | Age: 42
End: 2022-03-15
Payer: COMMERCIAL

## 2022-03-15 ENCOUNTER — PATIENT MESSAGE (OUTPATIENT)
Dept: ORTHOPEDICS | Facility: CLINIC | Age: 42
End: 2022-03-15
Payer: COMMERCIAL

## 2022-03-15 ENCOUNTER — TELEPHONE (OUTPATIENT)
Dept: NEUROLOGY | Facility: CLINIC | Age: 42
End: 2022-03-15
Payer: COMMERCIAL

## 2022-03-15 ENCOUNTER — PATIENT MESSAGE (OUTPATIENT)
Dept: GYNECOLOGIC ONCOLOGY | Facility: CLINIC | Age: 42
End: 2022-03-15
Payer: COMMERCIAL

## 2022-03-16 ENCOUNTER — PATIENT OUTREACH (OUTPATIENT)
Dept: ADMINISTRATIVE | Facility: CLINIC | Age: 42
End: 2022-03-16
Payer: COMMERCIAL

## 2022-03-16 LAB
BACTERIA BLD CULT: NORMAL
BACTERIA BLD CULT: NORMAL

## 2022-03-17 ENCOUNTER — PATIENT MESSAGE (OUTPATIENT)
Dept: HEMATOLOGY/ONCOLOGY | Facility: CLINIC | Age: 42
End: 2022-03-17
Payer: COMMERCIAL

## 2022-03-17 ENCOUNTER — TELEPHONE (OUTPATIENT)
Dept: ENDOCRINOLOGY | Facility: CLINIC | Age: 42
End: 2022-03-17
Payer: COMMERCIAL

## 2022-03-17 NOTE — TELEPHONE ENCOUNTER
S/w pt notified her that sooner appt to see Dr. Mcconnell was July as a New Pt. I offered to see if she might want to see Dr. Sandifer at a sooner appt. Patient declined and stated will make a call to see if she can get w/right Doctor

## 2022-03-17 NOTE — TELEPHONE ENCOUNTER
----- Message from [facility] sent at 3/17/2022  9:19 AM CDT -----  Contact: self  Type:  Sooner Apoointment Request    Caller is requesting a sooner appointment.  Caller declined first available appointment listed below.  Caller will not accept being placed on the waitlist and is requesting a message be sent to doctor.    Name of Caller:  patient  When is the first available appointment?  July  Symptoms:  Adrenal crisis  Best Call Back Number:  [phone]  Additional Information: Patient has a referral in the system but it would not pull up when trying to schedule and the patient did ask to see Dr Mcconnell.  thanks

## 2022-03-18 ENCOUNTER — TELEPHONE (OUTPATIENT)
Dept: ENDOCRINOLOGY | Facility: CLINIC | Age: 42
End: 2022-03-18
Payer: COMMERCIAL

## 2022-03-18 NOTE — TELEPHONE ENCOUNTER
"Pt was scheduled for virtual VV with Dr. Mcconnell today because she did not want to wait until the first avail NP slot (July).  Appt was booked yesterday for today. Explained to pt we do not typically do virtual NP visits but Dr. Mcconnell was willing to given her recent surgery and pt stated she was not able to drive.  The virtual visit was unsuccessful, Dr. Mcconnell could not hear the pt on the computer in the exam room nor on his phone.  He had me call the pt to ask when she'd be able to physically come in a for a visit, we'd work her in.  She was not happy that he could not just call her or r/s another virtual visit.  I explained we could not do phone visit (per Dr. Mcconnell) because he'd never seen her before and that he was only willing to do NP virtual today given her situation. She stated she "would have to think about when she'd be able to come in and call us back", stating "this is ridiculous and he should be able to call her now or r/s as virtual."   "

## 2022-03-22 ENCOUNTER — PATIENT MESSAGE (OUTPATIENT)
Dept: HEMATOLOGY/ONCOLOGY | Facility: CLINIC | Age: 42
End: 2022-03-22
Payer: COMMERCIAL

## 2022-03-22 ENCOUNTER — DOCUMENTATION ONLY (OUTPATIENT)
Dept: ENDOCRINOLOGY | Facility: CLINIC | Age: 42
End: 2022-03-22
Payer: COMMERCIAL

## 2022-03-22 NOTE — PROGRESS NOTES
Unable to do virtual visit on Friday 3/18/22  Attempted on computer and phone but audio does not work.   Will call patient to schedule in person when able to come in.

## 2022-03-24 ENCOUNTER — OFFICE VISIT (OUTPATIENT)
Dept: HEMATOLOGY/ONCOLOGY | Facility: CLINIC | Age: 42
End: 2022-03-24
Payer: COMMERCIAL

## 2022-03-24 DIAGNOSIS — C50.412 MALIGNANT NEOPLASM OF UPPER-OUTER QUADRANT OF LEFT BREAST IN FEMALE, ESTROGEN RECEPTOR NEGATIVE: Primary | ICD-10-CM

## 2022-03-24 DIAGNOSIS — Z15.01 BRCA2 GENE MUTATION POSITIVE: Chronic | ICD-10-CM

## 2022-03-24 DIAGNOSIS — Z17.1 MALIGNANT NEOPLASM OF UPPER-OUTER QUADRANT OF LEFT BREAST IN FEMALE, ESTROGEN RECEPTOR NEGATIVE: Primary | ICD-10-CM

## 2022-03-24 DIAGNOSIS — Z90.710 S/P LAPAROSCOPIC HYSTERECTOMY: ICD-10-CM

## 2022-03-24 DIAGNOSIS — Z15.09 BRCA2 GENE MUTATION POSITIVE: Chronic | ICD-10-CM

## 2022-03-24 DIAGNOSIS — E27.2 ADRENAL CRISIS: ICD-10-CM

## 2022-03-24 PROCEDURE — 99214 PR OFFICE/OUTPT VISIT, EST, LEVL IV, 30-39 MIN: ICD-10-PCS | Mod: 95,,, | Performed by: INTERNAL MEDICINE

## 2022-03-24 PROCEDURE — 1160F PR REVIEW ALL MEDS BY PRESCRIBER/CLIN PHARMACIST DOCUMENTED: ICD-10-PCS | Mod: CPTII,95,, | Performed by: INTERNAL MEDICINE

## 2022-03-24 PROCEDURE — 1159F MED LIST DOCD IN RCRD: CPT | Mod: CPTII,95,, | Performed by: INTERNAL MEDICINE

## 2022-03-24 PROCEDURE — 99214 OFFICE O/P EST MOD 30 MIN: CPT | Mod: 95,,, | Performed by: INTERNAL MEDICINE

## 2022-03-24 PROCEDURE — 1159F PR MEDICATION LIST DOCUMENTED IN MEDICAL RECORD: ICD-10-PCS | Mod: CPTII,95,, | Performed by: INTERNAL MEDICINE

## 2022-03-24 PROCEDURE — 1111F PR DISCHARGE MEDS RECONCILED W/ CURRENT OUTPATIENT MED LIST: ICD-10-PCS | Mod: CPTII,95,, | Performed by: INTERNAL MEDICINE

## 2022-03-24 PROCEDURE — 1111F DSCHRG MED/CURRENT MED MERGE: CPT | Mod: CPTII,95,, | Performed by: INTERNAL MEDICINE

## 2022-03-24 PROCEDURE — 1160F RVW MEDS BY RX/DR IN RCRD: CPT | Mod: CPTII,95,, | Performed by: INTERNAL MEDICINE

## 2022-03-24 NOTE — PROGRESS NOTES
"Subjective:       Patient ID: Alison Swann is a 42 y.o. female.    Chief Complaint: No chief complaint on file.    HPI     Admitted 3/11/2022- 3/14/2022:  Per hospital record  Alison Swann is a 42-year-old female now POD#7 s/p risk-reducing RA-TLH/BSO (on 3/4/22) for h/o BRCA2 mutation/breast cancer who presents as a transfer of care from [place], [place] for sepsis of unknown origin and suspected adrenal crisis.   Patient reports that starting yesterday afternoon, she began to feel "terrible" and like she could not get out of bed. States she felt very weak, tired, and lightheaded intermittently and reports that she began to develop shortness of breath that concerned her enough to go to the Emergency Room. 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 She reports that her current symptoms are similar to those she had with previous episode, but that her symptoms now are much worse. She reports having subjective fevers at home, but denies cough or congestion, abdominal pain, leg swelling. Endorses intermittent nausea and a few episodes of vomiting. Also reports that she was having issues with constipation for several days but states she had a large, loose bowel movement yesterday after taking Miralax for a few days. She reports that her pain has been very well controlled at home and she denies any vaginal bleeding. She does report having intermittent episodes of substernal chest pain as well.   Hospital Course:  03/12/2022 - NAEON. Elevated troponin noted, downtrending. No significant EKG changes on Tele. Chest discomfort/SOB improving. Decision made to stop heparin drip and cancel CTA chest as suspicion for PTE low. Echo WNL. IV abx continued. Tolerating minimal PO, ADAT.   03/13/2022 - NAEON. Intermittent hypertension overnight. Chest discomfort improving. Minimal appetite. Transitioned to PO prednisone. Abx discontinued. Stepped down " "from ICU to floor. JA and hyponatremia resolved.  03/14/2022 - NAEON, intermittent hypertension again noted. Otherwise VSS with adequate UOP. Reports appetite improving, tolerating regular diet without N/V. Feeling much improved overall. Denies any F/C. Chest discomfort essentially resolved. Blood cultures remain NGTD. Patient stable for discharge home with Prednisone 10 mg daily and close follow up with Dr. Sotelo and Endocrinology (outpatient consultation placed). Strict ED return precautions discussed.  ECHO wnl at OSH  She also did a stress test and reports great results here as well    Reports since hospitalization  Every day feels a little stronger  Tired at end of day  lightheadedness and weakness resolved  Had made it 15 days off steroid and now back on 10 mg per day     Oncology History:  - 12/2019 thermography of breast revealed  lymphatic changes in the left axilla.  - 4/2020 self detected a palpable left breast mass  - 6/6/2020 Diagnostic mammogram  Impression:  Suspicious grouping of microcalcifications in the upper-outer left breast corresponding to area of palpable concern.  Biopsy is warranted.Questionable distortion of the breast architecture in the periareolar region of the right breast near the 3 o'clock position without sonographic correlate.  Short-term mammographic follow-up of the right breast at 6 month interval is recommended.  The above findings and recommendations were discussed with the patient at the time of the examination.  BI-RADS CATEGORY 4: SUSPICIOUS ABNORMALITY-BIOPSY SHOULD BE CONSIDERED  - 6/23/2020 imaging guided biopsy  Pathology: [facility] (The other calcifications were apparently not sampled)  - 1/19/2021 excision biopsy with Dr. Johnston  Pathology:  1.  BREAST, LEFT, FURTHER DESIGNATED "MASS," EXCISION:   --INVASIVE CARCINOMA OF NO SPECIAL TYPE (DUCTAL, NOT OTHERWISE    SPECIFIED), SPENSER HISTOLOGIC           GRADE 3 OUT OF 3.        --TUMOR MEASURES 42 MILLIMETERS IN " "MAXIMUM DIMENSION.        --RECEPTOR STUDIES ARE PENDING.   --SPECIMEN ANTERIOR MARGIN IS POSITIVE FOR INVASIVE CARCINOMA; ALL    REMAINING SPECIMEN MARGINS ARE           AT LEAST 2 MILLIMETERS AWAY.   --DUCTAL CARCINOMA IN SITU, HIGH NUCLEAR GRADE, COMPRISING LESS THAN 5%    OF TOTAL TUMOR TISSUE.        --ALL SPECIMEN MARGINS ARE NEGATIVE FOR DUCTAL CARCINOMA IN SITU.        --EXTENSIVE LYMPHOVASCULAR INVASION PRESENT.   --FIBROCYSTIC CHANGES INCLUDING STROMAL FIBROSIS, CYSTIC DILATATION OF    DUCTS, AND APOCRINE           METAPLASIA.        --COLUMNAR CELL CHANGE.   --MICROCALCIFICATIONS ASSOCIATED WITH TUMOR AND WITH BENIGN DUCTAL    PROFILES.   2.  BREAST, LEFT, FURTHER DESIGNATED "MEDIAL MARGIN," EXCISION:        --NO MORPHOLOGIC EVIDENCE OF MALIGNANCY.   --FIBROCYSTIC CHANGES INCLUDING STROMAL FIBROSIS, CYSTIC DILATATION OF    DUCTS, APOCRINE METAPLASIA,           AND USUAL DUCTAL EPITHELIAL HYPERPLASIA.        --COLUMNAR CELL CHANGE.        --FOCAL MICROCALCIFICATIONS ASSOCIATED WITH BENIGN DUCTAL PROFILES.   3.  LYMPH NODE, LEFT SENTINEL, NEEDLE CORE BIOPSY:   --METASTATIC CARCINOMA OF NO SPECIAL TYPE (DUCTAL, NOT OTHERWISE    SPECIFIED).   ER negative, SC negative, Xhz2wid negative  Ki-67 79%     - 1/27/2021 Breast MRI:  Findings: The breasts have heterogeneous fibroglandular tissue. The background parenchymal enhancement is minimal.   Left  There are 6 similar 29 mm x 23 mm lymph nodes seen in the left axilla.   There is an 8 mm x 7 mm x 6 mm homogeneous, non-mass enhancement in a focal distribution seen in the left breast at 12 o'clock in the middle depth, 5.5 cm from the nipple. Delayed phase is persistent. This is immediately anterior to the pectoralis major muscle, at anterior aspect of implant.   There is a 24 mm x 11 mm x 9 mm clumped, non-mass enhancement in a focal distribution seen in the outer central region of the left breast in the posterior depth. This is along the margins of the resection " cavity at is posterior aspect.   Right  There is no evidence of suspicious masses, abnormal enhancement, or other abnormal findings in the right breast.  Impression:  Left  Non-mass Enhancement: Left breast 8 mm x 7 mm x 6 mm non-mass enhancement at the middle 12 o'clock position. Assessment: 4 - Suspicious finding. Biopsy is recommended.  Second look ultrasound followed by ultrasound or MRI-guided biopsy could be performed if it would .  Of note, MRI guided biopsy would carry risk of implant rupture. Non-mass Enhancement: Left breast 24 mm x 11 mm x 9 mm non-mass enhancement at the posterior aspect of the excision cavity in the lateral breast. Assessment: 4 - Suspicious finding.  This is suspicious for residual disease in this patient with history of recent excisional biopsy.   The area is likely not amenable to MRI guided biopsy.  Surgical consultation recommended. Left axillary adenopathy.  At least 6 abnormal appearing level I axillary nodes are present, suspicious for residual lymph node metastasis.  RightThere is no MR evidence of malignancy in the right breast.  BI-RADS Category:   Overall: 4 - Suspicious  Recommendation:  Surgical consultation recommended.  Left breast biopsy could be performed of focal NME at 12 o'clock in the left breast if clinically indicated.     - 2/2/2021 PET scan:  COMPARISON:  Breast MRI 01/27/2021  FINDINGS:  Quality of the study: Adequate.  In the head and neck, there are no hypermetabolic lesions worrisome for malignancy. There are no hypermetabolic mucosal lesions, and there are no pathologically enlarged or hypermetabolic lymph nodes.  In the chest, there is no definite hypermetabolic breast mass.  There are bilateral breast implants in place.  There is relatively mild diffuse uptake within dense fibroglandular tissue, and within the left breast there is a maximum SUV of 1.9 adjacent to biopsy clips on image 68.  There is also mildly hypermetabolic subcutaneous  fat stranding elsewhere in the lateral aspect of the left breast on image 77 likely postprocedural in nature. There are at least half a dozen left level 1 axillary lymph nodes the largest of which are hypermetabolic including a 3 x 2.1 cm node on image 50 with an SUV of 8.6, a 1 cm node on image 55 with an SUV of 4.7, and 2.3 x 1.2 cm node on image 61 with an SUV of 9.2.  There is also a non hypermetabolic 9 x 6 mm right level 2 lymph node.  There are no suspicious lymph nodes in the internal mammary chain.  There are no pulmonary nodules, and there are no pleural or pericardial effusions.  In the abdomen and pelvis, there is physiologic tracer distribution within the abdominal organs and excretion into the genitourinary system, including diffusely within the right ureter and focally within the left.In the bones, there are no hypermetabolic lesions worrisome for malignancy.  Impression:  1.  No discrete left breast mass identified.  2.  Metastatic left level 1 axillary lymph nodes.  Level 2 larisa metastasis is not excluded.  3.  No evidence of distant metastasis.     - 2/9/2021 ECHO:  The left ventricle is normal in size with normal systolic function. The estimated ejection fraction is 58%  Regimen:  Paclitaxel weekly + carboplatin with Pembrolizumab q 3 weeks followed by AC with Pembrolizumab q 3 weeks followed by Pembroluzimab.(Based on interim analysis of the phase III KEYNOTE-522 the addition of pembrolizumab to neoadjuvant chemotherapy and use of adjuvant pembrolizumab x 9 cycles resulted in improvements in pathologic complete response rate and event-free survival in women with early triple-negative breast cancer.)     BRCA2+     5/3/2021 Breast MRI follow up on treatment in the interval (results below)  Findings:  There are bilateral retropectoral silicone implants. There is a right sided port.  The breasts have extreme amounts of fibroglandular tissue. The background parenchymal enhancement is mild.  There  are postsurgical changes in the left upper outer breast and axilla.  There has been significant interval decrease in size of the previously seen abnormal left axillary lymph nodes, now with the largest lymph node measuring 14 x 15 x 10 mm (previously measuring up to 29 mm on the 21 MRI).  The other left axillary lymph nodes are smaller with mildly irregular shaggy margins, consistent with chemotherapy treatment.   No suspicious enhancement is seen in either breast. The previously seen left 12:00 mid depth focal non mass enhancement anterior to the pectoralis is not seen on the current exam.   No abnormal right axillary lymph nodes or internal mammary lymph nodes are seen.  Impression:  There has been significant interval decrease in size of the previously seen abnormal left axillary lymph nodes, now with the largest lymph node measuring 14 x 15 x 10 mm (previously measuring up to 29 mm on the 21 MRI).  The other left axillary lymph nodes are smaller with mildly irregular shaggy margins, consistent with chemotherapy treatment.   No suspicious enhancement is seen in either breast.   BI-RADS Category:   Overall: 6 - Known Biopsy-Proven Malignancy     -   Completed surgery with complete pathologic response     - completed XRT     Gyn Hx:  Menarche- 10  , age at 1st live birth 22  OCPs x 4 years  Endometrial ablation      FH:  Mother - diagnosed at age 50 with breast cancer  Mastectomy- bilateral  No chemotherapy or radiation   Remains on Tamoxifen  Treated in [place]  ? 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 Negative for activity change, appetite change, fever and unexpected weight change.   HENT: Negative for trouble swallowing.    Eyes: Negative  for visual disturbance.   Respiratory: Negative for cough and shortness of breath.    Cardiovascular: Negative for chest pain, palpitations and leg swelling.   Gastrointestinal: Negative for blood in stool, change in bowel habit, constipation, diarrhea, nausea, vomiting, fecal incontinence and change in bowel habit.        Notes 4 weeks of bowel changes- reports normal stool 2 x per day and states for past 4 weeks had more of constipation  Better after hydration and then resolved in hospital   Genitourinary: Negative for decreased urine volume, difficulty urinating, dysuria, frequency and urgency.   Musculoskeletal: Negative for arthralgias, back pain and myalgias.   Neurological: Negative for dizziness, weakness (better), light-headedness (resolved), numbness and headaches.   Hematological: Negative for adenopathy. Does not bruise/bleed easily.   Psychiatric/Behavioral: Positive for sleep disturbance. Negative for dysphoric mood. The patient is not nervous/anxious.          Objective:      Physical Exam  Virtual  Assessment:       Problem List Items Addressed This Visit     BRCA2 gene mutation positive (Chronic)    s/p RA-TLH/BSO    Malignant neoplasm of upper-outer quadrant of left breast in female, estrogen receptor negative - Primary    Relevant Orders    CMP    CBC W/ AUTO DIFFERENTIAL    Adrenal crisis          Plan:       Breast cancer- reviewed 3/22 scans- repeat 6/22 (as pulm abnorm required follow up, [facility] for malignancy)  Needs repeat labs- will arrange Monday PM in [facility] as she is out of town  Maintenance Keytruda stopped with adrenal insuff and side effects    Contact endocrine regarding missed appointment and need to reschedule      Route Chart for Scheduling    Med Onc Chart Routing      Follow up with physician 6 weeks. Needs cbc, cmp on Monday PM at [facility]; needs endo follow up asap in [place]   Follow up with [facility]    Labs    Imaging    Pharmacy appointment     Other referrals            Supportive Plan Information  OP SHELL SUPPORTIVE   Sandra Sotelo MD   Upcoming Treatment Dates - OP SHELL SUPPORTIVE    No upcoming days in selected categories.    Therapy Plan Information  Flushes  heparin, porcine (PF) 100 unit/mL injection flush 500 Units  500 Units, Intravenous, Every visit  sodium chloride 0.9% flush 10 mL  10 mL, Intravenous, Every visit  Hydration  sodium chloride 0.9% bolus 1,000 mL  1,000 mL, Intravenous, PRN

## 2022-03-25 ENCOUNTER — TELEPHONE (OUTPATIENT)
Dept: ENDOCRINOLOGY | Facility: CLINIC | Age: 42
End: 2022-03-25
Payer: COMMERCIAL

## 2022-03-25 NOTE — TELEPHONE ENCOUNTER
Pt scheduled for Wed, 3/30.  The appt time is for 9:00, but advised pt to come for 8:30 per Dr. Mcconnell.  LVM.

## 2022-03-25 NOTE — TELEPHONE ENCOUNTER
LVM for pt to call the office.  Will work in at 8:30 am on Wed 3/30 per Jayesh if pt is able to come in-person. Will need orthostatics.  Advised pt in the message I will wait to hear back from her before scheduling this appt.

## 2022-03-25 NOTE — TELEPHONE ENCOUNTER
----- Message from Hilario Mcconnell DO sent at 3/24/2022  4:41 PM CDT -----  Can try again. Let her know I am out most of next week but can see her 830 wed. Let her know I need to check orthostatics on her and why I need to see her in person    Thanks  Hilario  ----- Message -----  From: Sandra Sotelo MD  Sent: 3/24/2022   4:30 PM CDT  To: Hilario Mcconnell DO    She reports audio did not work but video did on her recent virtual with you so it was cancelled  All was fine on our virtual today so may have been an Epic issue   Can you get her in ASAP? She was told by your office next available is July and she was just discharged after adrenal crisis- now off immunotherapy  Thanks!

## 2022-03-25 NOTE — TELEPHONE ENCOUNTER
----- Message from Rodolfo Perez sent at 3/25/2022 12:36 PM CDT -----  Type:  Patient Returning Call    Who Called:  Patient  Who Left Message for Patient:  Maia  Does the patient know what this is regarding?:  Wednesday at 8:30 is fine  Best Call Back Number:  [phone]  Additional Information:

## 2022-03-28 ENCOUNTER — PATIENT MESSAGE (OUTPATIENT)
Dept: GYNECOLOGIC ONCOLOGY | Facility: CLINIC | Age: 42
End: 2022-03-28
Payer: COMMERCIAL

## 2022-03-28 ENCOUNTER — PATIENT MESSAGE (OUTPATIENT)
Dept: HEMATOLOGY/ONCOLOGY | Facility: CLINIC | Age: 42
End: 2022-03-28
Payer: COMMERCIAL

## 2022-03-29 ENCOUNTER — OFFICE VISIT (OUTPATIENT)
Dept: GYNECOLOGIC ONCOLOGY | Facility: CLINIC | Age: 42
End: 2022-03-29
Payer: COMMERCIAL

## 2022-03-29 DIAGNOSIS — Z15.01 BRCA2 GENE MUTATION POSITIVE: Primary | Chronic | ICD-10-CM

## 2022-03-29 DIAGNOSIS — Z90.710 S/P LAPAROSCOPIC HYSTERECTOMY: ICD-10-CM

## 2022-03-29 DIAGNOSIS — Z15.09 BRCA2 GENE MUTATION POSITIVE: Primary | Chronic | ICD-10-CM

## 2022-03-29 PROCEDURE — 1160F RVW MEDS BY RX/DR IN RCRD: CPT | Mod: CPTII,95,, | Performed by: OBSTETRICS & GYNECOLOGY

## 2022-03-29 PROCEDURE — 1160F PR REVIEW ALL MEDS BY PRESCRIBER/CLIN PHARMACIST DOCUMENTED: ICD-10-PCS | Mod: CPTII,95,, | Performed by: OBSTETRICS & GYNECOLOGY

## 2022-03-29 PROCEDURE — 99024 PR POST-OP FOLLOW-UP VISIT: ICD-10-PCS | Mod: 95,,, | Performed by: OBSTETRICS & GYNECOLOGY

## 2022-03-29 PROCEDURE — 1159F PR MEDICATION LIST DOCUMENTED IN MEDICAL RECORD: ICD-10-PCS | Mod: CPTII,95,, | Performed by: OBSTETRICS & GYNECOLOGY

## 2022-03-29 PROCEDURE — 99024 POSTOP FOLLOW-UP VISIT: CPT | Mod: 95,,, | Performed by: OBSTETRICS & GYNECOLOGY

## 2022-03-29 PROCEDURE — 1159F MED LIST DOCD IN RCRD: CPT | Mod: CPTII,95,, | Performed by: OBSTETRICS & GYNECOLOGY

## 2022-03-29 RX ORDER — SODIUM CHLORIDE 0.9 % (FLUSH) 0.9 %
10 SYRINGE (ML) INJECTION
Status: CANCELLED | OUTPATIENT
Start: 2022-03-29

## 2022-03-29 RX ORDER — HEPARIN 100 UNIT/ML
500 SYRINGE INTRAVENOUS
Status: CANCELLED | OUTPATIENT
Start: 2022-03-29

## 2022-03-30 ENCOUNTER — OFFICE VISIT (OUTPATIENT)
Dept: ENDOCRINOLOGY | Facility: CLINIC | Age: 42
End: 2022-03-30
Payer: COMMERCIAL

## 2022-03-30 ENCOUNTER — INFUSION (OUTPATIENT)
Dept: INFUSION THERAPY | Facility: HOSPITAL | Age: 42
End: 2022-03-30
Attending: INTERNAL MEDICINE
Payer: COMMERCIAL

## 2022-03-30 VITALS
HEART RATE: 108 BPM | HEIGHT: 67 IN | BODY MASS INDEX: 18.99 KG/M2 | WEIGHT: 121 LBS | SYSTOLIC BLOOD PRESSURE: 112 MMHG | DIASTOLIC BLOOD PRESSURE: 68 MMHG

## 2022-03-30 DIAGNOSIS — Z79.52 LONG TERM (CURRENT) USE OF SYSTEMIC STEROIDS: Primary | ICD-10-CM

## 2022-03-30 DIAGNOSIS — Z17.1 MALIGNANT NEOPLASM OF UPPER-OUTER QUADRANT OF LEFT BREAST IN FEMALE, ESTROGEN RECEPTOR NEGATIVE: Primary | ICD-10-CM

## 2022-03-30 DIAGNOSIS — C50.412 MALIGNANT NEOPLASM OF UPPER-OUTER QUADRANT OF LEFT BREAST IN FEMALE, ESTROGEN RECEPTOR NEGATIVE: Primary | ICD-10-CM

## 2022-03-30 DIAGNOSIS — C50.412 MALIGNANT NEOPLASM OF UPPER-OUTER QUADRANT OF LEFT BREAST IN FEMALE, ESTROGEN RECEPTOR NEGATIVE: ICD-10-CM

## 2022-03-30 DIAGNOSIS — Z17.1 MALIGNANT NEOPLASM OF UPPER-OUTER QUADRANT OF LEFT BREAST IN FEMALE, ESTROGEN RECEPTOR NEGATIVE: ICD-10-CM

## 2022-03-30 LAB
ALBUMIN SERPL BCP-MCNC: 3.9 G/DL (ref 3.5–5.2)
ALP SERPL-CCNC: 76 U/L (ref 55–135)
ALT SERPL W/O P-5'-P-CCNC: 12 U/L (ref 10–44)
ANION GAP SERPL CALC-SCNC: 11 MMOL/L (ref 8–16)
AST SERPL-CCNC: 12 U/L (ref 10–40)
BASOPHILS # BLD AUTO: 0.03 K/UL (ref 0–0.2)
BASOPHILS NFR BLD: 0.4 % (ref 0–1.9)
BILIRUB SERPL-MCNC: 0.4 MG/DL (ref 0.1–1)
BUN SERPL-MCNC: 13 MG/DL (ref 6–20)
CALCIUM SERPL-MCNC: 9.7 MG/DL (ref 8.7–10.5)
CHLORIDE SERPL-SCNC: 103 MMOL/L (ref 95–110)
CO2 SERPL-SCNC: 27 MMOL/L (ref 23–29)
CREAT SERPL-MCNC: 0.6 MG/DL (ref 0.5–1.4)
DIFFERENTIAL METHOD: ABNORMAL
EOSINOPHIL # BLD AUTO: 0.1 K/UL (ref 0–0.5)
EOSINOPHIL NFR BLD: 1.4 % (ref 0–8)
ERYTHROCYTE [DISTWIDTH] IN BLOOD BY AUTOMATED COUNT: 12.6 % (ref 11.5–14.5)
EST. GFR  (AFRICAN AMERICAN): >60 ML/MIN/1.73 M^2
EST. GFR  (NON AFRICAN AMERICAN): >60 ML/MIN/1.73 M^2
GLUCOSE SERPL-MCNC: 92 MG/DL (ref 70–110)
HCT VFR BLD AUTO: 37.7 % (ref 37–48.5)
HGB BLD-MCNC: 12.5 G/DL (ref 12–16)
IMM GRANULOCYTES # BLD AUTO: 0.06 K/UL (ref 0–0.04)
IMM GRANULOCYTES NFR BLD AUTO: 0.8 % (ref 0–0.5)
LYMPHOCYTES # BLD AUTO: 1.2 K/UL (ref 1–4.8)
LYMPHOCYTES NFR BLD: 15.3 % (ref 18–48)
MCH RBC QN AUTO: 30.6 PG (ref 27–31)
MCHC RBC AUTO-ENTMCNC: 33.2 G/DL (ref 32–36)
MCV RBC AUTO: 92 FL (ref 82–98)
MONOCYTES # BLD AUTO: 0.6 K/UL (ref 0.3–1)
MONOCYTES NFR BLD: 7.4 % (ref 4–15)
NEUTROPHILS # BLD AUTO: 5.9 K/UL (ref 1.8–7.7)
NEUTROPHILS NFR BLD: 74.7 % (ref 38–73)
NRBC BLD-RTO: 0 /100 WBC
PLATELET # BLD AUTO: 209 K/UL (ref 150–450)
PMV BLD AUTO: 8.1 FL (ref 9.2–12.9)
POTASSIUM SERPL-SCNC: 3.8 MMOL/L (ref 3.5–5.1)
PROT SERPL-MCNC: 6.6 G/DL (ref 6–8.4)
RBC # BLD AUTO: 4.09 M/UL (ref 4–5.4)
SODIUM SERPL-SCNC: 141 MMOL/L (ref 136–145)
WBC # BLD AUTO: 7.89 K/UL (ref 3.9–12.7)

## 2022-03-30 PROCEDURE — 1111F PR DISCHARGE MEDS RECONCILED W/ CURRENT OUTPATIENT MED LIST: ICD-10-PCS | Mod: CPTII,S$GLB,, | Performed by: INTERNAL MEDICINE

## 2022-03-30 PROCEDURE — 3008F BODY MASS INDEX DOCD: CPT | Mod: CPTII,S$GLB,, | Performed by: INTERNAL MEDICINE

## 2022-03-30 PROCEDURE — 80053 COMPREHEN METABOLIC PANEL: CPT | Mod: PN | Performed by: INTERNAL MEDICINE

## 2022-03-30 PROCEDURE — 3078F PR MOST RECENT DIASTOLIC BLOOD PRESSURE < 80 MM HG: ICD-10-PCS | Mod: CPTII,S$GLB,, | Performed by: INTERNAL MEDICINE

## 2022-03-30 PROCEDURE — 1160F PR REVIEW ALL MEDS BY PRESCRIBER/CLIN PHARMACIST DOCUMENTED: ICD-10-PCS | Mod: CPTII,S$GLB,, | Performed by: INTERNAL MEDICINE

## 2022-03-30 PROCEDURE — 85025 COMPLETE CBC W/AUTO DIFF WBC: CPT | Mod: PN | Performed by: INTERNAL MEDICINE

## 2022-03-30 PROCEDURE — 3074F SYST BP LT 130 MM HG: CPT | Mod: CPTII,S$GLB,, | Performed by: INTERNAL MEDICINE

## 2022-03-30 PROCEDURE — 1159F PR MEDICATION LIST DOCUMENTED IN MEDICAL RECORD: ICD-10-PCS | Mod: CPTII,S$GLB,, | Performed by: INTERNAL MEDICINE

## 2022-03-30 PROCEDURE — 99999 PR PBB SHADOW E&M-EST. PATIENT-LVL IV: CPT | Mod: PBBFAC,,, | Performed by: INTERNAL MEDICINE

## 2022-03-30 PROCEDURE — 99204 PR OFFICE/OUTPT VISIT, NEW, LEVL IV, 45-59 MIN: ICD-10-PCS | Mod: S$GLB,,, | Performed by: INTERNAL MEDICINE

## 2022-03-30 PROCEDURE — 36591 DRAW BLOOD OFF VENOUS DEVICE: CPT | Mod: PN

## 2022-03-30 PROCEDURE — 1160F RVW MEDS BY RX/DR IN RCRD: CPT | Mod: CPTII,S$GLB,, | Performed by: INTERNAL MEDICINE

## 2022-03-30 PROCEDURE — 99999 PR PBB SHADOW E&M-EST. PATIENT-LVL IV: ICD-10-PCS | Mod: PBBFAC,,, | Performed by: INTERNAL MEDICINE

## 2022-03-30 PROCEDURE — 1159F MED LIST DOCD IN RCRD: CPT | Mod: CPTII,S$GLB,, | Performed by: INTERNAL MEDICINE

## 2022-03-30 PROCEDURE — 1111F DSCHRG MED/CURRENT MED MERGE: CPT | Mod: CPTII,S$GLB,, | Performed by: INTERNAL MEDICINE

## 2022-03-30 PROCEDURE — 25000003 PHARM REV CODE 250: Mod: PN | Performed by: INTERNAL MEDICINE

## 2022-03-30 PROCEDURE — A4216 STERILE WATER/SALINE, 10 ML: HCPCS | Mod: PN | Performed by: INTERNAL MEDICINE

## 2022-03-30 PROCEDURE — 3074F PR MOST RECENT SYSTOLIC BLOOD PRESSURE < 130 MM HG: ICD-10-PCS | Mod: CPTII,S$GLB,, | Performed by: INTERNAL MEDICINE

## 2022-03-30 PROCEDURE — 3008F PR BODY MASS INDEX (BMI) DOCUMENTED: ICD-10-PCS | Mod: CPTII,S$GLB,, | Performed by: INTERNAL MEDICINE

## 2022-03-30 PROCEDURE — 99204 OFFICE O/P NEW MOD 45 MIN: CPT | Mod: S$GLB,,, | Performed by: INTERNAL MEDICINE

## 2022-03-30 PROCEDURE — 3078F DIAST BP <80 MM HG: CPT | Mod: CPTII,S$GLB,, | Performed by: INTERNAL MEDICINE

## 2022-03-30 RX ORDER — HEPARIN 100 UNIT/ML
500 SYRINGE INTRAVENOUS
Status: CANCELLED | OUTPATIENT
Start: 2022-03-30

## 2022-03-30 RX ORDER — SODIUM CHLORIDE 0.9 % (FLUSH) 0.9 %
10 SYRINGE (ML) INJECTION
Status: DISCONTINUED | OUTPATIENT
Start: 2022-03-30 | End: 2022-03-30 | Stop reason: HOSPADM

## 2022-03-30 RX ORDER — SODIUM CHLORIDE 0.9 % (FLUSH) 0.9 %
10 SYRINGE (ML) INJECTION
Status: CANCELLED | OUTPATIENT
Start: 2022-03-30

## 2022-03-30 RX ORDER — HYDROCORTISONE 10 MG/1
TABLET ORAL
Qty: 90 TABLET | Refills: 3 | Status: SHIPPED | OUTPATIENT
Start: 2022-03-30 | End: 2022-05-04 | Stop reason: SDUPTHER

## 2022-03-30 RX ADMIN — Medication 10 ML: at 10:03

## 2022-03-30 NOTE — PLAN OF CARE
Problem: Adult Inpatient Plan of Care  Goal: Plan of Care Review  Outcome: Ongoing, Progressing     PAC flushed and labs collected; saline locked. Pt denied the need for a printed schedule.

## 2022-03-30 NOTE — PROGRESS NOTES
CHIEF COMPLAINT: Long Term Steroids  42 y.o. old being seen as a new patient. Has BRCA2 mutation Breast CaOn prednisone 10 mg daily. Had robotic NICOLETTE on 3/4/22. On 3/11/22 went to ED at [facility] forN/V, weakness and CP. She was hypotensive in ED @ 75/38. Given VF and 125 mg Solucortef on 3/11/22. Cortisol of 19.2 done on 3/12. States started on steroids when got Chemo. Was on Keytruda. Last year was thought to have toxicity due to chemo approx July 2021. Went to [place] for a Wellness trip and developed severe weakness. Had to go to hospital. States started high dose steroids. Every so often she would get severe weakness, N/V, diarrhea. Then would get high dose steroids and IVF and would feel better. For the last few months was on daily steroids. When she tried to wean or stopped for a few days she would get a similar episode. No skin tone darkening.     Admitted Aug 13/21- was given 4 mg Dex IV on 8/14 and 8/15. Then converted to daily oral dex 4 mg daily. Then discharged on oral dex taper.     On 9/2/21 was given prednisone @ 60 mg daily and tapered over a month.       PAST MEDICAL HISTORY/PAST SURGICAL HISTORY:  Reviewed in [facility]    SOCIAL HISTORY: Reviewed in [facility]    FAMILY HISTORY:  No known thyroid disease. Father with DM 2. No known adrenal disorders.     MEDICATIONS/ALLERGIES: The patient's MedCard has been updated and reviewed.      ROS:   Constitutional: Weight stable   Cardiovascular: No CP or Palpitations  Respiratory: No SOB  Remainder ROS negative        PE:    GENERAL: Well developed, well nourished.  NECK: Supple, trachea midline, No palpable thyroid nodules.   CHEST: Resp even and unlabored, CTA bilateral.  CARDIAC: RRR, S1, S2 heard, no murmurs, rubs, S3, or S4  SKIN: no acanthosis nigracans.  No darkening of gums or palms.     LABS/Radiology     Latest Reference Range & Units 03/13/22 07:01   WBC 3.90 - 12.70 K/uL 6.77   RBC 4.00 - 5.40 M/uL 2.76 (L)   Hemoglobin 12.0 - 16.0 g/dL 8.7 (L)   Hematocrit  37.0 - 48.5 % 25.8 (L)   MCV 82 - 98 fL 94   MCH 27.0 - 31.0 pg 31.5 (H)   MCHC 32.0 - 36.0 g/dL 33.7   RDW 11.5 - 14.5 % 12.1   Platelets 150 - 450 K/uL 180      Latest Reference Range & Units 03/13/22 07:01   Sodium 136 - 145 mmol/L 141   Potassium 3.5 - 5.1 mmol/L 3.4 (L)   Chloride 95 - 110 mmol/L 111 (H)   CO2 23 - 29 mmol/L 23   Anion Gap 8 - 16 mmol/L 7 (L)   BUN 6 - 20 mg/dL 7   Creatinine 0.5 - 1.4 mg/dL 0.6   EGFR if non African American >60 mL/min/1.73 m^2 >60 [1]   EGFR if African American >60 mL/min/1.73 m^2 >60   Glucose 70 - 110 mg/dL 98   Calcium 8.7 - 10.5 mg/dL 8.9      Latest Reference Range & Units Most Recent   TSH 0.400 - 4.000 uIU/mL 0.783  10/14/21 14:42   Free T4 0.71 - 1.51 ng/dL 1.10  10/14/21 14:42   Procalcitonin <0.25 ng/mL 0.08 [1]  07/21/21 14:47      Latest Reference Range & Units 08/15/21 07:57 09/07/21 08:38 09/10/21 09:45 03/12/22 00:39   Cortisol ug/dL  9.30 [1] <1.00 [2] 19.20 [3]   Cortisol, 8 AM 4.30 - 22.40 ug/dL <1.00 (L)      (L): Data is abnormally low  [1] Cortisol Reference Range:   Before 10:00 am: 4.46-22.7 ug/dL   After 5:00 pm:    1.7-14.1 ug/dL     [2] Cortisol Reference Range:   Before 10:00 am: 4.46-22.7 ug/dL   After 5:00 pm:    1.7-14.1 ug/dL     [3] Cortisol Reference Range:   Before 10:00 am: 4.46-22.7 ug/dL   After 5:00 pm:    1.7-14.1 ug/dL        ASSESSMENT/PLAN:  1. Long Term Steroids- will need to reassess if has Adrenal Insufficiency. The undetectable cortisol was after she received Dex. Currently on prednisone. Some of the testing was on steroids. At this point will convert prednisone to Hydrocortisone 20/10. She will stay on that dose x 2 weeks and let us know how she is doing. Discussed Adrenal Insufficiency symptoms to monitor for. Not currently orthostatic. If stable at 2 weeks with convert Hydrocortisone to 10/5. Will place on that dose x 1 week and then do a cosyntropin stim test.     2. Breast Ca- Was on keytruda.     FOLLOWUP  AVS

## 2022-04-03 ENCOUNTER — PATIENT MESSAGE (OUTPATIENT)
Dept: HEMATOLOGY/ONCOLOGY | Facility: CLINIC | Age: 42
End: 2022-04-03
Payer: COMMERCIAL

## 2022-04-04 ENCOUNTER — TELEPHONE (OUTPATIENT)
Dept: HEMATOLOGY/ONCOLOGY | Facility: CLINIC | Age: 42
End: 2022-04-04
Payer: COMMERCIAL

## 2022-04-04 NOTE — PROGRESS NOTES
Subjective:      Patient ID: Alison Swann is a 42 y.o. female.    Chief Complaint: No chief complaint on file.    The patient location is: home  The chief complaint leading to consultation is: post op     Visit type: audiovisual    Face to Face time with patient: 10min  20 minutes of total time spent on the encounter, which includes face to face time and non-face to face time preparing to see the patient (eg, review of tests), Obtaining and/or reviewing separately obtained history, Documenting clinical information in the electronic or other health record, Independently interpreting results (not separately reported) and communicating results to the patient/family/caregiver, or Care coordination (not separately reported).         Each patient to whom he or she provides medical services by telemedicine is:  (1) informed of the relationship between the physician and patient and the respective role of any other health care provider with respect to management of the patient; and (2) notified that he or she may decline to receive medical services by telemedicine and may withdraw from such care at any time.    Notes:     HPI  Pelvic US 1/2022- unremarkable  The uterus measures 5.4 x 3.2 x 2.3 cm and the endometrium measures 2 mm and thin.  The right ovary is thought to be visualized at 2.1 x 2.0 x 1.0 cm and the left ovary is thought to be visualized at 2.0 x 1.7 x 0.9 cm     1/2022  23     She underwent double mastectomy/reconstruction 9/2021 and completed adjuvant RT 1/4/2022.     She desires to move forward with risk reducing BSO.   Discussed RA BSO as well as R/B/A to concurrent hysterectomy. She desires concurrent hysterectomy.     Today's visit:  S/p RTLH/BSO 3/4/2022  Final pathology shows benign risk reducing surgery and negative cytologic washings.   Readmitted 3/11/2022 with addisonian like crisis. CT was unremarkable for acute intra-abdominal post operative concerns.   She reports overall feeling well  from a surgical standpoint. Has an endocrinology consult this week. Also describes some labile emotions.      Referral history:  40 y/o para 1 referred by [facility] for positive BRCA2 mutation. Personal h/o L breast cancer. S/p NACT with Adriamycin/Cytoxan, Pembrolizumab. Planning for surgery on September 17 with [facility] then radiation to follow. Oncologist is Dr. Sandra Sotelo.      Surgical history includes CS x 1 (LTV), BTL, endometrial ablation.      LMP approximately 2017 after endometrial ablation. History of oral contraceptive use for 8 years.     Family history significant for mother with breast cancer (age 50).      No family history of ovarian, colon, or uterine cancer.      No other medical problems.      Denies history of abnormal pap smear. Reports last pap in 2020 was normal. OB/GYN is Dr. Gilmore in [place].  Review of Systems   Constitutional: Negative for appetite change, chills, fatigue and fever.   HENT: Negative for mouth sores.    Respiratory: Negative for cough and shortness of breath.    Cardiovascular: Negative for leg swelling.   Gastrointestinal: Negative for abdominal pain, blood in stool, constipation and diarrhea.   Endocrine: Negative for cold intolerance.   Genitourinary: Negative for dysuria and vaginal bleeding.   Musculoskeletal: Negative for myalgias.   Skin: Negative for rash.   Allergic/Immunologic: Negative.    Neurological: Negative for weakness and numbness.   Hematological: Negative for adenopathy. Does not bruise/bleed easily.   Psychiatric/Behavioral: Negative for confusion.       Objective:   Physical Exam:   Constitutional: She is oriented to person, place, and time. She appears well-developed and well-nourished. No distress.    HENT:   Head: Normocephalic and atraumatic.    Eyes: EOM are normal.      Pulmonary/Chest: Effort normal.                  Musculoskeletal: Moves all extremeties.       Neurological: She is alert and oriented to person, place, and  time.    Skin: Skin is dry. No pallor.    Psychiatric: She has a normal mood and affect. Her behavior is normal. Judgment and thought content normal.       Assessment:     1. BRCA2 gene mutation positive    2. s/p RA-TLH/BSO        Plan:   No orders of the defined types were placed in this encounter.    S/p benign risk reducing surgery.   Overall doing ok from a surgical standpoint.   Has endocrine follow up as above.   Continue post operative care. RTC 6 weeks for follow up post operative visit or sooner if needed.

## 2022-04-04 NOTE — TELEPHONE ENCOUNTER
Spoke with patient who reports that she had about 5 episodes of vomiting on Saturday night after dinner.  Since then she has not vomited and is no longer having nausea but now has no appetite and can hardly force herself to eat anything.  Patient states that usually diarrhea follows this but she has not had any episodes as of yet.  She is able to drink fluids without any issue and staying hydrated.  Requesting advice from MD.

## 2022-04-07 ENCOUNTER — PATIENT MESSAGE (OUTPATIENT)
Dept: HEMATOLOGY/ONCOLOGY | Facility: CLINIC | Age: 42
End: 2022-04-07
Payer: COMMERCIAL

## 2022-04-08 DIAGNOSIS — B00.1 FEVER BLISTER: Primary | ICD-10-CM

## 2022-04-08 RX ORDER — VALACYCLOVIR HYDROCHLORIDE 500 MG/1
500 TABLET, FILM COATED ORAL 2 TIMES DAILY
Qty: 10 TABLET | Refills: 0 | Status: SHIPPED | OUTPATIENT
Start: 2022-04-08 | End: 2022-06-24 | Stop reason: SDUPTHER

## 2022-04-12 ENCOUNTER — TELEPHONE (OUTPATIENT)
Dept: ENDOCRINOLOGY | Facility: CLINIC | Age: 42
End: 2022-04-12
Payer: COMMERCIAL

## 2022-04-12 NOTE — TELEPHONE ENCOUNTER
Pt advised and is willing to try Cortef 15 mg Q AM and 5 mg early PM.  She will let us know if having any issues.

## 2022-04-12 NOTE — TELEPHONE ENCOUNTER
----- Message from Elly Moreno sent at 4/12/2022  1:20 PM CDT -----  Type: Needs Medical Advice  Who Called:  Pt  Best Call Back Number: [phone]  Additional Information: requesting return call to discuss rx (hydrocortisone (CORTEF) 10 MG Tab)--please advise--Thank you

## 2022-04-12 NOTE — TELEPHONE ENCOUNTER
"Pt just started the Cortef 2 days ago, but just realized she's taking 10 mg BID instead of 20 in AM and 10 pm.  However, she's "having severe anxiety about taking 30 mg of steroids daily", states she's "afraid of gaining weight and messing up all the cosmetic surgery she's had due to weight/gain loss".  Please advise.   "

## 2022-04-12 NOTE — TELEPHONE ENCOUNTER
The amount we were giving her was almost equivalent to prednisone 10 mg that she was on. The dosing is different for both  Could try Hydrocortisone 15 in AM and 5 mg in early afternoon

## 2022-04-13 ENCOUNTER — PATIENT MESSAGE (OUTPATIENT)
Dept: GYNECOLOGIC ONCOLOGY | Facility: CLINIC | Age: 42
End: 2022-04-13
Payer: COMMERCIAL

## 2022-04-16 ENCOUNTER — PATIENT MESSAGE (OUTPATIENT)
Dept: ENDOCRINOLOGY | Facility: CLINIC | Age: 42
End: 2022-04-16
Payer: COMMERCIAL

## 2022-04-18 ENCOUNTER — PATIENT MESSAGE (OUTPATIENT)
Dept: ENDOCRINOLOGY | Facility: CLINIC | Age: 42
End: 2022-04-18
Payer: COMMERCIAL

## 2022-04-18 NOTE — TELEPHONE ENCOUNTER
Pt has  A port and cannot get stuck  Can [facility] to [facility] where I believe they can do the Cosyntropin stim test through port?

## 2022-04-18 NOTE — TELEPHONE ENCOUNTER
Ideally as close to 8 AM as possible as that is when cortisol peaks.   I want to see her AM levels as well as stimulated level.

## 2022-04-18 NOTE — TELEPHONE ENCOUNTER
Change to Hydrocortisone 10 in AM and 5 mg in early afternoon. After being on it 1 week, will need cosyntropin stim test  Can hold steroids day prior and restart after test complete until we get all the results

## 2022-04-18 NOTE — TELEPHONE ENCOUNTER
That's fine. Will need to verify they can do it.   Not sure how to put a therapy plan in for that  Will need to make sure there is an ACTH with the baseline test

## 2022-04-19 RX ORDER — SODIUM CHLORIDE 0.9 % (FLUSH) 0.9 %
3 SYRINGE (ML) INJECTION
Status: CANCELLED | OUTPATIENT
Start: 2022-04-19

## 2022-04-19 RX ORDER — COSYNTROPIN 0.25 MG/ML
0.25 INJECTION, POWDER, FOR SOLUTION INTRAMUSCULAR; INTRAVENOUS
Status: CANCELLED | OUTPATIENT
Start: 2022-04-19

## 2022-04-19 NOTE — TELEPHONE ENCOUNTER
[facility] do acth stim test  They have sent you a therapy plan to sign  It will be done 4/29, Friday of next week

## 2022-04-22 ENCOUNTER — PATIENT MESSAGE (OUTPATIENT)
Dept: ENDOCRINOLOGY | Facility: CLINIC | Age: 42
End: 2022-04-22
Payer: COMMERCIAL

## 2022-04-25 ENCOUNTER — PATIENT MESSAGE (OUTPATIENT)
Dept: HEMATOLOGY/ONCOLOGY | Facility: CLINIC | Age: 42
End: 2022-04-25
Payer: COMMERCIAL

## 2022-04-25 NOTE — TELEPHONE ENCOUNTER
"Pt has appt for acth stim test with [facility] this Friday  Asking for extra labs, "all my hormones"  Please see message and advise  "

## 2022-04-25 NOTE — TELEPHONE ENCOUNTER
There is not a hormone panel to order. May need to have her do a visit to see what specifically the issues are. We can only order tests based on certain conditions.     Some alternative medicine doctors do a panel of hormones. I'm not sure if that is what she is referring to. Those are usually done in their clinics and sent off to specific labs.

## 2022-04-27 DIAGNOSIS — C50.412 MALIGNANT NEOPLASM OF UPPER-OUTER QUADRANT OF LEFT BREAST IN FEMALE, ESTROGEN RECEPTOR NEGATIVE: Primary | ICD-10-CM

## 2022-04-27 DIAGNOSIS — Z17.1 MALIGNANT NEOPLASM OF UPPER-OUTER QUADRANT OF LEFT BREAST IN FEMALE, ESTROGEN RECEPTOR NEGATIVE: Primary | ICD-10-CM

## 2022-04-28 ENCOUNTER — OFFICE VISIT (OUTPATIENT)
Dept: GYNECOLOGIC ONCOLOGY | Facility: CLINIC | Age: 42
End: 2022-04-28
Payer: COMMERCIAL

## 2022-04-28 ENCOUNTER — PATIENT MESSAGE (OUTPATIENT)
Dept: HEMATOLOGY/ONCOLOGY | Facility: CLINIC | Age: 42
End: 2022-04-28
Payer: COMMERCIAL

## 2022-04-28 VITALS
DIASTOLIC BLOOD PRESSURE: 74 MMHG | BODY MASS INDEX: 19.42 KG/M2 | WEIGHT: 124 LBS | SYSTOLIC BLOOD PRESSURE: 117 MMHG | HEART RATE: 85 BPM

## 2022-04-28 DIAGNOSIS — Z90.710 S/P LAPAROSCOPIC HYSTERECTOMY: ICD-10-CM

## 2022-04-28 DIAGNOSIS — Z15.01 BRCA2 GENE MUTATION POSITIVE: Primary | ICD-10-CM

## 2022-04-28 DIAGNOSIS — Z15.09 BRCA2 GENE MUTATION POSITIVE: Primary | ICD-10-CM

## 2022-04-28 PROCEDURE — 3074F PR MOST RECENT SYSTOLIC BLOOD PRESSURE < 130 MM HG: ICD-10-PCS | Mod: CPTII,S$GLB,, | Performed by: OBSTETRICS & GYNECOLOGY

## 2022-04-28 PROCEDURE — 99999 PR PBB SHADOW E&M-EST. PATIENT-LVL III: ICD-10-PCS | Mod: PBBFAC,,, | Performed by: OBSTETRICS & GYNECOLOGY

## 2022-04-28 PROCEDURE — 3008F BODY MASS INDEX DOCD: CPT | Mod: CPTII,S$GLB,, | Performed by: OBSTETRICS & GYNECOLOGY

## 2022-04-28 PROCEDURE — 3074F SYST BP LT 130 MM HG: CPT | Mod: CPTII,S$GLB,, | Performed by: OBSTETRICS & GYNECOLOGY

## 2022-04-28 PROCEDURE — 99999 PR PBB SHADOW E&M-EST. PATIENT-LVL III: CPT | Mod: PBBFAC,,, | Performed by: OBSTETRICS & GYNECOLOGY

## 2022-04-28 PROCEDURE — 99024 PR POST-OP FOLLOW-UP VISIT: ICD-10-PCS | Mod: S$GLB,,, | Performed by: OBSTETRICS & GYNECOLOGY

## 2022-04-28 PROCEDURE — 3078F PR MOST RECENT DIASTOLIC BLOOD PRESSURE < 80 MM HG: ICD-10-PCS | Mod: CPTII,S$GLB,, | Performed by: OBSTETRICS & GYNECOLOGY

## 2022-04-28 PROCEDURE — 3008F PR BODY MASS INDEX (BMI) DOCUMENTED: ICD-10-PCS | Mod: CPTII,S$GLB,, | Performed by: OBSTETRICS & GYNECOLOGY

## 2022-04-28 PROCEDURE — 1159F MED LIST DOCD IN RCRD: CPT | Mod: CPTII,S$GLB,, | Performed by: OBSTETRICS & GYNECOLOGY

## 2022-04-28 PROCEDURE — 3078F DIAST BP <80 MM HG: CPT | Mod: CPTII,S$GLB,, | Performed by: OBSTETRICS & GYNECOLOGY

## 2022-04-28 PROCEDURE — 99024 POSTOP FOLLOW-UP VISIT: CPT | Mod: S$GLB,,, | Performed by: OBSTETRICS & GYNECOLOGY

## 2022-04-28 PROCEDURE — 1159F PR MEDICATION LIST DOCUMENTED IN MEDICAL RECORD: ICD-10-PCS | Mod: CPTII,S$GLB,, | Performed by: OBSTETRICS & GYNECOLOGY

## 2022-04-28 NOTE — PROGRESS NOTES
Subjective:       Patient ID: Alison Swann is a 42 y.o. female.    Chief Complaint: Post-op Evaluation    Today's visit:  S/p RTLH/BSO 3/4/2022  Final pathology shows benign risk reducing surgery and negative cytologic washings.   Readmitted 3/11/2022 with addisonian like crisis. CT was unremarkable for acute intra-abdominal post operative concerns.     Presents today for follow up post operative visit.   She reports overall feeling well from a surgical standpoint.      Referral history:  42 y/o para 1 referred by [facility] for positive BRCA2 mutation. Personal h/o L breast cancer. S/p NACT with Adriamycin/Cytoxan, Pembrolizumab. Planning for surgery on September 17 with [facility] then radiation to follow. Oncologist is Dr. Sandra Sotelo.      Surgical history includes CS x 1 (LTV), BTL, endometrial ablation.      LMP approximately 2017 after endometrial ablation. History of oral contraceptive use for 8 years.     Family history significant for mother with breast cancer (age 50).      No family history of ovarian, colon, or uterine cancer.      No other medical problems.      Denies history of abnormal pap smear. Reports last pap in 2020 was normal. OB/GYN is Dr. Gilmore in [place].    Pelvic US 1/2022- unremarkable  The uterus measures 5.4 x 3.2 x 2.3 cm and the endometrium measures 2 mm and thin.  The right ovary is thought to be visualized at 2.1 x 2.0 x 1.0 cm and the left ovary is thought to be visualized at 2.0 x 1.7 x 0.9 cm     1/2022  23     She underwent double mastectomy/reconstruction 9/2021 and completed adjuvant RT 1/4/2022.     She desires to move forward with risk reducing BSO.   Discussed RA BSO as well as R/B/A to concurrent hysterectomy. She desires concurrent hysterectomy.     Review of Systems   Constitutional: Negative for appetite change, chills, fatigue and fever.   HENT: Negative for mouth sores.    Respiratory: Negative for cough and shortness of breath.     Cardiovascular: Negative for leg swelling.   Gastrointestinal: Negative for abdominal pain, blood in stool, constipation and diarrhea.   Endocrine: Negative for cold intolerance.   Genitourinary: Negative for dysuria and vaginal bleeding.   Musculoskeletal: Negative for myalgias.   Integumentary:  Negative for rash.   Allergic/Immunologic: Negative.    Neurological: Negative for weakness and numbness.   Hematological: Negative for adenopathy. Does not bruise/bleed easily.   Psychiatric/Behavioral: Negative for confusion.         Objective:      Physical Exam  Vitals reviewed.   Constitutional:       Appearance: Normal appearance.   HENT:      Head: Normocephalic and atraumatic.   Eyes:      Pupils: Pupils are equal, round, and reactive to light.   Cardiovascular:      Rate and Rhythm: Normal rate.   Pulmonary:      Effort: Pulmonary effort is normal.   Abdominal:      General: Abdomen is flat.      Palpations: Abdomen is soft.   Genitourinary:     Vagina: Normal.      Uterus: Absent.       Adnexa:         Right: No mass.          Left: No mass.        Comments: Vaginal cuff healing well  Musculoskeletal:      Cervical back: Normal range of motion.   Neurological:      Mental Status: She is alert.         Assessment:       Problem List Items Addressed This Visit        Renal/    s/p RA-TLH/BSO       Oncology    BRCA2 gene mutation positive - Primary (Chronic)          Plan:         Has recovered appropriately from surgery.   Benign risk reducing surgery.  Will return care to primary providers.   May return to clinic with me as needed.

## 2022-04-29 ENCOUNTER — INFUSION (OUTPATIENT)
Dept: INFUSION THERAPY | Facility: HOSPITAL | Age: 42
End: 2022-04-29
Attending: INTERNAL MEDICINE
Payer: COMMERCIAL

## 2022-04-29 ENCOUNTER — PATIENT MESSAGE (OUTPATIENT)
Dept: ENDOCRINOLOGY | Facility: CLINIC | Age: 42
End: 2022-04-29
Payer: COMMERCIAL

## 2022-04-29 ENCOUNTER — OFFICE VISIT (OUTPATIENT)
Dept: HEMATOLOGY/ONCOLOGY | Facility: CLINIC | Age: 42
End: 2022-04-29
Payer: COMMERCIAL

## 2022-04-29 VITALS
DIASTOLIC BLOOD PRESSURE: 73 MMHG | RESPIRATION RATE: 16 BRPM | BODY MASS INDEX: 19.58 KG/M2 | WEIGHT: 124.75 LBS | HEIGHT: 67 IN | OXYGEN SATURATION: 97 % | HEART RATE: 120 BPM | TEMPERATURE: 98 F | SYSTOLIC BLOOD PRESSURE: 123 MMHG

## 2022-04-29 VITALS
SYSTOLIC BLOOD PRESSURE: 118 MMHG | RESPIRATION RATE: 16 BRPM | TEMPERATURE: 98 F | DIASTOLIC BLOOD PRESSURE: 68 MMHG | BODY MASS INDEX: 19.34 KG/M2 | WEIGHT: 123.44 LBS | HEART RATE: 82 BPM

## 2022-04-29 DIAGNOSIS — C50.412 MALIGNANT NEOPLASM OF UPPER-OUTER QUADRANT OF LEFT BREAST IN FEMALE, ESTROGEN RECEPTOR NEGATIVE: Primary | ICD-10-CM

## 2022-04-29 DIAGNOSIS — Z15.01 BRCA2 GENE MUTATION POSITIVE: Chronic | ICD-10-CM

## 2022-04-29 DIAGNOSIS — Z15.09 BRCA2 GENE MUTATION POSITIVE: Chronic | ICD-10-CM

## 2022-04-29 DIAGNOSIS — E27.2 ADRENAL CRISIS: Primary | ICD-10-CM

## 2022-04-29 DIAGNOSIS — E27.2 ADRENAL CRISIS: ICD-10-CM

## 2022-04-29 DIAGNOSIS — Z83.3 FAMILY HISTORY OF DIABETES MELLITUS: ICD-10-CM

## 2022-04-29 DIAGNOSIS — Z17.1 MALIGNANT NEOPLASM OF UPPER-OUTER QUADRANT OF LEFT BREAST IN FEMALE, ESTROGEN RECEPTOR NEGATIVE: Primary | ICD-10-CM

## 2022-04-29 DIAGNOSIS — Z51.81 ENCOUNTER FOR THERAPEUTIC DRUG MONITORING: ICD-10-CM

## 2022-04-29 LAB
ALBUMIN SERPL BCP-MCNC: 3.8 G/DL (ref 3.5–5.2)
ALP SERPL-CCNC: 87 U/L (ref 55–135)
ALT SERPL W/O P-5'-P-CCNC: 10 U/L (ref 10–44)
ANION GAP SERPL CALC-SCNC: 11 MMOL/L (ref 8–16)
AST SERPL-CCNC: 14 U/L (ref 10–40)
BASOPHILS NFR BLD: 1 % (ref 0–1.9)
BILIRUB SERPL-MCNC: 0.4 MG/DL (ref 0.1–1)
BUN SERPL-MCNC: 8 MG/DL (ref 6–20)
CALCIUM SERPL-MCNC: 9.7 MG/DL (ref 8.7–10.5)
CHLORIDE SERPL-SCNC: 104 MMOL/L (ref 95–110)
CO2 SERPL-SCNC: 25 MMOL/L (ref 23–29)
CORTIS SERPL-MCNC: 2.9 UG/DL
CORTIS SERPL-MCNC: 3.7 UG/DL
CORTIS SERPL-MCNC: <1 UG/DL
CREAT SERPL-MCNC: 0.7 MG/DL (ref 0.5–1.4)
DIFFERENTIAL METHOD: ABNORMAL
EOSINOPHIL NFR BLD: 1 % (ref 0–8)
ERYTHROCYTE [DISTWIDTH] IN BLOOD BY AUTOMATED COUNT: 12.4 % (ref 11.5–14.5)
EST. GFR  (AFRICAN AMERICAN): >60 ML/MIN/1.73 M^2
EST. GFR  (NON AFRICAN AMERICAN): >60 ML/MIN/1.73 M^2
GLUCOSE SERPL-MCNC: 80 MG/DL (ref 70–110)
HCT VFR BLD AUTO: 38.6 % (ref 37–48.5)
HGB BLD-MCNC: 13.2 G/DL (ref 12–16)
IMM GRANULOCYTES # BLD AUTO: ABNORMAL K/UL (ref 0–0.04)
IMM GRANULOCYTES NFR BLD AUTO: ABNORMAL % (ref 0–0.5)
LYMPHOCYTES NFR BLD: 41 % (ref 18–48)
MCH RBC QN AUTO: 30.5 PG (ref 27–31)
MCHC RBC AUTO-ENTMCNC: 34.2 G/DL (ref 32–36)
MCV RBC AUTO: 89 FL (ref 82–98)
MONOCYTES NFR BLD: 10 % (ref 4–15)
NEUTROPHILS NFR BLD: 47 % (ref 38–73)
NRBC BLD-RTO: 0 /100 WBC
PLATELET # BLD AUTO: 214 K/UL (ref 150–450)
PLATELET BLD QL SMEAR: ABNORMAL
PMV BLD AUTO: 8.5 FL (ref 9.2–12.9)
POTASSIUM SERPL-SCNC: 3.7 MMOL/L (ref 3.5–5.1)
PROT SERPL-MCNC: 6.4 G/DL (ref 6–8.4)
RBC # BLD AUTO: 4.33 M/UL (ref 4–5.4)
SODIUM SERPL-SCNC: 140 MMOL/L (ref 136–145)
WBC # BLD AUTO: 3.88 K/UL (ref 3.9–12.7)

## 2022-04-29 PROCEDURE — 1160F RVW MEDS BY RX/DR IN RCRD: CPT | Mod: CPTII,S$GLB,, | Performed by: INTERNAL MEDICINE

## 2022-04-29 PROCEDURE — 82024 ASSAY OF ACTH: CPT | Performed by: INTERNAL MEDICINE

## 2022-04-29 PROCEDURE — A4216 STERILE WATER/SALINE, 10 ML: HCPCS | Mod: PN | Performed by: INTERNAL MEDICINE

## 2022-04-29 PROCEDURE — 3078F DIAST BP <80 MM HG: CPT | Mod: CPTII,S$GLB,, | Performed by: INTERNAL MEDICINE

## 2022-04-29 PROCEDURE — 99214 PR OFFICE/OUTPT VISIT, EST, LEVL IV, 30-39 MIN: ICD-10-PCS | Mod: S$GLB,,, | Performed by: INTERNAL MEDICINE

## 2022-04-29 PROCEDURE — 25000003 PHARM REV CODE 250: Mod: PN | Performed by: INTERNAL MEDICINE

## 2022-04-29 PROCEDURE — 3074F SYST BP LT 130 MM HG: CPT | Mod: CPTII,S$GLB,, | Performed by: INTERNAL MEDICINE

## 2022-04-29 PROCEDURE — 1159F PR MEDICATION LIST DOCUMENTED IN MEDICAL RECORD: ICD-10-PCS | Mod: CPTII,S$GLB,, | Performed by: INTERNAL MEDICINE

## 2022-04-29 PROCEDURE — 1159F MED LIST DOCD IN RCRD: CPT | Mod: CPTII,S$GLB,, | Performed by: INTERNAL MEDICINE

## 2022-04-29 PROCEDURE — 99999 PR PBB SHADOW E&M-EST. PATIENT-LVL III: CPT | Mod: PBBFAC,,, | Performed by: INTERNAL MEDICINE

## 2022-04-29 PROCEDURE — 3008F PR BODY MASS INDEX (BMI) DOCUMENTED: ICD-10-PCS | Mod: CPTII,S$GLB,, | Performed by: INTERNAL MEDICINE

## 2022-04-29 PROCEDURE — 3074F PR MOST RECENT SYSTOLIC BLOOD PRESSURE < 130 MM HG: ICD-10-PCS | Mod: CPTII,S$GLB,, | Performed by: INTERNAL MEDICINE

## 2022-04-29 PROCEDURE — 96374 THER/PROPH/DIAG INJ IV PUSH: CPT | Mod: PN

## 2022-04-29 PROCEDURE — 1160F PR REVIEW ALL MEDS BY PRESCRIBER/CLIN PHARMACIST DOCUMENTED: ICD-10-PCS | Mod: CPTII,S$GLB,, | Performed by: INTERNAL MEDICINE

## 2022-04-29 PROCEDURE — 80053 COMPREHEN METABOLIC PANEL: CPT | Mod: PN | Performed by: INTERNAL MEDICINE

## 2022-04-29 PROCEDURE — 3008F BODY MASS INDEX DOCD: CPT | Mod: CPTII,S$GLB,, | Performed by: INTERNAL MEDICINE

## 2022-04-29 PROCEDURE — 82533 TOTAL CORTISOL: CPT | Mod: 91 | Performed by: INTERNAL MEDICINE

## 2022-04-29 PROCEDURE — 85027 COMPLETE CBC AUTOMATED: CPT | Mod: PN | Performed by: INTERNAL MEDICINE

## 2022-04-29 PROCEDURE — 3078F PR MOST RECENT DIASTOLIC BLOOD PRESSURE < 80 MM HG: ICD-10-PCS | Mod: CPTII,S$GLB,, | Performed by: INTERNAL MEDICINE

## 2022-04-29 PROCEDURE — 99999 PR PBB SHADOW E&M-EST. PATIENT-LVL III: ICD-10-PCS | Mod: PBBFAC,,, | Performed by: INTERNAL MEDICINE

## 2022-04-29 PROCEDURE — 99214 OFFICE O/P EST MOD 30 MIN: CPT | Mod: S$GLB,,, | Performed by: INTERNAL MEDICINE

## 2022-04-29 PROCEDURE — 36591 DRAW BLOOD OFF VENOUS DEVICE: CPT | Mod: PN

## 2022-04-29 PROCEDURE — 63600175 PHARM REV CODE 636 W HCPCS: Mod: PN | Performed by: INTERNAL MEDICINE

## 2022-04-29 PROCEDURE — 85007 BL SMEAR W/DIFF WBC COUNT: CPT | Mod: PN | Performed by: INTERNAL MEDICINE

## 2022-04-29 RX ORDER — SODIUM CHLORIDE 0.9 % (FLUSH) 0.9 %
3 SYRINGE (ML) INJECTION
Status: DISCONTINUED | OUTPATIENT
Start: 2022-04-29 | End: 2022-04-29 | Stop reason: HOSPADM

## 2022-04-29 RX ORDER — COSYNTROPIN 0.25 MG/ML
0.25 INJECTION, POWDER, FOR SOLUTION INTRAMUSCULAR; INTRAVENOUS
Status: COMPLETED | OUTPATIENT
Start: 2022-04-29 | End: 2022-04-29

## 2022-04-29 RX ORDER — COSYNTROPIN 0.25 MG/ML
0.25 INJECTION, POWDER, FOR SOLUTION INTRAMUSCULAR; INTRAVENOUS
Status: CANCELLED | OUTPATIENT
Start: 2022-04-29

## 2022-04-29 RX ORDER — SODIUM CHLORIDE 0.9 % (FLUSH) 0.9 %
3 SYRINGE (ML) INJECTION
Status: CANCELLED | OUTPATIENT
Start: 2022-04-29

## 2022-04-29 RX ADMIN — Medication 10 ML: at 10:04

## 2022-04-29 RX ADMIN — COSYNTROPIN 0.25 MG: 0.25 INJECTION, POWDER, LYOPHILIZED, FOR SOLUTION INTRAMUSCULAR; INTRAVENOUS at 09:04

## 2022-04-29 NOTE — PROGRESS NOTES
"Subjective:       Patient ID: Alison Swann is a 42 y.o. female.    Chief Complaint: No chief complaint on file.    HPI     Reports energy level average now  States switched from Prednisone and Hydrocortisone with Endocrinology and feels some fatigue related (for immunotherapy induced adrenal crisis)  Denies headaches  Dizziness resolved     Oncology History:  - 12/2019 thermography of breast revealed  lymphatic changes in the left axilla.  - 4/2020 self detected a palpable left breast mass  - 6/6/2020 Diagnostic mammogram  Impression:  Suspicious grouping of microcalcifications in the upper-outer left breast corresponding to area of palpable concern.  Biopsy is warranted.Questionable distortion of the breast architecture in the periareolar region of the right breast near the 3 o'clock position without sonographic correlate.  Short-term mammographic follow-up of the right breast at 6 month interval is recommended.  The above findings and recommendations were discussed with the patient at the time of the examination.  BI-RADS CATEGORY 4: SUSPICIOUS ABNORMALITY-BIOPSY SHOULD BE CONSIDERED  - 6/23/2020 imaging guided biopsy  Pathology: AD[facility] (The other calcifications were apparently not sampled)  - 1/19/2021 excision biopsy with Dr. Johnston  Pathology:  1.  BREAST, LEFT, FURTHER DESIGNATED "MASS," EXCISION:   --INVASIVE CARCINOMA OF NO SPECIAL TYPE (DUCTAL, NOT OTHERWISE    SPECIFIED), SPENSER HISTOLOGIC           GRADE 3 OUT OF 3.        --TUMOR MEASURES 42 MILLIMETERS IN MAXIMUM DIMENSION.        --RECEPTOR STUDIES ARE PENDING.   --SPECIMEN ANTERIOR MARGIN IS POSITIVE FOR INVASIVE CARCINOMA; ALL    REMAINING SPECIMEN MARGINS ARE           AT LEAST 2 MILLIMETERS AWAY.   --DUCTAL CARCINOMA IN SITU, HIGH NUCLEAR GRADE, COMPRISING LESS THAN 5%    OF TOTAL TUMOR TISSUE.        --ALL SPECIMEN MARGINS ARE NEGATIVE FOR DUCTAL CARCINOMA IN SITU.        --EXTENSIVE LYMPHOVASCULAR INVASION PRESENT.   --FIBROCYSTIC CHANGES " "INCLUDING STROMAL FIBROSIS, CYSTIC DILATATION OF    DUCTS, AND APOCRINE           METAPLASIA.        --COLUMNAR CELL CHANGE.   --MICROCALCIFICATIONS ASSOCIATED WITH TUMOR AND WITH BENIGN DUCTAL    PROFILES.   2.  BREAST, LEFT, FURTHER DESIGNATED "MEDIAL MARGIN," EXCISION:        --NO MORPHOLOGIC EVIDENCE OF MALIGNANCY.   --FIBROCYSTIC CHANGES INCLUDING STROMAL FIBROSIS, CYSTIC DILATATION OF    DUCTS, APOCRINE METAPLASIA,           AND USUAL DUCTAL EPITHELIAL HYPERPLASIA.        --COLUMNAR CELL CHANGE.        --FOCAL MICROCALCIFICATIONS ASSOCIATED WITH BENIGN DUCTAL PROFILES.   3.  LYMPH NODE, LEFT SENTINEL, NEEDLE CORE BIOPSY:   --METASTATIC CARCINOMA OF NO SPECIAL TYPE (DUCTAL, NOT OTHERWISE    SPECIFIED).   ER negative, TX negative, [id number] negative  Ki-67 79%     - 1/27/2021 Breast MRI:  Findings: The breasts have heterogeneous fibroglandular tissue. The background parenchymal enhancement is minimal.   Left  There are 6 similar 29 mm x 23 mm lymph nodes seen in the left axilla.   There is an 8 mm x 7 mm x 6 mm homogeneous, non-mass enhancement in a focal distribution seen in the left breast at 12 o'clock in the middle depth, 5.5 cm from the nipple. Delayed phase is persistent. This is immediately anterior to the pectoralis major muscle, at anterior aspect of implant.   There is a 24 mm x 11 mm x 9 mm clumped, non-mass enhancement in a focal distribution seen in the outer central region of the left breast in the posterior depth. This is along the margins of the resection cavity at is posterior aspect.   Right  There is no evidence of suspicious masses, abnormal enhancement, or other abnormal findings in the right breast.  Impression:  Left  Non-mass Enhancement: Left breast 8 mm x 7 mm x 6 mm non-mass enhancement at the middle 12 o'clock position. Assessment: 4 - Suspicious finding. Biopsy is recommended.  Second look ultrasound followed by ultrasound or MRI-guided biopsy could be performed if it would change " management.  Of note, MRI guided biopsy would carry risk of implant rupture. Non-mass Enhancement: Left breast 24 mm x 11 mm x 9 mm non-mass enhancement at the posterior aspect of the excision cavity in the lateral breast. Assessment: 4 - Suspicious finding.  This is suspicious for residual disease in this patient with history of recent excisional biopsy.   The area is likely not amenable to MRI guided biopsy.  Surgical consultation recommended. Left axillary adenopathy.  At least 6 abnormal appearing level I axillary nodes are present, suspicious for residual lymph node metastasis.  RightThere is no MR evidence of malignancy in the right breast.  BI-RADS Category:   Overall: 4 - Suspicious  Recommendation:  Surgical consultation recommended.  Left breast biopsy could be performed of focal NME at 12 o'clock in the left breast if clinically indicated.     - 2/2/2021 PET scan:  COMPARISON:  Breast MRI 01/27/2021  FINDINGS:  Quality of the study: Adequate.  In the head and neck, there are no hypermetabolic lesions worrisome for malignancy. There are no hypermetabolic mucosal lesions, and there are no pathologically enlarged or hypermetabolic lymph nodes.  In the chest, there is no definite hypermetabolic breast mass.  There are bilateral breast implants in place.  There is relatively mild diffuse uptake within dense fibroglandular tissue, and within the left breast there is a maximum SUV of 1.9 adjacent to biopsy clips on image 68.  There is also mildly hypermetabolic subcutaneous fat stranding elsewhere in the lateral aspect of the left breast on image 77 likely postprocedural in nature. There are at least half a dozen left level 1 axillary lymph nodes the largest of which are hypermetabolic including a 3 x 2.1 cm node on image 50 with an SUV of 8.6, a 1 cm node on image 55 with an SUV of 4.7, and 2.3 x 1.2 cm node on image 61 with an SUV of 9.2.  There is also a non hypermetabolic 9 x 6 mm right level 2 lymph node.   There are no suspicious lymph nodes in the internal mammary chain.  There are no pulmonary nodules, and there are no pleural or pericardial effusions.  In the abdomen and pelvis, there is physiologic tracer distribution within the abdominal organs and excretion into the genitourinary system, including diffusely within the right ureter and focally within the left.In the bones, there are no hypermetabolic lesions worrisome for malignancy.  Impression:  1.  No discrete left breast mass identified.  2.  Metastatic left level 1 axillary lymph nodes.  Level 2 larisa metastasis is not excluded.  3.  No evidence of distant metastasis.     - 2/9/2021 ECHO:  The left ventricle is normal in size with normal systolic function. The estimated ejection fraction is 58%  Regimen:  Paclitaxel weekly + carboplatin with Pembrolizumab q 3 weeks followed by AC with Pembrolizumab q 3 weeks followed by Pembroluzimab.(Based on interim analysis of the phase III KEYNOTE-522 the addition of pembrolizumab to neoadjuvant chemotherapy and use of adjuvant pembrolizumab x 9 cycles resulted in improvements in pathologic complete response rate and event-free survival in women with early triple-negative breast cancer.)     BRCA2+     5/3/2021 Breast MRI follow up on treatment in the interval (results below)  Findings:  There are bilateral retropectoral silicone implants. There is a right sided port.  The breasts have extreme amounts of fibroglandular tissue. The background parenchymal enhancement is mild.  There are postsurgical changes in the left upper outer breast and axilla.  There has been significant interval decrease in size of the previously seen abnormal left axillary lymph nodes, now with the largest lymph node measuring 14 x 15 x 10 mm (previously measuring up to 29 mm on the 1/27/21 MRI).  The other left axillary lymph nodes are smaller with mildly irregular shaggy margins, consistent with chemotherapy treatment.   No suspicious  enhancement is seen in either breast. The previously seen left 12:00 mid depth focal non mass enhancement anterior to the pectoralis is not seen on the current exam.   No abnormal right axillary lymph nodes or internal mammary lymph nodes are seen.  Impression:  There has been significant interval decrease in size of the previously seen abnormal left axillary lymph nodes, now with the largest lymph node measuring 14 x 15 x 10 mm (previously measuring up to 29 mm on the 21 MRI).  The other left axillary lymph nodes are smaller with mildly irregular shaggy margins, consistent with chemotherapy treatment.   No suspicious enhancement is seen in either breast.   BI-RADS Category:   Overall: 6 - Known Biopsy-Proven Malignancy     -   Completed surgery with complete pathologic response     - completed XRT     Gyn Hx:  Menarche- 10  , age at 1st live birth 22  OCPs x 4 years  Endometrial ablation      FH:  Mother - diagnosed at age 50 with breast cancer  Mastectomy- bilateral  No chemotherapy or radiation   Remains on Tamoxifen  Treated in [place]  ? Genetics  Axelleah Alvesivana (Mirtha Beck 57)     Maternal uncle-  in his 20s of gastric cancer  Maternal great grandmother - colon cancer  Paternal side unknown  3 sisters- healthy     SH:  , daughter  [employer] [place]    Review of Systems   Constitutional: Positive for fatigue. Negative for activity change, appetite change, fever and unexpected weight change.   HENT: Negative for trouble swallowing.    Eyes: Negative for visual disturbance.   Respiratory: Negative for cough and shortness of breath.    Cardiovascular: Negative for chest pain, palpitations and leg swelling.   Gastrointestinal: Negative for blood in stool, change in bowel habit, constipation, diarrhea, nausea, vomiting, fecal incontinence and change in bowel habit.        Notes 4 weeks of bowel changes- reports normal stool 2 x per day and states for past 4 weeks had more of  constipation  Better after hydration and then resolved in hospital   Genitourinary: Negative for decreased urine volume, difficulty urinating, dysuria, frequency and urgency.   Musculoskeletal: Negative for arthralgias, back pain and myalgias.   Neurological: Negative for dizziness, weakness (better), light-headedness (resolved), numbness and headaches.   Hematological: Negative for adenopathy. Does not bruise/bleed easily.   Psychiatric/Behavioral: Positive for sleep disturbance. Negative for dysphoric mood. The patient is not nervous/anxious.          Objective:      Physical Exam  Vitals and nursing note reviewed.   Constitutional:       General: She is not in acute distress.     Appearance: Normal appearance. She is well-developed and normal weight. She is not ill-appearing.      Comments: Very pleasant  Presents alone  ECOG= 0   HENT:      Head: Normocephalic and atraumatic.      Mouth/Throat:      Mouth: Mucous membranes are moist.      Pharynx: No oropharyngeal exudate or posterior oropharyngeal erythema.      Comments: No thrush  Eyes:      General: Lids are normal. No scleral icterus.     Extraocular Movements: Extraocular movements intact.      Conjunctiva/sclera: Conjunctivae normal.      Pupils: Pupils are equal, round, and reactive to light.   Neck:      Thyroid: No thyromegaly.      Vascular: No JVD.      Trachea: Trachea normal.   Cardiovascular:      Rate and Rhythm: Normal rate and regular rhythm.      Heart sounds: Normal heart sounds. No murmur heard.    No friction rub. No gallop.      Comments: No significant edema.   Pulmonary:      Effort: Pulmonary effort is normal. No respiratory distress.      Breath sounds: Normal breath sounds. No wheezing, rhonchi or rales.      Comments: Breasts - no palpable masses or LAD; reconstruction.  Chest:      Chest wall: No tenderness.   Breasts:      Right: No axillary adenopathy or supraclavicular adenopathy.      Left: No axillary adenopathy or  supraclavicular adenopathy.       Abdominal:      General: Abdomen is flat. Bowel sounds are normal. There is no distension.      Palpations: Abdomen is soft. There is no mass.      Tenderness: There is no abdominal tenderness. There is no guarding or rebound.      Comments: No organomegaly   Musculoskeletal:         General: No swelling, tenderness or deformity. Normal range of motion.      Cervical back: Normal range of motion and neck supple.      Right lower leg: No edema.      Left lower leg: No edema.      Comments: No spinal or paraspinal tenderness.    Lymphadenopathy:      Head:      Right side of head: No submental or submandibular adenopathy.      Left side of head: No submental or submandibular adenopathy.      Cervical: No cervical adenopathy.      Upper Body:      Right upper body: No supraclavicular or axillary adenopathy.      Left upper body: No supraclavicular or axillary adenopathy.   Skin:     General: Skin is warm and dry.      Capillary Refill: Capillary refill takes less than 2 seconds.      Findings: No bruising, erythema or rash.      Nails: There is no clubbing.   Neurological:      General: No focal deficit present.      Mental Status: She is alert and oriented to person, place, and time. Mental status is at baseline.      Sensory: No sensory deficit.      Motor: No weakness.      Coordination: Coordination normal.      Gait: Gait normal.   Psychiatric:         Mood and Affect: Mood normal.         Speech: Speech normal.         Behavior: Behavior normal.         Thought Content: Thought content normal.         Judgment: Judgment normal.       Labs- reviewed  Assessment:       Problem List Items Addressed This Visit     BRCA2 gene mutation positive (Chronic)    Malignant neoplasm of upper-outer quadrant of left breast in female, estrogen receptor negative - Primary    Adrenal crisis          Plan:     Continue surveillance  RTC 3 months  Appropriate prophylactic surgeries complete    On  steroid and following with endocrinology    Requests alternating between here and [place]    Route Chart for Scheduling    Med Onc Chart Routing      Follow up with physician 3 months. Dr. Cardoza in [place]   Follow up with [facility]    Labs CBC and CMP   Lab interval:     Imaging    Pharmacy appointment    Other referrals            Therapy Plan Information  PORT FLUSH  Flushes  heparin, porcine (PF) 100 unit/mL injection flush 500 Units  500 Units, Intravenous, Every visit  sodium chloride 0.9% flush 10 mL  10 mL, Intravenous, Every visit

## 2022-05-01 ENCOUNTER — PATIENT MESSAGE (OUTPATIENT)
Dept: HEMATOLOGY/ONCOLOGY | Facility: CLINIC | Age: 42
End: 2022-05-01
Payer: COMMERCIAL

## 2022-05-02 ENCOUNTER — PATIENT MESSAGE (OUTPATIENT)
Dept: HEMATOLOGY/ONCOLOGY | Facility: CLINIC | Age: 42
End: 2022-05-02
Payer: COMMERCIAL

## 2022-05-03 ENCOUNTER — TELEPHONE (OUTPATIENT)
Dept: ENDOCRINOLOGY | Facility: CLINIC | Age: 42
End: 2022-05-03
Payer: COMMERCIAL

## 2022-05-03 DIAGNOSIS — E27.49 SECONDARY ADRENAL INSUFFICIENCY: Primary | ICD-10-CM

## 2022-05-03 LAB — ACTH PLAS-MCNC: <5 PG/ML (ref 0–46)

## 2022-05-03 NOTE — TELEPHONE ENCOUNTER
I spoke with patient. 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 Discussed sick day precautions.   Continue Hydrocortisone 10/5  States when she did hold her steroid dose she did feel poorly    Staff:  She needs Pituitary MRI- please verify no metal or skin patches  F/u 2 months with CMP. OK to double book

## 2022-05-03 NOTE — TELEPHONE ENCOUNTER
----- Message from Eloisa Hook sent at 5/3/2022 11:19 AM CDT -----  Contact: pt  Type:  Patient Returning Call    Who Called:  pt  Who Left Message for Patient:    Does the patient know what this is regarding?yes  Best Call Back Number: [phone]   Additional Information: pt missed call and ask to be called back

## 2022-05-04 ENCOUNTER — PATIENT MESSAGE (OUTPATIENT)
Dept: OBSTETRICS AND GYNECOLOGY | Facility: CLINIC | Age: 42
End: 2022-05-04
Payer: COMMERCIAL

## 2022-05-04 ENCOUNTER — PATIENT MESSAGE (OUTPATIENT)
Dept: ENDOCRINOLOGY | Facility: CLINIC | Age: 42
End: 2022-05-04
Payer: COMMERCIAL

## 2022-05-04 RX ORDER — HYDROCORTISONE 10 MG/1
TABLET ORAL
Qty: 90 TABLET | Refills: 3 | Status: SHIPPED | OUTPATIENT
Start: 2022-05-04 | End: 2022-06-24 | Stop reason: SDUPTHER

## 2022-05-05 ENCOUNTER — PATIENT MESSAGE (OUTPATIENT)
Dept: ENDOCRINOLOGY | Facility: CLINIC | Age: 42
End: 2022-05-05
Payer: COMMERCIAL

## 2022-05-05 NOTE — TELEPHONE ENCOUNTER
Pt scheduled for MRI and f/u in 2 months with you.  Pt also states you want to repeat ACTH stim at that 2 month jose? Please advise if this is wanted and when, and steroid instructions for future acth stim repeat

## 2022-05-05 NOTE — TELEPHONE ENCOUNTER
I want to see her 1st in 2 months  Will order another stim test based on symptoms, labs and BP at the visit

## 2022-05-06 ENCOUNTER — PATIENT MESSAGE (OUTPATIENT)
Dept: ENDOCRINOLOGY | Facility: CLINIC | Age: 42
End: 2022-05-06
Payer: COMMERCIAL

## 2022-05-10 ENCOUNTER — TELEPHONE (OUTPATIENT)
Dept: ENDOCRINOLOGY | Facility: CLINIC | Age: 42
End: 2022-05-10
Payer: COMMERCIAL

## 2022-05-10 ENCOUNTER — PATIENT MESSAGE (OUTPATIENT)
Dept: ENDOCRINOLOGY | Facility: CLINIC | Age: 42
End: 2022-05-10
Payer: COMMERCIAL

## 2022-05-10 NOTE — TELEPHONE ENCOUNTER
----- Message from Bijal Dukes sent at 5/10/2022  8:34 AM CDT -----  Type: Patient Call Back         Who called:Mark          What is the request in detail:Regarding getting a request Authorization for MRI on the Brain and clinical Notes fax [phone]         Can the clinic reply by [facility]?no          Would the patient rather a call back or a response via [facility]?call back          Best call back number:[phone] Case #[id number] Magdiel          Additional Information:           Thank You

## 2022-05-12 ENCOUNTER — PATIENT MESSAGE (OUTPATIENT)
Dept: HEMATOLOGY/ONCOLOGY | Facility: CLINIC | Age: 42
End: 2022-05-12
Payer: COMMERCIAL

## 2022-05-12 ENCOUNTER — TELEPHONE (OUTPATIENT)
Dept: ENDOCRINOLOGY | Facility: CLINIC | Age: 42
End: 2022-05-12
Payer: COMMERCIAL

## 2022-05-12 NOTE — TELEPHONE ENCOUNTER
----- Message from Stevie Hernandez sent at 5/12/2022  9:23 AM CDT -----  Contact: Dr Wright from [facility]  States he's calling rg needing clinical notes and data in order to approve the MRI of the brain and can be reached at [phone] fax # [phone] care of Dr Wright

## 2022-05-16 ENCOUNTER — PATIENT MESSAGE (OUTPATIENT)
Dept: HEMATOLOGY/ONCOLOGY | Facility: CLINIC | Age: 42
End: 2022-05-16
Payer: COMMERCIAL

## 2022-05-17 ENCOUNTER — PATIENT MESSAGE (OUTPATIENT)
Dept: HEMATOLOGY/ONCOLOGY | Facility: CLINIC | Age: 42
End: 2022-05-17
Payer: COMMERCIAL

## 2022-05-18 ENCOUNTER — HOSPITAL ENCOUNTER (OUTPATIENT)
Dept: RADIOLOGY | Facility: HOSPITAL | Age: 42
Discharge: HOME | End: 2022-05-18
Attending: INTERNAL MEDICINE
Payer: COMMERCIAL

## 2022-05-18 ENCOUNTER — INFUSION (OUTPATIENT)
Dept: INFUSION THERAPY | Facility: HOSPITAL | Age: 42
End: 2022-05-18
Attending: INTERNAL MEDICINE
Payer: COMMERCIAL

## 2022-05-18 DIAGNOSIS — C50.412 MALIGNANT NEOPLASM OF UPPER-OUTER QUADRANT OF LEFT BREAST IN FEMALE, ESTROGEN RECEPTOR NEGATIVE: Primary | ICD-10-CM

## 2022-05-18 DIAGNOSIS — Z17.1 MALIGNANT NEOPLASM OF UPPER-OUTER QUADRANT OF LEFT BREAST IN FEMALE, ESTROGEN RECEPTOR NEGATIVE: Primary | ICD-10-CM

## 2022-05-18 DIAGNOSIS — E27.49 SECONDARY ADRENAL INSUFFICIENCY: ICD-10-CM

## 2022-05-18 PROCEDURE — 25500020 PHARM REV CODE 255: Mod: PO | Performed by: INTERNAL MEDICINE

## 2022-05-18 PROCEDURE — A9585 GADOBUTROL INJECTION: HCPCS | Mod: PO | Performed by: INTERNAL MEDICINE

## 2022-05-18 PROCEDURE — A4216 STERILE WATER/SALINE, 10 ML: HCPCS | Mod: PN | Performed by: INTERNAL MEDICINE

## 2022-05-18 PROCEDURE — 96523 IRRIG DRUG DELIVERY DEVICE: CPT | Mod: PN

## 2022-05-18 PROCEDURE — 70553 MRI BRAIN STEM W/O & W/DYE: CPT | Mod: TC,PO

## 2022-05-18 PROCEDURE — 70553 MRI PITUITARY W W/O CONTRAST: ICD-10-PCS | Mod: 26,,, | Performed by: RADIOLOGY

## 2022-05-18 PROCEDURE — 70553 MRI BRAIN STEM W/O & W/DYE: CPT | Mod: 26,,, | Performed by: RADIOLOGY

## 2022-05-18 PROCEDURE — 25000003 PHARM REV CODE 250: Mod: PN | Performed by: INTERNAL MEDICINE

## 2022-05-18 RX ORDER — SODIUM CHLORIDE 0.9 % (FLUSH) 0.9 %
10 SYRINGE (ML) INJECTION
Status: DISCONTINUED | OUTPATIENT
Start: 2022-05-18 | End: 2022-05-18 | Stop reason: HOSPADM

## 2022-05-18 RX ORDER — HEPARIN 100 UNIT/ML
500 SYRINGE INTRAVENOUS
Status: CANCELLED | OUTPATIENT
Start: 2022-05-18

## 2022-05-18 RX ORDER — GADOBUTROL 604.72 MG/ML
7.5 INJECTION INTRAVENOUS
Status: COMPLETED | OUTPATIENT
Start: 2022-05-18 | End: 2022-05-18

## 2022-05-18 RX ORDER — HEPARIN 100 UNIT/ML
500 SYRINGE INTRAVENOUS
Status: DISCONTINUED | OUTPATIENT
Start: 2022-05-18 | End: 2022-05-18 | Stop reason: HOSPADM

## 2022-05-18 RX ORDER — SODIUM CHLORIDE 0.9 % (FLUSH) 0.9 %
10 SYRINGE (ML) INJECTION
Status: CANCELLED | OUTPATIENT
Start: 2022-05-18

## 2022-05-18 RX ADMIN — GADOBUTROL 5 ML: 604.72 INJECTION INTRAVENOUS at 03:05

## 2022-05-18 RX ADMIN — Medication 10 ML: at 03:05

## 2022-05-18 RX ADMIN — Medication 10 ML: at 01:05

## 2022-05-18 NOTE — PLAN OF CARE
Problem: Adult Inpatient Plan of Care  Goal: Plan of Care Review  Outcome: Ongoing, Progressing  Goal: Patient-Specific Goal (Individualized)  Outcome: Ongoing, Progressing     Port accessed for a MRI; pt returned and mason needle removed; saline locked; positive blood return noted.

## 2022-05-20 ENCOUNTER — PATIENT MESSAGE (OUTPATIENT)
Dept: ENDOCRINOLOGY | Facility: CLINIC | Age: 42
End: 2022-05-20
Payer: COMMERCIAL

## 2022-05-24 ENCOUNTER — TELEPHONE (OUTPATIENT)
Dept: INFUSION THERAPY | Facility: HOSPITAL | Age: 42
End: 2022-05-24
Payer: COMMERCIAL

## 2022-05-27 ENCOUNTER — OFFICE VISIT (OUTPATIENT)
Dept: OBSTETRICS AND GYNECOLOGY | Facility: CLINIC | Age: 42
End: 2022-05-27
Payer: COMMERCIAL

## 2022-05-27 DIAGNOSIS — N95.1 MENOPAUSAL SYMPTOMS: ICD-10-CM

## 2022-05-27 DIAGNOSIS — Z17.1 MALIGNANT NEOPLASM OF UPPER-OUTER QUADRANT OF LEFT BREAST IN FEMALE, ESTROGEN RECEPTOR NEGATIVE: Primary | ICD-10-CM

## 2022-05-27 DIAGNOSIS — C50.412 MALIGNANT NEOPLASM OF UPPER-OUTER QUADRANT OF LEFT BREAST IN FEMALE, ESTROGEN RECEPTOR NEGATIVE: Primary | ICD-10-CM

## 2022-05-27 DIAGNOSIS — N95.2 VAGINAL ATROPHY: ICD-10-CM

## 2022-05-27 PROCEDURE — 99213 PR OFFICE/OUTPT VISIT, EST, LEVL III, 20-29 MIN: ICD-10-PCS | Mod: 95,24,, | Performed by: PHYSICIAN ASSISTANT

## 2022-05-27 PROCEDURE — 99213 OFFICE O/P EST LOW 20 MIN: CPT | Mod: 95,24,, | Performed by: PHYSICIAN ASSISTANT

## 2022-05-27 PROCEDURE — 1160F PR REVIEW ALL MEDS BY PRESCRIBER/CLIN PHARMACIST DOCUMENTED: ICD-10-PCS | Mod: CPTII,95,, | Performed by: PHYSICIAN ASSISTANT

## 2022-05-27 PROCEDURE — 1159F PR MEDICATION LIST DOCUMENTED IN MEDICAL RECORD: ICD-10-PCS | Mod: CPTII,95,, | Performed by: PHYSICIAN ASSISTANT

## 2022-05-27 PROCEDURE — 1160F RVW MEDS BY RX/DR IN RCRD: CPT | Mod: CPTII,95,, | Performed by: PHYSICIAN ASSISTANT

## 2022-05-27 PROCEDURE — 1159F MED LIST DOCD IN RCRD: CPT | Mod: CPTII,95,, | Performed by: PHYSICIAN ASSISTANT

## 2022-05-27 NOTE — PROGRESS NOTES
The patient location is: Home  The chief complaint leading to consultation is: Survivorship    Visit type: audiovisual    Face to Face time with patient: 15 minutes  25 minutes of total time spent on the encounter, which includes face to face time and non-face to face time preparing to see the patient (eg, review of tests), Obtaining and/or reviewing separately obtained history, Documenting clinical information in the electronic or other health record, Independently interpreting results (not separately reported) and communicating results to the patient/family/caregiver, or Care coordination (not separately reported).         Each patient to whom he or she provides medical services by telemedicine is:  (1) informed of the relationship between the physician and patient and the respective role of any other health care provider with respect to management of the patient; and (2) notified that he or she may decline to receive medical services by telemedicine and may withdraw from such care at any time.    Notes:     Subjective:      Alison Swann is a 42 y.o. female who presents for survivorship. History of triple negative breast cancer. She is status post lumpectomy 1/19/21. Then completed adjuvant chemo-immunotherapy.  Underwent bilateral mastectomy 9/29/21 with no residual carcinoma identified in breast of 14 sampled nodes (ypN0). She completed radiation therapy on 1/4/22. She is BRCA2+ and is now s/p hyst/BSO with Dr. Galvez on 3/4/2022. Reports that after surgery she was tearful and increased anxiety. Since she has been cleared to resume exercise and intercourse, she is feeling much better. Having to avoid these for 10 weeks post op was very hard for her. Since restarting exercise 5-6x per week and sexually active with her , she is feeling much better. Mood is good. Denies increase in anxiety, irritability or mood swings. Hot flashes have started to resolve in the last 2 months and no longer  bothersome.  Some vaginal dryness and dyspareunia for which she is using water based lubricant that helps.  She eats a diet that is full of fruits, vegetables and lean proteins. She gained weight with treatment but has been able to lose it all but two pounds with diet and exercise. Wants to share her journey with others.    Past Medical History:   Diagnosis Date    Attention Deficit Hyperactivity Disorder     Family H/O Diabetes Mellitus     Her Father    Generalized Anxiety     Left Breast Cancer S.P Lumpectomy In 2021     Dr. Dominga Johnston; Dr. Sandra Sotelo (Heme/Onc); 21 On CMT; Is Estrogen Receptor Negative; She Is BRCA2 Gene Mutation Positive, Andf Her Mother Also With A H/O Breast Cancer    Macrocytic Anemia     Associated With Her Chemotherapy    Postoperative Nausea And Vomiting     Scopolamine Patches Work Well For Her For This    Scoliosis     Therapeutic Drug Monitoring     Wellness Visit 2021      Past Surgical History:   Procedure Laterality Date    AUGMENTATION OF BREAST  2012    BREAST MASS EXCISION Left 2021    Procedure: EXCISION, MASS, BREAST- 2 oclock left breast with ultrasound guidance;  Surgeon: Rashmi Johnston MD;  Location: [facility] OR;  Service: General;  Laterality: Left;    BREAST SURGERY       SECTION      COLONOSCOPY N/A 2021    Procedure: COLONOSCOPY;  Surgeon: Mason Quintero MD;  Location: [facility] ([address] [place] [address]);  Service: Endoscopy;  Laterality: N/A;    ESOPHAGOGASTRODUODENOSCOPY N/A 2021    Procedure: EGD (ESOPHAGOGASTRODUODENOSCOPY);  Surgeon: Mason Quintero MD;  Location: [facility] ([address] [place] [address]);  Service: Endoscopy;  Laterality: N/A;    INSERTION OF TUNNELED CENTRAL VENOUS CATHETER (CVC) WITH SUBCUTANEOUS PORT N/A 2021    Procedure: [id number];  Surgeon: Griffin Becker MD;  Location: [facility] OR;  Service: General;  Laterality: N/A;    ROBOT-ASSISTED LAPAROSCOPIC ABDOMINAL HYSTERECTOMY USING DA NATALIIA XI N/A  3/4/2022    Procedure: XI ROBOTIC HYSTERECTOMY;  Surgeon: Carmella Galvez MD;  Location: [facility] OR;  Service: OB/GYN;  Laterality: N/A;    ROBOT-ASSISTED LAPAROSCOPIC SALPINGO-OOPHORECTOMY USING DA NATALIIA XI Bilateral 3/4/2022    Procedure: XI ROBOTIC SALPINGO-OOPHORECTOMY;  Surgeon: Carmella Galvez MD;  Location: [facility] OR;  Service: OB/GYN;  Laterality: Bilateral;    SENTINEL LYMPH NODE BIOPSY Left 2021    Procedure: CORE BIOPSY, LYMPH NODE, SENTINEL;  Surgeon: Rashmi Johnston MD;  Location: [facility] OR;  Service: General;  Laterality: Left;    TUBAL LIGATION       Social History     Tobacco Use    Smoking status: Never Smoker    Smokeless tobacco: Never Used   Substance Use Topics    Alcohol use: Yes     Comment: occasionally    Drug use: Never     Family History   Problem Relation Age of Onset    Breast cancer Mother 52    Depression Mother     Stroke Father         50s, multiple    Diabetes Father     Anemia Father     Hyperlipidemia Father     Cancer Other         unk abdominal origin- dx 20s    COPD Maternal Grandmother     Pancreatic cancer Other         suspected    Colon cancer Neg Hx     Esophageal cancer Neg Hx      OB History    Para Term  AB Living   1 1 1         SAB IAB Ectopic Multiple Live Births                  # Outcome Date GA Lbr Gustavo/2nd Weight Sex Delivery Anes PTL Lv   1 Term                Current Outpatient Medications:     dextroamphetamine-amphetamine 10 mg Tab, Take 1 tablet by mouth once daily. , Disp: , Rfl:     hydrocortisone (CORTEF) 10 MG Tab, 10 mg in AM and 5 mg in early afternoon, Disp: 90 tablet, Rfl: 3    valACYclovir (VALTREX) 500 MG tablet, Take 1 tablet (500 mg total) by mouth 2 (two) times daily. for 5 days, Disp: 10 tablet, Rfl: 0  No current facility-administered medications for this visit.    Facility-Administered Medications Ordered in Other Visits:     lactated ringers infusion, , Intravenous, Continuous, Leon Torres,  MD, Stopped at 01/19/21 6525    The ASCVD Risk score ([place] GAMALIEL Jr., et al., 2013) failed to calculate for the following reasons:    Cannot find a previous HDL lab    Cannot find a previous total cholesterol lab    Review of Systems:  General: No fever, chills, or weight loss.  Chest: No chest pain, shortness of breath, or palpitations.  Breast: No pain, masses, or nipple discharge.  Vulva: No pain, lesions, or itching.  Vagina: No relaxation, itching, discharge, or lesions.  Abdomen: No pain, nausea, vomiting, diarrhea, or constipation.  Urinary: No incontinence, nocturia, frequency, or dysuria.  Extremities:  No leg cramps, edema, or calf pain.  Neurologic: No headaches, dizziness, or visual changes.    Objective:   There were no vitals filed for this visit.  There is no height or weight on file to calculate BMI.    PHYSICAL EXAM:  APPEARANCE: Well nourished, well developed, in no acute distress.  AFFECT: WNL, alert and oriented x 3      Assessment:      Malignant neoplasm of upper-outer quadrant of left breast in female, estrogen receptor negative  -     Ambulatory referral/consult to [facility]    Vaginal atrophy    Menopausal symptoms      Plan:   Start vaginal moisturizer daily.  Recommend Good Clean Love Bio nourish.  Reviewed lubricants to use PRN.  Can consider topical estradiol/DHEA if needed in the future.  Doing great with diet and exercise.   Follow up PRN.    Instructed patient to call if she experiences any side effects or has any questions.    I spent a total of 25 minutes on the day of the visit.This includes face to face time and non-face to face time preparing to see the patient (eg, review of tests), obtaining and/or reviewing separately obtained history, documenting clinical information in the electronic or other health record, independently interpreting results and communicating results to the patient/family/caregiver, or care coordinator.

## 2022-06-01 ENCOUNTER — OFFICE VISIT (OUTPATIENT)
Dept: RADIATION ONCOLOGY | Facility: CLINIC | Age: 42
End: 2022-06-01
Payer: COMMERCIAL

## 2022-06-01 VITALS
WEIGHT: 126.31 LBS | OXYGEN SATURATION: 100 % | BODY MASS INDEX: 19.82 KG/M2 | RESPIRATION RATE: 16 BRPM | SYSTOLIC BLOOD PRESSURE: 107 MMHG | HEART RATE: 80 BPM | DIASTOLIC BLOOD PRESSURE: 74 MMHG | TEMPERATURE: 98 F | HEIGHT: 67 IN

## 2022-06-01 DIAGNOSIS — C50.412 MALIGNANT NEOPLASM OF UPPER-OUTER QUADRANT OF LEFT BREAST IN FEMALE, ESTROGEN RECEPTOR NEGATIVE: Primary | ICD-10-CM

## 2022-06-01 DIAGNOSIS — Z15.09 BRCA2 GENE MUTATION POSITIVE: Chronic | ICD-10-CM

## 2022-06-01 DIAGNOSIS — Z17.1 MALIGNANT NEOPLASM OF UPPER-OUTER QUADRANT OF LEFT BREAST IN FEMALE, ESTROGEN RECEPTOR NEGATIVE: Primary | ICD-10-CM

## 2022-06-01 DIAGNOSIS — Z15.01 BRCA2 GENE MUTATION POSITIVE: Chronic | ICD-10-CM

## 2022-06-01 PROCEDURE — 3074F PR MOST RECENT SYSTOLIC BLOOD PRESSURE < 130 MM HG: ICD-10-PCS | Mod: CPTII,S$GLB,, | Performed by: RADIOLOGY

## 2022-06-01 PROCEDURE — 3074F SYST BP LT 130 MM HG: CPT | Mod: CPTII,S$GLB,, | Performed by: RADIOLOGY

## 2022-06-01 PROCEDURE — 3078F PR MOST RECENT DIASTOLIC BLOOD PRESSURE < 80 MM HG: ICD-10-PCS | Mod: CPTII,S$GLB,, | Performed by: RADIOLOGY

## 2022-06-01 PROCEDURE — 3008F BODY MASS INDEX DOCD: CPT | Mod: CPTII,S$GLB,, | Performed by: RADIOLOGY

## 2022-06-01 PROCEDURE — 99215 PR OFFICE/OUTPT VISIT, EST, LEVL V, 40-54 MIN: ICD-10-PCS | Mod: S$GLB,,, | Performed by: RADIOLOGY

## 2022-06-01 PROCEDURE — 1159F MED LIST DOCD IN RCRD: CPT | Mod: CPTII,S$GLB,, | Performed by: RADIOLOGY

## 2022-06-01 PROCEDURE — 99999 PR PBB SHADOW E&M-EST. PATIENT-LVL V: CPT | Mod: PBBFAC,,, | Performed by: RADIOLOGY

## 2022-06-01 PROCEDURE — 3078F DIAST BP <80 MM HG: CPT | Mod: CPTII,S$GLB,, | Performed by: RADIOLOGY

## 2022-06-01 PROCEDURE — 1159F PR MEDICATION LIST DOCUMENTED IN MEDICAL RECORD: ICD-10-PCS | Mod: CPTII,S$GLB,, | Performed by: RADIOLOGY

## 2022-06-01 PROCEDURE — 99999 PR PBB SHADOW E&M-EST. PATIENT-LVL V: ICD-10-PCS | Mod: PBBFAC,,, | Performed by: RADIOLOGY

## 2022-06-01 PROCEDURE — 99215 OFFICE O/P EST HI 40 MIN: CPT | Mod: S$GLB,,, | Performed by: RADIOLOGY

## 2022-06-01 PROCEDURE — 3008F PR BODY MASS INDEX (BMI) DOCUMENTED: ICD-10-PCS | Mod: CPTII,S$GLB,, | Performed by: RADIOLOGY

## 2022-06-01 NOTE — PROGRESS NOTES
[facility] Department of Radiation Oncology  Follow Up Visit Note    Diagnosis:  Alison Swann is a 42 y.o. female with a(n) IIIB, triple negative invasive ductal carcinoma fo the LEFT breast,   Oncologic History:  Oncology History   Malignant neoplasm of upper-outer quadrant of left breast in female, estrogen receptor negative   1/19/2021 Cancer Staged    Staging form: Breast, AJCC 8th Edition  - Clinical stage from 1/19/2021: Stage IIIB (cT2, cN1, cM0, G3, ER-, MA-, HER2-)     1/28/2021 Initial Diagnosis    Malignant neoplasm of upper-outer quadrant of left breast in female, estrogen receptor negative     2/18/2021 - 2/18/2021 Chemotherapy    Treatment Summary   Plan Name: OP BREAST DOSE-DENSE AC - DOXORUBICIN CYCLOPHOSPHAMIDE Q2W  Treatment Goal: Curative  Status: Inactive  Start Date:   End Date:   Provider: Jhon Suazo MD  Chemotherapy: DOXOrubicin chemo injection 96 mg, 60 mg/m2, Intravenous, Clinic/HOD 1 time, 0 of 4 cycles  cyclophosphamide (CYTOXAN) 600 mg/m2 = 965 mg in sodium chloride 0.9% 250 mL chemo infusion, 600 mg/m2, Intravenous, Clinic/HOD 1 time, 0 of 4 cycles     2/25/2021 - 7/6/2021 Chemotherapy    Treatment Summary   Plan Name: OP BREAST PACLITAXEL WEEKLY + CARBOPLATIN (AUC 5) Q3W FOLLOWED BY AC WITH PEMBROLIZUMAB FOLLOWED BY PEMBROLIZUMAB   Treatment Goal: Curative  Status: Inactive  Start Date: 2/25/2021  End Date: 7/6/2021  Provider: Sandra Sotelo MD  Chemotherapy: DOXOrubicin chemo injection 112 mg, 60 mg/m2 = 112 mg (100 % of original dose 60 mg/m2), Intravenous, Once, 3 of 4 cycles  Dose modification: 60 mg/m2 (original dose 60 mg/m2, Cycle 5)  Administration: 112 mg (5/25/2021), 112 mg (6/15/2021), 110 mg (7/6/2021)  CARBOplatin (PARAPLATIN) 605 mg in sodium chloride 0.9% 250 mL chemo infusion, 605 mg (100 % of original dose 603.5 mg), Intravenous, Clinic/HOD 1 time, 4 of 4 cycles  Dose modification:   (original dose 603.5 mg, Cycle 1)  Administration: 605 mg  (2/25/2021), 630 mg (3/22/2021), 750 mg (4/13/2021), 750 mg (5/4/2021)  cyclophosphamide 600 mg/m2 = 1,100 mg in sodium chloride 0.9% 100 mL chemo infusion, 600 mg/m2 = 1,100 mg (100 % of original dose 600 mg/m2), Intravenous, Clinic/HOD 1 time, 3 of 4 cycles  Dose modification: 600 mg/m2 (original dose 600 mg/m2, Cycle 5), 600 mg/m2 (Cycle 8)  Administration: 1,100 mg (5/25/2021), 1,100 mg (6/15/2021), 1,100 mg (7/6/2021)  PACLitaxeL (TAXOL) 80 mg/m2 = 132 mg in sodium chloride 0.9% 250 mL chemo infusion, 80 mg/m2 = 132 mg (100 % of original dose 80 mg/m2), Intravenous, Clinic/HOD 1 time, 4 of 4 cycles  Dose modification: 80 mg/m2 (original dose 80 mg/m2, Cycle 1)  Administration: 132 mg (2/25/2021), 132 mg (3/4/2021), 132 mg (3/11/2021), 138 mg (3/22/2021), 138 mg (3/30/2021), 138 mg (4/6/2021), 138 mg (4/13/2021), 138 mg (4/20/2021), 144 mg (4/27/2021), 144 mg (5/4/2021), 144 mg (5/11/2021), 144 mg (5/18/2021)  pembrolizumab (KEYTRUDA) 200 mg in sodium chloride 0.9% 100 mL chemo infusion, 200 mg, Intravenous, Clinic/HOD 1 time, 7 of 18 cycles  Administration: 200 mg (2/25/2021), 200 mg (5/25/2021), 200 mg (3/22/2021), 200 mg (4/13/2021), 200 mg (5/4/2021), 200 mg (6/15/2021), 200 mg (7/6/2021)     4/15/2021 - 4/15/2021 Chemotherapy    Treatment Summary   Plan Name: OP BREAST PEMBROLIZUMAB Q3W PACLITAXEL CARBOPLATIN WEEKLY FOLLOWED BY PEMBROLIZUMAB DOXORUBICIN CYCLOPHOSPHAMIDE Q3W   Treatment Goal: Curative  Status: Inactive  Start Date:   End Date:   Provider: Jhon Suazo MD  Chemotherapy: CARBOplatin (PARAPLATIN) in sodium chloride 0.9% 250 mL chemo infusion,  (original dose ), Intravenous, Clinic/HOD 1 time, 0 of 3 cycles  Dose modification:   (Cycle 1)  cyclophosphamide in sodium chloride 0.9% chemo infusion, 600 mg/m2 = 965 mg (original dose ), Intravenous, Clinic/HOD 1 time, 0 of 1 cycle  Dose modification: 600 mg/m2 (Cycle 2)  DOXOrubicin (ADRIAMYCIN) in sodium chloride 0.9% 250 mL chemo infusion, 60  mg/m2 (original dose ), Intravenous, Clinic/HOD 1 time, 0 of 1 cycle  Dose modification: 60 mg/m2 (Cycle 2)  PACLitaxeL (TAXOL) in sodium chloride 0.9% 100 mL chemo infusion, 80 mg/m2 (original dose ), Intravenous, Clinic/HOD 1 time, 0 of 1 cycle  Dose modification: 80 mg/m2 (Cycle 1)  PEMEtrexed (ALIMTA) in sodium chloride 0.9% chemo infusion, 500 mg/m2, Intravenous, Clinic/HOD 1 time, 0 of 33 cycles  pembrolizumab (KEYTRUDA) 200 mg in sodium chloride 0.9% 100 mL chemo infusion, 200 mg, Intravenous, Clinic/HOD 1 time, 0 of 35 cycles     11/19/2021 - 1/4/2022 Radiation Therapy    Treating physician: Maryann Perales  Total Dose: 50 Gy  Fractions: 25    DIAGNOSIS:  C50.412 - Malignant neoplasm of upper-outer quadrant of left female breast, Diagnosed 11/11/2021 (Active) Stage IIIB, T2, N1, M0, G3, HER2 Neg, ER Neg, SC Neg    This is a 41 y.o. y/o female with cT2,c N1 and M0 G3, (Stage IIIB), triple negative, Ki-67 79%, LEFT upper outer quadrant breast, BRCA2+, status post lumpectomy 1/19/21, with residual larisa disease, followed by  adjuvant chemo-immunotherapy with carbo-taxol and pembrolizumab, followed by AC with pembrolizumab, followed by adjuvant pemprolizumab complicated by ongoing, chronic colitis/diarrhea necessitating long-term prednisone.  Underwent bilateral mastectomy 9/29/21 with no residual carcinoma identified in breast of 14 sampled nodes (ypN0). She has completed adjuvant LEFT breast and regional larisa radiation therapy to a dose of 50Gy.    Treatment Summary  Course: C1 BREAST 2021    Treatment Site Ref. ID Energy Dose/Fx (Gy) #Fx Dose Correction (Gy) Total Dose (Gy) Start Date End Date Elapsed Days   3D Sclav_L rxsc 18X/6X 2 25 / 25 0 50 11/19/2021 1/4/2022 46   3D Breast L Breast_L 18X/6X 2 25 / 25 0 50 11/19/2021 1/4/2022 46       Tolerated well.  2 day treatment break at patient's request for patch of dry desquamation, and 5 day treatment break due to CoVID infection.  Otherwise, patient  completed her course of therapy as prescribed.       PLAN: RTC 1 month for routine follow up. Continue current skin care/sun protection for now.  Follow up with other providers as directed.          Interval History  The patient presents today for a regularly scheduled follow up visit.  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   Since that time, she is now status post BSO with post-op adrenal crisis- new diagnosis of immunotherapy-induced adrenal insufficiency.  Followed now by Endocrinology.  Also noted to have diffuse left lung inflammatory changes at the time of hospitalization.  Currently no cough/SOB.  Feeling very well today and glad to finally have an explanation for her steroid dependence. Plan for final revision surgery in the coming weeks. Reports tightness/pain in the left axilla since she has started exercising again.     Review of Systems   Review of Systems   Constitutional: Negative.    HENT: Negative.    Eyes: Negative.    Respiratory: Negative.    Cardiovascular: Negative.    Gastrointestinal: Negative.    Genitourinary: Negative.    Musculoskeletal: Negative.    Skin: Negative.    Neurological: Negative.    Endo/Heme/Allergies: Negative.    Psychiatric/Behavioral: Negative.        Social History:  Social History     Tobacco Use    Smoking status: Never Smoker    Smokeless tobacco: Never Used   Substance Use Topics    Alcohol use: Yes     Comment: occasionally    Drug use: Never       Family History:  Cancer-related family history includes Breast cancer (age of onset: 52) in her mother; Cancer in her other; Pancreatic cancer in her other. There is no history of Esophageal cancer.    Exam:  Vitals:    06/01/22 1008   BP: 107/74   BP Location: Right arm   Patient Position: Sitting   Pulse: 80   Resp: 16   Temp: 98.2 °F (36.8 °C)   SpO2: 100%   Weight: 57.3 kg (126 lb 5.2 oz)   Height: 5'  "7" (1.702 m)     Constitutional: Pleasant 42 y.o. female in no acute distress.  Well nourished. Well groomed.   Physical Exam  Vitals reviewed.   Constitutional:       General: She is not in acute distress.     Appearance: Normal appearance. She is not ill-appearing or toxic-appearing.   HENT:      Head: Normocephalic and atraumatic.      Nose: Nose normal.   Eyes:      Extraocular Movements: Extraocular movements intact.      Conjunctiva/sclera: Conjunctivae normal.      Pupils: Pupils are equal, round, and reactive to light.   Pulmonary:      Effort: Pulmonary effort is normal.   Chest:   Breasts:      Right: No axillary adenopathy or supraclavicular adenopathy.      Left: No axillary adenopathy (no BERNARDA, +axillary web) or supraclavicular adenopathy.         Musculoskeletal:         General: Normal range of motion.      Cervical back: Normal range of motion.   Lymphadenopathy:      Upper Body:      Right upper body: No supraclavicular, axillary or pectoral adenopathy.      Left upper body: No supraclavicular, axillary (no BERNARDA, +axillary web) or pectoral adenopathy.   Skin:     General: Skin is warm.   Neurological:      General: No focal deficit present.      Mental Status: She is alert and oriented to person, place, and time.      Cranial Nerves: No cranial nerve deficit.      Gait: Gait normal.   Psychiatric:         Mood and Affect: Mood normal.         Behavior: Behavior normal.         Thought Content: Thought content normal.         Judgment: Judgment normal.            Interval Imaging:      Interval imaging (CT Chest, pituitary MRI) reviewed.    Assessment:   No evidence of disease recurrence   ECOG: (0) Fully active, able to carry on all predisease performance without restriction    Plan:   Follow up in late 10/2022   Plan for final surgical revision late 9/2022   Referral to primary care physician   CT chest to follow up findings from 3/2022   Referral to PT for L axillary web   Ultrasound " bilateral breasts to confirm fat necrosis/no suspicious findings   Integrative Onc referral   TSH 10/2022   Skincare/sun protection reviewed   She was given our contact information, and she was told that she could call our clinic at anytime if she has any questions or concerns.   Follow up with other providers as directed

## 2022-06-02 ENCOUNTER — TELEPHONE (OUTPATIENT)
Dept: INFUSION THERAPY | Facility: HOSPITAL | Age: 42
End: 2022-06-02
Payer: COMMERCIAL

## 2022-06-03 ENCOUNTER — TELEPHONE (OUTPATIENT)
Dept: HEMATOLOGY/ONCOLOGY | Facility: CLINIC | Age: 42
End: 2022-06-03
Payer: COMMERCIAL

## 2022-06-03 NOTE — TELEPHONE ENCOUNTER
INDU to inform her of the support service classes we have to offer if she is interested in attending. She is already est with [facility], can attend support service classes with us.

## 2022-06-06 ENCOUNTER — PATIENT MESSAGE (OUTPATIENT)
Dept: REHABILITATION | Facility: HOSPITAL | Age: 42
End: 2022-06-06
Payer: COMMERCIAL

## 2022-06-13 ENCOUNTER — TELEPHONE (OUTPATIENT)
Dept: HEMATOLOGY/ONCOLOGY | Facility: CLINIC | Age: 42
End: 2022-06-13
Payer: COMMERCIAL

## 2022-06-13 ENCOUNTER — CLINICAL SUPPORT (OUTPATIENT)
Dept: REHABILITATION | Facility: HOSPITAL | Age: 42
End: 2022-06-13
Attending: INTERNAL MEDICINE
Payer: COMMERCIAL

## 2022-06-13 DIAGNOSIS — L90.5 AXILLARY WEB SYNDROME: Primary | ICD-10-CM

## 2022-06-13 DIAGNOSIS — L76.82 AXILLARY WEB SYNDROME: Primary | ICD-10-CM

## 2022-06-13 DIAGNOSIS — Z17.1 MALIGNANT NEOPLASM OF UPPER-OUTER QUADRANT OF LEFT BREAST IN FEMALE, ESTROGEN RECEPTOR NEGATIVE: ICD-10-CM

## 2022-06-13 DIAGNOSIS — M25.612 DECREASED ROM OF LEFT SHOULDER: ICD-10-CM

## 2022-06-13 DIAGNOSIS — C50.412 MALIGNANT NEOPLASM OF UPPER-OUTER QUADRANT OF LEFT BREAST IN FEMALE, ESTROGEN RECEPTOR NEGATIVE: ICD-10-CM

## 2022-06-13 PROCEDURE — 97110 THERAPEUTIC EXERCISES: CPT | Mod: PN

## 2022-06-13 PROCEDURE — 97162 PT EVAL MOD COMPLEX 30 MIN: CPT | Mod: PN

## 2022-06-13 NOTE — PROGRESS NOTES
[facility]  Lymphedema Physical Therapy  Initial Evaluation    Visit Date: 6/13/2022     Name: Alison Swann  Clinic Number: [id number]  Therapy Diagnosis:   Encounter Diagnoses   Name Primary?    Decreased ROM of left shoulder     Axillary web syndrome Yes    Malignant neoplasm of upper-outer quadrant of left breast in female, estrogen receptor negative      Physician: Maryann Perales MD  Physician Orders: PT Eval and Treat  Medical Diagnosis: Malignant neoplasm of upper-outer quadrant of left breast in female, estrogen receptor negative   Evaluation Date: 6/13/2022  Authorization: 06/02/2022-06/02/2023  Plan of Care: 08/13/2022    Visit #: 1 / pending  PTA visit: n/a  Time In: 11:05 AM  Time Out: 12:00PM  Total Billable Time: 55 minutes    Precautions:Cancer, no IV's, needle sticks or BP's to L UE.     Subjective     Pt reports: I had to stop exercising a couple of months ago so after I started back I started having tightness and pulling under the left arm about 3 weeks ago, thought I did something wrong while exercising. I saw my radiology oncologist and his said it was axillary webbing and put an order in for PT.     Pain  Location: left axilla into medial left upper arm with raising the arm  Current 0/10  Description: pulling, tightness    Past Medical History:   Past Medical History:   Diagnosis Date    Attention Deficit Hyperactivity Disorder     Family H/O Diabetes Mellitus     Her Father    Generalized Anxiety     Left Breast Cancer S.P Lumpectomy In 01/2021     Dr. Dominga Johnston; Dr. Sandra Sotelo (Heme/Onc); 4/28/21 On CMT; Is Estrogen Receptor Negative; She Is BRCA2 Gene Mutation Positive, Andf Her Mother Also With A H/O Breast Cancer    Macrocytic Anemia     Associated With Her Chemotherapy    Postoperative Nausea And Vomiting     Scopolamine Patches Work Well For Her For This    Scoliosis     Therapeutic Drug Monitoring     Wellness Visit 4/28/2021         Past Surgical History:  has a past surgical history that includes Augmentation of breast (); Tubal ligation;  section; Breast surgery; Breast mass excision (Left, 2021); [place] lymph node biopsy (Left, 2021); Insertion of tunneled central venous catheter (CVC) with subcutaneous port (N/A, 2021); Esophagogastroduodenoscopy (N/A, 2021); Colonoscopy (N/A, 2021); Robot-assisted laparoscopic abdominal hysterectomy using da Jeff Xi (N/A, 3/4/2022); and Robot-assisted laparoscopic salpingo-oophorectomy using da Jeff Xi (Bilateral, 3/4/2022).    Medications: has a current medication list which includes the following prescription(s): dextroamphetamine-amphetamine, hydrocortisone, and valacyclovir, and the following Facility-Administered Medications: lactated ringers.    Allergies:   Review of patient's allergies indicates:   Allergen Reactions    Penicillins Hives     As a child         Prior Therapy/Previous treatment included: No PT this calendar year.   DME owned: left arm sleev, denies wearing  Social History:lives with spouse and daughter  Place of Residence (Steps/Adaptations):[facility] with ~30 steps to enter.  Occupation:Works for [employer]  Prior Exercise Routine: Returning back to running, yoga, weights  Prior Level of Function: independent  Current Level of Function: limitations with reaching overhead    Patient's Goals: regain full range of motion, return to regular exercise routine    Objective     Mental Status: Alert/Oriented    Observation  Posture: good postural awareness  Joint Integrity: normal    Integumentary System  Skin Integrity: intact, palpable cording, webbing to left axilla into left medial upper arm extends to elbow  Circulation: intact  Palpation: denies tenderness to palpation  Edema   Amount: Stage 0   Location: left axilla      Sensation  Light Touch: intact bilat  Proprioception: intact bilat    A/PROM  (L) UE: Limitations:  "AROM flexion 135, Abduction 85 deg, functional ER T1, ER/90 deg 35 deg  (R) UE: WFLs    STRENGTH  (L) UE: WFLs  (R) UE: WFLs    Girth Measurements deferred: pt without complaints or concerns of fullness/swelling to L UE.  Garments recommended: Patient report has recommended arm sleeve from previous evaluation last year, does not wear.       Functional Mobility   Bed mobility: independent   Roll to left: independent   Roll to right: independent   Supine to prone: independent   Scooting to edge of bed: independent   Supine to sit: independent   Sit to supine: independent   Transfers to bed: independent   Transfers to toilet: independent   Sit to stand: independent   Stand pivot: independent   Car transfers: independent     Gait Assessment  AD used: none  Assistance: independent  Distance: community distances  Endurance: WFL     Gait Pattern: normal        Treatment     Treatment Time In: 11:25AM  Treatment Time Out: 12:00PM  Total Treatment time separate from Evaluation: 35 minutes  Patient received one on one Manual Treatment for 25 minutes to include: Manual lymphatic drainage to left/ UE/trunk to include: short neck series, cervical terminus , axilla, sternal nodes, paravertebral nodes, AAI anastomosis , AIA anastomosis , inguinal nodes and rework accessing all watershed areas on trunk . Lymphatouch applied to left axilla, left medial upper arm set at 65mmHg for 10 minutes.   Myofacial release/STM applied to left axillary cording followed by passive stretching into flexion and abduction.   Improvements in shoulder flexion from 135 deg  to 150 deg and shoulder abduction from 85 deg to 135 deg after manual treatment.     Therapeutic Exercise to develop ROM and flexibility for 10 minutes including:  R Side lying for L UE half moon x10  R side lying upper trunk "open book"   Pulleys flexion, abduction 10 sec x 10 reps     Patient reports increased flexibility and ROM after treatment. Instructed patient to perform above " "exercises daily along with yoga "dharmesh pose" with diaphragmatic breathing. Pt verbalized understanding.     Education: Instructed on general anatomy/physiology, lymphedema information (definitions, signs, symptoms, precautions), role of therapy in multi-disciplinary team, purpose of lymphedema physical therapy and the benefits/risks of treatment, risks of refusing treatment, POC, and goals for therapy were discussed with the pt.    Written Home Exercises Provided: no. Will provide handouts next session.  Exercises were reviewed and Alison was able to demonstrate them prior to the end of the session. Alison demonstrated good  understanding of the education provided.         Assessment     Alison is a 42 y.o. female referred to outpatient physical therapy with a medical diagnosis of Malignant neoplasm of upper-outer quadrant of left breast in female, estrogen receptor negative. Patient has a history of bilateral mastectomies, 12 lymph nodes removed at left axilla and flap reconstruction, has also completed radiation 01/24/2022. Patient presents with decreased lef shoulder range of motion, decreased tissue pliability consistent with axillary cording/webbing syndrome limiting patient ability to fully participate in regular exercise program, yoga practice, making reaching over head difficult. Patient responded well to manual treatment and exercise with increased ROM noted in all ranges. Patients prognosis if good. This pt would benefit from skilled therapy services to address the above impairments.      Plan of care discussed with patient: Yes  Pt's spiritual, cultural and educational needs considered and patient is agreeable to the plan of care and goals as stated below:     Anticipated barriers for therapy: schedule availability    Medical Necessity is demonstrated by the following:  History  Co-morbidities and personal factors that may impact the plan of care Co-morbidities:   history of cancer and prior " radiation treatment    Personal Factors:   level of understanding of current condition     moderate   Examination  Body Structures and Functions, activity limitations and participation restrictions that may impact the plan of care Body Systems:    ROM    Activity limitations:   Mobility  reaching objects over head    Self care  no deficits    Domestic Life  no deficits         moderate   Clinical Presentation evolving clinical presentation with changing clinical characteristics moderate   Decision Making/ Complexity Score: moderate       GOALS  Short Term Goals: (3 weeks)  1. Patient will be independent donning/doffing compression garment with good fit noted.  2. Patient will demonstrate 100% knowledge of lymphedema precautions and signs of infection.   3. Patient will increase L UE AROM/PROM shoulder flexion to 160 to improve functional reach and ADL's with decreased complaints of tightness and pain.   4. Patient will increase L UE AROM/PROM shoulder abduction to 150 to improve functional reach and ADL's with decreased complaints of tightness and pain.    Long Term Goals: 6 weeks  Patient will be independent with HEP for self-management of symptoms and current status.   Patient to demonstrate UE AROM to WFL for improved ability to reach overhead with ADLs (self care, dressing) and iADLs (household chores, yard work).    Plan     Plan of Care Certification: 6/13/2022 to 09/13/2022    Outpatient Physical Therapy 2 time(s) a week for 6 weeks to include the following interventions: patient education, HEP, therapeutic exercises, neuromuscular re-education, therapeutic activity, manual therapy including pneumatic compression pump and lymphatouch, self care/home management, modalities, decongestive massage, self massage, self bandaging.     Mary Jane Bass, PT

## 2022-06-13 NOTE — TELEPHONE ENCOUNTER
"I spoke with Alison and offered to come in today at 11am for PT, she voiced acceptance and was added to schedule. PT was notified.          . ----- Message from Javier Jarquin MA sent at 2022 12:10 PM CDT -----  Regardin--PT waitlist  this pt would like you to call so she can schedule an eval,  "My oncology radiologist put in a referral. I'm experiencing webbing on my left arm"- Merly      " You can access the FollowMyHealth Patient Portal offered by [facility] by registering at the following website: [email or web address]. By joining [facility]’s FollowMyHealth portal, you will also be able to view your health information using other applications (apps) compatible with our system.

## 2022-06-20 ENCOUNTER — PATIENT MESSAGE (OUTPATIENT)
Dept: HEMATOLOGY/ONCOLOGY | Facility: CLINIC | Age: 42
End: 2022-06-20
Payer: COMMERCIAL

## 2022-07-05 ENCOUNTER — TELEPHONE (OUTPATIENT)
Dept: HEMATOLOGY/ONCOLOGY | Facility: CLINIC | Age: 42
End: 2022-07-05
Payer: COMMERCIAL

## 2022-07-05 NOTE — TELEPHONE ENCOUNTER
----- Message from Dayami Singh sent at 6/2/2022  9:03 AM CDT -----  Good morning Faiza,  My first attempt to contact Ms Hue was on 5/24--left a v/m & sent a portal message. She did not respond. This morning I was able to contact her via phone. Stated, she did not call back because says she has been so busy, but she did receive my messages.  She stated, she no longer feel a need to speak with anyone. She had requested this months ago & no one called her. She says she has passed that stage (to speak with therapist) & do not want to schedule an appt at this time. I did inform her that we have a psychologist on the [place] (Dr. Favian Hollingsworth) if she feels a need again to see someone.    Thank you,  Dayami  ----- Message -----  From: Faiza Shea RN  Sent: 5/23/2022  12:58 PM CDT  To: Dayami Singh    Please assist with getting patient scheduled.  Referral in.

## 2022-07-11 ENCOUNTER — OFFICE VISIT (OUTPATIENT)
Dept: ENDOCRINOLOGY | Facility: CLINIC | Age: 42
End: 2022-07-11
Payer: COMMERCIAL

## 2022-07-11 VITALS
OXYGEN SATURATION: 99 % | DIASTOLIC BLOOD PRESSURE: 78 MMHG | HEIGHT: 67 IN | BODY MASS INDEX: 19.53 KG/M2 | SYSTOLIC BLOOD PRESSURE: 118 MMHG | HEART RATE: 84 BPM | WEIGHT: 124.44 LBS

## 2022-07-11 DIAGNOSIS — E27.49 SECONDARY ADRENAL INSUFFICIENCY: Primary | ICD-10-CM

## 2022-07-11 PROCEDURE — 99999 PR PBB SHADOW E&M-EST. PATIENT-LVL III: CPT | Mod: PBBFAC,,, | Performed by: INTERNAL MEDICINE

## 2022-07-11 PROCEDURE — 3008F PR BODY MASS INDEX (BMI) DOCUMENTED: ICD-10-PCS | Mod: CPTII,S$GLB,, | Performed by: INTERNAL MEDICINE

## 2022-07-11 PROCEDURE — 99214 PR OFFICE/OUTPT VISIT, EST, LEVL IV, 30-39 MIN: ICD-10-PCS | Mod: S$GLB,,, | Performed by: INTERNAL MEDICINE

## 2022-07-11 PROCEDURE — 3074F PR MOST RECENT SYSTOLIC BLOOD PRESSURE < 130 MM HG: ICD-10-PCS | Mod: CPTII,S$GLB,, | Performed by: INTERNAL MEDICINE

## 2022-07-11 PROCEDURE — 99214 OFFICE O/P EST MOD 30 MIN: CPT | Mod: S$GLB,,, | Performed by: INTERNAL MEDICINE

## 2022-07-11 PROCEDURE — 99999 PR PBB SHADOW E&M-EST. PATIENT-LVL III: ICD-10-PCS | Mod: PBBFAC,,, | Performed by: INTERNAL MEDICINE

## 2022-07-11 PROCEDURE — 1160F RVW MEDS BY RX/DR IN RCRD: CPT | Mod: CPTII,S$GLB,, | Performed by: INTERNAL MEDICINE

## 2022-07-11 PROCEDURE — 3074F SYST BP LT 130 MM HG: CPT | Mod: CPTII,S$GLB,, | Performed by: INTERNAL MEDICINE

## 2022-07-11 PROCEDURE — 3008F BODY MASS INDEX DOCD: CPT | Mod: CPTII,S$GLB,, | Performed by: INTERNAL MEDICINE

## 2022-07-11 PROCEDURE — 1159F MED LIST DOCD IN RCRD: CPT | Mod: CPTII,S$GLB,, | Performed by: INTERNAL MEDICINE

## 2022-07-11 PROCEDURE — 3078F PR MOST RECENT DIASTOLIC BLOOD PRESSURE < 80 MM HG: ICD-10-PCS | Mod: CPTII,S$GLB,, | Performed by: INTERNAL MEDICINE

## 2022-07-11 PROCEDURE — 1160F PR REVIEW ALL MEDS BY PRESCRIBER/CLIN PHARMACIST DOCUMENTED: ICD-10-PCS | Mod: CPTII,S$GLB,, | Performed by: INTERNAL MEDICINE

## 2022-07-11 PROCEDURE — 3078F DIAST BP <80 MM HG: CPT | Mod: CPTII,S$GLB,, | Performed by: INTERNAL MEDICINE

## 2022-07-11 PROCEDURE — 1159F PR MEDICATION LIST DOCUMENTED IN MEDICAL RECORD: ICD-10-PCS | Mod: CPTII,S$GLB,, | Performed by: INTERNAL MEDICINE

## 2022-07-11 RX ORDER — DEXAMETHASONE SODIUM PHOSPHATE 4 MG/ML
4 INJECTION, SOLUTION INTRA-ARTICULAR; INTRALESIONAL; INTRAMUSCULAR; INTRAVENOUS; SOFT TISSUE ONCE
Qty: 1 ML | Refills: 3 | Status: SHIPPED | OUTPATIENT
Start: 2022-07-11 | End: 2022-07-11

## 2022-07-11 RX ORDER — DEXAMETHASONE SODIUM PHOSPHATE 4 MG/ML
4 INJECTION, SOLUTION INTRA-ARTICULAR; INTRALESIONAL; INTRAMUSCULAR; INTRAVENOUS; SOFT TISSUE ONCE
Qty: 2 ML | Refills: 3 | Status: SHIPPED | OUTPATIENT
Start: 2022-07-11 | End: 2022-07-11

## 2022-07-11 NOTE — PROGRESS NOTES
Subjective:       Patient ID: Alison Swann is a 41 y.o. female.    Chief Complaint: No chief complaint on file.    HPI     The patient location is: her home  The chief complaint leading to consultation is: follow up [facility]    Visit type: audiovisual    Face to Face time with patient:   25 minutes of total time spent on the encounter, which includes face to face time and non-face to face time preparing to see the patient (eg, review of tests), Obtaining and/or reviewing separately obtained history, Documenting clinical information in the electronic or other health record, Independently interpreting results (not separately reported) and communicating results to the patient/family/caregiver, or Care coordination (not separately reported).   Each patient to whom he or she provides medical services by telemedicine is:  (1) informed of the relationship between the physician and patient and the respective role of any other health care provider with respect to management of the patient; and (2) notified that he or she may decline to receive medical services by telemedicine and may withdraw from such care at any time.    Notes:     ECHO wnl at OSH  She also did a stress test and reports great results here as well    Struggling with sleep  Historically, she has slept hard she reports and now takes forever to get to sleep and to stay asleep  Now she takes Ativan prn (she ahd for nausea)- even taking 2 pills she wakes up at 5 hours  Dr. Galvez suggested melatonin- she fell asleep faster but had crazy dreams and did not feel more rested    She is still on Prednisone 10 mg in q AM    Discussed reasons for insomnia including hormonal shifts    Last few weeks notes   Left hand - holds her new iphone and case here often and notes numbness and tingling    Steroid plan discussed  Alternating days will be attempted    Oncology History:  - 12/2019 thermography of breast revealed  lymphatic changes in the left axilla.  - 4/2020 self  "detected a palpable left breast mass  - 6/6/2020 Diagnostic mammogram  Impression:  Suspicious grouping of microcalcifications in the upper-outer left breast corresponding to area of palpable concern.  Biopsy is warranted.Questionable distortion of the breast architecture in the periareolar region of the right breast near the 3 o'clock position without sonographic correlate.  Short-term mammographic follow-up of the right breast at 6 month interval is recommended.  The above findings and recommendations were discussed with the patient at the time of the examination.  BI-RADS CATEGORY 4: SUSPICIOUS ABNORMALITY-BIOPSY SHOULD BE CONSIDERED  - 6/23/2020 imaging guided biopsy  Pathology: [facility] (The other calcifications were apparently not sampled)  - 1/19/2021 excision biopsy with Dr. Johnston  Pathology:  1.  BREAST, LEFT, FURTHER DESIGNATED "MASS," EXCISION:   --INVASIVE CARCINOMA OF NO SPECIAL TYPE (DUCTAL, NOT OTHERWISE    SPECIFIED), SPENSER HISTOLOGIC           GRADE 3 OUT OF 3.        --TUMOR MEASURES 42 MILLIMETERS IN MAXIMUM DIMENSION.        --RECEPTOR STUDIES ARE PENDING.   --SPECIMEN ANTERIOR MARGIN IS POSITIVE FOR INVASIVE CARCINOMA; ALL    REMAINING SPECIMEN MARGINS ARE           AT LEAST 2 MILLIMETERS AWAY.   --DUCTAL CARCINOMA IN SITU, HIGH NUCLEAR GRADE, COMPRISING LESS THAN 5%    OF TOTAL TUMOR TISSUE.        --ALL SPECIMEN MARGINS ARE NEGATIVE FOR DUCTAL CARCINOMA IN SITU.        --EXTENSIVE LYMPHOVASCULAR INVASION PRESENT.   --FIBROCYSTIC CHANGES INCLUDING STROMAL FIBROSIS, CYSTIC DILATATION OF    DUCTS, AND APOCRINE           METAPLASIA.        --COLUMNAR CELL CHANGE.   --MICROCALCIFICATIONS ASSOCIATED WITH TUMOR AND WITH BENIGN DUCTAL    PROFILES.   2.  BREAST, LEFT, FURTHER DESIGNATED "MEDIAL MARGIN," EXCISION:        --NO MORPHOLOGIC EVIDENCE OF MALIGNANCY.   --FIBROCYSTIC CHANGES INCLUDING STROMAL FIBROSIS, CYSTIC DILATATION OF    DUCTS, APOCRINE METAPLASIA,           AND USUAL DUCTAL EPITHELIAL " HYPERPLASIA.        --COLUMNAR CELL CHANGE.        --FOCAL MICROCALCIFICATIONS ASSOCIATED WITH BENIGN DUCTAL PROFILES.   3.  LYMPH NODE, LEFT SENTINEL, NEEDLE CORE BIOPSY:   --METASTATIC CARCINOMA OF NO SPECIAL TYPE (DUCTAL, NOT OTHERWISE    SPECIFIED).   ER negative, SD negative, Ota9fvg negative  Ki-67 79%     - 1/27/2021 Breast MRI:  Findings: The breasts have heterogeneous fibroglandular tissue. The background parenchymal enhancement is minimal.   Left  There are 6 similar 29 mm x 23 mm lymph nodes seen in the left axilla.   There is an 8 mm x 7 mm x 6 mm homogeneous, non-mass enhancement in a focal distribution seen in the left breast at 12 o'clock in the middle depth, 5.5 cm from the nipple. Delayed phase is persistent. This is immediately anterior to the pectoralis major muscle, at anterior aspect of implant.   There is a 24 mm x 11 mm x 9 mm clumped, non-mass enhancement in a focal distribution seen in the outer central region of the left breast in the posterior depth. This is along the margins of the resection cavity at is posterior aspect.   Right  There is no evidence of suspicious masses, abnormal enhancement, or other abnormal findings in the right breast.  Impression:  Left  Non-mass Enhancement: Left breast 8 mm x 7 mm x 6 mm non-mass enhancement at the middle 12 o'clock position. Assessment: 4 - Suspicious finding. Biopsy is recommended.  Second look ultrasound followed by ultrasound or MRI-guided biopsy could be performed if it would .  Of note, MRI guided biopsy would carry risk of implant rupture. Non-mass Enhancement: Left breast 24 mm x 11 mm x 9 mm non-mass enhancement at the posterior aspect of the excision cavity in the lateral breast. Assessment: 4 - Suspicious finding.  This is suspicious for residual disease in this patient with history of recent excisional biopsy.   The area is likely not amenable to MRI guided biopsy.  Surgical consultation recommended. Left axillary  adenopathy.  At least 6 abnormal appearing level I axillary nodes are present, suspicious for residual lymph node metastasis.  RightThere is no MR evidence of malignancy in the right breast.  BI-RADS Category:   Overall: 4 - Suspicious  Recommendation:  Surgical consultation recommended.  Left breast biopsy could be performed of focal NME at 12 o'clock in the left breast if clinically indicated.     - 2/2/2021 PET scan:  COMPARISON:  Breast MRI 01/27/2021  FINDINGS:  Quality of the study: Adequate.  In the head and neck, there are no hypermetabolic lesions worrisome for malignancy. There are no hypermetabolic mucosal lesions, and there are no pathologically enlarged or hypermetabolic lymph nodes.  In the chest, there is no definite hypermetabolic breast mass.  There are bilateral breast implants in place.  There is relatively mild diffuse uptake within dense fibroglandular tissue, and within the left breast there is a maximum SUV of 1.9 adjacent to biopsy clips on image 68.  There is also mildly hypermetabolic subcutaneous fat stranding elsewhere in the lateral aspect of the left breast on image 77 likely postprocedural in nature. There are at least half a dozen left level 1 axillary lymph nodes the largest of which are hypermetabolic including a 3 x 2.1 cm node on image 50 with an SUV of 8.6, a 1 cm node on image 55 with an SUV of 4.7, and 2.3 x 1.2 cm node on image 61 with an SUV of 9.2.  There is also a non hypermetabolic 9 x 6 mm right level 2 lymph node.  There are no suspicious lymph nodes in the internal mammary chain.  There are no pulmonary nodules, and there are no pleural or pericardial effusions.  In the abdomen and pelvis, there is physiologic tracer distribution within the abdominal organs and excretion into the genitourinary system, including diffusely within the right ureter and focally within the left.In the bones, there are no hypermetabolic lesions worrisome for malignancy.  Impression:  1.  No  discrete left breast mass identified.  2.  Metastatic left level 1 axillary lymph nodes.  Level 2 larisa metastasis is not excluded.  3.  No evidence of distant metastasis.     - 2/9/2021 ECHO:  The left ventricle is normal in size with normal systolic function. The estimated ejection fraction is 58%  Regimen:  Paclitaxel weekly + carboplatin with Pembrolizumab q 3 weeks followed by AC with Pembrolizumab q 3 weeks followed by Pembroluzimab.(Based on interim analysis of the phase III KEYNOTE-522 the addition of pembrolizumab to neoadjuvant chemotherapy and use of adjuvant pembrolizumab x 9 cycles resulted in improvements in pathologic complete response rate and event-free survival in women with early triple-negative breast cancer.)     BRCA2+     5/3/2021 Breast MRI follow up on treatment in the interval (results below)  Findings:  There are bilateral retropectoral silicone implants. There is a right sided port.  The breasts have extreme amounts of fibroglandular tissue. The background parenchymal enhancement is mild.  There are postsurgical changes in the left upper outer breast and axilla.  There has been significant interval decrease in size of the previously seen abnormal left axillary lymph nodes, now with the largest lymph node measuring 14 x 15 x 10 mm (previously measuring up to 29 mm on the 1/27/21 MRI).  The other left axillary lymph nodes are smaller with mildly irregular shaggy margins, consistent with chemotherapy treatment.   No suspicious enhancement is seen in either breast. The previously seen left 12:00 mid depth focal non mass enhancement anterior to the pectoralis is not seen on the current exam.   No abnormal right axillary lymph nodes or internal mammary lymph nodes are seen.  Impression:  There has been significant interval decrease in size of the previously seen abnormal left axillary lymph nodes, now with the largest lymph node measuring 14 x 15 x 10 mm (previously measuring up to 29 mm on the  21 MRI).  The other left axillary lymph nodes are smaller with mildly irregular shaggy margins, consistent with chemotherapy treatment.   No suspicious enhancement is seen in either breast.   BI-RADS Category:   Overall: 6 - Known Biopsy-Proven Malignancy     -   Completed surgery with complete pathologic response    - completed XRT    Gyn Hx:  Menarche- 10  , age at 1st live birth 22  OCPs x 4 years  Endometrial ablation      FH:  Mother - diagnosed at age 50 with breast cancer  Mastectomy- bilateral  No chemotherapy or radiation   Remains on Tamoxifen  Treated in [place]  ? 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 Damon    Referral for neuro- nerve conduction with hand numbness (did not occur while on or in 6 months after chemo)    Virtual 4--6 weeks with steroid taper    RTC 3 months in person           Arava Counseling:  Patient counseled regarding adverse effects of Arava including but not limited to nausea, vomiting, abnormalities in liver function tests. Patients may develop mouth sores, rash, diarrhea, and abnormalities in blood counts. The patient understands that monitoring is required including LFTs and blood counts.  There is a rare possibility of scarring of the liver and lung problems that can occur when taking methotrexate. Persistent nausea, loss of appetite, pale stools, dark urine, cough, and shortness of breath should be reported immediately. Patient advised to discontinue Arava treatment and consult with a physician prior to attempting conception. The patient will have to undergo a treatment to eliminate Arava from the body prior to conception.

## 2022-07-11 NOTE — PATIENT INSTRUCTIONS
Adrenal Insufficiency Management   How do I prevent an adrenal crisis?   Get the flu shot every year.   Take your medications as prescribed.   Keep up to date with doctor appointments, including regular checkups.     The symptoms of an adrenal insufficiency crisis can include   Extreme weakness   Mental confusion   Extreme drowsiness, in advanced cases slipping towards a coma   Pronounced dizziness   Nausea and/or vomiting   Severe headache   Abnormal heart rate - either too fast or too slow   Abnormally low blood pressure   Feeling extremely cold   Possible fever   Possible abdominal tenderness     What do I do when I am sick?   Coughs, colds, or other minor illness with oral temperature less than 100 degrees F   ? You do not need to increase your oral steroid dose.   Serious illnesses with fever 100-101.9 degrees F (such as the flu)   ? Double your normal steroid dose and contact your doctor.   ? If your fever is 102 degrees F or higher, triple your normal dose and contact your doctor.   ? If you are taking fludrocortisone, you do not have to increase your steroid dose during illness.   Diarrhea lasting more than a day   ? Double your steroid dose.   Nausea and vomiting   ? Take anti-nausea medication.   ? Take an extra dose of steroid to see if you can keep it down for more than 30 minutes.   ? If you are unable to keep the steroid dose down for more than 30 minutes, give yourself an intramuscular (IM) steroid injection and report to the emergency room.   ? Be sure someone is around the house in case your condition worsens.   Surgery   ? Dental surgery or minor surgery requiring only local anesthesia: You do not need to increase your steroid dose.   ? Moderately stressful surgery (barium enema, endoscopy, cataract surgery, major oral surgery): You should receive a hydrocortisone 100 mg IV before the procedure.   ? Major surgery (, heart operation): You should receive a hydrocortisone 100 mg IV  before the procedure and then every 8 hours for 48-72 hours.     [id number]   Page 1 of 2   Last updated 08/2011   Adrenal Insufficiency Management   When should I give myself an emergency injection?   As a general rule, persons with adrenal insufficiency should give themselves an emergency dose of injectable steroid if they vomit more than once.   Do not wait until you are too weak and confused to give yourself the injection. Keep ahead of your illness.   If you are able to swallow medication and keep it down at least 30 minutes, an injection may not be necessary.   If you are unable to keep the steroid down for 30 minutes, you will need an emergency injection as soon as possible.   If you are experiencing a rapid deterioration in your condition (such as severe physical shock), you will need an emergency injection as soon as possible.   If you are seriously injured, you may need a stress dose of injectable steroids to stabilize your blood pressure before vomiting occurs.   Once you have given yourself an injection, seek immediate medical help. The injection medication is intended to stabilize your condition until you can get emergency medical help, not as a replacement for medical care.     Steroid Injections   1. Dexamethasone (Cortastat, Cortistat LA, Cortastat 10, Dexasone LA)   a. 4 mg / 1 mL vial   b. 3 mL syringes   c. [place]     Store at room temperature. Keep out of the light.   2. Hydrocortisone (Solu-Cortef)   a. Solu-Cortef 100 mg   b. 1 ampule of water   c. 3 mL syringe   d. [place]     More stable in high temperatures. Must be mixed with water before administration.   All patients with adrenal insufficiency should wear a MedicAlert bracelet or tag that identifies their condition as follows: Adrenal Insufficiency. Steroid Dependent.   [id number]   Page 2 of 2   Last updated 08/2011

## 2022-07-11 NOTE — PROGRESS NOTES
CHIEF COMPLAINT: Adrenal Insufficiency   42 y.o. old being seen as a new patient. Has BRCA2 mutation Breast CaOn prednisone 10 mg daily. Had robotic NICOLETTE on 3/4/22. On 3/11/22 went to ED at [facility] forN/V, weakness and CP. She was hypotensive in ED @ 75/38. Given VF and 125 mg Solucortef on 3/11/22. Cortisol of 19.2 done on 3/12. States started on steroids when got Chemo. Was on Keytruda. Last year was thought to have toxicity due to chemo approx July 2021. Went to [place] for a Wellness trip and developed severe weakness. Had to go to hospital. States started high dose steroids.       Admitted Aug 13/21- was given 4 mg Dex IV on 8/14 and 8/15. Then converted to daily oral dex 4 mg daily. Then discharged on oral dex taper.     On 9/2/21 was given prednisone @ 60 mg daily and tapered over a month.       4/29/22 failed cosyntropin stim test. On Hydrocortisone 10/5. When started steroids felt better. Occasional N/V. No chemo since July 2021. Not lightheaded when standing. Weight stable. No CP or SOB. She does have medic alert bracelet but not wearing today      PAST MEDICAL HISTORY/PAST SURGICAL HISTORY:  Reviewed in [facility]    SOCIAL HISTORY: Reviewed in [facility]    FAMILY HISTORY:  No known thyroid disease. Father with DM 2. No known adrenal disorders.     MEDICATIONS/ALLERGIES: The patient's MedCard has been updated and reviewed.            PE:    GENERAL: Well developed, well nourished.  NECK: Supple, trachea midline, No palpable thyroid nodules.   CHEST: Resp even and unlabored, CTA bilateral.  CARDIAC: RRR, S1, S2 heard, no murmurs, rubs, S3, or S4  SKIN: no acanthosis nigracans.  No darkening of gums or palms.     LABS/Radiology     Latest Reference Range & Units 04/29/22 08:56 04/29/22 09:55 04/29/22 10:25   Cortisol ug/dL <1.00 2.90 3.70   ACTH 0 - 46 pg/mL <5       MRI PITUITARY W W/O CONTRAST     CLINICAL HISTORY:  Hypopituitarism;secondary Adrenal insufficiency;.  Other adrenocortical  insufficiency     TECHNIQUE:  Multiplanar multisequence MR imaging of the brain was performed before and after the uneventful intravenous administration of 5 mL Gadavist.  Diffusion weighted imaging was performed.  ADC map was generated.  Additionally, thin section high-resolution multiplanar multisequence pre and post-contrast enhanced images were obtained through the sella.  None     COMPARISON:  None.     FINDINGS:  Intracranial compartment:     There is no acute intracranial abnormality.  Brain volume, ventricular size and position are normal.  There is no hemorrhage or mass/mass effect.  There are no definite regions of abnormal signal intensity in the brain.  There are no regions of restricted diffusion to suggest acute infarction.  There is no pathologic enhancement.  The basilar cisterns are open.  There is no abnormal extra-axial fluid collection.  Flow voids indicating patency are present in the major vessels at the base of the brain.  The cerebellar tonsils are just at the level of the foramen magnum in normal position.  The orbits are normal.     The pituitary gland is normal and homogeneous in signal intensity and enhancement without relative region of decreased enhancement to suggest site of possible adenoma.  The suprasellar cistern is normal.  The infundibulum is normal in size and position.  The optic chiasm is normal.  The cavernous sinuses are normal.  There is a normal posterior pituitary bright spot.     Skull/extracranial contents: Marrow signal intensity in the clivus and calvarium is grossly normal.  There is trace mucosal thickening in the posterior ethmoid air cells.  Otherwise, the included paranasal sinuses and mastoid air cells are clear.  The included facial soft tissues and scalp are normal.     Impression:     1.  Normal imaging of the brain.  There is no acute abnormality.  There is no hemorrhage, mass/mass effect, acute infarction.  There is no pathologic enhancement.  No pituitary  mass is identified      ASSESSMENT/PLAN:  1. Adrenal Insufficiency- appears secondary. Has had a normal MRI pituitary. Possibly chemo induce. No chemo since July 2021. Symptomatically doing better on steroids. Will be getting breast reconstruction surgery at some point. Will let uis know so can give steroid recs. Discussed sick day precaution and will give IM emergency kit (1 for each house). Discussed wearing medic alert bracelet. Gave info in AVS.     2. Breast Ca- Was on keytruda.     FOLLOWUP  F/u 6 months with CMP

## 2022-07-24 ENCOUNTER — PATIENT MESSAGE (OUTPATIENT)
Dept: ENDOCRINOLOGY | Facility: CLINIC | Age: 42
End: 2022-07-24
Payer: COMMERCIAL

## 2022-07-25 ENCOUNTER — PATIENT MESSAGE (OUTPATIENT)
Dept: HEMATOLOGY/ONCOLOGY | Facility: CLINIC | Age: 42
End: 2022-07-25
Payer: COMMERCIAL

## 2022-08-01 ENCOUNTER — PATIENT MESSAGE (OUTPATIENT)
Dept: HEMATOLOGY/ONCOLOGY | Facility: CLINIC | Age: 42
End: 2022-08-01
Payer: COMMERCIAL

## 2022-08-01 RX ORDER — ONDANSETRON 4 MG/1
4 TABLET, FILM COATED ORAL EVERY 12 HOURS PRN
Qty: 20 TABLET | Refills: 0 | Status: SHIPPED | OUTPATIENT
Start: 2022-08-01 | End: 2022-09-25 | Stop reason: SDUPTHER

## 2022-08-09 ENCOUNTER — PATIENT MESSAGE (OUTPATIENT)
Dept: HEMATOLOGY/ONCOLOGY | Facility: CLINIC | Age: 42
End: 2022-08-09
Payer: COMMERCIAL

## 2022-08-15 ENCOUNTER — PATIENT MESSAGE (OUTPATIENT)
Dept: ENDOCRINOLOGY | Facility: CLINIC | Age: 42
End: 2022-08-15
Payer: COMMERCIAL

## 2022-08-15 ENCOUNTER — PATIENT MESSAGE (OUTPATIENT)
Dept: HEMATOLOGY/ONCOLOGY | Facility: CLINIC | Age: 42
End: 2022-08-15
Payer: COMMERCIAL

## 2022-08-15 ENCOUNTER — OFFICE VISIT (OUTPATIENT)
Dept: HEMATOLOGY/ONCOLOGY | Facility: CLINIC | Age: 42
End: 2022-08-15
Payer: COMMERCIAL

## 2022-08-15 ENCOUNTER — INFUSION (OUTPATIENT)
Dept: INFUSION THERAPY | Facility: HOSPITAL | Age: 42
End: 2022-08-15
Attending: INTERNAL MEDICINE
Payer: COMMERCIAL

## 2022-08-15 VITALS
HEIGHT: 67 IN | BODY MASS INDEX: 19.82 KG/M2 | SYSTOLIC BLOOD PRESSURE: 127 MMHG | OXYGEN SATURATION: 100 % | DIASTOLIC BLOOD PRESSURE: 75 MMHG | TEMPERATURE: 98 F | RESPIRATION RATE: 18 BRPM | HEART RATE: 77 BPM | WEIGHT: 126.31 LBS

## 2022-08-15 DIAGNOSIS — Z17.1 MALIGNANT NEOPLASM OF UPPER-OUTER QUADRANT OF LEFT BREAST IN FEMALE, ESTROGEN RECEPTOR NEGATIVE: Primary | ICD-10-CM

## 2022-08-15 DIAGNOSIS — C50.412 MALIGNANT NEOPLASM OF UPPER-OUTER QUADRANT OF LEFT BREAST IN FEMALE, ESTROGEN RECEPTOR NEGATIVE: Primary | ICD-10-CM

## 2022-08-15 DIAGNOSIS — E27.49 SECONDARY ADRENAL INSUFFICIENCY: Primary | ICD-10-CM

## 2022-08-15 DIAGNOSIS — Z15.09 BRCA2 GENE MUTATION POSITIVE: Chronic | ICD-10-CM

## 2022-08-15 DIAGNOSIS — Z15.01 BRCA2 GENE MUTATION POSITIVE: Chronic | ICD-10-CM

## 2022-08-15 DIAGNOSIS — E27.40 ADRENAL INSUFFICIENCY: ICD-10-CM

## 2022-08-15 LAB
ALBUMIN SERPL BCP-MCNC: 3.4 G/DL (ref 3.5–5.2)
ALP SERPL-CCNC: 78 U/L (ref 55–135)
ALT SERPL W/O P-5'-P-CCNC: 7 U/L (ref 10–44)
ANION GAP SERPL CALC-SCNC: 10 MMOL/L (ref 8–16)
AST SERPL-CCNC: 10 U/L (ref 10–40)
BASOPHILS # BLD AUTO: 0.02 K/UL (ref 0–0.2)
BASOPHILS NFR BLD: 0.3 % (ref 0–1.9)
BILIRUB SERPL-MCNC: 0.2 MG/DL (ref 0.1–1)
BUN SERPL-MCNC: 10 MG/DL (ref 6–20)
CALCIUM SERPL-MCNC: 8 MG/DL (ref 8.7–10.5)
CHLORIDE SERPL-SCNC: 110 MMOL/L (ref 95–110)
CO2 SERPL-SCNC: 21 MMOL/L (ref 23–29)
CREAT SERPL-MCNC: 0.6 MG/DL (ref 0.5–1.4)
DIFFERENTIAL METHOD: ABNORMAL
EOSINOPHIL # BLD AUTO: 0 K/UL (ref 0–0.5)
EOSINOPHIL NFR BLD: 0.3 % (ref 0–8)
ERYTHROCYTE [DISTWIDTH] IN BLOOD BY AUTOMATED COUNT: 12.4 % (ref 11.5–14.5)
EST. GFR  (NO RACE VARIABLE): >60 ML/MIN/1.73 M^2
GLUCOSE SERPL-MCNC: 90 MG/DL (ref 70–110)
HCT VFR BLD AUTO: 35 % (ref 37–48.5)
HGB BLD-MCNC: 11.8 G/DL (ref 12–16)
IMM GRANULOCYTES # BLD AUTO: 0.02 K/UL (ref 0–0.04)
IMM GRANULOCYTES NFR BLD AUTO: 0.3 % (ref 0–0.5)
LYMPHOCYTES # BLD AUTO: 1.5 K/UL (ref 1–4.8)
LYMPHOCYTES NFR BLD: 21.9 % (ref 18–48)
MCH RBC QN AUTO: 31.1 PG (ref 27–31)
MCHC RBC AUTO-ENTMCNC: 33.7 G/DL (ref 32–36)
MCV RBC AUTO: 92 FL (ref 82–98)
MONOCYTES # BLD AUTO: 0.4 K/UL (ref 0.3–1)
MONOCYTES NFR BLD: 5.4 % (ref 4–15)
NEUTROPHILS # BLD AUTO: 4.9 K/UL (ref 1.8–7.7)
NEUTROPHILS NFR BLD: 71.8 % (ref 38–73)
NRBC BLD-RTO: 0 /100 WBC
PLATELET # BLD AUTO: 211 K/UL (ref 150–450)
PMV BLD AUTO: 8.7 FL (ref 9.2–12.9)
POTASSIUM SERPL-SCNC: 3.2 MMOL/L (ref 3.5–5.1)
PROT SERPL-MCNC: 5.6 G/DL (ref 6–8.4)
RBC # BLD AUTO: 3.8 M/UL (ref 4–5.4)
SODIUM SERPL-SCNC: 141 MMOL/L (ref 136–145)
WBC # BLD AUTO: 6.86 K/UL (ref 3.9–12.7)

## 2022-08-15 PROCEDURE — 99999 PR PBB SHADOW E&M-EST. PATIENT-LVL IV: CPT | Mod: PBBFAC,,, | Performed by: INTERNAL MEDICINE

## 2022-08-15 PROCEDURE — 1159F MED LIST DOCD IN RCRD: CPT | Mod: CPTII,S$GLB,, | Performed by: INTERNAL MEDICINE

## 2022-08-15 PROCEDURE — 3008F BODY MASS INDEX DOCD: CPT | Mod: CPTII,S$GLB,, | Performed by: INTERNAL MEDICINE

## 2022-08-15 PROCEDURE — 3008F PR BODY MASS INDEX (BMI) DOCUMENTED: ICD-10-PCS | Mod: CPTII,S$GLB,, | Performed by: INTERNAL MEDICINE

## 2022-08-15 PROCEDURE — 25000003 PHARM REV CODE 250: Mod: PN | Performed by: INTERNAL MEDICINE

## 2022-08-15 PROCEDURE — 3078F DIAST BP <80 MM HG: CPT | Mod: CPTII,S$GLB,, | Performed by: INTERNAL MEDICINE

## 2022-08-15 PROCEDURE — A4216 STERILE WATER/SALINE, 10 ML: HCPCS | Mod: PN | Performed by: INTERNAL MEDICINE

## 2022-08-15 PROCEDURE — 1159F PR MEDICATION LIST DOCUMENTED IN MEDICAL RECORD: ICD-10-PCS | Mod: CPTII,S$GLB,, | Performed by: INTERNAL MEDICINE

## 2022-08-15 PROCEDURE — 36591 DRAW BLOOD OFF VENOUS DEVICE: CPT | Mod: PN

## 2022-08-15 PROCEDURE — 99214 OFFICE O/P EST MOD 30 MIN: CPT | Mod: S$GLB,,, | Performed by: INTERNAL MEDICINE

## 2022-08-15 PROCEDURE — 3074F PR MOST RECENT SYSTOLIC BLOOD PRESSURE < 130 MM HG: ICD-10-PCS | Mod: CPTII,S$GLB,, | Performed by: INTERNAL MEDICINE

## 2022-08-15 PROCEDURE — 80053 COMPREHEN METABOLIC PANEL: CPT | Mod: PN | Performed by: INTERNAL MEDICINE

## 2022-08-15 PROCEDURE — 99214 PR OFFICE/OUTPT VISIT, EST, LEVL IV, 30-39 MIN: ICD-10-PCS | Mod: S$GLB,,, | Performed by: INTERNAL MEDICINE

## 2022-08-15 PROCEDURE — 1160F PR REVIEW ALL MEDS BY PRESCRIBER/CLIN PHARMACIST DOCUMENTED: ICD-10-PCS | Mod: CPTII,S$GLB,, | Performed by: INTERNAL MEDICINE

## 2022-08-15 PROCEDURE — 3074F SYST BP LT 130 MM HG: CPT | Mod: CPTII,S$GLB,, | Performed by: INTERNAL MEDICINE

## 2022-08-15 PROCEDURE — 1160F RVW MEDS BY RX/DR IN RCRD: CPT | Mod: CPTII,S$GLB,, | Performed by: INTERNAL MEDICINE

## 2022-08-15 PROCEDURE — 85025 COMPLETE CBC W/AUTO DIFF WBC: CPT | Mod: PN | Performed by: INTERNAL MEDICINE

## 2022-08-15 PROCEDURE — 3078F PR MOST RECENT DIASTOLIC BLOOD PRESSURE < 80 MM HG: ICD-10-PCS | Mod: CPTII,S$GLB,, | Performed by: INTERNAL MEDICINE

## 2022-08-15 PROCEDURE — 99999 PR PBB SHADOW E&M-EST. PATIENT-LVL IV: ICD-10-PCS | Mod: PBBFAC,,, | Performed by: INTERNAL MEDICINE

## 2022-08-15 RX ADMIN — Medication 10 ML: at 01:08

## 2022-08-15 NOTE — PROGRESS NOTES
Subjective:       Patient ID: Alison Swann is a 42 y.o. female.    Chief Complaint: Malignant neoplasm of upper-outer quadrant of left breast i    HPI     Returns for follow up  She has been seeing endocrinology regularly and currently on:  Hydrocortisone 10 mg 8 AM  5 mg 1 PM  Cortisol axis replaced  Felt to be more long term at this point    Sleeping well  No pain issues    Eating well and drinking well  Exercises regularly    Word finding  Memory recall not as good  ie did I feed the dog  Setting alarms and reminders    2nd reconstruct 11/1/2022 - stress dose steroid required      Review of Systems   Constitutional: Positive for fatigue (chronic). Negative for activity change, appetite change, chills, fever and unexpected weight change.   HENT: Negative for mouth sores.    Eyes: Negative for visual disturbance (left eye squinting noted).   Respiratory: Negative for cough and shortness of breath.    Cardiovascular: Negative for chest pain.   Gastrointestinal: Negative for abdominal pain and diarrhea.   Genitourinary: Negative for frequency.   Musculoskeletal: Negative for back pain.   Integumentary:  Negative for rash.   Neurological: Negative for dizziness, weakness, light-headedness, numbness and headaches.   Hematological: Negative for adenopathy.   Psychiatric/Behavioral: The patient is not nervous/anxious.         See above in regards to memory         Objective:      Physical Exam  Vitals and nursing note reviewed.   Constitutional:       General: She is not in acute distress.     Appearance: Normal appearance. She is well-developed and normal weight. She is not ill-appearing.      Comments: Very pleasant  Presents alone  ECOG= 0   HENT:      Head: Normocephalic and atraumatic.      Mouth/Throat:      Mouth: Mucous membranes are moist.      Pharynx: No oropharyngeal exudate or posterior oropharyngeal erythema.      Comments: No thrush  Eyes:      General: Lids are normal. No scleral icterus.      Extraocular Movements: Extraocular movements intact.      Conjunctiva/sclera: Conjunctivae normal.      Pupils: Pupils are equal, round, and reactive to light.   Neck:      Thyroid: No thyromegaly.      Vascular: No JVD.      Trachea: Trachea normal.   Cardiovascular:      Rate and Rhythm: Normal rate and regular rhythm.      Heart sounds: Normal heart sounds. No murmur heard.    No friction rub. No gallop.      Comments: No significant edema.   Pulmonary:      Effort: Pulmonary effort is normal. No respiratory distress.      Breath sounds: Normal breath sounds. No wheezing, rhonchi or rales.      Comments: Breasts - no palpable masses or LAD; reconstruction.  Chest:      Chest wall: No tenderness.   Breasts:      Right: No axillary adenopathy or supraclavicular adenopathy.      Left: No axillary adenopathy or supraclavicular adenopathy.       Abdominal:      General: Abdomen is flat. Bowel sounds are normal. There is no distension.      Palpations: Abdomen is soft. There is no mass.      Tenderness: There is no abdominal tenderness. There is no guarding or rebound.      Comments: No organomegaly   Musculoskeletal:         General: No swelling, tenderness or deformity. Normal range of motion.      Cervical back: Normal range of motion and neck supple.      Right lower leg: No edema.      Left lower leg: No edema.      Comments: No spinal or paraspinal tenderness.    Lymphadenopathy:      Head:      Right side of head: No submental or submandibular adenopathy.      Left side of head: No submental or submandibular adenopathy.      Cervical: No cervical adenopathy.      Upper Body:      Right upper body: No supraclavicular or axillary adenopathy.      Left upper body: No supraclavicular or axillary adenopathy.   Skin:     General: Skin is warm and dry.      Capillary Refill: Capillary refill takes less than 2 seconds.      Findings: No bruising, erythema or rash.      Nails: There is no clubbing.   Neurological:       General: No focal deficit present.      Mental Status: She is alert and oriented to person, place, and time. Mental status is at baseline.      Sensory: No sensory deficit.      Motor: No weakness.      Coordination: Coordination normal.      Gait: Gait normal.   Psychiatric:         Mood and Affect: Mood normal.         Speech: Speech normal.         Behavior: Behavior normal.         Thought Content: Thought content normal.         Judgment: Judgment normal.       Labs- reviewed  Assessment:       Problem List Items Addressed This Visit     BRCA2 gene mutation positive (Chronic)    Malignant neoplasm of upper-outer quadrant of left breast in female, estrogen receptor negative - Primary    Adrenal insufficiency          Plan:       Continue surveillance  RTC 3 months  Appropriate prophylactic surgeries complete     On steroid and following with endocrinology     Requests alternating between [facility] and [place]- would like all labs from [facility]  Dermatology follow up needed  Optho referral      Route Chart for Scheduling    Med Onc Chart Routing      Follow up with physician 3 months. Labs 1 day prior from [facility] in [place], needs dermatology follow up- needs optho referral   Follow up with [facility]    Infusion scheduling note    Injection scheduling note    Labs    Imaging    Pharmacy appointment    Other referrals Additional referrals needed           Therapy Plan Information  PORT FLUSH  Flushes  heparin, porcine (PF) 100 unit/mL injection flush 500 Units  500 Units, Intravenous, Every visit  sodium chloride 0.9% flush 10 mL  10 mL, Intravenous, Every visit

## 2022-08-15 NOTE — PLAN OF CARE
Problem: Adult Inpatient Plan of Care  Goal: Plan of Care Review  Outcome: Ongoing, Progressing  Flowsheets (Taken 8/15/2022 1330)  Plan of Care Reviewed With: patient  Goal: Patient-Specific Goal (Individualized)  Outcome: Ongoing, Progressing  Flowsheets (Taken 8/15/2022 1330)  Anxieties, Fears or Concerns: None  Individualized Care Needs: None  Patient-Specific Goals (Include Timeframe): Free of S/S of infection with port access.    Patient to Infusion for lab draw from portacath. Labs collected as ordered. Patient tolerated well. Escorted to the front lobby for discharge to home.

## 2022-08-16 RX ORDER — SODIUM CHLORIDE 0.9 % (FLUSH) 0.9 %
10 SYRINGE (ML) INJECTION
Status: ACTIVE | OUTPATIENT
Start: 2022-08-15

## 2022-08-19 ENCOUNTER — PATIENT MESSAGE (OUTPATIENT)
Dept: ENDOCRINOLOGY | Facility: CLINIC | Age: 42
End: 2022-08-19
Payer: COMMERCIAL

## 2022-09-15 ENCOUNTER — INFUSION (OUTPATIENT)
Dept: INFUSION THERAPY | Facility: HOSPITAL | Age: 42
End: 2022-09-15
Attending: INTERNAL MEDICINE
Payer: COMMERCIAL

## 2022-09-15 VITALS — TEMPERATURE: 98 F

## 2022-09-15 DIAGNOSIS — C50.412 MALIGNANT NEOPLASM OF UPPER-OUTER QUADRANT OF LEFT BREAST IN FEMALE, ESTROGEN RECEPTOR NEGATIVE: ICD-10-CM

## 2022-09-15 DIAGNOSIS — Z17.1 MALIGNANT NEOPLASM OF UPPER-OUTER QUADRANT OF LEFT BREAST IN FEMALE, ESTROGEN RECEPTOR NEGATIVE: ICD-10-CM

## 2022-09-15 DIAGNOSIS — E27.49 SECONDARY ADRENAL INSUFFICIENCY: Primary | ICD-10-CM

## 2022-09-15 LAB
ALBUMIN SERPL BCP-MCNC: 4.1 G/DL (ref 3.5–5.2)
ALP SERPL-CCNC: 99 U/L (ref 55–135)
ALT SERPL W/O P-5'-P-CCNC: 8 U/L (ref 10–44)
ANION GAP SERPL CALC-SCNC: 12 MMOL/L (ref 8–16)
AST SERPL-CCNC: 13 U/L (ref 10–40)
BILIRUB SERPL-MCNC: 0.3 MG/DL (ref 0.1–1)
BUN SERPL-MCNC: 11 MG/DL (ref 6–20)
CALCIUM SERPL-MCNC: 9.7 MG/DL (ref 8.7–10.5)
CHLORIDE SERPL-SCNC: 102 MMOL/L (ref 95–110)
CO2 SERPL-SCNC: 25 MMOL/L (ref 23–29)
CREAT SERPL-MCNC: 0.7 MG/DL (ref 0.5–1.4)
EST. GFR  (NO RACE VARIABLE): >60 ML/MIN/1.73 M^2
GLUCOSE SERPL-MCNC: 152 MG/DL (ref 70–110)
POTASSIUM SERPL-SCNC: 3.8 MMOL/L (ref 3.5–5.1)
PROT SERPL-MCNC: 7 G/DL (ref 6–8.4)
SODIUM SERPL-SCNC: 139 MMOL/L (ref 136–145)

## 2022-09-15 PROCEDURE — 36591 DRAW BLOOD OFF VENOUS DEVICE: CPT | Mod: PN

## 2022-09-15 PROCEDURE — 80053 COMPREHEN METABOLIC PANEL: CPT | Mod: PN | Performed by: INTERNAL MEDICINE

## 2022-09-15 PROCEDURE — A4216 STERILE WATER/SALINE, 10 ML: HCPCS | Mod: PN | Performed by: INTERNAL MEDICINE

## 2022-09-15 PROCEDURE — 25000003 PHARM REV CODE 250: Mod: PN | Performed by: INTERNAL MEDICINE

## 2022-09-15 RX ORDER — HEPARIN 100 UNIT/ML
500 SYRINGE INTRAVENOUS
Status: DISCONTINUED | OUTPATIENT
Start: 2022-09-15 | End: 2022-09-15 | Stop reason: HOSPADM

## 2022-09-15 RX ORDER — SODIUM CHLORIDE 0.9 % (FLUSH) 0.9 %
10 SYRINGE (ML) INJECTION
Status: DISCONTINUED | OUTPATIENT
Start: 2022-09-15 | End: 2022-09-15 | Stop reason: HOSPADM

## 2022-09-15 RX ORDER — HEPARIN 100 UNIT/ML
500 SYRINGE INTRAVENOUS
Status: CANCELLED | OUTPATIENT
Start: 2022-09-15

## 2022-09-15 RX ORDER — SODIUM CHLORIDE 0.9 % (FLUSH) 0.9 %
10 SYRINGE (ML) INJECTION
Status: CANCELLED | OUTPATIENT
Start: 2022-09-15

## 2022-09-15 RX ADMIN — Medication 10 ML: at 12:09

## 2022-09-17 ENCOUNTER — TELEPHONE (OUTPATIENT)
Dept: ENDOCRINOLOGY | Facility: CLINIC | Age: 42
End: 2022-09-17
Payer: COMMERCIAL

## 2022-09-17 DIAGNOSIS — R73.09 ELEVATED GLUCOSE: Primary | ICD-10-CM

## 2022-09-24 ENCOUNTER — PATIENT MESSAGE (OUTPATIENT)
Dept: ENDOCRINOLOGY | Facility: CLINIC | Age: 42
End: 2022-09-24
Payer: COMMERCIAL

## 2022-09-26 ENCOUNTER — PATIENT MESSAGE (OUTPATIENT)
Dept: ENDOCRINOLOGY | Facility: CLINIC | Age: 42
End: 2022-09-26
Payer: COMMERCIAL

## 2022-09-26 ENCOUNTER — OFFICE VISIT (OUTPATIENT)
Dept: OPTOMETRY | Facility: CLINIC | Age: 42
End: 2022-09-26
Payer: COMMERCIAL

## 2022-09-26 DIAGNOSIS — H52.7 REFRACTIVE ERROR: ICD-10-CM

## 2022-09-26 DIAGNOSIS — Z17.1 MALIGNANT NEOPLASM OF UPPER-OUTER QUADRANT OF LEFT BREAST IN FEMALE, ESTROGEN RECEPTOR NEGATIVE: Primary | ICD-10-CM

## 2022-09-26 DIAGNOSIS — C50.412 MALIGNANT NEOPLASM OF UPPER-OUTER QUADRANT OF LEFT BREAST IN FEMALE, ESTROGEN RECEPTOR NEGATIVE: Primary | ICD-10-CM

## 2022-09-26 PROCEDURE — 1160F PR REVIEW ALL MEDS BY PRESCRIBER/CLIN PHARMACIST DOCUMENTED: ICD-10-PCS | Mod: CPTII,S$GLB,, | Performed by: OPTOMETRIST

## 2022-09-26 PROCEDURE — 92004 PR EYE EXAM, NEW PATIENT,COMPREHESV: ICD-10-PCS | Mod: S$GLB,,, | Performed by: OPTOMETRIST

## 2022-09-26 PROCEDURE — 99999 PR PBB SHADOW E&M-EST. PATIENT-LVL III: ICD-10-PCS | Mod: PBBFAC,,, | Performed by: OPTOMETRIST

## 2022-09-26 PROCEDURE — 1160F RVW MEDS BY RX/DR IN RCRD: CPT | Mod: CPTII,S$GLB,, | Performed by: OPTOMETRIST

## 2022-09-26 PROCEDURE — 1159F MED LIST DOCD IN RCRD: CPT | Mod: CPTII,S$GLB,, | Performed by: OPTOMETRIST

## 2022-09-26 PROCEDURE — 92004 COMPRE OPH EXAM NEW PT 1/>: CPT | Mod: S$GLB,,, | Performed by: OPTOMETRIST

## 2022-09-26 PROCEDURE — 99999 PR PBB SHADOW E&M-EST. PATIENT-LVL III: CPT | Mod: PBBFAC,,, | Performed by: OPTOMETRIST

## 2022-09-26 PROCEDURE — 1159F PR MEDICATION LIST DOCUMENTED IN MEDICAL RECORD: ICD-10-PCS | Mod: CPTII,S$GLB,, | Performed by: OPTOMETRIST

## 2022-09-26 NOTE — PROGRESS NOTES
HPI    Pt here for retina check DLS- 10/21/21    Pt states her oncologist  noticed her OS twitching a lot and some   excessive blinking. Pt denies va changes or any new headaches. Hx of   lasik.  Last edited by Vaishnavi Collins on 9/26/2022  3:21 PM.            Assessment /Plan     For exam results, see Encounter Report.    Malignant neoplasm of upper-outer quadrant of left breast in female, estrogen receptor negative  -     Ambulatory referral/consult to Ophthalmology    Refractive error    No ocular signs of cancer, monitor yearly with dfe.  Minimal spec rx, no glasses necessary

## 2022-09-28 ENCOUNTER — PATIENT MESSAGE (OUTPATIENT)
Dept: HEMATOLOGY/ONCOLOGY | Facility: CLINIC | Age: 42
End: 2022-09-28
Payer: COMMERCIAL

## 2022-09-28 ENCOUNTER — PATIENT MESSAGE (OUTPATIENT)
Dept: ENDOCRINOLOGY | Facility: CLINIC | Age: 42
End: 2022-09-28
Payer: COMMERCIAL

## 2022-09-30 ENCOUNTER — PATIENT MESSAGE (OUTPATIENT)
Dept: HEMATOLOGY/ONCOLOGY | Facility: CLINIC | Age: 42
End: 2022-09-30
Payer: COMMERCIAL

## 2022-10-06 ENCOUNTER — PATIENT MESSAGE (OUTPATIENT)
Dept: ENDOCRINOLOGY | Facility: CLINIC | Age: 42
End: 2022-10-06
Payer: COMMERCIAL

## 2022-10-07 RX ORDER — HYDROCORTISONE 5 MG/1
TABLET ORAL
Qty: 25 TABLET | Refills: 0 | Status: SHIPPED | OUTPATIENT
Start: 2022-10-07 | End: 2022-10-09

## 2022-10-07 NOTE — TELEPHONE ENCOUNTER
Can we send these recs to the surgeon and their anesthesia team. Can let patient know as well    Steroid recommendations:    Give 50 mg hydrocortisone IV on-call to OR or immediately prior to surgery.  Then will need 25 mg of hydrocortisone q.8 hours times 24 hours then   Hydrocortisone 25 mg twice daily times 24 hours then   Go back to previous dose.      If unable to take hydrocortisone immediately after surgery, the dose can be given IV if needed.  If any postoperative complications, let us know as we can extend out her steroid taper    Monitor for nausea, vomiting, orthostatic hypotension

## 2022-10-08 ENCOUNTER — PATIENT MESSAGE (OUTPATIENT)
Dept: ENDOCRINOLOGY | Facility: CLINIC | Age: 42
End: 2022-10-08
Payer: COMMERCIAL

## 2022-10-11 ENCOUNTER — PATIENT MESSAGE (OUTPATIENT)
Dept: ENDOCRINOLOGY | Facility: CLINIC | Age: 42
End: 2022-10-11
Payer: COMMERCIAL

## 2022-10-11 NOTE — TELEPHONE ENCOUNTER
See below. This is how much to take after surgery    Give 50 mg hydrocortisone IV on-call to OR or immediately prior to surgery.  Then will need 25 mg of hydrocortisone Q 8 hours x 24 hours then   Hydrocortisone 25 mg twice daily x 24 hours then go back to previous dose.

## 2022-10-12 ENCOUNTER — TELEPHONE (OUTPATIENT)
Dept: RADIOLOGY | Facility: HOSPITAL | Age: 42
End: 2022-10-12
Payer: COMMERCIAL

## 2022-10-12 ENCOUNTER — INFUSION (OUTPATIENT)
Dept: INFUSION THERAPY | Facility: HOSPITAL | Age: 42
End: 2022-10-12
Attending: INTERNAL MEDICINE
Payer: COMMERCIAL

## 2022-10-12 ENCOUNTER — PATIENT MESSAGE (OUTPATIENT)
Dept: ENDOCRINOLOGY | Facility: CLINIC | Age: 42
End: 2022-10-12
Payer: COMMERCIAL

## 2022-10-12 ENCOUNTER — OFFICE VISIT (OUTPATIENT)
Dept: RADIATION ONCOLOGY | Facility: CLINIC | Age: 42
End: 2022-10-12
Payer: COMMERCIAL

## 2022-10-12 VITALS
HEIGHT: 67 IN | SYSTOLIC BLOOD PRESSURE: 132 MMHG | TEMPERATURE: 99 F | RESPIRATION RATE: 16 BRPM | DIASTOLIC BLOOD PRESSURE: 69 MMHG | HEART RATE: 88 BPM | WEIGHT: 124.31 LBS | OXYGEN SATURATION: 100 % | BODY MASS INDEX: 19.51 KG/M2

## 2022-10-12 DIAGNOSIS — J98.4 PNEUMONITIS: ICD-10-CM

## 2022-10-12 DIAGNOSIS — Z17.1 MALIGNANT NEOPLASM OF UPPER-OUTER QUADRANT OF LEFT BREAST IN FEMALE, ESTROGEN RECEPTOR NEGATIVE: Primary | ICD-10-CM

## 2022-10-12 DIAGNOSIS — C50.412 MALIGNANT NEOPLASM OF UPPER-OUTER QUADRANT OF LEFT BREAST IN FEMALE, ESTROGEN RECEPTOR NEGATIVE: Primary | ICD-10-CM

## 2022-10-12 DIAGNOSIS — E34.0 CARCINOID SYNDROME: ICD-10-CM

## 2022-10-12 DIAGNOSIS — R53.83 FATIGUE, UNSPECIFIED TYPE: ICD-10-CM

## 2022-10-12 DIAGNOSIS — E27.2 ADRENAL CRISIS: Primary | ICD-10-CM

## 2022-10-12 LAB
ALBUMIN SERPL BCP-MCNC: 4.1 G/DL (ref 3.5–5.2)
ALP SERPL-CCNC: 90 U/L (ref 55–135)
ALT SERPL W/O P-5'-P-CCNC: 8 U/L (ref 10–44)
ANION GAP SERPL CALC-SCNC: 9 MMOL/L (ref 8–16)
AST SERPL-CCNC: 11 U/L (ref 10–40)
BASOPHILS # BLD AUTO: 0.02 K/UL (ref 0–0.2)
BASOPHILS NFR BLD: 0.2 % (ref 0–1.9)
BILIRUB SERPL-MCNC: 0.3 MG/DL (ref 0.1–1)
BUN SERPL-MCNC: 13 MG/DL (ref 6–20)
CALCIUM SERPL-MCNC: 9.5 MG/DL (ref 8.7–10.5)
CHLORIDE SERPL-SCNC: 104 MMOL/L (ref 95–110)
CO2 SERPL-SCNC: 28 MMOL/L (ref 23–29)
CREAT SERPL-MCNC: 0.7 MG/DL (ref 0.5–1.4)
DIFFERENTIAL METHOD: ABNORMAL
EOSINOPHIL # BLD AUTO: 0.1 K/UL (ref 0–0.5)
EOSINOPHIL NFR BLD: 0.9 % (ref 0–8)
ERYTHROCYTE [DISTWIDTH] IN BLOOD BY AUTOMATED COUNT: 12.4 % (ref 11.5–14.5)
EST. GFR  (NO RACE VARIABLE): >60 ML/MIN/1.73 M^2
ESTIMATED AVG GLUCOSE: 97 MG/DL (ref 68–131)
GLUCOSE SERPL-MCNC: 105 MG/DL (ref 70–110)
HBA1C MFR BLD: 5 % (ref 4–5.6)
HCT VFR BLD AUTO: 37.8 % (ref 37–48.5)
HGB BLD-MCNC: 13 G/DL (ref 12–16)
IMM GRANULOCYTES # BLD AUTO: 0.03 K/UL (ref 0–0.04)
IMM GRANULOCYTES NFR BLD AUTO: 0.4 % (ref 0–0.5)
LYMPHOCYTES # BLD AUTO: 1.7 K/UL (ref 1–4.8)
LYMPHOCYTES NFR BLD: 20.4 % (ref 18–48)
MCH RBC QN AUTO: 30.7 PG (ref 27–31)
MCHC RBC AUTO-ENTMCNC: 34.4 G/DL (ref 32–36)
MCV RBC AUTO: 89 FL (ref 82–98)
MONOCYTES # BLD AUTO: 0.5 K/UL (ref 0.3–1)
MONOCYTES NFR BLD: 5.9 % (ref 4–15)
NEUTROPHILS # BLD AUTO: 5.9 K/UL (ref 1.8–7.7)
NEUTROPHILS NFR BLD: 72.2 % (ref 38–73)
NRBC BLD-RTO: 0 /100 WBC
PLATELET # BLD AUTO: 228 K/UL (ref 150–450)
PMV BLD AUTO: 8.2 FL (ref 9.2–12.9)
POTASSIUM SERPL-SCNC: 4 MMOL/L (ref 3.5–5.1)
PROT SERPL-MCNC: 6.8 G/DL (ref 6–8.4)
RBC # BLD AUTO: 4.24 M/UL (ref 4–5.4)
SODIUM SERPL-SCNC: 141 MMOL/L (ref 136–145)
WBC # BLD AUTO: 8.18 K/UL (ref 3.9–12.7)

## 2022-10-12 PROCEDURE — 99999 PR PBB SHADOW E&M-EST. PATIENT-LVL IV: ICD-10-PCS | Mod: PBBFAC,,, | Performed by: RADIOLOGY

## 2022-10-12 PROCEDURE — 3075F PR MOST RECENT SYSTOLIC BLOOD PRESS GE 130-139MM HG: ICD-10-PCS | Mod: CPTII,S$GLB,, | Performed by: RADIOLOGY

## 2022-10-12 PROCEDURE — 99215 OFFICE O/P EST HI 40 MIN: CPT | Mod: S$GLB,,, | Performed by: RADIOLOGY

## 2022-10-12 PROCEDURE — 25000003 PHARM REV CODE 250: Mod: PN | Performed by: INTERNAL MEDICINE

## 2022-10-12 PROCEDURE — 99215 PR OFFICE/OUTPT VISIT, EST, LEVL V, 40-54 MIN: ICD-10-PCS | Mod: S$GLB,,, | Performed by: RADIOLOGY

## 2022-10-12 PROCEDURE — A4216 STERILE WATER/SALINE, 10 ML: HCPCS | Mod: PN | Performed by: INTERNAL MEDICINE

## 2022-10-12 PROCEDURE — 85025 COMPLETE CBC W/AUTO DIFF WBC: CPT | Mod: PN | Performed by: INTERNAL MEDICINE

## 2022-10-12 PROCEDURE — 3078F PR MOST RECENT DIASTOLIC BLOOD PRESSURE < 80 MM HG: ICD-10-PCS | Mod: CPTII,S$GLB,, | Performed by: RADIOLOGY

## 2022-10-12 PROCEDURE — 3078F DIAST BP <80 MM HG: CPT | Mod: CPTII,S$GLB,, | Performed by: RADIOLOGY

## 2022-10-12 PROCEDURE — 84443 ASSAY THYROID STIM HORMONE: CPT | Performed by: RADIOLOGY

## 2022-10-12 PROCEDURE — 3075F SYST BP GE 130 - 139MM HG: CPT | Mod: CPTII,S$GLB,, | Performed by: RADIOLOGY

## 2022-10-12 PROCEDURE — 83036 HEMOGLOBIN GLYCOSYLATED A1C: CPT | Performed by: INTERNAL MEDICINE

## 2022-10-12 PROCEDURE — 1159F PR MEDICATION LIST DOCUMENTED IN MEDICAL RECORD: ICD-10-PCS | Mod: CPTII,S$GLB,, | Performed by: RADIOLOGY

## 2022-10-12 PROCEDURE — 99999 PR PBB SHADOW E&M-EST. PATIENT-LVL IV: CPT | Mod: PBBFAC,,, | Performed by: RADIOLOGY

## 2022-10-12 PROCEDURE — 1159F MED LIST DOCD IN RCRD: CPT | Mod: CPTII,S$GLB,, | Performed by: RADIOLOGY

## 2022-10-12 PROCEDURE — 36591 DRAW BLOOD OFF VENOUS DEVICE: CPT | Mod: PN

## 2022-10-12 PROCEDURE — 80053 COMPREHEN METABOLIC PANEL: CPT | Mod: PN | Performed by: INTERNAL MEDICINE

## 2022-10-12 RX ORDER — HEPARIN 100 UNIT/ML
500 SYRINGE INTRAVENOUS
Status: CANCELLED | OUTPATIENT
Start: 2022-10-12

## 2022-10-12 RX ORDER — SODIUM CHLORIDE 0.9 % (FLUSH) 0.9 %
10 SYRINGE (ML) INJECTION
Status: DISCONTINUED | OUTPATIENT
Start: 2022-10-12 | End: 2022-10-12 | Stop reason: HOSPADM

## 2022-10-12 RX ORDER — SODIUM CHLORIDE 0.9 % (FLUSH) 0.9 %
10 SYRINGE (ML) INJECTION
Status: CANCELLED | OUTPATIENT
Start: 2022-10-12

## 2022-10-12 RX ORDER — HEPARIN 100 UNIT/ML
500 SYRINGE INTRAVENOUS
Status: DISCONTINUED | OUTPATIENT
Start: 2022-10-12 | End: 2022-10-12 | Stop reason: HOSPADM

## 2022-10-12 RX ADMIN — Medication 10 ML: at 11:10

## 2022-10-12 NOTE — PROGRESS NOTES
[facility] Department of Radiation Oncology  Follow Up Visit Note    Diagnosis:  Alison Swann is a 42 y.o. female with a(n) IIIB, triple negative invasive ductal carcinoma fo the LEFT breast, status post neoadjuvant chemotherapy, bilateral mastectomy, and adjuvant radiation therapy. BRCA2+    Oncologic History:  Oncology History   Malignant neoplasm of upper-outer quadrant of left breast in female, estrogen receptor negative   1/19/2021 Cancer Staged    Staging form: Breast, AJCC 8th Edition  - Clinical stage from 1/19/2021: Stage IIIB (cT2, cN1, cM0, G3, ER-, OH-, HER2-)       1/28/2021 Initial Diagnosis    Malignant neoplasm of upper-outer quadrant of left breast in female, estrogen receptor negative     2/18/2021 - 2/18/2021 Chemotherapy    Treatment Summary   Plan Name: OP BREAST DOSE-DENSE AC - DOXORUBICIN CYCLOPHOSPHAMIDE Q2W  Treatment Goal: Curative  Status: Inactive  Start Date:   End Date:   Provider: Jhon Suazo MD  Chemotherapy: DOXOrubicin chemo injection 96 mg, 60 mg/m2, Intravenous, Clinic/HOD 1 time, 0 of 4 cycles  cyclophosphamide (CYTOXAN) 600 mg/m2 = 965 mg in sodium chloride 0.9% 250 mL chemo infusion, 600 mg/m2, Intravenous, Clinic/HOD 1 time, 0 of 4 cycles       2/25/2021 - 7/6/2021 Chemotherapy    Treatment Summary   Plan Name: OP BREAST PACLITAXEL WEEKLY + CARBOPLATIN (AUC 5) Q3W FOLLOWED BY AC WITH PEMBROLIZUMAB FOLLOWED BY PEMBROLIZUMAB   Treatment Goal: Curative  Status: Inactive  Start Date: 2/25/2021  End Date: 7/6/2021  Provider: Sandra Sotelo MD  Chemotherapy: DOXOrubicin chemo injection 112 mg, 60 mg/m2 = 112 mg (100 % of original dose 60 mg/m2), Intravenous, Once, 3 of 4 cycles  Dose modification: 60 mg/m2 (original dose 60 mg/m2, Cycle 5)  Administration: 112 mg (5/25/2021), 112 mg (6/15/2021), 110 mg (7/6/2021)  CARBOplatin (PARAPLATIN) 605 mg in sodium chloride 0.9% 250 mL chemo infusion, 605 mg (100 % of original dose 603.5 mg), Intravenous,  Clinic/HOD 1 time, 4 of 4 cycles  Dose modification:   (original dose 603.5 mg, Cycle 1)  Administration: 605 mg (2/25/2021), 630 mg (3/22/2021), 750 mg (4/13/2021), 750 mg (5/4/2021)  cyclophosphamide 600 mg/m2 = 1,100 mg in sodium chloride 0.9% 100 mL chemo infusion, 600 mg/m2 = 1,100 mg (100 % of original dose 600 mg/m2), Intravenous, Clinic/HOD 1 time, 3 of 4 cycles  Dose modification: 600 mg/m2 (original dose 600 mg/m2, Cycle 5), 600 mg/m2 (Cycle 8)  Administration: 1,100 mg (5/25/2021), 1,100 mg (6/15/2021), 1,100 mg (7/6/2021)  PACLitaxeL (TAXOL) 80 mg/m2 = 132 mg in sodium chloride 0.9% 250 mL chemo infusion, 80 mg/m2 = 132 mg (100 % of original dose 80 mg/m2), Intravenous, Clinic/HOD 1 time, 4 of 4 cycles  Dose modification: 80 mg/m2 (original dose 80 mg/m2, Cycle 1)  Administration: 132 mg (2/25/2021), 132 mg (3/4/2021), 132 mg (3/11/2021), 138 mg (3/22/2021), 138 mg (3/30/2021), 138 mg (4/6/2021), 138 mg (4/13/2021), 138 mg (4/20/2021), 144 mg (4/27/2021), 144 mg (5/4/2021), 144 mg (5/11/2021), 144 mg (5/18/2021)  pembrolizumab (KEYTRUDA) 200 mg in sodium chloride 0.9% 100 mL chemo infusion, 200 mg, Intravenous, Clinic/HOD 1 time, 7 of 18 cycles  Administration: 200 mg (2/25/2021), 200 mg (5/25/2021), 200 mg (3/22/2021), 200 mg (4/13/2021), 200 mg (5/4/2021), 200 mg (6/15/2021), 200 mg (7/6/2021)       4/15/2021 - 4/15/2021 Chemotherapy    Treatment Summary   Plan Name: OP BREAST PEMBROLIZUMAB Q3W PACLITAXEL CARBOPLATIN WEEKLY FOLLOWED BY PEMBROLIZUMAB DOXORUBICIN CYCLOPHOSPHAMIDE Q3W   Treatment Goal: Curative  Status: Inactive  Start Date:   End Date:   Provider: Jhon Suazo MD  Chemotherapy: CARBOplatin (PARAPLATIN) in sodium chloride 0.9% 250 mL chemo infusion,  (original dose ), Intravenous, Clinic/HOD 1 time, 0 of 3 cycles  Dose modification:   (Cycle 1)  cyclophosphamide in sodium chloride 0.9% chemo infusion, 600 mg/m2 = 965 mg (original dose ), Intravenous, Clinic/HOD 1 time, 0 of 1 cycle  Dose  modification: 600 mg/m2 (Cycle 2)  DOXOrubicin (ADRIAMYCIN) in sodium chloride 0.9% 250 mL chemo infusion, 60 mg/m2 (original dose ), Intravenous, Clinic/HOD 1 time, 0 of 1 cycle  Dose modification: 60 mg/m2 (Cycle 2)  PACLitaxeL (TAXOL) in sodium chloride 0.9% 100 mL chemo infusion, 80 mg/m2 (original dose ), Intravenous, Clinic/HOD 1 time, 0 of 1 cycle  Dose modification: 80 mg/m2 (Cycle 1)  PEMEtrexed (ALIMTA) in sodium chloride 0.9% chemo infusion, 500 mg/m2, Intravenous, Clinic/HOD 1 time, 0 of 33 cycles  pembrolizumab (KEYTRUDA) 200 mg in sodium chloride 0.9% 100 mL chemo infusion, 200 mg, Intravenous, Clinic/HOD 1 time, 0 of 35 cycles       11/19/2021 - 1/4/2022 Radiation Therapy    Treating physician: Maryann Perales  Total Dose: 50 Gy  Fractions: 25    DIAGNOSIS:  C50.412 - Malignant neoplasm of upper-outer quadrant of left female breast, Diagnosed 11/11/2021 (Active) Stage IIIB, T2, N1, M0, G3, HER2 Neg, ER Neg, WV Neg    This is a 41 y.o. y/o female with cT2,c N1 and M0 G3, (Stage IIIB), triple negative, Ki-67 79%, LEFT upper outer quadrant breast, BRCA2+, status post lumpectomy 1/19/21, with residual larisa disease, followed by  adjuvant chemo-immunotherapy with carbo-taxol and pembrolizumab, followed by AC with pembrolizumab, followed by adjuvant pemprolizumab complicated by ongoing, chronic colitis/diarrhea necessitating long-term prednisone.  Underwent bilateral mastectomy 9/29/21 with no residual carcinoma identified in breast of 14 sampled nodes (ypN0). She has completed adjuvant LEFT breast and regional larisa radiation therapy to a dose of 50Gy.    Treatment Summary  Course: C1 BREAST 2021    Treatment Site Ref. ID Energy Dose/Fx (Gy) #Fx Dose Correction (Gy) Total Dose (Gy) Start Date End Date Elapsed Days   3D Sclav_L rxsc 18X/6X 2 25 / 25 0 50 11/19/2021 1/4/2022 46   3D Breast L Breast_L 18X/6X 2 25 / 25 0 50 11/19/2021 1/4/2022 46     Tolerated well.  2 day treatment break at patient's  request for patch of dry desquamation, and 5 day treatment break due to CoVID infection.  Otherwise, patient completed her course of therapy as prescribed.       PLAN: RTC 1 month for routine follow up. Continue current skin care/sun protection for now.  Follow up with other providers as directed.          Interval History  The patient presents today for a regularly scheduled follow up visit.  She was last seen in our clinic on 6/12/22.   Since that time, she is maintained on hydrocortisone daily for immunotherapy-induced adrenal insufficiency.  Followed closely by Endocrinology.  She is status post robotic NICOLETTE-BSO 3/4/22 complicated by post-op sepsis. Also noted to have diffuse left lung inflammatory changes at the time of hospitalization.  Follow ujp CT chest ordered 6/2022 has not been completed.  Currently no cough/SOB.  Final surgical revision 11/1/2022.  Has instructions for stress dosing steroids. Feeling very well today and glad to finally have an explanation for her steroid dependence. Follows regularly with Dr. Sotelo and Dr. Mcconnell. Plan for final revision surgery in the coming weeks. Reports tightness/pain in the left axilla since she has started exercising again. Did not complete full course of PT due to significant difficulty in scheduling    Review of Systems   Review of Systems   Constitutional: Negative.    HENT: Negative.     Eyes: Negative.    Respiratory: Negative.     Cardiovascular: Negative.    Gastrointestinal: Negative.    Genitourinary: Negative.    Musculoskeletal: Negative.    Skin: Negative.    Neurological: Negative.    Endo/Heme/Allergies: Negative.    Psychiatric/Behavioral: Negative.       Social History:  Social History     Tobacco Use    Smoking status: Never    Smokeless tobacco: Never   Substance Use Topics    Alcohol use: Yes     Comment: occasionally    Drug use: Never       Family History:  Cancer-related family history includes Breast cancer (age of onset: 52) in her mother;  "Cancer in an other family member; Pancreatic cancer in an other family member. There is no history of Esophageal cancer.    Exam:  Vitals:    10/12/22 1056   BP: 132/69   BP Location: Right arm   Patient Position: Sitting   Pulse: 88   Resp: 16   Temp: 98.8 °F (37.1 °C)   SpO2: 100%   Weight: 56.4 kg (124 lb 5.4 oz)   Height: 5' 7" (1.702 m)       Constitutional: Pleasant 42 y.o. female in no acute distress.  Well nourished. Well groomed.   Physical Exam  Vitals reviewed.   Constitutional:       General: She is not in acute distress.     Appearance: Normal appearance. She is not ill-appearing or toxic-appearing.   HENT:      Head: Normocephalic and atraumatic.      Nose: Nose normal.   Eyes:      Extraocular Movements: Extraocular movements intact.      Conjunctiva/sclera: Conjunctivae normal.      Pupils: Pupils are equal, round, and reactive to light.   Pulmonary:      Effort: Pulmonary effort is normal.   Chest:   Breasts:     Breasts are asymmetrical.       Musculoskeletal:         General: Normal range of motion.      Cervical back: Normal range of motion.   Lymphadenopathy:      Upper Body:      Right upper body: No supraclavicular, axillary or pectoral adenopathy.      Left upper body: No supraclavicular, axillary or pectoral adenopathy.   Skin:     General: Skin is warm.   Neurological:      General: No focal deficit present.      Mental Status: She is alert and oriented to person, place, and time.      Cranial Nerves: No cranial nerve deficit.      Gait: Gait normal.   Psychiatric:         Mood and Affect: Mood normal.         Behavior: Behavior normal.         Thought Content: Thought content normal.         Judgment: Judgment normal.          Interval Imaging:  None    Assessment:  No evidence of disease recurrence  ECOG: (0) Fully active, able to carry on all predisease performance without restriction    Plan:  Follow up in spring 2023  Plan for final surgical revision late 11/2022  Follow up with " Endocrinology as directed  CT chest to follow up findings from 3/2022 (not performed since last visit)  Ultrasound bilateral breasts to confirm fat necrosis/no suspicious findings (not performed since last visit)  TSH today  Skincare/sun protection reviewed  Encouragement given re: learning to regulate hydrocortisone dose  She was given our contact information, and she was told that she could call our clinic at anytime if she has any questions or concerns.  Follow up with other providers as directed

## 2022-10-12 NOTE — TELEPHONE ENCOUNTER
Received call from Janice at [facility] at , to schedule bilat breast us on 10/25/22 after ct/chest same day. Scheduled for 10/25/22 at 115 pm at [address] [facility].  Janice will inform patient.    NOTE: per 10/12/22 MD note - Diagnosis:  Alison Swann is a 42 y.o. female with a(n) IIIB, triple negative invasive ductal carcinoma fo the LEFT breast, status post neoadjuvant chemotherapy, bilateral mastectomy, and adjuvant radiation therapy.

## 2022-10-13 ENCOUNTER — PATIENT MESSAGE (OUTPATIENT)
Dept: ENDOCRINOLOGY | Facility: CLINIC | Age: 42
End: 2022-10-13
Payer: COMMERCIAL

## 2022-10-13 LAB — TSH SERPL DL<=0.005 MIU/L-ACNC: 0.59 UIU/ML (ref 0.4–4)

## 2022-10-14 ENCOUNTER — PATIENT MESSAGE (OUTPATIENT)
Dept: ENDOCRINOLOGY | Facility: CLINIC | Age: 42
End: 2022-10-14
Payer: COMMERCIAL

## 2022-10-14 NOTE — TELEPHONE ENCOUNTER
I sent a message yesterday but may not have seen it. Copied below:      Your A1c test came back normal.  This is a test for prediabetes as well as diabetes. Let us know if you have any questions     Thanks ...

## 2022-10-17 ENCOUNTER — PATIENT MESSAGE (OUTPATIENT)
Dept: PSYCHIATRY | Facility: CLINIC | Age: 42
End: 2022-10-17
Payer: COMMERCIAL

## 2022-10-17 ENCOUNTER — TELEPHONE (OUTPATIENT)
Dept: PSYCHIATRY | Facility: CLINIC | Age: 42
End: 2022-10-17
Payer: COMMERCIAL

## 2022-10-17 NOTE — TELEPHONE ENCOUNTER
Tried calling pt about referral no answer LVM (left clinic contact info) and reached out via [employer]

## 2022-10-25 ENCOUNTER — HOSPITAL ENCOUNTER (OUTPATIENT)
Dept: RADIOLOGY | Facility: HOSPITAL | Age: 42
Discharge: HOME | End: 2022-10-25
Attending: RADIOLOGY
Payer: COMMERCIAL

## 2022-10-25 DIAGNOSIS — Z17.1 MALIGNANT NEOPLASM OF UPPER-OUTER QUADRANT OF LEFT BREAST IN FEMALE, ESTROGEN RECEPTOR NEGATIVE: ICD-10-CM

## 2022-10-25 DIAGNOSIS — C50.412 MALIGNANT NEOPLASM OF UPPER-OUTER QUADRANT OF LEFT BREAST IN FEMALE, ESTROGEN RECEPTOR NEGATIVE: ICD-10-CM

## 2022-10-25 PROCEDURE — 71250 CT THORAX DX C-: CPT | Mod: TC,PO

## 2022-10-25 PROCEDURE — 76642 ULTRASOUND BREAST LIMITED: CPT | Mod: TC,50,PO

## 2022-10-25 PROCEDURE — 71250 CT THORAX DX C-: CPT | Mod: 26,,, | Performed by: RADIOLOGY

## 2022-10-25 PROCEDURE — 71250 CT CHEST WITHOUT CONTRAST: ICD-10-PCS | Mod: 26,,, | Performed by: RADIOLOGY

## 2022-10-25 PROCEDURE — 76642 US BREAST BILATERAL LIMITED: ICD-10-PCS | Mod: 26,50,, | Performed by: RADIOLOGY

## 2022-10-25 PROCEDURE — 76642 ULTRASOUND BREAST LIMITED: CPT | Mod: 26,50,, | Performed by: RADIOLOGY

## 2022-11-04 ENCOUNTER — PATIENT MESSAGE (OUTPATIENT)
Dept: ENDOCRINOLOGY | Facility: CLINIC | Age: 42
End: 2022-11-04
Payer: COMMERCIAL

## 2022-11-04 ENCOUNTER — TELEPHONE (OUTPATIENT)
Dept: ENDOCRINOLOGY | Facility: CLINIC | Age: 42
End: 2022-11-04
Payer: COMMERCIAL

## 2022-11-04 ENCOUNTER — OFFICE VISIT (OUTPATIENT)
Dept: ENDOCRINOLOGY | Facility: CLINIC | Age: 42
End: 2022-11-04
Payer: COMMERCIAL

## 2022-11-04 DIAGNOSIS — E27.49 SECONDARY ADRENAL INSUFFICIENCY: Primary | ICD-10-CM

## 2022-11-04 DIAGNOSIS — G47.00 INSOMNIA, UNSPECIFIED TYPE: ICD-10-CM

## 2022-11-04 PROCEDURE — 3044F PR MOST RECENT HEMOGLOBIN A1C LEVEL <7.0%: ICD-10-PCS | Mod: CPTII,95,, | Performed by: INTERNAL MEDICINE

## 2022-11-04 PROCEDURE — 1159F PR MEDICATION LIST DOCUMENTED IN MEDICAL RECORD: ICD-10-PCS | Mod: CPTII,95,, | Performed by: INTERNAL MEDICINE

## 2022-11-04 PROCEDURE — 99214 PR OFFICE/OUTPT VISIT, EST, LEVL IV, 30-39 MIN: ICD-10-PCS | Mod: 95,,, | Performed by: INTERNAL MEDICINE

## 2022-11-04 PROCEDURE — 1160F PR REVIEW ALL MEDS BY PRESCRIBER/CLIN PHARMACIST DOCUMENTED: ICD-10-PCS | Mod: CPTII,95,, | Performed by: INTERNAL MEDICINE

## 2022-11-04 PROCEDURE — 3044F HG A1C LEVEL LT 7.0%: CPT | Mod: CPTII,95,, | Performed by: INTERNAL MEDICINE

## 2022-11-04 PROCEDURE — 1159F MED LIST DOCD IN RCRD: CPT | Mod: CPTII,95,, | Performed by: INTERNAL MEDICINE

## 2022-11-04 PROCEDURE — 99214 OFFICE O/P EST MOD 30 MIN: CPT | Mod: 95,,, | Performed by: INTERNAL MEDICINE

## 2022-11-04 PROCEDURE — 1160F RVW MEDS BY RX/DR IN RCRD: CPT | Mod: CPTII,95,, | Performed by: INTERNAL MEDICINE

## 2022-11-04 RX ORDER — HYDROCORTISONE 10 MG/1
TABLET ORAL
Qty: 120 TABLET | Refills: 1 | Status: SHIPPED | OUTPATIENT
Start: 2022-11-04 | End: 2023-01-20 | Stop reason: SDUPTHER

## 2022-11-04 NOTE — PROGRESS NOTES
The patient location is: [place]    Visit type: audiovisual    Face to Face time with patient: 14  17 minutes of total time spent on the encounter, which includes face to face time and non-face to face time preparing to see the patient (eg, review of tests), Obtaining and/or reviewing separately obtained history, Documenting clinical information in the electronic or other health record, Independently interpreting results (not separately reported) and communicating results to the patient/family/caregiver, or Care coordination (not separately reported).         Each patient to whom he or she provides medical services by telemedicine is:  (1) informed of the relationship between the physician and patient and the respective role of any other health care provider with respect to management of the patient; and (2) notified that he or she may decline to receive medical services by telemedicine and may withdraw from such care at any time.    Notes:       CHIEF COMPLAINT: Adrenal Insufficiency   42 y.o. old being seen as a f/u. Has BRCA2 mutation Breast CaOn prednisone 10 mg daily. Had robotic NICOLETTE on 3/4/22. On 3/11/22 went to ED at [place] forN/V, weakness and CP. She was hypotensive in ED @ 75/38. Given VF and 125 mg Solucortef on 3/11/22. Cortisol of 19.2 done on 3/12. States started on steroids when got Chemo. Was on Keytruda. Last year was thought to have toxicity due to chemo approx July 2021.       Admitted Aug 13/21- was given 4 mg Dex IV on 8/14 and 8/15. Then converted to daily oral dex 4 mg daily. Then discharged on oral dex taper.     On 9/2/21 was given prednisone @ 60 mg daily and tapered over a month.       4/29/22 failed cosyntropin stim test. Now on Hydrocortisone 10/5. After her surgery on taper of steroids 20/10. Had surgery Tuesday Nov 1. Has been on this dose since last night. Contacted the office today due to feeling poorly. States that as steroids weaned she is feeling very fatigued. States that she had a  fever once but resolved. Has been sleeping poorly. Getting valium for sleep. Had some nausea this AM.       PAST MEDICAL HISTORY/PAST SURGICAL HISTORY:  Reviewed in [facility]    SOCIAL HISTORY: Reviewed in [facility]    FAMILY HISTORY:  No known thyroid disease. Father with DM 2. No known adrenal disorders.     MEDICATIONS/ALLERGIES: The patient's MedCard has been updated and reviewed.            PE:      LABS/Radiology        ASSESSMENT/PLAN:  1. Adrenal Insufficiency- appears secondary. Has had a normal MRI pituitary. Possibly chemo induce. No chemo since July 2021.  After her breast surgery on Tuesday she appears to be having significant symptoms.  Having extreme fatigue and this morning had some nausea.  States she had 1 day of fever.  Will try to go back up on the hydrocortisone to 20/20.  Discussed taking the 2nd dose early in the afternoon to help with the insomnia.  Discussed insomnia could be contributing to the fatigue.  Discussed trying this for 3 days.  If no improvement after 3 days can continue a week.  If still feeling poorly despite going up on hydrocortisone can try a few days of hydrocortisone 30/20.  If continues to have symptoms despite high dose of steroids advised to have her follow-up with surgeon to make sure there is not a postsurgical complication.  Discussed fevers not usually associated with adrenal insufficiency if has another fever discussed the possibility that she could have a wound infection or some other fever inducing illness.    2. Breast Ca- Was on keytruda.  Status post reconstructive breast surgery.    3. Insomnia-advised taking 2nd dose of hydrocortisone earlier in the afternoon.  Appears that she was given Valium for insomnia which she states is not helping.  Discussed if no improvement with changes in steroids may have her follow up PCP to see if any other sleep medications are recommended.    FOLLOWUP  AVS

## 2022-11-04 NOTE — TELEPHONE ENCOUNTER
The 4PM is open today. Can you put her in for a virtual visit so I can better understand what is going on

## 2022-11-04 NOTE — TELEPHONE ENCOUNTER
Pt states she had sx for recurrent 2nd face mastectomy. States the doses you gave her are not enough states if you can up her dosage. Wants you to call her

## 2022-11-04 NOTE — TELEPHONE ENCOUNTER
----- Message from Katey Jacobson, Patient Care Assistant sent at 11/4/2022  9:34 AM CDT -----  Regarding: advice  Type: Needs Medical Advice  Who Called:  pt   Best Call Back Number: [phone]    Additional Information: pt states she would like a callback regarding surgery and steroids injection. Please call pt to advise. Thanks!

## 2022-11-08 ENCOUNTER — TELEPHONE (OUTPATIENT)
Dept: HEMATOLOGY/ONCOLOGY | Facility: CLINIC | Age: 42
End: 2022-11-08
Payer: COMMERCIAL

## 2022-11-13 ENCOUNTER — PATIENT MESSAGE (OUTPATIENT)
Dept: GYNECOLOGIC ONCOLOGY | Facility: CLINIC | Age: 42
End: 2022-11-13
Payer: COMMERCIAL

## 2022-11-14 ENCOUNTER — PATIENT MESSAGE (OUTPATIENT)
Dept: HEMATOLOGY/ONCOLOGY | Facility: CLINIC | Age: 42
End: 2022-11-14
Payer: COMMERCIAL

## 2022-11-14 DIAGNOSIS — C50.412 MALIGNANT NEOPLASM OF UPPER-OUTER QUADRANT OF LEFT BREAST IN FEMALE, ESTROGEN RECEPTOR NEGATIVE: Primary | ICD-10-CM

## 2022-11-14 DIAGNOSIS — Z17.1 MALIGNANT NEOPLASM OF UPPER-OUTER QUADRANT OF LEFT BREAST IN FEMALE, ESTROGEN RECEPTOR NEGATIVE: Primary | ICD-10-CM

## 2022-11-15 ENCOUNTER — TELEPHONE (OUTPATIENT)
Dept: HEMATOLOGY/ONCOLOGY | Facility: CLINIC | Age: 42
End: 2022-11-15
Payer: COMMERCIAL

## 2022-11-15 NOTE — TELEPHONE ENCOUNTER
----- Message from Christina Seo sent at 11/15/2022 12:01 PM CST -----  Type: Needs Medical Advice  Who Called:  Patient   Symptoms (please be specific):    How long has patient had these symptoms:    Pharmacy name and phone #:    Best Call Back Number: [phone]  Additional Information: Patient is requesting a call back to schedule PT appts.

## 2022-11-15 NOTE — TELEPHONE ENCOUNTER
I spoke with the patient regarding scheduling her PT now that she is post op breast surgery. I informed her to call her surgeon to get a PT referral in then call me so we can schedule the consult appt. She voiced understanding. I also explained that once she sees PT they will determine if she will need more therapy and can schedule then.

## 2022-11-16 ENCOUNTER — TELEPHONE (OUTPATIENT)
Dept: HEMATOLOGY/ONCOLOGY | Facility: CLINIC | Age: 42
End: 2022-11-16
Payer: COMMERCIAL

## 2022-11-16 ENCOUNTER — PATIENT MESSAGE (OUTPATIENT)
Dept: HEMATOLOGY/ONCOLOGY | Facility: CLINIC | Age: 42
End: 2022-11-16
Payer: COMMERCIAL

## 2022-11-16 DIAGNOSIS — Z17.1 MALIGNANT NEOPLASM OF UPPER-OUTER QUADRANT OF LEFT BREAST IN FEMALE, ESTROGEN RECEPTOR NEGATIVE: Primary | ICD-10-CM

## 2022-11-16 DIAGNOSIS — C50.412 MALIGNANT NEOPLASM OF UPPER-OUTER QUADRANT OF LEFT BREAST IN FEMALE, ESTROGEN RECEPTOR NEGATIVE: Primary | ICD-10-CM

## 2022-11-16 NOTE — TELEPHONE ENCOUNTER
I spoke with this patient and she said [facility] and she asked me to schedule her for 5 more visits just so she doesn't get too far behind, like last time. She reported that last time she was not able to get in asap to start therapy and doesn't want that to happen again.  She said she needs to get this schedueld now and if we cannot then she needs to know so she can go somewhere else to get treated, I explained to her that she has a referral in for a consult only right now and will not be able to schedule her next appts because we don't have any frequency or documentation that states she will need more therapy after the consult. She asked if she could just schedule them and say cash-pay and if insurance will cover it then switch to insurance........I told her I was not sure if that was a possibility but would ask my supervisor when he gets back into clinic tomorrow. She voiced understanding.

## 2022-11-16 NOTE — PROGRESS NOTES
"[facility]ensive [facility]  Gynecologic Oncology  Progress Note      Patient Name: Alison Swann  MRN: [id number]  Admission Date: 3/11/2022  Hospital Length of Stay: 1 days  Attending Provider: Carmella Galvez MD  Primary Care Provider: Carmella Galvez MD  Principal Problem: Adrenal crisis    Follow-up For: * No surgery found *  Post-Operative Day:    Subjective:      History of Present Illness:  Alison Dotson is a 41-year-old female now POD#7 s/p RATLH/BSO for h/o BRCA2 mutation/breast cancer who presents as a transfer of care from [place], [place] for sepsis of unknown origin and suspected adrenal crisis.     Patient reports that starting yesterday afternoon, she began to feel "terrible" and like she could not get out of bed. States she felt very weak, tired, and lightheaded intermittently and reports that she began to develop shortness of breath that concerned her enough to go to the emergency room. Of note, she has a history of adrenal crisis after her mastectomy/reconstruction in 2021 for which she was hospitalized. She reports that these symptoms are similar to those she had with previous episode, but that her symptoms now are much worse. She reports having fevers at home, but denies cough or congestion, abdominal pain, leg swelling. Endorses intermittent nausea and a few episodes of vomiting. Also reports that she was having issues with constipation for several days but states she had a large, loose bowel movement yesterday after taking miralax for a few days. She reports that her pain has been very well controlled at home and she denies any vaginal bleeding. She does report having intermittent episodes of substernal chest pain as well.       Hospital Course:  03/12/2022 - NAEON. Elevated troponin noted, downtrending. No significant EKG changes on Tele. Chest discomfort/SOB improving. CTPE and echo pending this AM. Continue Heparin GTT. Tolerating minimal PO, ADAT.       Interval History: NAEON. " Fasting blood work at [facility]  Colonoscopy- schedule   Dermatology Chest discomfort improving this AM. Tolerating crackers and water last night, asking for breakfast today. Denies nausea, vomiting. Has not had further BM. No abdominal pain. Overall reports she is feeling much better.     Scheduled Meds:   ceFOXItin (MEFOXIN) IVPB  2 g Intravenous Q6H    hydrocortisone sodium succinate  50 mg Intravenous Q6H    OLANZapine  10 mg Oral Nightly    sodium chloride 0.9%  10 mL Intravenous Q6H    vancomycin (VANCOCIN) IVPB  750 mg Intravenous Q24H     Continuous Infusions:   sodium chloride 0.9% 125 mL/hr at 03/12/22 0659    heparin (porcine) in D5W 12 Units/kg/hr (03/12/22 0659)    NORepinephrine bitartrate-D5W 0.04 mcg/kg/min (03/12/22 0659)     PRN Meds:famotidine, heparin (PORCINE), heparin (PORCINE), ibuprofen, LORazepam, ondansetron, sodium chloride 0.9%, Flushing PICC Protocol **AND** sodium chloride 0.9% **AND** sodium chloride 0.9%, Pharmacy to dose Vancomycin consult **AND** vancomycin - pharmacy to dose    Review of patient's allergies indicates:   Allergen Reactions    Penicillins Hives     As a child        Objective:     Vital Signs (Most Recent):  Temp: 98.6 °F (37 °C) (03/12/22 0715)  Pulse: 74 (03/12/22 0715)  Resp: (!) 22 (03/12/22 0715)  BP: (!) 126/56 (03/12/22 0715)  SpO2: 97 % (03/12/22 0715)   Vital Signs (24h Range):  Temp:  [98.1 °F (36.7 °C)-100.2 °F (37.9 °C)] 98.6 °F (37 °C)  Pulse:  [] 74  Resp:  [10-77] 22  SpO2:  [90 %-100 %] 97 %  BP: ()/(25-91) 126/56     Weight: 57.2 kg (126 lb 1.7 oz)  Body mass index is 19.75 kg/m².    Intake/Output - Last 3 Shifts         03/10 0700  03/11 0659 03/11 0700  03/12 0659 03/12 0700  03/13 0659    I.V. (mL/kg)  819.1 (14.3)     IV Piggyback  84.3     Total Intake(mL/kg)  903.4 (15.8)     Urine (mL/kg/hr)  650 350 (7.9)    Total Output  650 350    Net  +253.4 -350                      Physical Exam:   Constitutional: She is oriented to person, place, and time. She appears well-developed and  well-nourished.    HENT:   Head: Normocephalic and atraumatic.      Cardiovascular:  Normal rate.             Pulmonary/Chest: Effort normal and breath sounds normal.        Abdominal: Soft. She exhibits abdominal incision (c/d/i).             Musculoskeletal: No tenderness or edema.       Neurological: She is alert and oriented to person, place, and time.    Skin: Skin is warm and dry.    Psychiatric: She has a normal mood and affect. Her behavior is normal.     Lines/Drains/Airways       Peripherally Inserted Central Catheter Line  Duration             PICC Triple Lumen 03/12/22 0135 right brachial <1 day              Central Venous Catheter Line  Duration             Port A Cath Single Lumen 02/25/21 0949 right subclavian 379 days                    Laboratory:  CMP     CMP  Sodium   Date Value Ref Range Status   03/12/2022 138 136 - 145 mmol/L Final     Potassium   Date Value Ref Range Status   03/12/2022 3.6 3.5 - 5.1 mmol/L Final     Chloride   Date Value Ref Range Status   03/12/2022 107 95 - 110 mmol/L Final     CO2   Date Value Ref Range Status   03/12/2022 22 (L) 23 - 29 mmol/L Final     Glucose   Date Value Ref Range Status   03/12/2022 144 (H) 70 - 110 mg/dL Final     BUN   Date Value Ref Range Status   03/12/2022 10 6 - 20 mg/dL Final     Creatinine   Date Value Ref Range Status   03/12/2022 1.1 0.5 - 1.4 mg/dL Final     Calcium   Date Value Ref Range Status   03/12/2022 9.1 8.7 - 10.5 mg/dL Final     Total Protein   Date Value Ref Range Status   03/12/2022 5.7 (L) 6.0 - 8.4 g/dL Final     Albumin   Date Value Ref Range Status   03/12/2022 3.0 (L) 3.5 - 5.2 g/dL Final     Total Bilirubin   Date Value Ref Range Status   03/12/2022 0.7 0.1 - 1.0 mg/dL Final     Comment:     For infants and newborns, interpretation of results should be based  on gestational age, weight and in agreement with clinical  observations.    Premature Infant recommended reference ranges:  Up to 24 hours.............<8.0 mg/dL  Up  to 48 hours............<12.0 mg/dL  3-5 days..................<15.0 mg/dL  6-29 days.................<15.0 mg/dL       Alkaline Phosphatase   Date Value Ref Range Status   03/12/2022 85 55 - 135 U/L Final     AST   Date Value Ref Range Status   03/12/2022 16 10 - 40 U/L Final     ALT   Date Value Ref Range Status   03/12/2022 10 10 - 44 U/L Final     Anion Gap   Date Value Ref Range Status   03/12/2022 9 8 - 16 mmol/L Final     eGFR if    Date Value Ref Range Status   03/12/2022 >60 >60 mL/min/1.73 m^2 Final     eGFR if non    Date Value Ref Range Status   03/12/2022 >60 >60 mL/min/1.73 m^2 Final     Comment:     Calculation used to obtain the estimated glomerular filtration  rate (eGFR) is the CKD-EPI equation.          , CBC:   Recent Labs   Lab 03/11/22  0830 03/12/22  0320   WBC 9.35 7.98   HGB 11.3* 10.4*   HCT 32.7* 29.6*    186   , and All pertinent labs from the last 24 hours have been reviewed.      Troponin I : 0.017> 0.309 > 0.209     Diagnostic Results:  X-Ray Chest 1 View  Order: [id number]   Status: Final result     Visible to patient: Yes (not seen)     Next appt: 04/29/2022 at 01:30 PM in Lab (LAB, [facility])     0 Result Notes    Details    Reading Physician Reading Date Result Priority   Amberly Haque MD  [phone]  [phone] 3/12/2022 STAT     Narrative & Impression  EXAMINATION:  XR CHEST 1 VIEW     CLINICAL HISTORY:  line placement;     TECHNIQUE:  Single frontal view of the chest was performed.     COMPARISON:  Chest radiograph and CT chest, abdomen and pelvis 03/11/2022     FINDINGS:  Interval placement of a new right upper extremity PICC line, the tip of which projects over the region of the SVC.  Stably positioned right-sided chest port in place.  The cardiomediastinal silhouette is unchanged in size and configuration.  There is increased opacification in the left mid and lower lung zones likely relating to combination of pleural fluid  and atelectatic/consolidative change.  The right lung demonstrates slight increased interstitial attenuation compared to prior study.  Multiple surgical clips project over the thorax.  No evidence of new or enlarging pneumothorax.     Impression:     Please see above.        Electronically signed by: Amberly Haque MD  Date:                                            03/12/2022  Time:                                           02:28             Exam Ended: 03/12/22 01:57 Last Resulted: 03/12/22 02:28              Assessment/Plan:     * Adrenal crisis  - History of adrenal crisis s/p breast lumpectomy in 2021 necessitating IV steroids and hospital admission   - This admission symptoms similar to previous episode   - Hyponatremia and hypotension noted at OSH, requiring pressor support     Plan:   - continue pressor support as needed, wean as tolerated  - NS infusion   - AM labs improved, hyponatremia and JA resolved, anemia stable   - S/p hydrocortisone 125mg IV bolus at OSH, continue 50mg IV Q6hrs until symptoms resolved. Transition to PO steroids later today    Chest pain  - New onset substernal chest pain since this afternoon with associated SOB  - Negative CTAP at OSH   - CXR with findings concerning for L sided PNA vs. Radiation changes  - EKG at OSH NSR   - Broad spectrum abx initiated as noted in sepsis plan     Plan:   - Telemetry while in ICU, NAEON  - Decision made to hold off on CTPE at this time given low suspicion for PE given improvement in symptoms with supportive care and IV steroids   - Will obtain further imaging if clinical status changes   - d/c Heparin this AM   - Echo ordered to assess new finding of pericardial effusion on CTAP from OSH     Sepsis  - No leukocytosis at OSH   - CXR w/ findings concerning for possible PNA vs. Radiation changes   - Vanc/Cefepime started for broad spectrum coverage, continue for at least 24 hours then consider discontinuation if no further signs of developing  infection     JA (acute kidney injury)  RESOLVED  - Cr 1.8 at OSH> 1.1 this AM   - Continue IVF   - Renally dose medications   - Strict I&Os    Hyponatremia  Resolved   - Na 129 at OSH, 138 this AM   - NS at 125cc/hr   - suspected secondary to adrenal process, see below     BRCA2 gene mutation positive  - H/o Stag IIIB invasive ductal carcinoma of L breast  - Now POD#8 s/p RR-RALTH/BSO w/ Dr. 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

## 2022-11-18 ENCOUNTER — OFFICE VISIT (OUTPATIENT)
Dept: PSYCHIATRY | Facility: CLINIC | Age: 42
End: 2022-11-18
Payer: COMMERCIAL

## 2022-11-18 ENCOUNTER — PATIENT MESSAGE (OUTPATIENT)
Dept: PSYCHIATRY | Facility: CLINIC | Age: 42
End: 2022-11-18
Payer: COMMERCIAL

## 2022-11-18 ENCOUNTER — TELEPHONE (OUTPATIENT)
Dept: HEMATOLOGY/ONCOLOGY | Facility: CLINIC | Age: 42
End: 2022-11-18
Payer: COMMERCIAL

## 2022-11-18 ENCOUNTER — TELEPHONE (OUTPATIENT)
Dept: PSYCHIATRY | Facility: CLINIC | Age: 42
End: 2022-11-18
Payer: COMMERCIAL

## 2022-11-18 DIAGNOSIS — C50.412 MALIGNANT NEOPLASM OF UPPER-OUTER QUADRANT OF LEFT BREAST IN FEMALE, ESTROGEN RECEPTOR NEGATIVE: ICD-10-CM

## 2022-11-18 DIAGNOSIS — Z17.1 MALIGNANT NEOPLASM OF UPPER-OUTER QUADRANT OF LEFT BREAST IN FEMALE, ESTROGEN RECEPTOR NEGATIVE: ICD-10-CM

## 2022-11-18 DIAGNOSIS — F41.1 GAD (GENERALIZED ANXIETY DISORDER): Primary | ICD-10-CM

## 2022-11-18 PROCEDURE — 99999 PR PBB SHADOW E&M-EST. PATIENT-LVL II: ICD-10-PCS | Mod: PBBFAC,,,

## 2022-11-18 PROCEDURE — 90791 PSYCH DIAGNOSTIC EVALUATION: CPT | Mod: 95,,,

## 2022-11-18 PROCEDURE — 3044F HG A1C LEVEL LT 7.0%: CPT | Mod: CPTII,95,,

## 2022-11-18 PROCEDURE — 90791 PR PSYCHIATRIC DIAGNOSTIC EVALUATION: ICD-10-PCS | Mod: 95,,,

## 2022-11-18 PROCEDURE — 99999 PR PBB SHADOW E&M-EST. PATIENT-LVL II: CPT | Mod: PBBFAC,,,

## 2022-11-18 PROCEDURE — 3044F PR MOST RECENT HEMOGLOBIN A1C LEVEL <7.0%: ICD-10-PCS | Mod: CPTII,95,,

## 2022-11-18 NOTE — TELEPHONE ENCOUNTER
"I spoke with the patient and informed her that per my supervisor,  "we can schedule her for 6 follow up visits after eval and if PT doesnt feel like all those visits are warranted then we can take them off the schedule after her evaluation. " The patient voiced understanding and accpeted appts given.  "

## 2022-11-21 ENCOUNTER — PATIENT MESSAGE (OUTPATIENT)
Dept: HEMATOLOGY/ONCOLOGY | Facility: CLINIC | Age: 42
End: 2022-11-21
Payer: COMMERCIAL

## 2022-11-23 ENCOUNTER — PATIENT MESSAGE (OUTPATIENT)
Dept: HEMATOLOGY/ONCOLOGY | Facility: CLINIC | Age: 42
End: 2022-11-23
Payer: COMMERCIAL

## 2022-11-28 ENCOUNTER — TELEPHONE (OUTPATIENT)
Dept: HEMATOLOGY/ONCOLOGY | Facility: CLINIC | Age: 42
End: 2022-11-28
Payer: COMMERCIAL

## 2022-11-30 ENCOUNTER — CLINICAL SUPPORT (OUTPATIENT)
Dept: REHABILITATION | Facility: HOSPITAL | Age: 42
End: 2022-11-30
Payer: COMMERCIAL

## 2022-11-30 DIAGNOSIS — C50.412 MALIGNANT NEOPLASM OF UPPER-OUTER QUADRANT OF LEFT BREAST IN FEMALE, ESTROGEN RECEPTOR NEGATIVE: Primary | ICD-10-CM

## 2022-11-30 DIAGNOSIS — L90.5 AXILLARY WEB SYNDROME: ICD-10-CM

## 2022-11-30 DIAGNOSIS — L76.82 AXILLARY WEB SYNDROME: ICD-10-CM

## 2022-11-30 DIAGNOSIS — Z17.1 MALIGNANT NEOPLASM OF UPPER-OUTER QUADRANT OF LEFT BREAST IN FEMALE, ESTROGEN RECEPTOR NEGATIVE: Primary | ICD-10-CM

## 2022-11-30 PROCEDURE — 97163 PT EVAL HIGH COMPLEX 45 MIN: CPT | Mod: PN

## 2022-11-30 NOTE — PROGRESS NOTES
Physical Therapy Initial Evaluation     Patient: Alison Swann  Clinic #: [id number]    Date: 2022  Physician: Nicol Recinos NP  Diagnosis:   Encounter Diagnoses   Name Primary?    Malignant neoplasm of upper-outer quadrant of left breast in female, estrogen receptor negative Yes    Axillary web syndrome    Muscle weakness    Patient is a 42 y.o. female who presents with request for re-evaluation after her final revision of mastectomy done 2022 . Pt reports sleep in progress struggles with resting position uses 8 pillows, stopped wearing the girdle a few days ago.   History of Present Illness:    Patient is a 42 y.o. female  post op bilateral mastectomy, 12 lymph nodes removed at left axilla, flap reconstruction.  Pt states that immunotoxicity has put her in hospital 5 times since 2021. She has completed radiation 25 tx.   She had a final revision of mastectomy on 2022  History of Present Illness: Malignant neoplasm of upper-outer quadrant of left breast, estrogen receptor negative  Onset: new onset of tingling down R arm into hand, decreased  R UE motion for overhead reaching  Surgery: Augmentation of breast (); Tubal ligation;  section; Breast surgery; Breast mass excision (Left, 2021); and [place] lymph node biopsy (Left, 2021), Port placement (2021, post op double mastetcomy with reconstruction with flap, 12 lymph nodes removed left side.    Chemotherapy:   2021 - 2021 Chemotherapy       2021 - 2022 Radiation Therapy     Treating physician: Maryann Perales  Total Dose: 50 Gy  Fractions: 25     Previous Lymphedema Treatment: no formal lymphedema treatment, was seen for PT prior to mastectomy for UE ROM in  this year.   21:CHIEF COMPLAINT: Adrenal Insufficiency   42 y.o. old being seen as a f/u. Has BRCA2 mutation Breast CaOn prednisone 10 mg daily. Had robotic NICOLETTE on  3/4/22. On 3/11/22 went to ED at [place] forN/V, weakness and CP. She was hypotensive in ED @ 75/38. Given VF and 125 mg Solucortef on 3/11/22. Cortisol of 19.2 done on 3/12. States started on steroids when got Chemo. Was on Keytruda. Last year was thought to have toxicity due to chemo approx July 2021.         Admitted Aug 13/21- was given 4 mg Dex IV on 8/14 and 8/15. Then converted to daily oral dex 4 mg daily. Then discharged on oral dex taper.      On 9/2/21 was given prednisone @ 60 mg daily and tapered over a month.      Past Medical History:   Diagnosis Date    Attention Deficit Hyperactivity Disorder     Family H/O Diabetes Mellitus     Her Father    Generalized Anxiety     Left Breast Cancer S.P Lumpectomy In 01/2021     Dr. Dominga Johnston; Dr. Sandra Sotelo (Heme/Onc); 4/28/21 On CMT; Is Estrogen Receptor Negative; She Is BRCA2 Gene Mutation Positive, Andf Her Mother Also With A H/O Breast Cancer    Macrocytic Anemia     Associated With Her Chemotherapy    Postoperative Nausea And Vomiting     Scopolamine Patches Work Well For Her For This    Scoliosis     Therapeutic Drug Monitoring     Wellness Visit 4/28/2021      Current Outpatient Medications   Medication Sig    dextroamphetamine-amphetamine 10 mg Tab Take 1 tablet by mouth once daily.     hydrocortisone (CORTEF) 10 MG Tab 20 mg in AM and 20 mg in early afternoon    ondansetron (ZOFRAN) 4 MG tablet Take 1 tablet (4 mg total) by mouth every 12 (twelve) hours as needed for Nausea.    valACYclovir (VALTREX) 500 MG tablet Take 1 tablet (500 mg total) by mouth 2 (two) times daily. for 5 days     No current facility-administered medications for this visit.     Facility-Administered Medications Ordered in Other Visits   Medication    lactated ringers infusion    sodium chloride 0.9% flush 10 mL     Review of patient's allergies indicates:   Allergen Reactions    Penicillins Hives     As a child        Precautions: Cancer, reviewed precautions of no IV's,  needle sticks or BP's to L UE.     Social History: lives with family  Environmental barriers: none  Abuse/Neglect: none  Nutritional status: good  Educational needs: postural education    Fall risk: low    Subjective     Alison Weeks rates pain at a 0/10 where 0 = no pain and 10 = maximal pain.    Patient's goals are as follows: return to prior level of fitness      Objective    Functional testinMWT: 1056 feet  Completed FACT survey today and is in media  Range of Motion -   Shoulder Range of Motion:   Active /Passive ROM Right  2021 Left 2021 Right UE 22 LEFT UE 22   Flexion 114 (pulling lateral trunk/breast with pain with pain at end range) 110 (pulling lateral trunk/breast with pain at end range) 160 deg 150 deg in sitting   Abduction 110 (pulling/pain at end range) 95 (pulling/pain at end range) 180 deg 170 deg in sitting   IR sitting T7 T10     ER sitting  60 60         UE Strength MMT tested in 2021:  (R) shoulder flexion 4/5, shoulder Abduction 4-/5, shoulder IR 4+/5, shoulder ER 4/5, elbow flexion 4+/5, elbow extension 4/5  (L) shoulder flexion 4/5, shoulder abduction 4-/5, shoulder IR 4+/5, shoulder ER 4/5, elbow flexion 4/5, elbow extension 4/5   2022 standard MMT deferred secondary recent surgery appears to have weakness in scapular stabilization, tricep    Cervical ROM assessed: decreased lateral flexion: 30 deg lateral flexion to R and 40 deg to L lateral flexion; full ext, flexion, R rotation 60 deg, L rotation 70 deg   (+) compression test in sitting with symptoms radiating into L UE  (+) distraction test  PLOF: was very active with cross fit, yoga, pilates, running, independent     Current Functional Status:  Gait: independent without AD  Transfers: independent  Bed Mobility: independent     Girth Measurements (in centimeters)     LANDMARK RIGHT [id number] LEFT [id number] EVAL 2022  R UE EVAL 2022  R UE   Axilla 30.5 cm 31.4 cm 31.2 cm 30.6 cm    E + 4 in 30.5 cm 27.2 cm 27.2 cm 26.9 cm   E + 2 in 27.5 cm 25.6 cm 25.1 cm 25.7 cm   Elbow 23.5 cm 23.5 cm 23.2 cm 22.7 cm   E - 2 23.3 cm 23.2 cm 23.8 cm 23.5 cm   E -4 20.1 cm 20.1 cm 21.2 cm 20.2 cm   Wrist 14.4 cm 14.3 cm 14.6 cm 14.4 cm   DPC 16.9 cm 16.8 cm 17.3 cm 16.9 cm   IP Thumb 5.5 cm 5.3 cm 5.9 cm 5.7 cm   Total 192.2 187.4      Chest circ at NP line   86.5 cm    Chest circ at axillary line   84.3 cm    No significant girth changes since eval on 11/1/2021 in B UE.   Amount of Swelling: mild  Location of Swelling: L axillary  Skin Integrity: multiple areas along scar line, healing but not closed yet at anterior and posterior incisions in lower pelvic incisions, scarring in pelvic region from prior surgery is full/thick in certain areas.   Palpation/Texture: induration in L inferior breast approx 3cm in diameter, fullness in L axilla.   Circulation: intact  Posture: forward head rounded shoulders, with L shoulder positioned more anterior than R.      Sensation: (+) with tingling in L UE, light touch intact throughout B UE.    Treatment Evaluation: Evaluation of girth measurements, range of motion, cervical assessment with (+) for compression test, 6 MWT and FACT survey performed today and in media. Provided sample of mepiform and instructed in use can wear up to 72 hours, remove if skin becomes irritated.  Time In: 1:00 PM  Time Out: 2:00 PM    This patient is in agreement to participate in Lymphedema treatment.      Assessment   Pt presents with mild fullness in L axilla, decreased L UE shoulder motion with tingling sensation in L arm to hand. Mild induration in inferior L breast. Majority of breast revision surgical scars along breasts are healed. Her inferior scars from lower abdominal surgical incisions has several scabbed areas anterior and posterior. No signs or symptoms of infection and she is only 4 weeks post op of the revision. PT reports does not wear underwear, PT advised pt to call her  surgeon to ask if he recommends her not wearing a bra, particularly a bra with high axillary support to support lymphatics at axilla and posteriorly. Pt also with tingling sensation in L shoulder/arm/hand that began within last 24 hours.   This patient presents s/p Bilateral mastectomy with flap reconstruction, 12 lymph nodes removed , with recent revision of breast surgery,  Resulting in: Stage 0-1 lymphedema of the left upper arm, axilla, increased pain, increased stiffness in the shoulder range of motion with flexion and abduction, as well as difficulty performing reaching over head. Patient also reports significant decreased endurance, and limitations climbing her 30 steps up to raised camp. Patient is also at higher risk of infection. This pt would benefit from skilled therapy services to address the above impairments.      Medical necessity is demonstrated by the following IMPAIRMENTS/PROBLEMS:  1. Decreased endurance  2. Decreased ROM  3. Decreased strength throughout B UE/ LE/ trunk  4. Decreased functional mobility tolerance.  5. MLD to assist in healing process of surgery/ scar care     The following goals were discussed with the patient and patient is in agreement with them as to be addressed in the treatment plan.      Short Term Goals: (3 weeks)  1. Patient will be independent donning/doffing compression garment with good fit noted.  2. Patient will demonstrate 100% knowledge of lymphedema precautions and signs of infection.   3. Patient will increase L UE AROM/PROM shoulder flexion to 160 to improve functional reach and ADL's   4. Patient will increase B UE AROM/PROM shoulder abduction to 180 to improve functional reach and ADL's with decreased complaints of tightness and pain.  5. Patient will perform self lymph drainage techniques.  6 Patient to demonstrate proper posture with weight acceptance in B LE with neutral pelvis     Long Term Goals: (6 weeks)    1. Patient will perform self lymph drainage  techniques  2. 6MWT increased by 25%.  3. Patient will increased B UE strength  to improve overall functional reach, mobility, lifting.  4. Patient will increase L UE to WNL without difficulty raising arm overhead.  5. Pt to have absent tingling in L UE  6. Pt to be independent with scar massage and scar care.        PLAN   Patient to be seen 2 x per week for 4-6 weeks for the medically necessary treatments to include: exercise, decongestive massage, intermittent compression pump, multi layered bandaging, education in lymphedema precautions, self massage, self bandaging, and assistance in obtaining appropriate compression garment.    Merly Chapin, PT, CLT  11/30/2022

## 2022-12-02 ENCOUNTER — OFFICE VISIT (OUTPATIENT)
Dept: PSYCHIATRY | Facility: CLINIC | Age: 42
End: 2022-12-02
Payer: COMMERCIAL

## 2022-12-02 DIAGNOSIS — F41.1 GAD (GENERALIZED ANXIETY DISORDER): Primary | ICD-10-CM

## 2022-12-02 PROCEDURE — 3044F PR MOST RECENT HEMOGLOBIN A1C LEVEL <7.0%: ICD-10-PCS | Mod: CPTII,95,,

## 2022-12-02 PROCEDURE — 3044F HG A1C LEVEL LT 7.0%: CPT | Mod: CPTII,95,,

## 2022-12-02 PROCEDURE — 90832 PR PSYCHOTHERAPY W/PATIENT, 30 MIN: ICD-10-PCS | Mod: FQ,95,,

## 2022-12-02 PROCEDURE — 90832 PSYTX W PT 30 MINUTES: CPT | Mod: FQ,95,,

## 2022-12-02 NOTE — PROGRESS NOTES
Established Patient - Audio Only Telehealth Visit     The patient location is: [id number] [address]  The chief complaint leading to consultation is: anxiety  Visit type: Virtual visit with audio only (telephone)  Total time spent with patient: 30mins       The reason for the audio only service rather than synchronous audio and video virtual visit was related to technical difficulties or patient preference/necessity.     Each patient to whom I provide medical services by telemedicine is:  (1) informed of the relationship between the physician and patient and the respective role of any other health care provider with respect to management of the patient; and (2) notified that they may decline to receive medical services by telemedicine and may withdraw from such care at any time. Patient verbally consented to receive this service via voice-only telephone call.       PSYCHO-ONCOLOGY NOTE/ Individual Psychotherapy     Date: 12/2/2022   Site:  [facility]      Therapeutic Intervention: Met with patient.  Outpatient - Behavior modifying psychotherapy 30 min - CPT code 56202    This includes face to face time and non-face to face time preparing to see the patient, obtaining and/or reviewing separately obtained history, documenting clinical information in the electronic or other health record, independently interpreting results and communicating results to the patient/family/caregiver, or care coordinator.      Patient was last seen by me on 11/18/2022    Problem list  Patient Active Problem List   Diagnosis    Ductal carcinoma in situ of left breast    Malignant neoplasm of upper-outer quadrant of left breast in female, estrogen receptor negative    Dehydration    Anemia associated with chemotherapy    Nausea and vomiting    BRCA2 gene mutation positive    Scoliosis    Attention Deficit Hyperactivity Disorder    Therapeutic Drug Monitoring    Postoperative Nausea And Vomiting    Macrocytic Anemia     Generalized Anxiety    Family H/O Diabetes Mellitus    Fatigue    Hyponatremia    Elevated troponin    Diarrhea    Intravascular volume depletion    Hypomagnesemia    Hypokalemia    Anterior T wave inversion    Elevated LFTs    Colitis, indeterminate    Encounter for immunotherapy    Diarrhea due to drug    Pneumonitis    s/p RA-TLH/BSO    JA (acute kidney injury)    Adrenal crisis    Sepsis    Chest pain    Adrenal insufficiency       Chief complaint/reason for encounter: depression and anxiety   Met with patient to evaluate psychosocial adaptation to diagnosis/treatment/survivorship of Malignant neoplasm of upper-outer quadrant of left breast.     Current Medications  Current Outpatient Medications   Medication    dextroamphetamine-amphetamine 10 mg Tab    hydrocortisone (CORTEF) 10 MG Tab    ondansetron (ZOFRAN) 4 MG tablet    valACYclovir (VALTREX) 500 MG tablet     No current facility-administered medications for this visit.     Facility-Administered Medications Ordered in Other Visits   Medication Frequency    lactated ringers infusion Continuous    sodium chloride 0.9% flush 10 mL PRN       ONCOLOGY HISTORY  Oncology History   Malignant neoplasm of upper-outer quadrant of left breast in female, estrogen receptor negative   1/19/2021 Cancer Staged    Staging form: Breast, AJCC 8th Edition  - Clinical stage from 1/19/2021: Stage IIIB (cT2, cN1, cM0, G3, ER-, VT-, HER2-)       1/28/2021 Initial Diagnosis    Malignant neoplasm of upper-outer quadrant of left breast in female, estrogen receptor negative     2/18/2021 - 2/18/2021 Chemotherapy    Treatment Summary   Plan Name: OP BREAST DOSE-DENSE AC - DOXORUBICIN CYCLOPHOSPHAMIDE Q2W  Treatment Goal: Curative  Status: Inactive  Start Date:   End Date:   Provider: Jhon Suazo MD  Chemotherapy: DOXOrubicin chemo injection 96 mg, 60 mg/m2, Intravenous, Clinic/[facility] 1 time, 0 of 4 cycles  cyclophosphamide (CYTOXAN) 600 mg/m2 = 965 mg in sodium chloride 0.9% 250 mL  chemo infusion, 600 mg/m2, Intravenous, Clinic/[facility] 1 time, 0 of 4 cycles       2/25/2021 - 7/6/2021 Chemotherapy    Treatment Summary   Plan Name: OP BREAST PACLITAXEL WEEKLY + CARBOPLATIN (AUC 5) Q3W FOLLOWED BY AC WITH PEMBROLIZUMAB FOLLOWED BY PEMBROLIZUMAB   Treatment Goal: Curative  Status: Inactive  Start Date: 2/25/2021  End Date: 7/6/2021  Provider: Sandra Sotelo MD  Chemotherapy: DOXOrubicin chemo injection 112 mg, 60 mg/m2 = 112 mg (100 % of original dose 60 mg/m2), Intravenous, Once, 3 of 4 cycles  Dose modification: 60 mg/m2 (original dose 60 mg/m2, Cycle 5)  Administration: 112 mg (5/25/2021), 112 mg (6/15/2021), 110 mg (7/6/2021)  CARBOplatin (PARAPLATIN) 605 mg in sodium chloride 0.9% 250 mL chemo infusion, 605 mg (100 % of original dose 603.5 mg), Intravenous, Clinic/[facility] 1 time, 4 of 4 cycles  Dose modification:   (original dose 603.5 mg, Cycle 1)  Administration: 605 mg (2/25/2021), 630 mg (3/22/2021), 750 mg (4/13/2021), 750 mg (5/4/2021)  cyclophosphamide 600 mg/m2 = 1,100 mg in sodium chloride 0.9% 100 mL chemo infusion, 600 mg/m2 = 1,100 mg (100 % of original dose 600 mg/m2), Intravenous, Clinic/[facility] 1 time, 3 of 4 cycles  Dose modification: 600 mg/m2 (original dose 600 mg/m2, Cycle 5), 600 mg/m2 (Cycle 8)  Administration: 1,100 mg (5/25/2021), 1,100 mg (6/15/2021), 1,100 mg (7/6/2021)  PACLitaxeL (TAXOL) 80 mg/m2 = 132 mg in sodium chloride 0.9% 250 mL chemo infusion, 80 mg/m2 = 132 mg (100 % of original dose 80 mg/m2), Intravenous, Clinic/[facility] 1 time, 4 of 4 cycles  Dose modification: 80 mg/m2 (original dose 80 mg/m2, Cycle 1)  Administration: 132 mg (2/25/2021), 132 mg (3/4/2021), 132 mg (3/11/2021), 138 mg (3/22/2021), 138 mg (3/30/2021), 138 mg (4/6/2021), 138 mg (4/13/2021), 138 mg (4/20/2021), 144 mg (4/27/2021), 144 mg (5/4/2021), 144 mg (5/11/2021), 144 mg (5/18/2021)  pembrolizumab (KEYTRUDA) 200 mg in sodium chloride 0.9% 100 mL chemo infusion, 200 mg, Intravenous, Clinic/[facility] 1 time, 7  of 18 cycles  Administration: 200 mg (2/25/2021), 200 mg (5/25/2021), 200 mg (3/22/2021), 200 mg (4/13/2021), 200 mg (5/4/2021), 200 mg (6/15/2021), 200 mg (7/6/2021)       4/15/2021 - 4/15/2021 Chemotherapy    Treatment Summary   Plan Name: OP BREAST PEMBROLIZUMAB Q3W PACLITAXEL CARBOPLATIN WEEKLY FOLLOWED BY PEMBROLIZUMAB DOXORUBICIN CYCLOPHOSPHAMIDE Q3W   Treatment Goal: Curative  Status: Inactive  Start Date:   End Date:   Provider: Jhon Suazo MD  Chemotherapy: CARBOplatin (PARAPLATIN) in sodium chloride 0.9% 250 mL chemo infusion,  (original dose ), Intravenous, Clinic/[facility] 1 time, 0 of 3 cycles  Dose modification:   (Cycle 1)  cyclophosphamide in sodium chloride 0.9% chemo infusion, 600 mg/m2 = 965 mg (original dose ), Intravenous, Clinic/[facility] 1 time, 0 of 1 cycle  Dose modification: 600 mg/m2 (Cycle 2)  DOXOrubicin (ADRIAMYCIN) in sodium chloride 0.9% 250 mL chemo infusion, 60 mg/m2 (original dose ), Intravenous, Clinic/[facility] 1 time, 0 of 1 cycle  Dose modification: 60 mg/m2 (Cycle 2)  PACLitaxeL (TAXOL) in sodium chloride 0.9% 100 mL chemo infusion, 80 mg/m2 (original dose ), Intravenous, Clinic/[facility] 1 time, 0 of 1 cycle  Dose modification: 80 mg/m2 (Cycle 1)  PEMEtrexed (ALIMTA) in sodium chloride 0.9% chemo infusion, 500 mg/m2, Intravenous, Clinic/[facility] 1 time, 0 of 33 cycles  pembrolizumab (KEYTRUDA) 200 mg in sodium chloride 0.9% 100 mL chemo infusion, 200 mg, Intravenous, Clinic/[facility] 1 time, 0 of 35 cycles       11/19/2021 - 1/4/2022 Radiation Therapy    Treating physician: Maryann Perales  Total Dose: 50 Gy  Fractions: 25    DIAGNOSIS:  C50.412 - Malignant neoplasm of upper-outer quadrant of left female breast, Diagnosed 11/11/2021 (Active) Stage IIIB, T2, N1, M0, G3, HER2 Neg, ER Neg, ME Neg    This is a 41 y.o. y/o female with cT2,c N1 and M0 G3, (Stage IIIB), triple negative, Ki-67 79%, LEFT upper outer quadrant breast, BRCA2+, status post lumpectomy 1/19/21, with residual larisa disease, followed by   adjuvant chemo-immunotherapy with carbo-taxol and pembrolizumab, followed by AC with pembrolizumab, followed by adjuvant pemprolizumab complicated by ongoing, chronic colitis/diarrhea necessitating long-term prednisone.  Underwent bilateral mastectomy 9/29/21 with no residual carcinoma identified in breast of 14 sampled nodes (ypN0). She has completed adjuvant LEFT breast and regional larisa radiation therapy to a dose of 50Gy.    Treatment Summary  Course: C1 BREAST 2021    Treatment Site Ref. ID Energy Dose/Fx (Gy) #Fx Dose Correction (Gy) Total Dose (Gy) Start Date End Date Elapsed Days   3D Sclav_L rxsc 18X/6X 2 25 / 25 0 50 11/19/2021 1/4/2022 46   3D Breast L Breast_L 18X/6X 2 25 / 25 0 50 11/19/2021 1/4/2022 46     Tolerated well.  2 day treatment break at patient's request for patch of dry desquamation, and 5 day treatment break due to CoVID infection.  Otherwise, patient completed her course of therapy as prescribed.       PLAN: RTC 1 month for routine follow up. Continue current skin care/sun protection for now.  Follow up with other providers as directed.         Objective:  Alison Swann arrived promptly for the session. The patient was fully cooperative throughout the session.  Appearance: unable to assess due to audio only  Behavior/Cooperation: friendly and cooperative  Speech: normal in rate, volume, and tone  Mood: angry, depressed  Affect: appropriate range(crying, laughing)  Thought Process: goal-directed, logical  Thought Content: normal,  No delusions or paranoia; did not appear to be responding to internal stimuli during the session  Orientation: grossly intact  Memory: grossly intact  Attention Span/Concentration: Attends to session without distraction; reports no difficulty  Fund of Knowledge: average  Estimate of Intelligence: average from verbal skills and history  Cognition: grossly intact  Insight: patient has awareness of illness; good insight into own behavior and behavior of  others  Judgment: the patient's behavior is adequate to circumstances    NCCN Distress thermometer:   DISTRESS SCREENING 10/12/2022 10/12/2022 8/15/2022 8/15/2022 6/1/2022 4/29/2022 4/29/2022   Distress Score 10 - Extreme Distress 0 - No Distress 0 - No Distress 2 2 4 4   Practical Problems Work/School None of these - None of these - - -   Family Problems None of these None of these - None of these - - -   Emotional Problems Fears;Worry None of these - None of these - - -   Spiritual / Religion Concerns No No - No No - No   Physical Problems None of these None of these - None of these - - -   Other Problems - - - - - - -        Interval history and content of current session: Mrs. Swann discussed current adaptation to disease and treatment status. Reports to be coping in an adequate manner. Patient shares ongoing frustrations with insurance refusing to pay for recommended treatments not deemed medically necessary. She notes that this frustration has been over the course of her cancer treatment. Additional stressors include family conflict. Evaluated cognitive response, paying particular attention to negative intrusive thoughts of a persistent and detrimental nature. Thoughts of this type are in evidence with moderate distress. Provided cognitive behavioral therapy to address negative cognitions. Identified and evaluated psychosocial and environmental stressors secondary to diagnosis and treatment.  Examined proactive behaviors that may be implemented to minimize or ameliorate psychosocial stressors secondary to diagnosis and treatment including setting boundaries and communicating wants and needs.     Risk parameters:   Patient reports no suicidal ideation  Patient reports no homicidal ideation  Patient reports no self-injurious behavior  Patient reports no violent behavior   Safety needs:  None at this time      Verbal deficits: None     Patient's response to intervention:The patient's response to intervention is  accepting.     Progress toward goals and other mental status changes:  The patient's progress toward goals is fair .      Progress to date:Progress - Ongoing, but Slow      Goals from last visit: Met        Patient Strengths: verbal, intelligent, successful, good social support, good insight, commitment to wellness, strong melany, strong cultural traditions      Treatment Plan:individual psychotherapy  Target symptoms: depression, anxiety   Why chosen therapy is appropriate versus another modality: relevant to diagnosis  Outcome monitoring methods: self-report  Therapeutic intervention type: supportive psychotherapy  Prognosis: Fair      Behavioral goals:    Exercise: in the next week start engaged in light activitiy   Stress management: continue using deep breathing and meditation        Return to clinic: 1-2 weeks     Length of Service (minutes direct face-to-face contact): 30    Diagnosis:     ICD-10-CM ICD-9-CM   1. KIANA (generalized anxiety disorder)  F41.1 300.02             Julisa Dias PsyD  Clinical Health Psychology Fellow               This service was not originating from a related E/M service provided within the previous 7 days nor will  to an E/M service or procedure within the next 24 hours or my soonest available appointment.  Prevailing standard of care was able to be met in this audio-only visit.

## 2022-12-05 ENCOUNTER — PATIENT MESSAGE (OUTPATIENT)
Dept: HEMATOLOGY/ONCOLOGY | Facility: CLINIC | Age: 42
End: 2022-12-05
Payer: COMMERCIAL

## 2022-12-06 ENCOUNTER — TELEPHONE (OUTPATIENT)
Dept: HEMATOLOGY/ONCOLOGY | Facility: CLINIC | Age: 42
End: 2022-12-06
Payer: COMMERCIAL

## 2022-12-06 NOTE — TELEPHONE ENCOUNTER
I spoke with the patient and informed her that the day was booked and had nothing later opened. She voiced understanding and said she will keep her appt time.      ----- Message from Merly Chapin PT sent at 12/6/2022  4:02 PM CST -----  Tamanna Herrera, I cannot stay late tomorrow I have an appt after work so I need to leave by 4:15....normally I would stay. Can you let her know?  ----- Message -----  From: Madhuri Elias  Sent: 12/6/2022   1:18 PM CST  To: Merly Chapin PT    Type: Need Medical Advice  Who Called: Alison  Seth callback number: [phone]  Additional Info: Patient called to see if there was a latter appointment for tomorrow 12/7 she need to come about 4pm  Please call to further assist, Thanks

## 2022-12-07 ENCOUNTER — CLINICAL SUPPORT (OUTPATIENT)
Dept: REHABILITATION | Facility: HOSPITAL | Age: 42
End: 2022-12-07
Payer: COMMERCIAL

## 2022-12-07 DIAGNOSIS — C50.412 MALIGNANT NEOPLASM OF UPPER-OUTER QUADRANT OF LEFT FEMALE BREAST, UNSPECIFIED ESTROGEN RECEPTOR STATUS: Primary | ICD-10-CM

## 2022-12-07 DIAGNOSIS — L76.82 AXILLARY WEB SYNDROME: ICD-10-CM

## 2022-12-07 DIAGNOSIS — R26.89 DECREASED FUNCTIONAL MOBILITY: ICD-10-CM

## 2022-12-07 DIAGNOSIS — L90.5 AXILLARY WEB SYNDROME: ICD-10-CM

## 2022-12-07 PROCEDURE — 97140 MANUAL THERAPY 1/> REGIONS: CPT | Mod: PN

## 2022-12-07 PROCEDURE — 97110 THERAPEUTIC EXERCISES: CPT | Mod: PN

## 2022-12-07 NOTE — PROGRESS NOTES
Physical Therapy Daily Treatment Note/Lymphedema Management     Name: Alison Swann  Clinic Number: [id number]    Therapy Diagnosis:   Encounter Diagnoses   Name Primary?    Malignant neoplasm of upper-outer quadrant of left female breast, unspecified estrogen receptor status Yes    Axillary web syndrome     Decreased functional mobility      Physician: Nicol Recinos NP    Visit Date: 12/7/2022    Physician Orders: PT eval and treat    Evaluation Date: 11/30/2022  Authorization Period Expiration: pending  Plan of Care Certification Period: RA on 12/30/22  Visit #/Visits authorized: 2/ 16     Time In: 2:02 PM  Time Out: 3:02 PM  Total Billable Time: 60 minutes    Precautions: Standard, cancer, and avoid BP, IV, blood draws, or injections in L UE    Subjective     Pt reports: reports tingling seems to have gotten better in L UE, has R shoulder blade feels pinched, did order mepiform however not in yet. Pt did call her physician and they were ok either way with her wearing or not wearing a bra, however she id get two bras from [employer]. And since she does not have as large of breasts she finds wearing a bra was fine.     Response to previous treatment: good scars responding to mepiform very well pt pleased  Functional change: absent tingling in her L arm and hand    Pain: 6/10  Location: right scapula    Objective     Alison received therapeutic exercises to develop strength, ROM, flexibility, posture, and core stabilization for 30 minutes including:  Towel glides for shoulder flexion 10 reps wit turn and breath  Pec corner wall stretch 5 reps  Scapular depression in standing against wall   Foam roller pec stretch3 min with diaphragmatic breathing  Trunk rotation in supine with knees flexed 10 reps each side  Post pelvic tilt 10   Lateral pelvic tilt 10  Quadraped cat/cow 10 reps  Macarena pose 30 sec 3 times   Kneeling with full trunk flexion fpr lateral trunk flexion    Alisno received the following manual  therapy techniques: Manual Lymphatic Drainage and instruction in scar massage techniques MLD for upper and lower axilla/trunk Manual lymphatic drainage to bilateral short neck series, cervical terminus , axilla, deep abdominals, sternal nodes, paravertebral nodes, AAI anastomosis , JAKE anastomosis , inguinal nodes, and rework accessing all watershed areas on trunk  Instruction in self MLD techniques throughout.  STM and joint mobilization of R scapula with relief and increase in motion of serratus anterior. Applied mepiform strips to various thickened scarring.    Home Exercises Provided and Patient Education Provided     Education provided: scar massage to healed areas only, not on newly healed scarring      Written Home Exercises Provided:  will be provided next tx .  Exercises were reviewed and Alison was able to demonstrate them prior to the end of the session.  Alison demonstrated good  understanding of the education provided.     See EMR under Patient Instructions for exercises provided next tx.      Assessment   Pt with absent tingling sensation in L arm to hand. Continued mild induration in inferior L breast. Decreased R scapular mobility with serratus anterior with increased tone responded to soft tissue mobilization and scapular mobilization. Majority of breast revision surgical scars along breasts are healed. Her inferior scars from lower abdominal surgical incisions has several scabbed areas anterior and posterior. Tolerated MLD well. No signs or symptoms of infection and she is only 5 weeks post op of the revision.     Alison Is progressing well towards her goals.   Pt prognosis is Good.     Pt will continue to benefit from skilled outpatient physical therapy to address the deficits listed in the problem list box on initial evaluation, provide pt/family education and to maximize pt's level of independence in the home and community environment.     Pt's spiritual, cultural and educational needs  considered and pt agreeable to plan of care and goals.     Anticipated barriers to physical therapy: none  This patient presents s/p Bilateral mastectomy with flap reconstruction, 12 lymph nodes removed , with recent revision of breast surgery,  Resulting in: Stage 0-1 lymphedema of the left upper arm, axilla, increased pain, increased stiffness in the shoulder range of motion with flexion and abduction, as well as difficulty performing reaching over head. Patient also reports significant decreased endurance, and limitations climbing her 30 steps up to raised camp. Patient is also at higher risk of infection. This pt would benefit from skilled therapy services to address the above impairments.      Medical necessity is demonstrated by the following IMPAIRMENTS/PROBLEMS:  1. Decreased endurance  2. Decreased ROM  3. Decreased strength throughout B UE/ LE/ trunk  4. Decreased functional mobility tolerance.  5. MLD to assist in healing process of surgery/ scar care     The following goals were discussed with the patient and patient is in agreement with them as to be addressed in the treatment plan.      Short Term Goals: (3 weeks)  1. Patient will be independent donning/doffing compression garment with good fit noted.  2. Patient will demonstrate 100% knowledge of lymphedema precautions and signs of infection.   3. Patient will increase L UE AROM/PROM shoulder flexion to 160 to improve functional reach and ADL's   4. Patient will increase B UE AROM/PROM shoulder abduction to 180 to improve functional reach and ADL's with decreased complaints of tightness and pain.  5. Patient will perform self lymph drainage techniques.  6 Patient to demonstrate proper posture with weight acceptance in B LE with neutral pelvis     Long Term Goals: (6 weeks)     1. Patient will perform self lymph drainage techniques  2. 6MWT increased by 25%.  3. Patient will increased B UE strength  to improve overall functional reach, mobility,  lifting.  4. Patient will increase L UE to WNL without difficulty raising arm overhead.  5. Pt to have absent tingling in L UE  6. Pt to be independent with scar massage and scar care.           PLAN   Patient to be seen 2 x per week for 4-6 weeks for the medically necessary treatments to include: exercise, decongestive massage, intermittent compression pump, multi layered bandaging, education in lymphedema precautions, self massage, self bandaging, and assistance in obtaining appropriate compression garment.     Merly Chapin, PT, CLT  12/07/2022

## 2022-12-09 ENCOUNTER — OFFICE VISIT (OUTPATIENT)
Dept: PSYCHIATRY | Facility: CLINIC | Age: 42
End: 2022-12-09
Payer: COMMERCIAL

## 2022-12-09 DIAGNOSIS — F41.1 GAD (GENERALIZED ANXIETY DISORDER): Primary | ICD-10-CM

## 2022-12-09 PROCEDURE — 3044F HG A1C LEVEL LT 7.0%: CPT | Mod: CPTII,95,,

## 2022-12-09 PROCEDURE — 90832 PSYTX W PT 30 MINUTES: CPT | Mod: FQ,95,,

## 2022-12-09 PROCEDURE — 3044F PR MOST RECENT HEMOGLOBIN A1C LEVEL <7.0%: ICD-10-PCS | Mod: CPTII,95,,

## 2022-12-09 PROCEDURE — 90832 PR PSYCHOTHERAPY W/PATIENT, 30 MIN: ICD-10-PCS | Mod: FQ,95,,

## 2022-12-09 NOTE — PROGRESS NOTES
Established Patient - Audio Only Telehealth Visit     The patient location is: [address] [address]  The chief complaint leading to consultation is: anxiety  Visit type: Virtual visit with audio only (telephone)  Total time spent with patient: 30mins       The reason for the audio only service rather than synchronous audio and video virtual visit was related to technical difficulties or patient preference/necessity.     Each patient to whom I provide medical services by telemedicine is:  (1) informed of the relationship between the physician and patient and the respective role of any other health care provider with respect to management of the patient; and (2) notified that they may decline to receive medical services by telemedicine and may withdraw from such care at any time. Patient verbally consented to receive this service via voice-only telephone call.       PSYCHO-ONCOLOGY NOTE/ Individual Psychotherapy     Date: 12/9/2022   Site:  [facility]      Therapeutic Intervention: Met with patient.  Outpatient - Behavior modifying psychotherapy 30 min - CPT code 80153    This includes face to face time and non-face to face time preparing to see the patient, obtaining and/or reviewing separately obtained history, documenting clinical information in the electronic or other health record, independently interpreting results and communicating results to the patient/family/caregiver, or care coordinator.      Patient was last seen by me on 12/02/2022    Problem list  Patient Active Problem List   Diagnosis    Ductal carcinoma in situ of left breast    Malignant neoplasm of upper-outer quadrant of left female breast    Decreased functional mobility    Dehydration    Anemia associated with chemotherapy    Nausea and vomiting    BRCA2 gene mutation positive    Scoliosis    Attention Deficit Hyperactivity Disorder    Therapeutic Drug Monitoring    Postoperative Nausea And Vomiting    Macrocytic Anemia     Generalized Anxiety    Family H/O Diabetes Mellitus    Fatigue    Hyponatremia    Elevated troponin    Diarrhea    Intravascular volume depletion    Hypomagnesemia    Hypokalemia    Anterior T wave inversion    Elevated LFTs    Colitis, indeterminate    Encounter for immunotherapy    Diarrhea due to drug    Pneumonitis    s/p RA-TLH/BSO    JA (acute kidney injury)    Adrenal crisis    Sepsis    Chest pain    Adrenal insufficiency    Axillary web syndrome       Chief complaint/reason for encounter: depression and anxiety   Met with patient to evaluate psychosocial adaptation to diagnosis/treatment/survivorship of Malignant neoplasm of upper-outer quadrant of left breast.     Current Medications  Current Outpatient Medications   Medication    dextroamphetamine-amphetamine 10 mg Tab    hydrocortisone (CORTEF) 10 MG Tab    ondansetron (ZOFRAN) 4 MG tablet    valACYclovir (VALTREX) 500 MG tablet     No current facility-administered medications for this visit.     Facility-Administered Medications Ordered in Other Visits   Medication Frequency    lactated ringers infusion Continuous    sodium chloride 0.9% flush 10 mL PRN       ONCOLOGY HISTORY  Oncology History   Malignant neoplasm of upper-outer quadrant of left female breast   1/19/2021 Cancer Staged    Staging form: Breast, AJCC 8th Edition  - Clinical stage from 1/19/2021: Stage IIIB (cT2, cN1, cM0, G3, ER-, SD-, HER2-)       1/28/2021 Initial Diagnosis    Malignant neoplasm of upper-outer quadrant of left breast in female, estrogen receptor negative     2/18/2021 - 2/18/2021 Chemotherapy    Treatment Summary   Plan Name: OP BREAST DOSE-DENSE AC - DOXORUBICIN CYCLOPHOSPHAMIDE Q2W  Treatment Goal: Curative  Status: Inactive  Start Date:   End Date:   Provider: Jhon Suazo MD  Chemotherapy: DOXOrubicin chemo injection 96 mg, 60 mg/m2, Intravenous, Clinic/HOD 1 time, 0 of 4 cycles  cyclophosphamide (CYTOXAN) 600 mg/m2 = 965 mg in sodium chloride 0.9% 250 mL chemo  infusion, 600 mg/m2, Intravenous, Clinic/HOD 1 time, 0 of 4 cycles       2/25/2021 - 7/6/2021 Chemotherapy    Treatment Summary   Plan Name: OP BREAST PACLITAXEL WEEKLY + CARBOPLATIN (AUC 5) Q3W FOLLOWED BY AC WITH PEMBROLIZUMAB FOLLOWED BY PEMBROLIZUMAB   Treatment Goal: Curative  Status: Inactive  Start Date: 2/25/2021  End Date: 7/6/2021  Provider: Sandra Sotelo MD  Chemotherapy: DOXOrubicin chemo injection 112 mg, 60 mg/m2 = 112 mg (100 % of original dose 60 mg/m2), Intravenous, Once, 3 of 4 cycles  Dose modification: 60 mg/m2 (original dose 60 mg/m2, Cycle 5)  Administration: 112 mg (5/25/2021), 112 mg (6/15/2021), 110 mg (7/6/2021)  CARBOplatin (PARAPLATIN) 605 mg in sodium chloride 0.9% 250 mL chemo infusion, 605 mg (100 % of original dose 603.5 mg), Intravenous, Clinic/HOD 1 time, 4 of 4 cycles  Dose modification:   (original dose 603.5 mg, Cycle 1)  Administration: 605 mg (2/25/2021), 630 mg (3/22/2021), 750 mg (4/13/2021), 750 mg (5/4/2021)  cyclophosphamide 600 mg/m2 = 1,100 mg in sodium chloride 0.9% 100 mL chemo infusion, 600 mg/m2 = 1,100 mg (100 % of original dose 600 mg/m2), Intravenous, Clinic/HOD 1 time, 3 of 4 cycles  Dose modification: 600 mg/m2 (original dose 600 mg/m2, Cycle 5), 600 mg/m2 (Cycle 8)  Administration: 1,100 mg (5/25/2021), 1,100 mg (6/15/2021), 1,100 mg (7/6/2021)  PACLitaxeL (TAXOL) 80 mg/m2 = 132 mg in sodium chloride 0.9% 250 mL chemo infusion, 80 mg/m2 = 132 mg (100 % of original dose 80 mg/m2), Intravenous, Clinic/HOD 1 time, 4 of 4 cycles  Dose modification: 80 mg/m2 (original dose 80 mg/m2, Cycle 1)  Administration: 132 mg (2/25/2021), 132 mg (3/4/2021), 132 mg (3/11/2021), 138 mg (3/22/2021), 138 mg (3/30/2021), 138 mg (4/6/2021), 138 mg (4/13/2021), 138 mg (4/20/2021), 144 mg (4/27/2021), 144 mg (5/4/2021), 144 mg (5/11/2021), 144 mg (5/18/2021)  pembrolizumab (KEYTRUDA) 200 mg in sodium chloride 0.9% 100 mL chemo infusion, 200 mg, Intravenous, Clinic/HOD 1 time, 7 of 18  cycles  Administration: 200 mg (2/25/2021), 200 mg (5/25/2021), 200 mg (3/22/2021), 200 mg (4/13/2021), 200 mg (5/4/2021), 200 mg (6/15/2021), 200 mg (7/6/2021)       4/15/2021 - 4/15/2021 Chemotherapy    Treatment Summary   Plan Name: OP BREAST PEMBROLIZUMAB Q3W PACLITAXEL CARBOPLATIN WEEKLY FOLLOWED BY PEMBROLIZUMAB DOXORUBICIN CYCLOPHOSPHAMIDE Q3W   Treatment Goal: Curative  Status: Inactive  Start Date:   End Date:   Provider: Jhon Suazo MD  Chemotherapy: CARBOplatin (PARAPLATIN) in sodium chloride 0.9% 250 mL chemo infusion,  (original dose ), Intravenous, Clinic/HOD 1 time, 0 of 3 cycles  Dose modification:   (Cycle 1)  cyclophosphamide in sodium chloride 0.9% chemo infusion, 600 mg/m2 = 965 mg (original dose ), Intravenous, Clinic/HOD 1 time, 0 of 1 cycle  Dose modification: 600 mg/m2 (Cycle 2)  DOXOrubicin (ADRIAMYCIN) in sodium chloride 0.9% 250 mL chemo infusion, 60 mg/m2 (original dose ), Intravenous, Clinic/HOD 1 time, 0 of 1 cycle  Dose modification: 60 mg/m2 (Cycle 2)  PACLitaxeL (TAXOL) in sodium chloride 0.9% 100 mL chemo infusion, 80 mg/m2 (original dose ), Intravenous, Clinic/HOD 1 time, 0 of 1 cycle  Dose modification: 80 mg/m2 (Cycle 1)  PEMEtrexed (ALIMTA) in sodium chloride 0.9% chemo infusion, 500 mg/m2, Intravenous, Clinic/HOD 1 time, 0 of 33 cycles  pembrolizumab (KEYTRUDA) 200 mg in sodium chloride 0.9% 100 mL chemo infusion, 200 mg, Intravenous, Clinic/HOD 1 time, 0 of 35 cycles       11/19/2021 - 1/4/2022 Radiation Therapy    Treating physician: Maryann Perales  Total Dose: 50 Gy  Fractions: 25    DIAGNOSIS:  C50.412 - Malignant neoplasm of upper-outer quadrant of left female breast, Diagnosed 11/11/2021 (Active) Stage IIIB, T2, N1, M0, G3, HER2 Neg, ER Neg, MN Neg    This is a 41 y.o. y/o female with cT2,c N1 and M0 G3, (Stage IIIB), triple negative, Ki-67 79%, LEFT upper outer quadrant breast, BRCA2+, status post lumpectomy 1/19/21, with residual larisa disease, followed by   adjuvant chemo-immunotherapy with carbo-taxol and pembrolizumab, followed by AC with pembrolizumab, followed by adjuvant pemprolizumab complicated by ongoing, chronic colitis/diarrhea necessitating long-term prednisone.  Underwent bilateral mastectomy 9/29/21 with no residual carcinoma identified in breast of 14 sampled nodes (ypN0). She has completed adjuvant LEFT breast and regional larisa radiation therapy to a dose of 50Gy.    Treatment Summary  Course: C1 BREAST 2021    Treatment Site Ref. ID Energy Dose/Fx (Gy) #Fx Dose Correction (Gy) Total Dose (Gy) Start Date End Date Elapsed Days   3D Sclav_L rxsc 18X/6X 2 25 / 25 0 50 11/19/2021 1/4/2022 46   3D Breast L Breast_L 18X/6X 2 25 / 25 0 50 11/19/2021 1/4/2022 46     Tolerated well.  2 day treatment break at patient's request for patch of dry desquamation, and 5 day treatment break due to CoVID infection.  Otherwise, patient completed her course of therapy as prescribed.       PLAN: RTC 1 month for routine follow up. Continue current skin care/sun protection for now.  Follow up with other providers as directed.         Objective:  Alison Swann arrived promptly for the session. The patient was fully cooperative throughout the session.  Appearance: unable to assess due to audio only  Behavior/Cooperation: friendly and cooperative  Speech: normal in rate, volume, and tone  Mood: angry, depressed  Affect: appropriate range(crying, laughing)  Thought Process: goal-directed, logical  Thought Content: normal,  No delusions or paranoia; did not appear to be responding to internal stimuli during the session  Orientation: grossly intact  Memory: grossly intact  Attention Span/Concentration: Attends to session without distraction; reports no difficulty  Fund of Knowledge: average  Estimate of Intelligence: average from verbal skills and history  Cognition: grossly intact  Insight: patient has awareness of illness; good insight into own behavior and behavior of  others  Judgment: the patient's behavior is adequate to circumstances    NCCN Distress thermometer:   DISTRESS SCREENING 10/12/2022 10/12/2022 8/15/2022 8/15/2022 6/1/2022 4/29/2022 4/29/2022   Distress Score 10 - Extreme Distress 0 - No Distress 0 - No Distress 2 2 4 4   Practical Problems Work/School None of these - None of these - - -   Family Problems None of these None of these - None of these - - -   Emotional Problems Fears;Worry None of these - None of these - - -   Spiritual / Cheondoism Concerns No No - No No - No   Physical Problems None of these None of these - None of these - - -   Other Problems - - - - - - -        Interval history and content of current session: Mrs. Swann discussed current adaptation to disease and treatment status. Reports to be coping in an adequate manner. Patient shares new frustrations with needing to hiring a professional  to apply for a job that would be a career change. She is concerned about spending money she does not necessarily have at this moment. Expresses she is conflicted about this decision. Additional stressors include  ongoing family conflict. Evaluated cognitive response, paying particular attention to negative intrusive thoughts of a persistent and detrimental nature. Thoughts of this type are in evidence with moderate distress. She noted some new difficulties with managing her anger. Provided cognitive behavioral therapy to address negative cognitions. Identified and evaluated psychosocial and environmental stressors secondary to diagnosis and treatment.  Examined proactive behaviors that may be implemented to minimize or ameliorate psychosocial stressors secondary to diagnosis and treatment including setting boundaries and communicating wants and needs.     Risk parameters:   Patient reports no suicidal ideation  Patient reports no homicidal ideation  Patient reports no self-injurious behavior  Patient reports no violent behavior   Safety needs:   None at this time      Verbal deficits: None     Patient's response to intervention:The patient's response to intervention is accepting.     Progress toward goals and other mental status changes:  The patient's progress toward goals is fair .      Progress to date:Progress - Ongoing, but Slow      Goals from last visit: Attempted, partially met        Patient Strengths: verbal, intelligent, successful, good social support, good insight, commitment to wellness, strong melany, strong cultural traditions      Treatment Plan:individual psychotherapy  Target symptoms: depression, anxiety   Why chosen therapy is appropriate versus another modality: relevant to diagnosis  Outcome monitoring methods: self-report  Therapeutic intervention type: supportive psychotherapy/ behavior modification  Prognosis: Fair      Behavioral goals:    Exercise: in the next week start engaged in light activitiy   Stress management: continue using deep breathing and meditation; prioritize responsibilities and needs        Return to clinic: 2 weeks     Length of Service (minutes direct face-to-face contact): 30    Diagnosis:     ICD-10-CM ICD-9-CM   1. KIANA (generalized anxiety disorder)  F41.1 300.02             Julisa Dias PsyD  Clinical Health Psychology Fellow          Favian Hollingsworth Psy.D.  Supervising Clinical Psychologist         This service was not originating from a related E/M service provided within the previous 7 days nor will  to an E/M service or procedure within the next 24 hours or my soonest available appointment.  Prevailing standard of care was able to be met in this audio-only visit.

## 2022-12-10 ENCOUNTER — PATIENT MESSAGE (OUTPATIENT)
Dept: PSYCHIATRY | Facility: CLINIC | Age: 42
End: 2022-12-10
Payer: COMMERCIAL

## 2022-12-12 ENCOUNTER — CLINICAL SUPPORT (OUTPATIENT)
Dept: REHABILITATION | Facility: HOSPITAL | Age: 42
End: 2022-12-12
Payer: COMMERCIAL

## 2022-12-12 DIAGNOSIS — C50.412 MALIGNANT NEOPLASM OF UPPER-OUTER QUADRANT OF LEFT FEMALE BREAST, UNSPECIFIED ESTROGEN RECEPTOR STATUS: ICD-10-CM

## 2022-12-12 DIAGNOSIS — L90.5 AXILLARY WEB SYNDROME: Primary | ICD-10-CM

## 2022-12-12 DIAGNOSIS — L76.82 AXILLARY WEB SYNDROME: Primary | ICD-10-CM

## 2022-12-12 PROCEDURE — 97110 THERAPEUTIC EXERCISES: CPT | Mod: PN

## 2022-12-12 PROCEDURE — 97140 MANUAL THERAPY 1/> REGIONS: CPT | Mod: PN,97

## 2022-12-12 NOTE — PROGRESS NOTES
Physical Therapy Daily Treatment Note/Lymphedema Management     Name: Alison Swann  Clinic Number: [id number]    Therapy Diagnosis:   Encounter Diagnoses   Name Primary?    Axillary web syndrome Yes    Malignant neoplasm of upper-outer quadrant of left female breast, unspecified estrogen receptor status      Physician: Nicol Recinos NP    Visit Date: 12/12/2022    Physician Orders: PT eval and treat    Evaluation Date: 11/30/2022  Authorization Period Expiration: pending  Plan of Care Certification Period: RA on 12/30/22  Visit #/Visits authorized: 3/ 16     Time In: 1:00 PM  Time Out: 2:00 PM  Total Billable Time: 60 minutes    Precautions: Standard, cancer, and avoid BP, IV, blood draws, or injections in L UE    Subjective     Pt reports: improved pain in R shoulder blade as compared to prior, worked on scar massage of hysterectomy scars and she took mepiform tape off this am. Pt very pleased with how her scars especially at pelvis have responded to the mepiform and how good her scars look.     Response to previous treatment: good scars responding to mepiform very well pt pleased  Functional change: absent tingling in her L arm and hand    Pain: 6/10  Location: right scapula    Objective     Alison received therapeutic exercises to develop strength, ROM, flexibility, posture, and core stabilization for 20 minutes including:  UBE alternating forward and backward L2 6 min  Towel glides for shoulder flexion 10 reps wit turn and breath  Pec corner wall stretch 5 reps  Scapular depression in standing against wall   Towel  roll pec stretch3 min with diaphragmatic breathing  Trunk rotation in supine with knees flexed 10 reps each side  Post pelvic tilt 10   Contract relax stretches to B traps 3 30 sec hold, with R trap with increased tightness as compared to L.  deferrred  Lateral pelvic tilt 10  Quadraped cat/cow 10 reps  Macarena pose 30 sec 3 times   Kneeling with full trunk flexion fpr lateral trunk  flexion    Alison received the following manual therapy techniques 40 min: Manual Lymphatic Drainage and instruction in scar massage techniques MLD for upper and lower axilla/trunk Manual lymphatic drainage to bilateral short neck series, cervical terminus , axilla, deep abdominals, sternal nodes, paravertebral nodes, AAI anastomosis , JAKE anastomosis , inguinal nodes, and rework accessing all watershed areas on trunk  Instruction in self MLD techniques throughout.  STM to B traps with good release. Suboccipital release providing relief as well. Performed lymphatouch at light pressure 30 mmHG to L inferior breast and to R lateral trunk.  Home Exercises Provided and Patient Education Provided     Education provided: scar massage to healed areas only, not on newly healed scarring      Written Home Exercises Provided:  will be provided next tx .  Exercises were reviewed and Alison was able to demonstrate them prior to the end of the session.  Alison demonstrated good  understanding of the education provided.     See EMR under Patient Instructions for exercises provided next tx.      Assessment   Pt improved scapular discomfort in R shoulder blade. B trap tightness complaints responded to stretches and manual techniques. Significant improvement in scar L pelvis scar previously raised and full, now pencil thin. Continued mild induration in inferior L breast.Mld fullness in left axilla limiting full L arm elevation. Majority of breast revision surgical scars along breasts are healed. Her inferior scars from lower abdominal surgical incisions has several scabbed areas anterior and posterior. Tolerated MLD well. No signs or symptoms of infection and she is only 6 weeks post op of the revision.     Alison Is progressing well towards her goals.   Pt prognosis is Good.     Pt will continue to benefit from skilled outpatient physical therapy to address the deficits listed in the problem list box on initial  evaluation, provide pt/family education and to maximize pt's level of independence in the home and community environment.     Pt's spiritual, cultural and educational needs considered and pt agreeable to plan of care and goals.     Anticipated barriers to physical therapy: none  This patient presents s/p Bilateral mastectomy with flap reconstruction, 12 lymph nodes removed , with recent revision of breast surgery,  Resulting in: Stage 0-1 lymphedema of the left upper arm, axilla, increased pain, increased stiffness in the shoulder range of motion with flexion and abduction, as well as difficulty performing reaching over head. Patient also reports significant decreased endurance, and limitations climbing her 30 steps up to raised camp. Patient is also at higher risk of infection. This pt would benefit from skilled therapy services to address the above impairments.      Medical necessity is demonstrated by the following IMPAIRMENTS/PROBLEMS:  1. Decreased endurance  2. Decreased ROM  3. Decreased strength throughout B UE/ LE/ trunk  4. Decreased functional mobility tolerance.  5. MLD to assist in healing process of surgery/ scar care     The following goals were discussed with the patient and patient is in agreement with them as to be addressed in the treatment plan.      Short Term Goals: (3 weeks)  1. Patient will be independent donning/doffing compression garment with good fit noted.  2. Patient will demonstrate 100% knowledge of lymphedema precautions and signs of infection.   3. Patient will increase L UE AROM/PROM shoulder flexion to 160 to improve functional reach and ADL's   4. Patient will increase B UE AROM/PROM shoulder abduction to 180 to improve functional reach and ADL's with decreased complaints of tightness and pain.  5. Patient will perform self lymph drainage techniques.  6 Patient to demonstrate proper posture with weight acceptance in B LE with neutral pelvis     Long Term Goals: (6 weeks)     1.  Patient will perform self lymph drainage techniques  2. 6MWT increased by 25%.  3. Patient will increased B UE strength  to improve overall functional reach, mobility, lifting.  4. Patient will increase L UE to WNL without difficulty raising arm overhead.  5. Pt to have absent tingling in L UE  6. Pt to be independent with scar massage and scar care.           PLAN   POC starting 11/30/2022: Patient to be seen 2 x per week for 4-6 weeks for the medically necessary treatments to include: exercise, decongestive massage, intermittent compression pump, multi layered bandaging, education in lymphedema precautions, self massage, self bandaging, and assistance in obtaining appropriate compression garment.     Merly Chapin, PT, CLT  12/12/2022

## 2022-12-13 ENCOUNTER — TELEPHONE (OUTPATIENT)
Dept: PSYCHIATRY | Facility: CLINIC | Age: 42
End: 2022-12-13
Payer: COMMERCIAL

## 2022-12-13 ENCOUNTER — TELEPHONE (OUTPATIENT)
Dept: INFUSION THERAPY | Facility: HOSPITAL | Age: 42
End: 2022-12-13
Payer: COMMERCIAL

## 2022-12-13 NOTE — TELEPHONE ENCOUNTER
----- Message from Christina Seo sent at 12/13/2022  1:01 PM CST -----  Type: Needs Medical Advice  Who Called:  Patient   Symptoms (please be specific):    How long has patient had these symptoms:    Pharmacy name and phone #:    Best Call Back Number: [phone]  Additional Information: Patient called to inform her labs should be taken through her port on 12/14 at 1:25.

## 2022-12-14 ENCOUNTER — PATIENT MESSAGE (OUTPATIENT)
Dept: ENDOCRINOLOGY | Facility: CLINIC | Age: 42
End: 2022-12-14
Payer: COMMERCIAL

## 2022-12-14 ENCOUNTER — PATIENT MESSAGE (OUTPATIENT)
Dept: PSYCHIATRY | Facility: CLINIC | Age: 42
End: 2022-12-14
Payer: COMMERCIAL

## 2022-12-14 ENCOUNTER — CLINICAL SUPPORT (OUTPATIENT)
Dept: REHABILITATION | Facility: HOSPITAL | Age: 42
End: 2022-12-14
Payer: COMMERCIAL

## 2022-12-14 ENCOUNTER — INFUSION (OUTPATIENT)
Dept: INFUSION THERAPY | Facility: HOSPITAL | Age: 42
End: 2022-12-14
Attending: INTERNAL MEDICINE
Payer: COMMERCIAL

## 2022-12-14 VITALS — TEMPERATURE: 98 F

## 2022-12-14 DIAGNOSIS — C50.412 MALIGNANT NEOPLASM OF UPPER-OUTER QUADRANT OF LEFT BREAST IN FEMALE, ESTROGEN RECEPTOR NEGATIVE: Primary | ICD-10-CM

## 2022-12-14 DIAGNOSIS — L76.82 AXILLARY WEB SYNDROME: Primary | ICD-10-CM

## 2022-12-14 DIAGNOSIS — C50.412 MALIGNANT NEOPLASM OF UPPER-OUTER QUADRANT OF LEFT BREAST IN FEMALE, ESTROGEN RECEPTOR POSITIVE: ICD-10-CM

## 2022-12-14 DIAGNOSIS — Z17.1 MALIGNANT NEOPLASM OF UPPER-OUTER QUADRANT OF LEFT BREAST IN FEMALE, ESTROGEN RECEPTOR NEGATIVE: Primary | ICD-10-CM

## 2022-12-14 DIAGNOSIS — C50.412 MALIGNANT NEOPLASM OF UPPER-OUTER QUADRANT OF LEFT FEMALE BREAST, UNSPECIFIED ESTROGEN RECEPTOR STATUS: ICD-10-CM

## 2022-12-14 DIAGNOSIS — Z17.0 MALIGNANT NEOPLASM OF UPPER-OUTER QUADRANT OF LEFT BREAST IN FEMALE, ESTROGEN RECEPTOR POSITIVE: ICD-10-CM

## 2022-12-14 DIAGNOSIS — R26.89 DECREASED FUNCTIONAL MOBILITY: ICD-10-CM

## 2022-12-14 DIAGNOSIS — L90.5 AXILLARY WEB SYNDROME: Primary | ICD-10-CM

## 2022-12-14 LAB
ALBUMIN SERPL BCP-MCNC: 4.3 G/DL (ref 3.5–5.2)
ALP SERPL-CCNC: 93 U/L (ref 55–135)
ALT SERPL W/O P-5'-P-CCNC: 8 U/L (ref 10–44)
ANION GAP SERPL CALC-SCNC: 10 MMOL/L (ref 8–16)
AST SERPL-CCNC: 13 U/L (ref 10–40)
BASOPHILS # BLD AUTO: 0.02 K/UL (ref 0–0.2)
BASOPHILS NFR BLD: 0.3 % (ref 0–1.9)
BILIRUB SERPL-MCNC: 0.3 MG/DL (ref 0.1–1)
BUN SERPL-MCNC: 7 MG/DL (ref 6–20)
CALCIUM SERPL-MCNC: 9.8 MG/DL (ref 8.7–10.5)
CHLORIDE SERPL-SCNC: 105 MMOL/L (ref 95–110)
CO2 SERPL-SCNC: 25 MMOL/L (ref 23–29)
CREAT SERPL-MCNC: 0.7 MG/DL (ref 0.5–1.4)
DIFFERENTIAL METHOD: ABNORMAL
EOSINOPHIL # BLD AUTO: 0 K/UL (ref 0–0.5)
EOSINOPHIL NFR BLD: 0.5 % (ref 0–8)
ERYTHROCYTE [DISTWIDTH] IN BLOOD BY AUTOMATED COUNT: 11.9 % (ref 11.5–14.5)
EST. GFR  (NO RACE VARIABLE): >60 ML/MIN/1.73 M^2
GLUCOSE SERPL-MCNC: 96 MG/DL (ref 70–110)
HCG INTACT+B SERPL-ACNC: <2.4 MIU/ML
HCT VFR BLD AUTO: 39 % (ref 37–48.5)
HGB BLD-MCNC: 13.7 G/DL (ref 12–16)
IMM GRANULOCYTES # BLD AUTO: 0.01 K/UL (ref 0–0.04)
IMM GRANULOCYTES NFR BLD AUTO: 0.2 % (ref 0–0.5)
LYMPHOCYTES # BLD AUTO: 1.8 K/UL (ref 1–4.8)
LYMPHOCYTES NFR BLD: 28.9 % (ref 18–48)
MCH RBC QN AUTO: 31.6 PG (ref 27–31)
MCHC RBC AUTO-ENTMCNC: 35.1 G/DL (ref 32–36)
MCV RBC AUTO: 90 FL (ref 82–98)
MONOCYTES # BLD AUTO: 0.4 K/UL (ref 0.3–1)
MONOCYTES NFR BLD: 5.5 % (ref 4–15)
NEUTROPHILS # BLD AUTO: 4.1 K/UL (ref 1.8–7.7)
NEUTROPHILS NFR BLD: 64.6 % (ref 38–73)
NRBC BLD-RTO: 0 /100 WBC
PLATELET # BLD AUTO: 192 K/UL (ref 150–450)
PMV BLD AUTO: 9 FL (ref 9.2–12.9)
POTASSIUM SERPL-SCNC: 3.9 MMOL/L (ref 3.5–5.1)
PROT SERPL-MCNC: 7.1 G/DL (ref 6–8.4)
RBC # BLD AUTO: 4.34 M/UL (ref 4–5.4)
SODIUM SERPL-SCNC: 140 MMOL/L (ref 136–145)
WBC # BLD AUTO: 6.34 K/UL (ref 3.9–12.7)

## 2022-12-14 PROCEDURE — 97110 THERAPEUTIC EXERCISES: CPT | Mod: PN

## 2022-12-14 PROCEDURE — 84702 CHORIONIC GONADOTROPIN TEST: CPT | Performed by: INTERNAL MEDICINE

## 2022-12-14 PROCEDURE — 97140 MANUAL THERAPY 1/> REGIONS: CPT | Mod: PN

## 2022-12-14 PROCEDURE — 36591 DRAW BLOOD OFF VENOUS DEVICE: CPT | Mod: PN

## 2022-12-14 PROCEDURE — A4216 STERILE WATER/SALINE, 10 ML: HCPCS | Mod: PN | Performed by: INTERNAL MEDICINE

## 2022-12-14 PROCEDURE — 85025 COMPLETE CBC W/AUTO DIFF WBC: CPT | Mod: PN | Performed by: INTERNAL MEDICINE

## 2022-12-14 PROCEDURE — 25000003 PHARM REV CODE 250: Mod: PN | Performed by: INTERNAL MEDICINE

## 2022-12-14 PROCEDURE — 80053 COMPREHEN METABOLIC PANEL: CPT | Mod: PN | Performed by: INTERNAL MEDICINE

## 2022-12-14 RX ORDER — SODIUM CHLORIDE 0.9 % (FLUSH) 0.9 %
10 SYRINGE (ML) INJECTION
Status: CANCELLED | OUTPATIENT
Start: 2022-12-14

## 2022-12-14 RX ORDER — HEPARIN 100 UNIT/ML
500 SYRINGE INTRAVENOUS
Status: DISCONTINUED | OUTPATIENT
Start: 2022-12-14 | End: 2022-12-14 | Stop reason: HOSPADM

## 2022-12-14 RX ORDER — HEPARIN 100 UNIT/ML
500 SYRINGE INTRAVENOUS
Status: CANCELLED | OUTPATIENT
Start: 2022-12-14

## 2022-12-14 RX ORDER — SODIUM CHLORIDE 0.9 % (FLUSH) 0.9 %
10 SYRINGE (ML) INJECTION
Status: DISCONTINUED | OUTPATIENT
Start: 2022-12-14 | End: 2022-12-14 | Stop reason: HOSPADM

## 2022-12-14 RX ADMIN — Medication 10 ML: at 01:12

## 2022-12-14 NOTE — PROGRESS NOTES
Physical Therapy Daily Treatment Note/Lymphedema Management     Name: Alison Swann  Clinic Number: [id number]    Therapy Diagnosis:   Encounter Diagnoses   Name Primary?    Axillary web syndrome Yes    Malignant neoplasm of upper-outer quadrant of left female breast, unspecified estrogen receptor status     Decreased functional mobility      Physician: Nicol Recinos NP    Visit Date: 12/14/2022    Physician Orders: PT eval and treat    Evaluation Date: 11/30/2022  Authorization Period Expiration: pending  Plan of Care Certification Period: RA on 12/30/22  Visit #/Visits authorized: 4/ 16     Time In: 2:00 PM  Time Out: 2:53 PM  Total Billable Time: 53 minutes    Precautions: Standard, cancer, and avoid BP, IV, blood draws, or injections in L UE    Subjective     Pt reports: reports R shoulder blade pinching with difficulty taking deep breaths, brought in her own mepiform for PT to place     Response to previous treatment: good scars responding to mepiform very well pt pleased  Functional change: absent tingling in her L arm and hand    Pain: 6/10  Location: right scapula    Objective     Alison received therapeutic exercises to develop strength, ROM, flexibility, posture, and core stabilization for 13 minutes including:  Pectoral stretch on foam roller with diaphragmatic breathing  Shoulder flexion/ elongation in supine  1/2 moon in side lye stretch  Aarom/ arom shoulder flexion / abduciton B UE full end range motion.  UBE alternating forward and backward L2 6 min deferred  Towel glides for shoulder flexion 10 reps with turn and breath  Pec corner wall stretch 5 reps  Scapular depression in standing against wall   Towel  roll pec stretch3 min with diaphragmatic breathing  Trunk rotation in supine with knees flexed 10 reps each side  Post pelvic tilt 10   Contract relax stretches to B traps 3 30 sec hold, with R trap with increased tightness as compared to L.  deferrred  Lateral pelvic tilt 10  Quadraped  cat/cow 10 reps  Macarena pose 30 sec 3 times   Kneeling with full trunk flexion fpr lateral trunk flexion    Alison received the following manual therapy techniques 40 min: Manual Lymphatic Drainage and instruction in scar massage techniques MLD for upper and lower axilla/trunk Manual lymphatic drainage to bilateral short neck series, cervical terminus , axilla, deep abdominals, sternal nodes, paravertebral nodes, AAI anastomosis , JAKE anastomosis , inguinal nodes, and rework accessing all watershed areas on trunk  Instruction in self MLD techniques throughout.  Gentle rib springing, sternocostal jt mobilizations and stm at serratus anterior on R which relieved pain with taking deep breath. Performed lymphatouch at light pressure 30 mmHG to L axilla, inferior region below L breast and to R lateral trunk. Applied pt's mepiform scar tape per pt request.  Home Exercises Provided and Patient Education Provided     Education provided: scar massage to healed areas only, not on newly healed scarring      Written Home Exercises Provided:  will be provided next tx .  Exercises were reviewed and Alison was able to demonstrate them prior to the end of the session.  Alison demonstrated good  understanding of the education provided.     See EMR under Patient Instructions for exercises provided next tx.      Assessment   Pt improved symptoms after therapy.  Continued mild induration in inferior L breast.Progressing well with full L arm elevation. Majority of breast revision surgical scars along breasts are healed. Her inferior scars from lower abdominal surgical incisions has several scabbed areas anterior and posterior. Tolerated MLD well. No signs or symptoms of infection and she is only 6 weeks post op of the revision.     Alison Is progressing well towards her goals.   Pt prognosis is Good.     Pt will continue to benefit from skilled outpatient physical therapy to address the deficits listed in the problem list  box on initial evaluation, provide pt/family education and to maximize pt's level of independence in the home and community environment.     Pt's spiritual, cultural and educational needs considered and pt agreeable to plan of care and goals.     Anticipated barriers to physical therapy: none  This patient presents s/p Bilateral mastectomy with flap reconstruction, 12 lymph nodes removed , with recent revision of breast surgery,  Resulting in: Stage 0-1 lymphedema of the left upper arm, axilla, increased pain, increased stiffness in the shoulder range of motion with flexion and abduction, as well as difficulty performing reaching over head. Patient also reports significant decreased endurance, and limitations climbing her 30 steps up to raised camp. Patient is also at higher risk of infection. This pt would benefit from skilled therapy services to address the above impairments.      Medical necessity is demonstrated by the following IMPAIRMENTS/PROBLEMS:  1. Decreased endurance  2. Decreased ROM  3. Decreased strength throughout B UE/ LE/ trunk  4. Decreased functional mobility tolerance.  5. MLD to assist in healing process of surgery/ scar care     The following goals were discussed with the patient and patient is in agreement with them as to be addressed in the treatment plan.      Short Term Goals: (3 weeks)  1. Patient will be independent donning/doffing compression garment with good fit noted.  2. Patient will demonstrate 100% knowledge of lymphedema precautions and signs of infection.   3. Patient will increase L UE AROM/PROM shoulder flexion to 160 to improve functional reach and ADL's GOAL MET 12/14/2022  4. Patient will increase B UE AROM/PROM shoulder abduction to 180 to improve functional reach and ADL's with decreased complaints of tightness and pain.  5. Patient will perform self lymph drainage techniques.  6 Patient to demonstrate proper posture with weight acceptance in B LE with neutral pelvis GOAL  MET 12/14/2022     Long Term Goals: (6 weeks)     1. Patient will perform self lymph drainage techniques  2. 6MWT increased by 25%.  3. Patient will increased B UE strength  to improve overall functional reach, mobility, lifting.  4. Patient will increase L UE to WNL without difficulty raising arm overhead.  5. Pt to have absent tingling in L UE  6. Pt to be independent with scar massage and scar care.           PLAN   POC starting 11/30/2022: Patient to be seen 2 x per week for 4-6 weeks for the medically necessary treatments to include: exercise, decongestive massage, intermittent compression pump, multi layered bandaging, education in lymphedema precautions, self massage, self bandaging, and assistance in obtaining appropriate compression garment.     Merly Chapin, PT, CLT  12/12/2022

## 2022-12-16 ENCOUNTER — TELEPHONE (OUTPATIENT)
Dept: PSYCHIATRY | Facility: CLINIC | Age: 42
End: 2022-12-16
Payer: COMMERCIAL

## 2022-12-16 NOTE — TELEPHONE ENCOUNTER
Spoke with Patient regarding in person visit that was scheduled for 12/14/22. Patient expressed understanding and scheduled a follow up for 12/21/22.     Julisa Dias PsyD

## 2022-12-19 ENCOUNTER — PATIENT MESSAGE (OUTPATIENT)
Dept: ENDOCRINOLOGY | Facility: CLINIC | Age: 42
End: 2022-12-19
Payer: COMMERCIAL

## 2022-12-19 ENCOUNTER — OFFICE VISIT (OUTPATIENT)
Dept: HEMATOLOGY/ONCOLOGY | Facility: CLINIC | Age: 42
End: 2022-12-19
Payer: COMMERCIAL

## 2022-12-19 ENCOUNTER — CLINICAL SUPPORT (OUTPATIENT)
Dept: REHABILITATION | Facility: HOSPITAL | Age: 42
End: 2022-12-19
Payer: COMMERCIAL

## 2022-12-19 VITALS
HEART RATE: 72 BPM | BODY MASS INDEX: 19.3 KG/M2 | TEMPERATURE: 98 F | OXYGEN SATURATION: 100 % | SYSTOLIC BLOOD PRESSURE: 117 MMHG | WEIGHT: 123 LBS | DIASTOLIC BLOOD PRESSURE: 76 MMHG | HEIGHT: 67 IN

## 2022-12-19 DIAGNOSIS — E27.40 ADRENAL INSUFFICIENCY: ICD-10-CM

## 2022-12-19 DIAGNOSIS — Z17.1 MALIGNANT NEOPLASM OF UPPER-OUTER QUADRANT OF LEFT BREAST IN FEMALE, ESTROGEN RECEPTOR NEGATIVE: ICD-10-CM

## 2022-12-19 DIAGNOSIS — L76.82 AXILLARY WEB SYNDROME: ICD-10-CM

## 2022-12-19 DIAGNOSIS — R26.89 DECREASED FUNCTIONAL MOBILITY: Primary | ICD-10-CM

## 2022-12-19 DIAGNOSIS — C50.412 MALIGNANT NEOPLASM OF UPPER-OUTER QUADRANT OF LEFT FEMALE BREAST, UNSPECIFIED ESTROGEN RECEPTOR STATUS: Primary | ICD-10-CM

## 2022-12-19 DIAGNOSIS — L90.5 AXILLARY WEB SYNDROME: ICD-10-CM

## 2022-12-19 DIAGNOSIS — Z15.09 BRCA2 GENE MUTATION POSITIVE: Chronic | ICD-10-CM

## 2022-12-19 DIAGNOSIS — C50.412 MALIGNANT NEOPLASM OF UPPER-OUTER QUADRANT OF LEFT BREAST IN FEMALE, ESTROGEN RECEPTOR NEGATIVE: ICD-10-CM

## 2022-12-19 DIAGNOSIS — Z15.01 BRCA2 GENE MUTATION POSITIVE: Chronic | ICD-10-CM

## 2022-12-19 PROCEDURE — 97140 MANUAL THERAPY 1/> REGIONS: CPT | Mod: PN,97

## 2022-12-19 PROCEDURE — 99214 OFFICE O/P EST MOD 30 MIN: CPT | Mod: S$GLB,,, | Performed by: INTERNAL MEDICINE

## 2022-12-19 PROCEDURE — 3078F PR MOST RECENT DIASTOLIC BLOOD PRESSURE < 80 MM HG: ICD-10-PCS | Mod: CPTII,S$GLB,, | Performed by: INTERNAL MEDICINE

## 2022-12-19 PROCEDURE — 3008F PR BODY MASS INDEX (BMI) DOCUMENTED: ICD-10-PCS | Mod: CPTII,S$GLB,, | Performed by: INTERNAL MEDICINE

## 2022-12-19 PROCEDURE — 97110 THERAPEUTIC EXERCISES: CPT | Mod: PN,97

## 2022-12-19 PROCEDURE — 3044F PR MOST RECENT HEMOGLOBIN A1C LEVEL <7.0%: ICD-10-PCS | Mod: CPTII,S$GLB,, | Performed by: INTERNAL MEDICINE

## 2022-12-19 PROCEDURE — 1159F PR MEDICATION LIST DOCUMENTED IN MEDICAL RECORD: ICD-10-PCS | Mod: CPTII,S$GLB,, | Performed by: INTERNAL MEDICINE

## 2022-12-19 PROCEDURE — 99999 PR PBB SHADOW E&M-EST. PATIENT-LVL III: ICD-10-PCS | Mod: PBBFAC,,, | Performed by: INTERNAL MEDICINE

## 2022-12-19 PROCEDURE — 99999 PR PBB SHADOW E&M-EST. PATIENT-LVL III: CPT | Mod: PBBFAC,,, | Performed by: INTERNAL MEDICINE

## 2022-12-19 PROCEDURE — 3044F HG A1C LEVEL LT 7.0%: CPT | Mod: CPTII,S$GLB,, | Performed by: INTERNAL MEDICINE

## 2022-12-19 PROCEDURE — 1159F MED LIST DOCD IN RCRD: CPT | Mod: CPTII,S$GLB,, | Performed by: INTERNAL MEDICINE

## 2022-12-19 PROCEDURE — 3074F SYST BP LT 130 MM HG: CPT | Mod: CPTII,S$GLB,, | Performed by: INTERNAL MEDICINE

## 2022-12-19 PROCEDURE — 1160F RVW MEDS BY RX/DR IN RCRD: CPT | Mod: CPTII,S$GLB,, | Performed by: INTERNAL MEDICINE

## 2022-12-19 PROCEDURE — 3078F DIAST BP <80 MM HG: CPT | Mod: CPTII,S$GLB,, | Performed by: INTERNAL MEDICINE

## 2022-12-19 PROCEDURE — 3074F PR MOST RECENT SYSTOLIC BLOOD PRESSURE < 130 MM HG: ICD-10-PCS | Mod: CPTII,S$GLB,, | Performed by: INTERNAL MEDICINE

## 2022-12-19 PROCEDURE — 3008F BODY MASS INDEX DOCD: CPT | Mod: CPTII,S$GLB,, | Performed by: INTERNAL MEDICINE

## 2022-12-19 PROCEDURE — 1160F PR REVIEW ALL MEDS BY PRESCRIBER/CLIN PHARMACIST DOCUMENTED: ICD-10-PCS | Mod: CPTII,S$GLB,, | Performed by: INTERNAL MEDICINE

## 2022-12-19 PROCEDURE — 99214 PR OFFICE/OUTPT VISIT, EST, LEVL IV, 30-39 MIN: ICD-10-PCS | Mod: S$GLB,,, | Performed by: INTERNAL MEDICINE

## 2022-12-19 NOTE — PROGRESS NOTES
"Subjective:       Patient ID: Alison Swann is a 42 y.o. female.    Chief Complaint: Follow-up (Follow up)    HPI    Oncology History:  - 12/2019 thermography of breast revealed  lymphatic changes in the left axilla.  - 4/2020 self detected a palpable left breast mass  - 6/6/2020 Diagnostic mammogram  Impression:  Suspicious grouping of microcalcifications in the upper-outer left breast corresponding to area of palpable concern.  Biopsy is warranted.Questionable distortion of the breast architecture in the periareolar region of the right breast near the 3 o'clock position without sonographic correlate.  Short-term mammographic follow-up of the right breast at 6 month interval is recommended.  The above findings and recommendations were discussed with the patient at the time of the examination.  BI-RADS CATEGORY 4: SUSPICIOUS ABNORMALITY-BIOPSY SHOULD BE CONSIDERED  - 6/23/2020 imaging guided biopsy  Pathology: [facility] (The other calcifications were apparently not sampled)  - 1/19/2021 excision biopsy with Dr. Johnston  Pathology:  1.  BREAST, LEFT, FURTHER DESIGNATED "MASS," EXCISION:   --INVASIVE CARCINOMA OF NO SPECIAL TYPE (DUCTAL, NOT OTHERWISE    SPECIFIED), SPENSER HISTOLOGIC           GRADE 3 OUT OF 3.        --TUMOR MEASURES 42 MILLIMETERS IN MAXIMUM DIMENSION.        --RECEPTOR STUDIES ARE PENDING.   --SPECIMEN ANTERIOR MARGIN IS POSITIVE FOR INVASIVE CARCINOMA; ALL    REMAINING SPECIMEN MARGINS ARE           AT LEAST 2 MILLIMETERS AWAY.   --DUCTAL CARCINOMA IN SITU, HIGH NUCLEAR GRADE, COMPRISING LESS THAN 5%    OF TOTAL TUMOR TISSUE.        --ALL SPECIMEN MARGINS ARE NEGATIVE FOR DUCTAL CARCINOMA IN SITU.        --EXTENSIVE LYMPHOVASCULAR INVASION PRESENT.   --FIBROCYSTIC CHANGES INCLUDING STROMAL FIBROSIS, CYSTIC DILATATION OF    DUCTS, AND APOCRINE           METAPLASIA.        --COLUMNAR CELL CHANGE.   --MICROCALCIFICATIONS ASSOCIATED WITH TUMOR AND WITH BENIGN DUCTAL    PROFILES.   2.  BREAST, LEFT, " "FURTHER DESIGNATED "MEDIAL MARGIN," EXCISION:        --NO MORPHOLOGIC EVIDENCE OF MALIGNANCY.   --FIBROCYSTIC CHANGES INCLUDING STROMAL FIBROSIS, CYSTIC DILATATION OF    DUCTS, APOCRINE METAPLASIA,           AND USUAL DUCTAL EPITHELIAL HYPERPLASIA.        --COLUMNAR CELL CHANGE.        --FOCAL MICROCALCIFICATIONS ASSOCIATED WITH BENIGN DUCTAL PROFILES.   3.  LYMPH NODE, LEFT SENTINEL, NEEDLE CORE BIOPSY:   --METASTATIC CARCINOMA OF NO SPECIAL TYPE (DUCTAL, NOT OTHERWISE    SPECIFIED).   ER negative, IL negative, Ukr3fec negative  Ki-67 79%     - 1/27/2021 Breast MRI:  Findings: The breasts have heterogeneous fibroglandular tissue. The background parenchymal enhancement is minimal.   Left  There are 6 similar 29 mm x 23 mm lymph nodes seen in the left axilla.   There is an 8 mm x 7 mm x 6 mm homogeneous, non-mass enhancement in a focal distribution seen in the left breast at 12 o'clock in the middle depth, 5.5 cm from the nipple. Delayed phase is persistent. This is immediately anterior to the pectoralis major muscle, at anterior aspect of implant.   There is a 24 mm x 11 mm x 9 mm clumped, non-mass enhancement in a focal distribution seen in the outer central region of the left breast in the posterior depth. This is along the margins of the resection cavity at is posterior aspect.   Right  There is no evidence of suspicious masses, abnormal enhancement, or other abnormal findings in the right breast.  Impression:  Left  Non-mass Enhancement: Left breast 8 mm x 7 mm x 6 mm non-mass enhancement at the middle 12 o'clock position. Assessment: 4 - Suspicious finding. Biopsy is recommended.  Second look ultrasound followed by ultrasound or MRI-guided biopsy could be performed if it would .  Of note, MRI guided biopsy would carry risk of implant rupture. Non-mass Enhancement: Left breast 24 mm x 11 mm x 9 mm non-mass enhancement at the posterior aspect of the excision cavity in the lateral breast. " Assessment: 4 - Suspicious finding.  This is suspicious for residual disease in this patient with history of recent excisional biopsy.   The area is likely not amenable to MRI guided biopsy.  Surgical consultation recommended. Left axillary adenopathy.  At least 6 abnormal appearing level I axillary nodes are present, suspicious for residual lymph node metastasis.  RightThere is no MR evidence of malignancy in the right breast.  BI-RADS Category:   Overall: 4 - Suspicious  Recommendation:  Surgical consultation recommended.  Left breast biopsy could be performed of focal NME at 12 o'clock in the left breast if clinically indicated.     - 2/2/2021 PET scan:  COMPARISON:  Breast MRI 01/27/2021  FINDINGS:  Quality of the study: Adequate.  In the head and neck, there are no hypermetabolic lesions worrisome for malignancy. There are no hypermetabolic mucosal lesions, and there are no pathologically enlarged or hypermetabolic lymph nodes.  In the chest, there is no definite hypermetabolic breast mass.  There are bilateral breast implants in place.  There is relatively mild diffuse uptake within dense fibroglandular tissue, and within the left breast there is a maximum SUV of 1.9 adjacent to biopsy clips on image 68.  There is also mildly hypermetabolic subcutaneous fat stranding elsewhere in the lateral aspect of the left breast on image 77 likely postprocedural in nature. There are at least half a dozen left level 1 axillary lymph nodes the largest of which are hypermetabolic including a 3 x 2.1 cm node on image 50 with an SUV of 8.6, a 1 cm node on image 55 with an SUV of 4.7, and 2.3 x 1.2 cm node on image 61 with an SUV of 9.2.  There is also a non hypermetabolic 9 x 6 mm right level 2 lymph node.  There are no suspicious lymph nodes in the internal mammary chain.  There are no pulmonary nodules, and there are no pleural or pericardial effusions.  In the abdomen and pelvis, there is physiologic tracer distribution  within the abdominal organs and excretion into the genitourinary system, including diffusely within the right ureter and focally within the left.In the bones, there are no hypermetabolic lesions worrisome for malignancy.  Impression:  1.  No discrete left breast mass identified.  2.  Metastatic left level 1 axillary lymph nodes.  Level 2 larisa metastasis is not excluded.  3.  No evidence of distant metastasis.     - 2/9/2021 ECHO:  The left ventricle is normal in size with normal systolic function. The estimated ejection fraction is 58%  Regimen:  Paclitaxel weekly + carboplatin with Pembrolizumab q 3 weeks followed by AC with Pembrolizumab q 3 weeks followed by Pembroluzimab.(Based on interim analysis of the phase III KEYNOTE-522 the addition of pembrolizumab to neoadjuvant chemotherapy and use of adjuvant pembrolizumab x 9 cycles resulted in improvements in pathologic complete response rate and event-free survival in women with early triple-negative breast cancer.)     BRCA2+     5/3/2021 Breast MRI follow up on treatment in the interval (results below)  Findings:  There are bilateral retropectoral silicone implants. There is a right sided port.  The breasts have extreme amounts of fibroglandular tissue. The background parenchymal enhancement is mild.  There are postsurgical changes in the left upper outer breast and axilla.  There has been significant interval decrease in size of the previously seen abnormal left axillary lymph nodes, now with the largest lymph node measuring 14 x 15 x 10 mm (previously measuring up to 29 mm on the 1/27/21 MRI).  The other left axillary lymph nodes are smaller with mildly irregular shaggy margins, consistent with chemotherapy treatment.   No suspicious enhancement is seen in either breast. The previously seen left 12:00 mid depth focal non mass enhancement anterior to the pectoralis is not seen on the current exam.   No abnormal right axillary lymph nodes or internal mammary  lymph nodes are seen.  Impression:  There has been significant interval decrease in size of the previously seen abnormal left axillary lymph nodes, now with the largest lymph node measuring 14 x 15 x 10 mm (previously measuring up to 29 mm on the 21 MRI).  The other left axillary lymph nodes are smaller with mildly irregular shaggy margins, consistent with chemotherapy treatment.   No suspicious enhancement is seen in either breast.   BI-RADS Category:   Overall: 6 - Known Biopsy-Proven Malignancy     -   Completed surgery with complete pathologic response     - completed XRT     Gyn Hx:  Menarche- 10  , age at 1st live birth 22  OCPs x 4 years  Endometrial ablation      FH:  Mother - diagnosed at age 50 with breast cancer  Mastectomy- bilateral  No chemotherapy or radiation   Remains on Tamoxifen  Treated in [place]  ? 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 Negative for activity change, appetite change, chills, fever and unexpected weight change.   HENT:  Negative for mouth sores.    Eyes:  Negative for visual disturbance.   Respiratory:  Negative for cough and shortness of breath.    Cardiovascular:  Negative for chest pain.   Gastrointestinal:  Negative for abdominal pain and diarrhea.   Genitourinary:  Negative for frequency.   Musculoskeletal:  Negative for back pain.   Integumentary:  Negative for rash.   Neurological:  Negative for headaches.   Hematological:  Negative for adenopathy.   Psychiatric/Behavioral:  The patient is nervous/anxious.        Objective:      Physical Exam  Vitals and nursing note reviewed.   Constitutional:       General: She is not in acute distress.     Appearance: Normal appearance. She is well-developed and normal  weight. She is not ill-appearing.      Comments: Very pleasant  Presents alone  ECOG= 0   HENT:      Head: Normocephalic and atraumatic.      Mouth/Throat:      Mouth: Mucous membranes are moist.      Pharynx: No oropharyngeal exudate or posterior oropharyngeal erythema.      Comments: No thrush  Eyes:      General: Lids are normal. No scleral icterus.     Extraocular Movements: Extraocular movements intact.      Conjunctiva/sclera: Conjunctivae normal.      Pupils: Pupils are equal, round, and reactive to light.   Neck:      Thyroid: No thyromegaly.      Vascular: No JVD.      Trachea: Trachea normal.   Cardiovascular:      Rate and Rhythm: Normal rate and regular rhythm.      Heart sounds: Normal heart sounds. No murmur heard.    No friction rub. No gallop.      Comments: No significant edema.   Pulmonary:      Effort: Pulmonary effort is normal. No respiratory distress.      Breath sounds: Normal breath sounds. No wheezing, rhonchi or rales.      Comments: Breasts - no palpable masses or LAD; reconstruction.  Chest:      Chest wall: No tenderness.   Abdominal:      General: Abdomen is flat. Bowel sounds are normal. There is no distension.      Palpations: Abdomen is soft. There is no mass.      Tenderness: There is no abdominal tenderness. There is no guarding or rebound.      Comments: No organomegaly   Musculoskeletal:         General: No swelling, tenderness or deformity. Normal range of motion.      Cervical back: Normal range of motion and neck supple.      Right lower leg: No edema.      Left lower leg: No edema.      Comments: No spinal or paraspinal tenderness.    Lymphadenopathy:      Head:      Right side of head: No submental or submandibular adenopathy.      Left side of head: No submental or submandibular adenopathy.      Cervical: No cervical adenopathy.      Upper Body:      Right upper body: No supraclavicular or axillary adenopathy.      Left upper body: No supraclavicular or axillary adenopathy.    Skin:     General: Skin is warm and dry.      Capillary Refill: Capillary refill takes less than 2 seconds.      Findings: No bruising, erythema or rash.      Nails: There is no clubbing.   Neurological:      General: No focal deficit present.      Mental Status: She is alert and oriented to person, place, and time. Mental status is at baseline.      Sensory: No sensory deficit.      Motor: No weakness.      Coordination: Coordination normal.      Gait: Gait normal.   Psychiatric:         Mood and Affect: Mood normal.         Speech: Speech normal.         Behavior: Behavior normal.         Thought Content: Thought content normal.         Judgment: Judgment normal.       Assessment:       Problem List Items Addressed This Visit       BRCA2 gene mutation positive (Chronic)    Malignant neoplasm of upper-outer quadrant of left female breast - Primary    Adrenal insufficiency       Plan:       BRCA + TNBC  Continue surveillance  Appropriate prophylactic surgeries complete  Annual dermatology exam    Adrenal insufficiency- on replacement  Secondary to immunotherapy    Clinically doing well  Personal stressors reviewed impacted by above diagnoses    Route Chart for Scheduling    Med Onc Chart Routing      Follow up with physician 3 months. cbc, cmp virtual visit   Follow up with [facility]    Infusion scheduling note    Injection scheduling note    Labs    Imaging    Pharmacy appointment    Other referrals          Therapy Plan Information  PORT FLUSH  Flushes  heparin, porcine (PF) 100 unit/mL injection flush 500 Units  500 Units, Intravenous, Every visit  sodium chloride 0.9% flush 10 mL  10 mL, Intravenous, Every visit

## 2022-12-19 NOTE — PROGRESS NOTES
Physical Therapy Daily Treatment Note/Lymphedema Management     Name: Alison Swann  Clinic Number: [id number]    Therapy Diagnosis:   Encounter Diagnoses   Name Primary?    Decreased functional mobility Yes    Axillary web syndrome     Malignant neoplasm of upper-outer quadrant of left breast in female, estrogen receptor negative      Physician: Nicol Recinos NP    Visit Date: 12/19/2022    Physician Orders: PT eval and treat    Evaluation Date: 11/30/2022  Authorization Period Expiration: pending  Plan of Care Certification Period: RA on 12/30/22  Visit #/Visits authorized: 5/ 16     Time In: 2:00 PM  Time Out: 02:55 PM  Total Billable Time: 55 minutes    Precautions: Standard, cancer, and avoid BP, IV, blood draws, or injections in L UE    Subjective     Pt reports: Left arm/hand tingling was better after last session. Having some tightness on the left side of neck which is new, could have slept wrong. Also having some tightness down the inside of the left arm. Did get the scar strips in the mail but haven't put them on, wanted you to look at the scar first.       Response to previous treatment: good scars responding to mepiform very well pt pleased  Functional change: absent tingling in her L arm and hand    Pain: 6/10  Location: left upper trap soreness, tightness into left upper arm to elbow.     Objective     Alison received therapeutic exercises to develop strength, ROM, flexibility, posture, and core stabilization for 15 minutes including:  UBE alternating forward and backward L2 6 min   Towel glides for shoulder flexion 10 reps with turn and breath  Side Lying 1/2 moon  upper trunk rotation x 10 reps, tightness/pulling from left breast down inside left upper arm  Side Lying elbows bent (open book rotation) x 10 reps  Pectoral stretch on foam roller with diaphragmatic breathing, scap retractions with depression  10 sec hold x 10 reps  Trunk rotation in supine with knees flexed 10 reps each  side    Deferred:  Shoulder flexion/ elongation in supine  1/2 moon in side lye stretch  Aarom/ arom shoulder flexion / abduciton B UE full end range motion.  Pec corner wall stretch 5 reps  Scapular depression in standing against wall   Towel  roll pec stretch 3 min with diaphragmatic breathing  Post pelvic tilt 10   Contract relax stretches to B traps 3 30 sec hold, with R trap with increased tightness as compared to L.    deferred  Lateral pelvic tilt 10  Quadraped cat/cow 10 reps  Macarena pose 30 sec 3 times   Kneeling with full trunk flexion fpr lateral trunk flexion    Alison received the following manual therapy techniques 40 min: Manual Lymphatic Drainage and instruction in scar massage techniques MLD for upper and lower axilla/trunk Manual lymphatic drainage to bilateral short neck series, cervical terminus , axilla, deep abdominals, sternal nodes, paravertebral nodes, AAI anastomosis , JAKE anastomosis , inguinal nodes, and rework accessing all watershed areas on trunk  Instruction in self MLD techniques throughout.  Gentle rib springing, sternocostal jt mobilizations and stm at serratus anterior on R which relieved pain with taking deep breath. Performed lymphatouch at light pressure 30 mmHG to L axilla, inferior region below L breast and to R lateral trunk. Applied  mepiform scar tape to left lateral breast scar and medial abdominal scarring. Aquaphor applied to remaining scarring at lateral hips and across lower trunk, improved skin integrity and less dryness after.     Home Exercises Provided and Patient Education Provided     Education provided: scar massage to healed areas only, not on newly healed scarring      Written Home Exercises Provided:  will be provided next tx .  Exercises were reviewed and Alison was able to demonstrate them prior to the end of the session.  Alison demonstrated good  understanding of the education provided.     See EMR under Patient Instructions for exercises  provided next tx.      Assessment   Pt improved symptoms after therapy. Left medial arm tightness and pulling improved after therapy. Continued mild induration in inferior L breast. Progressing well with full L arm elevation. Majority of breast revision surgical scars along breasts are healed. Her inferior scars from lower abdominal surgical incisions improved without scabbing today. One small scab to inferior left breast. Tolerated MLD well. No signs or symptoms of infection and she is only 6 1/2weeks post op of the revision.     Alison Is progressing well towards her goals.   Pt prognosis is Good.     Pt will continue to benefit from skilled outpatient physical therapy to address the deficits listed in the problem list box on initial evaluation, provide pt/family education and to maximize pt's level of independence in the home and community environment.     Pt's spiritual, cultural and educational needs considered and pt agreeable to plan of care and goals.     Anticipated barriers to physical therapy: none  This patient presents s/p Bilateral mastectomy with flap reconstruction, 12 lymph nodes removed , with recent revision of breast surgery,  Resulting in: Stage 0-1 lymphedema of the left upper arm, axilla, increased pain, increased stiffness in the shoulder range of motion with flexion and abduction, as well as difficulty performing reaching over head. Patient also reports significant decreased endurance, and limitations climbing her 30 steps up to raised camp. Patient is also at higher risk of infection. This pt would benefit from skilled therapy services to address the above impairments.      Medical necessity is demonstrated by the following IMPAIRMENTS/PROBLEMS:  1. Decreased endurance  2. Decreased ROM  3. Decreased strength throughout B UE/ LE/ trunk  4. Decreased functional mobility tolerance.  5. MLD to assist in healing process of surgery/ scar care     The following goals were discussed with the  patient and patient is in agreement with them as to be addressed in the treatment plan.      Short Term Goals: (3 weeks)  1. Patient will be independent donning/doffing compression garment with good fit noted.  2. Patient will demonstrate 100% knowledge of lymphedema precautions and signs of infection.   3. Patient will increase L UE AROM/PROM shoulder flexion to 160 to improve functional reach and ADL's GOAL MET 12/14/2022  4. Patient will increase B UE AROM/PROM shoulder abduction to 180 to improve functional reach and ADL's with decreased complaints of tightness and pain.  5. Patient will perform self lymph drainage techniques.  6 Patient to demonstrate proper posture with weight acceptance in B LE with neutral pelvis GOAL MET 12/14/2022     Long Term Goals: (6 weeks)     1. Patient will perform self lymph drainage techniques  2. 6MWT increased by 25%.  3. Patient will increased B UE strength  to improve overall functional reach, mobility, lifting.  4. Patient will increase L UE to WNL without difficulty raising arm overhead.  5. Pt to have absent tingling in L UE  6. Pt to be independent with scar massage and scar care.           PLAN   POC starting 11/30/2022: Patient to be seen 2 x per week for 4-6 weeks for the medically necessary treatments to include: exercise, decongestive massage, intermittent compression pump, multi layered bandaging, education in lymphedema precautions, self massage, self bandaging, and assistance in obtaining appropriate compression garment.     Mary Jane Bass, PT, CLT  12/19/2022

## 2022-12-21 ENCOUNTER — OFFICE VISIT (OUTPATIENT)
Dept: PSYCHIATRY | Facility: CLINIC | Age: 42
End: 2022-12-21
Payer: COMMERCIAL

## 2022-12-21 DIAGNOSIS — Z17.1 MALIGNANT NEOPLASM OF UPPER-OUTER QUADRANT OF LEFT BREAST IN FEMALE, ESTROGEN RECEPTOR NEGATIVE: ICD-10-CM

## 2022-12-21 DIAGNOSIS — F41.1 GAD (GENERALIZED ANXIETY DISORDER): Primary | ICD-10-CM

## 2022-12-21 DIAGNOSIS — C50.412 MALIGNANT NEOPLASM OF UPPER-OUTER QUADRANT OF LEFT BREAST IN FEMALE, ESTROGEN RECEPTOR NEGATIVE: ICD-10-CM

## 2022-12-21 PROCEDURE — 3044F PR MOST RECENT HEMOGLOBIN A1C LEVEL <7.0%: ICD-10-PCS | Mod: CPTII,95,,

## 2022-12-21 PROCEDURE — 90832 PR PSYCHOTHERAPY W/PATIENT, 30 MIN: ICD-10-PCS | Mod: FQ,95,,

## 2022-12-21 PROCEDURE — 3044F HG A1C LEVEL LT 7.0%: CPT | Mod: CPTII,95,,

## 2022-12-21 PROCEDURE — 90832 PSYTX W PT 30 MINUTES: CPT | Mod: FQ,95,,

## 2022-12-21 NOTE — PROGRESS NOTES
Established Patient - Audio Only Telehealth Visit     The patient location is: [address] [address]  The chief complaint leading to consultation is: anxiety  Visit type: Virtual visit with audio only (telephone)  Total time spent with patient: 30 mins       The reason for the audio only service rather than synchronous audio and video virtual visit was related to technical difficulties or patient preference/necessity.     Each patient to whom I provide medical services by telemedicine is:  (1) informed of the relationship between the physician and patient and the respective role of any other health care provider with respect to management of the patient; and (2) notified that they may decline to receive medical services by telemedicine and may withdraw from such care at any time. Patient verbally consented to receive this service via voice-only telephone call.       PSYCHO-ONCOLOGY NOTE/ Individual Psychotherapy     Date: 12/21/2022   Site:  [facility]      Therapeutic Intervention: Met with patient.  Outpatient - Supportive psychotherapy 30 min - CPT code 88076    This includes face to face time and non-face to face time preparing to see the patient, obtaining and/or reviewing separately obtained history, documenting clinical information in the electronic or other health record, independently interpreting results and communicating results to the patient/family/caregiver, or care coordinator.      Patient was last seen by me on 12/14/2022    Problem list  Patient Active Problem List   Diagnosis    Ductal carcinoma in situ of left breast    Malignant neoplasm of upper-outer quadrant of left female breast    Decreased functional mobility    Dehydration    Anemia associated with chemotherapy    Nausea and vomiting    BRCA2 gene mutation positive    Scoliosis    Attention Deficit Hyperactivity Disorder    Therapeutic Drug Monitoring    Postoperative Nausea And Vomiting    Macrocytic Anemia    Generalized  Anxiety    Family H/O Diabetes Mellitus    Fatigue    Hyponatremia    Elevated troponin    Diarrhea    Intravascular volume depletion    Hypomagnesemia    Hypokalemia    Anterior T wave inversion    Elevated LFTs    Colitis, indeterminate    Encounter for immunotherapy    Diarrhea due to drug    Pneumonitis    s/p RA-TLH/BSO    JA (acute kidney injury)    Adrenal crisis    Sepsis    Chest pain    Adrenal insufficiency    Axillary web syndrome       Chief complaint/reason for encounter: depression and anxiety   Met with patient to evaluate psychosocial adaptation to diagnosis/treatment/survivorship of Malignant neoplasm of upper-outer quadrant of left breast.     Current Medications  Current Outpatient Medications   Medication    dextroamphetamine-amphetamine 10 mg Tab    hydrocortisone (CORTEF) 10 MG Tab    ondansetron (ZOFRAN) 4 MG tablet    valACYclovir (VALTREX) 500 MG tablet     No current facility-administered medications for this visit.     Facility-Administered Medications Ordered in Other Visits   Medication Frequency    lactated ringers infusion Continuous    sodium chloride 0.9% flush 10 mL PRN       ONCOLOGY HISTORY  Oncology History   Malignant neoplasm of upper-outer quadrant of left female breast   1/19/2021 Cancer Staged    Staging form: Breast, AJCC 8th Edition  - Clinical stage from 1/19/2021: Stage IIIB (cT2, cN1, cM0, G3, ER-, SD-, HER2-)       1/28/2021 Initial Diagnosis    Malignant neoplasm of upper-outer quadrant of left breast in female, estrogen receptor negative     2/18/2021 - 2/18/2021 Chemotherapy    Treatment Summary   Plan Name: OP BREAST DOSE-DENSE AC - DOXORUBICIN CYCLOPHOSPHAMIDE Q2W  Treatment Goal: Curative  Status: Inactive  Start Date:   End Date:   Provider: Jhon Suazo MD  Chemotherapy: DOXOrubicin chemo injection 96 mg, 60 mg/m2, Intravenous, Clinic/[facility] 1 time, 0 of 4 cycles  cyclophosphamide (CYTOXAN) 600 mg/m2 = 965 mg in sodium chloride 0.9% 250 mL chemo infusion, 600  mg/m2, Intravenous, Clinic/[facility] 1 time, 0 of 4 cycles       2/25/2021 - 7/6/2021 Chemotherapy    Treatment Summary   Plan Name: OP BREAST PACLITAXEL WEEKLY + CARBOPLATIN (AUC 5) Q3W FOLLOWED BY AC WITH PEMBROLIZUMAB FOLLOWED BY PEMBROLIZUMAB   Treatment Goal: Curative  Status: Inactive  Start Date: 2/25/2021  End Date: 7/6/2021  Provider: Sandra Sotelo MD  Chemotherapy: DOXOrubicin chemo injection 112 mg, 60 mg/m2 = 112 mg (100 % of original dose 60 mg/m2), Intravenous, Once, 3 of 4 cycles  Dose modification: 60 mg/m2 (original dose 60 mg/m2, Cycle 5)  Administration: 112 mg (5/25/2021), 112 mg (6/15/2021), 110 mg (7/6/2021)  CARBOplatin (PARAPLATIN) 605 mg in sodium chloride 0.9% 250 mL chemo infusion, 605 mg (100 % of original dose 603.5 mg), Intravenous, Clinic/H[facility] 1 time, 4 of 4 cycles  Dose modification:   (original dose 603.5 mg, Cycle 1)  Administration: 605 mg (2/25/2021), 630 mg (3/22/2021), 750 mg (4/13/2021), 750 mg (5/4/2021)  cyclophosphamide 600 mg/m2 = 1,100 mg in sodium chloride 0.9% 100 mL chemo infusion, 600 mg/m2 = 1,100 mg (100 % of original dose 600 mg/m2), Intravenous, Clinic/HOD 1 time, 3 of 4 cycles  Dose modification: 600 mg/m2 (original dose 600 mg/m2, Cycle 5), 600 mg/m2 (Cycle 8)  Administration: 1,100 mg (5/25/2021), 1,100 mg (6/15/2021), 1,100 mg (7/6/2021)  PACLitaxeL (TAXOL) 80 mg/m2 = 132 mg in sodium chloride 0.9% 250 mL chemo infusion, 80 mg/m2 = 132 mg (100 % of original dose 80 mg/m2), Intravenous, Clinic/HOD 1 time, 4 of 4 cycles  Dose modification: 80 mg/m2 (original dose 80 mg/m2, Cycle 1)  Administration: 132 mg (2/25/2021), 132 mg (3/4/2021), 132 mg (3/11/2021), 138 mg (3/22/2021), 138 mg (3/30/2021), 138 mg (4/6/2021), 138 mg (4/13/2021), 138 mg (4/20/2021), 144 mg (4/27/2021), 144 mg (5/4/2021), 144 mg (5/11/2021), 144 mg (5/18/2021)  pembrolizumab (KEYTRUDA) 200 mg in sodium chloride 0.9% 100 mL chemo infusion, 200 mg, Intravenous, Clinic/[facility] 1 time, 7 of 18  cycles  Administration: 200 mg (2/25/2021), 200 mg (5/25/2021), 200 mg (3/22/2021), 200 mg (4/13/2021), 200 mg (5/4/2021), 200 mg (6/15/2021), 200 mg (7/6/2021)       4/15/2021 - 4/15/2021 Chemotherapy    Treatment Summary   Plan Name: OP BREAST PEMBROLIZUMAB Q3W PACLITAXEL CARBOPLATIN WEEKLY FOLLOWED BY PEMBROLIZUMAB DOXORUBICIN CYCLOPHOSPHAMIDE Q3W   Treatment Goal: Curative  Status: Inactive  Start Date:   End Date:   Provider: Jhon Suazo MD  Chemotherapy: CARBOplatin (PARAPLATIN) in sodium chloride 0.9% 250 mL chemo infusion,  (original dose ), Intravenous, Clinic/HOD 1 time, 0 of 3 cycles  Dose modification:   (Cycle 1)  cyclophosphamide in sodium chloride 0.9% chemo infusion, 600 mg/m2 = 965 mg (original dose ), Intravenous, Clinic/HOD 1 time, 0 of 1 cycle  Dose modification: 600 mg/m2 (Cycle 2)  DOXOrubicin (ADRIAMYCIN) in sodium chloride 0.9% 250 mL chemo infusion, 60 mg/m2 (original dose ), Intravenous, Clinic/HOD 1 time, 0 of 1 cycle  Dose modification: 60 mg/m2 (Cycle 2)  PACLitaxeL (TAXOL) in sodium chloride 0.9% 100 mL chemo infusion, 80 mg/m2 (original dose ), Intravenous, Clinic/HOD 1 time, 0 of 1 cycle  Dose modification: 80 mg/m2 (Cycle 1)  PEMEtrexed (ALIMTA) in sodium chloride 0.9% chemo infusion, 500 mg/m2, Intravenous, Clinic/HOD 1 time, 0 of 33 cycles  pembrolizumab (KEYTRUDA) 200 mg in sodium chloride 0.9% 100 mL chemo infusion, 200 mg, Intravenous, Clinic/HOD 1 time, 0 of 35 cycles       11/19/2021 - 1/4/2022 Radiation Therapy    Treating physician: Maryann Perales  Total Dose: 50 Gy  Fractions: 25    DIAGNOSIS:  C50.412 - Malignant neoplasm of upper-outer quadrant of left female breast, Diagnosed 11/11/2021 (Active) Stage IIIB, T2, N1, M0, G3, HER2 Neg, ER Neg, MS Neg    This is a 41 y.o. y/o female with cT2,c N1 and M0 G3, (Stage IIIB), triple negative, Ki-67 79%, LEFT upper outer quadrant breast, BRCA2+, status post lumpectomy 1/19/21, with residual larisa disease, followed by   adjuvant chemo-immunotherapy with carbo-taxol and pembrolizumab, followed by AC with pembrolizumab, followed by adjuvant pemprolizumab complicated by ongoing, chronic colitis/diarrhea necessitating long-term prednisone.  Underwent bilateral mastectomy 9/29/21 with no residual carcinoma identified in breast of 14 sampled nodes (ypN0). She has completed adjuvant LEFT breast and regional larisa radiation therapy to a dose of 50Gy.    Treatment Summary  Course: C1 BREAST 2021    Treatment Site Ref. ID Energy Dose/Fx (Gy) #Fx Dose Correction (Gy) Total Dose (Gy) Start Date End Date Elapsed Days   3D Sclav_L rxsc 18X/6X 2 25 / 25 0 50 11/19/2021 1/4/2022 46   3D Breast L Breast_L 18X/6X 2 25 / 25 0 50 11/19/2021 1/4/2022 46     Tolerated well.  2 day treatment break at patient's request for patch of dry desquamation, and 5 day treatment break due to CoVID infection.  Otherwise, patient completed her course of therapy as prescribed.       PLAN: RTC 1 month for routine follow up. Continue current skin care/sun protection for now.  Follow up with other providers as directed.         Objective:  Alison Swann arrived promptly for the session. The patient was fully cooperative throughout the session.  Appearance: unable to assess due to audio only  Behavior/Cooperation: friendly and cooperative  Speech: normal in rate, volume, and tone  Mood: angry, depressed  Affect: appropriate range(crying, laughing)  Thought Process: goal-directed, logical  Thought Content: normal,  No delusions or paranoia; did not appear to be responding to internal stimuli during the session  Orientation: grossly intact  Memory: grossly intact  Attention Span/Concentration: Attends to session without distraction; reports no difficulty  Fund of Knowledge: average  Estimate of Intelligence: average from verbal skills and history  Cognition: grossly intact  Insight: patient has awareness of illness; good insight into own behavior and behavior of  others  Judgment: the patient's behavior is adequate to circumstances    NCCN Distress thermometer:   DISTRESS SCREENING 12/19/2022 10/12/2022 10/12/2022 8/15/2022 8/15/2022 6/1/2022 4/29/2022   Distress Score 3 10 - Extreme Distress 0 - No Distress 0 - No Distress 2 2 4   Practical Problems Insurance/Financial Work/School None of these - None of these - -   Family Problems None of these None of these None of these - None of these - -   Emotional Problems Depression;Sadness;Worry Fears;Worry None of these - None of these - -   Spiritual / Worship Concerns No No No - No No -   Physical Problems None of these None of these None of these - None of these - -   Other Problems - - - - - - -        Interval history and content of current session: Mrs. Swann discussed current adaptation to disease and treatment status. Reports to be coping in an adequate manner. Additional stressors include daughters upcoming wedding, finances, and ongoing family conflict. Evaluated cognitive response, paying particular attention to negative intrusive thoughts of a persistent and detrimental nature. Thoughts of this type are in evidence with moderate distress. She noted some new difficulties with managing her anger. Provided cognitive behavioral therapy to address negative cognitions. Identified and evaluated psychosocial and environmental stressors secondary to diagnosis and treatment.  Examined proactive behaviors that may be implemented to minimize or ameliorate psychosocial stressors secondary to diagnosis and treatment including setting boundaries and communicating wants and needs.     Risk parameters:   Patient reports no suicidal ideation  Patient reports no homicidal ideation  Patient reports no self-injurious behavior  Patient reports no violent behavior   Safety needs:  None at this time      Verbal deficits: None     Patient's response to intervention:The patient's response to intervention is accepting.     Progress toward  goals and other mental status changes:  The patient's progress toward goals is fair .      Progress to date:Progress - Ongoing, but Slow      Goals from last visit: Attempted, partially met        Patient Strengths: verbal, intelligent, successful, good social support, good insight, commitment to wellness, strong melany, strong cultural traditions      Treatment Plan:individual psychotherapy  Target symptoms: depression, anxiety   Why chosen therapy is appropriate versus another modality: relevant to diagnosis  Outcome monitoring methods: self-report  Therapeutic intervention type: supportive psychotherapy/ behavior modification  Prognosis: Fair      Behavioral goals:    Exercise: in the next week start engaged in light activity   Stress management: continue using deep breathing and meditation; prioritize responsibilities and needs        Return to clinic: 1 weeks     Length of Service (minutes direct face-to-face contact): 30    Diagnosis:     ICD-10-CM ICD-9-CM   1. KIANA (generalized anxiety disorder)  F41.1 300.02   2. Malignant neoplasm of upper-outer quadrant of left breast in female, estrogen receptor negative  C50.412 174.4    Z17.1 V86.1               Julisa Dias Psy.D.  Clinical Health Psychology Fellow          Favian Hollingsworth Psy.D.  Supervising Clinical Psychologist         This service was not originating from a related E/M service provided within the previous 7 days nor will  to an E/M service or procedure within the next 24 hours or my soonest available appointment.  Prevailing standard of care was able to be met in this audio-only visit.

## 2022-12-25 ENCOUNTER — PATIENT MESSAGE (OUTPATIENT)
Dept: HEMATOLOGY/ONCOLOGY | Facility: CLINIC | Age: 42
End: 2022-12-25
Payer: COMMERCIAL

## 2022-12-28 ENCOUNTER — CLINICAL SUPPORT (OUTPATIENT)
Dept: REHABILITATION | Facility: HOSPITAL | Age: 42
End: 2022-12-28
Payer: COMMERCIAL

## 2022-12-28 ENCOUNTER — PATIENT MESSAGE (OUTPATIENT)
Dept: ENDOCRINOLOGY | Facility: CLINIC | Age: 42
End: 2022-12-28
Payer: COMMERCIAL

## 2022-12-28 ENCOUNTER — OFFICE VISIT (OUTPATIENT)
Dept: PSYCHIATRY | Facility: CLINIC | Age: 42
End: 2022-12-28
Payer: COMMERCIAL

## 2022-12-28 DIAGNOSIS — R26.89 DECREASED FUNCTIONAL MOBILITY: Primary | ICD-10-CM

## 2022-12-28 DIAGNOSIS — F41.1 GAD (GENERALIZED ANXIETY DISORDER): Primary | ICD-10-CM

## 2022-12-28 DIAGNOSIS — Z17.1 MALIGNANT NEOPLASM OF UPPER-OUTER QUADRANT OF LEFT BREAST IN FEMALE, ESTROGEN RECEPTOR NEGATIVE: ICD-10-CM

## 2022-12-28 DIAGNOSIS — C50.412 MALIGNANT NEOPLASM OF UPPER-OUTER QUADRANT OF LEFT BREAST IN FEMALE, ESTROGEN RECEPTOR NEGATIVE: ICD-10-CM

## 2022-12-28 DIAGNOSIS — L90.5 AXILLARY WEB SYNDROME: ICD-10-CM

## 2022-12-28 DIAGNOSIS — L76.82 AXILLARY WEB SYNDROME: ICD-10-CM

## 2022-12-28 PROCEDURE — 97140 MANUAL THERAPY 1/> REGIONS: CPT | Mod: PN,97

## 2022-12-28 PROCEDURE — 3044F PR MOST RECENT HEMOGLOBIN A1C LEVEL <7.0%: ICD-10-PCS | Mod: CPTII,S$GLB,,

## 2022-12-28 PROCEDURE — 90832 PSYTX W PT 30 MINUTES: CPT | Mod: S$GLB,,,

## 2022-12-28 PROCEDURE — 97110 THERAPEUTIC EXERCISES: CPT | Mod: PN

## 2022-12-28 PROCEDURE — 3044F HG A1C LEVEL LT 7.0%: CPT | Mod: CPTII,S$GLB,,

## 2022-12-28 PROCEDURE — 90832 PR PSYCHOTHERAPY W/PATIENT, 30 MIN: ICD-10-PCS | Mod: S$GLB,,,

## 2022-12-28 NOTE — PROGRESS NOTES
Physical Therapy Daily Treatment Note/Lymphedema Management     Name: Alison Swann  Clinic Number: [id number]    Therapy Diagnosis:    Axillary web syndrome Yes    Malignant neoplasm of upper-outer quadrant of left female breast, unspecified estrogen receptor status      Decreased functional mobility          Physician: Nicol Recinos NP    Visit Date: 12/28/2022    Physician Orders: PT eval and treat    Evaluation Date: 11/30/2022  Authorization Period Expiration: pending  Plan of Care Certification Period: RA on 12/30/22  Visit #/Visits authorized: 6/ 16     Time In: 2:00 PM  Time Out: 02:55 PM  Total Billable Time: 55 minutes    Precautions: Standard, cancer, and avoid BP, IV, blood draws, or injections in L UE    Subjective   02 sat 100% 103 resting HR  Pt reports: no real complaints just feel weak/ tried to jog and had R sided back pain. So stopped running and it went away. Difficulty still with reaching for things. Pt was not wearing support bra with running. Feels like she is getting more forgetful. Short term memory is really bad.       Response to previous treatment: good scars responding to mepiform very well pt pleased  Functional change: absent tingling in her L arm and hand    Pain: 0/10  Location none    Objective     Alison received therapeutic exercises to develop strength, ROM, flexibility, posture, and core stabilization for 30 minutes including:  UBE alternating forward and backward L2.5  8 min post vitals 98% / 133  Nustep 10 min L3 post vitals  98%/ 119 HR  Towel glides for shoulder flexion 10 reps with turn and breath  Strengthening for B LE provided written hep and performed:  Bridging with B LE  Single limb bridge  Elevator SLR  Hip abduction   deferred  Side Lying 1/2 moon  upper trunk rotation x 10 reps, tightness/pulling from left breast down inside left upper arm  Side Lying elbows bent (open book rotation) x 10 reps  Pectoral stretch on foam roller with diaphragmatic breathing,  scap retractions with depression  10 sec hold x 10 reps  Trunk rotation in supine with knees flexed 10 reps each side    Deferred:  Shoulder flexion/ elongation in supine  1/2 moon in side lye stretch  Aarom/ arom shoulder flexion / abduciton B UE full end range motion.  Pec corner wall stretch 5 reps  Scapular depression in standing against wall   Towel  roll pec stretch 3 min with diaphragmatic breathing  Post pelvic tilt 10   Contract relax stretches to B traps 3 30 sec hold, with R trap with increased tightness as compared to L.    deferred  Lateral pelvic tilt 10  Quadraped cat/cow 10 reps  Macarena pose 30 sec 3 times   Kneeling with full trunk flexion fpr lateral trunk flexion    Alison received the following manual therapy techniques 25 min: Manual Lymphatic Drainage and instruction in scar massage techniques MLD for upper and lower axilla/trunk Manual lymphatic drainage to bilateral short neck series, cervical terminus , axilla, deep abdominals, sternal nodes, paravertebral nodes, AAI anastomosis , JAKE anastomosis , inguinal nodes, and rework accessing all watershed areas on trunk  Instruction in self MLD techniques throughout.    Performed lymphatouch at light pressure 30 mmHG to L axilla, inferior region below L breast and to R lateral trunk. Recommended aquaphor for dryness to scar in posterior trunk/ provided sample to pt.  Deferred Applied  mepiform scar tape to left lateral breast scar and medial abdominal scarring. Aquaphor applied to remaining scarring at lateral hips and across lower trunk, improved skin integrity and less dryness after.     Home Exercises Provided and Patient Education Provided     Education provided: trunk strengthening and need for this in order to return to PLOF      Written Home Exercises Provided:   Exercises were reviewed and Alison was able to demonstrate them prior to the end of the session.  Alison demonstrated good  understanding of the education provided.     See  EMR under Patient Instructions for exercises provided next tx.      Assessment   Pt improved motion in overhead reach. Continued mild induration in inferior L breast.  Majority of breast revision surgical scars along breasts are healed.  One small scab to inferior left breast R lateral hip. Tolerated MLD well. No signs or symptoms of infection and she is only 7 weeks post op of the revision.     Alison Is progressing well towards her goals.   Pt prognosis is Good.     Pt will continue to benefit from skilled outpatient physical therapy to address the deficits listed in the problem list box on initial evaluation, provide pt/family education and to maximize pt's level of independence in the home and community environment.     Pt's spiritual, cultural and educational needs considered and pt agreeable to plan of care and goals.     Anticipated barriers to physical therapy: none  This patient presents s/p Bilateral mastectomy with flap reconstruction, 12 lymph nodes removed , with recent revision of breast surgery,  Resulting in: Stage 0-1 lymphedema of the left upper arm, axilla, increased pain, increased stiffness in the shoulder range of motion with flexion and abduction, as well as difficulty performing reaching over head. Patient also reports significant decreased endurance, and limitations climbing her 30 steps up to raised camp. Patient is also at higher risk of infection. This pt would benefit from skilled therapy services to address the above impairments.      Medical necessity is demonstrated by the following IMPAIRMENTS/PROBLEMS:  1. Decreased endurance  2. Decreased ROM  3. Decreased strength throughout B UE/ LE/ trunk  4. Decreased functional mobility tolerance.  5. MLD to assist in healing process of surgery/ scar care     The following goals were discussed with the patient and patient is in agreement with them as to be addressed in the treatment plan.      Short Term Goals: (3 weeks)  1. Patient will be  independent donning/doffing compression garment with good fit noted.  2. Patient will demonstrate 100% knowledge of lymphedema precautions and signs of infection.   3. Patient will increase L UE AROM/PROM shoulder flexion to 160 to improve functional reach and ADL's GOAL MET 12/14/2022  4. Patient will increase B UE AROM/PROM shoulder abduction to 180 to improve functional reach and ADL's with decreased complaints of tightness and pain.  5. Patient will perform self lymph drainage techniques.  6 Patient to demonstrate proper posture with weight acceptance in B LE with neutral pelvis GOAL MET 12/14/2022     Long Term Goals: (6 weeks)     1. Patient will perform self lymph drainage techniques  2. 6MWT increased by 25%.  3. Patient will increased B UE strength  to improve overall functional reach, mobility, lifting.  4. Patient will increase L UE to WNL without difficulty raising arm overhead.  5. Pt to have absent tingling in L UE GOAL MET 12/28/2022  6. Pt to be independent with scar massage and scar care. GOAL MET 12/28/2022           PLAN   POC starting 11/30/2022: Patient to be seen 2 x per week for 4-6 weeks for the medically necessary treatments to include: exercise, decongestive massage, intermittent compression pump, multi layered bandaging, education in lymphedema precautions, self massage, self bandaging, and assistance in obtaining appropriate compression garment.   Reassess on 12/30/2022 for continued skilled need. Feel she should focus on further strengthening as tolerated. / Review HEP provided on today tx 12/28   Merly Chapin,PT, CLT  12/28/2022

## 2022-12-28 NOTE — PROGRESS NOTES
PSYCHO-ONCOLOGY NOTE/ Individual Psychotherapy     Date: 12/28/2022   Site:  [facility]      Therapeutic Intervention: Met with patient.  Outpatient - Behavior modifying psychotherapy 30 min - CPT code 83063    This includes face to face time and non-face to face time preparing to see the patient, obtaining and/or reviewing separately obtained history, documenting clinical information in the electronic or other health record, independently interpreting results and communicating results to the patient/family/caregiver, or care coordinator.      Patient was last seen by me on 12/21/2022    Problem list  Patient Active Problem List   Diagnosis    Ductal carcinoma in situ of left breast    Malignant neoplasm of upper-outer quadrant of left female breast    Decreased functional mobility    Dehydration    Anemia associated with chemotherapy    Nausea and vomiting    BRCA2 gene mutation positive    Scoliosis    Attention Deficit Hyperactivity Disorder    Therapeutic Drug Monitoring    Postoperative Nausea And Vomiting    Macrocytic Anemia    Generalized Anxiety    Family H/O Diabetes Mellitus    Fatigue    Hyponatremia    Elevated troponin    Diarrhea    Intravascular volume depletion    Hypomagnesemia    Hypokalemia    Anterior T wave inversion    Elevated LFTs    Colitis, indeterminate    Encounter for immunotherapy    Diarrhea due to drug    Pneumonitis    s/p RA-TLH/BSO    JA (acute kidney injury)    Adrenal crisis    Sepsis    Chest pain    Adrenal insufficiency    Axillary web syndrome       Chief complaint/reason for encounter: depression and anxiety   Met with patient to evaluate psychosocial adaptation to diagnosis/treatment/survivorship of malignant neoplasm of upper-outer quadrant of left female breasts    Current Medications  Current Outpatient Medications   Medication    dextroamphetamine-amphetamine 10 mg Tab    hydrocortisone (CORTEF) 10 MG Tab    ondansetron (ZOFRAN) 4 MG tablet    valACYclovir  (VALTREX) 500 MG tablet     No current facility-administered medications for this visit.     Facility-Administered Medications Ordered in Other Visits   Medication Frequency    lactated ringers infusion Continuous    sodium chloride 0.9% flush 10 mL PRN       ONCOLOGY HISTORY  Oncology History   Malignant neoplasm of upper-outer quadrant of left female breast   1/19/2021 Cancer Staged    Staging form: Breast, AJCC 8th Edition  - Clinical stage from 1/19/2021: Stage IIIB (cT2, cN1, cM0, G3, ER-, HI-, HER2-)       1/28/2021 Initial Diagnosis    Malignant neoplasm of upper-outer quadrant of left breast in female, estrogen receptor negative     2/18/2021 - 2/18/2021 Chemotherapy    Treatment Summary   Plan Name: OP BREAST DOSE-DENSE AC - DOXORUBICIN CYCLOPHOSPHAMIDE Q2W  Treatment Goal: Curative  Status: Inactive  Start Date:   End Date:   Provider: Jhon Suazo MD  Chemotherapy: DOXOrubicin chemo injection 96 mg, 60 mg/m2, Intravenous, Clinic/HOD 1 time, 0 of 4 cycles  cyclophosphamide (CYTOXAN) 600 mg/m2 = 965 mg in sodium chloride 0.9% 250 mL chemo infusion, 600 mg/m2, Intravenous, Clinic/HOD 1 time, 0 of 4 cycles       2/25/2021 - 7/6/2021 Chemotherapy    Treatment Summary   Plan Name: OP BREAST PACLITAXEL WEEKLY + CARBOPLATIN (AUC 5) Q3W FOLLOWED BY AC WITH PEMBROLIZUMAB FOLLOWED BY PEMBROLIZUMAB   Treatment Goal: Curative  Status: Inactive  Start Date: 2/25/2021  End Date: 7/6/2021  Provider: Sandra Sotelo MD  Chemotherapy: DOXOrubicin chemo injection 112 mg, 60 mg/m2 = 112 mg (100 % of original dose 60 mg/m2), Intravenous, Once, 3 of 4 cycles  Dose modification: 60 mg/m2 (original dose 60 mg/m2, Cycle 5)  Administration: 112 mg (5/25/2021), 112 mg (6/15/2021), 110 mg (7/6/2021)  CARBOplatin (PARAPLATIN) 605 mg in sodium chloride 0.9% 250 mL chemo infusion, 605 mg (100 % of original dose 603.5 mg), Intravenous, Clinic/HOD 1 time, 4 of 4 cycles  Dose modification:   (original dose 603.5 mg, Cycle  1)  Administration: 605 mg (2/25/2021), 630 mg (3/22/2021), 750 mg (4/13/2021), 750 mg (5/4/2021)  cyclophosphamide 600 mg/m2 = 1,100 mg in sodium chloride 0.9% 100 mL chemo infusion, 600 mg/m2 = 1,100 mg (100 % of original dose 600 mg/m2), Intravenous, Clinic/HOD 1 time, 3 of 4 cycles  Dose modification: 600 mg/m2 (original dose 600 mg/m2, Cycle 5), 600 mg/m2 (Cycle 8)  Administration: 1,100 mg (5/25/2021), 1,100 mg (6/15/2021), 1,100 mg (7/6/2021)  PACLitaxeL (TAXOL) 80 mg/m2 = 132 mg in sodium chloride 0.9% 250 mL chemo infusion, 80 mg/m2 = 132 mg (100 % of original dose 80 mg/m2), Intravenous, Clinic/HOD 1 time, 4 of 4 cycles  Dose modification: 80 mg/m2 (original dose 80 mg/m2, Cycle 1)  Administration: 132 mg (2/25/2021), 132 mg (3/4/2021), 132 mg (3/11/2021), 138 mg (3/22/2021), 138 mg (3/30/2021), 138 mg (4/6/2021), 138 mg (4/13/2021), 138 mg (4/20/2021), 144 mg (4/27/2021), 144 mg (5/4/2021), 144 mg (5/11/2021), 144 mg (5/18/2021)  pembrolizumab (KEYTRUDA) 200 mg in sodium chloride 0.9% 100 mL chemo infusion, 200 mg, Intravenous, Clinic/HOD 1 time, 7 of 18 cycles  Administration: 200 mg (2/25/2021), 200 mg (5/25/2021), 200 mg (3/22/2021), 200 mg (4/13/2021), 200 mg (5/4/2021), 200 mg (6/15/2021), 200 mg (7/6/2021)       4/15/2021 - 4/15/2021 Chemotherapy    Treatment Summary   Plan Name: OP BREAST PEMBROLIZUMAB Q3W PACLITAXEL CARBOPLATIN WEEKLY FOLLOWED BY PEMBROLIZUMAB DOXORUBICIN CYCLOPHOSPHAMIDE Q3W   Treatment Goal: Curative  Status: Inactive  Start Date:   End Date:   Provider: Jhon Suazo MD  Chemotherapy: CARBOplatin (PARAPLATIN) in sodium chloride 0.9% 250 mL chemo infusion,  (original dose ), Intravenous, Clinic/HOD 1 time, 0 of 3 cycles  Dose modification:   (Cycle 1)  cyclophosphamide in sodium chloride 0.9% chemo infusion, 600 mg/m2 = 965 mg (original dose ), Intravenous, Clinic/HOD 1 time, 0 of 1 cycle  Dose modification: 600 mg/m2 (Cycle 2)  DOXOrubicin (ADRIAMYCIN) in sodium chloride 0.9%  250 mL chemo infusion, 60 mg/m2 (original dose ), Intravenous, Clinic/HOD 1 time, 0 of 1 cycle  Dose modification: 60 mg/m2 (Cycle 2)  PACLitaxeL (TAXOL) in sodium chloride 0.9% 100 mL chemo infusion, 80 mg/m2 (original dose ), Intravenous, Clinic/HOD 1 time, 0 of 1 cycle  Dose modification: 80 mg/m2 (Cycle 1)  PEMEtrexed (ALIMTA) in sodium chloride 0.9% chemo infusion, 500 mg/m2, Intravenous, Clinic/HOD 1 time, 0 of 33 cycles  pembrolizumab (KEYTRUDA) 200 mg in sodium chloride 0.9% 100 mL chemo infusion, 200 mg, Intravenous, Clinic/HOD 1 time, 0 of 35 cycles       11/19/2021 - 1/4/2022 Radiation Therapy    Treating physician: Maryann Perales  Total Dose: 50 Gy  Fractions: 25    DIAGNOSIS:  C50.412 - Malignant neoplasm of upper-outer quadrant of left female breast, Diagnosed 11/11/2021 (Active) Stage IIIB, T2, N1, M0, G3, HER2 Neg, ER Neg, NV Neg    This is a 41 y.o. y/o female with cT2,c N1 and M0 G3, (Stage IIIB), triple negative, Ki-67 79%, LEFT upper outer quadrant breast, BRCA2+, status post lumpectomy 1/19/21, with residual larisa disease, followed by  adjuvant chemo-immunotherapy with carbo-taxol and pembrolizumab, followed by AC with pembrolizumab, followed by adjuvant pemprolizumab complicated by ongoing, chronic colitis/diarrhea necessitating long-term prednisone.  Underwent bilateral mastectomy 9/29/21 with no residual carcinoma identified in breast of 14 sampled nodes (ypN0). She has completed adjuvant LEFT breast and regional larisa radiation therapy to a dose of 50Gy.    Treatment Summary  Course: C1 BREAST 2021    Treatment Site Ref. ID Energy Dose/Fx (Gy) #Fx Dose Correction (Gy) Total Dose (Gy) Start Date End Date Elapsed Days   3D Sclav_L rxsc 18X/6X 2 25 / 25 0 50 11/19/2021 1/4/2022 46   3D Breast L Breast_L 18X/6X 2 25 / 25 0 50 11/19/2021 1/4/2022 46     Tolerated well.  2 day treatment break at patient's request for patch of dry desquamation, and 5 day treatment break due to CoVID infection.   Otherwise, patient completed her course of therapy as prescribed.       PLAN: RTC 1 month for routine follow up. Continue current skin care/sun protection for now.  Follow up with other providers as directed.         Objective:  Alison Swann arrived promptly for the session. Mrs. Swann was independently ambulatory at the time of session. The patient was fully cooperative throughout the session.  Appearance: age appropriate, casually  dressed, adequately  groomed  Behavior/Cooperation: friendly and cooperative  Speech: normal in rate, volume, and tone  Mood: euthymic  Affect: labile  Thought Process: goal-directed, logical  Thought Content: normal,  No delusions or paranoia; did not appear to be responding to internal stimuli during the session  Orientation: grossly intact  Memory: grossly intact  Attention Span/Concentration: Attends to session without distraction; reports no difficulty  Fund of Knowledge: average  Estimate of Intelligence: average from verbal skills and history  Cognition: grossly intact  Insight: patient has awareness of illness; good insight into own behavior and behavior of others  Judgment: the patient's behavior is adequate to circumstances    [facility]:   DISTRESS SCREENING 12/19/2022 10/12/2022 10/12/2022 8/15/2022 8/15/2022 6/1/2022 4/29/2022   Distress Score 3 10 - Extreme Distress 0 - No Distress 0 - No Distress 2 2 4   Practical Problems Insurance/Financial Work/School None of these - None of these - -   Family Problems None of these None of these None of these - None of these - -   Emotional Problems Depression;Sadness;Worry Fears;Worry None of these - None of these - -   Spiritual / Congregation Concerns No No No - No No -   Physical Problems None of these None of these None of these - None of these - -   Other Problems - - - - - - -        Interval history and content of current session: Mrs. Dotson  Discussed prognosis and current adaptation to disease and treatment  "status. Reports to be coping in an adequate manner. Evaluated cognitive response, paying particular attention to negative intrusive thoughts of a persistent and detrimental nature. Patient shared this week she has not had any "meltdowns". She shares she spent time with her  which seemed to alleviate a lot of anxiety. She notes that in the last couple of months, she has noticed changes in her memory. She is concerned about her forgetfulness, even when cues are present. Thoughts of this type are in evidence with mild distress. Provided cognitive behavioral therapy to address negative cognitions. Identified and evaluated psychosocial and environmental stressors secondary to diagnosis and treatment.  Examined proactive behaviors that may be implemented to minimize or ameliorate psychosocial stressors secondary to diagnosis and treatment.      Risk parameters:   Patient reports no suicidal ideation  Patient reports no homicidal ideation  Patient reports no self-injurious behavior  Patient reports no violent behavior   Safety needs:  None at this time      Verbal deficits: None     Patient's response to intervention:The patient's response to intervention is accepting.     Progress toward goals and other mental status changes:  The patient's progress toward goals is fair .      Progress to date:Revise Objectives (Goals)      Goals from last visit: Met        Patient Strengths: verbal, intelligent, successful, good social support, good insight, commitment to wellness, strong melany, strong cultural traditions       Treatment Plan:individual psychotherapy  Target symptoms: depression, anxiety , mood swings  Why chosen therapy is appropriate versus another modality: relevant to diagnosis, evidence based practice  Outcome monitoring methods: self-report  Therapeutic intervention type: insight oriented psychotherapy, behavior modifying psychotherapy, supportive psychotherapy  Prognosis: Fair      Behavioral goals: "    Exercise: increase daily exercise   Stress management: self-care, deep breathing   Therapy: writing down precipitating events and thoughts     Return to clinic: 3 weeks     Length of Service (minutes direct face-to-face contact): 30    Diagnosis:     ICD-10-CM ICD-9-CM   1. KIANA (generalized anxiety disorder)  F41.1 300.02   2. Malignant neoplasm of upper-outer quadrant of left breast in female, estrogen receptor negative  C50.412 174.4    Z17.1 V86.1                   Julisa Dias PsyD

## 2022-12-28 NOTE — PATIENT INSTRUCTIONS
Posterior pelvic tilts with transverse abdominus: laying down with knees bent posterior pelvic tilt, then sacral stomp while you lift right knee bent up. Alternate legs individually without letting go of position. 20 reps     Bridges 2 legged left bottom up squeeze rear end then lower 20 reps          Bridges: single limb bridge    Laying down, one knee bent, other leg relaxed. Lift bottom up by pressing down through foot and push knee outward hold 10 sec  repeat 10 x each leg.     Hip Abduction   Lifting leg while on your side and going three levels up three levels down. 10 reps.

## 2022-12-29 ENCOUNTER — PATIENT MESSAGE (OUTPATIENT)
Dept: ENDOCRINOLOGY | Facility: CLINIC | Age: 42
End: 2022-12-29
Payer: COMMERCIAL

## 2023-01-09 ENCOUNTER — CLINICAL SUPPORT (OUTPATIENT)
Dept: REHABILITATION | Facility: HOSPITAL | Age: 43
End: 2023-01-09
Payer: COMMERCIAL

## 2023-01-09 DIAGNOSIS — L76.82 AXILLARY WEB SYNDROME: Primary | ICD-10-CM

## 2023-01-09 DIAGNOSIS — L90.5 AXILLARY WEB SYNDROME: Primary | ICD-10-CM

## 2023-01-09 PROCEDURE — 97110 THERAPEUTIC EXERCISES: CPT | Mod: PN

## 2023-01-09 NOTE — PATIENT INSTRUCTIONS
Initiation Series:     Laying down with knees bent, back is completely flat, bring knees towards chest, and place hands on upper thigh. Left knees up toward ceiling while you press hands down through thighs. Hold 10 sec repeat 10 times       Laying on left side, knees bent perform mermaid exercise where you lift feet up and side bend to right. 10 reps working up to 10 reps x 3    Laying on left side, knees bent, heels tighten together, perform heel squeeze and side bend  to right. 10 reps working up to 10 reps x 3

## 2023-01-09 NOTE — PROGRESS NOTES
Physical Therapy Re-assessment & Daily Treatment Note/Lymphedema Management     Name: Alison Swann  Clinic Number: [id number]    Therapy Diagnosis:    Axillary web syndrome Yes    Malignant neoplasm of upper-outer quadrant of left female breast, unspecified estrogen receptor status      Decreased functional mobility          Physician: Nicol Recinos NP    Visit Date: 1/9/2023    Physician Orders: PT eval and treat    Evaluation Date: 11/30/2022  RE-Assessment: 1/9/2023  ( Pt cancelled prior visit on 12/30/22)    Authorization Period Expiration: pending  Plan of Care Certification Period: 1/9/2023 - 2/9/2023  Visit #/Visits authorized: 7/ 16     Time In: 2:00 PM  Time Out: 3:00 PM  Total Billable Time: 60 minutes    Precautions: Standard, cancer, and avoid BP, IV, blood draws, or injections in L UE    Subjective    Doing well, doing exercises, no pain, feeling like she is healing.   Response to previous treatment: good scars responding to mepiform very well pt pleased  Functional change: absent tingling in her L arm and hand    Pain: 0/10  Location none    Objective   02 sat 98% 86 bpm  resting HR  6MWT: 1373 feet met goal for increase of at least 25% since eval.      Girth Measurements (in centimeters)     [facility] RIGHT UE11/1/2021 LEFT UE11/1/2021 EVAL 11/30/2022  R UE EVAL 11/30/2022  R UE 1/9/23   Axilla 30.5 cm 31.4 cm 31.2 cm 30.6 cm    E + 4 in 30.5 cm 27.2 cm 27.2 cm 26.9 cm    E + 2 in 27.5 cm 25.6 cm 25.1 cm 25.7 cm    Elbow 23.5 cm 23.5 cm 23.2 cm 22.7 cm    E - 2 23.3 cm 23.2 cm 23.8 cm 23.5 cm    E -4 20.1 cm 20.1 cm 21.2 cm 20.2 cm    Wrist 14.4 cm 14.3 cm 14.6 cm 14.4 cm    DPC 16.9 cm 16.8 cm 17.3 cm 16.9 cm    IP Thumb 5.5 cm 5.3 cm 5.9 cm 5.7 cm    Total 192.2 187.4         Chest circ at NP line     86.5 cm   86 cm   Chest circ at axillary line     84.3 cm   83.7  cm   Slight reduction noted in chest girth measurements.   Pt MMT tested: has R glut max and R glut med with muscle weakness as  "compared to L LE. Pt with poor to fair transverse abdominus strength testing.  Diagonal stance testing LFF: 2+/5 RFF 3+/5  Pt reports:  Alison received therapeutic exercises to develop strength, ROM, flexibility, posture, and core stabilization for 53 minutes including:  Eval: 6MWT: 1056 feet   6MWT: 1373 TM post vitals 98% and 90 bpm HR   UBE alternating forward and backward L2.  10 min  " shock wave" in R lower quadrant at 8 min 30 sec ( which ended as soon as she stopped exercising on UBE) 113bpm HR, 98% O2 post vitals   Stationary bike: 10 min L3 post vitals  98%/ 119 HR  Transverse abdominus exercises: pressed with leg lift in knee flexed position "initiation series" 10 sec hold 10 reps  Sidelye on R heel set with R lateral flexion  Bridging with VIET TOMAS  Provided with written hep for pt to assist in remembering for home.    deferred  Towel glides for shoulder flexion 10 reps with turn and breath  Strengthening for VIET TOMAS provided written hep and performed:  Bridging with VIET TOMAS  Side lye mermaid leg lifts with knee   Single limb bridge  Elevator SLR  Hip abduction   deferred  Side Lying 1/2 moon  upper trunk rotation x 10 reps, tightness/pulling from left breast down inside left upper arm  Side Lying elbows bent (open book rotation) x 10 reps  Pectoral stretch on foam roller with diaphragmatic breathing, scap retractions with depression  10 sec hold x 10 reps  Trunk rotation in supine with knees flexed 10 reps each side    Deferred:  Shoulder flexion/ elongation in supine  1/2 moon in side lye stretch  Aarom/ arom shoulder flexion / abduciton B UE full end range motion.  Pec corner wall stretch 5 reps  Scapular depression in standing against wall   Towel  roll pec stretch 3 min with diaphragmatic breathing  Post pelvic tilt 10   Contract relax stretches to B traps 3 30 sec hold, with R trap with increased tightness as compared to L.    deferred  Lateral pelvic tilt 10  Quadraped cat/cow 10 reps  Macarena pose 30 sec " 3 times   Kneeling with full trunk flexion fpr lateral trunk flexion    Deferred   Alison received the following manual therapy techniques 25 min: Manual Lymphatic Drainage and instruction in scar massage techniques MLD for upper and lower axilla/trunk Manual lymphatic drainage to bilateral short neck series, cervical terminus , axilla, deep abdominals, sternal nodes, paravertebral nodes, AAI anastomosis , JAKE anastomosis , inguinal nodes, and rework accessing all watershed areas on trunk  Instruction in self MLD techniques throughout.    Performed lymphatouch at light pressure 30 mmHG to L axilla, inferior region below L breast and to R lateral trunk. Recommended aquaphor for dryness to scar in posterior trunk/ provided sample to pt.  Deferred Applied  mepiform scar tape to left lateral breast scar and medial abdominal scarring. Aquaphor applied to remaining scarring at lateral hips and across lower trunk, improved skin integrity and less dryness after.     Home Exercises Provided and Patient Education Provided     Education provided: trunk strengthening and need for this in order to return to PLOF      Written Home Exercises Provided:   Exercises were reviewed and Alison was able to demonstrate them prior to the end of the session.  Alison demonstrated good  understanding of the education provided.     See EMR under Patient Instructions for exercises provided 1/9/23      Assessment   Pt improved AROM in UE with overhead reach without strain. Able to perform 26 min of cardio without difficulty in terms of no sob no c/o exertion, pt did report shooting pains while in sitting with UBE exercise in R lower quadrant.  R hip weakness with transverse abdominus weakness present as well. All surgical scars along breasts are healed.   Focus from here forward should be for trunk strengthening and progression of endurance, setting up hep. No signs or symptoms of infection and she is only 9 weeks post op of the  revision.     Alison Is progressing well towards her goals.   Pt prognosis is Good.     Pt will continue to benefit from skilled outpatient physical therapy to address the deficits listed in the problem list box on initial evaluation, provide pt/family education and to maximize pt's level of independence in the home and community environment.     Pt's spiritual, cultural and educational needs considered and pt agreeable to plan of care and goals.     Anticipated barriers to physical therapy: none  This patient presents s/p Bilateral mastectomy with flap reconstruction, 12 lymph nodes removed , with recent revision of breast surgery,  Resulting in: Stage 0-1 lymphedema of the left upper arm, axilla, increased pain, increased stiffness in the shoulder range of motion with flexion and abduction, as well as difficulty performing reaching over head. Patient also reports significant decreased endurance, and limitations climbing her 30 steps up to raised camp. Patient is also at higher risk of infection. This pt would benefit from skilled therapy services to address the above impairments.      Medical necessity is demonstrated by the following IMPAIRMENTS/PROBLEMS:  1. Decreased endurance  2. Decreased ROM  3. Decreased strength throughout B UE/ LE/ trunk  4. Decreased functional mobility tolerance.  5. MLD to assist in healing process of surgery/ scar care     The following goals were discussed with the patient and patient is in agreement with them as to be addressed in the treatment plan.      Short Term Goals: (3 weeks)  1. Patient will be independent donning/doffing compression garment with good fit noted.  2. Patient will demonstrate 100% knowledge of lymphedema precautions and signs of infection. GOAL MET 12/14/2022  3. Patient will increase L UE AROM/PROM shoulder flexion to 160 to improve functional reach and ADL's GOAL MET 12/14/2022  4. Patient will increase B UE AROM/PROM shoulder abduction to 180 to improve  functional reach and ADL's with decreased complaints of tightness and pain.GOAL MET 1/9/2023  5. Patient will perform self lymph drainage techniques. Pt with understanding but not performing, per self report. NOT MET 1/9/2023  6 Patient to demonstrate proper posture with weight acceptance in B LE with neutral pelvis GOAL MET 12/14/2022     Long Term Goals: (6 weeks)     1. Patient will perform self lymph drainage techniques  2. 6MWT increased by 25%. GOAL MET 1/9/2023  3. Patient will increased B UE strength  to improve overall functional reach, mobility, lifting.  4. Patient will increase L UE to WNL without difficulty raising arm overhead. GOAL MET 1/9/2023  5. Pt to have absent tingling in L UE GOAL MET 12/28/2022  6. Pt to be independent with scar massage and scar care. GOAL MET 12/28/2022  7. Diagonal stance testing to increase to at least 4/5 with increase in trunk strength to avoid strain in back while exercising.            PLAN   POC after re-assess on 1/9/2023   Focus of tx will be therapeutic exercises, monitor pt for change in ROM however pt is indep with hep for full ROM of UE at home.    Patient to be seen 1- 2 x per week for 4 weeks for the medically necessary treatments to include: exercise, decongestive massage, intermittent compression pump, multi layered bandaging, education in lymphedema precautions, self massage, self bandaging, and assistance in obtaining appropriate compression garment.      Merly Chapin,PT, CLT  1/9/2023

## 2023-01-10 ENCOUNTER — PATIENT MESSAGE (OUTPATIENT)
Dept: REHABILITATION | Facility: HOSPITAL | Age: 43
End: 2023-01-10
Payer: COMMERCIAL

## 2023-01-18 ENCOUNTER — OFFICE VISIT (OUTPATIENT)
Dept: PSYCHIATRY | Facility: CLINIC | Age: 43
End: 2023-01-18
Payer: COMMERCIAL

## 2023-01-18 ENCOUNTER — CLINICAL SUPPORT (OUTPATIENT)
Dept: REHABILITATION | Facility: HOSPITAL | Age: 43
End: 2023-01-18
Payer: COMMERCIAL

## 2023-01-18 DIAGNOSIS — L76.82 AXILLARY WEB SYNDROME: Primary | ICD-10-CM

## 2023-01-18 DIAGNOSIS — L90.5 AXILLARY WEB SYNDROME: Primary | ICD-10-CM

## 2023-01-18 DIAGNOSIS — F41.1 GAD (GENERALIZED ANXIETY DISORDER): Primary | ICD-10-CM

## 2023-01-18 DIAGNOSIS — Z17.1 MALIGNANT NEOPLASM OF UPPER-OUTER QUADRANT OF LEFT BREAST IN FEMALE, ESTROGEN RECEPTOR NEGATIVE: ICD-10-CM

## 2023-01-18 DIAGNOSIS — R26.89 DECREASED FUNCTIONAL MOBILITY: ICD-10-CM

## 2023-01-18 DIAGNOSIS — C50.412 MALIGNANT NEOPLASM OF UPPER-OUTER QUADRANT OF LEFT BREAST IN FEMALE, ESTROGEN RECEPTOR NEGATIVE: ICD-10-CM

## 2023-01-18 PROCEDURE — 90834 PR PSYCHOTHERAPY W/PATIENT, 45 MIN: ICD-10-PCS | Mod: 95,,,

## 2023-01-18 PROCEDURE — 90834 PSYTX W PT 45 MINUTES: CPT | Mod: 95,,,

## 2023-01-18 PROCEDURE — 97110 THERAPEUTIC EXERCISES: CPT | Mod: PN

## 2023-01-18 NOTE — PROGRESS NOTES
"  Physical Therapy Re-assessment & Daily Treatment Note/Lymphedema Management     Name: Alison Swann  Clinic Number: [id number]    Therapy Diagnosis:    Axillary web syndrome Yes    Malignant neoplasm of upper-outer quadrant of left female breast, unspecified estrogen receptor status      Decreased functional mobility          Physician: Nicol Recinos NP    Visit Date: 1/18/2023    Physician Orders: PT eval and treat    Evaluation Date: 11/30/2022  RE-Assessment: 1/9/2023  ( Pt cancelled prior visit on 12/30/22)    Authorization Period Expiration: pending  Plan of Care Certification Period: 1/9/2023 - 2/9/2023  Visit #/Visits authorized: 7/ 16     Time In: 2:00 PM  Time Out: 3:00 PM  Total Billable Time: 60 minutes    Precautions: Standard, cancer, and avoid BP, IV, blood draws, or injections in L UE    Subjective    S:Did not start on the exercises bc she went out of town to [facility], and was on vacation. Pt forgot to wear her sleeve on airplane travel.   Response to previous treatment: good scars responding to mepiform very well pt pleased  Functional change: absent tingling in her L arm and hand    Pain: 0/10  Location none    Objective   Alison received therapeutic exercises to develop strength, ROM, flexibility, posture, and core stabilization for 54 minutes including:  TM: level 2.3 10 min   Stationary bike x 10 min L3   UBE alternating forward and backward L2.  10 min  Standing trunk strengthening: ball squeeze with alternating marching  Sitting on ball with alternating hip flexion  Sitting on ball with alternating leg extension 5 sec hold 5 res each while arms extending pressing into small ball  Squat with green tband 10 reps   3 level squat up/ down eccentric quad control x 10 reps  Sidesteps  abd with green tbnd for resistance 3 steps out x 10 reps leading R/L  deferred  Transverse abdominus exercises: pressed with leg lift in knee flexed position "initiation series" 10 sec hold 10 reps  Sidelye on R " heel set with R lateral flexion  Bridging with B LE      deferred  Towel glides for shoulder flexion 10 reps with turn and breath  Strengthening for B LE provided written hep and performed:  Bridging with B LE  Side lye mermaid leg lifts with knee   Single limb bridge  Elevator SLR  Hip abduction   deferred  Side Lying 1/2 moon  upper trunk rotation x 10 reps, tightness/pulling from left breast down inside left upper arm  Side Lying elbows bent (open book rotation) x 10 reps  Pectoral stretch on foam roller with diaphragmatic breathing, scap retractions with depression  10 sec hold x 10 reps  Trunk rotation in supine with knees flexed 10 reps each side    Deferred:  Shoulder flexion/ elongation in supine  1/2 moon in side lye stretch  Aarom/ arom shoulder flexion / abduciton B UE full end range motion.  Pec corner wall stretch 5 reps  Scapular depression in standing against wall   Towel  roll pec stretch 3 min with diaphragmatic breathing  Post pelvic tilt 10   Contract relax stretches to B traps 3 30 sec hold, with R trap with increased tightness as compared to L.    deferred  Lateral pelvic tilt 10  Quadraped cat/cow 10 reps  Macarena pose 30 sec 3 times   Kneeling with full trunk flexion fpr lateral trunk flexion    Deferred   Alison received the following manual therapy techniques 25 min: Manual Lymphatic Drainage and instruction in scar massage techniques MLD for upper and lower axilla/trunk Manual lymphatic drainage to bilateral short neck series, cervical terminus , axilla, deep abdominals, sternal nodes, paravertebral nodes, AAI anastomosis , JAKE anastomosis , inguinal nodes, and rework accessing all watershed areas on trunk  Instruction in self MLD techniques throughout.    Performed lymphatouch at light pressure 30 mmHG to L axilla, inferior region below L breast and to R lateral trunk. Recommended aquaphor for dryness to scar in posterior trunk/ provided sample to pt.  Deferred Applied  mepiform scar  tape to left lateral breast scar and medial abdominal scarring. Aquaphor applied to remaining scarring at lateral hips and across lower trunk, improved skin integrity and less dryness after.     Home Exercises Provided and Patient Education Provided     Education provided: trunk strengthening and need for this in order to return to PLOF      Written Home Exercises Provided:   Exercises were reviewed and Alison was able to demonstrate them prior to the end of the session.  Alison demonstrated good  understanding of the education provided.     See EMR under Patient Instructions for exercises provided 1/9/23      Assessment   Able to perform 30 min of cardio without difficulty in terms of no sob no c/o exertion, continued R hip weakness with transverse abdominus weakness present as well. All surgical scars along breasts are healed.   Continued focus for trunk strengthening and progression of endurance especially in standing ideally, progression of hep.      Alison Is progressing well towards her goals.   Pt prognosis is Good.     Pt will continue to benefit from skilled outpatient physical therapy to address the deficits listed in the problem list box on initial evaluation, provide pt/family education and to maximize pt's level of independence in the home and community environment.     Pt's spiritual, cultural and educational needs considered and pt agreeable to plan of care and goals.     Anticipated barriers to physical therapy: none  This patient presents s/p Bilateral mastectomy with flap reconstruction, 12 lymph nodes removed , with recent revision of breast surgery,  Resulting in: Stage 0-1 lymphedema of the left upper arm, axilla, increased pain, increased stiffness in the shoulder range of motion with flexion and abduction, as well as difficulty performing reaching over head. Patient also reports significant decreased endurance, and limitations climbing her 30 steps up to raised camp. Patient is also  at higher risk of infection. This pt would benefit from skilled therapy services to address the above impairments.      Medical necessity is demonstrated by the following IMPAIRMENTS/PROBLEMS:  1. Decreased endurance  2. Decreased ROM  3. Decreased strength throughout B UE/ LE/ trunk  4. Decreased functional mobility tolerance.  5. MLD to assist in healing process of surgery/ scar care     The following goals were discussed with the patient and patient is in agreement with them as to be addressed in the treatment plan.      Short Term Goals: (3 weeks)  1. Patient will be independent donning/doffing compression garment with good fit noted.  2. Patient will demonstrate 100% knowledge of lymphedema precautions and signs of infection. GOAL MET 12/14/2022  3. Patient will increase L UE AROM/PROM shoulder flexion to 160 to improve functional reach and ADL's GOAL MET 12/14/2022  4. Patient will increase B UE AROM/PROM shoulder abduction to 180 to improve functional reach and ADL's with decreased complaints of tightness and pain.GOAL MET 1/9/2023  5. Patient will perform self lymph drainage techniques. Pt with understanding but not performing, per self report. NOT MET 1/9/2023  6 Patient to demonstrate proper posture with weight acceptance in B LE with neutral pelvis GOAL MET 12/14/2022     Long Term Goals: (6 weeks)     1. Patient will perform self lymph drainage techniques  2. 6MWT increased by 25%. GOAL MET 1/9/2023  3. Patient will increased B UE strength  to improve overall functional reach, mobility, lifting.  4. Patient will increase L UE to WNL without difficulty raising arm overhead. GOAL MET 1/9/2023  5. Pt to have absent tingling in L UE GOAL MET 12/28/2022  6. Pt to be independent with scar massage and scar care. GOAL MET 12/28/2022  7. Diagonal stance testing to increase to at least 4/5 with increase in trunk strength to avoid strain in back while exercising.            PLAN   Cont with POC  Focus of tx will be  therapeutic exercises, monitor pt for change in ROM however pt is indep with hep for full ROM of UE at home.    Patient to be seen 1- 2 x per week for 3 more weeks for the medically necessary treatments to include: exercise, decongestive massage, intermittent compression pump, multi layered bandaging, education in lymphedema precautions, self massage, self bandaging, and assistance in obtaining appropriate compression garment.      Merly Chapin,PT, CLT  1/18/2023

## 2023-01-18 NOTE — PROGRESS NOTES
The patient location is:  [address]  The patient location [place] is: [place]  The patient phone number is: [phone]   Visit type: Virtual visit with synchronous audio and video  Each patient to whom he or she provides medical services by telemedicine is:  (1) informed of the relationship between the provider and patient and the respective role of any other health care provider with respect to management of the patient; and (2) notified that he or she may decline to receive medical services by telemedicine and may withdraw from such care at any time.    Crisis Disclaimer: Patient was informed that due to the virtual nature of the visit, that if a crisis develops, protocols will be implemented to ensure patient safety, including but not limited to: 1) Initiating a welfare check with local Law Enforcement, 2) Calling [facility]/National Crisis Hotline, and/or 3) Initiating PEC/CEC procedures.    Time (Face-to-face): 31 minutes    Time (Non-face-to-face): 10 minutes   PSYCHO-ONCOLOGY NOTE/ Individual Psychotherapy     Date: 1/18/2023   Site:  [place], [place]      Therapeutic Intervention: Met with patient.  Outpatient - Behavior modifying psychotherapy 45 min - CPT code 70713    This includes face to face time and non-face to face time preparing to see the patient, obtaining and/or reviewing separately obtained history, documenting clinical information in the electronic or other health record, independently interpreting results and communicating results to the patient/family/caregiver, or care coordinator.      Patient was last seen by me on 12/29/2022    Problem list  Patient Active Problem List   Diagnosis    Ductal carcinoma in situ of left breast    Malignant neoplasm of upper-outer quadrant of left female breast    Decreased functional mobility    Dehydration    Anemia associated with chemotherapy    Nausea and vomiting    BRCA2 gene mutation positive    Scoliosis    Attention Deficit Hyperactivity  Disorder    Therapeutic Drug Monitoring    Postoperative Nausea And Vomiting    Macrocytic Anemia    Generalized Anxiety    Family H/O Diabetes Mellitus    Fatigue    Hyponatremia    Elevated troponin    Diarrhea    Intravascular volume depletion    Hypomagnesemia    Hypokalemia    Anterior T wave inversion    Elevated LFTs    Colitis, indeterminate    Encounter for immunotherapy    Diarrhea due to drug    Pneumonitis    s/p RA-TLH/BSO    JA (acute kidney injury)    Adrenal crisis    Sepsis    Chest pain    Adrenal insufficiency    Axillary web syndrome       Chief complaint/reason for encounter: depression and anxiety   Met with patient to evaluate psychosocial adaptation to diagnosis/treatment/survivorship of malignant neoplasm of upper-outer quadrant of left female breasts    Current Medications  Current Outpatient Medications   Medication    dextroamphetamine-amphetamine 10 mg Tab    hydrocortisone (CORTEF) 10 MG Tab    ondansetron (ZOFRAN) 4 MG tablet    valACYclovir (VALTREX) 500 MG tablet     No current facility-administered medications for this visit.     Facility-Administered Medications Ordered in Other Visits   Medication Frequency    lactated ringers infusion Continuous    sodium chloride 0.9% flush 10 mL PRN       ONCOLOGY HISTORY  Oncology History   Malignant neoplasm of upper-outer quadrant of left female breast   1/19/2021 Cancer Staged    Staging form: Breast, AJCC 8th Edition  - Clinical stage from 1/19/2021: Stage IIIB (cT2, cN1, cM0, G3, ER-, MO-, HER2-)       1/28/2021 Initial Diagnosis    Malignant neoplasm of upper-outer quadrant of left breast in female, estrogen receptor negative     2/18/2021 - 2/18/2021 Chemotherapy    Treatment Summary   Plan Name: OP BREAST DOSE-DENSE AC - DOXORUBICIN CYCLOPHOSPHAMIDE Q2W  Treatment Goal: Curative  Status: Inactive  Start Date:   End Date:   Provider: Jhon Suazo MD  Chemotherapy: DOXOrubicin chemo injection 96 mg, 60 mg/m2, Intravenous, Clinic/HOD 1  time, 0 of 4 cycles  cyclophosphamide (CYTOXAN) 600 mg/m2 = 965 mg in sodium chloride 0.9% 250 mL chemo infusion, 600 mg/m2, Intravenous, Clinic/HOD 1 time, 0 of 4 cycles       2/25/2021 - 7/6/2021 Chemotherapy    Treatment Summary   Plan Name: OP BREAST PACLITAXEL WEEKLY + CARBOPLATIN (AUC 5) Q3W FOLLOWED BY AC WITH PEMBROLIZUMAB FOLLOWED BY PEMBROLIZUMAB   Treatment Goal: Curative  Status: Inactive  Start Date: 2/25/2021  End Date: 7/6/2021  Provider: Sandra Sotelo MD  Chemotherapy: DOXOrubicin chemo injection 112 mg, 60 mg/m2 = 112 mg (100 % of original dose 60 mg/m2), Intravenous, Once, 3 of 4 cycles  Dose modification: 60 mg/m2 (original dose 60 mg/m2, Cycle 5)  Administration: 112 mg (5/25/2021), 112 mg (6/15/2021), 110 mg (7/6/2021)  CARBOplatin (PARAPLATIN) 605 mg in sodium chloride 0.9% 250 mL chemo infusion, 605 mg (100 % of original dose 603.5 mg), Intravenous, Clinic/HOD 1 time, 4 of 4 cycles  Dose modification:   (original dose 603.5 mg, Cycle 1)  Administration: 605 mg (2/25/2021), 630 mg (3/22/2021), 750 mg (4/13/2021), 750 mg (5/4/2021)  cyclophosphamide 600 mg/m2 = 1,100 mg in sodium chloride 0.9% 100 mL chemo infusion, 600 mg/m2 = 1,100 mg (100 % of original dose 600 mg/m2), Intravenous, Clinic/HOD 1 time, 3 of 4 cycles  Dose modification: 600 mg/m2 (original dose 600 mg/m2, Cycle 5), 600 mg/m2 (Cycle 8)  Administration: 1,100 mg (5/25/2021), 1,100 mg (6/15/2021), 1,100 mg (7/6/2021)  PACLitaxeL (TAXOL) 80 mg/m2 = 132 mg in sodium chloride 0.9% 250 mL chemo infusion, 80 mg/m2 = 132 mg (100 % of original dose 80 mg/m2), Intravenous, Clinic/HOD 1 time, 4 of 4 cycles  Dose modification: 80 mg/m2 (original dose 80 mg/m2, Cycle 1)  Administration: 132 mg (2/25/2021), 132 mg (3/4/2021), 132 mg (3/11/2021), 138 mg (3/22/2021), 138 mg (3/30/2021), 138 mg (4/6/2021), 138 mg (4/13/2021), 138 mg (4/20/2021), 144 mg (4/27/2021), 144 mg (5/4/2021), 144 mg (5/11/2021), 144 mg (5/18/2021)  pembrolizumab  (KEYTRUDA) 200 mg in sodium chloride 0.9% 100 mL chemo infusion, 200 mg, Intravenous, Clinic/HOD 1 time, 7 of 18 cycles  Administration: 200 mg (2/25/2021), 200 mg (5/25/2021), 200 mg (3/22/2021), 200 mg (4/13/2021), 200 mg (5/4/2021), 200 mg (6/15/2021), 200 mg (7/6/2021)       4/15/2021 - 4/15/2021 Chemotherapy    Treatment Summary   Plan Name: OP BREAST PEMBROLIZUMAB Q3W PACLITAXEL CARBOPLATIN WEEKLY FOLLOWED BY PEMBROLIZUMAB DOXORUBICIN CYCLOPHOSPHAMIDE Q3W   Treatment Goal: Curative  Status: Inactive  Start Date:   End Date:   Provider: Jhon Suazo MD  Chemotherapy: CARBOplatin (PARAPLATIN) in sodium chloride 0.9% 250 mL chemo infusion,  (original dose ), Intravenous, Clinic/HOD 1 time, 0 of 3 cycles  Dose modification:   (Cycle 1)  cyclophosphamide in sodium chloride 0.9% chemo infusion, 600 mg/m2 = 965 mg (original dose ), Intravenous, Clinic/HOD 1 time, 0 of 1 cycle  Dose modification: 600 mg/m2 (Cycle 2)  DOXOrubicin (ADRIAMYCIN) in sodium chloride 0.9% 250 mL chemo infusion, 60 mg/m2 (original dose ), Intravenous, Clinic/HOD 1 time, 0 of 1 cycle  Dose modification: 60 mg/m2 (Cycle 2)  PACLitaxeL (TAXOL) in sodium chloride 0.9% 100 mL chemo infusion, 80 mg/m2 (original dose ), Intravenous, Clinic/HOD 1 time, 0 of 1 cycle  Dose modification: 80 mg/m2 (Cycle 1)  PEMEtrexed (ALIMTA) in sodium chloride 0.9% chemo infusion, 500 mg/m2, Intravenous, Clinic/HOD 1 time, 0 of 33 cycles  pembrolizumab (KEYTRUDA) 200 mg in sodium chloride 0.9% 100 mL chemo infusion, 200 mg, Intravenous, Clinic/HOD 1 time, 0 of 35 cycles       11/19/2021 - 1/4/2022 Radiation Therapy    Treating physician: Maryann Perales  Total Dose: 50 Gy  Fractions: 25    DIAGNOSIS:  C50.412 - Malignant neoplasm of upper-outer quadrant of left female breast, Diagnosed 11/11/2021 (Active) Stage IIIB, T2, N1, M0, G3, HER2 Neg, ER Neg, OH Neg    This is a 41 y.o. y/o female with cT2,c N1 and M0 G3, (Stage IIIB), triple negative, Ki-67 79%, LEFT upper  outer quadrant breast, BRCA2+, status post lumpectomy 1/19/21, with residual larisa disease, followed by  adjuvant chemo-immunotherapy with carbo-taxol and pembrolizumab, followed by AC with pembrolizumab, followed by adjuvant pemprolizumab complicated by ongoing, chronic colitis/diarrhea necessitating long-term prednisone.  Underwent bilateral mastectomy 9/29/21 with no residual carcinoma identified in breast of 14 sampled nodes (ypN0). She has completed adjuvant LEFT breast and regional larisa radiation therapy to a dose of 50Gy.    Treatment Summary  Course: C1 BREAST 2021    Treatment Site Ref. ID Energy Dose/Fx (Gy) #Fx Dose Correction (Gy) Total Dose (Gy) Start Date End Date Elapsed Days   3D Sclav_L rxsc 18X/6X 2 25 / 25 0 50 11/19/2021 1/4/2022 46   3D Breast L Breast_L 18X/6X 2 25 / 25 0 50 11/19/2021 1/4/2022 46     Tolerated well.  2 day treatment break at patient's request for patch of dry desquamation, and 5 day treatment break due to CoVID infection.  Otherwise, patient completed her course of therapy as prescribed.       PLAN: RTC 1 month for routine follow up. Continue current skin care/sun protection for now.  Follow up with other providers as directed.         Objective:  Alison Swann arrived promptly for the session. Mrs. Swann was independently ambulatory at the time of session. The patient was fully cooperative throughout the session.  Appearance: age appropriate, casually  dressed, adequately  groomed  Behavior/Cooperation: friendly and cooperative  Speech: normal in rate, volume, and tone  Mood: euthymic  Affect: labile  Thought Process: goal-directed, logical  Thought Content: normal,  No delusions or paranoia; did not appear to be responding to internal stimuli during the session  Orientation: grossly intact  Memory: grossly intact  Attention Span/Concentration: Attends to session without distraction; reports no difficulty  Fund of Knowledge: average  Estimate of Intelligence: average  "from verbal skills and history  Cognition: grossly intact  Insight: patient has awareness of illness; good insight into own behavior and behavior of others  Judgment: the patient's behavior is adequate to circumstances    NCCN Distress thermometer:   DISTRESS SCREENING 12/19/2022 10/12/2022 10/12/2022 8/15/2022 8/15/2022 6/1/2022 4/29/2022   Distress Score 3 10 - Extreme Distress 0 - No Distress 0 - No Distress 2 2 4   Practical Problems Insurance/Financial Work/School None of these - None of these - -   Family Problems None of these None of these None of these - None of these - -   Emotional Problems Depression;Sadness;Worry Fears;Worry None of these - None of these - -   Spiritual / Methodist Concerns No No No - No No -   Physical Problems None of these None of these None of these - None of these - -   Other Problems - - - - - - -        Interval history and content of current session: Mrs. Dotson  Discussed prognosis and current adaptation to disease and treatment status. Reports to be coping in an adequate manner. Evaluated cognitive response, paying particular attention to negative intrusive thoughts of a persistent and detrimental nature. Patient shared this week she has not had any "meltdowns". She shares new potential business opportunities. She notes she is working on reframing her negative thoughts. Provided cognitive behavioral therapy to address negative cognitions. Identified and evaluated psychosocial and environmental stressors secondary to diagnosis and treatment.  Examined proactive behaviors that may be implemented to minimize or ameliorate psychosocial stressors secondary to diagnosis and treatment.      Risk parameters:   Patient reports no suicidal ideation  Patient reports no homicidal ideation  Patient reports no self-injurious behavior  Patient reports no violent behavior   Safety needs:  None at this time      Verbal deficits: None     Patient's response to intervention:The patient's response " to intervention is accepting.     Progress toward goals and other mental status changes:  The patient's progress toward goals is fair .      Progress to date:Revise Objectives (Goals)      Goals from last visit: Met        Patient Strengths: verbal, intelligent, successful, good social support, good insight, commitment to wellness, strong melany, strong cultural traditions       Treatment Plan:individual psychotherapy  Target symptoms: depression, anxiety , mood swings  Why chosen therapy is appropriate versus another modality: relevant to diagnosis, evidence based practice  Outcome monitoring methods: self-report  Therapeutic intervention type: insight oriented psychotherapy, behavior modifying psychotherapy, supportive psychotherapy  Prognosis: Fair      Behavioral goals:    Exercise: increase daily exercise   Stress management: self-care, deep breathing   Therapy: writing down precipitating events and thoughts     Return to clinic: 3 weeks     Length of Service (minutes direct face-to-face contact): 30    Diagnosis:     ICD-10-CM ICD-9-CM   1. KIAAN (generalized anxiety disorder)  F41.1 300.02   2. Malignant neoplasm of upper-outer quadrant of left breast in female, estrogen receptor negative  C50.412 174.4    Z17.1 V86.1           Tucker Acuña Psy.D.  Supervising Clinical Psychologist

## 2023-01-23 ENCOUNTER — CLINICAL SUPPORT (OUTPATIENT)
Dept: REHABILITATION | Facility: HOSPITAL | Age: 43
End: 2023-01-23
Payer: COMMERCIAL

## 2023-01-23 DIAGNOSIS — L90.5 AXILLARY WEB SYNDROME: Primary | ICD-10-CM

## 2023-01-23 DIAGNOSIS — C50.412 MALIGNANT NEOPLASM OF UPPER-OUTER QUADRANT OF LEFT FEMALE BREAST, UNSPECIFIED ESTROGEN RECEPTOR STATUS: ICD-10-CM

## 2023-01-23 DIAGNOSIS — L90.5 AXILLARY WEB SYNDROME: ICD-10-CM

## 2023-01-23 DIAGNOSIS — L76.82 AXILLARY WEB SYNDROME: ICD-10-CM

## 2023-01-23 DIAGNOSIS — C50.412 MALIGNANT NEOPLASM OF UPPER-OUTER QUADRANT OF LEFT FEMALE BREAST, UNSPECIFIED ESTROGEN RECEPTOR STATUS: Primary | ICD-10-CM

## 2023-01-23 DIAGNOSIS — L76.82 AXILLARY WEB SYNDROME: Primary | ICD-10-CM

## 2023-01-23 DIAGNOSIS — R26.89 DECREASED FUNCTIONAL MOBILITY: ICD-10-CM

## 2023-01-23 PROCEDURE — 97110 THERAPEUTIC EXERCISES: CPT | Mod: PN,97

## 2023-01-23 NOTE — PROGRESS NOTES
Physical Therapy Re-assessment & Daily Treatment Note/Lymphedema Management     Name: Alison Swann  Clinic Number: [id number]    Therapy Diagnosis:    Axillary web syndrome Yes    Malignant neoplasm of upper-outer quadrant of left female breast, unspecified estrogen receptor status      Decreased functional mobility          Physician: Nicol Recinos NP    Visit Date: 1/23/2023    Physician Orders: PT eval and treat    Evaluation Date: 11/30/2022  RE-Assessment: 1/9/2023  ( Pt cancelled prior visit on 12/30/22)    Authorization Period Expiration: pending  Plan of Care Certification Period: 1/9/2023 - 2/9/2023  Visit #/Visits authorized: 8/ 16     Time In: 2:00 PM  Time Out: 3:00 PM  Total Billable Time: 60 minutes    Precautions: Standard, cancer, and avoid BP, IV, blood draws, or injections in L UE    Subjective    Reports been doing some of exercises cardio, trunk ext, lunges, squats sit ups at home. Denies pain.   Response to previous treatment: good scars responding to mepiform very well pt pleased  Functional change: absent tingling in her L arm and hand    Pain: 0/10  Location none    Objective   Resting  O2 sat 97.%  Demonstrated good form on lunges and squats for hep.   Alison received therapeutic exercises to develop strength, ROM, flexibility, posture, and core stabilization for 60 minutes including:    Nu Step: L5 10 min   TM: level 10 min HR 137bpm, O2 sat 98% with 6 min time test at .32 miles (pt with jog/ walk on TM)   Squat holds 10-15 sec hold 5 reps pt with fatigue while holding static squat position.   Standing trunk strengthening: ball squeeze with alternating marching def  Sitting on ball with alternating hip flexion 10 sec hold 10 reps   Diagonal hip and shoulder on ball 10 reps  Standing ball press with hip flexion hold 5 sec 5 reps each side x 2 trials  Well leg exercise on 1 inch step 10 reps, limited with crepitus on R no pain  Sidelye hip abduction 2 levels 3 x 10 b  "LE  Sidelye hip abduction with leg kick outs 2 sets of 15 reps each leg.    TA initiation series 3 reps with TA ( knees flexed lifting up to ceiling with PPT and head lift while hands press down on thighs.   Sitting on ball with alternating leg extension 5 sec hold 5 res each while arms extending pressing into small ball    deferred  Squat with green tband 10 reps   3 level squat up/ down eccentric quad control x 10 reps deferred   Sidesteps  abd with green tbnd for resistance 3 steps out x 10 reps leading R/Ldeferred  deferred  Transverse abdominus exercises: pressed with leg lift in knee flexed position "initiation series" 10 sec hold 10 reps  Sidelye on R heel set with R lateral flexion  Bridging with B LE    Stationary bike x 10 min L3 deferred  UBE alternating forward and backward L2.  10 min deferred    deferred  Towel glides for shoulder flexion 10 reps with turn and breath  Strengthening for B LE provided written hep and performed:  Bridging with B LE  Side lye mermaid leg lifts with knee   Single limb bridge  Elevator SLR  Hip abduction   deferred  Side Lying 1/2 moon  upper trunk rotation x 10 reps, tightness/pulling from left breast down inside left upper arm  Side Lying elbows bent (open book rotation) x 10 reps  Pectoral stretch on foam roller with diaphragmatic breathing, scap retractions with depression  10 sec hold x 10 reps  Trunk rotation in supine with knees flexed 10 reps each side    Deferred:  Shoulder flexion/ elongation in supine  1/2 moon in side lye stretch  Aarom/ arom shoulder flexion / abduciton B UE full end range motion.  Pec corner wall stretch 5 reps  Scapular depression in standing against wall   Towel  roll pec stretch 3 min with diaphragmatic breathing  Post pelvic tilt 10   Contract relax stretches to B traps 3 30 sec hold, with R trap with increased tightness as compared to L.    deferred  Lateral pelvic tilt 10  Quadraped cat/cow 10 reps  Macarena pose 30 sec 3 times   Kneeling " with full trunk flexion fpr lateral trunk flexion    Deferred   Alison received the following manual therapy techniques 25 min: Manual Lymphatic Drainage and instruction in scar massage techniques MLD for upper and lower axilla/trunk Manual lymphatic drainage to bilateral short neck series, cervical terminus , axilla, deep abdominals, sternal nodes, paravertebral nodes, AAI anastomosis , JAKE anastomosis , inguinal nodes, and rework accessing all watershed areas on trunk  Instruction in self MLD techniques throughout.    Performed lymphatouch at light pressure 30 mmHG to L axilla, inferior region below L breast and to R lateral trunk. Recommended aquaphor for dryness to scar in posterior trunk/ provided sample to pt.  Deferred Applied  mepiform scar tape to left lateral breast scar and medial abdominal scarring. Aquaphor applied to remaining scarring at lateral hips and across lower trunk, improved skin integrity and less dryness after.     Home Exercises Provided and Patient Education Provided     Education provided: trunk strengthening and need for this in order to return to PLOF      Written Home Exercises Provided:   Exercises were reviewed and Alison was able to demonstrate them prior to the end of the session.  Alison demonstrated good  understanding of the education provided.     See EMR under Patient Instructions for exercises provided 1/9/23  Assessment   Demonstrating improved recruitment of transverse abdominus and glut med/max. Pt with difficulty holding sustained quad contraction for longer than 10 sec during squat. All surgical scars along breasts are healed.   Decreased trunk rotation and guards upper body motion during jogging on TM at 3.7mph. Denies pain with jog which is improvement as well as increase in distance for 6MWT today. Continued focus for trunk strengthening and progression of endurance especially in standing ideally, progression of hep.      Alison Is progressing well towards  her goals.   Pt prognosis is Good.     Pt will continue to benefit from skilled outpatient physical therapy to address the deficits listed in the problem list box on initial evaluation, provide pt/family education and to maximize pt's level of independence in the home and community environment.     Pt's spiritual, cultural and educational needs considered and pt agreeable to plan of care and goals.     Anticipated barriers to physical therapy: none  This patient presents s/p Bilateral mastectomy with flap reconstruction, 12 lymph nodes removed , with recent revision of breast surgery,  Resulting in: Stage 0-1 lymphedema of the left upper arm, axilla, increased pain, increased stiffness in the shoulder range of motion with flexion and abduction, as well as difficulty performing reaching over head. Patient also reports significant decreased endurance, and limitations climbing her 30 steps up to raised camp. Patient is also at higher risk of infection. This pt would benefit from skilled therapy services to address the above impairments.      Medical necessity is demonstrated by the following IMPAIRMENTS/PROBLEMS:  1. Decreased endurance  2. Decreased ROM  3. Decreased strength throughout B UE/ LE/ trunk  4. Decreased functional mobility tolerance.  5. MLD to assist in healing process of surgery/ scar care     The following goals were discussed with the patient and patient is in agreement with them as to be addressed in the treatment plan.      Short Term Goals: (3 weeks)  1. Patient will be independent donning/doffing compression garment with good fit noted.  2. Patient will demonstrate 100% knowledge of lymphedema precautions and signs of infection. GOAL MET 12/14/2022  3. Patient will increase L UE AROM/PROM shoulder flexion to 160 to improve functional reach and ADL's GOAL MET 12/14/2022  4. Patient will increase B UE AROM/PROM shoulder abduction to 180 to improve functional reach and ADL's with decreased complaints  of tightness and pain.GOAL MET 1/9/2023  5. Patient will perform self lymph drainage techniques. Pt with understanding but not performing, per self report. NOT MET 1/9/2023  6 Patient to demonstrate proper posture with weight acceptance in B LE with neutral pelvis GOAL MET 12/14/2022     Long Term Goals: (6 weeks)     1. Patient will perform self lymph drainage techniques  2. 6MWT increased by 25%. GOAL MET 1/9/2023  3. Patient will increased B UE strength  to improve overall functional reach, mobility, lifting.  4. Patient will increase L UE to WNL without difficulty raising arm overhead. GOAL MET 1/9/2023  5. Pt to have absent tingling in L UE GOAL MET 12/28/2022  6. Pt to be independent with scar massage and scar care. GOAL MET 12/28/2022  7. Diagonal stance testing to increase to at least 4/5 with increase in trunk strength to avoid strain in back while exercising.            PLAN   Cont with POC  Focus of tx will be therapeutic exercises, monitor pt for change in ROM however pt is indep with hep for full ROM of UE at home.    Patient to be seen 1- 2 x per week for 2 more weeks for the medically necessary treatments to include: exercise, decongestive massage, intermittent compression pump, multi layered bandaging, education in lymphedema precautions, self massage, self bandaging, and assistance in obtaining appropriate compression garment.      Merly Chapin,PT, CLT  1/23/2023

## 2023-01-24 ENCOUNTER — TELEPHONE (OUTPATIENT)
Dept: HEMATOLOGY/ONCOLOGY | Facility: CLINIC | Age: 43
End: 2023-01-24
Payer: COMMERCIAL

## 2023-01-24 NOTE — TELEPHONE ENCOUNTER
I spoke with the patient to schedule her more PT appts and she voiced acceptance of available dates given.

## 2023-01-25 ENCOUNTER — TELEPHONE (OUTPATIENT)
Dept: HEMATOLOGY/ONCOLOGY | Facility: CLINIC | Age: 43
End: 2023-01-25
Payer: COMMERCIAL

## 2023-01-25 ENCOUNTER — CLINICAL SUPPORT (OUTPATIENT)
Dept: REHABILITATION | Facility: HOSPITAL | Age: 43
End: 2023-01-25
Payer: COMMERCIAL

## 2023-01-25 ENCOUNTER — PATIENT MESSAGE (OUTPATIENT)
Dept: PSYCHIATRY | Facility: CLINIC | Age: 43
End: 2023-01-25
Payer: COMMERCIAL

## 2023-01-25 ENCOUNTER — PATIENT MESSAGE (OUTPATIENT)
Dept: GYNECOLOGIC ONCOLOGY | Facility: CLINIC | Age: 43
End: 2023-01-25
Payer: COMMERCIAL

## 2023-01-25 DIAGNOSIS — L90.5 AXILLARY WEB SYNDROME: Primary | ICD-10-CM

## 2023-01-25 DIAGNOSIS — C50.412 MALIGNANT NEOPLASM OF UPPER-OUTER QUADRANT OF LEFT FEMALE BREAST, UNSPECIFIED ESTROGEN RECEPTOR STATUS: ICD-10-CM

## 2023-01-25 DIAGNOSIS — L76.82 AXILLARY WEB SYNDROME: Primary | ICD-10-CM

## 2023-01-25 DIAGNOSIS — R26.89 DECREASED FUNCTIONAL MOBILITY: ICD-10-CM

## 2023-01-25 PROCEDURE — 97110 THERAPEUTIC EXERCISES: CPT | Mod: PN

## 2023-01-25 NOTE — TELEPHONE ENCOUNTER
Spoke with the patient and informed her I had another patient wanting earlier so I switched their appt times. She voiced acceptance      ----- Message from Christina Seo sent at 1/25/2023  9:10 AM CST -----  Type: Needs Medical Advice  Who Called:  Patient   Symptoms (please be specific):    How long has patient had these symptoms:    Pharmacy name and phone #:    Best Call Back Number: [phone]  Additional Information: Patient is requesting a call back for a later appt. other than 1:00 with any PT for today.

## 2023-01-25 NOTE — PROGRESS NOTES
Physical Therapy Daily Treatment Note/Lymphedema Management     Name: Alison Swann  Clinic Number: [id number]    Therapy Diagnosis:    Axillary web syndrome Yes    Malignant neoplasm of upper-outer quadrant of left female breast, unspecified estrogen receptor status      Decreased functional mobility          Physician: Nicol Recinos NP    Visit Date: 1/25/2023    Physician Orders: PT eval and treat    Evaluation Date: 11/30/2022  RE-Assessment: 1/9/2023  ( Pt cancelled prior visit on 12/30/22)    Authorization Period Expiration: pending  Plan of Care Certification Period: 1/9/2023 - 2/9/2023  Visit #/Visits authorized: 9/ 16     Time In: 3:10 PM  Time Out: 04:05 PM  Total Billable Time: 55 minutes    Precautions: Standard, cancer, and avoid BP, IV, blood draws, or injections in L UE    Subjective   Surprised I wasn't as sore than I though I would be.   A few sessions back I thought I felt a lump but the therapist assured it was a rib. Recently my  said he thought he felt a few of them on the right, I was told in the past fat deposits can form. I see the breast surgeon next week.      Reports been doing some of exercises cardio, trunk ext, lunges, squats sit ups at home. Denies pain.   Response to previous treatment: good scars responding to mepiform very well pt pleased  Functional change: absent tingling in her L arm and hand    Pain: 0/10  Location none    Objective   Resting  O2 sat 98.%  Demonstrated good form on lunges and squats for hep.     Alison received therapeutic exercises to develop strength, ROM, flexibility, posture, and core stabilization for 55 minutes including:  Nu Step: L6 8 min   TM: level 10 min HR 137bpm, 3.5-4.8 mph alt jog 1-2 min /walk O2 sat 98%   Squat holds 10-15 sec hold 5 reps pt with fatigue while holding static squat position.   Seated on ball, PPT static hold with shoulder ext RTB 10 sec x 10 reps  Sitting on ball with alternating leg extension 10 sec hold 10  "reps each while arms extending pressing into small ball  Static reverse lunge 10 sec x 5 sets  Single leg bridge 20 sec hold x 3 rounds each, more challenging maintaining hip stability on R  Sidelye hip abduction with leg kick outs 2 sets of 15 reps each leg.        Deferred due to time:  Standing trunk strengthening: ball squeeze with alternating marching def  Sitting on ball with alternating hip flexion 10 sec hold 10 reps   Diagonal hip and shoulder on ball 10 reps  Standing ball press with hip flexion hold 5 sec 5 reps each side x 2 trials  Well leg exercise on 1 inch step 10 reps, limited with crepitus on R no pain  Sidelye hip abduction 2 levels 3 x 10 b LE  TA initiation series 3 reps with TA ( knees flexed lifting up to ceiling with PPT and head lift while hands press down on thighs.         deferred  Squat with green tband 10 reps   3 level squat up/ down eccentric quad control x 10 reps deferred   Sidesteps  abd with green tbnd for resistance 3 steps out x 10 reps leading R/Ldeferred  deferred  Transverse abdominus exercises: pressed with leg lift in knee flexed position "initiation series" 10 sec hold 10 reps  Sidelye on R heel set with R lateral flexion  Bridging with B LE  Stationary bike x 10 min L3 deferred  UBE alternating forward and backward L2.  10 min deferred    deferred  Towel glides for shoulder flexion 10 reps with turn and breath  Strengthening for B LE provided written hep and performed:  Bridging with B LE  Side lye mermaid leg lifts with knee   Single limb bridge  Elevator SLR  Hip abduction   deferred  Side Lying 1/2 moon  upper trunk rotation x 10 reps, tightness/pulling from left breast down inside left upper arm  Side Lying elbows bent (open book rotation) x 10 reps  Pectoral stretch on foam roller with diaphragmatic breathing, scap retractions with depression  10 sec hold x 10 reps  Trunk rotation in supine with knees flexed 10 reps each side    Deferred:  Shoulder flexion/ " elongation in supine  1/2 moon in side lye stretch  Aarom/ arom shoulder flexion / abduciton B UE full end range motion.  Pec corner wall stretch 5 reps  Scapular depression in standing against wall   Towel  roll pec stretch 3 min with diaphragmatic breathing  Post pelvic tilt 10   Contract relax stretches to B traps 3 30 sec hold, with R trap with increased tightness as compared to L.    deferred  Lateral pelvic tilt 10  Quadraped cat/cow 10 reps  Macarena pose 30 sec 3 times   Kneeling with full trunk flexion fpr lateral trunk flexion    Deferred   Alison received the following manual therapy techniques 25 min: Manual Lymphatic Drainage and instruction in scar massage techniques MLD for upper and lower axilla/trunk Manual lymphatic drainage to bilateral short neck series, cervical terminus , axilla, deep abdominals, sternal nodes, paravertebral nodes, AAI anastomosis , JAKE anastomosis , inguinal nodes, and rework accessing all watershed areas on trunk  Instruction in self MLD techniques throughout.    Performed lymphatouch at light pressure 30 mmHG to L axilla, inferior region below L breast and to R lateral trunk. Recommended aquaphor for dryness to scar in posterior trunk/ provided sample to pt.      Home Exercises Provided and Patient Education Provided     Education provided: trunk strengthening and need for this in order to return to PLOF      Written Home Exercises Provided:   Exercises were reviewed and Alison was able to demonstrate them prior to the end of the session.  Alison demonstrated good  understanding of the education provided.     See EMR under Patient Instructions for exercises provided 1/9/23  Assessment   Demonstrating improved recruitment of transverse abdominus and glut med/max. Pt improving endurance with holding sustained quad contraction for longer than 10 sec during squat.    Decreased trunk rotation and guards upper body motion during jogging on TM at 3.7mph. Denies pain with jog  which is improvement as well as increase in distance for 6MWT today. Continued focus for trunk strengthening and progression of endurance especially in standing ideally, progression of hep.  All surgical scars along breasts are healed.  Scheduled for her follow up appointment with breast surgeon next week, will also mention her concerns of new nodules within the right breast, possible fat deposits.     Alison Is progressing well towards her goals.   Pt prognosis is Good.     Pt will continue to benefit from skilled outpatient physical therapy to address the deficits listed in the problem list box on initial evaluation, provide pt/family education and to maximize pt's level of independence in the home and community environment.     Pt's spiritual, cultural and educational needs considered and pt agreeable to plan of care and goals.     Anticipated barriers to physical therapy: none  This patient presents s/p Bilateral mastectomy with flap reconstruction, 12 lymph nodes removed , with recent revision of breast surgery,  Resulting in: Stage 0-1 lymphedema of the left upper arm, axilla, increased pain, increased stiffness in the shoulder range of motion with flexion and abduction, as well as difficulty performing reaching over head. Patient also reports significant decreased endurance, and limitations climbing her 30 steps up to raised camp. Patient is also at higher risk of infection. This pt would benefit from skilled therapy services to address the above impairments.      Medical necessity is demonstrated by the following IMPAIRMENTS/PROBLEMS:  1. Decreased endurance  2. Decreased ROM  3. Decreased strength throughout B UE/ LE/ trunk  4. Decreased functional mobility tolerance.  5. MLD to assist in healing process of surgery/ scar care     The following goals were discussed with the patient and patient is in agreement with them as to be addressed in the treatment plan.      Short Term Goals: (3 weeks)  1. Patient  will be independent donning/doffing compression garment with good fit noted.  2. Patient will demonstrate 100% knowledge of lymphedema precautions and signs of infection. GOAL MET 12/14/2022  3. Patient will increase L UE AROM/PROM shoulder flexion to 160 to improve functional reach and ADL's GOAL MET 12/14/2022  4. Patient will increase B UE AROM/PROM shoulder abduction to 180 to improve functional reach and ADL's with decreased complaints of tightness and pain.GOAL MET 1/9/2023  5. Patient will perform self lymph drainage techniques. Pt with understanding but not performing, per self report. NOT MET 1/9/2023  6 Patient to demonstrate proper posture with weight acceptance in B LE with neutral pelvis GOAL MET 12/14/2022     Long Term Goals: (6 weeks)     1. Patient will perform self lymph drainage techniques  2. 6MWT increased by 25%. GOAL MET 1/9/2023  3. Patient will increased B UE strength  to improve overall functional reach, mobility, lifting.  4. Patient will increase L UE to WNL without difficulty raising arm overhead. GOAL MET 1/9/2023  5. Pt to have absent tingling in L UE GOAL MET 12/28/2022  6. Pt to be independent with scar massage and scar care. GOAL MET 12/28/2022  7. Diagonal stance testing to increase to at least 4/5 with increase in trunk strength to avoid strain in back while exercising.            PLAN   Cont with POC  Focus of tx will be therapeutic exercises, monitor pt for change in ROM however pt is indep with hep for full ROM of UE at home.    Patient to be seen 1- 2 x per week for 2 more weeks for the medically necessary treatments to include: exercise, decongestive massage, intermittent compression pump, multi layered bandaging, education in lymphedema precautions, self massage, self bandaging, and assistance in obtaining appropriate compression garment.      Mary Jane Bass, PT, CLT  01/25/2023

## 2023-01-30 ENCOUNTER — PATIENT MESSAGE (OUTPATIENT)
Dept: ENDOCRINOLOGY | Facility: CLINIC | Age: 43
End: 2023-01-30

## 2023-01-30 ENCOUNTER — INFUSION (OUTPATIENT)
Dept: INFUSION THERAPY | Facility: HOSPITAL | Age: 43
End: 2023-01-30
Attending: INTERNAL MEDICINE
Payer: COMMERCIAL

## 2023-01-30 ENCOUNTER — PATIENT MESSAGE (OUTPATIENT)
Dept: ENDOCRINOLOGY | Facility: CLINIC | Age: 43
End: 2023-01-30
Payer: COMMERCIAL

## 2023-01-30 ENCOUNTER — PATIENT MESSAGE (OUTPATIENT)
Dept: OBSTETRICS AND GYNECOLOGY | Facility: CLINIC | Age: 43
End: 2023-01-30
Payer: COMMERCIAL

## 2023-01-30 ENCOUNTER — CLINICAL SUPPORT (OUTPATIENT)
Dept: REHABILITATION | Facility: HOSPITAL | Age: 43
End: 2023-01-30
Payer: COMMERCIAL

## 2023-01-30 ENCOUNTER — TELEPHONE (OUTPATIENT)
Dept: ENDOCRINOLOGY | Facility: CLINIC | Age: 43
End: 2023-01-30
Payer: COMMERCIAL

## 2023-01-30 DIAGNOSIS — E27.49 SECONDARY ADRENAL INSUFFICIENCY: Primary | ICD-10-CM

## 2023-01-30 DIAGNOSIS — R26.89 DECREASED FUNCTIONAL MOBILITY: ICD-10-CM

## 2023-01-30 DIAGNOSIS — L90.5 AXILLARY WEB SYNDROME: Primary | ICD-10-CM

## 2023-01-30 DIAGNOSIS — L76.82 AXILLARY WEB SYNDROME: Primary | ICD-10-CM

## 2023-01-30 DIAGNOSIS — C50.412 MALIGNANT NEOPLASM OF UPPER-OUTER QUADRANT OF LEFT BREAST IN FEMALE, ESTROGEN RECEPTOR POSITIVE: ICD-10-CM

## 2023-01-30 DIAGNOSIS — Z17.0 MALIGNANT NEOPLASM OF UPPER-OUTER QUADRANT OF LEFT BREAST IN FEMALE, ESTROGEN RECEPTOR POSITIVE: ICD-10-CM

## 2023-01-30 LAB
ALBUMIN SERPL BCP-MCNC: 4.1 G/DL (ref 3.5–5.2)
ALP SERPL-CCNC: 94 U/L (ref 55–135)
ALT SERPL W/O P-5'-P-CCNC: 11 U/L (ref 10–44)
ANION GAP SERPL CALC-SCNC: 12 MMOL/L (ref 8–16)
AST SERPL-CCNC: 13 U/L (ref 10–40)
BILIRUB SERPL-MCNC: 0.2 MG/DL (ref 0.1–1)
BUN SERPL-MCNC: 13 MG/DL (ref 6–20)
CALCIUM SERPL-MCNC: 9.5 MG/DL (ref 8.7–10.5)
CHLORIDE SERPL-SCNC: 103 MMOL/L (ref 95–110)
CO2 SERPL-SCNC: 24 MMOL/L (ref 23–29)
CREAT SERPL-MCNC: 0.8 MG/DL (ref 0.5–1.4)
EST. GFR  (NO RACE VARIABLE): >60 ML/MIN/1.73 M^2
GLUCOSE SERPL-MCNC: 124 MG/DL (ref 70–110)
POTASSIUM SERPL-SCNC: 3.9 MMOL/L (ref 3.5–5.1)
PROT SERPL-MCNC: 6.9 G/DL (ref 6–8.4)
SODIUM SERPL-SCNC: 139 MMOL/L (ref 136–145)

## 2023-01-30 PROCEDURE — 63600175 PHARM REV CODE 636 W HCPCS: Mod: PN | Performed by: INTERNAL MEDICINE

## 2023-01-30 PROCEDURE — 25000003 PHARM REV CODE 250: Mod: PN | Performed by: INTERNAL MEDICINE

## 2023-01-30 PROCEDURE — 80053 COMPREHEN METABOLIC PANEL: CPT | Mod: PN | Performed by: INTERNAL MEDICINE

## 2023-01-30 PROCEDURE — 97110 THERAPEUTIC EXERCISES: CPT | Mod: PN

## 2023-01-30 PROCEDURE — A4216 STERILE WATER/SALINE, 10 ML: HCPCS | Mod: PN | Performed by: INTERNAL MEDICINE

## 2023-01-30 PROCEDURE — 36591 DRAW BLOOD OFF VENOUS DEVICE: CPT | Mod: PN

## 2023-01-30 RX ORDER — HEPARIN 100 UNIT/ML
500 SYRINGE INTRAVENOUS
Status: CANCELLED | OUTPATIENT
Start: 2023-01-30

## 2023-01-30 RX ORDER — SODIUM CHLORIDE 0.9 % (FLUSH) 0.9 %
10 SYRINGE (ML) INJECTION
Status: DISCONTINUED | OUTPATIENT
Start: 2023-01-30 | End: 2023-01-30 | Stop reason: HOSPADM

## 2023-01-30 RX ORDER — HEPARIN 100 UNIT/ML
500 SYRINGE INTRAVENOUS
Status: DISCONTINUED | OUTPATIENT
Start: 2023-01-30 | End: 2023-01-30 | Stop reason: HOSPADM

## 2023-01-30 RX ORDER — SODIUM CHLORIDE 0.9 % (FLUSH) 0.9 %
10 SYRINGE (ML) INJECTION
Status: CANCELLED | OUTPATIENT
Start: 2023-01-30

## 2023-01-30 RX ADMIN — Medication 10 ML: at 03:01

## 2023-01-30 RX ADMIN — Medication 500 UNITS: at 03:01

## 2023-01-30 NOTE — TELEPHONE ENCOUNTER
Patient unable to do virtual visit   So called patient.  On Hydrocortisone 10/5. Occasionally getting nauseated.   Still having take Zofran.  Concerned about ability to concentrate      Will try 15/5  for 4 weeks to see if helps with Nausea. 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       F/U 6 months with CMP

## 2023-01-30 NOTE — PROGRESS NOTES
Physical Therapy Daily Treatment Note/Lymphedema Management     Name: Alison Swann  Clinic Number: [id number]    Therapy Diagnosis:    Axillary web syndrome Yes    Malignant neoplasm of upper-outer quadrant of left female breast, unspecified estrogen receptor status      Decreased functional mobility          Physician: Nicol Recinos NP    Visit Date: 1/30/2023    Physician Orders: PT eval and treat    Evaluation Date: 11/30/2022  RE-Assessment: 1/9/2023  ( Pt cancelled prior visit on 12/30/22)    Authorization Period Expiration: pending  Plan of Care Certification Period: 1/9/2023 - 2/9/2023  Visit #/Visits authorized: 10/ 16      Time In: 2: 10 PM late to appt  Time Out: 3:00 PM  Total Billable Time: 50 minutes    Precautions: Standard, cancer, and avoid BP, IV, blood draws, or injections in L UE    Subjective   S: pt reports 5/10 just feel bad, no necessarily nausea or pain, just don't feel good.  Will see surgeon tomorrow     Reports been doing some of exercises cardio, trunk ext, lunges, squats sit ups at home. Denies pain.   Response to previous treatment: good scars responding to mepiform very well pt pleased  Functional change: absent tingling in her L arm and hand    Pain: 0/10  Location none    Objective   Resting HR86  O2 sat 999%  Demonstrated good form on lunges and squats for hep.     Alison received therapeutic exercises to develop strength, ROM, flexibility, posture, and core stabilization for 50 minutes including:  Nu Step: L5  10 min 696 steps  TM: level 10 min HR 137bpm, 3.5-4.8 mph alt jog 1-2 min /walk O2 sat 98% .62 miles   Squat holds 20 sec hold 5 reps pt with fatigue while holding static squat position.   Squat side steps with green tband  x 120 feet  Ball to wall ball press with knee into ball with opp leg up on toes  for core 10 sec hold 10 reps each foot  Dynamic  reverse lunge 10 reps with 2.2# weight on opp arm elevated.    deferred  Seated on ball, PPT static hold with  "shoulder ext RTB 10 sec x 10 reps  Sitting on ball with alternating leg extension 10 sec hold 10 reps each while arms extending pressing into small ball  Static reverse lunge 10 sec x 5 sets  Single leg bridge 20 sec hold x 3 rounds each, more challenging maintaining hip stability on R  Sidelye hip abduction with leg kick outs 2 sets of 15 reps each leg.        Deferred due to time:  Standing trunk strengthening: ball squeeze with alternating marching def  Sitting on ball with alternating hip flexion 10 sec hold 10 reps   Diagonal hip and shoulder on ball 10 reps  Standing ball press with hip flexion hold 5 sec 5 reps each side x 2 trials  Well leg exercise on 1 inch step 10 reps, limited with crepitus on R no pain  Sidelye hip abduction 2 levels 3 x 10 b LE  TA initiation series 3 reps with TA ( knees flexed lifting up to ceiling with PPT and head lift while hands press down on thighs.         deferred  Squat with green tband 10 reps   3 level squat up/ down eccentric quad control x 10 reps deferred   Sidesteps  abd with green tbnd for resistance 3 steps out x 10 reps leading R/Ldeferred  deferred  Transverse abdominus exercises: pressed with leg lift in knee flexed position "initiation series" 10 sec hold 10 reps  Sidelye on R heel set with R lateral flexion  Bridging with VIET TOMAS  Stationary bike x 10 min L3 deferred  UBE alternating forward and backward L2.  10 min deferred    deferred  Towel glides for shoulder flexion 10 reps with turn and breath  Strengthening for B LE provided written hep and performed:  Bridging with B LE  Side lye mermaid leg lifts with knee   Single limb bridge  Elevator SLR  Hip abduction   deferred  Side Lying 1/2 moon  upper trunk rotation x 10 reps, tightness/pulling from left breast down inside left upper arm  Side Lying elbows bent (open book rotation) x 10 reps  Pectoral stretch on foam roller with diaphragmatic breathing, scap retractions with depression  10 sec hold x 10 reps  Trunk " rotation in supine with knees flexed 10 reps each side    Deferred:  Shoulder flexion/ elongation in supine  1/2 moon in side lye stretch  Aarom/ arom shoulder flexion / abduciton B UE full end range motion.  Pec corner wall stretch 5 reps  Scapular depression in standing against wall   Towel  roll pec stretch 3 min with diaphragmatic breathing  Post pelvic tilt 10   Contract relax stretches to B traps 3 30 sec hold, with R trap with increased tightness as compared to L.    deferred  Lateral pelvic tilt 10  Quadraped cat/cow 10 reps  Macarena pose 30 sec 3 times   Kneeling with full trunk flexion fpr lateral trunk flexion    Deferred   Alison received the following manual therapy techniques 25 min: Manual Lymphatic Drainage and instruction in scar massage techniques MLD for upper and lower axilla/trunk Manual lymphatic drainage to bilateral short neck series, cervical terminus , axilla, deep abdominals, sternal nodes, paravertebral nodes, AAI anastomosis , JAKE anastomosis , inguinal nodes, and rework accessing all watershed areas on trunk  Instruction in self MLD techniques throughout.    Performed lymphatouch at light pressure 30 mmHG to L axilla, inferior region below L breast and to R lateral trunk. Recommended aquaphor for dryness to scar in posterior trunk/ provided sample to pt.      Home Exercises Provided and Patient Education Provided     Education provided: trunk strengthening and need for this in order to return to PLOF      Written Home Exercises Provided:   Exercises were reviewed and Alison was able to demonstrate them prior to the end of the session.  Alison demonstrated good  understanding of the education provided.     See EMR under Patient Instructions for exercises provided 1/9/23  Assessment   Demonstrating improved recruitment of transverse abdominus and glut med/max. All weight bearing resistive exercises done today demonstrating improving recruitment of trunk musculature.  Decreased  trunk rotation and guards upper body motion during jogging on TM at 3.7mph. Denies pain with jog which is improvement as well as increase in distance for 6MWT today. Continued focus for trunk strengthening and progression of endurance especially in standing ideally, progression of hep.  All surgical scars along breasts are healed.  Scheduled for her follow up appointment with breast surgeon next week, will also mention her concerns of new nodules within the right breast, possible fat deposits.     Alison Is progressing well towards her goals.   Pt prognosis is Good.     Pt will continue to benefit from skilled outpatient physical therapy to address the deficits listed in the problem list box on initial evaluation, provide pt/family education and to maximize pt's level of independence in the home and community environment.     Pt's spiritual, cultural and educational needs considered and pt agreeable to plan of care and goals.     Anticipated barriers to physical therapy: none  This patient presents s/p Bilateral mastectomy with flap reconstruction, 12 lymph nodes removed , with recent revision of breast surgery,  Resulting in: Stage 0-1 lymphedema of the left upper arm, axilla, increased pain, increased stiffness in the shoulder range of motion with flexion and abduction, as well as difficulty performing reaching over head. Patient also reports significant decreased endurance, and limitations climbing her 30 steps up to raised camp. Patient is also at higher risk of infection. This pt would benefit from skilled therapy services to address the above impairments.      Medical necessity is demonstrated by the following IMPAIRMENTS/PROBLEMS:  1. Decreased endurance  2. Decreased ROM  3. Decreased strength throughout B UE/ LE/ trunk  4. Decreased functional mobility tolerance.  5. MLD to assist in healing process of surgery/ scar care     The following goals were discussed with the patient and patient is in agreement  with them as to be addressed in the treatment plan.      Short Term Goals: (3 weeks)  1. Patient will be independent donning/doffing compression garment with good fit noted.  2. Patient will demonstrate 100% knowledge of lymphedema precautions and signs of infection. GOAL MET 12/14/2022  3. Patient will increase L UE AROM/PROM shoulder flexion to 160 to improve functional reach and ADL's GOAL MET 12/14/2022  4. Patient will increase B UE AROM/PROM shoulder abduction to 180 to improve functional reach and ADL's with decreased complaints of tightness and pain.GOAL MET 1/9/2023  5. Patient will perform self lymph drainage techniques. Pt with understanding but not performing, per self report. NOT MET 1/9/2023  6 Patient to demonstrate proper posture with weight acceptance in B LE with neutral pelvis GOAL MET 12/14/2022     Long Term Goals: (6 weeks)     1. Patient will perform self lymph drainage techniques  2. 6MWT increased by 25%. GOAL MET 1/9/2023  3. Patient will increased B UE strength  to improve overall functional reach, mobility, lifting.  4. Patient will increase L UE to WNL without difficulty raising arm overhead. GOAL MET 1/9/2023  5. Pt to have absent tingling in L UE GOAL MET 12/28/2022  6. Pt to be independent with scar massage and scar care. GOAL MET 12/28/2022  7. Diagonal stance testing to increase to at least 4/5 with increase in trunk strength to avoid strain in back while exercising.            PLAN   Cont with POC  Focus of tx will be therapeutic exercises, monitor pt for change in ROM however pt is indep with hep for full ROM of UE at home.    Patient to be seen 1- 2 x per week for 3 more weeks for the medically necessary treatments to include: exercise, decongestive massage, intermittent compression pump, multi layered bandaging, education in lymphedema precautions, self massage, self bandaging, and assistance in obtaining appropriate compression garment.      Merly Chapin,  PT,  CLT  01/30/2023

## 2023-01-31 ENCOUNTER — PATIENT MESSAGE (OUTPATIENT)
Dept: ENDOCRINOLOGY | Facility: CLINIC | Age: 43
End: 2023-01-31
Payer: COMMERCIAL

## 2023-02-01 ENCOUNTER — PATIENT MESSAGE (OUTPATIENT)
Dept: ENDOCRINOLOGY | Facility: CLINIC | Age: 43
End: 2023-02-01
Payer: COMMERCIAL

## 2023-02-02 ENCOUNTER — TELEPHONE (OUTPATIENT)
Dept: ENDOCRINOLOGY | Facility: CLINIC | Age: 43
End: 2023-02-02
Payer: COMMERCIAL

## 2023-02-02 NOTE — TELEPHONE ENCOUNTER
----- Message from Apolonia Julian sent at 2/2/2023  9:42 AM CST -----  Regarding: returning call  Contact: patient  Type:  Patient Returning Call    Who Called:  Patient  Who Left Message for Patient:  Maia   Does the patient know what this is regarding?:    Best Call Back Number:  [phone]    Additional Information:  Please call to advise. Thanks!

## 2023-02-02 NOTE — TELEPHONE ENCOUNTER
Pt advised, per Dr. Mcconnell, if no nausea, can go back to previous dose of hydrocortisone (pt states she was on 10 mg Q AM and 5 mg Q afternoon).  Pt verb understanding.

## 2023-02-02 NOTE — TELEPHONE ENCOUNTER
----- Message from Apolonia Julian sent at 2/2/2023  9:08 AM CST -----  Regarding: advise  Contact: patient  Type: Needs Medical Advice  Who Called:  Patient  Symptoms (please be specific):  nausea and vomitting/medication  How long has patient had these symptoms:    Pharmacy name and phone #:    Best Call Back Number: [phone]    Additional Information: Please call to advise. Thanks!

## 2023-02-02 NOTE — TELEPHONE ENCOUNTER
Pt states at the recent phone visit, you were asking her about nausea and you increased her steroid dose to 20 mg Q AM and 10 mg Q afternoon.  She wanted you to know she has not been nauseated very often.  WG messed up her zofran RX and she ended up picking up 2 zofran RX's at once - but she has not really needed it.  Does not want to be on a higher dose of steroids if not needed.  She feels you upped it because she had been having nausea.  Please advise.

## 2023-02-07 ENCOUNTER — TELEPHONE (OUTPATIENT)
Dept: HEMATOLOGY/ONCOLOGY | Facility: CLINIC | Age: 43
End: 2023-02-07
Payer: COMMERCIAL

## 2023-02-07 ENCOUNTER — PATIENT MESSAGE (OUTPATIENT)
Dept: HEMATOLOGY/ONCOLOGY | Facility: CLINIC | Age: 43
End: 2023-02-07
Payer: COMMERCIAL

## 2023-02-07 ENCOUNTER — HOSPITAL ENCOUNTER (EMERGENCY)
Facility: HOSPITAL | Age: 43
Discharge: HOME | End: 2023-02-07
Payer: COMMERCIAL

## 2023-02-07 VITALS
RESPIRATION RATE: 18 BRPM | OXYGEN SATURATION: 100 % | SYSTOLIC BLOOD PRESSURE: 116 MMHG | BODY MASS INDEX: 19.3 KG/M2 | HEIGHT: 67 IN | HEART RATE: 96 BPM | DIASTOLIC BLOOD PRESSURE: 75 MMHG | TEMPERATURE: 98 F | WEIGHT: 123 LBS

## 2023-02-07 DIAGNOSIS — T50.901A ACCIDENTAL DRUG INGESTION, INITIAL ENCOUNTER: Primary | ICD-10-CM

## 2023-02-07 DIAGNOSIS — T65.91XA INGESTION OF NONTOXIC SUBSTANCE, ACCIDENTAL OR UNINTENTIONAL, INITIAL ENCOUNTER: ICD-10-CM

## 2023-02-07 PROCEDURE — 99283 EMERGENCY DEPT VISIT LOW MDM: CPT

## 2023-02-07 PROCEDURE — 25000003 PHARM REV CODE 250: Performed by: NURSE PRACTITIONER

## 2023-02-07 RX ORDER — ONDANSETRON 4 MG/1
4 TABLET, ORALLY DISINTEGRATING ORAL
Status: COMPLETED | OUTPATIENT
Start: 2023-02-07 | End: 2023-02-07

## 2023-02-07 RX ADMIN — ONDANSETRON 4 MG: 4 TABLET, ORALLY DISINTEGRATING ORAL at 12:02

## 2023-02-07 NOTE — ED PROVIDER NOTES
Encounter Date: 2/7/2023       History     Chief Complaint   Patient presents with    Ingestion     Pt accidentally took one 60 mg cerenia pill which is her dogs rx for motion sickness. Pt reports being on the telephone and not paying attention when she accidentally took the dogs medicine instead of her own.      Patient is a 42-year-old female presents emergency room with chief complaint of nausea and vomiting x1 after taking her dogs medications this morning.  Patient states she accidentally took 60 mg of Cerenia (which is for her dogs motion sickness).  Patient states she took her prednisone as well as her Adderall this morning, and got in a hurry this morning while getting her self together to go to a doctor's appointment with her dog, and accidentally took the dogs medicine instead of giving it to the dog.  Patient states she did get nauseated proximally 1 hour after the incident and had 1 mild vomiting episode.  Patient states she is talked to poison control, pharmacist, her oncologist, as well as another friend who all states she or polyp be okay, but advised her to go the emergency room just in case.  Patient denies all other symptoms at this time.    Review of patient's allergies indicates:   Allergen Reactions    Penicillins Hives     As a child      Past Medical History:   Diagnosis Date    Attention Deficit Hyperactivity Disorder     Family H/O Diabetes Mellitus     Her Father    Generalized Anxiety     Left Breast Cancer S.P Lumpectomy In 01/2021     Dr. Dominga Johnston; Dr. Sandra Sotelo (Heme/Onc); 4/28/21 On CMT; Is Estrogen Receptor Negative; She Is BRCA2 Gene Mutation Positive, Andf Her Mother Also With A H/O Breast Cancer    Macrocytic Anemia     Associated With Her Chemotherapy    Postoperative Nausea And Vomiting     Scopolamine Patches Work Well For Her For This    Scoliosis     Therapeutic Drug Monitoring     Wellness Visit 4/28/2021      Past Surgical History:   Procedure Laterality Date     AUGMENTATION OF BREAST  2012    BREAST MASS EXCISION Left 2021    Procedure: EXCISION, MASS, BREAST- 2 oclock left breast with ultrasound guidance;  Surgeon: Rashmi Johnston MD;  Location: [facility] OR;  Service: General;  Laterality: Left;    BREAST SURGERY       SECTION      COLONOSCOPY N/A 2021    Procedure: COLONOSCOPY;  Surgeon: Mason Quintero MD;  Location: [facility] ENDO (2ND FLR);  Service: Endoscopy;  Laterality: N/A;    ESOPHAGOGASTRODUODENOSCOPY N/A 2021    Procedure: EGD (ESOPHAGOGASTRODUODENOSCOPY);  Surgeon: Mason Quintero MD;  Location: [facility] ENDO (2ND FLR);  Service: Endoscopy;  Laterality: N/A;    INSERTION OF TUNNELED CENTRAL VENOUS CATHETER (CVC) WITH SUBCUTANEOUS PORT N/A 2021    Procedure: [id number]-[id number];  Surgeon: Griffin Becker MD;  Location: [facility] OR;  Service: General;  Laterality: N/A;    ROBOT-ASSISTED LAPAROSCOPIC ABDOMINAL HYSTERECTOMY USING DA NATALIIA XI N/A 2022    Procedure: XI ROBOTIC HYSTERECTOMY;  Surgeon: Carmella Galvez MD;  Location: [facility];  Service: OB/GYN;  Laterality: N/A;    ROBOT-ASSISTED LAPAROSCOPIC SALPINGO-OOPHORECTOMY USING DA NATALIIA XI Bilateral 2022    Procedure: XI ROBOTIC SALPINGO-OOPHORECTOMY;  Surgeon: Carmella Galvez MD;  Location: [facility];  Service: OB/GYN;  Laterality: Bilateral;    SENTINEL LYMPH NODE BIOPSY Left 2021    Procedure: CORE BIOPSY, LYMPH NODE, SENTINEL;  Surgeon: Rashmi Johnston MD;  Location: [facility] OR;  Service: General;  Laterality: Left;    TUBAL LIGATION       Family History   Problem Relation Age of Onset    Breast cancer Mother 52    Depression Mother     Stroke Father         50s, multiple    Diabetes Father     Anemia Father     Hyperlipidemia Father     Cancer Other         unk abdominal origin- dx 20s    COPD Maternal Grandmother     Pancreatic cancer Other         suspected    Colon cancer Neg Hx     Esophageal cancer Neg Hx      Social History     Tobacco Use     Smoking status: Never    Smokeless tobacco: Never   Substance Use Topics    Alcohol use: Yes     Comment: occasionally    Drug use: Never     Review of Systems   Constitutional: Negative.    HENT: Negative.     Eyes: Negative.    Respiratory: Negative.     Cardiovascular: Negative.    Gastrointestinal:  Positive for nausea. Negative for abdominal pain, constipation, diarrhea and vomiting.   Endocrine: Negative.    Genitourinary: Negative.    Musculoskeletal: Negative.    Skin: Negative.    Allergic/Immunologic: Negative for food allergies.   Neurological: Negative.    Hematological: Negative.    Psychiatric/Behavioral: Negative.     All other systems reviewed and are negative.    Physical Exam     Initial Vitals [02/07/23 1058]   BP Pulse Resp Temp SpO2   116/75 96 18 98.1 °F (36.7 °C) 100 %      MAP       --         Physical Exam    Nursing note and vitals reviewed.  Constitutional: She appears well-developed and well-nourished. She is not diaphoretic. No distress.   HENT:   Head: Normocephalic and atraumatic.   Mouth/Throat: Oropharynx is clear and moist.   Eyes: Conjunctivae and EOM are normal. Pupils are equal, round, and reactive to light.   Neck:   Normal range of motion.  Cardiovascular:  Normal rate, regular rhythm, normal heart sounds and intact distal pulses.     Exam reveals no friction rub.       Pulmonary/Chest: Breath sounds normal. No respiratory distress. She has no wheezes. She has no rales.   Abdominal:   She denies any abdominal pain, bloating, tenderness at this time   Musculoskeletal:      Cervical back: Normal range of motion.     Neurological: She is alert and oriented to person, place, and time. She has normal strength. No sensory deficit. GCS score is 15. GCS eye subscore is 4. GCS verbal subscore is 5. GCS motor subscore is 6.   Skin: Skin is warm and dry. Capillary refill takes 2 to 3 seconds.   Psychiatric: She has a normal mood and affect.       ED Course   Procedures  Labs Reviewed - No  data to display       Imaging Results    None          Medications   ondansetron disintegrating tablet 4 mg (4 mg Oral Given 2/7/23 1217)     Medical Decision Making:   Initial Assessment:   Patient seen examined emergency room, no acute distress at this time.  She is states she is still mildly nauseated.  Detailed assessment as noted above.  Differential Diagnosis:   Nausea vomiting, drug induced nausea vomiting, anxiety  ED Management:  Patient seen examined emergency room, no acute distress identified.  Poison control was contacted by nursing staff, advised there was no further testing or evaluation needed to be done for the medication patient has taken.  The only symptoms reported may be some nausea and vomiting.  The patient was treated with 4 mg Zofran ODT here in the emergency room for nausea.  Patient already has Zofran p.o. to take at home if she has nausea.  Encouraged patient to follow up with her primary care provider if symptoms do not resolve, or she develops any new other worrisome symptoms.  Patient verbalized understanding.                        Clinical Impression:   Final diagnoses:  [T50.901A] Accidental drug ingestion, initial encounter (Primary)  [T65.91XA] Ingestion of nontoxic substance, accidental or unintentional, initial encounter        ED Disposition Condition    Discharge Stable          ED Prescriptions    None       Follow-up Information       Follow up With Specialties Details Why Contact Info        Follow-up with primary care provider within 2-3 days if symptoms continue, or worsening.             Richard Klein NP  02/07/23 1224       Richard Klein NP  02/07/23 1227

## 2023-02-07 NOTE — DISCHARGE INSTRUCTIONS
The medication you ingested this morning is non fatal, and only may cause some nausea and vomiting.  Use your Zofran as needed for nausea and vomiting.    Follow-up primary care provider if you continue to have symptoms lasting longer than 1-2 days.    Return emergency room if develops any new other worrisome symptom.

## 2023-02-07 NOTE — TELEPHONE ENCOUNTER
Call received from pt via  stating that she accidentally took her dog's medicine, Cerenia 60 mg x 1 tab approx 30 min prior to phone call.   Per pt she has already contacted poison control and was dissatisfied with the response.   Confirmed with pt that she is not dizzy, lightheaded, nauseaded or having any other symptoms thus far.   This RN utilized the poison control website and reviewed possible side effects of human consumption of Cerenia.  Side effects include (but are not limited to nausea, vomiting, hypotension, seizure and cardiac arrest) Recommended to pt that she have a family member or friend bring her to ER or call EMS immediately. Pt declined requesting that Dr. Sotelo be notified first. Pt alone, agreed to call her adult daughter to come be with her to monitor asap.  Dr. Sotelo and LYRIC Moeller notified of above.   Recommendation given that pt report to ER.   Called pt back, instructed to go to ER.   Pt agreed to go to ER in [place]. Confirms that she is not alone, that her daughter is present.   Reported above to Dr. Sotelo and LYRIC Moeller NP.

## 2023-02-07 NOTE — ED NOTES
Evi,poison control, contacted regarding ingestion. They report possible n/v but they deny need for any further testing or observation time in ed.

## 2023-02-08 ENCOUNTER — OFFICE VISIT (OUTPATIENT)
Dept: PSYCHIATRY | Facility: CLINIC | Age: 43
End: 2023-02-08
Payer: COMMERCIAL

## 2023-02-08 ENCOUNTER — PATIENT MESSAGE (OUTPATIENT)
Dept: GYNECOLOGIC ONCOLOGY | Facility: CLINIC | Age: 43
End: 2023-02-08
Payer: COMMERCIAL

## 2023-02-08 DIAGNOSIS — F41.1 GAD (GENERALIZED ANXIETY DISORDER): Primary | ICD-10-CM

## 2023-02-08 DIAGNOSIS — Z17.1 MALIGNANT NEOPLASM OF UPPER-OUTER QUADRANT OF LEFT BREAST IN FEMALE, ESTROGEN RECEPTOR NEGATIVE: ICD-10-CM

## 2023-02-08 DIAGNOSIS — C50.412 MALIGNANT NEOPLASM OF UPPER-OUTER QUADRANT OF LEFT BREAST IN FEMALE, ESTROGEN RECEPTOR NEGATIVE: ICD-10-CM

## 2023-02-08 PROCEDURE — 90837 PSYTX W PT 60 MINUTES: CPT | Mod: 95,,,

## 2023-02-08 PROCEDURE — 90837 PR PSYCHOTHERAPY W/PATIENT, 60 MIN: ICD-10-PCS | Mod: 95,,,

## 2023-02-08 NOTE — PROGRESS NOTES
The patient location is: [address] [address]   The patient location [place] is: [facility]  The patient phone number is: [phone]   Visit type: Virtual visit with synchronous audio and video  Each patient to whom he or she provides medical services by telemedicine is:  (1) informed of the relationship between the provider and patient and the respective role of any other health care provider with respect to management of the patient; and (2) notified that he or she may decline to receive medical services by telemedicine and may withdraw from such care at any time.    Crisis Disclaimer: Patient was informed that due to the virtual nature of the visit, that if a crisis develops, protocols will be implemented to ensure patient safety, including but not limited to: 1) Initiating a welfare check with local Law Enforcement, 2) Calling [facility]/National Crisis Hotline, and/or 3) Initiating PEC/CEC procedures.    Time (Face-to-face): 48 minutes    Time (Non-face-to-face): 20 minutes   PSYCHO-ONCOLOGY NOTE/ Individual Psychotherapy     Date: 102/08/2023   Site:  [facility]      Therapeutic Intervention: Met with patient.  Outpatient - Behavior modifying psychotherapy 60 min - CPT code 55392    This includes face to face time and non-face to face time preparing to see the patient, obtaining and/or reviewing separately obtained history, documenting clinical information in the electronic or other health record, independently interpreting results and communicating results to the patient/family/caregiver, or care coordinator.      Patient was last seen by me on 01/25/2023    Problem list  Patient Active Problem List   Diagnosis    Ductal carcinoma in situ of left breast    Malignant neoplasm of upper-outer quadrant of left female breast    Decreased functional mobility    Dehydration    Anemia associated with chemotherapy    Nausea and vomiting    BRCA2 gene mutation positive    Scoliosis    Attention Deficit  Hyperactivity Disorder    Therapeutic Drug Monitoring    Postoperative Nausea And Vomiting    Macrocytic Anemia    Generalized Anxiety    Family H/O Diabetes Mellitus    Fatigue    Hyponatremia    Elevated troponin    Diarrhea    Intravascular volume depletion    Hypomagnesemia    Hypokalemia    Anterior T wave inversion    Elevated LFTs    Colitis, indeterminate    Encounter for immunotherapy    Diarrhea due to drug    Pneumonitis    s/p RA-TLH/BSO    JA (acute kidney injury)    Adrenal crisis    Sepsis    Chest pain    Adrenal insufficiency    Axillary web syndrome       Chief complaint/reason for encounter: depression and anxiety   Met with patient to evaluate psychosocial adaptation to diagnosis/treatment/survivorship of malignant neoplasm of upper-outer quadrant of left female breasts    Current Medications  Current Outpatient Medications   Medication    dextroamphetamine-amphetamine 10 mg Tab    hydrocortisone (CORTEF) 10 MG Tab    ondansetron (ZOFRAN) 4 MG tablet    valACYclovir (VALTREX) 500 MG tablet     No current facility-administered medications for this visit.     Facility-Administered Medications Ordered in Other Visits   Medication Frequency    lactated ringers infusion Continuous    sodium chloride 0.9% flush 10 mL PRN       ONCOLOGY HISTORY  Oncology History   Malignant neoplasm of upper-outer quadrant of left female breast   1/19/2021 Cancer Staged    Staging form: Breast, AJCC 8th Edition  - Clinical stage from 1/19/2021: Stage IIIB (cT2, cN1, cM0, G3, ER-, AL-, HER2-)       1/28/2021 Initial Diagnosis    Malignant neoplasm of upper-outer quadrant of left breast in female, estrogen receptor negative     2/18/2021 - 2/18/2021 Chemotherapy    Treatment Summary   Plan Name: OP BREAST DOSE-DENSE AC - DOXORUBICIN CYCLOPHOSPHAMIDE Q2W  Treatment Goal: Curative  Status: Inactive  Start Date:   End Date:   Provider: Jhon Suazo MD  Chemotherapy: DOXOrubicin chemo injection 96 mg, 60 mg/m2, Intravenous,  Clinic/HOD 1 time, 0 of 4 cycles  cyclophosphamide (CYTOXAN) 600 mg/m2 = 965 mg in sodium chloride 0.9% 250 mL chemo infusion, 600 mg/m2, Intravenous, Clinic/HOD 1 time, 0 of 4 cycles       2/25/2021 - 7/6/2021 Chemotherapy    Treatment Summary   Plan Name: OP BREAST PACLITAXEL WEEKLY + CARBOPLATIN (AUC 5) Q3W FOLLOWED BY AC WITH PEMBROLIZUMAB FOLLOWED BY PEMBROLIZUMAB   Treatment Goal: Curative  Status: Inactive  Start Date: 2/25/2021  End Date: 7/6/2021  Provider: Sandra Sotelo MD  Chemotherapy: DOXOrubicin chemo injection 112 mg, 60 mg/m2 = 112 mg (100 % of original dose 60 mg/m2), Intravenous, Once, 3 of 4 cycles  Dose modification: 60 mg/m2 (original dose 60 mg/m2, Cycle 5)  Administration: 112 mg (5/25/2021), 112 mg (6/15/2021), 110 mg (7/6/2021)  CARBOplatin (PARAPLATIN) 605 mg in sodium chloride 0.9% 250 mL chemo infusion, 605 mg (100 % of original dose 603.5 mg), Intravenous, Clinic/HOD 1 time, 4 of 4 cycles  Dose modification:   (original dose 603.5 mg, Cycle 1)  Administration: 605 mg (2/25/2021), 630 mg (3/22/2021), 750 mg (4/13/2021), 750 mg (5/4/2021)  cyclophosphamide 600 mg/m2 = 1,100 mg in sodium chloride 0.9% 100 mL chemo infusion, 600 mg/m2 = 1,100 mg (100 % of original dose 600 mg/m2), Intravenous, Clinic/HOD 1 time, 3 of 4 cycles  Dose modification: 600 mg/m2 (original dose 600 mg/m2, Cycle 5), 600 mg/m2 (Cycle 8)  Administration: 1,100 mg (5/25/2021), 1,100 mg (6/15/2021), 1,100 mg (7/6/2021)  PACLitaxeL (TAXOL) 80 mg/m2 = 132 mg in sodium chloride 0.9% 250 mL chemo infusion, 80 mg/m2 = 132 mg (100 % of original dose 80 mg/m2), Intravenous, Clinic/HOD 1 time, 4 of 4 cycles  Dose modification: 80 mg/m2 (original dose 80 mg/m2, Cycle 1)  Administration: 132 mg (2/25/2021), 132 mg (3/4/2021), 132 mg (3/11/2021), 138 mg (3/22/2021), 138 mg (3/30/2021), 138 mg (4/6/2021), 138 mg (4/13/2021), 138 mg (4/20/2021), 144 mg (4/27/2021), 144 mg (5/4/2021), 144 mg (5/11/2021), 144 mg  (5/18/2021)  pembrolizumab (KEYTRUDA) 200 mg in sodium chloride 0.9% 100 mL chemo infusion, 200 mg, Intravenous, Clinic/HOD 1 time, 7 of 18 cycles  Administration: 200 mg (2/25/2021), 200 mg (5/25/2021), 200 mg (3/22/2021), 200 mg (4/13/2021), 200 mg (5/4/2021), 200 mg (6/15/2021), 200 mg (7/6/2021)       4/15/2021 - 4/15/2021 Chemotherapy    Treatment Summary   Plan Name: OP BREAST PEMBROLIZUMAB Q3W PACLITAXEL CARBOPLATIN WEEKLY FOLLOWED BY PEMBROLIZUMAB DOXORUBICIN CYCLOPHOSPHAMIDE Q3W   Treatment Goal: Curative  Status: Inactive  Start Date:   End Date:   Provider: Jhon Suazo MD  Chemotherapy: CARBOplatin (PARAPLATIN) in sodium chloride 0.9% 250 mL chemo infusion,  (original dose ), Intravenous, Clinic/HOD 1 time, 0 of 3 cycles  Dose modification:   (Cycle 1)  cyclophosphamide in sodium chloride 0.9% chemo infusion, 600 mg/m2 = 965 mg (original dose ), Intravenous, Clinic/HOD 1 time, 0 of 1 cycle  Dose modification: 600 mg/m2 (Cycle 2)  DOXOrubicin (ADRIAMYCIN) in sodium chloride 0.9% 250 mL chemo infusion, 60 mg/m2 (original dose ), Intravenous, Clinic/HOD 1 time, 0 of 1 cycle  Dose modification: 60 mg/m2 (Cycle 2)  PACLitaxeL (TAXOL) in sodium chloride 0.9% 100 mL chemo infusion, 80 mg/m2 (original dose ), Intravenous, Clinic/HOD 1 time, 0 of 1 cycle  Dose modification: 80 mg/m2 (Cycle 1)  PEMEtrexed (ALIMTA) in sodium chloride 0.9% chemo infusion, 500 mg/m2, Intravenous, Clinic/HOD 1 time, 0 of 33 cycles  pembrolizumab (KEYTRUDA) 200 mg in sodium chloride 0.9% 100 mL chemo infusion, 200 mg, Intravenous, Clinic/HOD 1 time, 0 of 35 cycles       11/19/2021 - 1/4/2022 Radiation Therapy    Treating physician: Maryann Perales  Total Dose: 50 Gy  Fractions: 25    DIAGNOSIS:  C50.412 - Malignant neoplasm of upper-outer quadrant of left female breast, Diagnosed 11/11/2021 (Active) Stage IIIB, T2, N1, M0, G3, HER2 Neg, ER Neg, [place] Neg    This is a 41 y.o. y/o female with cT2,c N1 and M0 G3, (Stage IIIB), triple  negative, Ki-67 79%, LEFT upper outer quadrant breast, BRCA2+, status post lumpectomy 1/19/21, with residual larisa disease, followed by  adjuvant chemo-immunotherapy with carbo-taxol and pembrolizumab, followed by AC with pembrolizumab, followed by adjuvant pemprolizumab complicated by ongoing, chronic colitis/diarrhea necessitating long-term prednisone.  Underwent bilateral mastectomy 9/29/21 with no residual carcinoma identified in breast of 14 sampled nodes (ypN0). She has completed adjuvant LEFT breast and regional larisa radiation therapy to a dose of 50Gy.    Treatment Summary  Course: C1 BREAST 2021    Treatment Site Ref. ID Energy Dose/Fx (Gy) #Fx Dose Correction (Gy) Total Dose (Gy) Start Date End Date Elapsed Days   3D Sclav_L rxsc 18X/6X 2 25 / 25 0 50 11/19/2021 1/4/2022 46   3D Breast L Breast_L 18X/6X 2 25 / 25 0 50 11/19/2021 1/4/2022 46     Tolerated well.  2 day treatment break at patient's request for patch of dry desquamation, and 5 day treatment break due to CoVID infection.  Otherwise, patient completed her course of therapy as prescribed.       PLAN: RTC 1 month for routine follow up. Continue current skin care/sun protection for now.  Follow up with other providers as directed.         Objective:  Alison Swann arrived promptly for the session. Mrs. Swann was independently ambulatory at the time of session. The patient was fully cooperative throughout the session.  Appearance: age appropriate, casually  dressed, adequately  groomed  Behavior/Cooperation: friendly and cooperative  Speech: normal in rate, volume, and tone  Mood: euthymic  Affect: labile  Thought Process: goal-directed, logical  Thought Content: normal,  No delusions or paranoia; did not appear to be responding to internal stimuli during the session  Orientation: grossly intact  Memory: grossly intact  Attention Span/Concentration: Attends to session without distraction; reports no difficulty  Fund of Knowledge:  "average  Estimate of Intelligence: average from verbal skills and history  Cognition: grossly intact at time of appointment.  Insight: patient has awareness of illness; good insight into own behavior and behavior of others  Judgment: the patient's behavior is adequate to circumstances    NCCN Distress thermometer:   DISTRESS SCREENING 12/19/2022 10/12/2022 10/12/2022 8/15/2022 8/15/2022 6/1/2022 4/29/2022   Distress Score 3 10 - Extreme Distress 0 - No Distress 0 - No Distress 2 2 4   Practical Problems Insurance/Financial Work/School None of these - None of these - -   Family Problems None of these None of these None of these - None of these - -   Emotional Problems Depression;Sadness;Worry Fears;Worry None of these - None of these - -   Spiritual / Tenriism Concerns No No No - No No -   Physical Problems None of these None of these None of these - None of these - -   Other Problems - - - - - - -        Interval history and content of current session: Mrs. Swann discussed prognosis and current adaptation to disease and treatment status. Reports to be coping in an adequate manner. Evaluated cognitive response, paying particular attention to negative intrusive thoughts of a persistent and detrimental nature. Patient shared this week she has not had any "meltdowns" despite having new life stressors. She shares she is at the crossroads of having to decide whether to sell her home or not. She notes she is working on reframing her negative thoughts. Provided cognitive behavioral therapy to address negative cognitions. Identified and evaluated psychosocial and environmental stressors secondary to diagnosis and treatment.  Patient expressed concern about her cognitive functioning. She notes forgetfulness and impaired concentration. Examined proactive behaviors that may be implemented to minimize or ameliorate psychosocial stressors secondary to diagnosis and treatment.      Risk parameters:   Patient reports no suicidal " ideation  Patient reports no homicidal ideation  Patient reports no self-injurious behavior  Patient reports no violent behavior   Safety needs:  None at this time      Verbal deficits: None     Patient's response to intervention:The patient's response to intervention is accepting.     Progress toward goals and other mental status changes:  The patient's progress toward goals is fair .      Progress to date:Revise Objectives (Goals)      Goals from last visit: Met        Patient Strengths: verbal, intelligent, successful, good social support, good insight, commitment to wellness, strong melany, strong cultural traditions       Treatment Plan:individual psychotherapy  Target symptoms: depression, anxiety , mood swings  Why chosen therapy is appropriate versus another modality: relevant to diagnosis, evidence based practice  Outcome monitoring methods: self-report  Therapeutic intervention type: insight oriented psychotherapy, behavior modifying psychotherapy, supportive psychotherapy  Prognosis: Fair      Behavioral goals:    Exercise: increase daily exercise   Stress management: self-care, deep breathing   Therapy: challenging anxious thoughts    Return to clinic: 2 weeks     Length of Service (minutes direct face-to-face contact): 50    Diagnosis:     ICD-10-CM ICD-9-CM   1. KIANA (generalized anxiety disorder)  F41.1 300.02   2. Malignant neoplasm of upper-outer quadrant of left breast in female, estrogen receptor negative  C50.412 174.4    Z17.1 V86.1             Tucker Acuña Psy.D.  Supervising Clinical Psychologist

## 2023-02-09 ENCOUNTER — PATIENT MESSAGE (OUTPATIENT)
Dept: PSYCHIATRY | Facility: CLINIC | Age: 43
End: 2023-02-09
Payer: COMMERCIAL

## 2023-02-13 ENCOUNTER — PATIENT MESSAGE (OUTPATIENT)
Dept: HEMATOLOGY/ONCOLOGY | Facility: CLINIC | Age: 43
End: 2023-02-13
Payer: COMMERCIAL

## 2023-02-14 ENCOUNTER — CLINICAL SUPPORT (OUTPATIENT)
Dept: REHABILITATION | Facility: HOSPITAL | Age: 43
End: 2023-02-14
Payer: COMMERCIAL

## 2023-02-14 DIAGNOSIS — R26.89 DECREASED FUNCTIONAL MOBILITY: ICD-10-CM

## 2023-02-14 DIAGNOSIS — L90.5 AXILLARY WEB SYNDROME: Primary | ICD-10-CM

## 2023-02-14 DIAGNOSIS — L76.82 AXILLARY WEB SYNDROME: Primary | ICD-10-CM

## 2023-02-14 PROCEDURE — 97110 THERAPEUTIC EXERCISES: CPT | Mod: PN,CQ

## 2023-02-14 NOTE — PROGRESS NOTES
"  Physical Therapy Daily Treatment Note/Lymphedema Management     Name: Alison Swann  Clinic Number: [id number]    Therapy Diagnosis:    Axillary web syndrome Yes    Malignant neoplasm of upper-outer quadrant of left female breast, unspecified estrogen receptor status      Decreased functional mobility          Physician: Nicol Recinos NP    Visit Date: 2/14/2023    Physician Orders: PT eval and treat    Evaluation Date: 11/30/2022  RE-Assessment: 1/9/2023  ( Pt cancelled prior visit on 12/30/22)    Authorization Period Expiration: pending  Plan of Care Certification Period: 1/9/2023 - 2/9/2023  Visit #/Visits authorized: 11/ 16      Time In: 10:00 AM   Time Out: 11:00 AM  Total Billable Time: 60 minutes    Precautions: Standard, cancer, and avoid BP, IV, blood draws, or injections in L UE    Subjective   S: pt reports that she saw the surgeon and he ordered a mammogram and ultrasound and said that the lumps in her breast are fat necrosis and nothing to be concerned about. "I will be having another fat transfer surgery to fill in some gaps in my breast." Pt states that the biggest issue she is facing is brain fog and forgetfulness; would like to see a neurologist to make sure everything is ok with her brain. Pt ran the other day for 10 minutes without pain, and that was the farthest she has been able to run without pain. Pt does complain of a "pinch" in right lateral trunk. Pt reports that she is exercising but not stretching at home.      Reports been doing some of exercises cardio, trunk ext, lunges, squats sit ups at home. Denies pain.   Response to previous treatment: axillary web resolved  Functional change: absent tingling in her L arm and hand    Pain: 1/10  Location: pinching sensation in right lateral trunk    Objective   Alison received therapeutic exercises to develop strength, ROM, flexibility, posture, and core stabilization for 55 minutes including:    Nu Step: L5 10 min O2 sats 100% p. " 104  TM: level 10 min  bpm, 3.5-4.8 mph alt jog 1-2 min /walk O2 sat 99% .65 miles   TM backwards walking x 2', 1.0 MPH  TM side stepping/squats .5 MPH x 1' ea side  Squat holds 20 sec hold 5 reps pt with fatigue while holding static squat position.   Squat side steps with green tband  x 120 feet  Ball to wall ball press with knee into ball with opp leg up on toes  for core 10 sec hold 10 reps each foot  Dynamic  reverse lunge 10 reps with 2.2# weight on opp arm elevated.  Single leg bridge 20 sec hold x 3 rounds each, more challenging maintaining hip stability on R  LTR with arms in Y position x 3 reps ea side, 10 sec hold ea   Reviewed seated HHS, gastroc stretch, standing hip flexor stretch at barre, seated piriformis stretch, and standing IT band stretch at barre with encouragement to stretch daily    deferred  Seated on ball, PPT static hold with shoulder ext RTB 10 sec x 10 reps  Sitting on ball with alternating leg extension 10 sec hold 10 reps each while arms extending pressing into small ball  Static reverse lunge 10 sec x 5 sets  Sidelye hip abduction with leg kick outs 2 sets of 15 reps each leg.        Deferred due to time:  Standing trunk strengthening: ball squeeze with alternating marching def  Sitting on ball with alternating hip flexion 10 sec hold 10 reps   Diagonal hip and shoulder on ball 10 reps  Standing ball press with hip flexion hold 5 sec 5 reps each side x 2 trials  Well leg exercise on 1 inch step 10 reps, limited with crepitus on R no pain  Sidelye hip abduction 2 levels 3 x 10 b LE  TA initiation series 3 reps with TA ( knees flexed lifting up to ceiling with PPT and head lift while hands press down on thighs.         deferred  Squat with green tband 10 reps   3 level squat up/ down eccentric quad control x 10 reps deferred   Sidesteps  abd with green tbnd for resistance 3 steps out x 10 reps leading R/Ldeferred  deferred  Transverse abdominus exercises: pressed with leg lift in  "knee flexed position "initiation series" 10 sec hold 10 reps  Sidelye on R heel set with R lateral flexion  Bridging with B LE  Stationary bike x 10 min L3 deferred  UBE alternating forward and backward L2.  10 min deferred    deferred  Towel glides for shoulder flexion 10 reps with turn and breath  Strengthening for B LE provided written hep and performed:  Bridging with B LE  Side lye mermaid leg lifts with knee   Single limb bridge  Elevator SLR  Hip abduction   deferred  Side Lying 1/2 moon  upper trunk rotation x 10 reps, tightness/pulling from left breast down inside left upper arm  Side Lying elbows bent (open book rotation) x 10 reps  Pectoral stretch on foam roller with diaphragmatic breathing, scap retractions with depression  10 sec hold x 10 reps  Trunk rotation in supine with knees flexed 10 reps each side    Deferred:  Shoulder flexion/ elongation in supine  1/2 moon in side lye stretch  Aarom/ arom shoulder flexion / abduciton B UE full end range motion.  Pec corner wall stretch 5 reps  Scapular depression in standing against wall   Towel  roll pec stretch 3 min with diaphragmatic breathing  Post pelvic tilt 10   Contract relax stretches to B traps 3 30 sec hold, with R trap with increased tightness as compared to L.    deferred  Lateral pelvic tilt 10  Quadraped cat/cow 10 reps  Macarena pose 30 sec 3 times   Kneeling with full trunk flexion fpr lateral trunk flexion    Deferred   Alison received the following manual therapy techniques 25 min: Manual Lymphatic Drainage and instruction in scar massage techniques MLD for upper and lower axilla/trunk Manual lymphatic drainage to bilateral short neck series, cervical terminus , axilla, deep abdominals, sternal nodes, paravertebral nodes, AAI anastomosis , JAKE anastomosis , inguinal nodes, and rework accessing all watershed areas on trunk  Instruction in self MLD techniques throughout.    Performed lymphatouch at light pressure 30 mmHG to L axilla, " inferior region below L breast and to R lateral trunk. Recommended aquaphor for dryness to scar in posterior trunk/ provided sample to pt.      Home Exercises Provided and Patient Education Provided     Education provided: trunk strengthening and need for this in order to return to PLOF      Written Home Exercises Provided:   Exercises were reviewed and Alison was able to demonstrate them prior to the end of the session.  Alison demonstrated good  understanding of the education provided.     See EMR under Patient Instructions for exercises provided 1/9/23  Assessment   Demonstrating improved recruitment of transverse abdominus and glut med/max. All weight bearing resistive exercises done today demonstrating improving recruitment of trunk musculature.  Decreased trunk rotation and guards upper body motion during jogging on TM at 3.7mph. Continued focus for trunk strengthening and progression of endurance especially in standing ideally, progression of hep.  All surgical scars along breasts are healed.  Scheduled for a fat transfer at the end of March.     Alison Is progressing well towards her goals.   Pt prognosis is Good.     Pt will continue to benefit from skilled outpatient physical therapy to address the deficits listed in the problem list box on initial evaluation, provide pt/family education and to maximize pt's level of independence in the home and community environment.     Pt's spiritual, cultural and educational needs considered and pt agreeable to plan of care and goals.     Anticipated barriers to physical therapy: none  This patient presents s/p Bilateral mastectomy with flap reconstruction, 12 lymph nodes removed , with recent revision of breast surgery,  Resulting in: Stage 0-1 lymphedema of the left upper arm, axilla, increased pain, increased stiffness in the shoulder range of motion with flexion and abduction, as well as difficulty performing reaching over head. Patient also reports  significant decreased endurance, and limitations climbing her 30 steps up to raised camp. Patient is also at higher risk of infection. This pt would benefit from skilled therapy services to address the above impairments.      Medical necessity is demonstrated by the following IMPAIRMENTS/PROBLEMS:  1. Decreased endurance  2. Decreased ROM  3. Decreased strength throughout B UE/ LE/ trunk  4. Decreased functional mobility tolerance.  5. MLD to assist in healing process of surgery/ scar care     The following goals were discussed with the patient and patient is in agreement with them as to be addressed in the treatment plan.      Short Term Goals: (3 weeks)  1. Patient will be independent donning/doffing compression garment with good fit noted.  2. Patient will demonstrate 100% knowledge of lymphedema precautions and signs of infection. GOAL MET 12/14/2022  3. Patient will increase L UE AROM/PROM shoulder flexion to 160 to improve functional reach and ADL's GOAL MET 12/14/2022  4. Patient will increase B UE AROM/PROM shoulder abduction to 180 to improve functional reach and ADL's with decreased complaints of tightness and pain.GOAL MET 1/9/2023  5. Patient will perform self lymph drainage techniques. Pt with understanding but not performing, per self report. NOT MET 1/9/2023  6 Patient to demonstrate proper posture with weight acceptance in B LE with neutral pelvis GOAL MET 12/14/2022     Long Term Goals: (6 weeks)     1. Patient will perform self lymph drainage techniques  2. 6MWT increased by 25%. GOAL MET 1/9/2023  3. Patient will increased B UE strength  to improve overall functional reach, mobility, lifting.  4. Patient will increase L UE to WNL without difficulty raising arm overhead. GOAL MET 1/9/2023  5. Pt to have absent tingling in L UE GOAL MET 12/28/2022  6. Pt to be independent with scar massage and scar care. GOAL MET 12/28/2022  7. Diagonal stance testing to increase to at least 4/5 with increase in  trunk strength to avoid strain in back while exercising.            PLAN   Cont with POC  Focus of tx will be therapeutic exercises, monitor pt for change in ROM however pt is indep with hep for full ROM of UE at home.    Patient to be seen 1- 2 x per week for 3 more weeks for the medically necessary treatments to include: exercise, decongestive massage, intermittent compression pump, multi layered bandaging, education in lymphedema precautions, self massage, self bandaging, and assistance in obtaining appropriate compression garment.     Aurora Hanson PTA, CLT  02/14/2023

## 2023-02-17 ENCOUNTER — PATIENT MESSAGE (OUTPATIENT)
Dept: ENDOCRINOLOGY | Facility: CLINIC | Age: 43
End: 2023-02-17
Payer: COMMERCIAL

## 2023-02-21 NOTE — TELEPHONE ENCOUNTER
I missed that there were 3 messages. Not sure I can answer this in a message. Will need to schedule a visit to discuss    But can try taking afternoon of steroids earlier to see if that helps with sleep

## 2023-02-22 ENCOUNTER — PATIENT MESSAGE (OUTPATIENT)
Dept: ENDOCRINOLOGY | Facility: CLINIC | Age: 43
End: 2023-02-22
Payer: COMMERCIAL

## 2023-02-23 ENCOUNTER — PATIENT MESSAGE (OUTPATIENT)
Dept: ENDOCRINOLOGY | Facility: CLINIC | Age: 43
End: 2023-02-23
Payer: COMMERCIAL

## 2023-03-01 ENCOUNTER — PATIENT MESSAGE (OUTPATIENT)
Dept: OBSTETRICS AND GYNECOLOGY | Facility: CLINIC | Age: 43
End: 2023-03-01
Payer: COMMERCIAL

## 2023-03-02 ENCOUNTER — OFFICE VISIT (OUTPATIENT)
Dept: OBSTETRICS AND GYNECOLOGY | Facility: CLINIC | Age: 43
End: 2023-03-02
Attending: OBSTETRICS & GYNECOLOGY
Payer: COMMERCIAL

## 2023-03-02 ENCOUNTER — PATIENT MESSAGE (OUTPATIENT)
Dept: ENDOCRINOLOGY | Facility: CLINIC | Age: 43
End: 2023-03-02
Payer: COMMERCIAL

## 2023-03-02 ENCOUNTER — PATIENT MESSAGE (OUTPATIENT)
Dept: OBSTETRICS AND GYNECOLOGY | Facility: CLINIC | Age: 43
End: 2023-03-02

## 2023-03-02 VITALS
HEART RATE: 97 BPM | SYSTOLIC BLOOD PRESSURE: 112 MMHG | HEIGHT: 67 IN | WEIGHT: 124 LBS | BODY MASS INDEX: 19.46 KG/M2 | DIASTOLIC BLOOD PRESSURE: 75 MMHG

## 2023-03-02 DIAGNOSIS — Z15.09 BRCA2 GENE MUTATION POSITIVE: Chronic | ICD-10-CM

## 2023-03-02 DIAGNOSIS — Z15.01 BRCA2 GENE MUTATION POSITIVE: Chronic | ICD-10-CM

## 2023-03-02 DIAGNOSIS — Z90.710 S/P LAPAROSCOPIC HYSTERECTOMY: ICD-10-CM

## 2023-03-02 DIAGNOSIS — Z17.0 MALIGNANT NEOPLASM OF UPPER-OUTER QUADRANT OF LEFT BREAST IN FEMALE, ESTROGEN RECEPTOR POSITIVE: ICD-10-CM

## 2023-03-02 DIAGNOSIS — N94.19 DYSPAREUNIA DUE TO MEDICAL CONDITION IN FEMALE: ICD-10-CM

## 2023-03-02 DIAGNOSIS — N95.2 VAGINAL ATROPHY: Primary | ICD-10-CM

## 2023-03-02 DIAGNOSIS — C50.412 MALIGNANT NEOPLASM OF UPPER-OUTER QUADRANT OF LEFT BREAST IN FEMALE, ESTROGEN RECEPTOR POSITIVE: ICD-10-CM

## 2023-03-02 PROCEDURE — 1159F MED LIST DOCD IN RCRD: CPT | Mod: CPTII,S$GLB,, | Performed by: OBSTETRICS & GYNECOLOGY

## 2023-03-02 PROCEDURE — 3078F DIAST BP <80 MM HG: CPT | Mod: CPTII,S$GLB,, | Performed by: OBSTETRICS & GYNECOLOGY

## 2023-03-02 PROCEDURE — 3008F PR BODY MASS INDEX (BMI) DOCUMENTED: ICD-10-PCS | Mod: CPTII,S$GLB,, | Performed by: OBSTETRICS & GYNECOLOGY

## 2023-03-02 PROCEDURE — 1159F PR MEDICATION LIST DOCUMENTED IN MEDICAL RECORD: ICD-10-PCS | Mod: CPTII,S$GLB,, | Performed by: OBSTETRICS & GYNECOLOGY

## 2023-03-02 PROCEDURE — 99214 PR OFFICE/OUTPT VISIT, EST, LEVL IV, 30-39 MIN: ICD-10-PCS | Mod: S$GLB,,, | Performed by: OBSTETRICS & GYNECOLOGY

## 2023-03-02 PROCEDURE — 3074F PR MOST RECENT SYSTOLIC BLOOD PRESSURE < 130 MM HG: ICD-10-PCS | Mod: CPTII,S$GLB,, | Performed by: OBSTETRICS & GYNECOLOGY

## 2023-03-02 PROCEDURE — 3074F SYST BP LT 130 MM HG: CPT | Mod: CPTII,S$GLB,, | Performed by: OBSTETRICS & GYNECOLOGY

## 2023-03-02 PROCEDURE — 3078F PR MOST RECENT DIASTOLIC BLOOD PRESSURE < 80 MM HG: ICD-10-PCS | Mod: CPTII,S$GLB,, | Performed by: OBSTETRICS & GYNECOLOGY

## 2023-03-02 PROCEDURE — 3008F BODY MASS INDEX DOCD: CPT | Mod: CPTII,S$GLB,, | Performed by: OBSTETRICS & GYNECOLOGY

## 2023-03-02 PROCEDURE — 99214 OFFICE O/P EST MOD 30 MIN: CPT | Mod: S$GLB,,, | Performed by: OBSTETRICS & GYNECOLOGY

## 2023-03-02 PROCEDURE — 99999 PR PBB SHADOW E&M-EST. PATIENT-LVL III: CPT | Mod: PBBFAC,,, | Performed by: OBSTETRICS & GYNECOLOGY

## 2023-03-02 PROCEDURE — 99999 PR PBB SHADOW E&M-EST. PATIENT-LVL III: ICD-10-PCS | Mod: PBBFAC,,, | Performed by: OBSTETRICS & GYNECOLOGY

## 2023-03-02 RX ORDER — ESTRADIOL 10 UG/1
10 INSERT VAGINAL NIGHTLY
Qty: 14 EACH | Refills: 0 | Status: SHIPPED | OUTPATIENT
Start: 2023-03-02 | End: 2023-05-30

## 2023-03-02 RX ORDER — ESTRADIOL 10 UG/1
10 INSERT VAGINAL
Qty: 8 EACH | Refills: 11 | Status: SHIPPED | OUTPATIENT
Start: 2023-03-02 | End: 2023-12-18

## 2023-03-02 NOTE — PROGRESS NOTES
"SUBJECTIVE:   42 y.o. female   presents today to discuss vaginal dryness and pain with intercourse.  No LMP recorded (lmp unknown). Patient has had a hysterectomy..  She reports that she is interested in vaginal rejuvenation.   She has triple negative breast cancer diagnosed 2021.  She underwent RATLH/BSO with Dr. Galvez   She has tried vaginal moisturizer and lubricant. She does not want medication if possible "I take so many pills"   She reports that she has some hot flashes and night sweats but these are not bothering her.  She reports that her libido is good and that she typically has sex 3 times per week but is inhibited because of her pain    Past Medical History:   Diagnosis Date    Attention Deficit Hyperactivity Disorder     Family H/O Diabetes Mellitus     Her Father    Generalized Anxiety     Left Breast Cancer S.P Lumpectomy In 2021     Dr. Dominga Johnsotn; Dr. Sandra Sotelo (Heme/Onc); 21 On CMT; Is Estrogen Receptor Negative; She Is BRCA2 Gene Mutation Positive, Andf Her Mother Also With A H/O Breast Cancer    Macrocytic Anemia     Associated With Her Chemotherapy    Postoperative Nausea And Vomiting     Scopolamine Patches Work Well For Her For This    Scoliosis     Therapeutic Drug Monitoring     Wellness Visit 2021      Past Surgical History:   Procedure Laterality Date    AUGMENTATION OF BREAST  2012    BREAST MASS EXCISION Left 2021    Procedure: EXCISION, MASS, BREAST- 2 oclock left breast with ultrasound guidance;  Surgeon: Rashmi Johnston MD;  Location: [facility];  Service: General;  Laterality: Left;    BREAST SURGERY       SECTION      COLONOSCOPY N/A 2021    Procedure: COLONOSCOPY;  Surgeon: Mason Quintero MD;  Location: [facility] ([address] [place] [address]);  Service: Endoscopy;  Laterality: N/A;    ESOPHAGOGASTRODUODENOSCOPY N/A 2021    Procedure: EGD (ESOPHAGOGASTRODUODENOSCOPY);  Surgeon: Mason Quintero MD;  Location: [facility] ([address] [place] [address]);  Service: " Endoscopy;  Laterality: N/A;    HYSTERECTOMY      INSERTION OF TUNNELED CENTRAL VENOUS CATHETER (CVC) WITH SUBCUTANEOUS PORT N/A 2021    Procedure: XILNOEMIV-AEYD-I-CATH;  Surgeon: Griffin Becker MD;  Location: [facility];  Service: General;  Laterality: N/A;    OOPHORECTOMY      ROBOT-ASSISTED LAPAROSCOPIC ABDOMINAL HYSTERECTOMY USING DA NATALIIA XI N/A 2022    Procedure: XI ROBOTIC HYSTERECTOMY;  Surgeon: Carmella Galvez MD;  Location: [facility] [place], operated by [facility] OR;  Service: OB/GYN;  Laterality: N/A;    ROBOT-ASSISTED LAPAROSCOPIC SALPINGO-OOPHORECTOMY USING DA NATALIIA XI Bilateral 2022    Procedure: XI ROBOTIC SALPINGO-OOPHORECTOMY;  Surgeon: Carmella Galvez MD;  Location: [facility];  Service: OB/GYN;  Laterality: Bilateral;    SENTINEL LYMPH NODE BIOPSY Left 2021    Procedure: CORE BIOPSY, LYMPH NODE, SENTINEL;  Surgeon: Rashmi Johnston MD;  Location: [facility];  Service: General;  Laterality: Left;    TUBAL LIGATION       Social History     Socioeconomic History    Marital status:    Occupational History    Occupation: self employed   Tobacco Use    Smoking status: Never    Smokeless tobacco: Never   Substance and Sexual Activity    Alcohol use: Yes     Comment: occasionally    Drug use: Never    Sexual activity: Not Currently     Birth control/protection: See Surgical Hx     Family History   Problem Relation Age of Onset    COPD Maternal Grandmother     Stroke Father         50s, multiple    Diabetes Father     Anemia Father     Hyperlipidemia Father     Breast cancer Mother 52    Depression Mother     Cancer Other         unk abdominal origin- dx 20s    Pancreatic cancer Other         suspected    Colon cancer Neg Hx     Esophageal cancer Neg Hx     Ovarian cancer Neg Hx      OB History    Para Term  AB Living   1 1 1         SAB IAB Ectopic Multiple Live Births                  # Outcome Date GA Lbr Gustavo/2nd Weight Sex Delivery Anes PTL Lv   1 Term      CS-LTranv              Current  Outpatient Medications   Medication Sig Dispense Refill    dextroamphetamine-amphetamine 10 mg Tab Take 1 tablet by mouth once daily.       hydrocortisone (CORTEF) 10 MG Tab 20 mg in AM and 20 mg in early afternoon 120 tablet 1    LORazepam (ATIVAN) 1 MG tablet Take 1 mg by mouth every evening.      ondansetron (ZOFRAN-ODT) 8 MG TbDL DISSOLVE 1 TABLET ON THE TONGUE EVERY 6 TO 8 HOURS AS NEEDED FOR NAUSEA      valACYclovir (VALTREX) 500 MG tablet Take 1 tablet (500 mg total) by mouth 2 (two) times daily. for 5 days 10 tablet 0    estradioL (IMVEXXY MAINTENANCE PACK) 10 mcg Inst Place 10 mcg vaginally twice a week. 8 each 11    estradioL (IMVEXXY STARTER PACK) 10 mcg InPk Place 10 mcg vaginally every evening. 14 each 0     No current facility-administered medications for this visit.     Facility-Administered Medications Ordered in Other Visits   Medication Dose Route Frequency Provider Last Rate Last Admin    lactated ringers infusion   Intravenous Continuous Leon Torres MD   Stopped at 01/19/21 1345    sodium chloride 0.9% flush 10 mL  10 mL Intravenous PRN Sandra Sotelo MD   10 mL at 08/15/22 1330     Allergies: Penicillins     The ASCVD Risk score (Hunnewell DK, et al., 2019) failed to calculate for the following reasons:    Cannot find a previous HDL lab    Cannot find a previous total cholesterol lab      ROS:  Constitutional: no weight loss, weight gain, fever, fatigue  Eyes:  No vision changes, glasses/contacts  ENT/Mouth: No ulcers, sinus problems, ears ringing, headache  Cardiovascular: No inability to lie flat, chest pain, exercise intolerance, swelling, heart palpitations  Respiratory: No wheezing, coughing blood, shortness of breath, or cough  Gastrointestinal: No diarrhea, bloody stool, nausea/vomiting, constipation, gas, hemorrhoids  Genitourinary: No blood in urine, painful urination, urgency of urination, frequency of urination, incomplete emptying, incontinence, abnormal bleeding, painful  periods, heavy periods, vaginal discharge, vaginal odor, +painful intercourse, sexual problems, bleeding after intercourse.  Musculoskeletal: No muscle weakness  Skin/Breast: No painful breasts, nipple discharge, masses, rash, ulcers  Neurological: No passing out, seizures, numbness, headache  Endocrine: No diabetes, hypothyroid, hyperthyroid, +hot flashes, hair loss, abnormal hair growth, acne  Psychiatric: No depression, crying  Hematologic: No bruises, bleeding, swollen lymph nodes, anemia.      Physical Exam  General- well developed, well nourished  Vulva- no masses, no lesions  Vagina-  no masses, no lesions, +atrophy  Cervix- absent surgically  Uterus- absent surgically  Adnexa- nontender, no masses      ASSESSMENT:   1. Vaginal atrophy  estradioL (IMVEXXY STARTER PACK) 10 mcg InPk    estradioL (IMVEXXY MAINTENANCE PACK) 10 mcg Inst      2. Malignant neoplasm of upper-outer quadrant of left breast in female, estrogen receptor positive        3. s/p RA-TLH/BSO        4. BRCA2 gene mutation positive        5. Dyspareunia due to medical condition in female              PLAN: total time 25 minutes- face to face, review of medical record and arranging follow up with Dr. Ashok Thakkar  Counseled her that I would recommend use of vaginal estrogen as well as Vaginal laser. Risks/benefits and possible side effects reviewed  Will refer to Dr. Ashok Thakkar in [place] for vaginal laser

## 2023-03-03 ENCOUNTER — PATIENT MESSAGE (OUTPATIENT)
Dept: OBSTETRICS AND GYNECOLOGY | Facility: CLINIC | Age: 43
End: 2023-03-03
Payer: COMMERCIAL

## 2023-03-05 ENCOUNTER — PATIENT MESSAGE (OUTPATIENT)
Dept: REHABILITATION | Facility: HOSPITAL | Age: 43
End: 2023-03-05
Payer: COMMERCIAL

## 2023-03-05 NOTE — TELEPHONE ENCOUNTER
Steroid recommendations:    Give 50 mg hydrocortisone IV on-call to OR or immediately prior to surgery.  Then will need 25 mg of hydrocortisone Q 8 hours x 24 hours then   Hydrocortisone 25 mg twice daily x 24 hours then go back to previous dose.    If unable to take hydrocortisone immediately after surgery, the dose can be given IV if needed.  If any postoperative complications, let us know, as we can extend out her steroid taper.    Monitor for nausea, vomiting, orthostatic hypotension    Can send this to surgeon as well

## 2023-03-06 ENCOUNTER — PATIENT MESSAGE (OUTPATIENT)
Dept: PSYCHIATRY | Facility: CLINIC | Age: 43
End: 2023-03-06
Payer: COMMERCIAL

## 2023-03-06 ENCOUNTER — TELEPHONE (OUTPATIENT)
Dept: HEMATOLOGY/ONCOLOGY | Facility: CLINIC | Age: 43
End: 2023-03-06
Payer: COMMERCIAL

## 2023-03-06 ENCOUNTER — PATIENT MESSAGE (OUTPATIENT)
Dept: ENDOCRINOLOGY | Facility: CLINIC | Age: 43
End: 2023-03-06
Payer: COMMERCIAL

## 2023-03-06 NOTE — TELEPHONE ENCOUNTER
Lvm to see if pt wanted to r/s her PT appts for a reassessment with Merly or Mary Jane.    ----- Message from Javier Jarquin MA sent at 3/6/2023  9:12 AM CST -----  Regarding: PT appts  Merly---hey I called her this am and got voice mail, she may be calling you I told her she has two choices, continue with hep at home, or if she comes back for final two visits she needs to reschedule the next one to be with either mary jane or myself for PT reassess every t30 days....just wanted to keep you in loop

## 2023-03-08 ENCOUNTER — OFFICE VISIT (OUTPATIENT)
Dept: PSYCHIATRY | Facility: CLINIC | Age: 43
End: 2023-03-08
Payer: COMMERCIAL

## 2023-03-08 DIAGNOSIS — C50.412 MALIGNANT NEOPLASM OF UPPER-OUTER QUADRANT OF LEFT BREAST IN FEMALE, ESTROGEN RECEPTOR NEGATIVE: ICD-10-CM

## 2023-03-08 DIAGNOSIS — F41.1 GAD (GENERALIZED ANXIETY DISORDER): Primary | ICD-10-CM

## 2023-03-08 DIAGNOSIS — Z17.1 MALIGNANT NEOPLASM OF UPPER-OUTER QUADRANT OF LEFT BREAST IN FEMALE, ESTROGEN RECEPTOR NEGATIVE: ICD-10-CM

## 2023-03-08 PROCEDURE — 90834 PSYTX W PT 45 MINUTES: CPT | Mod: 95,,,

## 2023-03-08 PROCEDURE — 90834 PR PSYCHOTHERAPY W/PATIENT, 45 MIN: ICD-10-PCS | Mod: 95,,,

## 2023-03-08 NOTE — PROGRESS NOTES
The patient location is:  [address] [place] [id number]  The patient location [place] is: [facility]  The patient phone number is: [phone]   Visit type: Virtual visit with synchronous audio and video  Each patient to whom he or she provides medical services by telemedicine is:  (1) informed of the relationship between the provider and patient and the respective role of any other health care provider with respect to management of the patient; and (2) notified that he or she may decline to receive medical services by telemedicine and may withdraw from such care at any time.    Crisis Disclaimer: Patient was informed that due to the virtual nature of the visit, that if a crisis develops, protocols will be implemented to ensure patient safety, including but not limited to: 1) Initiating a welfare check with local Law Enforcement, 2) Calling [facility]/National Crisis Hotline, and/or 3) Initiating PEC/CEC procedures.    Time (Face-to-face): 32 minutes    Time (Non-face-to-face): 15 minutes     PSYCHO-ONCOLOGY NOTE/ Individual Psychotherapy     Date: 102/08/2023   Site:  [facility]      Therapeutic Intervention: Met with patient.  Outpatient - Behavior modifying psychotherapy 45 min - CPT code 61257    This includes face to face time and non-face to face time preparing to see the patient, obtaining and/or reviewing separately obtained history, documenting clinical information in the electronic or other health record, independently interpreting results and communicating results to the patient/family/caregiver, or care coordinator.      Patient was last seen by me on 02/08/2023    Problem list  Patient Active Problem List   Diagnosis    Ductal carcinoma in situ of left breast    Malignant neoplasm of upper-outer quadrant of left female breast    Decreased functional mobility    Dehydration    Anemia associated with chemotherapy    Nausea and vomiting    BRCA2 gene mutation positive    Scoliosis    Attention  Deficit Hyperactivity Disorder    Therapeutic Drug Monitoring    Postoperative Nausea And Vomiting    Macrocytic Anemia    Generalized Anxiety    Family H/O Diabetes Mellitus    Fatigue    Hyponatremia    Elevated troponin    Diarrhea    Intravascular volume depletion    Hypomagnesemia    Hypokalemia    Anterior T wave inversion    Elevated LFTs    Colitis, indeterminate    Encounter for immunotherapy    Diarrhea due to drug    Pneumonitis    s/p RA-TLH/BSO    JA (acute kidney injury)    Adrenal crisis    Sepsis    Chest pain    Adrenal insufficiency    Axillary web syndrome       Chief complaint/reason for encounter: depression and anxiety   Met with patient to evaluate psychosocial adaptation to diagnosis/treatment/survivorship of malignant neoplasm of upper-outer quadrant of left female breasts    Current Medications  Current Outpatient Medications   Medication    dextroamphetamine-amphetamine 10 mg Tab    estradioL (IMVEXXY MAINTENANCE PACK) 10 mcg Inst    estradioL (IMVEXXY STARTER PACK) 10 mcg InPk    hydrocortisone (CORTEF) 10 MG Tab    LORazepam (ATIVAN) 1 MG tablet    ondansetron (ZOFRAN-ODT) 8 MG TbDL    valACYclovir (VALTREX) 500 MG tablet     No current facility-administered medications for this visit.     Facility-Administered Medications Ordered in Other Visits   Medication Frequency    lactated ringers infusion Continuous    sodium chloride 0.9% flush 10 mL PRN       ONCOLOGY HISTORY  Oncology History   Malignant neoplasm of upper-outer quadrant of left female breast   1/19/2021 Cancer Staged    Staging form: Breast, AJCC 8th Edition  - Clinical stage from 1/19/2021: Stage IIIB (cT2, cN1, cM0, G3, ER-, PA-, HER2-)       1/28/2021 Initial Diagnosis    Malignant neoplasm of upper-outer quadrant of left breast in female, estrogen receptor negative     2/18/2021 - 2/18/2021 Chemotherapy    Treatment Summary   Plan Name: OP BREAST DOSE-DENSE AC - DOXORUBICIN CYCLOPHOSPHAMIDE Q2W  Treatment Goal:  Curative  Status: Inactive  Start Date:   End Date:   Provider: Jhon Suazo MD  Chemotherapy: DOXOrubicin chemo injection 96 mg, 60 mg/m2, Intravenous, Clinic/[facility] 1 time, 0 of 4 cycles  cyclophosphamide (CYTOXAN) 600 mg/m2 = 965 mg in sodium chloride 0.9% 250 mL chemo infusion, 600 mg/m2, Intravenous, Clinic/[facility] 1 time, 0 of 4 cycles       2/25/2021 - 7/6/2021 Chemotherapy    Treatment Summary   Plan Name: OP BREAST PACLITAXEL WEEKLY + CARBOPLATIN (AUC 5) Q3W FOLLOWED BY AC WITH PEMBROLIZUMAB FOLLOWED BY PEMBROLIZUMAB   Treatment Goal: Curative  Status: Inactive  Start Date: 2/25/2021  End Date: 7/6/2021  Provider: Sandra Sotelo MD  Chemotherapy: DOXOrubicin chemo injection 112 mg, 60 mg/m2 = 112 mg (100 % of original dose 60 mg/m2), Intravenous, Once, 3 of 4 cycles  Dose modification: 60 mg/m2 (original dose 60 mg/m2, Cycle 5)  Administration: 112 mg (5/25/2021), 112 mg (6/15/2021), 110 mg (7/6/2021)  CARBOplatin (PARAPLATIN) 605 mg in sodium chloride 0.9% 250 mL chemo infusion, 605 mg (100 % of original dose 603.5 mg), Intravenous, Clinic/[facility] 1 time, 4 of 4 cycles  Dose modification:   (original dose 603.5 mg, Cycle 1)  Administration: 605 mg (2/25/2021), 630 mg (3/22/2021), 750 mg (4/13/2021), 750 mg (5/4/2021)  cyclophosphamide 600 mg/m2 = 1,100 mg in sodium chloride 0.9% 100 mL chemo infusion, 600 mg/m2 = 1,100 mg (100 % of original dose 600 mg/m2), Intravenous, Clinic/[facility] 1 time, 3 of 4 cycles  Dose modification: 600 mg/m2 (original dose 600 mg/m2, Cycle 5), 600 mg/m2 (Cycle 8)  Administration: 1,100 mg (5/25/2021), 1,100 mg (6/15/2021), 1,100 mg (7/6/2021)  PACLitaxeL (TAXOL) 80 mg/m2 = 132 mg in sodium chloride 0.9% 250 mL chemo infusion, 80 mg/m2 = 132 mg (100 % of original dose 80 mg/m2), Intravenous, Clinic/[facility] 1 time, 4 of 4 cycles  Dose modification: 80 mg/m2 (original dose 80 mg/m2, Cycle 1)  Administration: 132 mg (2/25/2021), 132 mg (3/4/2021), 132 mg (3/11/2021), 138 mg (3/22/2021), 138 mg  (3/30/2021), 138 mg (4/6/2021), 138 mg (4/13/2021), 138 mg (4/20/2021), 144 mg (4/27/2021), 144 mg (5/4/2021), 144 mg (5/11/2021), 144 mg (5/18/2021)  pembrolizumab (KEYTRUDA) 200 mg in sodium chloride 0.9% 100 mL chemo infusion, 200 mg, Intravenous, Clinic/[facility] 1 time, 7 of 18 cycles  Administration: 200 mg (2/25/2021), 200 mg (5/25/2021), 200 mg (3/22/2021), 200 mg (4/13/2021), 200 mg (5/4/2021), 200 mg (6/15/2021), 200 mg (7/6/2021)       4/15/2021 - 4/15/2021 Chemotherapy    Treatment Summary   Plan Name: OP BREAST PEMBROLIZUMAB Q3W PACLITAXEL CARBOPLATIN WEEKLY FOLLOWED BY PEMBROLIZUMAB DOXORUBICIN CYCLOPHOSPHAMIDE Q3W   Treatment Goal: Curative  Status: Inactive  Start Date:   End Date:   Provider: Jhon Suazo MD  Chemotherapy: CARBOplatin (PARAPLATIN) in sodium chloride 0.9% 250 mL chemo infusion,  (original dose ), Intravenous, Clinic/[facility] 1 time, 0 of 3 cycles  Dose modification:   (Cycle 1)  cyclophosphamide in sodium chloride 0.9% chemo infusion, 600 mg/m2 = 965 mg (original dose ), Intravenous, Clinic/[facility] 1 time, 0 of 1 cycle  Dose modification: 600 mg/m2 (Cycle 2)  DOXOrubicin (ADRIAMYCIN) in sodium chloride 0.9% 250 mL chemo infusion, 60 mg/m2 (original dose ), Intravenous, Clinic/[facility] 1 time, 0 of 1 cycle  Dose modification: 60 mg/m2 (Cycle 2)  PACLitaxeL (TAXOL) in sodium chloride 0.9% 100 mL chemo infusion, 80 mg/m2 (original dose ), Intravenous, Clinic/[facility] 1 time, 0 of 1 cycle  Dose modification: 80 mg/m2 (Cycle 1)  PEMEtrexed (ALIMTA) in sodium chloride 0.9% chemo infusion, 500 mg/m2, Intravenous, Clinic/[facility] 1 time, 0 of 33 cycles  pembrolizumab (KEYTRUDA) 200 mg in sodium chloride 0.9% 100 mL chemo infusion, 200 mg, Intravenous, Clinic/[facility] 1 time, 0 of 35 cycles       11/19/2021 - 1/4/2022 Radiation Therapy    Treating physician: Maryann Perales  Total Dose: 50 Gy  Fractions: 25    DIAGNOSIS:  C50.412 - Malignant neoplasm of upper-outer quadrant of left female breast, Diagnosed 11/11/2021  (Active) Stage IIIB, T2, N1, M0, G3, HER2 Neg, ER Neg, MT Neg    This is a 41 y.o. y/o female with cT2,c N1 and M0 G3, (Stage IIIB), triple negative, Ki-67 79%, LEFT upper outer quadrant breast, BRCA2+, status post lumpectomy 1/19/21, with residual larisa disease, followed by  adjuvant chemo-immunotherapy with carbo-taxol and pembrolizumab, followed by AC with pembrolizumab, followed by adjuvant pemprolizumab complicated by ongoing, chronic colitis/diarrhea necessitating long-term prednisone.  Underwent bilateral mastectomy 9/29/21 with no residual carcinoma identified in breast of 14 sampled nodes (ypN0). She has completed adjuvant LEFT breast and regional larisa radiation therapy to a dose of 50Gy.    Treatment Summary  Course: C1 BREAST 2021    Treatment Site Ref. ID Energy Dose/Fx (Gy) #Fx Dose Correction (Gy) Total Dose (Gy) Start Date End Date Elapsed Days   3D Sclav_L rxsc 18X/6X 2 25 / 25 0 50 11/19/2021 1/4/2022 46   3D Breast L Breast_L 18X/6X 2 25 / 25 0 50 11/19/2021 1/4/2022 46     Tolerated well.  2 day treatment break at patient's request for patch of dry desquamation, and 5 day treatment break due to CoVID infection.  Otherwise, patient completed her course of therapy as prescribed.       PLAN: RTC 1 month for routine follow up. Continue current skin care/sun protection for now.  Follow up with other providers as directed.         Objective:  Alison Swann arrived promptly for the session. Mrs. Swann was independently ambulatory at the time of session. The patient was fully cooperative throughout the session.  Appearance: age appropriate, casually  dressed, adequately  groomed  Behavior/Cooperation: friendly and cooperative  Speech: normal in rate, volume, and tone  Mood: euthymic  Affect: labile  Thought Process: goal-directed, logical  Thought Content: normal,  No delusions or paranoia; did not appear to be responding to internal stimuli during the session  Orientation: grossly intact  Memory:  "grossly intact  Attention Span/Concentration: Attends to session without distraction; reports no difficulty  Fund of Knowledge: average  Estimate of Intelligence: average from verbal skills and history  Cognition: grossly intact at time of appointment.  Insight: patient has awareness of illness; good insight into own behavior and behavior of others  Judgment: the patient's behavior is adequate to circumstances    NCCN Distress thermometer:   DISTRESS SCREENING 12/19/2022 10/12/2022 10/12/2022 8/15/2022 8/15/2022 6/1/2022 4/29/2022   Distress Score 3 10 - Extreme Distress 0 - No Distress 0 - No Distress 2 2 4   Practical Problems Insurance/Financial Work/School None of these - None of these - -   Family Problems None of these None of these None of these - None of these - -   Emotional Problems Depression;Sadness;Worry Fears;Worry None of these - None of these - -   Spiritual / Cheondoism Concerns No No No - No No -   Physical Problems None of these None of these None of these - None of these - -   Other Problems - - - - - - -        Interval history and content of current session: Mrs. Swann discussed prognosis and current adaptation to disease and treatment status. Reports to be coping in an adequate manner. Evaluated cognitive response, paying particular attention to negative intrusive thoughts of a persistent and detrimental nature. Patient shared the last couple of weeks have been difficult, with multiple emotional "breakdowns". Patient expressed concern about her cognitive functioning. She notes forgetfulness and impaired concentration. She has an upcoming appointment with Endocrinology to address hormone concerns. She shares her home has officially gone on the market, and has interest. She notes she is working on reframing her negative thoughts. Provided cognitive behavioral therapy to address negative cognitions. Identified and evaluated psychosocial and environmental stressors secondary to diagnosis and " treatment.   Examined proactive behaviors that may be implemented to minimize or ameliorate psychosocial stressors secondary to diagnosis and treatment.      Risk parameters:   Patient reports no suicidal ideation  Patient reports no homicidal ideation  Patient reports no self-injurious behavior  Patient reports no violent behavior   Safety needs:  None at this time      Verbal deficits: None     Patient's response to intervention:The patient's response to intervention is accepting.     Progress toward goals and other mental status changes:  The patient's progress toward goals is fair .      Progress to date:Revise Objectives (Goals)      Goals from last visit: Met        Patient Strengths: verbal, intelligent, successful, good social support, good insight, commitment to wellness, strong melany, strong cultural traditions       Treatment Plan:individual psychotherapy  Target symptoms: depression, anxiety , mood swings  Why chosen therapy is appropriate versus another modality: relevant to diagnosis, evidence based practice  Outcome monitoring methods: self-report  Therapeutic intervention type: insight oriented psychotherapy, behavior modifying psychotherapy, supportive psychotherapy  Prognosis: Fair      Behavioral goals:    Exercise: increase daily exercise   Stress management: self-care, deep breathing   Therapy: challenging anxious thoughts    Return to clinic: as scheduled     Length of Service (minutes direct face-to-face contact): 32    Diagnosis:     ICD-10-CM ICD-9-CM   1. KIANA (generalized anxiety disorder)  F41.1 300.02   2. Malignant neoplasm of upper-outer quadrant of left breast in female, estrogen receptor negative  C50.412 174.4    Z17.1 V86.1               Tucker Acuña Psy.D.  Supervising Clinical Psychologist

## 2023-03-14 ENCOUNTER — PATIENT MESSAGE (OUTPATIENT)
Dept: HEMATOLOGY/ONCOLOGY | Facility: CLINIC | Age: 43
End: 2023-03-14
Payer: COMMERCIAL

## 2023-03-14 DIAGNOSIS — C50.412 MALIGNANT NEOPLASM OF UPPER-OUTER QUADRANT OF LEFT FEMALE BREAST, UNSPECIFIED ESTROGEN RECEPTOR STATUS: Primary | ICD-10-CM

## 2023-03-15 ENCOUNTER — INFUSION (OUTPATIENT)
Dept: INFUSION THERAPY | Facility: HOSPITAL | Age: 43
End: 2023-03-15
Attending: INTERNAL MEDICINE
Payer: COMMERCIAL

## 2023-03-15 DIAGNOSIS — C50.412 MALIGNANT NEOPLASM OF UPPER-OUTER QUADRANT OF LEFT BREAST IN FEMALE, ESTROGEN RECEPTOR POSITIVE: Primary | ICD-10-CM

## 2023-03-15 DIAGNOSIS — C50.412 MALIGNANT NEOPLASM OF UPPER-OUTER QUADRANT OF LEFT FEMALE BREAST, UNSPECIFIED ESTROGEN RECEPTOR STATUS: ICD-10-CM

## 2023-03-15 DIAGNOSIS — Z17.0 MALIGNANT NEOPLASM OF UPPER-OUTER QUADRANT OF LEFT BREAST IN FEMALE, ESTROGEN RECEPTOR POSITIVE: Primary | ICD-10-CM

## 2023-03-15 LAB
ANION GAP SERPL CALC-SCNC: 11 MMOL/L (ref 8–16)
BACTERIA #/AREA URNS HPF: NORMAL /HPF
BASOPHILS # BLD AUTO: 0.02 K/UL (ref 0–0.2)
BASOPHILS NFR BLD: 0.3 % (ref 0–1.9)
BILIRUB UR QL STRIP: NEGATIVE
BUN SERPL-MCNC: 10 MG/DL (ref 6–20)
CALCIUM SERPL-MCNC: 9.5 MG/DL (ref 8.7–10.5)
CHLORIDE SERPL-SCNC: 104 MMOL/L (ref 95–110)
CLARITY UR: CLEAR
CO2 SERPL-SCNC: 26 MMOL/L (ref 23–29)
COLOR UR: YELLOW
CREAT SERPL-MCNC: 0.8 MG/DL (ref 0.5–1.4)
DIFFERENTIAL METHOD: ABNORMAL
EOSINOPHIL # BLD AUTO: 0.1 K/UL (ref 0–0.5)
EOSINOPHIL NFR BLD: 1 % (ref 0–8)
ERYTHROCYTE [DISTWIDTH] IN BLOOD BY AUTOMATED COUNT: 13.1 % (ref 11.5–14.5)
EST. GFR  (NO RACE VARIABLE): >60 ML/MIN/1.73 M^2
GLUCOSE SERPL-MCNC: 93 MG/DL (ref 70–110)
GLUCOSE UR QL STRIP: NEGATIVE
HCT VFR BLD AUTO: 38.7 % (ref 37–48.5)
HGB BLD-MCNC: 13.2 G/DL (ref 12–16)
HGB UR QL STRIP: NEGATIVE
IMM GRANULOCYTES # BLD AUTO: 0.02 K/UL (ref 0–0.04)
IMM GRANULOCYTES NFR BLD AUTO: 0.3 % (ref 0–0.5)
KETONES UR QL STRIP: NEGATIVE
LEUKOCYTE ESTERASE UR QL STRIP: ABNORMAL
LYMPHOCYTES # BLD AUTO: 2.1 K/UL (ref 1–4.8)
LYMPHOCYTES NFR BLD: 26.3 % (ref 18–48)
MCH RBC QN AUTO: 30.2 PG (ref 27–31)
MCHC RBC AUTO-ENTMCNC: 34.1 G/DL (ref 32–36)
MCV RBC AUTO: 89 FL (ref 82–98)
MICROSCOPIC COMMENT: NORMAL
MONOCYTES # BLD AUTO: 0.6 K/UL (ref 0.3–1)
MONOCYTES NFR BLD: 7.6 % (ref 4–15)
NEUTROPHILS # BLD AUTO: 5.2 K/UL (ref 1.8–7.7)
NEUTROPHILS NFR BLD: 64.5 % (ref 38–73)
NITRITE UR QL STRIP: NEGATIVE
NRBC BLD-RTO: 0 /100 WBC
PH UR STRIP: 7 [PH] (ref 5–8)
PLATELET # BLD AUTO: 221 K/UL (ref 150–450)
PMV BLD AUTO: 8.7 FL (ref 9.2–12.9)
POTASSIUM SERPL-SCNC: 3.9 MMOL/L (ref 3.5–5.1)
PROT UR QL STRIP: NEGATIVE
RBC # BLD AUTO: 4.37 M/UL (ref 4–5.4)
SODIUM SERPL-SCNC: 141 MMOL/L (ref 136–145)
SP GR UR STRIP: <=1.005 (ref 1–1.03)
SQUAMOUS #/AREA URNS HPF: 6 /HPF
URN SPEC COLLECT METH UR: ABNORMAL
WBC # BLD AUTO: 7.99 K/UL (ref 3.9–12.7)
WBC #/AREA URNS HPF: 2 /HPF (ref 0–5)

## 2023-03-15 PROCEDURE — A4216 STERILE WATER/SALINE, 10 ML: HCPCS | Mod: PN | Performed by: INTERNAL MEDICINE

## 2023-03-15 PROCEDURE — 85025 COMPLETE CBC W/AUTO DIFF WBC: CPT | Mod: PN | Performed by: PHYSICIAN ASSISTANT

## 2023-03-15 PROCEDURE — 81000 URINALYSIS NONAUTO W/SCOPE: CPT | Mod: PN | Performed by: PHYSICIAN ASSISTANT

## 2023-03-15 PROCEDURE — 80048 BASIC METABOLIC PNL TOTAL CA: CPT | Mod: PN | Performed by: PHYSICIAN ASSISTANT

## 2023-03-15 PROCEDURE — 36591 DRAW BLOOD OFF VENOUS DEVICE: CPT | Mod: PN

## 2023-03-15 PROCEDURE — 25000003 PHARM REV CODE 250: Mod: PN | Performed by: INTERNAL MEDICINE

## 2023-03-15 RX ORDER — HEPARIN 100 UNIT/ML
500 SYRINGE INTRAVENOUS
Status: CANCELLED | OUTPATIENT
Start: 2023-03-15

## 2023-03-15 RX ORDER — SODIUM CHLORIDE 0.9 % (FLUSH) 0.9 %
10 SYRINGE (ML) INJECTION
Status: DISCONTINUED | OUTPATIENT
Start: 2023-03-15 | End: 2023-03-15 | Stop reason: HOSPADM

## 2023-03-15 RX ORDER — SODIUM CHLORIDE 0.9 % (FLUSH) 0.9 %
10 SYRINGE (ML) INJECTION
Status: CANCELLED | OUTPATIENT
Start: 2023-03-15

## 2023-03-15 RX ADMIN — Medication 10 ML: at 02:03

## 2023-03-15 NOTE — PLAN OF CARE
.Implanted port accessed, flushed easily with adequate blood return achieved.    Lab specimens drawn and walked to laboratory.  Port flushed with NS  per protocol and deaccessed.  Pt tolerated well with no questions or complaints.

## 2023-03-16 ENCOUNTER — PATIENT MESSAGE (OUTPATIENT)
Dept: HEMATOLOGY/ONCOLOGY | Facility: CLINIC | Age: 43
End: 2023-03-16
Payer: COMMERCIAL

## 2023-03-17 ENCOUNTER — PATIENT MESSAGE (OUTPATIENT)
Dept: ENDOCRINOLOGY | Facility: CLINIC | Age: 43
End: 2023-03-17
Payer: COMMERCIAL

## 2023-03-19 ENCOUNTER — PATIENT MESSAGE (OUTPATIENT)
Dept: HEMATOLOGY/ONCOLOGY | Facility: CLINIC | Age: 43
End: 2023-03-19
Payer: COMMERCIAL

## 2023-03-20 ENCOUNTER — OFFICE VISIT (OUTPATIENT)
Dept: ENDOCRINOLOGY | Facility: CLINIC | Age: 43
End: 2023-03-20
Payer: COMMERCIAL

## 2023-03-20 ENCOUNTER — OFFICE VISIT (OUTPATIENT)
Dept: HEMATOLOGY/ONCOLOGY | Facility: CLINIC | Age: 43
End: 2023-03-20
Payer: COMMERCIAL

## 2023-03-20 DIAGNOSIS — F32.A DEPRESSION, UNSPECIFIED DEPRESSION TYPE: ICD-10-CM

## 2023-03-20 DIAGNOSIS — Z15.09 BRCA2 GENE MUTATION POSITIVE: Chronic | ICD-10-CM

## 2023-03-20 DIAGNOSIS — E27.40 ADRENAL INSUFFICIENCY: ICD-10-CM

## 2023-03-20 DIAGNOSIS — C50.412 MALIGNANT NEOPLASM OF UPPER-OUTER QUADRANT OF LEFT BREAST IN FEMALE, ESTROGEN RECEPTOR NEGATIVE: Primary | ICD-10-CM

## 2023-03-20 DIAGNOSIS — Z17.1 MALIGNANT NEOPLASM OF UPPER-OUTER QUADRANT OF LEFT BREAST IN FEMALE, ESTROGEN RECEPTOR NEGATIVE: Primary | ICD-10-CM

## 2023-03-20 DIAGNOSIS — Z15.01 BRCA2 GENE MUTATION POSITIVE: Chronic | ICD-10-CM

## 2023-03-20 DIAGNOSIS — G47.00 INSOMNIA, UNSPECIFIED TYPE: ICD-10-CM

## 2023-03-20 DIAGNOSIS — E27.49 SECONDARY ADRENAL INSUFFICIENCY: Primary | ICD-10-CM

## 2023-03-20 PROCEDURE — 99214 OFFICE O/P EST MOD 30 MIN: CPT | Mod: 95,,, | Performed by: INTERNAL MEDICINE

## 2023-03-20 PROCEDURE — 99214 PR OFFICE/OUTPT VISIT, EST, LEVL IV, 30-39 MIN: ICD-10-PCS | Mod: 95,,, | Performed by: INTERNAL MEDICINE

## 2023-03-20 PROCEDURE — 1159F PR MEDICATION LIST DOCUMENTED IN MEDICAL RECORD: ICD-10-PCS | Mod: CPTII,95,, | Performed by: INTERNAL MEDICINE

## 2023-03-20 PROCEDURE — 1160F PR REVIEW ALL MEDS BY PRESCRIBER/CLIN PHARMACIST DOCUMENTED: ICD-10-PCS | Mod: CPTII,95,, | Performed by: INTERNAL MEDICINE

## 2023-03-20 PROCEDURE — 1160F RVW MEDS BY RX/DR IN RCRD: CPT | Mod: CPTII,95,, | Performed by: INTERNAL MEDICINE

## 2023-03-20 PROCEDURE — 1159F MED LIST DOCD IN RCRD: CPT | Mod: CPTII,95,, | Performed by: INTERNAL MEDICINE

## 2023-03-20 RX ORDER — LORAZEPAM 1 MG/1
1 TABLET ORAL NIGHTLY
Qty: 30 TABLET | Refills: 3 | Status: SHIPPED | OUTPATIENT
Start: 2023-03-20 | End: 2023-07-19

## 2023-03-20 NOTE — PROGRESS NOTES
The patient location is: [place]    Visit type: audiovisual    Face to Face time with patient: 33  33 minutes of total time spent on the encounter, which includes face to face time and non-face to face time preparing to see the patient (eg, review of tests), Obtaining and/or reviewing separately obtained history, Documenting clinical information in the electronic or other health record, Independently interpreting results (not separately reported) and communicating results to the patient/family/caregiver, or Care coordination (not separately reported).         Each patient to whom he or she provides medical services by telemedicine is:  (1) informed of the relationship between the physician and patient and the respective role of any other health care provider with respect to management of the patient; and (2) notified that he or she may decline to receive medical services by telemedicine and may withdraw from such care at any time.    Notes:       CHIEF COMPLAINT: Adrenal Insufficiency   43 y.o. old being seen as a f/u. Has BRCA2 mutation Breast CaOn prednisone 10 mg daily. Had robotic NICOLETTE on 3/4/22. On 3/11/22 went to ED at [facility] forN/V, weakness and CP. She was hypotensive in ED @ 75/38. Given VF and 125 mg Solucortef on 3/11/22. Cortisol of 19.2 done on 3/12. States started on steroids when got Chemo. Was on Keytruda. Last year was thought to have toxicity due to chemo approx July 2021.       Admitted Aug 13/21- was given 4 mg Dex IV on 8/14 and 8/15. Then converted to daily oral dex 4 mg daily. Then discharged on oral dex taper.     On 9/2/21 was given prednisone @ 60 mg daily and tapered over a month.       4/29/22 failed cosyntropin stim test. Now on Hydrocortisone 10/5.  Has been having significant memory issues and brain fog. She is scheduling things wrong at work. Sometimes missing meeting with people. Has been under a lot of stress. Has some depression at times.               PAST MEDICAL HISTORY/PAST SURGICAL  HISTORY:  Reviewed in [facility]    SOCIAL HISTORY: Reviewed in [facility]    FAMILY HISTORY:  No known thyroid disease. Father with DM 2. No known adrenal disorders.     MEDICATIONS/ALLERGIES: The patient's MedCard has been updated and reviewed.            PE:      LABS/Radiology        ASSESSMENT/PLAN:  1. Adrenal Insufficiency- appears secondary. Has had a normal MRI pituitary. Possibly chemo induce. No chemo since July 2021.  She does appear to be having significant symptoms.  Unsure if all of her symptoms are related to adrenal insufficiency.  She does have a significant amount of external stressors as well as the stress of her medical conditions.  This could be contributing to some of her symptoms.  Could consider increasing the steroid dose some to see if it helps with some of her symptoms.  I strongly encouraged her to consider seeing someone for the increased stress and anxiety.    2. Breast Ca- Was on keytruda.  Status post reconstructive breast surgery.    3. Insomnia-unsure much of this can be exacerbated by stress.    33 minutes spent with the patient during the visit.  Reviewed all of her symptoms as well as answered questions.  Went back to review timing of symptoms and understanding external stressors that could be contributing to symptoms.  As well as discussing possible options for treatment.    FOLLOWUP  AVS

## 2023-03-20 NOTE — PROGRESS NOTES
Subjective:       Patient ID: Alison Swann is a 43 y.o. female.    Chief Complaint: No chief complaint on file.    HPI    The patient location is: home  The chief complaint leading to consultation is: follow up TNBC    Visit type: audiovisual    Face to Face time with patient: 25 minutes  30 minutes of total time spent on the encounter, which includes face to face time and non-face to face time preparing to see the patient (eg, review of tests), Obtaining and/or reviewing separately obtained history, Documenting clinical information in the electronic or other health record, Independently interpreting results (not separately reported) and communicating results to the patient/family/caregiver, or Care coordination (not separately reported).   Each patient to whom he or she provides medical services by telemedicine is:  (1) informed of the relationship between the physician and patient and the respective role of any other health care provider with respect to management of the patient; and (2) notified that he or she may decline to receive medical services by telemedicine and may withdraw from such care at any time.    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 but admits that feels tenuous as well  She notes with the stress that she has been pushing loved ones away and then feels guilty for this  She still has brain fog issues and highlights the following- accidentally taking the dogs medicine last week, scheduled a class the day after her surgery having forgotten that date  Feels that she is no longer accomplished- grew up poor and having success provided her with  "this sense  Cortisol levels impact- her sleep- backed up PM time she takes replacement as impacts her sleep  Frustrated by hormonal changes and impact on her sexual quality of life  Went to vaginal laser- notes great improvement  Facial aging noted    Oncology History:  - 12/2019 thermography of breast revealed  lymphatic changes in the left axilla.  - 4/2020 self detected a palpable left breast mass  - 6/6/2020 Diagnostic mammogram  Impression:  Suspicious grouping of microcalcifications in the upper-outer left breast corresponding to area of palpable concern.  Biopsy is warranted.Questionable distortion of the breast architecture in the periareolar region of the right breast near the 3 o'clock position without sonographic correlate.  Short-term mammographic follow-up of the right breast at 6 month interval is recommended.  The above findings and recommendations were discussed with the patient at the time of the examination.  BI-RADS CATEGORY 4: SUSPICIOUS ABNORMALITY-BIOPSY SHOULD BE CONSIDERED  - 6/23/2020 imaging guided biopsy  Pathology: [facility] (The other calcifications were apparently not sampled)  - 1/19/2021 excision biopsy with Dr. Johnston  Pathology:  1.  BREAST, LEFT, FURTHER DESIGNATED "MASS," EXCISION:   --INVASIVE CARCINOMA OF NO SPECIAL TYPE (DUCTAL, NOT OTHERWISE    SPECIFIED), SPENSER HISTOLOGIC           GRADE 3 OUT OF 3.        --TUMOR MEASURES 42 MILLIMETERS IN MAXIMUM DIMENSION.        --RECEPTOR STUDIES ARE PENDING.   --SPECIMEN ANTERIOR MARGIN IS POSITIVE FOR INVASIVE CARCINOMA; ALL    REMAINING SPECIMEN MARGINS ARE           AT LEAST 2 MILLIMETERS AWAY.   --DUCTAL CARCINOMA IN SITU, HIGH NUCLEAR GRADE, COMPRISING LESS THAN 5%    OF TOTAL TUMOR TISSUE.        --ALL SPECIMEN MARGINS ARE NEGATIVE FOR DUCTAL CARCINOMA IN SITU.        --EXTENSIVE LYMPHOVASCULAR INVASION PRESENT.   --FIBROCYSTIC CHANGES INCLUDING STROMAL FIBROSIS, CYSTIC DILATATION OF    DUCTS, AND APOCRINE           METAPLASIA.    " "    --COLUMNAR CELL CHANGE.   --MICROCALCIFICATIONS ASSOCIATED WITH TUMOR AND WITH BENIGN DUCTAL    PROFILES.   2.  BREAST, LEFT, FURTHER DESIGNATED "MEDIAL MARGIN," EXCISION:        --NO MORPHOLOGIC EVIDENCE OF MALIGNANCY.   --FIBROCYSTIC CHANGES INCLUDING STROMAL FIBROSIS, CYSTIC DILATATION OF    DUCTS, APOCRINE METAPLASIA,           AND USUAL DUCTAL EPITHELIAL HYPERPLASIA.        --COLUMNAR CELL CHANGE.        --FOCAL MICROCALCIFICATIONS ASSOCIATED WITH BENIGN DUCTAL PROFILES.   3.  LYMPH NODE, LEFT SENTINEL, NEEDLE CORE BIOPSY:   --METASTATIC CARCINOMA OF NO SPECIAL TYPE (DUCTAL, NOT OTHERWISE    SPECIFIED).   ER negative, MT negative, Nad2orr negative  Ki-67 79%     - 1/27/2021 Breast MRI:  Findings: The breasts have heterogeneous fibroglandular tissue. The background parenchymal enhancement is minimal.   Left  There are 6 similar 29 mm x 23 mm lymph nodes seen in the left axilla.   There is an 8 mm x 7 mm x 6 mm homogeneous, non-mass enhancement in a focal distribution seen in the left breast at 12 o'clock in the middle depth, 5.5 cm from the nipple. Delayed phase is persistent. This is immediately anterior to the pectoralis major muscle, at anterior aspect of implant.   There is a 24 mm x 11 mm x 9 mm clumped, non-mass enhancement in a focal distribution seen in the outer central region of the left breast in the posterior depth. This is along the margins of the resection cavity at is posterior aspect.   Right  There is no evidence of suspicious masses, abnormal enhancement, or other abnormal findings in the right breast.  Impression:  Left  Non-mass Enhancement: Left breast 8 mm x 7 mm x 6 mm non-mass enhancement at the middle 12 o'clock position. Assessment: 4 - Suspicious finding. Biopsy is recommended.  Second look ultrasound followed by ultrasound or MRI-guided biopsy could be performed if it would .  Of note, MRI guided biopsy would carry risk of implant rupture. Non-mass Enhancement: " Left breast 24 mm x 11 mm x 9 mm non-mass enhancement at the posterior aspect of the excision cavity in the lateral breast. Assessment: 4 - Suspicious finding.  This is suspicious for residual disease in this patient with history of recent excisional biopsy.   The area is likely not amenable to MRI guided biopsy.  Surgical consultation recommended. Left axillary adenopathy.  At least 6 abnormal appearing level I axillary nodes are present, suspicious for residual lymph node metastasis.  RightThere is no MR evidence of malignancy in the right breast.  BI-RADS Category:   Overall: 4 - Suspicious  Recommendation:  Surgical consultation recommended.  Left breast biopsy could be performed of focal NME at 12 o'clock in the left breast if clinically indicated.     - 2/2/2021 PET scan:  COMPARISON:  Breast MRI 01/27/2021  FINDINGS:  Quality of the study: Adequate.  In the head and neck, there are no hypermetabolic lesions worrisome for malignancy. There are no hypermetabolic mucosal lesions, and there are no pathologically enlarged or hypermetabolic lymph nodes.  In the chest, there is no definite hypermetabolic breast mass.  There are bilateral breast implants in place.  There is relatively mild diffuse uptake within dense fibroglandular tissue, and within the left breast there is a maximum SUV of 1.9 adjacent to biopsy clips on image 68.  There is also mildly hypermetabolic subcutaneous fat stranding elsewhere in the lateral aspect of the left breast on image 77 likely postprocedural in nature. There are at least half a dozen left level 1 axillary lymph nodes the largest of which are hypermetabolic including a 3 x 2.1 cm node on image 50 with an SUV of 8.6, a 1 cm node on image 55 with an SUV of 4.7, and 2.3 x 1.2 cm node on image 61 with an SUV of 9.2.  There is also a non hypermetabolic 9 x 6 mm right level 2 lymph node.  There are no suspicious lymph nodes in the internal mammary chain.  There are no pulmonary nodules,  and there are no pleural or pericardial effusions.  In the abdomen and pelvis, there is physiologic tracer distribution within the abdominal organs and excretion into the genitourinary system, including diffusely within the right ureter and focally within the left.In the bones, there are no hypermetabolic lesions worrisome for malignancy.  Impression:  1.  No discrete left breast mass identified.  2.  Metastatic left level 1 axillary lymph nodes.  Level 2 larisa metastasis is not excluded.  3.  No evidence of distant metastasis.     - 2/9/2021 ECHO:  The left ventricle is normal in size with normal systolic function. The estimated ejection fraction is 58%  Regimen:  Paclitaxel weekly + carboplatin with Pembrolizumab q 3 weeks followed by AC with Pembrolizumab q 3 weeks followed by Pembroluzimab.(Based on interim analysis of the phase III KEYNOTE-522 the addition of pembrolizumab to neoadjuvant chemotherapy and use of adjuvant pembrolizumab x 9 cycles resulted in improvements in pathologic complete response rate and event-free survival in women with early triple-negative breast cancer.)     BRCA2+     5/3/2021 Breast MRI follow up on treatment in the interval (results below)  Findings:  There are bilateral retropectoral silicone implants. There is a right sided port.  The breasts have extreme amounts of fibroglandular tissue. The background parenchymal enhancement is mild.  There are postsurgical changes in the left upper outer breast and axilla.  There has been significant interval decrease in size of the previously seen abnormal left axillary lymph nodes, now with the largest lymph node measuring 14 x 15 x 10 mm (previously measuring up to 29 mm on the 1/27/21 MRI).  The other left axillary lymph nodes are smaller with mildly irregular shaggy margins, consistent with chemotherapy treatment.   No suspicious enhancement is seen in either breast. The previously seen left 12:00 mid depth focal non mass enhancement  anterior to the pectoralis is not seen on the current exam.   No abnormal right axillary lymph nodes or internal mammary lymph nodes are seen.  Impression:  There has been significant interval decrease in size of the previously seen abnormal left axillary lymph nodes, now with the largest lymph node measuring 14 x 15 x 10 mm (previously measuring up to 29 mm on the 21 MRI).  The other left axillary lymph nodes are smaller with mildly irregular shaggy margins, consistent with chemotherapy treatment.   No suspicious enhancement is seen in either breast.   BI-RADS Category:   Overall: 6 - Known Biopsy-Proven Malignancy     -   Completed surgery with complete pathologic response     - completed XRT     Gyn Hx:  Menarche- 10  , age at 1st live birth 22  OCPs x 4 years  Endometrial ablation      FH:  Mother - diagnosed at age 50 with breast cancer  Mastectomy- bilateral  No chemotherapy or radiation   Remains on Tamoxifen  Treated in [place]  ? 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   HENT:  Negative for mouth sores.    Eyes:  Negative for visual disturbance.   Respiratory:  Negative for cough and shortness of breath.    Cardiovascular:  Negative for chest pain.   Gastrointestinal:  Negative for abdominal pain and diarrhea.   Genitourinary:  Negative for frequency.   Musculoskeletal:  Negative for back pain.   Integumentary:  Negative for rash.   Neurological:  Negative for headaches.   Hematological:  Negative for adenopathy.   Psychiatric/Behavioral:  The patient is nervous/anxious.        Objective:      Physical Exam    Assessment:       Problem List Items Addressed This Visit       BRCA2 gene mutation positive  (Chronic)    Malignant neoplasm of upper-outer quadrant of left female breast - Primary    Depressed    Adrenal insufficiency       Plan:       BRCA + TNBC  Continue surveillance  Appropriate prophylactic surgeries completed  Annual dermatology exam     Adrenal insufficiency- on replacement with time adjustments made  This occurred secondary to immunotherapy     Reviewed personal stressors and medical stressors and how they relate- she requests referral for psychiatry as recognizes may need medical management   She is less resistant to these now  She can also review her concerns about forgetfulness in light of her prior ADHD and chemo brain fog    Financial stressors discussed-  consultation to review patient assistance support    Route Chart for Scheduling    Med Onc Chart Routing  Urgent    Follow up with physician 2 months. psychiatry referral and social work referral; see me in 3 months   Follow up with [facility]    Infusion scheduling note    Injection scheduling note    Labs    Imaging    Pharmacy appointment    Other referrals           Therapy Plan Information  PORT FLUSH  Flushes  heparin, porcine (PF) 100 unit/mL injection flush 500 Units  500 Units, Intravenous, Every visit  sodium chloride 0.9% flush 10 mL  10 mL, Intravenous, Every visit

## 2023-03-21 ENCOUNTER — PATIENT MESSAGE (OUTPATIENT)
Dept: HEMATOLOGY/ONCOLOGY | Facility: CLINIC | Age: 43
End: 2023-03-21
Payer: COMMERCIAL

## 2023-03-23 ENCOUNTER — PATIENT MESSAGE (OUTPATIENT)
Dept: PSYCHIATRY | Facility: CLINIC | Age: 43
End: 2023-03-23
Payer: COMMERCIAL

## 2023-03-23 ENCOUNTER — TELEPHONE (OUTPATIENT)
Dept: PSYCHIATRY | Facility: CLINIC | Age: 43
End: 2023-03-23
Payer: COMMERCIAL

## 2023-03-23 ENCOUNTER — DOCUMENTATION ONLY (OUTPATIENT)
Dept: HEMATOLOGY/ONCOLOGY | Facility: CLINIC | Age: 43
End: 2023-03-23
Payer: COMMERCIAL

## 2023-03-23 NOTE — TELEPHONE ENCOUNTER
----- Message from Reddy Morrell sent at 3/23/2023  9:37 AM CDT -----      Name of Who is Calling:PT          What is the request in detail:PT is requesting a call back to discuss an appointment as soon as possible.          Can the clinic reply by [facility]:no          What Number to Call Back if not in [phone]

## 2023-03-23 NOTE — PROGRESS NOTES
Received referral from the clinic that the patient was interested in speak with . Attempted to reach out to the patient but there was no answer. I left her a detailed message along with my direct contact information.

## 2023-03-23 NOTE — TELEPHONE ENCOUNTER
Called and spoke to patient and she wanted to make appointment with Dr Cramer as new patient.  Explained that Dr Cramer is not taking new patients at this time and we have a wait list for [facility] providers. She understood and will try to get with someone on [facility]

## 2023-03-24 ENCOUNTER — TELEPHONE (OUTPATIENT)
Dept: HEMATOLOGY/ONCOLOGY | Facility: CLINIC | Age: 43
End: 2023-03-24
Payer: COMMERCIAL

## 2023-03-24 ENCOUNTER — DOCUMENTATION ONLY (OUTPATIENT)
Dept: HEMATOLOGY/ONCOLOGY | Facility: CLINIC | Age: 43
End: 2023-03-24
Payer: COMMERCIAL

## 2023-03-24 NOTE — PROGRESS NOTES
SW attempted to contact patient to discuss referral for assistance. SW left a detailed message and is awaiting a return call. JULIANA will continue to follow.      JOE Millard, [facility] [place]  Oncology Social Worker   [facility] - Oncology  [phone]

## 2023-03-31 ENCOUNTER — OFFICE VISIT (OUTPATIENT)
Dept: PSYCHIATRY | Facility: CLINIC | Age: 43
End: 2023-03-31
Payer: COMMERCIAL

## 2023-03-31 ENCOUNTER — DOCUMENTATION ONLY (OUTPATIENT)
Dept: HEMATOLOGY/ONCOLOGY | Facility: CLINIC | Age: 43
End: 2023-03-31
Payer: COMMERCIAL

## 2023-03-31 DIAGNOSIS — F32.A DEPRESSION, UNSPECIFIED DEPRESSION TYPE: ICD-10-CM

## 2023-03-31 PROCEDURE — 90792 PSYCH DIAG EVAL W/MED SRVCS: CPT | Mod: 95,,, | Performed by: PSYCHIATRY & NEUROLOGY

## 2023-03-31 PROCEDURE — 90792 PR PSYCHIATRIC DIAGNOSTIC EVALUATION W/MEDICAL SERVICES: ICD-10-PCS | Mod: 95,,, | Performed by: PSYCHIATRY & NEUROLOGY

## 2023-03-31 RX ORDER — SERTRALINE HYDROCHLORIDE 50 MG/1
50 TABLET, FILM COATED ORAL DAILY
Qty: 30 TABLET | Refills: 1 | Status: SHIPPED | OUTPATIENT
Start: 2023-04-28 | End: 2023-05-01

## 2023-03-31 RX ORDER — SERTRALINE HYDROCHLORIDE 50 MG/1
50 TABLET, FILM COATED ORAL DAILY
Qty: 30 TABLET | Refills: 0 | Status: SHIPPED | OUTPATIENT
Start: 2023-03-31 | End: 2023-04-12 | Stop reason: SINTOL

## 2023-03-31 NOTE — PROGRESS NOTES
Outpatient Psychiatry Initial Visit (MD/NP)    3/31/2023    Alison Swann, a 43 y.o. female, presenting for initial evaluation visit. Met with patient.    Reason for Encounter: self-referral. Patient complains of   Chief Complaint   Patient presents with    Anxiety    Depression   .    History of Present Illness: Anxiety  Patient is here for evaluation of anxiety.  She has the following anxiety symptoms: none.     Patient complains of depression. She complains of anhedonia, depressed mood, difficulty concentrating, fatigue, feelings of worthlessness/guilt, impaired memory, and insomnia. Onset was approximately 1 year ago. Symptoms have been gradually worsening since that time. Current symptoms include:  see above  . Patient denies hypersomnia, psychomotor agitation, psychomotor retardation, suicidal attempt, and suicidal thoughts with specific plan. Family history significant for depression. Possible organic causes contributing are: endocrine/metabolic. Risk factors: negative life event cancer  . Previous treatment includes individual therapy. She complains of the following side effects from the treatment: none.      Over 3 years , owned business during covid. 1/21  dx with aggressive invasie breasy ca. CHEMO , RADS PULS 3 SURGERIES SUBJECTIVE:/p double mastectomey .    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      Has only 1 sis working for her now .    On e handicapped sis . Mom around.,    Mood swings :  Fine, then for example could not get into house post dinner , crying on carport .    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 /p crises   ,now on steroids .  S/p 6 episodes of sever hypotension .     No yasmany .    Socially outgoing .     Just cancelled 3/16 /23 birthday  2 hours prior . Not like her at all .       Business still poor - interest rate , misperception of her illness     PMH   S/p echo EKG said to be good - outside doc   3/22 ecg normal QtC       Psych  meds   adderall 10 mg q day  Ativan 1 mg q hs  for insomnia     Current Outpatient Medications   Medication Instructions    dextroamphetamine-amphetamine 10 mg Tab 1 tablet, Oral, Daily    hydrocortisone (CORTEF) 10 MG Tab 20 mg in AM and 20 mg in early afternoon    IMVEXXY MAINTENANCE PACK 10 mcg, Vaginal, Twice weekly    IMVEXXY STARTER PACK 10 mcg, Vaginal, Nightly    LORazepam (ATIVAN) 1 mg, Oral, Nightly    ondansetron (ZOFRAN-ODT) 8 MG TbDL DISSOLVE 1 TABLET ON THE TONGUE EVERY 6 TO 8 HOURS AS NEEDED FOR NAUSEA    valACYclovir (VALTREX) 500 mg, Oral, 2 times daily        Fam Hx   Mo chronically depressed as did grmo, aunt  Dtr responds to zoloft     Past psych Hx :  Has therapist   Seen by Destinee flores     30 sessions of neuro feedback  finished 3 months ago - resolved brief flashing SI. Never planning       Social Hx  2 homes in [place] and [place]   Now selling primary home in [place]   Has dog   1 dtr ago 18 - red           Psychiatric Review Of Systems - Is patient experiencing or having changes in:  sleep: insomnia   appetite: no  weight: no  energy/anergy: yes  interest/pleasure/anhedonia: yes  somatic symptoms: yes  libido: no   anxiety/panic: yes  guilty/hopelessness: yes  concentration: yes  S.I.B.s/risky behavior: no  Irritability: yes  Racing thoughts: no  Impulsive behaviors: no  Paranoia: no  AVH: no     Review Of Systems:     GENERAL:  No weight gain or loss  SKIN:  No rashes or lacerations  HEAD:  No headaches  EYES:  No exophthalmos, jaundice or blindness  EARS:  No dizziness, tinnitus or hearing loss  NOSE:  No changes in smell  MOUTH & THROAT:  No dyskinetic movements or obvious goiter  CHEST:  No shortness of breath, hyperventilation or cough  CARDIOVASCULAR:  No tachycardia or chest pain  ABDOMEN:  No nausea, vomiting, pain, constipation or diarrhea  URINARY:  No frequency, dysuria or sexual dysfunction  ENDOCRINE:  No polydipsia, polyuria  MUSCULOSKELETAL:  No pain or  stiffness of the joints  NEUROLOGIC:  No weakness, sensory changes, seizures, confusion, memory loss, tremor or other abnormal movements    Current Evaluation:     Nutritional Screening: Considering the patient's height and weight, medications, medical history and preferences, should a referral be made to the dietitian? no    Constitutional  Vitals:  Most recent vital signs, dated less than 90 days prior to this appointment, were not reviewed.    There were no vitals filed for this visit.     General:  unremarkable, age appropriate, casually dressed, neatly groomed     Musculoskeletal  Muscle Strength/Tone:  no dyskinesia, no tremor, no tic   Gait & Station:  non-ataxic     Psychiatric  Speech:  no latency; no press, spontaneous   Mood & Affect:  anxious, depressed  congruent and appropriate, mood-congruent   Thought Process:  normal and logical, goal-directed   Associations:  intact   Thought Content:  normal, no suicidality, no homicidality, delusions, or paranoia   Insight:  has awareness of illness   Judgement: behavior is adequate to circumstances   Orientation:  grossly intact   Memory: intact for content of interview   Language: grossly intact   Attention Span & Concentration:  able to focus   Fund of Knowledge:  intact and appropriate to age and level of education       Relevant Elements of Neurological Exam: normal gait    Functioning in Relationships:  Spouse/partner:  28-29 years - in construction   Peers: has support   Employers: real estate     Laboratory Data  Infusion on 03/15/2023   Component Date Value Ref Range Status    Specimen UA 03/15/2023 Urine, Clean Catch   Final    Color, UA 03/15/2023 Yellow  Yellow, Straw, Jannie Final    Appearance, UA 03/15/2023 Clear  Clear Final    pH, UA 03/15/2023 7.0  5.0 - 8.0 Final    Specific Gravity, UA 03/15/2023 <=1.005 (A)  1.005 - 1.030 Final    Protein, UA 03/15/2023 Negative  Negative Final    Glucose, UA 03/15/2023 Negative  Negative Final     Ketones, UA 03/15/2023 Negative  Negative Final    Bilirubin (UA) 03/15/2023 Negative  Negative Final    Occult Blood UA 03/15/2023 Negative  Negative Final    Nitrite, UA 03/15/2023 Negative  Negative Final    Leukocytes, UA 03/15/2023 Trace (A)  Negative Final    WBC 03/15/2023 7.99  3.90 - 12.70 K/uL Final    RBC 03/15/2023 4.37  4.00 - 5.40 M/uL Final    Hemoglobin 03/15/2023 13.2  12.0 - 16.0 g/dL Final    Hematocrit 03/15/2023 38.7  37.0 - 48.5 % Final    MCV 03/15/2023 89  82 - 98 fL Final    MCH 03/15/2023 30.2  27.0 - 31.0 pg Final    MCHC 03/15/2023 34.1  32.0 - 36.0 g/dL Final    RDW 03/15/2023 13.1  11.5 - 14.5 % Final    Platelets 03/15/2023 221  150 - 450 K/uL Final    MPV 03/15/2023 8.7 (L)  9.2 - 12.9 fL Final    Immature Granulocytes 03/15/2023 0.3  0.0 - 0.5 % Final    Gran # (ANC) 03/15/2023 5.2  1.8 - 7.7 K/uL Final    Immature Grans (Abs) 03/15/2023 0.02  0.00 - 0.04 K/uL Final    Lymph # 03/15/2023 2.1  1.0 - 4.8 K/uL Final    Mono # 03/15/2023 0.6  0.3 - 1.0 K/uL Final    Eos # 03/15/2023 0.1  0.0 - 0.5 K/uL Final    Baso # 03/15/2023 0.02  0.00 - 0.20 K/uL Final    nRBC 03/15/2023 0  0 /100 WBC Final    Gran % 03/15/2023 64.5  38.0 - 73.0 % Final    Lymph % 03/15/2023 26.3  18.0 - 48.0 % Final    Mono % 03/15/2023 7.6  4.0 - 15.0 % Final    Eosinophil % 03/15/2023 1.0  0.0 - 8.0 % Final    Basophil % 03/15/2023 0.3  0.0 - 1.9 % Final    Differential Method 03/15/2023 Automated   Final    Sodium 03/15/2023 141  136 - 145 mmol/L Final    Potassium 03/15/2023 3.9  3.5 - 5.1 mmol/L Final    Chloride 03/15/2023 104  95 - 110 mmol/L Final    CO2 03/15/2023 26  23 - 29 mmol/L Final    Glucose 03/15/2023 93  70 - 110 mg/dL Final    BUN 03/15/2023 10  6 - 20 mg/dL Final    Creatinine 03/15/2023 0.8  0.5 - 1.4 mg/dL Final    Calcium 03/15/2023 9.5  8.7 - 10.5 mg/dL Final    Anion Gap 03/15/2023 11  8 - 16 mmol/L Final    eGFR 03/15/2023 >60.0  >60 mL/min/1.73 m^2 Final    WBC, UA 03/15/2023 2  0 - 5  /hpf Final    Bacteria 03/15/2023 Rare  None-Occ /hpf Final    Squam Epithkrystle, UA 03/15/2023 6  /hpf Final    Microscopic Comment 03/15/2023 SEE COMMENT   Final         Medications  Outpatient Encounter Medications as of 3/31/2023   Medication Sig Dispense Refill    dextroamphetamine-amphetamine 10 mg Tab Take 1 tablet by mouth once daily.       estradioL (IMVEXXY MAINTENANCE PACK) 10 mcg Inst Place 10 mcg vaginally twice a week. 8 each 11    estradioL (IMVEXXY STARTER PACK) 10 mcg InPk Place 10 mcg vaginally every evening. 14 each 0    hydrocortisone (CORTEF) 10 MG Tab 20 mg in AM and 20 mg in early afternoon 120 tablet 11    LORazepam (ATIVAN) 1 MG tablet Take 1 tablet (1 mg total) by mouth every evening. 30 tablet 3    ondansetron (ZOFRAN-ODT) 8 MG TbDL DISSOLVE 1 TABLET ON THE TONGUE EVERY 6 TO 8 HOURS AS NEEDED FOR NAUSEA      valACYclovir (VALTREX) 500 MG tablet Take 1 tablet (500 mg total) by mouth 2 (two) times daily. for 5 days 10 tablet 2    [DISCONTINUED] hydrocortisone (CORTEF) 10 MG Tab 20 mg in AM and 20 mg in early afternoon 120 tablet 1     Facility-Administered Encounter Medications as of 3/31/2023   Medication Dose Route Frequency Provider Last Rate Last Admin    lactated ringers infusion   Intravenous Continuous Leon Torres MD   Stopped at 01/19/21 1345    sodium chloride 0.9% flush 10 mL  10 mL Intravenous PRN Sandra Sotelo MD   10 mL at 08/15/22 1330           Assessment - Diagnosis - Goals:     Impression: new pt with depression      ICD-10-CM ICD-9-CM   1. Depression, unspecified depression type  F32.A 311       Strengths and Liabilities: Strength: Patient accepts guidance/feedback, Strength: Patient is expressive/articulate., Strength: Patient is intelligent., Strength: Patient is motivated for change., Strength: Patient is physically healthy., Strength: Patient has positive support network., Strength: Patient has reasonable judgment., Strength: Patient is stable.    Treatment Goals:   Specify outcomes written in observable, behavioral terms:   Anxiety: acquiring relapse prevention skills and reducing physical symptoms of anxiety  Depression: acquiring relapse prevention skills    Treatment Plan/Recommendations:   Medication Management:   Begin low dose zoloft .The risks and benefits of medication were discussed with the patient.  The treatment plan and follow up plan were reviewed with the patient.      Return to Clinic: 1 month

## 2023-03-31 NOTE — PROGRESS NOTES
SW spoke with patient regarding financial assistance referral. Patient inquired about what financial assistance she can get based her Cancer dx. SW explained the financial assistance programs to the patient. Patient reports she is in need of financial assistance for her household bills and will send them to SW to review. SW emailed the patient at [email or web address] so that the patient could have SW contact info. JULIANA will continue to follow.     JOE Millard, [facility]  Oncology Social Worker   [facility] - Oncology  [phone]

## 2023-04-01 ENCOUNTER — PATIENT MESSAGE (OUTPATIENT)
Dept: HEMATOLOGY/ONCOLOGY | Facility: CLINIC | Age: 43
End: 2023-04-01
Payer: COMMERCIAL

## 2023-04-10 ENCOUNTER — PATIENT MESSAGE (OUTPATIENT)
Dept: REHABILITATION | Facility: HOSPITAL | Age: 43
End: 2023-04-10
Payer: COMMERCIAL

## 2023-04-10 ENCOUNTER — OFFICE VISIT (OUTPATIENT)
Dept: PSYCHIATRY | Facility: CLINIC | Age: 43
End: 2023-04-10
Payer: COMMERCIAL

## 2023-04-10 DIAGNOSIS — G47.00 INSOMNIA, UNSPECIFIED TYPE: ICD-10-CM

## 2023-04-10 DIAGNOSIS — F32.A DEPRESSION, UNSPECIFIED DEPRESSION TYPE: Primary | ICD-10-CM

## 2023-04-10 PROCEDURE — 99213 OFFICE O/P EST LOW 20 MIN: CPT | Mod: 95,,, | Performed by: PSYCHIATRY & NEUROLOGY

## 2023-04-10 PROCEDURE — 99213 PR OFFICE/OUTPT VISIT, EST, LEVL III, 20-29 MIN: ICD-10-PCS | Mod: 95,,, | Performed by: PSYCHIATRY & NEUROLOGY

## 2023-04-10 RX ORDER — ZALEPLON 10 MG/1
10 CAPSULE ORAL NIGHTLY
Qty: 30 CAPSULE | Refills: 5 | Status: SHIPPED | OUTPATIENT
Start: 2023-04-10 | End: 2023-04-12 | Stop reason: SDUPTHER

## 2023-04-10 NOTE — PROGRESS NOTES
Outpatient Psychiatry Follow-Up Visit (MD/NP)    4/10/2023    Clinical Status of Patient:  Outpatient (Ambulatory)    The patient location is: home in  [facility]  The chief complaint leading to consultation is: depression     Visit type: audiovisual    Face to Face time with patient:23 mins   30  minutes of total time spent on the encounter, which includes face to face time and non-face to face time preparing to see the patient (eg, review of tests), Obtaining and/or reviewing separately obtained history, Documenting clinical information in the electronic or other health record, Independently interpreting results (not separately reported) and communicating results to the patient/family/caregiver, or Care coordination (not separately reported).         Each patient to whom he or she provides medical services by telemedicine is:  (1) informed of the relationship between the physician and patient and the respective role of any other health care provider with respect to management of the patient; and (2) notified that he or she may decline to receive medical services by telemedicine and may withdraw from such care at any time.    Notes:      Chief Complaint:  Alison Swann is a 43 y.o. female who presents today for follow-up of depression and anxiety.  Met with patient.      Interval History and Content of Current Session:  Interim Events/Subjective Report/Content of Current Session:       History of Present Illness: Anxiety  Patient is here for evaluation of depression .       Patient complains of depression primarily . She complains of anhedonia, depressed mood, difficulty concentrating, fatigue, feelings of worthlessness/guilt, impaired memory, and insomnia. Onset was approximately 1 year  ago. Symptoms have been gradually worsening since that time. Current symptoms include:  see above  . Patient denies hypersomnia, psychomotor agitation, psychomotor retardation, suicidal attempt, and suicidal thoughts with  specific plan. Family history significant for depression. Possible organic causes contributing are: endocrine/metabolic. Risk factors: negative life event cancer  . Previous treatment includes individual therapy. She complains of the following side effects from the treatment: none.        Over 3 years , owned business during covid. 1/21  dx with aggressive invasive breast ca. CHEMO , RADS PULS 3 SURGERIES SUBJECTIVE:/p double mastectomey .     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       Has only 1 sis working for her now .     One handicapped sis . Mom around.,     Mood swings :  Fine, then for example could not get into house post dinner , crying on carport .    One word triggers How are you ? 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 /p crises   ,now on steroids .  S/p 6 episodes of sever hypotension .      No yasmany .     Socially outgoing .   Just cancelled 3/16 /23 birthday  2 hours prior . Not like her at all .       Stressors :  Business still poor - due to interest rate , misperception of her illness      PMH   S/p echo EKG said to be good - outside doc   3/22 ecg normal QTc        Psych meds :  adderall 10 mg q day  Ativan 1 mg q hs  for insomnia      Fam Hx   Mo chronically depressed as did grmo, aunt  Dtr responds to zoloft      Past psych Hx :  Has therapist   Seen by Destinee flores      30 sessions of neuro feedback  finished 3 months ago - resolved brief flashing SI. Never planning         Social Hx  2 homes in [place] and [place]   Now selling primary home in [place]   Has dog   1 dtr ago 18 - red        Functioning in Relationships:  Spouse/partner:  28-29 years - in construction , owner   Peers: has support   Employers: real estate     Updated hx 4-10-23   Overall improved but still some lability   Onset of s/s 1 year ago  Seen 3-31-23   Has not started zoloft yet   In between 2 houses and moving   Feeling better - house under contract  - less chaos  S/p recent  and last  surgery complicated by never resting   Biggest challenge is sleeping ; past was deep sleeper   Dtr and   hus to move to [place]  2 year 30- pt and very close   Hus may have job [employer]  4 nights per week - stressor but $$$           Psychiatric Review Of Systems - Is patient experiencing or having changes in:  sleep: insomnia  lorazepam half  helped in past , now takes full mg but not helping   appetite: no  weight: no April 122  good consistent   energy/anergy: improved but not baseline   interest/pleasure/anhedonia: better with preparing  house for sale but socially can be avoidant   somatic symptoms: yes  libido: no  anxiety/panic: improved   guilty/hopelessness: better self esteem   concentration: improved   memory : improved   S.I.B.s/risky behavior: no  Irritability: improved   Racing thoughts: no  Impulsive behaviors: no  Paranoia: no  AVH: no          Psychotherapy:  Target symptoms: depression, anxiety   Why chosen therapy is appropriate versus another modality: relevant to diagnosis, patient responds to this modality, evidence based practice  Outcome monitoring methods: self-report, observation  Therapeutic intervention type: insight oriented psychotherapy, supportive psychotherapy  Topics discussed/themes: building skills sets for symptom management  The patient's response to the intervention is accepting. The patient's progress toward treatment goals is fair.   Duration of intervention: 15  minutes.    Review of Systems   Prob Pert. 1 sys, Ext. psych +2 add., Comp. 10-14 sys  PSYCHIATRIC: Pertinant items are noted in the narrative.  CONSTITUTIONAL: No weight gain or loss.   MUSCULOSKELETAL: No pain or stiffness of the joints.  NEUROLOGIC: No weakness, sensory changes, seizures, confusion, memory loss, tremor or other abnormal movements.  ENDOCRINE: No polydipsia or polyuria.  INTEGUMENTARY: No rashes or lacerations.  EYES: No exophthalmos, jaundice or blindness.  ENT: No dizziness,  tinnitus or hearing loss.  RESPIRATORY: No shortness of breath.  CARDIOVASCULAR: No tachycardia or chest pain.  GASTROINTESTINAL: No nausea, vomiting, pain, constipation or diarrhea.  GENITOURINARY: No frequency, dysuria or sexual dysfunction.  HEMATOLOGIC/LYMPHATIC: No excessive bleeding, prolonged or excessive bleeding after dental extraction/injury.  ALLERGIC/IMMUNOLOGIC: No allergic response to materials, foods or animals at this time.    Past Medical, Family and Social History: The patient's past medical, family and social history have been reviewed and updated as appropriate within the electronic medical record - see encounter notes.    Compliance: yes    Side effects: None    Risk Parameters:  Patient reports no suicidal ideation  Patient reports no homicidal ideation  Patient reports no self-injurious behavior  Patient reports no violent behavior    Exam (detailed: at least 9 elements; comprehensive: all 15 elements)   Constitutional  Vitals:  Most recent vital signs, dated greater than 90 days prior to this appointment, were not reviewed.   There were no vitals filed for this visit.     General:  unremarkable, age appropriate, neatly groomed     Musculoskeletal  Muscle Strength/Tone:  no dyskinesia, no dystonia, no tremor   Gait & Station:  non-ataxic     Psychiatric  Speech:  no latency; no press   Mood & Affect:  Better but still emotional at times   congruent and appropriate   Thought Process:  normal and logical, goal-directed   Associations:  intact   Thought Content:  normal, no suicidality, no homicidality, delusions, or paranoia   Insight:  has awareness of illness   Judgement: behavior is adequate to circumstances   Orientation:  grossly intact   Memory: intact for content of interview   Language: grossly intact   Attention Span & Concentration:  able to focus   Fund of Knowledge:  intact and appropriate to age and level of education     Assessment and Diagnosis   Status/Progress: Based on the  examination today, the patient's problem(s) is/are inadequately controlled.  New problems have not been presented today.   Co-morbidities are complicating management of the primary condition.  There are no active rule-out diagnoses for this patient at this time.     General Impression: has yet to start med bc of personal chaos with home sale       ICD-10-CM ICD-9-CM   1. Depression, unspecified depression type  F32.A 311       Intervention/Counseling/Treatment Plan   Medication Management: start zoloft medication at half dose fro mood and lability .   Add zaleplon for sleep The risks and benefits of medication were discussed with the patient.   Patient was told not to drive nor operate machinery until effects of this medication are known.Sleep Aid Initiation:  Patient advised of potential side effects of medication including sleep walking or other complex behaviors.  Patient advised not to mix medication with alcohol, to go to bed immediately after taking, and to stop at first sign of any unusual behaviors     Counseling provided with patient as follows: importance of compliance with chosen treatment options was emphasized, risks and benefits of treatment options, including medications, were discussed with the patient      Return to Clinic: 1 month

## 2023-04-11 ENCOUNTER — TELEPHONE (OUTPATIENT)
Dept: HEMATOLOGY/ONCOLOGY | Facility: CLINIC | Age: 43
End: 2023-04-11
Payer: COMMERCIAL

## 2023-04-11 ENCOUNTER — DOCUMENTATION ONLY (OUTPATIENT)
Dept: HEMATOLOGY/ONCOLOGY | Facility: CLINIC | Age: 43
End: 2023-04-11
Payer: COMMERCIAL

## 2023-04-11 NOTE — TELEPHONE ENCOUNTER
----- Message from Madhuri Elias sent at 4/11/2023  9:45 AM CDT -----  Type: Need Medical Advice   Who Called: Patient   Best callback number: [phone]  Additional Information: Patient would like to schedule PT for today if not please call for any cacleation  Please call to further assist, Thanks

## 2023-04-11 NOTE — PROGRESS NOTES
SW spoke with patient to provide update regarding financial assistance request. SW continues to research community resources for the patient. JULIANA will continue to follow.     JOE Millard, [facility] [place]  Oncology Social Worker   [facility] [place] - Oncology  [phone]

## 2023-04-12 RX ORDER — ZALEPLON 10 MG/1
10 CAPSULE ORAL NIGHTLY
Qty: 30 CAPSULE | Refills: 5 | Status: SHIPPED | OUTPATIENT
Start: 2023-04-12 | End: 2023-05-12

## 2023-04-13 ENCOUNTER — TELEPHONE (OUTPATIENT)
Dept: HEMATOLOGY/ONCOLOGY | Facility: CLINIC | Age: 43
End: 2023-04-13
Payer: COMMERCIAL

## 2023-04-13 DIAGNOSIS — R26.89 DECREASED FUNCTIONAL MOBILITY: ICD-10-CM

## 2023-04-13 DIAGNOSIS — Z17.1 MALIGNANT NEOPLASM OF UPPER-OUTER QUADRANT OF LEFT BREAST IN FEMALE, ESTROGEN RECEPTOR NEGATIVE: Primary | ICD-10-CM

## 2023-04-13 DIAGNOSIS — C50.412 MALIGNANT NEOPLASM OF UPPER-OUTER QUADRANT OF LEFT BREAST IN FEMALE, ESTROGEN RECEPTOR NEGATIVE: Primary | ICD-10-CM

## 2023-04-13 DIAGNOSIS — L90.5 SCAR OF SKIN: ICD-10-CM

## 2023-04-13 DIAGNOSIS — Z90.13 ACQUIRED ABSENCE OF BILATERAL BREASTS AND NIPPLES: ICD-10-CM

## 2023-04-13 NOTE — TELEPHONE ENCOUNTER
Called pt to schedule PT reassessment apt on 4/19 @ 2:00 pm pt stated that [facility]

## 2023-04-14 ENCOUNTER — TELEPHONE (OUTPATIENT)
Dept: RADIOLOGY | Facility: HOSPITAL | Age: 43
End: 2023-04-14
Payer: COMMERCIAL

## 2023-04-14 NOTE — TELEPHONE ENCOUNTER
I called patient to schedule for her 6 month follow up US. She stated that she had a recent US and surgery done at [facility]. She is not due for any imaging at this time.

## 2023-04-19 ENCOUNTER — CLINICAL SUPPORT (OUTPATIENT)
Dept: REHABILITATION | Facility: HOSPITAL | Age: 43
End: 2023-04-19
Attending: INTERNAL MEDICINE
Payer: COMMERCIAL

## 2023-04-19 DIAGNOSIS — Z17.1 MALIGNANT NEOPLASM OF UPPER-OUTER QUADRANT OF LEFT BREAST IN FEMALE, ESTROGEN RECEPTOR NEGATIVE: Primary | ICD-10-CM

## 2023-04-19 DIAGNOSIS — L90.5 SCAR OF SKIN: ICD-10-CM

## 2023-04-19 DIAGNOSIS — C50.412 MALIGNANT NEOPLASM OF UPPER-OUTER QUADRANT OF LEFT BREAST IN FEMALE, ESTROGEN RECEPTOR NEGATIVE: ICD-10-CM

## 2023-04-19 DIAGNOSIS — R26.89 DECREASED FUNCTIONAL MOBILITY: ICD-10-CM

## 2023-04-19 DIAGNOSIS — Z17.1 MALIGNANT NEOPLASM OF UPPER-OUTER QUADRANT OF LEFT BREAST IN FEMALE, ESTROGEN RECEPTOR NEGATIVE: ICD-10-CM

## 2023-04-19 DIAGNOSIS — C50.412 MALIGNANT NEOPLASM OF UPPER-OUTER QUADRANT OF LEFT BREAST IN FEMALE, ESTROGEN RECEPTOR NEGATIVE: Primary | ICD-10-CM

## 2023-04-19 DIAGNOSIS — M25.619 DECREASED RANGE OF MOTION OF SHOULDER, UNSPECIFIED LATERALITY: Primary | ICD-10-CM

## 2023-04-19 PROCEDURE — 97110 THERAPEUTIC EXERCISES: CPT | Mod: PN,97

## 2023-04-19 PROCEDURE — 97161 PT EVAL LOW COMPLEX 20 MIN: CPT | Mod: PN

## 2023-04-19 NOTE — PROGRESS NOTES
[facility]  Lymphedema Physical Therapy  Initial Evaluation    Visit Date: 4/19/2023     Name: Alison Swann  Clinic Number: [id number]  Therapy Diagnosis:   Encounter Diagnoses   Name Primary?    Malignant neoplasm of upper-outer quadrant of left breast in female, estrogen receptor negative     Decreased functional mobility     Scar of skin      Physician: Jhon Suazo MD  Physician Orders: PT Eval and Treat  Medical Diagnosis from Referral: Malignant neoplasm of upper-outer quadrant left breast, decreased functional mobility, scar of skin  Evaluation Date: 4/19/2023  Authorization: Pending  Plan of Care Expiration: 04/19/2023-06/19/2023  Reassessment Due: 05/19/2023    Visit: 1 / 8  PTA Visit: -- / 5  Time In: 02:00 PM  Time Out: 03:00 PM  Total Billable Time: 60 minutes    Precautions: Standard,  hx of cancer reviewed precautions of no IV's, needle sticks or BP's to L UE    Subjective     Pt reports: Since last here, my abdominal scar had moved up so surgically had to lower the abdominal scarring. The arm range of motion is limited again tight raising the arms on both sides, right side pulling down under the breast, left side has pulling into the arm/under the arm and breast. The new scarring under both breast and the right lower abdomen is tight, I can't lay flat, have the have pillows under the legs and my shoulders. My shoulders/upper traps are so tight, need a massage and dry needling.  Have not been cleared by surgeon to return to my regular exercise (yoga, running), can't take a bath for 3 more weeks.      Pain  Location: upper traps, left axilla, under B breast, lower abdomen  Current 0/10, Worst 0/10, Best 0/10   Description: tightness      Past Medical History:   Past Medical History:   Diagnosis Date    Attention Deficit Hyperactivity Disorder     Family H/O Diabetes Mellitus     Her Father    Generalized Anxiety     Left Breast Cancer S.P Lumpectomy In 01/2021       Dominga Johnston; Dr. Sandra Sotelo (Heme/Onc); 19 On CMT; Is Estrogen Receptor Negative; She Is BRCA2 Gene Mutation Positive, Andf Her Mother Also With A H/O Breast Cancer    Macrocytic Anemia     Associated With Her Chemotherapy    Postoperative Nausea And Vomiting     Scopolamine Patches Work Well For Her For This    Scoliosis     Therapeutic Drug Monitoring     Wellness Visit 2021        Past Surgical History:  has a past surgical history that includes Augmentation of breast (); Tubal ligation;  section; Breast surgery; Breast mass excision (Left, 2021); Samson lymph node biopsy (Left, 2021); Insertion of tunneled central venous catheter (CVC) with subcutaneous port (N/A, 2021); Esophagogastroduodenoscopy (N/A, 2021); Colonoscopy (N/A, 2021); Robot-assisted laparoscopic abdominal hysterectomy using da Jeff Xi (N/A, 2022); Robot-assisted laparoscopic salpingo-oophorectomy using da Jeff Xi (Bilateral, 2022); Hysterectomy; and Oophorectomy.    Medications: has a current medication list which includes the following prescription(s): dextroamphetamine-amphetamine, imvexxy maintenance pack, imvexxy starter pack, hydrocortisone, lorazepam, ondansetron, [START ON 2023] sertraline, valacyclovir, and zaleplon, and the following Facility-Administered Medications: lactated ringers and sodium chloride 0.9%.    Allergies:   Review of patient's allergies indicates:   Allergen Reactions    Penicillins Hives     As a child       Chart Review:   Pt post op bilateral mastectomy, 12 lymph nodes removed at left axilla, flap reconstruction.  Pt states that immunotoxicity has put her in hospital 5 times since 2021. She has completed radiation 25 tx.   She had a final revision of mastectomy on 2022    Habitus: well developed, well nourished    Prior Therapy/Previous treatment included: Had PT this calendar year. 2022-2023  DME owned: none  Social  History: lives with their spouse  Place of Residence (Steps/Adaptations): ~30 steps to enter, raised home  Occupation:   Prior Exercise Routine: was very active with cross fit, yoga, pilates, running  Prior Level of Function: independent  Current Level of Function:  independent however has been restricted from prior recreational activities post surgery    Patient's Goals: return to PLOF    Objective     Mental Status: Alert/Oriented    Observation  Posture: bilateral upper trap hypertonicity/trigger points palpated,   Joint Integrity: wnl    Integumentary System  Skin Integrity: no open wounds noted, scabbing to left and right breast , new right lower abdominal scarring healed. Visible/palpable stitches at 6 o'clock of both breast .  Circulation: Intact  Palpation: decreased scar mobility to bilateral inferior breast, lateral breast/axilla  Edema   Amount: mild    Location: left axilla    Sensation  Light Touch: intact bilat  Proprioception: intact bilat    A/PROM  (L) UE: flexion 135, abduction 125 (pulling to lateral trunk/axilla)  (R) UE: flexion 160, abduction 115 (pulling into pec ), ER 35 (90 deg)  Limitations:      STRENGTH  (L) UE: grossly 4+/5  (R) UE: grossly 4+/5      Functional Mobility   Bed mobility: independent   Roll to left: independent   Roll to right: independent   Supine to prone: independent   Scooting to edge of bed: independent   Supine to sit: independent   Sit to supine: independent   Transfers to bed: independent   Transfers to toilet: independent   Sit to stand: independent   Stand pivot: independent   Car transfers: independent     Gait Assessment  AD used: none  Assistance: independent  Distance: community distances  Endurance: WFL     Gait Pattern: WNL      Treatment/Education     Treatment Time In: 02:00 PM  Treatment Time Out: 03:00 PM  Total Treatment time separate from Evaluation: 30 minutes    Patient received individual therapeutic exercises for endurance, ROM,  strengthening for 15 minutes to include:   -UBE 6 minutes (alt directions)  -standing wall slides into flex, abduction 10 sec hold x 10  -supine passive shoulder flexion with manual stretching to tolerance, passive shoulder abduction   -Supine trunk elongation R UE, L UE, R LE, L LE (each individually x 5), R UE/LE, L UE/LE x 5  -Supine hook lying with finger tips behind head (butterfly wing position) open/close elbows in available comfortable range    Mepiform scar tape applied to R lower abdominal scarring, instructed to leave in place 72 hrs. Remove if any irritation or itching noted.      Education: Instructed on general anatomy/physiology, lymphedema information (definitions, signs, symptoms, precautions), role of therapy in multi-disciplinary team, purpose of lymphedema physical therapy and the benefits/risks of treatment, risks of refusing treatment, POC, and goals for therapy were discussed with the pt.    Written Home Exercises Provided:  will provide next sessions .    Assessment     Alison is a 43 y.o. female referred to outpatient physical therapy with a medical diagnosis of Malignant neoplasm of upper-outer quadrant left breast, decreased functional mobility, scar of skin. Pt presents to therapy s/p abdominal scar revision, new bilateral breast shelving procedure. Pt history of  bilateral mastectomy with Sue Flap with revision of mastectomy done Nov 1 2022. Pt presents with decreased bilateral shoulder ROM, pain and pulling with end range of motion as well as decreased tolerance at night laying flat for resting position, increased stiffness in the shoulder range of motion with flexion and abduction, as well as difficulty performing reaching over head. Breast revision surgical scars with scabbing along areolas of nipples, inferior breast scars healed with exposed stitches at 6'oclock both breast. Inferior scars lower abdominal surgical incisions is healed, No signs or symptoms of infection and she  is only 3 weeks post op of the revision. Patient is also at higher risk of infection. This pt would benefit from skilled therapy services to address the above impairments in order to return to PLOF and activity level.     Plan of care discussed with patient: Yes  Pt's spiritual, cultural and educational needs considered and patient is agreeable to the plan of care and goals as stated below:     Anticipated barriers for therapy: far commute    Medical Necessity is demonstrated by the following:  History  Co-morbidities and personal factors that may impact the plan of care Co-morbidities:   history of cancer and level of undertstanding of current condition    Personal Factors:   none     low   Examination  Body Structures and Functions, activity limitations and participation restrictions that may impact the plan of care Body Systems:    ROM  strength  scar formation    Activity limitations:   Mobility  lifting and carrying objects    Self care  no deficits    Domestic Life  no deficits    Participation Restrictions:   Limitations returning to prior exercise routine post surgery restrictions         moderate   Clinical Presentation stable and uncomplicated low   Decision Making/ Complexity Score: low       GOALS  Short Term Goals: (4 weeks)  1. Patient will be independent donning/doffing compression garment with good fit noted.  2. Patient will demonstrate 100% knowledge of lymphedema precautions and signs of infection.   3. Patient will increase B UE AROM/PROM shoulder flexion to 180 to improve functional reach and ADL's   4. Patient will increase B UE AROM/PROM shoulder abduction to 180 to improve functional reach and ADL's with decreased complaints of tightness and pain.  5. Patient to demonstrate proper upright posture with weight acceptance in B LE with neutral pelvis in sitting and standing.  6. Pt to be independent with scar massage and scar care.  7. Patient to report improved upper trap/neck pain no greater  than 1-2/10.    Long Term Goals: deferred       Plan   6MWT, Fact-G survey  Plan of Care Certification: 4/19/2023 to 05/19/2023    Outpatient Physical Therapy 2 time(s) a week for 4 weeks to include the following interventions: patient education, HEP, therapeutic exercises, neuromuscular re-education, therapeutic activity, manual therapy including pneumatic compression pump and lymphatouch, self care/home management, modalities, gait training, decongestive massage, multi-layered bandaging, self massage, self bandaging, and assistance in obtaining appropriate compression garment.    Patient may be seen by PTA as part of the rehabilitation team.  Mary Jane Bass, PT , CLT

## 2023-04-20 ENCOUNTER — TELEPHONE (OUTPATIENT)
Dept: HEMATOLOGY/ONCOLOGY | Facility: CLINIC | Age: 43
End: 2023-04-20
Payer: COMMERCIAL

## 2023-04-21 ENCOUNTER — TELEPHONE (OUTPATIENT)
Dept: HEMATOLOGY/ONCOLOGY | Facility: CLINIC | Age: 43
End: 2023-04-21
Payer: COMMERCIAL

## 2023-04-21 NOTE — TELEPHONE ENCOUNTER
----- Message from Christina Seo sent at 4/21/2023  8:17 AM CDT -----  Type: Needs Medical Advice  Who Called:  Patient   Symptoms (please be specific):    How long has patient had these symptoms:    Pharmacy name and phone #:    Best Call Back Number: [phone]  Additional Information: Patient is requesting a call back to reschedule her appt. on 5/15.

## 2023-04-21 NOTE — TELEPHONE ENCOUNTER
Called pt she wanted to change her 5/15 apt from 1pm to 11am so that she could take the music therapy at 1pm

## 2023-05-02 ENCOUNTER — CLINICAL SUPPORT (OUTPATIENT)
Dept: REHABILITATION | Facility: HOSPITAL | Age: 43
End: 2023-05-02
Attending: INTERNAL MEDICINE
Payer: COMMERCIAL

## 2023-05-02 DIAGNOSIS — L90.5 SCAR OF SKIN: ICD-10-CM

## 2023-05-02 DIAGNOSIS — R26.89 DECREASED FUNCTIONAL MOBILITY: ICD-10-CM

## 2023-05-02 DIAGNOSIS — Z17.1 MALIGNANT NEOPLASM OF UPPER-OUTER QUADRANT OF LEFT BREAST IN FEMALE, ESTROGEN RECEPTOR NEGATIVE: ICD-10-CM

## 2023-05-02 DIAGNOSIS — C50.412 MALIGNANT NEOPLASM OF UPPER-OUTER QUADRANT OF LEFT BREAST IN FEMALE, ESTROGEN RECEPTOR NEGATIVE: ICD-10-CM

## 2023-05-02 PROCEDURE — 97140 MANUAL THERAPY 1/> REGIONS: CPT | Mod: PN,CQ

## 2023-05-02 NOTE — PROGRESS NOTES
"[facility]  Physical Therapy Progress Note  Lymphedema Therapy    Visit Date: 5/2/2023    Name: Alison Swann  MRN: [id number]  Therapy Diagnosis:   Encounter Diagnoses   Name Primary?    Malignant neoplasm of upper-outer quadrant of left breast in female, estrogen receptor negative     Decreased functional mobility     Scar of skin    Physician: Jhon Suazo MD  Physician Orders: PT Eval and Treat  Medical Diagnosis from Referral: Malignant neoplasm of upper-outer quadrant left breast, decreased functional mobility, scar of skin  Evaluation Date: 4/19/2023  Authorization: Pending  Plan of Care Expiration: 04/19/2023-06/19/2023  Reassessment Due: 05/19/2023     Visit: 2 / 8  PTA Visit: 1 / 5  Time In: 01:00 PM  Time Out: 02:00 PM  Total Billable Time: 60 minutes     Precautions: Standard,  hx of cancer reviewed precautions of no IV's, needle sticks or BP's to L UE      Subjective     Patient states: That she is disappointed that her right breast sits lower than her left, "I thought I would be more symmetrical." Dr Holloway said to wait a couple of months to let it heal and then he would decide what to do. Had a laser treatment on vagina yesterday and feel sore. Haven't been cleared to exercise yet, tomorrow will be five weeks since surgery. Tightness under arms and scars under breasts and abdomen. "They had to revise the scar on my abdomen and there is one area that is thickened and tight."     Pain: 0/10   Location: no pain currently, just tightness    Objective     Alison participated in / received the following treatment:    Manual Therapy to develop flexibility, extensibility, and pliability for 50 minutes including:  gentle stretching to right and left upper extremity, with tightness noted at end range flexion/abduction. Lymphatouch at 50 mmHg to right and left breast scar tissue and abdominal scar tissue. Mepiform scar tape placed to inferior breasts and abdominal " "scar tissue with instructions to remove if painful.    Pt received therapeutic exercises as follows:  LTR with arms in "Y" x 3 reps with a 10 sec hold  Diaphragmatic breathing      Education Provided  [x] Progress toward goals   [x] Role of therapy   [x] Activity modification  [x] Reviewed HEP    Home Exercises Provided:  will issue when appropriate .    Assessment     Pt tolerated first follow up well, still having some tightness in bilateral shoulders. Not cleared to begin exercising until 6 weeks SP reconstruction, encouraged pt to start slowly. Thickened scar tissue in abdominal area and inferior breasts.   Alison is a 43 y.o. female referred to outpatient physical therapy with a medical diagnosis of Malignant neoplasm of upper-outer quadrant left breast, decreased functional mobility, scar of skin. Pt presents to therapy s/p abdominal scar revision, new bilateral breast shelving procedure. Pt history of  bilateral mastectomy with Sue Flap with revision of mastectomy done Nov 1 2022. Pt presents with decreased bilateral shoulder ROM, pain and pulling with end range of motion as well as decreased tolerance at night laying flat for resting position, increased stiffness in the shoulder range of motion with flexion and abduction, as well as difficulty performing reaching over head. Breast revision surgical scars with scabbing along areolas of nipples, inferior breast scars healed with exposed stitches at 6'oclock both breast. Inferior scars lower abdominal surgical incisions is healed, No signs or symptoms of infection and she is only 3 weeks post op of the revision. Patient is also at higher risk of infection. This pt would benefit from skilled therapy services to address the above impairments in order to return to PLOF and activity level.       Anticipated barriers for therapy: far commute    Goals  GOALS  Short Term Goals: (4 weeks)  1. Patient will be independent donning/doffing compression garment with " good fit noted.  2. Patient will demonstrate 100% knowledge of lymphedema precautions and signs of infection.   3. Patient will increase B UE AROM/PROM shoulder flexion to 180 to improve functional reach and ADL's   4. Patient will increase B UE AROM/PROM shoulder abduction to 180 to improve functional reach and ADL's with decreased complaints of tightness and pain.  5. Patient to demonstrate proper upright posture with weight acceptance in B LE with neutral pelvis in sitting and standing.  6. Pt to be independent with scar massage and scar care.  7. Patient to report improved upper trap/neck pain no greater than 1-2/10.     Long Term Goals: deferred     Plan     Continue with established plan of care working toward PT goals.    Aurora Hanson, PTA

## 2023-05-04 ENCOUNTER — CLINICAL SUPPORT (OUTPATIENT)
Dept: REHABILITATION | Facility: HOSPITAL | Age: 43
End: 2023-05-04
Attending: INTERNAL MEDICINE
Payer: COMMERCIAL

## 2023-05-04 DIAGNOSIS — C50.412 MALIGNANT NEOPLASM OF UPPER-OUTER QUADRANT OF LEFT FEMALE BREAST, UNSPECIFIED ESTROGEN RECEPTOR STATUS: ICD-10-CM

## 2023-05-04 DIAGNOSIS — L90.5 AXILLARY WEB SYNDROME: Primary | ICD-10-CM

## 2023-05-04 DIAGNOSIS — L76.82 AXILLARY WEB SYNDROME: Primary | ICD-10-CM

## 2023-05-04 PROCEDURE — 97140 MANUAL THERAPY 1/> REGIONS: CPT | Mod: PN,97

## 2023-05-04 NOTE — PROGRESS NOTES
"[facility]  Physical Therapy Progress Note  Lymphedema Therapy    Visit Date: 5/4/2023    Name: Alison Swann  MRN: [id number]  Therapy Diagnosis:   Encounter Diagnoses   Name Primary?    Axillary web syndrome Yes    Malignant neoplasm of upper-outer quadrant of left female breast, unspecified estrogen receptor status    Physician: Jhon Suazo MD  Physician Orders: PT Eval and Treat  Medical Diagnosis from Referral: Malignant neoplasm of upper-outer quadrant left breast, decreased functional mobility, scar of skin  Evaluation Date: 4/19/2023  Authorization: Pending  Plan of Care Expiration: 04/19/2023-06/19/2023  Reassessment Due: 05/19/2023     Visit: 3 / 8  PTA Visit: 0 / 5  Time In: 01:00 PM  Time Out: 02:03 PM  Total Billable Time: 60 minutes     Precautions: Standard,  hx of cancer reviewed precautions of no IV's, needle sticks or BP's to L UE      Subjective     Patient states: Pt reports doing ok, tired a lot, not sleeping well. Prior visit reported she is disappointed that her right breast sits lower than her left, "I thought I would be more symmetrical." Dr Holloway said to wait a couple of months to let it heal and then he would decide what to do. Had a laser treatment on vagina yesterday and feel sore. Haven't been cleared to exercise yet. Tightness under arms and scars under breasts and abdomen. "They had to revise the scar on my abdomen and there is one area that is thickened and tight."     Pain: 0/10   Location: no pain currently, just tightness    Objective   Not wearing underwear  Alison participated in / received the following treatment:    Manual Therapy to develop flexibility, extensibility, and pliability for 55 minutes including:  gentle stretching to right and left upper extremity, with tightness noted at end range flexion/abduction. Lymphatouch at 50 mmHg to right and left breast scar tissue and abdominal scar tissue. Mepiform scar tape placed to " "inferior breasts and abdominal scar tissue with instructions to remove if painful. Pt to remove up to 72 hours.Manual tx for mobilization of B scapula in sidelye and stm of serratus anterior, supraspinatus, upper trap, cervical paraspinals, suboccipital release.    Pt received therapeutic exercises as follows:  LTR with arms in "Y" x 3 reps with a 10 sec hold  Diaphragmatic breathing  Scapular mobility in sidelye    Education Provided  [x] Progress toward goals   [x] Role of therapy   [x] Activity modification  [x] Reviewed HEP    Home Exercises Provided:  will issue when appropriate .    Assessment     Pt tolerated last session well, still having some tightness in bilateral shoulders and R trap. Not cleared to begin exercising until 6 weeks SP reconstruction, encouraged pt to start slowly. Thickened scar tissue in abdominal area and inferior breasts. PT able to distract scapula with soft tissue mobiliz and pt with full mobility in scapula TERESA Rosas is a 43 y.o. female referred to outpatient physical therapy with a medical diagnosis of Malignant neoplasm of upper-outer quadrant left breast, decreased functional mobility, scar of skin. Pt presents to therapy s/p abdominal scar revision, new bilateral breast shelving procedure. Pt history of  bilateral mastectomy with Sue Flap with revision of mastectomy done Nov 1 2022. Pt presents with decreased bilateral shoulder ROM, pain and pulling with end range of motion as well as decreased tolerance at night laying flat for resting position, increased stiffness in the shoulder range of motion with flexion and abduction, as well as difficulty performing reaching over head. Breast revision surgical scars with scabbing along areolas of nipples, inferior breast scars healed with exposed stitches at 6'oclock both breast. Inferior scars lower abdominal surgical incisions is healed, No signs or symptoms of infection and she is only 3 weeks post op of the revision. Patient is " also at higher risk of infection. This pt would benefit from skilled therapy services to address the above impairments in order to return to PLOF and activity level.       Anticipated barriers for therapy: far commute    Goals  GOALS  Short Term Goals: (4 weeks) IN PROGRESS  1. Patient will be independent donning/doffing compression garment with good fit noted.  2. Patient will demonstrate 100% knowledge of lymphedema precautions and signs of infection.   3. Patient will increase B UE AROM/PROM shoulder flexion to 180 to improve functional reach and ADL's   4. Patient will increase B UE AROM/PROM shoulder abduction to 180 to improve functional reach and ADL's with decreased complaints of tightness and pain.  5. Patient to demonstrate proper upright posture with weight acceptance in B LE with neutral pelvis in sitting and standing.  6. Pt to be independent with scar massage and scar care.  7. Patient to report improved upper trap/neck pain no greater than 1-2/10.     Long Term Goals: deferred     Plan     Continue with established plan of care working toward PT goals.    Merly Chapin, PT, CLT

## 2023-05-05 ENCOUNTER — PATIENT MESSAGE (OUTPATIENT)
Dept: OBSTETRICS AND GYNECOLOGY | Facility: CLINIC | Age: 43
End: 2023-05-05
Payer: COMMERCIAL

## 2023-05-08 ENCOUNTER — CLINICAL SUPPORT (OUTPATIENT)
Dept: REHABILITATION | Facility: HOSPITAL | Age: 43
End: 2023-05-08
Attending: INTERNAL MEDICINE
Payer: COMMERCIAL

## 2023-05-08 ENCOUNTER — OFFICE VISIT (OUTPATIENT)
Dept: RADIATION ONCOLOGY | Facility: CLINIC | Age: 43
End: 2023-05-08
Payer: COMMERCIAL

## 2023-05-08 VITALS
BODY MASS INDEX: 19.44 KG/M2 | OXYGEN SATURATION: 100 % | TEMPERATURE: 98 F | HEART RATE: 76 BPM | DIASTOLIC BLOOD PRESSURE: 70 MMHG | HEIGHT: 67 IN | WEIGHT: 123.88 LBS | SYSTOLIC BLOOD PRESSURE: 122 MMHG | RESPIRATION RATE: 18 BRPM

## 2023-05-08 DIAGNOSIS — L76.82 AXILLARY WEB SYNDROME: Primary | ICD-10-CM

## 2023-05-08 DIAGNOSIS — Z17.1 MALIGNANT NEOPLASM OF UPPER-OUTER QUADRANT OF LEFT BREAST IN FEMALE, ESTROGEN RECEPTOR NEGATIVE: ICD-10-CM

## 2023-05-08 DIAGNOSIS — C50.412 MALIGNANT NEOPLASM OF UPPER-OUTER QUADRANT OF LEFT BREAST IN FEMALE, ESTROGEN RECEPTOR NEGATIVE: Primary | ICD-10-CM

## 2023-05-08 DIAGNOSIS — M25.619 DECREASED RANGE OF MOTION OF SHOULDER, UNSPECIFIED LATERALITY: ICD-10-CM

## 2023-05-08 DIAGNOSIS — Z17.1 MALIGNANT NEOPLASM OF UPPER-OUTER QUADRANT OF LEFT BREAST IN FEMALE, ESTROGEN RECEPTOR NEGATIVE: Primary | ICD-10-CM

## 2023-05-08 DIAGNOSIS — L90.5 AXILLARY WEB SYNDROME: Primary | ICD-10-CM

## 2023-05-08 DIAGNOSIS — C50.412 MALIGNANT NEOPLASM OF UPPER-OUTER QUADRANT OF LEFT BREAST IN FEMALE, ESTROGEN RECEPTOR NEGATIVE: ICD-10-CM

## 2023-05-08 PROCEDURE — 3008F PR BODY MASS INDEX (BMI) DOCUMENTED: ICD-10-PCS | Mod: CPTII,S$GLB,, | Performed by: RADIOLOGY

## 2023-05-08 PROCEDURE — 3078F DIAST BP <80 MM HG: CPT | Mod: CPTII,S$GLB,, | Performed by: RADIOLOGY

## 2023-05-08 PROCEDURE — 3078F PR MOST RECENT DIASTOLIC BLOOD PRESSURE < 80 MM HG: ICD-10-PCS | Mod: CPTII,S$GLB,, | Performed by: RADIOLOGY

## 2023-05-08 PROCEDURE — 99999 PR PBB SHADOW E&M-EST. PATIENT-LVL IV: ICD-10-PCS | Mod: PBBFAC,,, | Performed by: RADIOLOGY

## 2023-05-08 PROCEDURE — 1159F PR MEDICATION LIST DOCUMENTED IN MEDICAL RECORD: ICD-10-PCS | Mod: CPTII,S$GLB,, | Performed by: RADIOLOGY

## 2023-05-08 PROCEDURE — 97140 MANUAL THERAPY 1/> REGIONS: CPT | Mod: PN

## 2023-05-08 PROCEDURE — 3008F BODY MASS INDEX DOCD: CPT | Mod: CPTII,S$GLB,, | Performed by: RADIOLOGY

## 2023-05-08 PROCEDURE — 1159F MED LIST DOCD IN RCRD: CPT | Mod: CPTII,S$GLB,, | Performed by: RADIOLOGY

## 2023-05-08 PROCEDURE — 99999 PR PBB SHADOW E&M-EST. PATIENT-LVL IV: CPT | Mod: PBBFAC,,, | Performed by: RADIOLOGY

## 2023-05-08 PROCEDURE — 3074F PR MOST RECENT SYSTOLIC BLOOD PRESSURE < 130 MM HG: ICD-10-PCS | Mod: CPTII,S$GLB,, | Performed by: RADIOLOGY

## 2023-05-08 PROCEDURE — 99213 PR OFFICE/OUTPT VISIT, EST, LEVL III, 20-29 MIN: ICD-10-PCS | Mod: S$GLB,,, | Performed by: RADIOLOGY

## 2023-05-08 PROCEDURE — 99213 OFFICE O/P EST LOW 20 MIN: CPT | Mod: S$GLB,,, | Performed by: RADIOLOGY

## 2023-05-08 PROCEDURE — 3074F SYST BP LT 130 MM HG: CPT | Mod: CPTII,S$GLB,, | Performed by: RADIOLOGY

## 2023-05-08 NOTE — PROGRESS NOTES
"[facility] Outpatient  Physical Therapy Progress Note  Lymphedema Therapy    Visit Date: 5/8/2023    Name: Alison Swann  MRN: [id number]  Therapy Diagnosis:     Physician: Jhon Suazo MD  Physician Orders: PT Eval and Treat  Medical Diagnosis from Referral: Malignant neoplasm of upper-outer quadrant left breast, decreased functional mobility, scar of skin  Evaluation Date: 4/19/2023  Authorization: Pending  Plan of Care Expiration: 04/19/2023-06/19/2023  Reassessment Due: 05/19/2023     Visit: 4 / 8  PTA Visit: 0 / 5  Time In: 01:05 PM  Time Out: 02:00 PM  Total Billable Time:  minutes     Precautions: Standard,  hx of cancer reviewed precautions of no IV's, needle sticks or BP's to L UE      Subjective     Patient states: Hit or miss on sleep, back bothering her in tow places.   Haven't been cleared to exercise yet. Tightness under arms and scars under breasts and abdomen. "They had to revise the scar on my abdomen and there is one area that is thickened and tight."     Pain: 0/10   Location: no pain currently, just tightness    Objective     Alison participated in / received the following treatment:    Manual Therapy to develop flexibility, extensibility, and pliability for 55 minutes including:  gentle stretching to right and left upper extremity, with tightness noted at end range flexion/abduction. Lymphatouch at 50 mmHg to right and left breast scar tissue and abdominal scar tissue. Mepiform scar tape placed to inferior breasts and abdominal scar tissue with instructions to remove if painful. Pt to remove up to 72 hours.Manual tx for mobilization of B scapula in sidelye and stm of serratus anterior, supraspinatus, upper trap, cervical paraspinals, suboccipital release.  deferred  Pt received therapeutic exercises as follows:  LTR with arms in "Y" x 3 reps with a 10 sec hold  Diaphragmatic breathing  Scapular mobility in sidelye    Education Provided  [x] Progress toward " goals   [x] Role of therapy   [x] Activity modification  [x] Reviewed HEP    Home Exercises Provided:  will issue when appropriate .    Assessment     Pt tolerated last session well, still having some tightness in bilateral shoulders and R trap. Not cleared to begin exercising until 6 weeks SP reconstruction, encouraged pt to start slowly. Thickened scar tissue in abdominal area and inferior breasts. PT able to distract scapula with soft tissue mobiliz and pt with full mobility in scapula TERESA Rosas is a 43 y.o. female referred to outpatient physical therapy with a medical diagnosis of Malignant neoplasm of upper-outer quadrant left breast, decreased functional mobility, scar of skin. Pt presents to therapy s/p abdominal scar revision, new bilateral breast shelving procedure. Pt history of  bilateral mastectomy with Sue Flap with revision of mastectomy done Nov 1 2022. Pt presents with decreased bilateral shoulder ROM, pain and pulling with end range of motion as well as decreased tolerance at night laying flat for resting position, increased stiffness in the shoulder range of motion with flexion and abduction, as well as difficulty performing reaching over head. Breast revision surgical scars with scabbing along areolas of nipples, inferior breast scars healed with exposed stitches at 6'oclock both breast. Inferior scars lower abdominal surgical incisions is healed, No signs or symptoms of infection and she is only 3 weeks post op of the revision. Patient is also at higher risk of infection. This pt would benefit from skilled therapy services to address the above impairments in order to return to PLOF and activity level.       Anticipated barriers for therapy: far commute    Goals  GOALS  Short Term Goals: (4 weeks) IN PROGRESS  1. Patient will be independent donning/doffing compression garment with good fit noted.  2. Patient will demonstrate 100% knowledge of lymphedema precautions and signs of infection.    3. Patient will increase B UE AROM/PROM shoulder flexion to 180 to improve functional reach and ADL's   4. Patient will increase B UE AROM/PROM shoulder abduction to 180 to improve functional reach and ADL's with decreased complaints of tightness and pain.  5. Patient to demonstrate proper upright posture with weight acceptance in B LE with neutral pelvis in sitting and standing.  6. Pt to be independent with scar massage and scar care.  7. Patient to report improved upper trap/neck pain no greater than 1-2/10.     Long Term Goals: deferred     Plan     Continue with established plan of care working toward PT goals.    Merly Chapin, PT, CLT

## 2023-05-08 NOTE — PROGRESS NOTES
[facility] Department of Radiation Oncology  Follow Up Visit Note    Diagnosis:  Alison Swann is a 43 y.o. female with a(n) IIIB, triple negative invasive ductal carcinoma fo the LEFT breast, status post neoadjuvant chemotherapy, bilateral mastectomy, and adjuvant radiation therapy. BRCA2+    Oncologic History:  Oncology History   Malignant neoplasm of upper-outer quadrant of left female breast   1/19/2021 Cancer Staged    Staging form: Breast, AJCC 8th Edition  - Clinical stage from 1/19/2021: Stage IIIB (cT2, cN1, cM0, G3, ER-, MD-, HER2-)     1/28/2021 Initial Diagnosis    Malignant neoplasm of upper-outer quadrant of left breast in female, estrogen receptor negative     2/18/2021 - 2/18/2021 Chemotherapy    Treatment Summary   Plan Name: OP BREAST DOSE-DENSE AC - DOXORUBICIN CYCLOPHOSPHAMIDE Q2W  Treatment Goal: Curative  Status: Inactive  Start Date:   End Date:   Provider: Jhon Suazo MD  Chemotherapy: DOXOrubicin chemo injection 96 mg, 60 mg/m2, Intravenous, Clinic/HOD 1 time, 0 of 4 cycles  cyclophosphamide (CYTOXAN) 600 mg/m2 = 965 mg in sodium chloride 0.9% 250 mL chemo infusion, 600 mg/m2, Intravenous, Clinic/HOD 1 time, 0 of 4 cycles     2/25/2021 - 7/6/2021 Chemotherapy    Treatment Summary   Plan Name: OP BREAST PACLITAXEL WEEKLY + CARBOPLATIN (AUC 5) Q3W FOLLOWED BY AC WITH PEMBROLIZUMAB FOLLOWED BY PEMBROLIZUMAB   Treatment Goal: Curative  Status: Inactive  Start Date: 2/25/2021  End Date: 7/6/2021  Provider: Sandra Sotelo MD  Chemotherapy: DOXOrubicin chemo injection 112 mg, 60 mg/m2 = 112 mg (100 % of original dose 60 mg/m2), Intravenous, Once, 3 of 4 cycles  Dose modification: 60 mg/m2 (original dose 60 mg/m2, Cycle 5)  Administration: 112 mg (5/25/2021), 112 mg (6/15/2021), 110 mg (7/6/2021)  CARBOplatin (PARAPLATIN) 605 mg in sodium chloride 0.9% 250 mL chemo infusion, 605 mg (100 % of original dose 603.5 mg), Intravenous, Clinic/HOD 1 time, 4 of 4 cycles  Dose  modification:   (original dose 603.5 mg, Cycle 1)  Administration: 605 mg (2/25/2021), 630 mg (3/22/2021), 750 mg (4/13/2021), 750 mg (5/4/2021)  cyclophosphamide 600 mg/m2 = 1,100 mg in sodium chloride 0.9% 100 mL chemo infusion, 600 mg/m2 = 1,100 mg (100 % of original dose 600 mg/m2), Intravenous, Clinic/HOD 1 time, 3 of 4 cycles  Dose modification: 600 mg/m2 (original dose 600 mg/m2, Cycle 5), 600 mg/m2 (Cycle 8)  Administration: 1,100 mg (5/25/2021), 1,100 mg (6/15/2021), 1,100 mg (7/6/2021)  PACLitaxeL (TAXOL) 80 mg/m2 = 132 mg in sodium chloride 0.9% 250 mL chemo infusion, 80 mg/m2 = 132 mg (100 % of original dose 80 mg/m2), Intravenous, Clinic/HOD 1 time, 4 of 4 cycles  Dose modification: 80 mg/m2 (original dose 80 mg/m2, Cycle 1)  Administration: 132 mg (2/25/2021), 132 mg (3/4/2021), 132 mg (3/11/2021), 138 mg (3/22/2021), 138 mg (3/30/2021), 138 mg (4/6/2021), 138 mg (4/13/2021), 138 mg (4/20/2021), 144 mg (4/27/2021), 144 mg (5/4/2021), 144 mg (5/11/2021), 144 mg (5/18/2021)  pembrolizumab (KEYTRUDA) 200 mg in sodium chloride 0.9% 100 mL chemo infusion, 200 mg, Intravenous, Clinic/HOD 1 time, 7 of 18 cycles  Administration: 200 mg (2/25/2021), 200 mg (5/25/2021), 200 mg (3/22/2021), 200 mg (4/13/2021), 200 mg (5/4/2021), 200 mg (6/15/2021), 200 mg (7/6/2021)     4/15/2021 - 4/15/2021 Chemotherapy    Treatment Summary   Plan Name: OP BREAST PEMBROLIZUMAB Q3W PACLITAXEL CARBOPLATIN WEEKLY FOLLOWED BY PEMBROLIZUMAB DOXORUBICIN CYCLOPHOSPHAMIDE Q3W   Treatment Goal: Curative  Status: Inactive  Start Date:   End Date:   Provider: Jhon Suazo MD  Chemotherapy: CARBOplatin (PARAPLATIN) in sodium chloride 0.9% 250 mL chemo infusion,  (original dose ), Intravenous, Clinic/HOD 1 time, 0 of 3 cycles  Dose modification:   (Cycle 1)  cyclophosphamide in sodium chloride 0.9% chemo infusion, 600 mg/m2 = 965 mg (original dose ), Intravenous, Clinic/HOD 1 time, 0 of 1 cycle  Dose modification: 600 mg/m2 (Cycle  2)  DOXOrubicin (ADRIAMYCIN) in sodium chloride 0.9% 250 mL chemo infusion, 60 mg/m2 (original dose ), Intravenous, Clinic/HOD 1 time, 0 of 1 cycle  Dose modification: 60 mg/m2 (Cycle 2)  PACLitaxeL (TAXOL) in sodium chloride 0.9% 100 mL chemo infusion, 80 mg/m2 (original dose ), Intravenous, Clinic/HOD 1 time, 0 of 1 cycle  Dose modification: 80 mg/m2 (Cycle 1)  PEMEtrexed (ALIMTA) in sodium chloride 0.9% chemo infusion, 500 mg/m2, Intravenous, Clinic/HOD 1 time, 0 of 33 cycles  pembrolizumab (KEYTRUDA) 200 mg in sodium chloride 0.9% 100 mL chemo infusion, 200 mg, Intravenous, Clinic/HOD 1 time, 0 of 35 cycles     11/19/2021 - 1/4/2022 Radiation Therapy    Treating physician: Maryann Perales  Total Dose: 50 Gy  Fractions: 25    DIAGNOSIS:  C50.412 - Malignant neoplasm of upper-outer quadrant of left female breast, Diagnosed 11/11/2021 (Active) Stage IIIB, T2, N1, M0, G3, HER2 Neg, ER Neg, NM Neg    This is a 41 y.o. y/o female with cT2,c N1 and M0 G3, (Stage IIIB), triple negative, Ki-67 79%, LEFT upper outer quadrant breast, BRCA2+, status post lumpectomy 1/19/21, with residual larisa disease, followed by  adjuvant chemo-immunotherapy with carbo-taxol and pembrolizumab, followed by AC with pembrolizumab, followed by adjuvant pemprolizumab complicated by ongoing, chronic colitis/diarrhea necessitating long-term prednisone.  Underwent bilateral mastectomy 9/29/21 with no residual carcinoma identified in breast of 14 sampled nodes (ypN0). She has completed adjuvant LEFT breast and regional larisa radiation therapy to a dose of 50Gy.    Treatment Summary  Course: C1 BREAST 2021    Treatment Site Ref. ID Energy Dose/Fx (Gy) #Fx Dose Correction (Gy) Total Dose (Gy) Start Date End Date Elapsed Days   3D Sclav_L rxsc 18X/6X 2 25 / 25 0 50 11/19/2021 1/4/2022 46   3D Breast L Breast_L 18X/6X 2 25 / 25 0 50 11/19/2021 1/4/2022 46       Tolerated well.  2 day treatment break at patient's request for patch of dry  desquamation, and 5 day treatment break due to CoVID infection.  Otherwise, patient completed her course of therapy as prescribed.       PLAN: RTC 1 month for routine follow up. Continue current skin care/sun protection for now.  Follow up with other providers as directed.          Interval History  The patient presents today for a regularly scheduled follow up visit.  She was last seen in our clinic on 10/12/22.   Since that time, she is maintained on hydrocortisone daily for immunotherapy-induced adrenal insufficiency.  Followed closely by Endocrinology.  She is status post robotic NICOLETTE-BSO 3/4/22 complicated by post-op sepsis. Also noted to have diffuse left lung inflammatory changes at the time of hospitalization.  Follow ujp CT chest 10/25/22 with interval clearing of pleural/parenchymal changes bilaterally except for some subpleural scarring in left anterior hemithorax likely 2/2 radiation therapy.  Currently no cough/SOB.      10/25/23 bilateral ultrasound for irregular PE noted regions most consistent with fat necrosis.      Underwent surgical revision 11/1/2022, again in 3/2023; one more being considered.      Follows regularly with Dr. Sotelo and Dr. Mcconnlel. Reports tightness/pain in the left axilla since she has started exercising again. Did not complete full course of PT due to significant difficulty in scheduling    Review of Systems   Review of Systems   Constitutional: Negative.    HENT: Negative.     Eyes: Negative.    Respiratory: Negative.     Cardiovascular: Negative.    Gastrointestinal: Negative.    Genitourinary: Negative.    Musculoskeletal: Negative.    Skin: Negative.    Neurological: Negative.    Endo/Heme/Allergies: Negative.    Psychiatric/Behavioral: Negative.       Social History:  Social History     Tobacco Use    Smoking status: Never    Smokeless tobacco: Never   Substance Use Topics    Alcohol use: Yes     Comment: occasionally    Drug use: Never       Family History:  Cancer-related  family history includes Breast cancer (age of onset: 52) in her mother; Cancer in an other family member; Pancreatic cancer in an other family member. There is no history of Esophageal cancer or Ovarian cancer.    Exam:  There were no vitals filed for this visit.      Constitutional: Pleasant 43 y.o. female in no acute distress.  Well nourished. Well groomed.   Physical Exam  Vitals reviewed.   Constitutional:       General: She is not in acute distress.     Appearance: Normal appearance. She is not ill-appearing or toxic-appearing.   HENT:      Head: Normocephalic and atraumatic.      Nose: Nose normal.   Eyes:      Extraocular Movements: Extraocular movements intact.      Conjunctiva/sclera: Conjunctivae normal.      Pupils: Pupils are equal, round, and reactive to light.   Pulmonary:      Effort: Pulmonary effort is normal.   Chest:   Breasts:     Breasts are asymmetrical.       Musculoskeletal:         General: Normal range of motion.      Cervical back: Normal range of motion.   Lymphadenopathy:      Upper Body:      Right upper body: No supraclavicular, axillary or pectoral adenopathy.      Left upper body: No supraclavicular, axillary or pectoral adenopathy.   Skin:     General: Skin is warm.   Neurological:      General: No focal deficit present.      Mental Status: She is alert and oriented to person, place, and time.      Cranial Nerves: No cranial nerve deficit.      Gait: Gait normal.   Psychiatric:         Mood and Affect: Mood normal.         Behavior: Behavior normal.         Thought Content: Thought content normal.         Judgment: Judgment normal.          Interval Imaging:  None    Assessment:  No evidence of disease recurrence  ECOG: (0) Fully active, able to carry on all predisease performance without restriction    Plan:  Follow up in 6-12 months  Recommend annual ultrasound given extensive bilateral breast fat necrosis which could potentially mask early recurrence  Follow up with Endocrinology  as directed  Skincare/sun protection reviewed  She was given our contact information, and she was told that she could call our clinic at anytime if she has any questions or concerns.  Follow up with other providers as directed

## 2023-05-11 ENCOUNTER — CLINICAL SUPPORT (OUTPATIENT)
Dept: REHABILITATION | Facility: HOSPITAL | Age: 43
End: 2023-05-11
Attending: INTERNAL MEDICINE
Payer: COMMERCIAL

## 2023-05-11 DIAGNOSIS — L76.82 AXILLARY WEB SYNDROME: Primary | ICD-10-CM

## 2023-05-11 DIAGNOSIS — C50.412 MALIGNANT NEOPLASM OF UPPER-OUTER QUADRANT OF LEFT BREAST IN FEMALE, ESTROGEN RECEPTOR NEGATIVE: ICD-10-CM

## 2023-05-11 DIAGNOSIS — M25.619 DECREASED RANGE OF MOTION OF SHOULDER, UNSPECIFIED LATERALITY: ICD-10-CM

## 2023-05-11 DIAGNOSIS — L90.5 AXILLARY WEB SYNDROME: Primary | ICD-10-CM

## 2023-05-11 DIAGNOSIS — Z17.1 MALIGNANT NEOPLASM OF UPPER-OUTER QUADRANT OF LEFT BREAST IN FEMALE, ESTROGEN RECEPTOR NEGATIVE: ICD-10-CM

## 2023-05-11 PROCEDURE — 97140 MANUAL THERAPY 1/> REGIONS: CPT | Mod: PN,CQ

## 2023-05-11 NOTE — PROGRESS NOTES
"[facility] Outpatient  Physical Therapy Progress Note  Lymphedema Therapy    Visit Date: 5/11/2023    Name: Alison Swann  MRN: [id number]  Therapy Diagnosis:     Physician: Jhon Suazo MD  Physician Orders: PT Eval and Treat  Medical Diagnosis from Referral: Malignant neoplasm of upper-outer quadrant left breast, decreased functional mobility, scar of skin  Evaluation Date: 4/19/2023  Authorization: Pending  Plan of Care Expiration: 04/19/2023-06/19/2023  Reassessment Due: 05/19/2023     Visit: 5 / 8  PTA Visit: 1 / 5  Time In: 01:10 PM  Time Out: 02:00 PM  Total Billable Time:  50 minutes     Precautions: Standard,  hx of cancer reviewed precautions of no IV's, needle sticks or BP's to L UE      Subjective     Patient states: 5 weeks since surgery, haven't been cleared to exercise yet. Tightness under arms and scars under breasts and abdomen. "They had to revise the scar on my abdomen and there is one area that is thickened and tight." Getting a massage tomorrow and will see someone in [place] for dry needling soon. Still having the nagging pain in right upper trap and medial border of shoulder blade.     Pain: 2/10   Location: no pain currently, just tightness    Objective     Alison participated in / received the following treatment:    Manual Therapy to develop flexibility, extensibility, and pliability for 50 minutes including:  gentle stretching to right and left upper extremity, with tightness noted at end range flexion/abduction. Lymphatouch at 50 mmHg to right and left breast scar tissue and abdominal scar tissue. Mepiform scar tape placed to inferior breasts and abdominal scar tissue with instructions to remove if painful. Pt to remove up to 72 hours.Manual tx for mobilization of B scapula in sidelye and stm of serratus anterior, supraspinatus, upper trap, cervical paraspinals, suboccipital release.  deferred  Pt received therapeutic exercises as follows:  LTR with " "arms in "Y" x 3 reps with a 10 sec hold  Diaphragmatic breathing  Scapular mobility in sidelye    Education Provided  [x] Progress toward goals   [x] Role of therapy   [x] Activity modification  [x] Reviewed HEP    Home Exercises Provided:  will issue when appropriate .    Assessment     Pt tolerated last session well, still having some tightness in bilateral shoulders and R trap. Not cleared to begin exercising until 6 weeks SP reconstruction, encouraged pt to start slowly. Thickened scar tissue in abdominal area and inferior breasts.   Alison is a 43 y.o. female referred to outpatient physical therapy with a medical diagnosis of Malignant neoplasm of upper-outer quadrant left breast, decreased functional mobility, scar of skin. Pt presents to therapy s/p abdominal scar revision, new bilateral breast shelving procedure. Pt history of  bilateral mastectomy with Sue Flap with revision of mastectomy done Nov 1 2022. Pt presents with decreased bilateral shoulder ROM, pain and pulling with end range of motion as well as decreased tolerance at night laying flat for resting position, increased stiffness in the shoulder range of motion with flexion and abduction, as well as difficulty performing reaching over head. Breast revision surgical scars with scabbing along areolas of nipples, inferior breast scars healed with exposed stitches at 6'oclock both breast. Inferior scars lower abdominal surgical incisions is healed, No signs or symptoms of infection and she is only 3 weeks post op of the revision. Patient is also at higher risk of infection. This pt would benefit from skilled therapy services to address the above impairments in order to return to PLOF and activity level.       Anticipated barriers for therapy: far commute    Goals  GOALS  Short Term Goals: (4 weeks) IN PROGRESS  1. Patient will be independent donning/doffing compression garment with good fit noted.  2. Patient will demonstrate 100% knowledge of " lymphedema precautions and signs of infection.   3. Patient will increase B UE AROM/PROM shoulder flexion to 180 to improve functional reach and ADL's   4. Patient will increase B UE AROM/PROM shoulder abduction to 180 to improve functional reach and ADL's with decreased complaints of tightness and pain.  5. Patient to demonstrate proper upright posture with weight acceptance in B LE with neutral pelvis in sitting and standing.  6. Pt to be independent with scar massage and scar care.  7. Patient to report improved upper trap/neck pain no greater than 1-2/10.     Long Term Goals: deferred     Plan     Continue with established plan of care working toward PT goals.    Aurora Hanson, PTA, CLT

## 2023-05-15 ENCOUNTER — CLINICAL SUPPORT (OUTPATIENT)
Dept: REHABILITATION | Facility: HOSPITAL | Age: 43
End: 2023-05-15
Attending: INTERNAL MEDICINE
Payer: COMMERCIAL

## 2023-05-15 DIAGNOSIS — M25.619 DECREASED RANGE OF MOTION OF SHOULDER, UNSPECIFIED LATERALITY: Primary | ICD-10-CM

## 2023-05-15 PROCEDURE — 97110 THERAPEUTIC EXERCISES: CPT | Mod: PN,97

## 2023-05-15 NOTE — PROGRESS NOTES
[facility] Outpatient  Physical Therapy Progress Note  Lymphedema Therapy    Visit Date: 5/15/2023    Name: Alison Swann  MRN: [id number]  Therapy Diagnosis:  Malignant neoplasm of upper-outer quadrant left breast, decreased functional mobility, scar of skin    Physician: Jhon Suazo MD  Physician Orders: PT Eval and Treat  Medical Diagnosis from Referral: Malignant neoplasm of upper-outer quadrant left breast, decreased functional mobility, scar of skin  Evaluation Date: 4/19/2023  Authorization: Pending  Plan of Care Expiration: 04/19/2023-06/19/2023  Reassessment Due: 05/19/2023     Visit: 6 / 8  PTA Visit: 0 / 5  Time In: 11:14 AM  Time Out: 12:10 PM  Total Billable Time:  55  minutes     Precautions: Standard,  hx of cancer reviewed precautions of no IV's, needle sticks or BP's to L UE      Subjective     Patient states:I can exercise now, its been over 6 weeks since surgery, I will see someone in [place] tomorrow for dry needling.. Still having the nagging pain in right upper trap and medial border of shoulder blade.   Can you watch me run on treadmill to see what I am doing? My arm rom is not the same, during pec stretch, the R side is out of wack  Pain: 4/10   Location: R shoulder blade    Objective   Limited arom of B UE for shoulder flexion and abduction noted in supine. L 150 R 160 deg.  Alison participated in / received the following treatment:  Therapeutic exercises x 55 min: TM: observation of pt 7 min with pt progressing up to 4.2mph  for jogging. Pt with decreased hip support and guarded arms at her side creating tension in upper back/ shoulder blade region. Pt demonstrates decreased support in B hips however Left hip with less stabilization which could explain more strain to R upper thoracic side.   VMO work: for knee stability: tm 4 min on 10%incline at 1.5 mph   VMO: Reverse knee extension with tband red behind knee 30 reps B LE  Attempted well leg pt  "with crepitus B LE   Single limb bridges B 10 sec hold 10 reps  B LE bridges   Scapular work mobility and exercise for strengthening  Open book ex in sidelye attempted pt unable  Side lye scapular depression, retraction mobility exercises R scap  Supine B Serratus anterior punches no weight 30 reps  B Serratus anterior activation with upper trap deactivation and 1# weight with pt performing alphabet B UE done separately.   Lumbar ladders 4 levels up and down 3 sec hold 1 trial of each level in supine with knees bent, post pelvic tilt  deferred  Manual Therapy to develop flexibility, extensibility, and pliability for 50 minutes including:  gentle stretching to right and left upper extremity, with tightness noted at end range flexion/abduction. Lymphatouch at 50 mmHg to right and left breast scar tissue and abdominal scar tissue. Mepiform scar tape placed to inferior breasts and abdominal scar tissue with instructions to remove if painful. Pt to remove up to 72 hours.Manual tx for mobilization of B scapula in sidelye and stm of serratus anterior, supraspinatus, upper trap, cervical paraspinals, suboccipital release.  deferred  Pt received therapeutic exercises as follows:  LTR with arms in "Y" x 3 reps with a 10 sec hold  Diaphragmatic breathing  Scapular mobility in sidelye    Education Provided  [x] Progress toward goals   [x] Role of therapy   [x] Activity modification  [x] Reviewed HEP    Home Exercises Provided:  will issue when appropriate .    Assessment     Pt tolerated last session well. Pt demonstrates muscle imbalance present with gluteal weakness, scapular stabilization weakness with serratus anterior weak and limited mobility of scapula.    Alison is a 43 y.o. female referred to outpatient physical therapy with a medical diagnosis of Malignant neoplasm of upper-outer quadrant left breast, decreased functional mobility, scar of skin. Pt presents to therapy s/p abdominal scar revision, new bilateral " breast shelving procedure. Pt history of  bilateral mastectomy with Sue Flap with revision of mastectomy done Nov 1 2022. Pt presents with decreased bilateral shoulder ROM, pain and pulling with end range of motion as well as decreased tolerance at night laying flat for resting position, increased stiffness in the shoulder range of motion with flexion and abduction, as well as difficulty performing reaching over head. Breast revision surgical scars with scabbing along areolas of nipples, inferior breast scars healed with exposed stitches at 6'oclock both breast. Inferior scars lower abdominal surgical incisions is healed, No signs or symptoms of infection and she is only 3 weeks post op of the revision. Patient is also at higher risk of infection. This pt would benefit from skilled therapy services to address the above impairments in order to return to PLOF and activity level.       Anticipated barriers for therapy: far commute    Goals  GOALS  Short Term Goals: (4 weeks) IN PROGRESS  1. Patient will be independent donning/doffing compression garment with good fit noted.  2. Patient will demonstrate 100% knowledge of lymphedema precautions and signs of infection. GOAL MET 5/15/2023  3. Patient will increase B UE AROM/PROM shoulder flexion to 180 to improve functional reach and ADL's   4. Patient will increase B UE AROM/PROM shoulder abduction to 180 to improve functional reach and ADL's with decreased complaints of tightness and pain.  5. Patient to demonstrate proper upright posture with weight acceptance in B LE with neutral pelvis in sitting and standing. GOAL MET 5/15/2023  6. Pt to be independent with scar massage and scar care.  7. Patient to report improved upper trap/neck pain no greater than 1-2/10.     Long Term Goals: deferred     Plan   Focus on arom B shoulders next tx 2 visits left on POC  Continue with established plan of care working toward PT goals.    Merly Chapin, PT, CLT

## 2023-05-16 ENCOUNTER — TELEPHONE (OUTPATIENT)
Dept: HEMATOLOGY/ONCOLOGY | Facility: CLINIC | Age: 43
End: 2023-05-16
Payer: COMMERCIAL

## 2023-05-16 NOTE — TELEPHONE ENCOUNTER
----- Message from Madhuri Elias sent at 5/16/2023 10:56 AM CDT -----  Type: Need Medical Advice   Who Called: Patient   Best callback number: [phone]  Additional Information: Patient asked for Merly to give her a call about her upcoming appointment and reassement   Please call to further assist, Thanks

## 2023-05-16 NOTE — TELEPHONE ENCOUNTER
Returned call to pt she has questions for Merly, told her that she is with patients but will call her once she is finished with her clinic

## 2023-05-30 ENCOUNTER — OFFICE VISIT (OUTPATIENT)
Dept: FAMILY MEDICINE | Facility: CLINIC | Age: 43
End: 2023-05-30
Payer: COMMERCIAL

## 2023-05-30 ENCOUNTER — PATIENT MESSAGE (OUTPATIENT)
Dept: HEMATOLOGY/ONCOLOGY | Facility: CLINIC | Age: 43
End: 2023-05-30
Payer: COMMERCIAL

## 2023-05-30 VITALS
BODY MASS INDEX: 19.38 KG/M2 | OXYGEN SATURATION: 100 % | DIASTOLIC BLOOD PRESSURE: 83 MMHG | HEART RATE: 84 BPM | HEIGHT: 67 IN | WEIGHT: 123.5 LBS | SYSTOLIC BLOOD PRESSURE: 115 MMHG | RESPIRATION RATE: 14 BRPM

## 2023-05-30 DIAGNOSIS — F41.1 ANXIETY, GENERALIZED: ICD-10-CM

## 2023-05-30 DIAGNOSIS — F90.0 ADHD (ATTENTION DEFICIT HYPERACTIVITY DISORDER), INATTENTIVE TYPE: ICD-10-CM

## 2023-05-30 DIAGNOSIS — Z13.6 ENCOUNTER FOR LIPID SCREENING FOR CARDIOVASCULAR DISEASE: ICD-10-CM

## 2023-05-30 DIAGNOSIS — Z13.220 ENCOUNTER FOR LIPID SCREENING FOR CARDIOVASCULAR DISEASE: ICD-10-CM

## 2023-05-30 DIAGNOSIS — N95.2 VAGINAL ATROPHY: Primary | ICD-10-CM

## 2023-05-30 DIAGNOSIS — Z76.89 ENCOUNTER TO ESTABLISH CARE WITH NEW DOCTOR: ICD-10-CM

## 2023-05-30 PROCEDURE — 3008F BODY MASS INDEX DOCD: CPT | Mod: CPTII,S$GLB,, | Performed by: FAMILY MEDICINE

## 2023-05-30 PROCEDURE — 99999 PR PBB SHADOW E&M-EST. PATIENT-LVL III: CPT | Mod: PBBFAC,,, | Performed by: FAMILY MEDICINE

## 2023-05-30 PROCEDURE — 3079F DIAST BP 80-89 MM HG: CPT | Mod: CPTII,S$GLB,, | Performed by: FAMILY MEDICINE

## 2023-05-30 PROCEDURE — 3074F PR MOST RECENT SYSTOLIC BLOOD PRESSURE < 130 MM HG: ICD-10-PCS | Mod: CPTII,S$GLB,, | Performed by: FAMILY MEDICINE

## 2023-05-30 PROCEDURE — 99214 PR OFFICE/OUTPT VISIT, EST, LEVL IV, 30-39 MIN: ICD-10-PCS | Mod: S$GLB,,, | Performed by: FAMILY MEDICINE

## 2023-05-30 PROCEDURE — 3079F PR MOST RECENT DIASTOLIC BLOOD PRESSURE 80-89 MM HG: ICD-10-PCS | Mod: CPTII,S$GLB,, | Performed by: FAMILY MEDICINE

## 2023-05-30 PROCEDURE — 99999 PR PBB SHADOW E&M-EST. PATIENT-LVL III: ICD-10-PCS | Mod: PBBFAC,,, | Performed by: FAMILY MEDICINE

## 2023-05-30 PROCEDURE — 3074F SYST BP LT 130 MM HG: CPT | Mod: CPTII,S$GLB,, | Performed by: FAMILY MEDICINE

## 2023-05-30 PROCEDURE — 99214 OFFICE O/P EST MOD 30 MIN: CPT | Mod: S$GLB,,, | Performed by: FAMILY MEDICINE

## 2023-05-30 PROCEDURE — 3008F PR BODY MASS INDEX (BMI) DOCUMENTED: ICD-10-PCS | Mod: CPTII,S$GLB,, | Performed by: FAMILY MEDICINE

## 2023-05-30 RX ORDER — TRAZODONE HYDROCHLORIDE 50 MG/1
50 TABLET ORAL NIGHTLY
COMMUNITY
End: 2023-10-11

## 2023-05-30 RX ORDER — DEXTROAMPHETAMINE SACCHARATE, AMPHETAMINE ASPARTATE, DEXTROAMPHETAMINE SULFATE AND AMPHETAMINE SULFATE 2.5; 2.5; 2.5; 2.5 MG/1; MG/1; MG/1; MG/1
10 TABLET ORAL DAILY
COMMUNITY
End: 2023-07-19 | Stop reason: SDUPTHER

## 2023-05-31 ENCOUNTER — PATIENT MESSAGE (OUTPATIENT)
Dept: ENDOCRINOLOGY | Facility: CLINIC | Age: 43
End: 2023-05-31
Payer: COMMERCIAL

## 2023-05-31 DIAGNOSIS — C50.412 MALIGNANT NEOPLASM OF UPPER-OUTER QUADRANT OF LEFT BREAST IN FEMALE, ESTROGEN RECEPTOR NEGATIVE: Primary | ICD-10-CM

## 2023-05-31 DIAGNOSIS — Z17.1 MALIGNANT NEOPLASM OF UPPER-OUTER QUADRANT OF LEFT BREAST IN FEMALE, ESTROGEN RECEPTOR NEGATIVE: Primary | ICD-10-CM

## 2023-05-31 RX ORDER — ONDANSETRON 8 MG/1
TABLET, ORALLY DISINTEGRATING ORAL
Qty: 60 TABLET | Refills: 1 | Status: SHIPPED | OUTPATIENT
Start: 2023-05-31 | End: 2023-08-24 | Stop reason: SDUPTHER

## 2023-06-06 ENCOUNTER — PATIENT MESSAGE (OUTPATIENT)
Dept: HEMATOLOGY/ONCOLOGY | Facility: CLINIC | Age: 43
End: 2023-06-06
Payer: COMMERCIAL

## 2023-06-06 ENCOUNTER — PATIENT MESSAGE (OUTPATIENT)
Dept: DERMATOLOGY | Facility: CLINIC | Age: 43
End: 2023-06-06
Payer: COMMERCIAL

## 2023-06-06 DIAGNOSIS — C50.412 MALIGNANT NEOPLASM OF UPPER-OUTER QUADRANT OF LEFT BREAST IN FEMALE, ESTROGEN RECEPTOR NEGATIVE: Primary | ICD-10-CM

## 2023-06-06 DIAGNOSIS — Z17.1 MALIGNANT NEOPLASM OF UPPER-OUTER QUADRANT OF LEFT BREAST IN FEMALE, ESTROGEN RECEPTOR NEGATIVE: Primary | ICD-10-CM

## 2023-06-07 ENCOUNTER — PATIENT MESSAGE (OUTPATIENT)
Dept: HEMATOLOGY/ONCOLOGY | Facility: CLINIC | Age: 43
End: 2023-06-07
Payer: COMMERCIAL

## 2023-06-14 ENCOUNTER — PATIENT MESSAGE (OUTPATIENT)
Dept: ENDOCRINOLOGY | Facility: CLINIC | Age: 43
End: 2023-06-14
Payer: COMMERCIAL

## 2023-06-14 ENCOUNTER — INFUSION (OUTPATIENT)
Dept: INFUSION THERAPY | Facility: HOSPITAL | Age: 43
End: 2023-06-14
Attending: INTERNAL MEDICINE
Payer: COMMERCIAL

## 2023-06-14 ENCOUNTER — LAB VISIT (OUTPATIENT)
Dept: LAB | Facility: HOSPITAL | Age: 43
End: 2023-06-14
Attending: INTERNAL MEDICINE
Payer: COMMERCIAL

## 2023-06-14 VITALS
RESPIRATION RATE: 18 BRPM | TEMPERATURE: 98 F | DIASTOLIC BLOOD PRESSURE: 64 MMHG | SYSTOLIC BLOOD PRESSURE: 106 MMHG | HEART RATE: 76 BPM

## 2023-06-14 DIAGNOSIS — E27.40 ADRENAL INSUFFICIENCY: ICD-10-CM

## 2023-06-14 DIAGNOSIS — C50.412 MALIGNANT NEOPLASM OF UPPER-OUTER QUADRANT OF LEFT BREAST IN FEMALE, ESTROGEN RECEPTOR NEGATIVE: ICD-10-CM

## 2023-06-14 DIAGNOSIS — N95.2 VAGINAL ATROPHY: ICD-10-CM

## 2023-06-14 DIAGNOSIS — Z13.6 ENCOUNTER FOR LIPID SCREENING FOR CARDIOVASCULAR DISEASE: ICD-10-CM

## 2023-06-14 DIAGNOSIS — Z17.1 MALIGNANT NEOPLASM OF UPPER-OUTER QUADRANT OF LEFT BREAST IN FEMALE, ESTROGEN RECEPTOR NEGATIVE: ICD-10-CM

## 2023-06-14 DIAGNOSIS — Z13.220 ENCOUNTER FOR LIPID SCREENING FOR CARDIOVASCULAR DISEASE: ICD-10-CM

## 2023-06-14 DIAGNOSIS — E27.40 ADRENAL INSUFFICIENCY: Primary | ICD-10-CM

## 2023-06-14 LAB
ALBUMIN SERPL BCP-MCNC: 3.9 G/DL (ref 3.5–5.2)
ALP SERPL-CCNC: 74 U/L (ref 55–135)
ALT SERPL W/O P-5'-P-CCNC: 10 U/L (ref 10–44)
ANION GAP SERPL CALC-SCNC: 10 MMOL/L (ref 8–16)
AST SERPL-CCNC: 15 U/L (ref 10–40)
BASOPHILS # BLD AUTO: 0.03 K/UL (ref 0–0.2)
BASOPHILS NFR BLD: 0.7 % (ref 0–1.9)
BILIRUB SERPL-MCNC: 0.3 MG/DL (ref 0.1–1)
BUN SERPL-MCNC: 7 MG/DL (ref 6–20)
CALCIUM SERPL-MCNC: 9.2 MG/DL (ref 8.7–10.5)
CHLORIDE SERPL-SCNC: 109 MMOL/L (ref 95–110)
CHOLEST SERPL-MCNC: 154 MG/DL (ref 120–199)
CHOLEST/HDLC SERPL: 2.6 {RATIO} (ref 2–5)
CO2 SERPL-SCNC: 24 MMOL/L (ref 23–29)
CORTIS SERPL-MCNC: <1 UG/DL (ref 4.3–22.4)
CREAT SERPL-MCNC: 0.7 MG/DL (ref 0.5–1.4)
DIFFERENTIAL METHOD: ABNORMAL
EOSINOPHIL # BLD AUTO: 0.1 K/UL (ref 0–0.5)
EOSINOPHIL NFR BLD: 2.4 % (ref 0–8)
ERYTHROCYTE [DISTWIDTH] IN BLOOD BY AUTOMATED COUNT: 12.7 % (ref 11.5–14.5)
EST. GFR  (NO RACE VARIABLE): >60 ML/MIN/1.73 M^2
FSH SERPL-ACNC: 108.05 MIU/ML
GLUCOSE SERPL-MCNC: 83 MG/DL (ref 70–110)
HCT VFR BLD AUTO: 37.2 % (ref 37–48.5)
HDLC SERPL-MCNC: 60 MG/DL (ref 40–75)
HDLC SERPL: 39 % (ref 20–50)
HGB BLD-MCNC: 12.7 G/DL (ref 12–16)
IMM GRANULOCYTES # BLD AUTO: 0.02 K/UL (ref 0–0.04)
IMM GRANULOCYTES NFR BLD AUTO: 0.4 % (ref 0–0.5)
LDLC SERPL CALC-MCNC: 75.4 MG/DL (ref 63–159)
LH SERPL-ACNC: 38 MIU/ML
LYMPHOCYTES # BLD AUTO: 2.4 K/UL (ref 1–4.8)
LYMPHOCYTES NFR BLD: 52.7 % (ref 18–48)
MCH RBC QN AUTO: 31 PG (ref 27–31)
MCHC RBC AUTO-ENTMCNC: 34.1 G/DL (ref 32–36)
MCV RBC AUTO: 91 FL (ref 82–98)
MONOCYTES # BLD AUTO: 0.4 K/UL (ref 0.3–1)
MONOCYTES NFR BLD: 9.2 % (ref 4–15)
NEUTROPHILS # BLD AUTO: 1.6 K/UL (ref 1.8–7.7)
NEUTROPHILS NFR BLD: 34.6 % (ref 38–73)
NONHDLC SERPL-MCNC: 94 MG/DL
NRBC BLD-RTO: 0 /100 WBC
PLATELET # BLD AUTO: 188 K/UL (ref 150–450)
PMV BLD AUTO: 8.6 FL (ref 9.2–12.9)
POTASSIUM SERPL-SCNC: 3.3 MMOL/L (ref 3.5–5.1)
PROGEST SERPL-MCNC: <0.1 NG/ML
PROT SERPL-MCNC: 6.1 G/DL (ref 6–8.4)
RBC # BLD AUTO: 4.1 M/UL (ref 4–5.4)
SODIUM SERPL-SCNC: 143 MMOL/L (ref 136–145)
TESTOST SERPL-MCNC: <4 NG/DL (ref 5–73)
TRIGL SERPL-MCNC: 93 MG/DL (ref 30–150)
TSH SERPL DL<=0.005 MIU/L-ACNC: 1.13 UIU/ML (ref 0.4–4)
WBC # BLD AUTO: 4.59 K/UL (ref 3.9–12.7)

## 2023-06-14 PROCEDURE — 36591 DRAW BLOOD OFF VENOUS DEVICE: CPT

## 2023-06-14 PROCEDURE — 82533 TOTAL CORTISOL: CPT | Performed by: INTERNAL MEDICINE

## 2023-06-14 PROCEDURE — 84443 ASSAY THYROID STIM HORMONE: CPT | Performed by: FAMILY MEDICINE

## 2023-06-14 PROCEDURE — 84144 ASSAY OF PROGESTERONE: CPT | Performed by: FAMILY MEDICINE

## 2023-06-14 PROCEDURE — 80053 COMPREHEN METABOLIC PANEL: CPT | Performed by: INTERNAL MEDICINE

## 2023-06-14 PROCEDURE — 80061 LIPID PANEL: CPT | Performed by: FAMILY MEDICINE

## 2023-06-14 PROCEDURE — 85025 COMPLETE CBC W/AUTO DIFF WBC: CPT | Performed by: INTERNAL MEDICINE

## 2023-06-14 PROCEDURE — 82672 ASSAY OF ESTROGEN: CPT | Performed by: FAMILY MEDICINE

## 2023-06-14 PROCEDURE — 83002 ASSAY OF GONADOTROPIN (LH): CPT | Performed by: FAMILY MEDICINE

## 2023-06-14 PROCEDURE — 84403 ASSAY OF TOTAL TESTOSTERONE: CPT | Performed by: FAMILY MEDICINE

## 2023-06-14 PROCEDURE — 83001 ASSAY OF GONADOTROPIN (FSH): CPT | Performed by: FAMILY MEDICINE

## 2023-06-14 NOTE — PLAN OF CARE
Pleasant alert and oriented patient to unit for port flush with labs - pt tolerated well - discharged home with no concerns - pt to RTC PRN

## 2023-06-15 ENCOUNTER — PATIENT MESSAGE (OUTPATIENT)
Dept: HEMATOLOGY/ONCOLOGY | Facility: CLINIC | Age: 43
End: 2023-06-15
Payer: COMMERCIAL

## 2023-06-15 NOTE — TELEPHONE ENCOUNTER
Let patient know that labs show that she has adrenal insufficiency  Stay on hydrocortisone.  I do not think that physiologic replacement doses of steroids would cause significant blood pressure increase.  However, high dose steroids could increase blood pressure.

## 2023-06-19 ENCOUNTER — OFFICE VISIT (OUTPATIENT)
Dept: HEMATOLOGY/ONCOLOGY | Facility: CLINIC | Age: 43
End: 2023-06-19
Payer: COMMERCIAL

## 2023-06-19 DIAGNOSIS — Z90.710 S/P LAPAROSCOPIC HYSTERECTOMY: ICD-10-CM

## 2023-06-19 DIAGNOSIS — Z15.01 BRCA2 GENE MUTATION POSITIVE: Chronic | ICD-10-CM

## 2023-06-19 DIAGNOSIS — Z17.1 MALIGNANT NEOPLASM OF UPPER-OUTER QUADRANT OF LEFT BREAST IN FEMALE, ESTROGEN RECEPTOR NEGATIVE: Primary | ICD-10-CM

## 2023-06-19 DIAGNOSIS — K59.00 CONSTIPATION, UNSPECIFIED CONSTIPATION TYPE: ICD-10-CM

## 2023-06-19 DIAGNOSIS — D05.12 DUCTAL CARCINOMA IN SITU OF LEFT BREAST: ICD-10-CM

## 2023-06-19 DIAGNOSIS — Z29.89 ENCOUNTER FOR IMMUNOTHERAPY: ICD-10-CM

## 2023-06-19 DIAGNOSIS — E27.40 ADRENAL INSUFFICIENCY: ICD-10-CM

## 2023-06-19 DIAGNOSIS — C50.412 MALIGNANT NEOPLASM OF UPPER-OUTER QUADRANT OF LEFT BREAST IN FEMALE, ESTROGEN RECEPTOR NEGATIVE: Primary | ICD-10-CM

## 2023-06-19 DIAGNOSIS — Z15.09 BRCA2 GENE MUTATION POSITIVE: Chronic | ICD-10-CM

## 2023-06-19 LAB — ESTROGEN SERPL-MCNC: <25 PG/ML

## 2023-06-19 PROCEDURE — 1160F RVW MEDS BY RX/DR IN RCRD: CPT | Mod: CPTII,S$GLB,, | Performed by: INTERNAL MEDICINE

## 2023-06-19 PROCEDURE — 99214 PR OFFICE/OUTPT VISIT, EST, LEVL IV, 30-39 MIN: ICD-10-PCS | Mod: S$GLB,,, | Performed by: INTERNAL MEDICINE

## 2023-06-19 PROCEDURE — 1160F PR REVIEW ALL MEDS BY PRESCRIBER/CLIN PHARMACIST DOCUMENTED: ICD-10-PCS | Mod: CPTII,S$GLB,, | Performed by: INTERNAL MEDICINE

## 2023-06-19 PROCEDURE — 99999 PR PBB SHADOW E&M-EST. PATIENT-LVL II: ICD-10-PCS | Mod: PBBFAC,,, | Performed by: INTERNAL MEDICINE

## 2023-06-19 PROCEDURE — 99999 PR PBB SHADOW E&M-EST. PATIENT-LVL II: CPT | Mod: PBBFAC,,, | Performed by: INTERNAL MEDICINE

## 2023-06-19 PROCEDURE — 1159F MED LIST DOCD IN RCRD: CPT | Mod: CPTII,S$GLB,, | Performed by: INTERNAL MEDICINE

## 2023-06-19 PROCEDURE — 1159F PR MEDICATION LIST DOCUMENTED IN MEDICAL RECORD: ICD-10-PCS | Mod: CPTII,S$GLB,, | Performed by: INTERNAL MEDICINE

## 2023-06-19 PROCEDURE — 99214 OFFICE O/P EST MOD 30 MIN: CPT | Mod: S$GLB,,, | Performed by: INTERNAL MEDICINE

## 2023-06-19 NOTE — PROGRESS NOTES
"Subjective     Patient ID: Alison Swann is a 43 y.o. female.    Chief Complaint: No chief complaint on file.    HPI    Reports physically feeling well  Back to baseline weight and working out again  Energy level adequate with steroid replacement (cortisol deficiency)  Now on Trazodone 50 mg for sleep (resistance to prior medications)  No pain issues  No fevers, chills or infectious complaints    Oncology History:  - 12/2019 thermography of breast revealed  lymphatic changes in the left axilla.  - 4/2020 self detected a palpable left breast mass  - 6/6/2020 Diagnostic mammogram  Impression:  Suspicious grouping of microcalcifications in the upper-outer left breast corresponding to area of palpable concern.  Biopsy is warranted.Questionable distortion of the breast architecture in the periareolar region of the right breast near the 3 o'clock position without sonographic correlate.  Short-term mammographic follow-up of the right breast at 6 month interval is recommended.  The above findings and recommendations were discussed with the patient at the time of the examination.  BI-RADS CATEGORY 4: SUSPICIOUS ABNORMALITY-BIOPSY SHOULD BE CONSIDERED  - 6/23/2020 imaging guided biopsy  Pathology: [facility] (The other calcifications were apparently not sampled)  - 1/19/2021 excision biopsy with Dr. Johnston  Pathology:  1.  BREAST, LEFT, FURTHER DESIGNATED "MASS," EXCISION:   --INVASIVE CARCINOMA OF NO SPECIAL TYPE (DUCTAL, NOT OTHERWISE    SPECIFIED), SPENSER HISTOLOGIC           GRADE 3 OUT OF 3.        --TUMOR MEASURES 42 MILLIMETERS IN MAXIMUM DIMENSION.        --RECEPTOR STUDIES ARE PENDING.   --SPECIMEN ANTERIOR MARGIN IS POSITIVE FOR INVASIVE CARCINOMA; ALL    REMAINING SPECIMEN MARGINS ARE           AT LEAST 2 MILLIMETERS AWAY.   --DUCTAL CARCINOMA IN SITU, HIGH NUCLEAR GRADE, COMPRISING LESS THAN 5%    OF TOTAL TUMOR TISSUE.        --ALL SPECIMEN MARGINS ARE NEGATIVE FOR DUCTAL CARCINOMA IN SITU.        --EXTENSIVE " "LYMPHOVASCULAR INVASION PRESENT.   --FIBROCYSTIC CHANGES INCLUDING STROMAL FIBROSIS, CYSTIC DILATATION OF    DUCTS, AND APOCRINE           METAPLASIA.        --COLUMNAR CELL CHANGE.   --MICROCALCIFICATIONS ASSOCIATED WITH TUMOR AND WITH BENIGN DUCTAL    PROFILES.   2.  BREAST, LEFT, FURTHER DESIGNATED "MEDIAL MARGIN," EXCISION:        --NO MORPHOLOGIC EVIDENCE OF MALIGNANCY.   --FIBROCYSTIC CHANGES INCLUDING STROMAL FIBROSIS, CYSTIC DILATATION OF    DUCTS, APOCRINE METAPLASIA,           AND USUAL DUCTAL EPITHELIAL HYPERPLASIA.        --COLUMNAR CELL CHANGE.        --FOCAL MICROCALCIFICATIONS ASSOCIATED WITH BENIGN DUCTAL PROFILES.   3.  LYMPH NODE, LEFT SENTINEL, NEEDLE CORE BIOPSY:   --METASTATIC CARCINOMA OF NO SPECIAL TYPE (DUCTAL, NOT OTHERWISE    SPECIFIED).   ER negative, MI negative, Xck0sqn negative  Ki-67 79%     - 1/27/2021 Breast MRI:  Findings: The breasts have heterogeneous fibroglandular tissue. The background parenchymal enhancement is minimal.   Left  There are 6 similar 29 mm x 23 mm lymph nodes seen in the left axilla.   There is an 8 mm x 7 mm x 6 mm homogeneous, non-mass enhancement in a focal distribution seen in the left breast at 12 o'clock in the middle depth, 5.5 cm from the nipple. Delayed phase is persistent. This is immediately anterior to the pectoralis major muscle, at anterior aspect of implant.   There is a 24 mm x 11 mm x 9 mm clumped, non-mass enhancement in a focal distribution seen in the outer central region of the left breast in the posterior depth. This is along the margins of the resection cavity at is posterior aspect.   Right  There is no evidence of suspicious masses, abnormal enhancement, or other abnormal findings in the right breast.  Impression:  Left  Non-mass Enhancement: Left breast 8 mm x 7 mm x 6 mm non-mass enhancement at the middle 12 o'clock position. Assessment: 4 - Suspicious finding. Biopsy is recommended.  Second look ultrasound followed by ultrasound or " MRI-guided biopsy could be performed if it would .  Of note, MRI guided biopsy would carry risk of implant rupture. Non-mass Enhancement: Left breast 24 mm x 11 mm x 9 mm non-mass enhancement at the posterior aspect of the excision cavity in the lateral breast. Assessment: 4 - Suspicious finding.  This is suspicious for residual disease in this patient with history of recent excisional biopsy.   The area is likely not amenable to MRI guided biopsy.  Surgical consultation recommended. Left axillary adenopathy.  At least 6 abnormal appearing level I axillary nodes are present, suspicious for residual lymph node metastasis.  RightThere is no MR evidence of malignancy in the right breast.  BI-RADS Category:   Overall: 4 - Suspicious  Recommendation:  Surgical consultation recommended.  Left breast biopsy could be performed of focal NME at 12 o'clock in the left breast if clinically indicated.     - 2/2/2021 PET scan:  COMPARISON:  Breast MRI 01/27/2021  FINDINGS:  Quality of the study: Adequate.  In the head and neck, there are no hypermetabolic lesions worrisome for malignancy. There are no hypermetabolic mucosal lesions, and there are no pathologically enlarged or hypermetabolic lymph nodes.  In the chest, there is no definite hypermetabolic breast mass.  There are bilateral breast implants in place.  There is relatively mild diffuse uptake within dense fibroglandular tissue, and within the left breast there is a maximum SUV of 1.9 adjacent to biopsy clips on image 68.  There is also mildly hypermetabolic subcutaneous fat stranding elsewhere in the lateral aspect of the left breast on image 77 likely postprocedural in nature. There are at least half a dozen left level 1 axillary lymph nodes the largest of which are hypermetabolic including a 3 x 2.1 cm node on image 50 with an SUV of 8.6, a 1 cm node on image 55 with an SUV of 4.7, and 2.3 x 1.2 cm node on image 61 with an SUV of 9.2.  There is also a  non hypermetabolic 9 x 6 mm right level 2 lymph node.  There are no suspicious lymph nodes in the internal mammary chain.  There are no pulmonary nodules, and there are no pleural or pericardial effusions.  In the abdomen and pelvis, there is physiologic tracer distribution within the abdominal organs and excretion into the genitourinary system, including diffusely within the right ureter and focally within the left.In the bones, there are no hypermetabolic lesions worrisome for malignancy.  Impression:  1.  No discrete left breast mass identified.  2.  Metastatic left level 1 axillary lymph nodes.  Level 2 larisa metastasis is not excluded.  3.  No evidence of distant metastasis.     - 2/9/2021 ECHO:  The left ventricle is normal in size with normal systolic function. The estimated ejection fraction is 58%  Regimen:  Paclitaxel weekly + carboplatin with Pembrolizumab q 3 weeks followed by AC with Pembrolizumab q 3 weeks followed by Pembroluzimab.(Based on interim analysis of the phase III KEYNOTE-522 the addition of pembrolizumab to neoadjuvant chemotherapy and use of adjuvant pembrolizumab x 9 cycles resulted in improvements in pathologic complete response rate and event-free survival in women with early triple-negative breast cancer.)     BRCA2+     5/3/2021 Breast MRI follow up on treatment in the interval (results below)  Findings:  There are bilateral retropectoral silicone implants. There is a right sided port.  The breasts have extreme amounts of fibroglandular tissue. The background parenchymal enhancement is mild.  There are postsurgical changes in the left upper outer breast and axilla.  There has been significant interval decrease in size of the previously seen abnormal left axillary lymph nodes, now with the largest lymph node measuring 14 x 15 x 10 mm (previously measuring up to 29 mm on the 1/27/21 MRI).  The other left axillary lymph nodes are smaller with mildly irregular shaggy margins, consistent  with chemotherapy treatment.   No suspicious enhancement is seen in either breast. The previously seen left 12:00 mid depth focal non mass enhancement anterior to the pectoralis is not seen on the current exam.   No abnormal right axillary lymph nodes or internal mammary lymph nodes are seen.  Impression:  There has been significant interval decrease in size of the previously seen abnormal left axillary lymph nodes, now with the largest lymph node measuring 14 x 15 x 10 mm (previously measuring up to 29 mm on the 21 MRI).  The other left axillary lymph nodes are smaller with mildly irregular shaggy margins, consistent with chemotherapy treatment.   No suspicious enhancement is seen in either breast.   BI-RADS Category:   Overall: 6 - Known Biopsy-Proven Malignancy     -   Completed surgery with complete pathologic response     - completed XRT     Gyn Hx:  Menarche- 10  , age at 1st live birth 22  OCPs x 4 years  Endometrial ablation      FH:  Mother - diagnosed at age 50 with breast cancer  Mastectomy- bilateral  No chemotherapy or radiation   Remains on Tamoxifen  Treated in [place]  ? Genetics  Axel Talamantes (Mirtha Beck 59)     Maternal uncle-  in his 20s of gastric cancer  Maternal great grandmother - colon cancer  Paternal side unknown  3 sisters- healthy     SH:  , daughter  [employer]    Review of Systems   Constitutional:  Negative for activity change, appetite change, chills, fatigue, fever and unexpected weight change.   HENT:  Negative for trouble swallowing.    Respiratory:  Negative for cough, shortness of breath and wheezing.    Cardiovascular:  Negative for chest pain, palpitations and leg swelling.   Gastrointestinal:  Positive for change in bowel habit, constipation (notes no longer reqular, daily, harder stools) and change in bowel habit. Negative for abdominal distention, abdominal pain, diarrhea, nausea, vomiting and reflux.   Genitourinary:  Negative for  bladder incontinence, decreased urine volume, difficulty urinating, dysuria and urgency.   Musculoskeletal:  Negative for arthralgias, back pain, joint swelling, myalgias and joint deformity.   Integumentary:  Negative for color change, pallor, rash, breast mass and breast tenderness.   Neurological:  Negative for dizziness, weakness, light-headedness and numbness.   Hematological:  Negative for adenopathy. Does not bruise/bleed easily.   Psychiatric/Behavioral:  Positive for sleep disturbance. Negative for dysphoric mood. The patient is not nervous/anxious.    Breast: Negative for mass and tenderness       Objective     Physical Exam  Vitals and nursing note reviewed.   Constitutional:       General: She is not in acute distress.     Appearance: Normal appearance. She is normal weight. She is not ill-appearing.   HENT:      Head: Normocephalic and atraumatic.   Eyes:      General: No scleral icterus.     Extraocular Movements: Extraocular movements intact.      Conjunctiva/sclera: Conjunctivae normal.      Pupils: Pupils are equal, round, and reactive to light.   Cardiovascular:      Rate and Rhythm: Normal rate and regular rhythm.      Heart sounds: Normal heart sounds. No murmur heard.    No friction rub. No gallop.   Pulmonary:      Effort: Pulmonary effort is normal. No respiratory distress.      Breath sounds: Normal breath sounds. No wheezing, rhonchi or rales.      Comments: Bilateral reconstruction  No masses  Chest:      Chest wall: No tenderness.   Abdominal:      General: Abdomen is flat. Bowel sounds are normal. There is no distension.      Palpations: Abdomen is soft. There is no mass.      Tenderness: There is no abdominal tenderness. There is no guarding or rebound.   Musculoskeletal:         General: No swelling or tenderness. Normal range of motion.      Cervical back: Normal range of motion and neck supple. No tenderness.      Right lower leg: No edema.      Left lower leg: No edema.    Lymphadenopathy:      Cervical: No cervical adenopathy.   Skin:     General: Skin is warm and dry.      Coloration: Skin is not jaundiced or pale.      Findings: No lesion or rash.      Comments: Notes very small areas hupopigmentation   Neurological:      General: No focal deficit present.      Mental Status: She is alert and oriented to person, place, and time. Mental status is at baseline.      Sensory: No sensory deficit.      Motor: No weakness.      Coordination: Coordination normal.      Gait: Gait normal.   Psychiatric:         Mood and Affect: Mood normal.         Behavior: Behavior normal.         Thought Content: Thought content normal.         Judgment: Judgment normal.        Assessment and Plan     1. Malignant neoplasm of upper-outer quadrant of left breast in female, estrogen receptor negative    2. Ductal carcinoma in situ of left breast    3. BRCA2 gene mutation positive    4. Encounter for immunotherapy    5. s/p RA-TLH/BSO    6. Adrenal insufficiency    7. Constipation, unspecified constipation type      BRCA + TNBC  Continue surveillance  Appropriate prophylactic surgeries completed  Will need revision on shelf on right   Continue annual dermatology exam    Reports she sees a new specialist later tyhis month to assist with hormonal levels and will keep us posted    Adrenal insufficiency- on replacement  This occurred secondary to immunotherapy   Follows with endocrinology    Constipation  Counseled on management    Route Chart for Scheduling    Med Onc Chart Routing      Follow up with physician 3 months. cbc, cmp, cortisol AM   Follow up with [facility]    Infusion scheduling note    Injection scheduling note    Labs    Imaging    Pharmacy appointment    Other referrals            Therapy Plan Information  PORT FLUSH  Flushes  heparin, porcine (PF) 100 unit/mL injection flush 500 Units  500 Units, Intravenous, Every visit  sodium chloride 0.9% flush 10 mL  10 mL, Intravenous, Every visit      25  minutes total with patient  5 minutes chart review and completion

## 2023-06-27 ENCOUNTER — PATIENT MESSAGE (OUTPATIENT)
Dept: FAMILY MEDICINE | Facility: CLINIC | Age: 43
End: 2023-06-27
Payer: COMMERCIAL

## 2023-06-28 RX ORDER — HYDROCORTISONE 10 MG/1
TABLET ORAL
Qty: 120 TABLET | Refills: 11 | Status: SHIPPED | OUTPATIENT
Start: 2023-06-28 | End: 2023-09-23 | Stop reason: SDUPTHER

## 2023-07-13 ENCOUNTER — PATIENT MESSAGE (OUTPATIENT)
Dept: HEMATOLOGY/ONCOLOGY | Facility: CLINIC | Age: 43
End: 2023-07-13
Payer: COMMERCIAL

## 2023-07-17 ENCOUNTER — PATIENT MESSAGE (OUTPATIENT)
Dept: ENDOCRINOLOGY | Facility: CLINIC | Age: 43
End: 2023-07-17
Payer: COMMERCIAL

## 2023-07-17 DIAGNOSIS — E27.49 SECONDARY ADRENAL INSUFFICIENCY: Primary | ICD-10-CM

## 2023-07-17 RX ORDER — DEXAMETHASONE SODIUM PHOSPHATE 4 MG/ML
4 INJECTION, SOLUTION INTRA-ARTICULAR; INTRALESIONAL; INTRAMUSCULAR; INTRAVENOUS; SOFT TISSUE ONCE
Qty: 1 ML | Refills: 3 | Status: SHIPPED | OUTPATIENT
Start: 2023-07-17 | End: 2023-07-17

## 2023-07-17 NOTE — TELEPHONE ENCOUNTER
I don't see which injection was prescribed in the past for her.  Needs refill on the emergency injection.

## 2023-07-19 ENCOUNTER — OFFICE VISIT (OUTPATIENT)
Dept: FAMILY MEDICINE | Facility: CLINIC | Age: 43
End: 2023-07-19
Payer: COMMERCIAL

## 2023-07-19 ENCOUNTER — TELEPHONE (OUTPATIENT)
Dept: HEMATOLOGY/ONCOLOGY | Facility: CLINIC | Age: 43
End: 2023-07-19
Payer: COMMERCIAL

## 2023-07-19 DIAGNOSIS — F90.0 ADHD (ATTENTION DEFICIT HYPERACTIVITY DISORDER), INATTENTIVE TYPE: Primary | ICD-10-CM

## 2023-07-19 PROCEDURE — 99213 OFFICE O/P EST LOW 20 MIN: CPT | Mod: 95,,, | Performed by: FAMILY MEDICINE

## 2023-07-19 PROCEDURE — 99213 PR OFFICE/OUTPT VISIT, EST, LEVL III, 20-29 MIN: ICD-10-PCS | Mod: 95,,, | Performed by: FAMILY MEDICINE

## 2023-07-19 RX ORDER — DEXTROAMPHETAMINE SACCHARATE, AMPHETAMINE ASPARTATE, DEXTROAMPHETAMINE SULFATE AND AMPHETAMINE SULFATE 2.5; 2.5; 2.5; 2.5 MG/1; MG/1; MG/1; MG/1
10 TABLET ORAL DAILY
Qty: 30 TABLET | Refills: 0 | Status: SHIPPED | OUTPATIENT
Start: 2023-07-19 | End: 2023-08-17 | Stop reason: DRUGHIGH

## 2023-07-19 NOTE — TELEPHONE ENCOUNTER
"Alison Swann ("Alison") verbally provided me with her permission for me to share with her maternal half-sister, initials DAGO, Alison's genetic information.    "

## 2023-07-19 NOTE — PROGRESS NOTES
[facility] - Clinic Note    Subjective    The patient location is: in car  The chief complaint leading to consultation is: med refill    Visit type: audiovisual    Face to Face time with patient: 5 minutes  7 minutes of total time spent on the encounter, which includes face to face time and non-face to face time preparing to see the patient (eg, review of tests), Obtaining and/or reviewing separately obtained history, Documenting clinical information in the electronic or other health record, Independently interpreting results (not separately reported) and communicating results to the patient/family/caregiver, or Care coordination (not separately reported).         Each patient to whom he or she provides medical services by telemedicine is:  (1) informed of the relationship between the physician and patient and the respective role of any other health care provider with respect to management of the patient; and (2) notified that he or she may decline to receive medical services by telemedicine and may withdraw from such care at any time.    Notes:     Ms. Swann is a 43 y.o. female who presents for a VV.     On adderall 10mg daily for ADHD.  Needs a refill.   Stable on regimen    [facility] Alison has a past medical history of Attention Deficit Hyperactivity Disorder, Family H/O Diabetes Mellitus, Generalized Anxiety, Left Breast Cancer S.P Lumpectomy In 2021, Macrocytic Anemia, Postoperative Nausea And Vomiting, Scoliosis, Therapeutic Drug Monitoring, and Wellness Visit 2021.   PSXH Alison has a past surgical history that includes Augmentation of breast (); Tubal ligation;  section; Breast surgery; Breast mass excision (Left, 2021); Hackett lymph node biopsy (Left, 2021); Insertion of tunneled central venous catheter (CVC) with subcutaneous port (N/A, 2021); Esophagogastroduodenoscopy (N/A, 2021); Colonoscopy (N/A, 2021); Robot-assisted laparoscopic abdominal  hysterectomy using da Jeff Xi (N/A, 03/04/2022); Robot-assisted laparoscopic salpingo-oophorectomy using da Jeff Xi (Bilateral, 03/04/2022); Hysterectomy; and Oophorectomy.   YAZAN Rosas's family history includes Anemia in her father; Breast cancer (age of onset: 52) in her mother; COPD in her maternal grandmother; Cancer in an other family member; Depression in her mother; Diabetes in her father; Hyperlipidemia in her father; Pancreatic cancer in an other family member; Stroke in her father.    Alison reports that she has never smoked. She has never used smokeless tobacco. She reports current alcohol use. She reports that she does not use drugs.   ALG Alison is allergic to penicillins.   LEROY Rosas has a current medication list which includes the following prescription(s): dextroamphetamine-amphetamine, imvexxy maintenance pack, hydrocortisone, ondansetron, trazodone, and valacyclovir, and the following Facility-Administered Medications: lactated ringers and sodium chloride 0.9%.     Review of Systems   Constitutional:  Negative for activity change and unexpected weight change.   HENT:  Negative for hearing loss, rhinorrhea and trouble swallowing.    Eyes:  Negative for discharge and visual disturbance.   Respiratory:  Negative for chest tightness and wheezing.    Cardiovascular:  Negative for chest pain and palpitations.   Gastrointestinal:  Negative for blood in stool, constipation, diarrhea and vomiting.   Endocrine: Negative for polydipsia and polyuria.   Genitourinary:  Negative for difficulty urinating, dysuria, hematuria and menstrual problem.   Musculoskeletal:  Negative for arthralgias, joint swelling and neck pain.   Neurological:  Negative for weakness and headaches.   Psychiatric/Behavioral:  Negative for confusion and dysphoric mood.      Objective     LMP  (LMP Unknown) Comment: hysterectomy 3/4/22    Physical Exam   Constitutional: normal appearance.  Non-toxic appearance. No distress.  She does not appear ill.   HENT:   Head: Normocephalic and atraumatic.   Right Ear: External ear normal.   Left Ear: External ear normal.   Eyes: Right eye exhibits no discharge. Left eye exhibits no discharge. Pulmonary:      Effort: Pulmonary effort is normal. No respiratory distress.      Comments: Speaks in complete sentences without notable SOB. No tachypnea.     Abdominal: Normal appearance.   Neurological: She is alert.   Psychiatric: Her behavior is normal. Mood, judgment and thought content normal.      Assessment/Plan     Diagnoses and all orders for this visit:    ADHD (attention deficit hyperactivity disorder), inattentive type  -     dextroamphetamine-amphetamine 10 mg Tab; Take 1 tablet (10 mg total) by mouth once daily.        Follow up in about 3 months (around 10/19/2023), or if symptoms worsen or fail to improve.    Future Appointments   Date Time Provider Department Center   8/23/2023  1:30 PM Hilario Mcconnell DO [facility] ENDOCRSARINA [facility]   8/30/2023  4:40 PM Bola Amado MD [facility]   9/21/2023  8:00 AM LAB, [facility]EMON CAN[facility]ER [facility] LAB HO Gumaro Mooney   9/21/2023  9:00 AM Sandra Sotelo MD [facility] HEMON Gumaro Amado MD  Family Medicine  [facility]

## 2023-07-20 ENCOUNTER — PATIENT MESSAGE (OUTPATIENT)
Dept: FAMILY MEDICINE | Facility: CLINIC | Age: 43
End: 2023-07-20
Payer: COMMERCIAL

## 2023-07-24 ENCOUNTER — TELEPHONE (OUTPATIENT)
Dept: ENDOCRINOLOGY | Facility: CLINIC | Age: 43
End: 2023-07-24

## 2023-07-24 NOTE — TELEPHONE ENCOUNTER
Pt is requesting Decadron 4 mg to be sent to a different pharmacy and it is not showing on med list. Please advise.

## 2023-07-24 NOTE — TELEPHONE ENCOUNTER
----- Message from Magdalena Menjivar, Patient Care Assistant sent at 7/24/2023 11:54 AM CDT -----  Contact: [facility]  Type: Needs Medical Advice    Who Called: Bakari Ames  Best Call Back Number: [phone]  Inquiry/Question: Pharm is not able to get the pt's 4 mg injection. Pt is requesting it be sent to   [facility] [place] [place]. Please advise. Thank you~

## 2023-07-25 RX ORDER — DEXAMETHASONE SODIUM PHOSPHATE 4 MG/ML
4 INJECTION, SOLUTION INTRA-ARTICULAR; INTRALESIONAL; INTRAMUSCULAR; INTRAVENOUS; SOFT TISSUE ONCE
Qty: 1 ML | Refills: 3 | Status: SHIPPED | OUTPATIENT
Start: 2023-07-25 | End: 2023-07-26 | Stop reason: SDUPTHER

## 2023-07-26 ENCOUNTER — PATIENT MESSAGE (OUTPATIENT)
Dept: ENDOCRINOLOGY | Facility: CLINIC | Age: 43
End: 2023-07-26
Payer: COMMERCIAL

## 2023-07-26 DIAGNOSIS — E27.49 SECONDARY ADRENAL INSUFFICIENCY: Primary | ICD-10-CM

## 2023-07-26 RX ORDER — DEXAMETHASONE SODIUM PHOSPHATE 4 MG/ML
4 INJECTION, SOLUTION INTRA-ARTICULAR; INTRALESIONAL; INTRAMUSCULAR; INTRAVENOUS; SOFT TISSUE ONCE
Qty: 1 ML | Refills: 0 | Status: SHIPPED | OUTPATIENT
Start: 2023-07-26 | End: 2023-07-26

## 2023-07-27 ENCOUNTER — PATIENT MESSAGE (OUTPATIENT)
Dept: HEMATOLOGY/ONCOLOGY | Facility: CLINIC | Age: 43
End: 2023-07-27
Payer: COMMERCIAL

## 2023-07-27 ENCOUNTER — PATIENT MESSAGE (OUTPATIENT)
Dept: ENDOCRINOLOGY | Facility: CLINIC | Age: 43
End: 2023-07-27
Payer: COMMERCIAL

## 2023-08-01 ENCOUNTER — TELEPHONE (OUTPATIENT)
Dept: ENDOCRINOLOGY | Facility: CLINIC | Age: 43
End: 2023-08-01
Payer: COMMERCIAL

## 2023-08-01 ENCOUNTER — PATIENT MESSAGE (OUTPATIENT)
Dept: HEMATOLOGY/ONCOLOGY | Facility: CLINIC | Age: 43
End: 2023-08-01
Payer: COMMERCIAL

## 2023-08-01 NOTE — TELEPHONE ENCOUNTER
----- Message from Shasta Urbina sent at 7/28/2023  4:35 PM CDT -----  Type:  Insurance Calling to Clarify an RX    Name of Caller: Nathaniel    Pharmacy Name: [facility]    Prescription Name: dexamethasone 4 mg    What do they need to clarify?: Prior authorization Ref#[id number]    Best Call Back Number: [phone]    Additional Information:    Please call back to advise. Thanks!

## 2023-08-02 ENCOUNTER — TELEPHONE (OUTPATIENT)
Dept: HEMATOLOGY/ONCOLOGY | Facility: CLINIC | Age: 43
End: 2023-08-02

## 2023-08-02 ENCOUNTER — OFFICE VISIT (OUTPATIENT)
Dept: HEMATOLOGY/ONCOLOGY | Facility: CLINIC | Age: 43
End: 2023-08-02
Payer: COMMERCIAL

## 2023-08-02 DIAGNOSIS — Z15.01 BRCA2 GENE MUTATION POSITIVE: Chronic | ICD-10-CM

## 2023-08-02 DIAGNOSIS — Z15.09 BRCA2 GENE MUTATION POSITIVE: Chronic | ICD-10-CM

## 2023-08-02 DIAGNOSIS — Z17.1 MALIGNANT NEOPLASM OF UPPER-OUTER QUADRANT OF LEFT BREAST IN FEMALE, ESTROGEN RECEPTOR NEGATIVE: ICD-10-CM

## 2023-08-02 DIAGNOSIS — R20.8 SENSATION OF CHANGE IN TEMPERATURE: Primary | ICD-10-CM

## 2023-08-02 DIAGNOSIS — C50.412 MALIGNANT NEOPLASM OF UPPER-OUTER QUADRANT OF LEFT BREAST IN FEMALE, ESTROGEN RECEPTOR NEGATIVE: ICD-10-CM

## 2023-08-02 PROCEDURE — 99214 PR OFFICE/OUTPT VISIT, EST, LEVL IV, 30-39 MIN: ICD-10-PCS | Mod: 95,,, | Performed by: NURSE PRACTITIONER

## 2023-08-02 PROCEDURE — 99214 OFFICE O/P EST MOD 30 MIN: CPT | Mod: 95,,, | Performed by: NURSE PRACTITIONER

## 2023-08-02 NOTE — PROGRESS NOTES
The patient location is: [place]  The chief complaint leading to consultation is: burning fingers    Visit type: audiovisual      30 minutes of total time spent on the encounter, which includes face to face time and non-face to face time preparing to see the patient (eg, review of tests), Obtaining and/or reviewing separately obtained history, Documenting clinical information in the electronic or other health record, Independently interpreting results (not separately reported) and communicating results to the patient/family/caregiver, or Care coordination (not separately reported).         Each patient to whom he or she provides medical services by telemedicine is:  (1) informed of the relationship between the physician and patient and the respective role of any other health care provider with respect to management of the patient; and (2) notified that he or she may decline to receive medical services by telemedicine and may withdraw from such care at any time.    Notes:       Patient ID: Alison Swann is a 43 y.o. female.    Chief Complaint: No chief complaint on file.    HPI    Being seen as an urgent care per patient request for burning fingertips  Noticed off and on fingertip burning (not thumbs) for about 2 weeks  Feels its related to her cell phone as stays hot.   She is in contact with [employer] and trying to get a new phone.   No numbness or tingling.   Also noted more hot flashes but not at the same time as when her fingers burn.   No visible redness to skin  No other issues or complaints  Gets vitamin drip every 3 weeks and this has not changed    Oncology History:  - 12/2019 thermography of breast revealed  lymphatic changes in the left axilla.  - 4/2020 self detected a palpable left breast mass  - 6/6/2020 Diagnostic mammogram  Impression:  Suspicious grouping of microcalcifications in the upper-outer left breast corresponding to area of palpable concern.  Biopsy is warranted.Questionable distortion of the  "breast architecture in the periareolar region of the right breast near the 3 o'clock position without sonographic correlate.  Short-term mammographic follow-up of the right breast at 6 month interval is recommended.  The above findings and recommendations were discussed with the patient at the time of the examination.  BI-RADS CATEGORY 4: SUSPICIOUS ABNORMALITY-BIOPSY SHOULD BE CONSIDERED  - 6/23/2020 imaging guided biopsy  Pathology: ADH (The other calcifications were apparently not sampled)  - 1/19/2021 excision biopsy with Dr. Johnston  Pathology:  1.  BREAST, LEFT, FURTHER DESIGNATED "MASS," EXCISION:   --INVASIVE CARCINOMA OF NO SPECIAL TYPE (DUCTAL, NOT OTHERWISE    SPECIFIED), SPENSER HISTOLOGIC           GRADE 3 OUT OF 3.        --TUMOR MEASURES 42 MILLIMETERS IN MAXIMUM DIMENSION.        --RECEPTOR STUDIES ARE PENDING.   --SPECIMEN ANTERIOR MARGIN IS POSITIVE FOR INVASIVE CARCINOMA; ALL    REMAINING SPECIMEN MARGINS ARE           AT LEAST 2 MILLIMETERS AWAY.   --DUCTAL CARCINOMA IN SITU, HIGH NUCLEAR GRADE, COMPRISING LESS THAN 5%    OF TOTAL TUMOR TISSUE.        --ALL SPECIMEN MARGINS ARE NEGATIVE FOR DUCTAL CARCINOMA IN SITU.        --EXTENSIVE LYMPHOVASCULAR INVASION PRESENT.   --FIBROCYSTIC CHANGES INCLUDING STROMAL FIBROSIS, CYSTIC DILATATION OF    DUCTS, AND APOCRINE           METAPLASIA.        --COLUMNAR CELL CHANGE.   --MICROCALCIFICATIONS ASSOCIATED WITH TUMOR AND WITH BENIGN DUCTAL    PROFILES.   2.  BREAST, LEFT, FURTHER DESIGNATED "MEDIAL MARGIN," EXCISION:        --NO MORPHOLOGIC EVIDENCE OF MALIGNANCY.   --FIBROCYSTIC CHANGES INCLUDING STROMAL FIBROSIS, CYSTIC DILATATION OF    DUCTS, APOCRINE METAPLASIA,           AND USUAL DUCTAL EPITHELIAL HYPERPLASIA.        --COLUMNAR CELL CHANGE.        --FOCAL MICROCALCIFICATIONS ASSOCIATED WITH BENIGN DUCTAL PROFILES.   3.  LYMPH NODE, LEFT SENTINEL, NEEDLE CORE BIOPSY:   --METASTATIC CARCINOMA OF NO SPECIAL TYPE (DUCTAL, NOT OTHERWISE    SPECIFIED). "   ER negative, OH negative, [id number] negative  Ki-67 79%     - 1/27/2021 Breast MRI:  Findings: The breasts have heterogeneous fibroglandular tissue. The background parenchymal enhancement is minimal.   Left  There are 6 similar 29 mm x 23 mm lymph nodes seen in the left axilla.   There is an 8 mm x 7 mm x 6 mm homogeneous, non-mass enhancement in a focal distribution seen in the left breast at 12 o'clock in the middle depth, 5.5 cm from the nipple. Delayed phase is persistent. This is immediately anterior to the pectoralis major muscle, at anterior aspect of implant.   There is a 24 mm x 11 mm x 9 mm clumped, non-mass enhancement in a focal distribution seen in the outer central region of the left breast in the posterior depth. This is along the margins of the resection cavity at is posterior aspect.   Right  There is no evidence of suspicious masses, abnormal enhancement, or other abnormal findings in the right breast.  Impression:  Left  Non-mass Enhancement: Left breast 8 mm x 7 mm x 6 mm non-mass enhancement at the middle 12 o'clock position. Assessment: 4 - Suspicious finding. Biopsy is recommended.  Second look ultrasound followed by ultrasound or MRI-guided biopsy could be performed if it would .  Of note, MRI guided biopsy would carry risk of implant rupture. Non-mass Enhancement: Left breast 24 mm x 11 mm x 9 mm non-mass enhancement at the posterior aspect of the excision cavity in the lateral breast. Assessment: 4 - Suspicious finding.  This is suspicious for residual disease in this patient with history of recent excisional biopsy.   The area is likely not amenable to MRI guided biopsy.  Surgical consultation recommended. Left axillary adenopathy.  At least 6 abnormal appearing level I axillary nodes are present, suspicious for residual lymph node metastasis.  RightThere is no MR evidence of malignancy in the right breast.  BI-RADS Category:   Overall: 4 -  Suspicious  Recommendation:  Surgical consultation recommended.  Left breast biopsy could be performed of focal NME at 12 o'clock in the left breast if clinically indicated.     - 2/2/2021 PET scan:  COMPARISON:  Breast MRI 01/27/2021  FINDINGS:  Quality of the study: Adequate.  In the head and neck, there are no hypermetabolic lesions worrisome for malignancy. There are no hypermetabolic mucosal lesions, and there are no pathologically enlarged or hypermetabolic lymph nodes.  In the chest, there is no definite hypermetabolic breast mass.  There are bilateral breast implants in place.  There is relatively mild diffuse uptake within dense fibroglandular tissue, and within the left breast there is a maximum SUV of 1.9 adjacent to biopsy clips on image 68.  There is also mildly hypermetabolic subcutaneous fat stranding elsewhere in the lateral aspect of the left breast on image 77 likely postprocedural in nature. There are at least half a dozen left level 1 axillary lymph nodes the largest of which are hypermetabolic including a 3 x 2.1 cm node on image 50 with an SUV of 8.6, a 1 cm node on image 55 with an SUV of 4.7, and 2.3 x 1.2 cm node on image 61 with an SUV of 9.2.  There is also a non hypermetabolic 9 x 6 mm right level 2 lymph node.  There are no suspicious lymph nodes in the internal mammary chain.  There are no pulmonary nodules, and there are no pleural or pericardial effusions.  In the abdomen and pelvis, there is physiologic tracer distribution within the abdominal organs and excretion into the genitourinary system, including diffusely within the right ureter and focally within the left.In the bones, there are no hypermetabolic lesions worrisome for malignancy.  Impression:  1.  No discrete left breast mass identified.  2.  Metastatic left level 1 axillary lymph nodes.  Level 2 larisa metastasis is not excluded.  3.  No evidence of distant metastasis.     - 2/9/2021 ECHO:  The left ventricle is normal in  size with normal systolic function. The estimated ejection fraction is 58%  Regimen:  Paclitaxel weekly + carboplatin with Pembrolizumab q 3 weeks followed by AC with Pembrolizumab q 3 weeks followed by Pembroluzimab.(Based on interim analysis of the phase III KEYNOTE-522 the addition of pembrolizumab to neoadjuvant chemotherapy and use of adjuvant pembrolizumab x 9 cycles resulted in improvements in pathologic complete response rate and event-free survival in women with early triple-negative breast cancer.)     BRCA2+     5/3/2021 Breast MRI follow up on treatment in the interval (results below)  Findings:  There are bilateral retropectoral silicone implants. There is a right sided port.  The breasts have extreme amounts of fibroglandular tissue. The background parenchymal enhancement is mild.  There are postsurgical changes in the left upper outer breast and axilla.  There has been significant interval decrease in size of the previously seen abnormal left axillary lymph nodes, now with the largest lymph node measuring 14 x 15 x 10 mm (previously measuring up to 29 mm on the 1/27/21 MRI).  The other left axillary lymph nodes are smaller with mildly irregular shaggy margins, consistent with chemotherapy treatment.   No suspicious enhancement is seen in either breast. The previously seen left 12:00 mid depth focal non mass enhancement anterior to the pectoralis is not seen on the current exam.   No abnormal right axillary lymph nodes or internal mammary lymph nodes are seen.  Impression:  There has been significant interval decrease in size of the previously seen abnormal left axillary lymph nodes, now with the largest lymph node measuring 14 x 15 x 10 mm (previously measuring up to 29 mm on the 1/27/21 MRI).  The other left axillary lymph nodes are smaller with mildly irregular shaggy margins, consistent with chemotherapy treatment.   No suspicious enhancement is seen in either breast.   BI-RADS Category:   Overall:  6 - Known Biopsy-Proven Malignancy     -   Completed surgery with complete pathologic response     - completed XRT     Gyn Hx:  Menarche- 10  , age at 1st live birth 22  OCPs x 4 years  Endometrial ablation      FH:  Mother - diagnosed at age 50 with breast cancer  Mastectomy- bilateral  No chemotherapy or radiation   Remains on Tamoxifen  Treated in [place]  ? 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         Objective     Physical Exam  Constitutional:       Comments: Limited PE as virtual   Appears comfortable and in NAD          Assessment and Plan     1. Sensation of change in temperature          Patient reports intermittent burning to her finger tips that she feel sis related to her cell phone.   Encouraged new phone.   Also encouraged to see GYN for hormone levels and PCP for further workup @vit B12, folate , etc  Continue f/u as scheduled see previous noted      Route Chart for Scheduling    Med Onc Chart Routing      Follow up with physician    Follow up with [facility] . Per previous   Infusion scheduling note    Injection scheduling note    Labs    Imaging    Pharmacy appointment    Other referrals              Urgent Care Oncology Visit    Date and Time: 2023 7:23 AM   Patient MRN: [id number]   Chief Complaint: No chief complaint on file.    Reason for Urgent Care Visit:  fingers burning  Intervention:  needs to see her PCP  Dispo: return to clinic as previous  UrgOnc appointment addressed via: [facility] message  This UrgOnc visit was virtual  Time spent handling UC issue:  30 minutes    Was this virtual or in person UrgOnc visit appropriate? No    Patient is in agreement with the proposed treatment plan.  All questions were answered to the patient's satisfaction. Pt knows to call clinic for any new or worsening symptoms and if anything is needed before the next clinic visit.      NADINE Machuca-C  Hematology & Oncology  [facility] [place] [address]  ph. [phone]  Fax. [phone]

## 2023-08-02 NOTE — TELEPHONE ENCOUNTER
Called number below (office) and left message that Dr. Sotelo will be back in the office next week and she may call us at [phone].   PJ      ----- Message from Jessica Molina sent at 8/1/2023  9:38 AM CDT -----  Dr. Marylee Cherry would like to consult on the above mutual patient. She can be reached after 3 at [phone].

## 2023-08-08 ENCOUNTER — OFFICE VISIT (OUTPATIENT)
Dept: FAMILY MEDICINE | Facility: CLINIC | Age: 43
End: 2023-08-08
Payer: COMMERCIAL

## 2023-08-08 VITALS
OXYGEN SATURATION: 99 % | WEIGHT: 120 LBS | DIASTOLIC BLOOD PRESSURE: 80 MMHG | SYSTOLIC BLOOD PRESSURE: 120 MMHG | HEART RATE: 95 BPM | BODY MASS INDEX: 18.83 KG/M2 | HEIGHT: 67 IN

## 2023-08-08 DIAGNOSIS — R41.3 MEMORY LOSS: ICD-10-CM

## 2023-08-08 DIAGNOSIS — R20.2 PARESTHESIA OF BOTH HANDS: Primary | ICD-10-CM

## 2023-08-08 PROCEDURE — 3074F PR MOST RECENT SYSTOLIC BLOOD PRESSURE < 130 MM HG: ICD-10-PCS | Mod: CPTII,S$GLB,, | Performed by: FAMILY MEDICINE

## 2023-08-08 PROCEDURE — 3008F PR BODY MASS INDEX (BMI) DOCUMENTED: ICD-10-PCS | Mod: CPTII,S$GLB,, | Performed by: FAMILY MEDICINE

## 2023-08-08 PROCEDURE — 3074F SYST BP LT 130 MM HG: CPT | Mod: CPTII,S$GLB,, | Performed by: FAMILY MEDICINE

## 2023-08-08 PROCEDURE — 99214 OFFICE O/P EST MOD 30 MIN: CPT | Mod: S$GLB,,, | Performed by: FAMILY MEDICINE

## 2023-08-08 PROCEDURE — 3079F PR MOST RECENT DIASTOLIC BLOOD PRESSURE 80-89 MM HG: ICD-10-PCS | Mod: CPTII,S$GLB,, | Performed by: FAMILY MEDICINE

## 2023-08-08 PROCEDURE — 99214 PR OFFICE/OUTPT VISIT, EST, LEVL IV, 30-39 MIN: ICD-10-PCS | Mod: S$GLB,,, | Performed by: FAMILY MEDICINE

## 2023-08-08 PROCEDURE — 3079F DIAST BP 80-89 MM HG: CPT | Mod: CPTII,S$GLB,, | Performed by: FAMILY MEDICINE

## 2023-08-08 PROCEDURE — 99999 PR PBB SHADOW E&M-EST. PATIENT-LVL IV: CPT | Mod: PBBFAC,,, | Performed by: FAMILY MEDICINE

## 2023-08-08 PROCEDURE — 3008F BODY MASS INDEX DOCD: CPT | Mod: CPTII,S$GLB,, | Performed by: FAMILY MEDICINE

## 2023-08-08 PROCEDURE — 99999 PR PBB SHADOW E&M-EST. PATIENT-LVL IV: ICD-10-PCS | Mod: PBBFAC,,, | Performed by: FAMILY MEDICINE

## 2023-08-08 RX ORDER — VALACYCLOVIR HYDROCHLORIDE 500 MG/1
TABLET, FILM COATED ORAL
COMMUNITY

## 2023-08-08 RX ORDER — LIDOCAINE HYDROCHLORIDE 20 MG/ML
SOLUTION ORAL; TOPICAL
COMMUNITY
Start: 2023-07-20 | End: 2024-03-12

## 2023-08-08 RX ORDER — HYDROCORTISONE 5 MG/1
TABLET ORAL
COMMUNITY
End: 2024-03-12

## 2023-08-08 RX ORDER — ESTRADIOL 10 UG/1
INSERT VAGINAL
COMMUNITY
Start: 2023-08-01 | End: 2023-12-18

## 2023-08-08 NOTE — PROGRESS NOTES
[facility] - Clinic Note    Subjective      Ms. Swann is a 43 y.o. female who presents to clinic with complains of her hands burning.    Patient reports with a burning sensation on her fingers bilaterally for the past 3 months. Feels the sensation on all fingers and palms. Mainly feels when shes holding or using her hands.   Cold sensation helps alleviate her pain.   She reports that when she is on her phone she feels like it is burning but when asks her friends its not.     Reports trouble with memory. More short term loss. Did have chemo and radiation.     [facility] Alison has a past medical history of Attention Deficit Hyperactivity Disorder, Family H/O Diabetes Mellitus, Generalized Anxiety, Left Breast Cancer S.P Lumpectomy In 2021, Macrocytic Anemia, Postoperative Nausea And Vomiting, Scoliosis, Therapeutic Drug Monitoring, and Wellness Visit 2021.   PSX[facility] Alison has a past surgical history that includes Augmentation of breast (); Tubal ligation;  section; Breast surgery; Breast mass excision (Left, 2021); [place] lymph node biopsy (Left, 2021); Insertion of tunneled central venous catheter (CVC) with subcutaneous port (N/A, 2021); Esophagogastroduodenoscopy (N/A, 2021); Colonoscopy (N/A, 2021); Robot-assisted laparoscopic abdominal hysterectomy using da Jeff Xi (N/A, 2022); Robot-assisted laparoscopic salpingo-oophorectomy using da Jeff Xi (Bilateral, 2022); Hysterectomy; and Oophorectomy.    Alison's family history includes Anemia in her father; Breast cancer (age of onset: 52) in her mother; COPD in her maternal grandmother; Cancer in an other family member; Depression in her mother; Diabetes in her father; Hyperlipidemia in her father; Pancreatic cancer in an other family member; Stroke in her father.    Alison reports that she has never smoked. She has never used smokeless tobacco. She reports current alcohol use. She reports  "that she does not use drugs.   JORDAN Rosas is allergic to penicillins.   LEROY Rosas has a current medication list which includes the following prescription(s): imvexxy maintenance pack, lidocaine viscous, ondansetron, trazodone, valacyclovir, dextroamphetamine-amphetamine, estradiol, hydrocortisone, hydrocortisone, ondansetron, trazodone, and valacyclovir, and the following Facility-Administered Medications: lactated ringers and sodium chloride 0.9%.     Review of Systems   Constitutional:  Negative for activity change, appetite change, chills, fatigue and fever.   Eyes:  Negative for visual disturbance.   Respiratory:  Negative for cough and shortness of breath.    Cardiovascular:  Negative for chest pain, palpitations and leg swelling.   Gastrointestinal:  Negative for abdominal pain, nausea and vomiting.   Skin:  Negative for wound.   Neurological:  Positive for numbness. Negative for dizziness and headaches.   Psychiatric/Behavioral:  Negative for confusion.      Objective     /80 (BP Location: Left arm, Patient Position: Sitting, BP Method: Medium (Manual))   Pulse 95   Ht 5' 7" (1.702 m)   Wt 54.4 kg (120 lb)   LMP  (LMP Unknown) Comment: hysterectomy 3/4/22  SpO2 99%   BMI 18.79 kg/m²     Physical Exam   Constitutional: normal appearance. She appears well-developed and well-nourished.  Non-toxic appearance. No distress. She does not appear ill.   HENT:   Head: Normocephalic and atraumatic.   Eyes: Right eye exhibits no discharge. Left eye exhibits no discharge.   Cardiovascular: Normal rate, regular rhythm, normal heart sounds and normal pulses. Exam reveals no gallop and no friction rub.   No murmur heard.Pulmonary:      Effort: Pulmonary effort is normal. No respiratory distress.      Breath sounds: Normal breath sounds. No wheezing, rhonchi or rales.     Abdominal: Normal appearance.   Musculoskeletal:      Cervical back: Neck supple.   Lymphadenopathy:     She has no cervical adenopathy. "   Neurological: She is alert.   Skin: Skin is warm and dry. Capillary refill takes less than 2 seconds. She is not diaphoretic.   Psychiatric: Her behavior is normal. Mood, judgment and thought content normal.   Vitals reviewed.     Assessment/Plan     Alison was seen today for finger pain.    Diagnoses and all orders for this visit:    Paresthesia of both hands  -     Iron and TIBC; Future  -     Ferritin; Future  -     Ambulatory referral/consult to Orthopedics; Future  -     Vitamin B12; Future    Memory loss  -     Ambulatory referral/consult to Neurology; Future      -seems consistent with carpal tunnel. Recommend bilateral brace and refer to ortho.    Future Appointments   Date Time Provider Department Center   8/28/2023  1:00 PM [facility] MRI1 350 LB LIMIT [facility] MRI [facility]   8/30/2023  1:00 PM INJECTION CHAIR, [facility] CC [facility] CHEMO [facility]   8/30/2023  4:40 PM Bola Amado MD [facility]   9/6/2023  2:00 PM Julisa Dias PsyD [facility]   9/13/2023 11:00 AM Julisa Dias PsyD [facility]   9/18/2023 12:30 PM Elton Brown MD [address] MICHELE [place]   9/20/2023 11:00 AM Julisa Dias PsyD [facility]   9/21/2023  8:00 AM LAB, HEMON CANCER BLDG [facility] LAB HO Gumaro Mooney   9/21/2023  9:00 AM Sandra Sotelo MD [facility] HEMONC3 [place]   9/27/2023 11:00 AM Julisa Dias PsyD [facility]   2/27/2024  1:30 PM Maryann Perales MD [facility]       Bola Amado MD  Family Medicine  [facility]

## 2023-08-09 DIAGNOSIS — M79.641 BILATERAL HAND PAIN: Primary | ICD-10-CM

## 2023-08-09 DIAGNOSIS — M79.642 BILATERAL HAND PAIN: Primary | ICD-10-CM

## 2023-08-10 ENCOUNTER — PATIENT MESSAGE (OUTPATIENT)
Dept: FAMILY MEDICINE | Facility: CLINIC | Age: 43
End: 2023-08-10
Payer: COMMERCIAL

## 2023-08-10 DIAGNOSIS — R20.2 PARESTHESIA OF BOTH HANDS: Primary | ICD-10-CM

## 2023-08-15 ENCOUNTER — OFFICE VISIT (OUTPATIENT)
Dept: ORTHOPEDICS | Facility: CLINIC | Age: 43
End: 2023-08-15
Payer: COMMERCIAL

## 2023-08-15 ENCOUNTER — PATIENT MESSAGE (OUTPATIENT)
Dept: HEMATOLOGY/ONCOLOGY | Facility: CLINIC | Age: 43
End: 2023-08-15
Payer: COMMERCIAL

## 2023-08-15 ENCOUNTER — TELEPHONE (OUTPATIENT)
Dept: PHYSICAL MEDICINE AND REHAB | Facility: CLINIC | Age: 43
End: 2023-08-15
Payer: COMMERCIAL

## 2023-08-15 ENCOUNTER — HOSPITAL ENCOUNTER (OUTPATIENT)
Dept: RADIOLOGY | Facility: HOSPITAL | Age: 43
Discharge: HOME | End: 2023-08-15
Attending: ORTHOPAEDIC SURGERY
Payer: COMMERCIAL

## 2023-08-15 ENCOUNTER — PATIENT MESSAGE (OUTPATIENT)
Dept: NEUROLOGY | Facility: CLINIC | Age: 43
End: 2023-08-15
Payer: COMMERCIAL

## 2023-08-15 VITALS — BODY MASS INDEX: 18.82 KG/M2 | WEIGHT: 119.94 LBS | RESPIRATION RATE: 16 BRPM | HEIGHT: 67 IN

## 2023-08-15 DIAGNOSIS — M79.641 BILATERAL HAND PAIN: ICD-10-CM

## 2023-08-15 DIAGNOSIS — M79.642 BILATERAL HAND PAIN: ICD-10-CM

## 2023-08-15 DIAGNOSIS — G56.03 CARPAL TUNNEL SYNDROME ON BOTH SIDES: Primary | ICD-10-CM

## 2023-08-15 DIAGNOSIS — R20.2 PARESTHESIA OF BOTH HANDS: ICD-10-CM

## 2023-08-15 DIAGNOSIS — G47.00 INSOMNIA, UNSPECIFIED TYPE: Primary | ICD-10-CM

## 2023-08-15 PROCEDURE — 73130 X-RAY EXAM OF HAND: CPT | Mod: TC,50,PN

## 2023-08-15 PROCEDURE — 99999 PR PBB SHADOW E&M-EST. PATIENT-LVL III: ICD-10-PCS | Mod: PBBFAC,,, | Performed by: ORTHOPAEDIC SURGERY

## 2023-08-15 PROCEDURE — 99999 PR PBB SHADOW E&M-EST. PATIENT-LVL III: CPT | Mod: PBBFAC,,, | Performed by: ORTHOPAEDIC SURGERY

## 2023-08-15 PROCEDURE — 99204 OFFICE O/P NEW MOD 45 MIN: CPT | Mod: S$GLB,,, | Performed by: ORTHOPAEDIC SURGERY

## 2023-08-15 PROCEDURE — 73130 X-RAY EXAM OF HAND: CPT | Mod: 26,50,, | Performed by: RADIOLOGY

## 2023-08-15 PROCEDURE — 1159F PR MEDICATION LIST DOCUMENTED IN MEDICAL RECORD: ICD-10-PCS | Mod: CPTII,S$GLB,, | Performed by: ORTHOPAEDIC SURGERY

## 2023-08-15 PROCEDURE — 99204 PR OFFICE/OUTPT VISIT, NEW, LEVL IV, 45-59 MIN: ICD-10-PCS | Mod: S$GLB,,, | Performed by: ORTHOPAEDIC SURGERY

## 2023-08-15 PROCEDURE — 73130 XR HAND COMPLETE 3 VIEWS BILATERAL: ICD-10-PCS | Mod: 26,50,, | Performed by: RADIOLOGY

## 2023-08-15 PROCEDURE — 1159F MED LIST DOCD IN RCRD: CPT | Mod: CPTII,S$GLB,, | Performed by: ORTHOPAEDIC SURGERY

## 2023-08-15 PROCEDURE — 3008F PR BODY MASS INDEX (BMI) DOCUMENTED: ICD-10-PCS | Mod: CPTII,S$GLB,, | Performed by: ORTHOPAEDIC SURGERY

## 2023-08-15 PROCEDURE — 3008F BODY MASS INDEX DOCD: CPT | Mod: CPTII,S$GLB,, | Performed by: ORTHOPAEDIC SURGERY

## 2023-08-15 RX ORDER — TRAZODONE HYDROCHLORIDE 50 MG/1
50 TABLET ORAL NIGHTLY
OUTPATIENT
Start: 2023-08-15

## 2023-08-15 RX ORDER — TRAZODONE HYDROCHLORIDE 50 MG/1
50 TABLET ORAL NIGHTLY
Qty: 30 TABLET | Refills: 11 | Status: SHIPPED | OUTPATIENT
Start: 2023-08-15 | End: 2023-09-08 | Stop reason: SDUPTHER

## 2023-08-15 NOTE — TELEPHONE ENCOUNTER
Asked if patient wanted to schedule EMG/ Nerve conduction test with Dr. Brown in [place]. Pt stated she did and appointment was made.

## 2023-08-15 NOTE — TELEPHONE ENCOUNTER
----- Message from Whitney Badillo MA sent at 8/15/2023 12:54 PM CDT -----  Please contact & schedule pt.    Thank you!

## 2023-08-15 NOTE — PROGRESS NOTES
Subjective:      Patient ID: Alison Swann is a 43 y.o. female.    Chief Complaint: Pain and Tingling of the Right Hand and Pain and Tingling of the Left Hand    Referring Provider: Bola Amado Md  [address] [place] [address],  [place] [id number]    HPI:  Ms. Swann is a 43-year-old right-hand-dominant lady who presented today for evaluation of about 3 months of bilateral hand/finger burning.  Her right hand equals her left.  All the fingers of both hands are involved along with the palms.  He gets intermittent symptoms with exacerbations and then improvements.  Putting her hands in front of the air conditioner while driving or holding popsicles decreases her symptoms.  Using cold on her hands decreases her symptoms.  She feels her symptoms began after she bought a new [employer] phone and when she checked the temperature of the phone it was increased.  Her symptoms do not awaken her at night.  She bought a brace but could not wear it because it exacerbated her problems.  She has not taken NSAIDs, done physical therapy, nor had injections.    Past Medical History:   Diagnosis Date    Attention Deficit Hyperactivity Disorder     Family H/O Diabetes Mellitus (father)    * Depression    Generalized Anxiety     Left Breast Cancer treated with bilaterally mastectomy/chemo/radiation    * Insomnia    Macrocytic Anemia    * Adrenal insufficiency    Postoperative Nausea And Vomiting     Scopolamine Patches Work Well For Her For This    Scoliosis     Therapeutic Drug Monitoring     Wellness Visit 2021      Past Surgical History:   Procedure Laterality Date    AUGMENTATION OF BREAST BILATERAL  2012    BREAST MASS EXCISION LUMPECTOMY Left 2021   * BILATERAL BREAST RECONSTRUCTION WITH ABDOMINAL FLAP    BILATERAL BREAST MASTECTOMY        SECTION      COLONOSCOPY N/A 2021   * BREAST IMPLANTS REMOVAL    ESOPHAGOGASTRODUODENOSCOPY N/A 2021   * LASIK BILATERAL EYES    HYSTERECTOMY      INSERTION OF  TUNNELED CENTRAL VENOUS CATHETER (CVC) WITH SUBCUTANEOUS PORT N/A 02/11/2021    Procedure: [id number];  Surgeon: Griffin Becker MD;  Location: [facility] OR;  Service: General;  Laterality: N/A;    OOPHORECTOMY      ROBOT-ASSISTED LAPAROSCOPIC ABDOMINAL HYSTERECTOMY USING DA NATALIIA XI N/A 03/04/2022    Procedure: XI ROBOTIC HYSTERECTOMY;  Surgeon: Carmella Galvez MD;  Location: [facility];  Service: OB/GYN;  Laterality: N/A;    ROBOT-ASSISTED LAPAROSCOPIC SALPINGO-OOPHORECTOMY USING DA NATALIIA XI Bilateral 03/04/2022    Procedure: XI ROBOTIC SALPINGO-OOPHORECTOMY;  Surgeon: Carmella Galvez MD;  Location: [facility] OR;  Service: OB/GYN;  Laterality: Bilateral;    SENTINEL LYMPH NODE BIOPSY Left 01/19/2021    Procedure: CORE BIOPSY, LYMPH NODE, SENTINEL;  Surgeon: Rashmi Johnston MD;  [facility];  Service: General;  Laterality: Left;    TUBAL LIGATION         Review of patient's allergies indicates:   Allergen Reactions    Penicillins Hives     As a child        Social History     Occupational History    Occupation: self employed   Tobacco Use    Smoking status: Never    Smokeless tobacco: Never   Substance and Sexual Activity    Alcohol use: Yes     Comment: occasionally    Drug use: Never    Sexual activity: Yes     Birth control/protection: See Surgical Hx      Family History   Problem Relation Age of Onset    COPD Maternal Grandmother     Stroke Father         50s, multiple    Diabetes Father     Anemia Father     Hyperlipidemia Father     Breast cancer Mother 52    Depression Mother     Cancer Other         unk abdominal origin- dx 20s    Pancreatic cancer Other         suspected    Colon cancer Neg Hx     Esophageal cancer Neg Hx     Ovarian cancer Neg Hx        Previous Hospitalizations:  CHILDBIRTH, MASTECTOMY, BREAST RECONSTRUCTION, HYSTERECTOMY, ADRENAL CRISIS.    ROS:   Review of Systems   Constitutional: Negative for chills and fever.   HENT:  Negative for congestion.    Eyes:  Negative for  blurred vision and double vision.   Cardiovascular:  Negative for chest pain and cyanosis.   Respiratory:  Negative for cough and shortness of breath.    Endocrine: Negative for polydipsia.   Hematologic/Lymphatic: Negative for adenopathy.   Skin:  Negative for flushing, itching and skin cancer.   Musculoskeletal:  Negative for falls and neck pain.   Gastrointestinal:  Negative for constipation, diarrhea and heartburn.   Genitourinary:  Negative for nocturia.   Neurological:  Negative for headaches and seizures.   Psychiatric/Behavioral:  Positive for depression. Negative for substance abuse. The patient has insomnia and is nervous/anxious.    Allergic/Immunologic: Positive for environmental allergies.           Objective:      Physical Exam:   General: AAOx3.  No acute distress  HEENT: Normocephalic, PEARLA EOMI, Good Dentition  Neck: Supple, No JVD  Chest: Symetric, equal excursion on inspiration  Abdomen: Soft NTND  Vascular:  Pulses intact and equal bilaterally.  Capillary refill less than 3 seconds and equal bilaterally  Neurologic:  Pinprick and soft touch intact and equal bilaterally.  Tinel's negative bilaterally at elbows and wrist.  Phalen's negative bilaterally.  Integment:  No ecchymosis, no errythema  Extremity:  Wrist/hand:  Pronation/supination equal bilaterally 90/90 degrees.  Dorsiflexion/volar flexion equal bilaterally 90/85 degrees.  Mild thenar atrophy both hands.  Mild 1st interspace interosseous atrophy both hands.   strength equal bilaterally 5/5.  Nontender in the anatomic snuffbox bilaterally.  Nontender at the 1st dorsal compartment bilaterally.  No swelling at the 1st dorsal compartment bilaterally.  Finkelstein's negative bilaterally.  Grind test negative bilaterally.  Nontender at the scapholunate interval bilaterally.  Medrano's test negative bilaterally.  Nontender at the DRUJ/TFCC bilaterally.  Ulnar impaction test negative bilaterally.  Wartenberg sign negative bilaterally.  Full  motion of the fingers without pain.  Durkan's test mildly positive both hands.                        Elbow:  Pronation/supination equal bilaterally 90/90 degrees.  Extension/flexion equal bilaterally-2/130 degrees.  Nontender with elbow motion.  Nontender over the medial or lateral epicondyles bilaterally.  Radial/ulna stressing equal bilaterally with endpoint.  Nontender over the olecranon bilaterally.  Nontender at the triceps insertion on the olecranon bilaterally.  Triceps palpable throughout full distribution bilaterally.                         C-spine:  Forward flexion/backward flexion 70/70 degrees.  Right/left rotation 70/70 degrees.  Right/left side bending 60/60 degrees.  Nontender with C-spine motion.  Nontender with C-spine palpation.  Spurling's negative.  Radiography:  Personally reviewed x-rays of both hands completed on 08/15/2023 which showed no fracture dislocation.      Assessment:       Impression:      1. Carpal tunnel syndrome on both sides    2. Paresthesia of both hands          Plan:       1.  Discussed physical examination and radiographic findings with the patient. Alison understands that she to have a unique presentation of bilateral carpal tunnel syndrome but she also may have a late onset peripheral neuropathy.  Treatment alternatives and outcomes were discussed with the patient she understands she could be treated conservatively with observation, activity modification, NSAIDs, bracing, physical therapy, injections, or she could consider surgical intervention such as carpal tunnel release.  Before any surgical intervention can be entertained a nerve conduction study is warranted.  2. Refer for a nerve conduction study of both hands.  3. Final recommendations after nerve conduction study is completed.    4. Discussed with the patient possibly trialing her cock-up wrist splints again to see if they can help.  5. Recommend she start taken NSAIDs dosed per box instructions she should  discuss the use of the NSAIDs with her PCM prior to starting the taking them.  6. Recommend she purchase over-the-counter Voltaren gel and applied to her hands twice daily and massage in for 2 minutes.    7. Follow up after nerve conduction study is completed.  May consider carpal tunnel steroid injections at next visit

## 2023-08-16 ENCOUNTER — PATIENT MESSAGE (OUTPATIENT)
Dept: FAMILY MEDICINE | Facility: CLINIC | Age: 43
End: 2023-08-16
Payer: COMMERCIAL

## 2023-08-16 ENCOUNTER — OFFICE VISIT (OUTPATIENT)
Dept: NEUROLOGY | Facility: CLINIC | Age: 43
End: 2023-08-16
Payer: COMMERCIAL

## 2023-08-16 ENCOUNTER — PATIENT MESSAGE (OUTPATIENT)
Dept: ENDOCRINOLOGY | Facility: CLINIC | Age: 43
End: 2023-08-16
Payer: COMMERCIAL

## 2023-08-16 VITALS
SYSTOLIC BLOOD PRESSURE: 119 MMHG | WEIGHT: 119.19 LBS | BODY MASS INDEX: 18.71 KG/M2 | HEIGHT: 67 IN | DIASTOLIC BLOOD PRESSURE: 77 MMHG | RESPIRATION RATE: 16 BRPM | HEART RATE: 84 BPM

## 2023-08-16 DIAGNOSIS — R41.3 MEMORY LOSS: ICD-10-CM

## 2023-08-16 DIAGNOSIS — Z17.1 MALIGNANT NEOPLASM OF UPPER-OUTER QUADRANT OF LEFT BREAST IN FEMALE, ESTROGEN RECEPTOR NEGATIVE: ICD-10-CM

## 2023-08-16 DIAGNOSIS — F90.0 ADHD (ATTENTION DEFICIT HYPERACTIVITY DISORDER), INATTENTIVE TYPE: ICD-10-CM

## 2023-08-16 DIAGNOSIS — C50.412 MALIGNANT NEOPLASM OF UPPER-OUTER QUADRANT OF LEFT BREAST IN FEMALE, ESTROGEN RECEPTOR NEGATIVE: ICD-10-CM

## 2023-08-16 DIAGNOSIS — F90.0 ADHD (ATTENTION DEFICIT HYPERACTIVITY DISORDER), INATTENTIVE TYPE: Primary | ICD-10-CM

## 2023-08-16 DIAGNOSIS — R41.3 OTHER AMNESIA: Primary | ICD-10-CM

## 2023-08-16 DIAGNOSIS — E27.40 ADRENAL INSUFFICIENCY: ICD-10-CM

## 2023-08-16 PROCEDURE — 99999 PR PBB SHADOW E&M-EST. PATIENT-LVL IV: CPT | Mod: PBBFAC,,, | Performed by: NURSE PRACTITIONER

## 2023-08-16 PROCEDURE — 3078F DIAST BP <80 MM HG: CPT | Mod: CPTII,S$GLB,, | Performed by: NURSE PRACTITIONER

## 2023-08-16 PROCEDURE — 99999 PR PBB SHADOW E&M-EST. PATIENT-LVL IV: ICD-10-PCS | Mod: PBBFAC,,, | Performed by: NURSE PRACTITIONER

## 2023-08-16 PROCEDURE — 3008F BODY MASS INDEX DOCD: CPT | Mod: CPTII,S$GLB,, | Performed by: NURSE PRACTITIONER

## 2023-08-16 PROCEDURE — 99215 OFFICE O/P EST HI 40 MIN: CPT | Mod: S$GLB,,, | Performed by: NURSE PRACTITIONER

## 2023-08-16 PROCEDURE — 3008F PR BODY MASS INDEX (BMI) DOCUMENTED: ICD-10-PCS | Mod: CPTII,S$GLB,, | Performed by: NURSE PRACTITIONER

## 2023-08-16 PROCEDURE — 3078F PR MOST RECENT DIASTOLIC BLOOD PRESSURE < 80 MM HG: ICD-10-PCS | Mod: CPTII,S$GLB,, | Performed by: NURSE PRACTITIONER

## 2023-08-16 PROCEDURE — 99215 PR OFFICE/OUTPT VISIT, EST, LEVL V, 40-54 MIN: ICD-10-PCS | Mod: S$GLB,,, | Performed by: NURSE PRACTITIONER

## 2023-08-16 PROCEDURE — 3074F SYST BP LT 130 MM HG: CPT | Mod: CPTII,S$GLB,, | Performed by: NURSE PRACTITIONER

## 2023-08-16 PROCEDURE — 3074F PR MOST RECENT SYSTOLIC BLOOD PRESSURE < 130 MM HG: ICD-10-PCS | Mod: CPTII,S$GLB,, | Performed by: NURSE PRACTITIONER

## 2023-08-16 NOTE — PROGRESS NOTES
NEUROLOGY  Outpatient Consultation Visit     [facility]  [address] [facility] [address], [place] [address]  [phone] (office) / [phone] (fax)    Patient Name:  Alison Swann  :  1980  MR #:  [id number]  Acct #:  [id number]    Date of  Visit: 2023    Other Physicians:  Bola Amado MD (Primary Care Physician)      CHIEF COMPLAINT: Memory Loss      HISTORY OF PRESENT ILLNESS:  Alison Swann is a 43 y.o. R-handed female seen in consultation for memory loss per Bola Amado MD    Medical history is significant for ADHD, anxiety, L breast cancer s/p lumpectomy, anemia, scoliosis, adrenal insufficiency, insomnia    Notes memory change since chemotherapy treatment, but noted worsening over last 6 months. Can't recall chemo regimen, but knows that she was treated with Keytruda. All treatment stopped 2021 due to concern for toxicity. History is limited in that regard. Diagnosed with adrenal insufficiency afterwards.    Notes both short and long term issues, worse ST. Loosing train of thought in conversation. Forgets why she went into a room. Forgets dates. Significant executive dysfunction.     Also noted mood swings, depression, even had SI. Completed neuro feedback sessions with provider in [place], which helped mood symptoms greatly. Denies current SI or depression.     Has college education. 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 Having trouble keeping up with tasks, affecting her work and income. Also having difficulty in home life,  and kids notice. Independent with ADLs and iADLs. Having trouble using her air fryer, forgetting to press start. Paying bills twice at times.     Treated by PCP for ADHD, long standing diagnosis pre dating these issues. Takes Adderall 10 mg daily. Feels no longer helpful.     Used to sleep well. Now, taking trazodone for insomnia  over the last year. Helpful. Was on Ativan in the past but became resistant.     No hallucinations, major personality change. No concern for sz.     Denies pertinent FH.     At most 5 drinks ETOH per week. No other drug use. Non smoker.     Allergies:  Review of patient's allergies indicates:   Allergen Reactions    Penicillins Hives     As a child        Current Medications:  Current Outpatient Medications   Medication Sig Dispense Refill    dextroamphetamine-amphetamine 10 mg Tab Take 1 tablet (10 mg total) by mouth once daily. 30 tablet 0    estradioL (IMVEXXY MAINTENANCE PACK) 10 mcg Inst Place 10 mcg vaginally twice a week. 8 each 11    estradioL (VAGIFEM) 10 mcg Tab Place vaginally.      hydrocortisone (CORTEF) 10 MG Tab 20 mg in AM and 20 mg in early afternoon 120 tablet 11    hydrocortisone (CORTEF) 5 MG Tab Take 1 tablet every day by oral route in the evening.      LIDOCAINE VISCOUS 2 % solution SMARTSIG:sparingly Topical PRN      ondansetron (ZOFRAN ODT ORAL)       ondansetron (ZOFRAN-ODT) 8 MG TbDL DISSOLVE 1 TABLET ON THE TONGUE EVERY 6 TO 8 HOURS AS NEEDED FOR NAUSEA 60 tablet 1    traZODone (DESYREL) 50 MG tablet Take 50 mg by mouth every evening.      traZODone (DESYREL) 50 MG tablet Take 1 tablet (50 mg total) by mouth every evening. 30 tablet 11    valACYclovir (VALTREX) 500 MG tablet Take 1 tablet (500 mg total) by mouth 2 (two) times daily. 10 tablet 2    valACYclovir (VALTREX) 500 MG tablet as needed.       No current facility-administered medications for this visit.     Facility-Administered Medications Ordered in Other Visits   Medication Dose Route Frequency Provider Last Rate Last Admin    lactated ringers infusion   Intravenous Continuous Leon Torres MD   Stopped at 01/19/21 1345    sodium chloride 0.9% flush 10 mL  10 mL Intravenous PRN Sandra Sotelo MD   10 mL at 08/15/22 1330       Past Medical History:  Past Medical History:   Diagnosis Date    Attention Deficit Hyperactivity  Disorder     Family H/O Diabetes Mellitus     Her Father    Generalized Anxiety     Left Breast Cancer S.P Lumpectomy In 2021     Dr. Dominga Johnston; Dr. Sandra Sotelo (Heme/Onc); 21 On CMT; Is Estrogen Receptor Negative; She Is BRCA2 Gene Mutation Positive, Andf Her Mother Also With A H/O Breast Cancer    Macrocytic Anemia     Associated With Her Chemotherapy    Postoperative Nausea And Vomiting     Scopolamine Patches Work Well For Her For This    Scoliosis     Therapeutic Drug Monitoring     Wellness Visit 2021        Past Surgical History:  Past Surgical History:   Procedure Laterality Date    AUGMENTATION OF BREAST  2012    BREAST MASS EXCISION Left 2021    Procedure: EXCISION, MASS, BREAST- 2 oclock left breast with ultrasound guidance;  Surgeon: Rashmi Johnston MD;  Location: [facility] OR;  Service: General;  Laterality: Left;    BREAST SURGERY       SECTION      COLONOSCOPY N/A 2021    Procedure: COLONOSCOPY;  Surgeon: aMson Quintero MD;  [facility]  Service: Endoscopy;  Laterality: N/A;    ESOPHAGOGASTRODUODENOSCOPY N/A 2021    Procedure: EGD (ESOPHAGOGASTRODUODENOSCOPY);  Surgeon: Mason Quintero MD;  [facility]  Service: Endoscopy;  Laterality: N/A;    HYSTERECTOMY      INSERTION OF TUNNELED CENTRAL VENOUS CATHETER (CVC) WITH SUBCUTANEOUS PORT N/A 2021    Procedure: [id number]-[id number];  Surgeon: Griffin Becker MD;  Location: [facility] OR;  Service: General;  Laterality: N/A;    OOPHORECTOMY      ROBOT-ASSISTED LAPAROSCOPIC ABDOMINAL HYSTERECTOMY USING DA NATALIIA XI N/A 2022    Procedure: XI ROBOTIC HYSTERECTOMY;  Surgeon: Carmella Galvez MD;  Location: [facility] OR;  Service: OB/GYN;  Laterality: N/A;    ROBOT-ASSISTED LAPAROSCOPIC SALPINGO-OOPHORECTOMY USING DA NATALIIA XI Bilateral 2022    Procedure: XI ROBOTIC SALPINGO-OOPHORECTOMY;  Surgeon: Carmella Galvez MD;  Location: [facility];  Service: OB/GYN;   "Laterality: Bilateral;    SENTINEL LYMPH NODE BIOPSY Left 01/19/2021    Procedure: CORE BIOPSY, LYMPH NODE, SENTINEL;  Surgeon: Rashmi Johnston MD;  Location: [facility];  Service: General;  Laterality: Left;    TUBAL LIGATION         Family History:  family history includes Anemia in her father; Breast cancer (age of onset: 52) in her mother; COPD in her maternal grandmother; Cancer in an other family member; Depression in her mother; Diabetes in her father; Hyperlipidemia in her father; Pancreatic cancer in an other family member; Stroke in her father.    Social History:   reports that she has never smoked. She has never used smokeless tobacco. She reports current alcohol use. She reports that she does not use drugs.      REVIEW OF SYSTEMS:  As per HPI    PHYSICAL EXAM:  /77 (BP Location: Right arm, Patient Position: Sitting, BP Method: Small (Automatic))   Pulse 84   Resp 16   Ht 5' 7" (1.702 m)   Wt 54.1 kg (119 lb 2.5 oz)   LMP  (LMP Unknown) Comment: hysterectomy 3/4/22  BMI 18.66 kg/m²     General: Well groomed. No acute distress.  Pulmonary: Normal effort and rate.   Musculoskeletal: No obvious joint deformities, moves all extremities well.  Extremities: No clubbing, cyanosis or edema.     Neurological exam:  Mental status: Awake and alert.  Oriented to person, place, time and situation. 1/3 on DR. Kumar of knowledge normal. Decreased attention and concentration (can spell WORLD backwards, but unable to complete serial subtraction). MMSE 28/30.   Speech/Language: Fluent and appropriate. No dysarthria or aphasia on conversation. Able to follow complex commands. Names 8 F words in 1 min.   Cranial nerves (II-XII): Visual fields full. Pupils equal round and reactive to light, extraocular movements intact, no ptosis, no nystagmus. Facial sensation and symmetry intact bilaterally. Hearing grossly intact. Palate mobile and midline. Shoulder shrug normal bilaterally. Normal tongue protrusion. "   Motor: 5 out of 5 strength throughout the upper and lower extremities bilaterally. Normal bulk and tone. No abnormal movements noted. No drift appreciated.   Sensation: Hypersensitive to pin in fingertips. Intact to light touch, vibration and temperature bilaterally.    DTR: 2+ at the knees and biceps bilaterally.  Coordination: Finger-nose-finger testing intact bilaterally. HILL normal bilaterally. No tremor.   Gait: Normal gait.    DIAGNOSTIC DATA:  I have personally reviewed provider notes, labs and imaging made available to me today.     Imaging:  MRI pituitary w wo 5/2022:  FINDINGS:  Intracranial compartment:     There is no acute intracranial abnormality.  Brain volume, ventricular size and position are normal.  There is no hemorrhage or mass/mass effect.  There are no definite regions of abnormal signal intensity in the brain.  There are no regions of restricted diffusion to suggest acute infarction.  There is no pathologic enhancement.  The basilar cisterns are open.  There is no abnormal extra-axial fluid collection.  Flow voids indicating patency are present in the major vessels at the base of the brain.  The cerebellar tonsils are just at the level of the foramen magnum in normal position.  The orbits are normal.     The pituitary gland is normal and homogeneous in signal intensity and enhancement without relative region of decreased enhancement to suggest site of possible adenoma.  The suprasellar cistern is normal.  The infundibulum is normal in size and position.  The optic chiasm is normal.  The cavernous sinuses are normal.  There is a normal posterior pituitary bright spot.     Skull/extracranial contents: Marrow signal intensity in the clivus and calvarium is grossly normal.  There is trace mucosal thickening in the posterior ethmoid air cells.  Otherwise, the included paranasal sinuses and mastoid air cells are clear.  The included facial soft tissues and scalp are normal.     Impression:  1.   Normal imaging of the brain.  There is no acute abnormality.  There is no hemorrhage, mass/mass effect, acute infarction.  There is no pathologic enhancement.  No pituitary mass is identified       Labs:  Lab Results   Component Value Date    WBC 4.59 06/14/2023    HGB 12.7 06/14/2023    HCT 37.2 06/14/2023     06/14/2023    MCV 91 06/14/2023    RDW 12.7 06/14/2023     Lab Results   Component Value Date     06/14/2023    K 3.3 (L) 06/14/2023     06/14/2023    CO2 24 06/14/2023    BUN 7 06/14/2023    CREATININE 0.7 06/14/2023    GLU 83 06/14/2023    CALCIUM 9.2 06/14/2023    MG 1.5 (L) 03/11/2022    PHOS 4.5 09/02/2021     Lab Results   Component Value Date    PROT 6.1 06/14/2023    ALBUMIN 3.9 06/14/2023    BILITOT 0.3 06/14/2023    AST 15 06/14/2023    ALKPHOS 74 06/14/2023    ALT 10 06/14/2023     Lab Results   Component Value Date    INR 1.2 03/12/2022     Lab Results   Component Value Date    CHOL 154 06/14/2023    HDL 60 06/14/2023    LDLCALC 75.4 06/14/2023    TRIG 93 06/14/2023    CHOLHDL 39.0 06/14/2023     Lab Results   Component Value Date    HGBA1C 5.0 10/12/2022      Lab Results   Component Value Date    WTBBVJYF89 >2000 (H) 06/24/2021     Lab Results   Component Value Date    FOLATE 8.8 03/30/2021     Lab Results   Component Value Date    TSH 1.130 06/14/2023       ASSESSMENT & PLAN:  Alison Swann is a 43 y.o. R-handed female seen in consultation for memory loss.     Problem List Items Addressed This Visit          Neuro    Memory loss    Overview     Onset 2021 post chemo/immunotherapy tx, worse over time     MMSE:  28/30 Aug 2023         Current Assessment & Plan     No red flag sx or exam findings -- doesn't meet criteria for MCI or dementia, no safety concerns  While certain immunotherapies (Keytruda) are associated with variable neurotoxicity, onset is typically not delayed & wouldn't be expected to clinically progress even after agent is discontinued   Suspect that non  neurological factors, such as mood instability, ADHD, insomnia are contributing to symptoms    Plan:  MRI brain w wo given reported worsening since last imaging and medical history  Serologies to assess for any contributory metabolic factors   NP testing for more comprehensive cognitive evaluation  Continue care with psychology / PCP to manage mood / insomnia / ADHD                Psychiatric    Attention Deficit Hyperactivity Disorder       Oncology    Malignant neoplasm of upper-outer quadrant of left breast in female, estrogen receptor negative       Endocrine    Adrenal insufficiency     Other Visit Diagnoses       Other amnesia    -  Primary            Follow up: after NP testing     I spent a total of 65 minutes on the day of the visit.    This includes face to face time with the patient, as well as non-face to face time preparing for and completing the visit (review of prior diagnostic testing and clinical notes, obtaining or reviewing history, documenting clinical information in the EMR, independently interpreting and communicating results to the patient/family and coordinating ongoing care).       I appreciate the opportunity to participate in the care of this patient. Please feel free to contact me with any concerns or questions.       Candis Rodriguez, [facility]  [facility]  [address] [place] [address]

## 2023-08-16 NOTE — PATIENT INSTRUCTIONS
Repeat MRI brain   Labs to assess for any metabolic factors that may be contributing  Neuropsychiatric testing -- may take 3-4 months to have this done, department will contact you to schedule     Will plan to follow up after testing is complete     Message or call for any new symptoms or additional concerns

## 2023-08-16 NOTE — TELEPHONE ENCOUNTER
Spoke to patient, disability paperwork has been completed but it was not approved based on information given. Attached info is what needed to help get disability approved.

## 2023-08-16 NOTE — ASSESSMENT & PLAN NOTE
No red flag sx or exam findings -- doesn't meet criteria for MCI or dementia, no safety concerns  While certain immunotherapies (Keytruda) are associated with variable neurotoxicity, onset is typically not delayed & wouldn't be expected to clinically progress even after agent is discontinued   Suspect that non neurological factors, such as mood instability, ADHD, insomnia are contributing to symptoms    Plan:  MRI brain w wo given reported worsening since last imaging and medical history  Serologies to assess for any contributory metabolic factors   NP testing for more comprehensive cognitive evaluation  Continue care with psychology / PCP to manage mood / insomnia / ADHD

## 2023-08-17 ENCOUNTER — PATIENT MESSAGE (OUTPATIENT)
Dept: FAMILY MEDICINE | Facility: CLINIC | Age: 43
End: 2023-08-17
Payer: COMMERCIAL

## 2023-08-17 ENCOUNTER — TELEPHONE (OUTPATIENT)
Dept: FAMILY MEDICINE | Facility: CLINIC | Age: 43
End: 2023-08-17
Payer: COMMERCIAL

## 2023-08-17 RX ORDER — DEXTROAMPHETAMINE SACCHARATE, AMPHETAMINE ASPARTATE, DEXTROAMPHETAMINE SULFATE AND AMPHETAMINE SULFATE 5; 5; 5; 5 MG/1; MG/1; MG/1; MG/1
1 TABLET ORAL DAILY
Qty: 60 TABLET | Refills: 0 | Status: SHIPPED | OUTPATIENT
Start: 2023-08-17 | End: 2023-08-17 | Stop reason: SDUPTHER

## 2023-08-17 RX ORDER — DEXTROAMPHETAMINE SACCHARATE, AMPHETAMINE ASPARTATE, DEXTROAMPHETAMINE SULFATE AND AMPHETAMINE SULFATE 5; 5; 5; 5 MG/1; MG/1; MG/1; MG/1
1 TABLET ORAL DAILY
Qty: 60 TABLET | Refills: 0 | Status: SHIPPED | OUTPATIENT
Start: 2023-08-17 | End: 2023-09-25 | Stop reason: SDUPTHER

## 2023-08-17 NOTE — TELEPHONE ENCOUNTER
See below for operative steroid recs    Give 50 mg hydrocortisone IV on-call to OR or immediately prior to surgery.  Then will need 25 mg of hydrocortisone Q 8 hours x 24 hours then   Hydrocortisone 25 mg twice daily x 24 hours then go back to previous dose.

## 2023-08-17 NOTE — TELEPHONE ENCOUNTER
----- Message from Katelynn Klein sent at 8/17/2023 12:08 PM CDT -----  Type: Needs Medical Advice  Who Called:  [facility] Call Back Number: [phone] Renee MCGUIRE   Additional Information: pt is having surgery with clinic and they needs to clarify a few things please advise

## 2023-08-21 ENCOUNTER — TELEPHONE (OUTPATIENT)
Dept: HEMATOLOGY/ONCOLOGY | Facility: CLINIC | Age: 43
End: 2023-08-21
Payer: COMMERCIAL

## 2023-08-21 DIAGNOSIS — L90.5 SCAR CONDITION AND FIBROSIS OF SKIN: Primary | ICD-10-CM

## 2023-08-21 DIAGNOSIS — R68.82 DECREASED LIBIDO: ICD-10-CM

## 2023-08-21 DIAGNOSIS — R53.83 FATIGUE: ICD-10-CM

## 2023-08-21 DIAGNOSIS — Z01.818 OTHER SPECIFIED PRE-OPERATIVE EXAMINATION: ICD-10-CM

## 2023-08-21 DIAGNOSIS — N95.1 MENOPAUSAL SYMPTOM: ICD-10-CM

## 2023-08-21 DIAGNOSIS — Z01.812 PRE-OPERATIVE LABORATORY EXAMINATION: ICD-10-CM

## 2023-08-21 DIAGNOSIS — N65.0 DEFORMITY OF RECONSTRUCTED BREAST: ICD-10-CM

## 2023-08-21 DIAGNOSIS — N65.1 BREAST ASYMMETRY FOLLOWING RECONSTRUCTIVE SURGERY: ICD-10-CM

## 2023-08-21 DIAGNOSIS — N95.2 VAGINAL ATROPHY: ICD-10-CM

## 2023-08-21 DIAGNOSIS — Z90.13 ABSENCE OF BREAST, BILATERAL: ICD-10-CM

## 2023-08-21 DIAGNOSIS — Z80.3 FAMILY HISTORY OF BREAST CANCER: ICD-10-CM

## 2023-08-21 DIAGNOSIS — G47.00 INSOMNIA, UNSPECIFIED: ICD-10-CM

## 2023-08-21 DIAGNOSIS — Z85.3 PERSONAL HISTORY OF MALIGNANT NEOPLASM OF BREAST: ICD-10-CM

## 2023-08-21 RX ORDER — SODIUM CHLORIDE 0.9 % (FLUSH) 0.9 %
10 SYRINGE (ML) INJECTION
Status: CANCELLED | OUTPATIENT
Start: 2023-08-21

## 2023-08-21 RX ORDER — HEPARIN 100 UNIT/ML
500 SYRINGE INTRAVENOUS
Status: CANCELLED | OUTPATIENT
Start: 2023-08-21

## 2023-08-21 NOTE — TELEPHONE ENCOUNTER
[facility] for Dr Chiu to return call        ----- Message from Fili Carlos sent at 8/21/2023  1:26 PM CDT -----  Regarding: Callback  Patient  GYN Dr. Tressa Chiu is requesting a callback regarding some concerns she has about the Pt. Please give the patient  GYN Dr. Chiu a callback at [phone]. After hours Cell number [phone].

## 2023-08-23 ENCOUNTER — OFFICE VISIT (OUTPATIENT)
Dept: ENDOCRINOLOGY | Facility: CLINIC | Age: 43
End: 2023-08-23
Payer: COMMERCIAL

## 2023-08-23 ENCOUNTER — TELEPHONE (OUTPATIENT)
Dept: ENDOCRINOLOGY | Facility: CLINIC | Age: 43
End: 2023-08-23
Payer: COMMERCIAL

## 2023-08-23 VITALS
HEART RATE: 98 BPM | OXYGEN SATURATION: 98 % | DIASTOLIC BLOOD PRESSURE: 60 MMHG | BODY MASS INDEX: 18.68 KG/M2 | WEIGHT: 119 LBS | HEIGHT: 67 IN | SYSTOLIC BLOOD PRESSURE: 104 MMHG

## 2023-08-23 DIAGNOSIS — Z17.1 MALIGNANT NEOPLASM OF UPPER-OUTER QUADRANT OF LEFT BREAST IN FEMALE, ESTROGEN RECEPTOR NEGATIVE: ICD-10-CM

## 2023-08-23 DIAGNOSIS — C50.412 MALIGNANT NEOPLASM OF UPPER-OUTER QUADRANT OF LEFT BREAST IN FEMALE, ESTROGEN RECEPTOR NEGATIVE: ICD-10-CM

## 2023-08-23 DIAGNOSIS — G47.00 INSOMNIA, UNSPECIFIED TYPE: ICD-10-CM

## 2023-08-23 DIAGNOSIS — E27.49 SECONDARY ADRENAL INSUFFICIENCY: Primary | ICD-10-CM

## 2023-08-23 PROCEDURE — 3078F DIAST BP <80 MM HG: CPT | Mod: CPTII,S$GLB,, | Performed by: INTERNAL MEDICINE

## 2023-08-23 PROCEDURE — 99999 PR PBB SHADOW E&M-EST. PATIENT-LVL IV: CPT | Mod: PBBFAC,,, | Performed by: INTERNAL MEDICINE

## 2023-08-23 PROCEDURE — 3008F PR BODY MASS INDEX (BMI) DOCUMENTED: ICD-10-PCS | Mod: CPTII,S$GLB,, | Performed by: INTERNAL MEDICINE

## 2023-08-23 PROCEDURE — 3074F PR MOST RECENT SYSTOLIC BLOOD PRESSURE < 130 MM HG: ICD-10-PCS | Mod: CPTII,S$GLB,, | Performed by: INTERNAL MEDICINE

## 2023-08-23 PROCEDURE — 3078F PR MOST RECENT DIASTOLIC BLOOD PRESSURE < 80 MM HG: ICD-10-PCS | Mod: CPTII,S$GLB,, | Performed by: INTERNAL MEDICINE

## 2023-08-23 PROCEDURE — 3008F BODY MASS INDEX DOCD: CPT | Mod: CPTII,S$GLB,, | Performed by: INTERNAL MEDICINE

## 2023-08-23 PROCEDURE — 99214 OFFICE O/P EST MOD 30 MIN: CPT | Mod: S$GLB,,, | Performed by: INTERNAL MEDICINE

## 2023-08-23 PROCEDURE — 1159F PR MEDICATION LIST DOCUMENTED IN MEDICAL RECORD: ICD-10-PCS | Mod: CPTII,S$GLB,, | Performed by: INTERNAL MEDICINE

## 2023-08-23 PROCEDURE — 1159F MED LIST DOCD IN RCRD: CPT | Mod: CPTII,S$GLB,, | Performed by: INTERNAL MEDICINE

## 2023-08-23 PROCEDURE — 3074F SYST BP LT 130 MM HG: CPT | Mod: CPTII,S$GLB,, | Performed by: INTERNAL MEDICINE

## 2023-08-23 PROCEDURE — 99214 PR OFFICE/OUTPT VISIT, EST, LEVL IV, 30-39 MIN: ICD-10-PCS | Mod: S$GLB,,, | Performed by: INTERNAL MEDICINE

## 2023-08-23 PROCEDURE — 99999 PR PBB SHADOW E&M-EST. PATIENT-LVL IV: ICD-10-PCS | Mod: PBBFAC,,, | Performed by: INTERNAL MEDICINE

## 2023-08-23 PROCEDURE — 1160F PR REVIEW ALL MEDS BY PRESCRIBER/CLIN PHARMACIST DOCUMENTED: ICD-10-PCS | Mod: CPTII,S$GLB,, | Performed by: INTERNAL MEDICINE

## 2023-08-23 PROCEDURE — 1160F RVW MEDS BY RX/DR IN RCRD: CPT | Mod: CPTII,S$GLB,, | Performed by: INTERNAL MEDICINE

## 2023-08-23 NOTE — TELEPHONE ENCOUNTER
S/w DOROTHY Ga with [facility].     They received the your surgery hydrocortisone instructions and wanted to know if they could use PO hydrocortisone instead of IV. They say pt will be doing same day surgery and not being admitted.     Please advise.

## 2023-08-23 NOTE — TELEPHONE ENCOUNTER
S/w DOROTHY Ga with [facility] at [phone]. States they will give 1st dose of hydrocortisone IV and send her with 25mg tabs for home.

## 2023-08-23 NOTE — PROGRESS NOTES
CHIEF COMPLAINT: Adrenal Insufficiency   43 y.o. old being seen as a f/u. Has BRCA2 mutation Breast CaOn prednisone 10 mg daily. Had robotic NICOLETTE on 3/4/22. On 3/11/22 went to ED at [facility] forN/V, weakness and CP. She was hypotensive in ED @ 75/38. Given VF and 125 mg Solucortef on 3/11/22. Cortisol of 19.2 done on 3/12. States started on steroids when got Chemo. Was on Keytruda. Last year was thought to have toxicity due to chemo approx July 2021.       Admitted Aug 13/21- was given 4 mg Dex IV on 8/14 and 8/15. Then converted to daily oral dex 4 mg daily. Then discharged on oral dex taper.     On 9/2/21 was given prednisone @ 60 mg daily and tapered over a month.       4/29/22 failed cosyntropin stim test. Now on Hydrocortisone 10/5.  Scheduled for her final breast surgery. No N/V. Has not had to use zofran. Still having issues with memory. Had seen neurology. Still under a lot of stress. Insomnia somewhat better with trazadone.             PAST MEDICAL HISTORY/PAST SURGICAL HISTORY:  Reviewed in [facility]    SOCIAL HISTORY: Reviewed in [facility]    FAMILY HISTORY:  No known thyroid disease. Father with DM 2. No known adrenal disorders.     MEDICATIONS/ALLERGIES: The patient's MedCard has been updated and reviewed.        PE:    GENERAL: Well developed, well nourished.  NECK: Supple, trachea midline, No palpable thyroid nodules.   CHEST: Resp even and unlabored, CTA bilateral.  CARDIAC: RRR, S1, S2 heard, no murmurs, rubs, S3, or S4    LABS/Radiology     Latest Reference Range & Units 06/14/23 09:50   Sodium 136 - 145 mmol/L 143   Potassium 3.5 - 5.1 mmol/L 3.3 (L)   Chloride 95 - 110 mmol/L 109   CO2 23 - 29 mmol/L 24   Anion Gap 8 - 16 mmol/L 10   BUN 6 - 20 mg/dL 7   Creatinine 0.5 - 1.4 mg/dL 0.7   eGFR >60 mL/min/1.73 m^2 >60.0   Glucose 70 - 110 mg/dL 83   Calcium 8.7 - 10.5 mg/dL 9.2   Alkaline Phosphatase 55 - 135 U/L 74   PROTEIN TOTAL 6.0 - 8.4 g/dL 6.1   Albumin 3.5 - 5.2 g/dL 3.9   BILIRUBIN TOTAL 0.1 - 1.0  mg/dL 0.3   AST 10 - 40 U/L 15   ALT 10 - 44 U/L 10   (L): Data is abnormally low    ASSESSMENT/PLAN:  1. Adrenal Insufficiency- appears secondary. Has had a normal MRI pituitary. Possibly chemo induce. No chemo since July 2021.  Her symptoms appear stable.  Would continue current dose of hydrocortisone.  Scheduled for her last breast surgery.  Gave steroid recommendations to do surgery team.  Advise letting us know if has any adrenal insufficiency symptoms post surgery.  Has not had to take any Zofran for her nausea.    2. Breast Ca- Was on keytruda.  Status post reconstructive breast surgery.    3. Insomnia-appears stable.  Does appear she is still has some insomnia, but better with trazodone    FOLLOWUP  F/U 6 months with CMP

## 2023-08-24 DIAGNOSIS — C50.412 MALIGNANT NEOPLASM OF UPPER-OUTER QUADRANT OF LEFT BREAST IN FEMALE, ESTROGEN RECEPTOR NEGATIVE: ICD-10-CM

## 2023-08-24 DIAGNOSIS — Z17.1 MALIGNANT NEOPLASM OF UPPER-OUTER QUADRANT OF LEFT BREAST IN FEMALE, ESTROGEN RECEPTOR NEGATIVE: ICD-10-CM

## 2023-08-24 RX ORDER — ONDANSETRON 8 MG/1
TABLET, ORALLY DISINTEGRATING ORAL
Qty: 60 TABLET | Refills: 1 | Status: SHIPPED | OUTPATIENT
Start: 2023-08-24 | End: 2023-12-15 | Stop reason: SDUPTHER

## 2023-08-28 ENCOUNTER — PATIENT MESSAGE (OUTPATIENT)
Dept: NEUROLOGY | Facility: CLINIC | Age: 43
End: 2023-08-28
Payer: COMMERCIAL

## 2023-08-28 ENCOUNTER — PATIENT MESSAGE (OUTPATIENT)
Dept: HEMATOLOGY/ONCOLOGY | Facility: CLINIC | Age: 43
End: 2023-08-28
Payer: COMMERCIAL

## 2023-08-28 ENCOUNTER — HOSPITAL ENCOUNTER (OUTPATIENT)
Dept: RADIOLOGY | Facility: HOSPITAL | Age: 43
Discharge: HOME | End: 2023-08-28
Attending: NURSE PRACTITIONER
Payer: COMMERCIAL

## 2023-08-28 ENCOUNTER — TELEPHONE (OUTPATIENT)
Dept: NEUROLOGY | Facility: CLINIC | Age: 43
End: 2023-08-28
Payer: COMMERCIAL

## 2023-08-28 DIAGNOSIS — R41.0 DISORIENTATION: Primary | ICD-10-CM

## 2023-08-28 DIAGNOSIS — R41.3 OTHER AMNESIA: ICD-10-CM

## 2023-08-28 PROCEDURE — 70553 MRI BRAIN STEM W/O & W/DYE: CPT | Mod: TC

## 2023-08-28 PROCEDURE — 70553 MRI BRAIN W WO CONTRAST: ICD-10-PCS | Mod: 26,,, | Performed by: RADIOLOGY

## 2023-08-28 PROCEDURE — 70553 MRI BRAIN STEM W/O & W/DYE: CPT | Mod: 26,,, | Performed by: RADIOLOGY

## 2023-08-28 PROCEDURE — 25500020 PHARM REV CODE 255: Performed by: NURSE PRACTITIONER

## 2023-08-28 PROCEDURE — A9585 GADOBUTROL INJECTION: HCPCS | Performed by: NURSE PRACTITIONER

## 2023-08-28 RX ORDER — GADOBUTROL 604.72 MG/ML
5 INJECTION INTRAVENOUS
Status: COMPLETED | OUTPATIENT
Start: 2023-08-28 | End: 2023-08-28

## 2023-08-28 RX ADMIN — GADOBUTROL 5 ML: 604.72 INJECTION INTRAVENOUS at 01:08

## 2023-08-28 NOTE — TELEPHONE ENCOUNTER
LVM for pt to inform her of EEG order and the number to call and get it schedule. Also reminded her that Candis ordered labs that need to be drawn.

## 2023-08-29 ENCOUNTER — PATIENT MESSAGE (OUTPATIENT)
Dept: NEUROLOGY | Facility: CLINIC | Age: 43
End: 2023-08-29
Payer: COMMERCIAL

## 2023-08-29 ENCOUNTER — TELEPHONE (OUTPATIENT)
Dept: NEUROLOGY | Facility: CLINIC | Age: 43
End: 2023-08-29
Payer: COMMERCIAL

## 2023-08-29 DIAGNOSIS — R93.0 ABNORMAL MRI OF THE HEAD: Primary | ICD-10-CM

## 2023-08-29 DIAGNOSIS — G47.00 INSOMNIA, UNSPECIFIED: ICD-10-CM

## 2023-08-29 DIAGNOSIS — N65.1 BREAST ASYMMETRY FOLLOWING RECONSTRUCTIVE SURGERY: ICD-10-CM

## 2023-08-29 DIAGNOSIS — R53.83 FATIGUE: ICD-10-CM

## 2023-08-29 DIAGNOSIS — Z80.3 FAMILY HISTORY OF BREAST CANCER: ICD-10-CM

## 2023-08-29 DIAGNOSIS — Z90.13 ABSENCE OF BREAST, BILATERAL: ICD-10-CM

## 2023-08-29 DIAGNOSIS — N95.1 MENOPAUSAL SYMPTOM: ICD-10-CM

## 2023-08-29 DIAGNOSIS — R42 DIZZINESS AND GIDDINESS: Primary | ICD-10-CM

## 2023-08-29 DIAGNOSIS — R41.82 ALTERED MENTAL STATUS, UNSPECIFIED ALTERED MENTAL STATUS TYPE: ICD-10-CM

## 2023-08-29 DIAGNOSIS — Z01.812 PRE-OPERATIVE LABORATORY EXAMINATION: Primary | ICD-10-CM

## 2023-08-29 DIAGNOSIS — Z17.1 MALIGNANT NEOPLASM OF UPPER-OUTER QUADRANT OF LEFT BREAST IN FEMALE, ESTROGEN RECEPTOR NEGATIVE: ICD-10-CM

## 2023-08-29 DIAGNOSIS — Z01.818 OTHER SPECIFIED PRE-OPERATIVE EXAMINATION: ICD-10-CM

## 2023-08-29 DIAGNOSIS — N95.2 VAGINAL ATROPHY: ICD-10-CM

## 2023-08-29 DIAGNOSIS — R68.82 DECREASED LIBIDO: ICD-10-CM

## 2023-08-29 DIAGNOSIS — C50.412 MALIGNANT NEOPLASM OF UPPER-OUTER QUADRANT OF LEFT BREAST IN FEMALE, ESTROGEN RECEPTOR NEGATIVE: ICD-10-CM

## 2023-08-29 DIAGNOSIS — R41.89 COGNITIVE DECLINE: ICD-10-CM

## 2023-08-30 ENCOUNTER — TELEPHONE (OUTPATIENT)
Dept: HEMATOLOGY/ONCOLOGY | Facility: CLINIC | Age: 43
End: 2023-08-30
Payer: COMMERCIAL

## 2023-08-30 ENCOUNTER — PATIENT MESSAGE (OUTPATIENT)
Dept: SPINE | Facility: CLINIC | Age: 43
End: 2023-08-30
Payer: COMMERCIAL

## 2023-08-30 ENCOUNTER — INFUSION (OUTPATIENT)
Dept: INFUSION THERAPY | Facility: HOSPITAL | Age: 43
End: 2023-08-30
Attending: INTERNAL MEDICINE
Payer: COMMERCIAL

## 2023-08-30 VITALS
HEIGHT: 67 IN | TEMPERATURE: 98 F | RESPIRATION RATE: 16 BRPM | DIASTOLIC BLOOD PRESSURE: 73 MMHG | BODY MASS INDEX: 18.39 KG/M2 | OXYGEN SATURATION: 100 % | SYSTOLIC BLOOD PRESSURE: 110 MMHG | HEART RATE: 81 BPM | WEIGHT: 117.19 LBS

## 2023-08-30 DIAGNOSIS — G47.00 INSOMNIA, UNSPECIFIED: ICD-10-CM

## 2023-08-30 DIAGNOSIS — N95.1 MENOPAUSAL SYMPTOM: Primary | ICD-10-CM

## 2023-08-30 DIAGNOSIS — Z01.818 OTHER SPECIFIED PRE-OPERATIVE EXAMINATION: ICD-10-CM

## 2023-08-30 DIAGNOSIS — R53.83 FATIGUE: ICD-10-CM

## 2023-08-30 DIAGNOSIS — R68.82 DECREASED LIBIDO: ICD-10-CM

## 2023-08-30 DIAGNOSIS — Z80.3 FAMILY HISTORY OF BREAST CANCER: ICD-10-CM

## 2023-08-30 DIAGNOSIS — C50.412 MALIGNANT NEOPLASM OF UPPER-OUTER QUADRANT OF LEFT BREAST IN FEMALE, ESTROGEN RECEPTOR NEGATIVE: ICD-10-CM

## 2023-08-30 DIAGNOSIS — N65.1 BREAST ASYMMETRY FOLLOWING RECONSTRUCTIVE SURGERY: ICD-10-CM

## 2023-08-30 DIAGNOSIS — Z17.1 MALIGNANT NEOPLASM OF UPPER-OUTER QUADRANT OF LEFT BREAST IN FEMALE, ESTROGEN RECEPTOR NEGATIVE: ICD-10-CM

## 2023-08-30 DIAGNOSIS — N95.2 VAGINAL ATROPHY: ICD-10-CM

## 2023-08-30 DIAGNOSIS — Z90.13 ABSENCE OF BREAST, BILATERAL: ICD-10-CM

## 2023-08-30 DIAGNOSIS — Z01.812 PRE-OPERATIVE LABORATORY EXAMINATION: ICD-10-CM

## 2023-08-30 LAB
25(OH)D3+25(OH)D2 SERPL-MCNC: 49 NG/ML (ref 30–96)
ANION GAP SERPL CALC-SCNC: 6 MMOL/L (ref 8–16)
BASOPHILS # BLD AUTO: 0.02 K/UL (ref 0–0.2)
BASOPHILS NFR BLD: 0.3 % (ref 0–1.9)
BILIRUB UR QL STRIP: NEGATIVE
BUN SERPL-MCNC: 9 MG/DL (ref 6–20)
CALCIUM SERPL-MCNC: 9.1 MG/DL (ref 8.7–10.5)
CHLORIDE SERPL-SCNC: 105 MMOL/L (ref 95–110)
CLARITY UR: CLEAR
CO2 SERPL-SCNC: 26 MMOL/L (ref 23–29)
COLOR UR: COLORLESS
CREAT SERPL-MCNC: 0.5 MG/DL (ref 0.5–1.4)
DIFFERENTIAL METHOD: ABNORMAL
EOSINOPHIL # BLD AUTO: 0 K/UL (ref 0–0.5)
EOSINOPHIL NFR BLD: 0.5 % (ref 0–8)
ERYTHROCYTE [DISTWIDTH] IN BLOOD BY AUTOMATED COUNT: 12.8 % (ref 11.5–14.5)
EST. GFR  (NO RACE VARIABLE): >60 ML/MIN/1.73 M^2
GLUCOSE SERPL-MCNC: 93 MG/DL (ref 70–110)
GLUCOSE UR QL STRIP: NEGATIVE
HCT VFR BLD AUTO: 36.7 % (ref 37–48.5)
HGB BLD-MCNC: 12.5 G/DL (ref 12–16)
HGB UR QL STRIP: NEGATIVE
IMM GRANULOCYTES # BLD AUTO: 0.03 K/UL (ref 0–0.04)
IMM GRANULOCYTES NFR BLD AUTO: 0.5 % (ref 0–0.5)
KETONES UR QL STRIP: NEGATIVE
LEUKOCYTE ESTERASE UR QL STRIP: NEGATIVE
LYMPHOCYTES # BLD AUTO: 2 K/UL (ref 1–4.8)
LYMPHOCYTES NFR BLD: 32.6 % (ref 18–48)
MCH RBC QN AUTO: 30.3 PG (ref 27–31)
MCHC RBC AUTO-ENTMCNC: 34.1 G/DL (ref 32–36)
MCV RBC AUTO: 89 FL (ref 82–98)
MONOCYTES # BLD AUTO: 0.3 K/UL (ref 0.3–1)
MONOCYTES NFR BLD: 5.2 % (ref 4–15)
NEUTROPHILS # BLD AUTO: 3.6 K/UL (ref 1.8–7.7)
NEUTROPHILS NFR BLD: 60.9 % (ref 38–73)
NITRITE UR QL STRIP: NEGATIVE
NRBC BLD-RTO: 0 /100 WBC
PH UR STRIP: 7 [PH] (ref 5–8)
PLATELET # BLD AUTO: 216 K/UL (ref 150–450)
PMV BLD AUTO: 8.8 FL (ref 9.2–12.9)
POTASSIUM SERPL-SCNC: 3.8 MMOL/L (ref 3.5–5.1)
PROT UR QL STRIP: NEGATIVE
RBC # BLD AUTO: 4.13 M/UL (ref 4–5.4)
SODIUM SERPL-SCNC: 137 MMOL/L (ref 136–145)
SP GR UR STRIP: 1 (ref 1–1.03)
T4 FREE SERPL-MCNC: 0.75 NG/DL (ref 0.71–1.51)
TSH SERPL DL<=0.005 MIU/L-ACNC: 0.83 UIU/ML (ref 0.34–5.6)
URN SPEC COLLECT METH UR: ABNORMAL
UROBILINOGEN UR STRIP-ACNC: NEGATIVE EU/DL
VIT B12 SERPL-MCNC: 1189 PG/ML (ref 210–950)
WBC # BLD AUTO: 5.98 K/UL (ref 3.9–12.7)

## 2023-08-30 PROCEDURE — 80048 BASIC METABOLIC PNL TOTAL CA: CPT | Performed by: SURGERY

## 2023-08-30 PROCEDURE — 36591 DRAW BLOOD OFF VENOUS DEVICE: CPT

## 2023-08-30 PROCEDURE — 82679 ASSAY OF ESTRONE: CPT | Performed by: INTERNAL MEDICINE

## 2023-08-30 PROCEDURE — 81003 URINALYSIS AUTO W/O SCOPE: CPT | Performed by: SURGERY

## 2023-08-30 PROCEDURE — 25000003 PHARM REV CODE 250: Performed by: INTERNAL MEDICINE

## 2023-08-30 PROCEDURE — 84481 FREE ASSAY (FT-3): CPT | Performed by: INTERNAL MEDICINE

## 2023-08-30 PROCEDURE — A4216 STERILE WATER/SALINE, 10 ML: HCPCS | Performed by: INTERNAL MEDICINE

## 2023-08-30 PROCEDURE — 82306 VITAMIN D 25 HYDROXY: CPT | Performed by: INTERNAL MEDICINE

## 2023-08-30 PROCEDURE — 82607 VITAMIN B-12: CPT | Performed by: INTERNAL MEDICINE

## 2023-08-30 PROCEDURE — 84439 ASSAY OF FREE THYROXINE: CPT | Performed by: INTERNAL MEDICINE

## 2023-08-30 PROCEDURE — 84443 ASSAY THYROID STIM HORMONE: CPT | Performed by: INTERNAL MEDICINE

## 2023-08-30 PROCEDURE — 85025 COMPLETE CBC W/AUTO DIFF WBC: CPT | Performed by: SURGERY

## 2023-08-30 PROCEDURE — 63600175 PHARM REV CODE 636 W HCPCS: Performed by: INTERNAL MEDICINE

## 2023-08-30 PROCEDURE — 82670 ASSAY OF TOTAL ESTRADIOL: CPT | Performed by: INTERNAL MEDICINE

## 2023-08-30 RX ORDER — HEPARIN 100 UNIT/ML
500 SYRINGE INTRAVENOUS
Status: CANCELLED | OUTPATIENT
Start: 2023-08-30

## 2023-08-30 RX ORDER — SODIUM CHLORIDE 0.9 % (FLUSH) 0.9 %
10 SYRINGE (ML) INJECTION
Status: CANCELLED | OUTPATIENT
Start: 2023-08-30

## 2023-08-30 RX ORDER — HEPARIN 100 UNIT/ML
500 SYRINGE INTRAVENOUS
Status: DISCONTINUED | OUTPATIENT
Start: 2023-08-30 | End: 2023-08-30 | Stop reason: HOSPADM

## 2023-08-30 RX ORDER — SODIUM CHLORIDE 0.9 % (FLUSH) 0.9 %
10 SYRINGE (ML) INJECTION
Status: DISCONTINUED | OUTPATIENT
Start: 2023-08-30 | End: 2023-08-30 | Stop reason: HOSPADM

## 2023-08-30 RX ADMIN — HEPARIN 500 UNITS: 100 SYRINGE at 01:08

## 2023-08-30 RX ADMIN — SODIUM CHLORIDE, PRESERVATIVE FREE 10 ML: 5 INJECTION INTRAVENOUS at 01:08

## 2023-08-31 ENCOUNTER — PATIENT MESSAGE (OUTPATIENT)
Dept: ENDOCRINOLOGY | Facility: CLINIC | Age: 43
End: 2023-08-31
Payer: COMMERCIAL

## 2023-08-31 ENCOUNTER — TELEPHONE (OUTPATIENT)
Dept: HEMATOLOGY/ONCOLOGY | Facility: CLINIC | Age: 43
End: 2023-08-31
Payer: COMMERCIAL

## 2023-08-31 ENCOUNTER — PATIENT MESSAGE (OUTPATIENT)
Dept: PHYSICAL MEDICINE AND REHAB | Facility: CLINIC | Age: 43
End: 2023-08-31
Payer: COMMERCIAL

## 2023-08-31 ENCOUNTER — PATIENT MESSAGE (OUTPATIENT)
Dept: NEUROLOGY | Facility: CLINIC | Age: 43
End: 2023-08-31
Payer: COMMERCIAL

## 2023-08-31 NOTE — TELEPHONE ENCOUNTER
----- Message from Carlos Alberto Resendiz sent at 8/31/2023 11:13 AM CDT -----  Contact: patient  Type:  Needs Medical Advice    Who Called: patient   Would the patient rather a call back or a response via [facility]? Call back   Best Call Back Number: [phone]  Additional Information: patient would like to speak with physician about a MRI please assist

## 2023-08-31 NOTE — TELEPHONE ENCOUNTER
----- Message from Sandra Sotelo MD sent at 8/31/2023  4:19 PM CDT -----  Regarding: RE: MRI question  Contact: patient  I have now called and left daily voicemails- this is call # [phone]   Please save    ----- Message -----  From: Sisi Cardoza, JACKIE  Sent: 8/31/2023  12:42 PM CDT  To: Sandra Sotelo MD  Subject: MRI question                                     Alison wants to talk to you- not a nurse    ----- Message -----  From: Carlos Alberto Resendiz  Sent: 8/31/2023  11:14 AM CDT  To: Ashleigh SMITH Staff    Type:  Needs Medical Advice    Who Called: patient   Would the patient rather a call back or a response via [facility]? Call back   Best Call Back Number: [phone]  Additional Information: patient would like to speak with physician about a MRI please assist

## 2023-09-01 ENCOUNTER — PATIENT MESSAGE (OUTPATIENT)
Dept: HEMATOLOGY/ONCOLOGY | Facility: CLINIC | Age: 43
End: 2023-09-01
Payer: COMMERCIAL

## 2023-09-01 LAB
ESTRADIOL SERPL-MCNC: <5 PG/ML
T3FREE SERPL-MCNC: 4.1 PG/ML (ref 2–4.4)

## 2023-09-05 ENCOUNTER — TELEPHONE (OUTPATIENT)
Dept: HEMATOLOGY/ONCOLOGY | Facility: CLINIC | Age: 43
End: 2023-09-05
Payer: COMMERCIAL

## 2023-09-05 NOTE — TELEPHONE ENCOUNTER
----- Message from Sandra Sotelo MD sent at 8/31/2023  4:36 PM CDT -----    Sisi  Please save this message    Schedulers- please book MRI Brain after 10/1 at [facility] main per her request

## 2023-09-06 ENCOUNTER — OFFICE VISIT (OUTPATIENT)
Dept: PSYCHIATRY | Facility: CLINIC | Age: 43
End: 2023-09-06
Payer: COMMERCIAL

## 2023-09-06 ENCOUNTER — PATIENT MESSAGE (OUTPATIENT)
Dept: RADIATION ONCOLOGY | Facility: CLINIC | Age: 43
End: 2023-09-06
Payer: COMMERCIAL

## 2023-09-06 DIAGNOSIS — G47.00 INSOMNIA, UNSPECIFIED TYPE: ICD-10-CM

## 2023-09-06 DIAGNOSIS — F32.A DEPRESSION, UNSPECIFIED DEPRESSION TYPE: Primary | ICD-10-CM

## 2023-09-06 DIAGNOSIS — F41.1 GAD (GENERALIZED ANXIETY DISORDER): ICD-10-CM

## 2023-09-06 LAB — ESTRONE SERPL-MCNC: 26 PG/ML

## 2023-09-06 PROCEDURE — 90837 PSYTX W PT 60 MINUTES: CPT | Mod: 95,,,

## 2023-09-06 PROCEDURE — 90837 PR PSYCHOTHERAPY W/PATIENT, 60 MIN: ICD-10-PCS | Mod: 95,,,

## 2023-09-06 NOTE — PROGRESS NOTES
The patient location is:  [facility] [place], [place]  The patient location [place] is: [place]  The patient phone number is: [phone]  Visit type: Virtual visit with synchronous audio and video  Each patient to whom he or she provides medical services by telemedicine is:  (1) informed of the relationship between the provider and patient and the respective role of any other health care provider with respect to management of the patient; and (2) notified that he or she may decline to receive medical services by telemedicine and may withdraw from such care at any time.    Crisis Disclaimer: Patient was informed that due to the virtual nature of the visit, that if a crisis develops, protocols will be implemented to ensure patient safety, including but not limited to: 1) Initiating a welfare check with local Law Enforcement, 2) Calling [facility]/National Crisis Hotline, and/or 3) Initiating PEC/CEC procedures.    Time (Face-to-face): 43 minutes    Time (Non-face-to-face): 15 minutes     PSYCHO-ONCOLOGY NOTE/ Individual Psychotherapy     Date: 9/6/2023   Site:  [place], [place]      Therapeutic Intervention: Met with patient.  Outpatient - Behavior modifying psychotherapy 60 min - CPT code 28544 and Outpatient - Supportive psychotherapy 60 min - CPT Code 55523    This includes face to face time and non-face to face time preparing to see the patient, obtaining and/or reviewing separately obtained history, documenting clinical information in the electronic or other health record, independently interpreting results and communicating results to the patient/family/caregiver, or care coordinator.      Patient was last seen by me on 3/8/2023    Problem list  Patient Active Problem List   Diagnosis    Ductal carcinoma in situ of left breast    Malignant neoplasm of upper-outer quadrant of left breast in female, estrogen receptor negative    Decreased functional mobility    Dehydration    Anemia associated with chemotherapy    Nausea and vomiting     BRCA2 gene mutation positive    Scoliosis    Attention Deficit Hyperactivity Disorder    Therapeutic Drug Monitoring    Postoperative Nausea And Vomiting    Macrocytic Anemia    Generalized Anxiety    Family H/O Diabetes Mellitus    Fatigue    Hyponatremia    Elevated troponin    Diarrhea    Intravascular volume depletion    Hypomagnesemia    Hypokalemia    Anterior T wave inversion    Elevated LFTs    Colitis, indeterminate    Encounter for immunotherapy    Diarrhea due to drug    Pneumonitis    s/p RA-TLH/BSO    JA (acute kidney injury)    Adrenal crisis    Sepsis    Chest pain    Adrenal insufficiency    Axillary web syndrome    Depressed    Constipation    Memory loss       Chief complaint/reason for encounter: depression, anxiety, and cognition   Met with patient to evaluate psychosocial adaptation to diagnosis/treatment/survivorship of BREAST CANCER    Current Medications  Current Outpatient Medications   Medication    dextroamphetamine-amphetamine (ADDERALL) 20 mg tablet    estradioL (IMVEXXY MAINTENANCE PACK) 10 mcg Inst    estradioL (VAGIFEM) 10 mcg Tab    hydrocortisone (CORTEF) 10 MG Tab    hydrocortisone (CORTEF) 5 MG Tab    LIDOCAINE VISCOUS 2 % solution    ondansetron (ZOFRAN ODT ORAL)    ondansetron (ZOFRAN-ODT) 8 MG TbDL    traZODone (DESYREL) 50 MG tablet    traZODone (DESYREL) 50 MG tablet    valACYclovir (VALTREX) 500 MG tablet    valACYclovir (VALTREX) 500 MG tablet     No current facility-administered medications for this visit.     Facility-Administered Medications Ordered in Other Visits   Medication Frequency    lactated ringers infusion Continuous    sodium chloride 0.9% flush 10 mL PRN       ONCOLOGY HISTORY  Oncology History   Malignant neoplasm of upper-outer quadrant of left breast in female, estrogen receptor negative   1/19/2021 Cancer Staged    Staging form: Breast, AJCC 8th Edition  - Clinical stage from 1/19/2021: Stage IIIB (cT2, cN1, cM0, G3, ER-, AL-, HER2-)     1/28/2021  Initial Diagnosis    Malignant neoplasm of upper-outer quadrant of left breast in female, estrogen receptor negative     2/18/2021 - 2/18/2021 Chemotherapy    Treatment Summary   Plan Name: OP BREAST DOSE-DENSE AC - DOXORUBICIN CYCLOPHOSPHAMIDE Q2W  Treatment Goal: Curative  Status: Inactive  Start Date:   End Date:   Provider: Jhon Suazo MD  Chemotherapy: DOXOrubicin chemo injection 96 mg, 60 mg/m2, Intravenous, Clinic/HOD 1 time, 0 of 4 cycles  cyclophosphamide (CYTOXAN) 600 mg/m2 = 965 mg in sodium chloride 0.9% 250 mL chemo infusion, 600 mg/m2, Intravenous, Clinic/HOD 1 time, 0 of 4 cycles     2/25/2021 - 7/6/2021 Chemotherapy    Treatment Summary   Plan Name: OP BREAST PACLITAXEL WEEKLY + CARBOPLATIN (AUC 5) Q3W FOLLOWED BY AC WITH PEMBROLIZUMAB FOLLOWED BY PEMBROLIZUMAB   Treatment Goal: Curative  Status: Inactive  Start Date: 2/25/2021  End Date: 7/6/2021  Provider: Sandra Sotelo MD  Chemotherapy: DOXOrubicin chemo injection 112 mg, 60 mg/m2 = 112 mg (100 % of original dose 60 mg/m2), Intravenous, Once, 3 of 4 cycles  Dose modification: 60 mg/m2 (original dose 60 mg/m2, Cycle 5)  Administration: 112 mg (5/25/2021), 112 mg (6/15/2021), 110 mg (7/6/2021)  CARBOplatin (PARAPLATIN) 605 mg in sodium chloride 0.9% 250 mL chemo infusion, 605 mg (100 % of original dose 603.5 mg), Intravenous, Clinic/HOD 1 time, 4 of 4 cycles  Dose modification:   (original dose 603.5 mg, Cycle 1)  Administration: 605 mg (2/25/2021), 630 mg (3/22/2021), 750 mg (4/13/2021), 750 mg (5/4/2021)  cyclophosphamide 600 mg/m2 = 1,100 mg in sodium chloride 0.9% 100 mL chemo infusion, 600 mg/m2 = 1,100 mg (100 % of original dose 600 mg/m2), Intravenous, Clinic/HOD 1 time, 3 of 4 cycles  Dose modification: 600 mg/m2 (original dose 600 mg/m2, Cycle 5), 600 mg/m2 (Cycle 8)  Administration: 1,100 mg (5/25/2021), 1,100 mg (6/15/2021), 1,100 mg (7/6/2021)  PACLitaxeL (TAXOL) 80 mg/m2 = 132 mg in sodium chloride 0.9% 250 mL chemo infusion, 80  mg/m2 = 132 mg (100 % of original dose 80 mg/m2), Intravenous, Clinic/HOD 1 time, 4 of 4 cycles  Dose modification: 80 mg/m2 (original dose 80 mg/m2, Cycle 1)  Administration: 132 mg (2/25/2021), 132 mg (3/4/2021), 132 mg (3/11/2021), 138 mg (3/22/2021), 138 mg (3/30/2021), 138 mg (4/6/2021), 138 mg (4/13/2021), 138 mg (4/20/2021), 144 mg (4/27/2021), 144 mg (5/4/2021), 144 mg (5/11/2021), 144 mg (5/18/2021)  pembrolizumab (KEYTRUDA) 200 mg in sodium chloride 0.9% 100 mL chemo infusion, 200 mg, Intravenous, Clinic/HOD 1 time, 7 of 18 cycles  Administration: 200 mg (2/25/2021), 200 mg (5/25/2021), 200 mg (3/22/2021), 200 mg (4/13/2021), 200 mg (5/4/2021), 200 mg (6/15/2021), 200 mg (7/6/2021)     4/15/2021 - 4/15/2021 Chemotherapy    Treatment Summary   Plan Name: OP BREAST PEMBROLIZUMAB Q3W PACLITAXEL CARBOPLATIN WEEKLY FOLLOWED BY PEMBROLIZUMAB DOXORUBICIN CYCLOPHOSPHAMIDE Q3W   Treatment Goal: Curative  Status: Inactive  Start Date:   End Date:   Provider: Jhon Suazo MD  Chemotherapy: CARBOplatin (PARAPLATIN) in sodium chloride 0.9% 250 mL chemo infusion,  (original dose ), Intravenous, Clinic/HOD 1 time, 0 of 3 cycles  Dose modification:   (Cycle 1)  cyclophosphamide in sodium chloride 0.9% chemo infusion, 600 mg/m2 = 965 mg (original dose ), Intravenous, Clinic/HOD 1 time, 0 of 1 cycle  Dose modification: 600 mg/m2 (Cycle 2)  DOXOrubicin (ADRIAMYCIN) in sodium chloride 0.9% 250 mL chemo infusion, 60 mg/m2 (original dose ), Intravenous, Clinic/HOD 1 time, 0 of 1 cycle  Dose modification: 60 mg/m2 (Cycle 2)  PACLitaxeL (TAXOL) in sodium chloride 0.9% 100 mL chemo infusion, 80 mg/m2 (original dose ), Intravenous, Clinic/HOD 1 time, 0 of 1 cycle  Dose modification: 80 mg/m2 (Cycle 1)  PEMEtrexed (ALIMTA) in sodium chloride 0.9% chemo infusion, 500 mg/m2, Intravenous, Clinic/HOD 1 time, 0 of 33 cycles  pembrolizumab (KEYTRUDA) 200 mg in sodium chloride 0.9% 100 mL chemo infusion, 200 mg, Intravenous, Clinic/HOD 1  time, 0 of 35 cycles     11/19/2021 - 1/4/2022 Radiation Therapy    Treating physician: Maryann Perales  Total Dose: 50 Gy  Fractions: 25    DIAGNOSIS:  C50.412 - Malignant neoplasm of upper-outer quadrant of left female breast, Diagnosed 11/11/2021 (Active) Stage IIIB, T2, N1, M0, G3, HER2 Neg, ER Neg, MA Neg    This is a 41 y.o. y/o female with cT2,c N1 and M0 G3, (Stage IIIB), triple negative, Ki-67 79%, LEFT upper outer quadrant breast, BRCA2+, status post lumpectomy 1/19/21, with residual larisa disease, followed by  adjuvant chemo-immunotherapy with carbo-taxol and pembrolizumab, followed by AC with pembrolizumab, followed by adjuvant pemprolizumab complicated by ongoing, chronic colitis/diarrhea necessitating long-term prednisone.  Underwent bilateral mastectomy 9/29/21 with no residual carcinoma identified in breast of 14 sampled nodes (ypN0). She has completed adjuvant LEFT breast and regional larisa radiation therapy to a dose of 50Gy.    Treatment Summary  Course: C1 BREAST 2021    Treatment Site Ref. ID Energy Dose/Fx (Gy) #Fx Dose Correction (Gy) Total Dose (Gy) Start Date End Date Elapsed Days   3D Sclav_L rxsc 18X/6X 2 25 / 25 0 50 11/19/2021 1/4/2022 46   3D Breast L Breast_L 18X/6X 2 25 / 25 0 50 11/19/2021 1/4/2022 46     Tolerated well.  2 day treatment break at patient's request for patch of dry desquamation, and 5 day treatment break due to CoVID infection.  Otherwise, patient completed her course of therapy as prescribed.       PLAN: RTC 1 month for routine follow up. Continue current skin care/sun protection for now.  Follow up with other providers as directed.         Objective:  Alison JOSE RAFAEL Swann arrived promptly for the session. The patient was fully cooperative throughout the session.  Appearance: age appropriate, casually  dressed, adequately  groomed  Behavior/Cooperation: friendly and cooperative  Speech: normal in rate, volume, and tone  Mood: anxious, depressed  Affect: anxious and  tearful  Thought Process: goal-directed, logical  Thought Content: normal,  No delusions or paranoia; did not appear to be responding to internal stimuli during the session  Orientation: grossly intact  Memory: grossly intact  Attention Span/Concentration: Attends to session without distraction; reports no difficulty  Fund of Knowledge: average  Estimate of Intelligence: average from verbal skills and history  Cognition: grossly intact  Insight: patient has awareness of illness; good insight into own behavior and behavior of others  Judgment: the patient's behavior is adequate to circumstances    [facility]:       8/2/2023     6:58 AM 5/8/2023     2:03 PM 3/20/2023     9:57 AM 12/19/2022     8:53 AM 10/12/2022    10:59 AM 10/12/2022    10:56 AM 8/15/2022    11:31 AM   DISTRESS SCREENING   Distress Score 6 2 9 3 10 - Extreme Distress 0 - No Distress 0 - No Distress   Practical Problems None of these Work/School Insurance/Financial;Work/School Insurance/Financial Work/School None of these    Family Problems None of these Dealing with Children Family Health Issues None of these None of these None of these    Emotional Problems Sadness Worry Depression;Fears;Nervousness;Sadness;Worry;Loss of Interest Depression;Sadness;Worry Fears;Worry None of these    Spiritual / Christian Concerns No No No No No No    Physical Problems Tingling in hands or feet Pain Appearance;Bathing/Dressing;Memory/Concentration None of these None of these None of these    Other Problems Anxiety- burning fingers              Interval history and content of current session: Mrs. Swann  Discussed treatment and current adaptation to disease and treatment status. Discussed at length patient ongoing concerns about concentration and memory. Reports to be coping with significant difficulty. She shares her adderall dosage was increase recently however she has not started the new dose. Also shares ongoing difficulty with obtaining short term  disability. Feels overwhelmed with the number of doctors and appointments she has to tend to. Evaluated cognitive response, paying particular attention to negative intrusive thoughts of a persistent and detrimental nature. Thoughts of this type are in evidence with severe distress. Provided supportive therapy to address practical concerns. Identified and evaluated psychosocial and environmental stressors secondary to diagnosis and treatment.  Examined proactive behaviors that may be implemented to minimize or ameliorate psychosocial stressors secondary to diagnosis and treatment.     Risk parameters:   Patient reports no suicidal ideation  Patient reports no homicidal ideation  Patient reports no self-injurious behavior  Patient reports no violent behavior   Safety needs:  None at this time      Verbal deficits: None     Patient's response to intervention:The patient's response to intervention is accepting.     Progress toward goals and other mental status changes:  The patient's progress toward goals is limited.      Progress to date:Revise Objectives (Goals)      Goals from last visit:  n/a        Patient Strengths: verbal, intelligent, successful, good social support, good insight, commitment to wellness      Treatment Plan:individual psychotherapy and consult psychiatrist for medication evaluation; referral placed for medication management. Will follow up with  for short term disability paperwork.  Target symptoms: depression, mood swings, adjustment  Why chosen therapy is appropriate versus another modality: relevant to diagnosis, patient responds to this modality, evidence based practice  Outcome monitoring methods: self-report  Therapeutic intervention type: behavior modifying psychotherapy, supportive psychotherapy  Prognosis: Fair      Behavioral goals:    Exercise: continue as medically advised   Stress management: creating a system to organize day to day; utilize support system   Therapy: continue  self-care,     Return to clinic: as scheduled     Length of Service (minutes direct face-to-face contact): 45    Diagnosis:     ICD-10-CM ICD-9-CM   1. Depression, unspecified depression type  F32.A 311   2. KIANA (generalized anxiety disorder)  F41.1 300.02   3. Insomnia, unspecified type  G47.00 780.52                   Julisa Dias PsyD

## 2023-09-07 ENCOUNTER — PATIENT MESSAGE (OUTPATIENT)
Dept: PSYCHIATRY | Facility: CLINIC | Age: 43
End: 2023-09-07
Payer: COMMERCIAL

## 2023-09-08 DIAGNOSIS — G47.00 INSOMNIA, UNSPECIFIED TYPE: ICD-10-CM

## 2023-09-09 RX ORDER — TRAZODONE HYDROCHLORIDE 50 MG/1
50 TABLET ORAL NIGHTLY
Qty: 30 TABLET | Refills: 11 | Status: SHIPPED | OUTPATIENT
Start: 2023-09-09 | End: 2023-10-08 | Stop reason: SDUPTHER

## 2023-09-12 ENCOUNTER — TELEPHONE (OUTPATIENT)
Dept: PSYCHIATRY | Facility: CLINIC | Age: 43
End: 2023-09-12
Payer: COMMERCIAL

## 2023-09-13 ENCOUNTER — OFFICE VISIT (OUTPATIENT)
Dept: PSYCHIATRY | Facility: CLINIC | Age: 43
End: 2023-09-13
Payer: COMMERCIAL

## 2023-09-13 DIAGNOSIS — F41.1 GAD (GENERALIZED ANXIETY DISORDER): Primary | ICD-10-CM

## 2023-09-13 PROCEDURE — 90832 PR PSYCHOTHERAPY W/PATIENT, 30 MIN: ICD-10-PCS | Mod: 95,,,

## 2023-09-13 PROCEDURE — 90832 PSYTX W PT 30 MINUTES: CPT | Mod: 95,,,

## 2023-09-13 NOTE — PROGRESS NOTES
The patient location is:  [place], [place]  The patient location [place] is: [place]  The patient phone number is: [phone]  Visit type: Virtual visit with synchronous audio and video  Each patient to whom he or she provides medical services by telemedicine is:  (1) informed of the relationship between the provider and patient and the respective role of any other health care provider with respect to management of the patient; and (2) notified that he or she may decline to receive medical services by telemedicine and may withdraw from such care at any time.    Crisis Disclaimer: Patient was informed that due to the virtual nature of the visit, that if a crisis develops, protocols will be implemented to ensure patient safety, including but not limited to: 1) Initiating a welfare check with local Law Enforcement, 2) Calling [facility]/National Crisis Hotline, and/or 3) Initiating PEC/CEC procedures.    Time (Face-to-face): 12 minutes    Time (Non-face-to-face): 20 minutes     PSYCHO-ONCOLOGY NOTE/ Individual Psychotherapy     Date: 9/13/2023   Site:  [place], [place]      Therapeutic Intervention: Met with patient.  Outpatient - Supportive psychotherapy 30 min - CPT Code 48036    This includes face to face time and non-face to face time preparing to see the patient, obtaining and/or reviewing separately obtained history, documenting clinical information in the electronic or other health record, independently interpreting results and communicating results to the patient/family/caregiver, or care coordinator.      Patient was last seen by me on 9/6/2023    Problem list  Patient Active Problem List   Diagnosis    Ductal carcinoma in situ of left breast    Malignant neoplasm of upper-outer quadrant of left breast in female, estrogen receptor negative    Decreased functional mobility    Dehydration    Anemia associated with chemotherapy    Nausea and vomiting    BRCA2 gene mutation positive    Scoliosis    Attention Deficit  Hyperactivity Disorder    Therapeutic Drug Monitoring    Postoperative Nausea And Vomiting    Macrocytic Anemia    Generalized Anxiety    Family H/O Diabetes Mellitus    Fatigue    Hyponatremia    Elevated troponin    Diarrhea    Intravascular volume depletion    Hypomagnesemia    Hypokalemia    Anterior T wave inversion    Elevated LFTs    Colitis, indeterminate    Encounter for immunotherapy    Diarrhea due to drug    Pneumonitis    s/p RA-TLH/BSO    JA (acute kidney injury)    Adrenal crisis    Sepsis    Chest pain    Adrenal insufficiency    Axillary web syndrome    Depressed    Constipation    Memory loss       Chief complaint/reason for encounter: depression, anxiety, and cognition   Met with patient to evaluate psychosocial adaptation to survivorship of BREAST CANCER    Current Medications  Current Outpatient Medications   Medication    dextroamphetamine-amphetamine (ADDERALL) 20 mg tablet    estradioL (IMVEXXY MAINTENANCE PACK) 10 mcg Inst    estradioL (VAGIFEM) 10 mcg Tab    hydrocortisone (CORTEF) 10 MG Tab    hydrocortisone (CORTEF) 5 MG Tab    LIDOCAINE VISCOUS 2 % solution    ondansetron (ZOFRAN ODT ORAL)    ondansetron (ZOFRAN-ODT) 8 MG TbDL    traZODone (DESYREL) 50 MG tablet    traZODone (DESYREL) 50 MG tablet    valACYclovir (VALTREX) 500 MG tablet    valACYclovir (VALTREX) 500 MG tablet     No current facility-administered medications for this visit.     Facility-Administered Medications Ordered in Other Visits   Medication Frequency    lactated ringers infusion Continuous    sodium chloride 0.9% flush 10 mL PRN       ONCOLOGY HISTORY  Oncology History   Malignant neoplasm of upper-outer quadrant of left breast in female, estrogen receptor negative   1/19/2021 Cancer Staged    Staging form: Breast, AJCC 8th Edition  - Clinical stage from 1/19/2021: Stage IIIB (cT2, cN1, cM0, G3, ER-, OH-, HER2-)     1/28/2021 Initial Diagnosis    Malignant neoplasm of upper-outer quadrant of left breast in female,  estrogen receptor negative     2/18/2021 - 2/18/2021 Chemotherapy    Treatment Summary   Plan Name: OP BREAST DOSE-DENSE AC - DOXORUBICIN CYCLOPHOSPHAMIDE Q2W  Treatment Goal: Curative  Status: Inactive  Start Date:   End Date:   Provider: Jhon Suazo MD  Chemotherapy: DOXOrubicin chemo injection 96 mg, 60 mg/m2, Intravenous, Clinic/[facility] 1 time, 0 of 4 cycles  cyclophosphamide (CYTOXAN) 600 mg/m2 = 965 mg in sodium chloride 0.9% 250 mL chemo infusion, 600 mg/m2, Intravenous, Clinic/[facility] 1 time, 0 of 4 cycles     2/25/2021 - 7/6/2021 Chemotherapy    Treatment Summary   Plan Name: OP BREAST PACLITAXEL WEEKLY + CARBOPLATIN (AUC 5) Q3W FOLLOWED BY AC WITH PEMBROLIZUMAB FOLLOWED BY PEMBROLIZUMAB   Treatment Goal: Curative  Status: Inactive  Start Date: 2/25/2021  End Date: 7/6/2021  Provider: Sandra Sotelo MD  Chemotherapy: DOXOrubicin chemo injection 112 mg, 60 mg/m2 = 112 mg (100 % of original dose 60 mg/m2), Intravenous, Once, 3 of 4 cycles  Dose modification: 60 mg/m2 (original dose 60 mg/m2, Cycle 5)  Administration: 112 mg (5/25/2021), 112 mg (6/15/2021), 110 mg (7/6/2021)  CARBOplatin (PARAPLATIN) 605 mg in sodium chloride 0.9% 250 mL chemo infusion, 605 mg (100 % of original dose 603.5 mg), Intravenous, Clinic/[facility] 1 time, 4 of 4 cycles  Dose modification:   (original dose 603.5 mg, Cycle 1)  Administration: 605 mg (2/25/2021), 630 mg (3/22/2021), 750 mg (4/13/2021), 750 mg (5/4/2021)  cyclophosphamide 600 mg/m2 = 1,100 mg in sodium chloride 0.9% 100 mL chemo infusion, 600 mg/m2 = 1,100 mg (100 % of original dose 600 mg/m2), Intravenous, Clinic/[facility] 1 time, 3 of 4 cycles  Dose modification: 600 mg/m2 (original dose 600 mg/m2, Cycle 5), 600 mg/m2 (Cycle 8)  Administration: 1,100 mg (5/25/2021), 1,100 mg (6/15/2021), 1,100 mg (7/6/2021)  PACLitaxeL (TAXOL) 80 mg/m2 = 132 mg in sodium chloride 0.9% 250 mL chemo infusion, 80 mg/m2 = 132 mg (100 % of original dose 80 mg/m2), Intravenous, Clinic/[facility] 1 time, 4 of 4  cycles  Dose modification: 80 mg/m2 (original dose 80 mg/m2, Cycle 1)  Administration: 132 mg (2/25/2021), 132 mg (3/4/2021), 132 mg (3/11/2021), 138 mg (3/22/2021), 138 mg (3/30/2021), 138 mg (4/6/2021), 138 mg (4/13/2021), 138 mg (4/20/2021), 144 mg (4/27/2021), 144 mg (5/4/2021), 144 mg (5/11/2021), 144 mg (5/18/2021)  pembrolizumab (KEYTRUDA) 200 mg in sodium chloride 0.9% 100 mL chemo infusion, 200 mg, Intravenous, Clinic/H[facility] 1 time, 7 of 18 cycles  Administration: 200 mg (2/25/2021), 200 mg (5/25/2021), 200 mg (3/22/2021), 200 mg (4/13/2021), 200 mg (5/4/2021), 200 mg (6/15/2021), 200 mg (7/6/2021)     4/15/2021 - 4/15/2021 Chemotherapy    Treatment Summary   Plan Name: OP BREAST PEMBROLIZUMAB Q3W PACLITAXEL CARBOPLATIN WEEKLY FOLLOWED BY PEMBROLIZUMAB DOXORUBICIN CYCLOPHOSPHAMIDE Q3W   Treatment Goal: Curative  Status: Inactive  Start Date:   End Date:   Provider: Jhon Suazo MD  Chemotherapy: CARBOplatin (PARAPLATIN) in sodium chloride 0.9% 250 mL chemo infusion,  (original dose ), Intravenous, Clinic/[facility] 1 time, 0 of 3 cycles  Dose modification:   (Cycle 1)  cyclophosphamide in sodium chloride 0.9% chemo infusion, 600 mg/m2 = 965 mg (original dose ), Intravenous, Clinic/[facility] 1 time, 0 of 1 cycle  Dose modification: 600 mg/m2 (Cycle 2)  DOXOrubicin (ADRIAMYCIN) in sodium chloride 0.9% 250 mL chemo infusion, 60 mg/m2 (original dose ), Intravenous, Clinic/[facility] 1 time, 0 of 1 cycle  Dose modification: 60 mg/m2 (Cycle 2)  PACLitaxeL (TAXOL) in sodium chloride 0.9% 100 mL chemo infusion, 80 mg/m2 (original dose ), Intravenous, Clinic/[facility] 1 time, 0 of 1 cycle  Dose modification: 80 mg/m2 (Cycle 1)  PEMEtrexed (ALIMTA) in sodium chloride 0.9% chemo infusion, 500 mg/m2, Intravenous, Clinic/H[facility] 1 time, 0 of 33 cycles  pembrolizumab (KEYTRUDA) 200 mg in sodium chloride 0.9% 100 mL chemo infusion, 200 mg, Intravenous, Clinic/[facility] 1 time, 0 of 35 cycles     11/19/2021 - 1/4/2022 Radiation Therapy    Treating physician:  Maryann Perales  Total Dose: 50 Gy  Fractions: 25    DIAGNOSIS:  C50.412 - Malignant neoplasm of upper-outer quadrant of left female breast, Diagnosed 11/11/2021 (Active) Stage IIIB, T2, N1, M0, G3, HER2 Neg, ER Neg, [place] Neg    This is a 41 y.o. y/o female with cT2,c N1 and M0 G3, (Stage IIIB), triple negative, Ki-67 79%, LEFT upper outer quadrant breast, BRCA2+, status post lumpectomy 1/19/21, with residual larisa disease, followed by  adjuvant chemo-immunotherapy with carbo-taxol and pembrolizumab, followed by AC with pembrolizumab, followed by adjuvant pemprolizumab complicated by ongoing, chronic colitis/diarrhea necessitating long-term prednisone.  Underwent bilateral mastectomy 9/29/21 with no residual carcinoma identified in breast of 14 sampled nodes (ypN0). She has completed adjuvant LEFT breast and regional larisa radiation therapy to a dose of 50Gy.    Treatment Summary  Course: C1 BREAST 2021    Treatment Site Ref. ID Energy Dose/Fx (Gy) #Fx Dose Correction (Gy) Total Dose (Gy) Start Date End Date Elapsed Days   3D Sclav_L rxsc 18X/6X 2 25 / 25 0 50 11/19/2021 1/4/2022 46   3D Breast L Breast_L 18X/6X 2 25 / 25 0 50 11/19/2021 1/4/2022 46     Tolerated well.  2 day treatment break at patient's request for patch of dry desquamation, and 5 day treatment break due to CoVID infection.  Otherwise, patient completed her course of therapy as prescribed.       PLAN: RTC 1 month for routine follow up. Continue current skin care/sun protection for now.  Follow up with other providers as directed.         Objective:  Alison Swann arrived promptly for the session. The patient was fully cooperative throughout the session.  Appearance: age appropriate, casually  dressed, adequately  groomed  Behavior/Cooperation: friendly and cooperative  Speech: normal in rate, volume, and tone  Mood: anxious, depressed  Affect: anxious and tearful  Thought Process: goal-directed, logical  Thought Content: normal,  No delusions  or paranoia; did not appear to be responding to internal stimuli during the session  Orientation: grossly intact  Memory: grossly intact  Attention Span/Concentration: Attends to session without distraction; reports no difficulty  Fund of Knowledge: average  Estimate of Intelligence: average from verbal skills and history  Cognition: grossly intact  Insight: patient has awareness of illness; good insight into own behavior and behavior of others  Judgment: the patient's behavior is adequate to circumstances    [facility] Distress thermometer:       8/2/2023     6:58 AM 5/8/2023     2:03 PM 3/20/2023     9:57 AM 12/19/2022     8:53 AM 10/12/2022    10:59 AM 10/12/2022    10:56 AM 8/15/2022    11:31 AM   DISTRESS SCREENING   Distress Score 6 2 9 3 10 - Extreme Distress 0 - No Distress 0 - No Distress   Practical Problems None of these Work/School Insurance/Financial;Work/School Insurance/Financial Work/School None of these    Family Problems None of these Dealing with Children Family Health Issues None of these None of these None of these    Emotional Problems Sadness Worry Depression;Fears;Nervousness;Sadness;Worry;Loss of Interest Depression;Sadness;Worry Fears;Worry None of these    Spiritual / [employer] Concerns No No No No No No    Physical Problems Tingling in hands or feet Pain Appearance;Bathing/Dressing;Memory/Concentration None of these None of these None of these    Other Problems Anxiety- burning fingers              Interval history and content of current session: Mrs. Swann  Discussed treatment and current adaptation to disease and treatment status. Patient reports her revision surgery went well. She reports minimal pain. Shares she has been having a good week and completing tasks. She expressed concerns about her paperwork for SSDI. Feels overwhelmed with the number of doctors and appointments she has to tend to. Evaluated cognitive response, paying particular attention to negative intrusive thoughts of a  persistent and detrimental nature. Thoughts of this type are in evidence with severe distress. Provided supportive therapy to address practical concerns. Identified and evaluated psychosocial and environmental stressors secondary to diagnosis and treatment.  Examined proactive behaviors that may be implemented to minimize or ameliorate psychosocial stressors secondary to diagnosis and treatment.     Risk parameters:   Patient reports no suicidal ideation  Patient reports no homicidal ideation  Patient reports no self-injurious behavior  Patient reports no violent behavior   Safety needs:  None at this time      Verbal deficits: None     Patient's response to intervention:The patient's response to intervention is accepting.     Progress toward goals and other mental status changes:  The patient's progress toward goals is limited.      Progress to date:Revise Objectives (Goals)      Goals from last visit:  attempted        Patient Strengths: verbal, intelligent, successful, good social support, good insight, commitment to wellness      Treatment Plan:individual psychotherapy and consult psychiatrist for medication evaluation; referral placed for medication management. Will follow up with  for short term disability paperwork.  Target symptoms: depression, mood swings, adjustment  Why chosen therapy is appropriate versus another modality: relevant to diagnosis, patient responds to this modality, evidence based practice  Outcome monitoring methods: self-report  Therapeutic intervention type: behavior modifying psychotherapy, supportive psychotherapy  Prognosis: Fair      Behavioral goals:    Exercise: continue as medically advised   Stress management: creating a system to organize day to day; utilize support system   Therapy: continue self-care     Return to clinic: as scheduled     Length of Service (minutes direct face-to-face contact):  12 mins face to face, 20mins of clinical work    Diagnosis:     ICD-10-CM  ICD-9-CM   1. KIANA (generalized anxiety disorder)  F41.1 300.02                     Julisa Dias PsyD

## 2023-09-15 ENCOUNTER — PATIENT MESSAGE (OUTPATIENT)
Dept: PSYCHIATRY | Facility: CLINIC | Age: 43
End: 2023-09-15
Payer: COMMERCIAL

## 2023-09-18 ENCOUNTER — PATIENT MESSAGE (OUTPATIENT)
Dept: HEMATOLOGY/ONCOLOGY | Facility: CLINIC | Age: 43
End: 2023-09-18
Payer: COMMERCIAL

## 2023-09-19 ENCOUNTER — PATIENT MESSAGE (OUTPATIENT)
Dept: NEUROLOGY | Facility: CLINIC | Age: 43
End: 2023-09-19
Payer: COMMERCIAL

## 2023-09-19 ENCOUNTER — PATIENT MESSAGE (OUTPATIENT)
Dept: PSYCHIATRY | Facility: CLINIC | Age: 43
End: 2023-09-19
Payer: COMMERCIAL

## 2023-09-19 NOTE — TELEPHONE ENCOUNTER
Lana Cevallos, I just wanted to make sure this pt was still on your list to call to schedule.    18-May-2019 13:34

## 2023-09-20 ENCOUNTER — PATIENT MESSAGE (OUTPATIENT)
Dept: PSYCHIATRY | Facility: CLINIC | Age: 43
End: 2023-09-20
Payer: COMMERCIAL

## 2023-09-20 ENCOUNTER — OFFICE VISIT (OUTPATIENT)
Dept: PSYCHIATRY | Facility: CLINIC | Age: 43
End: 2023-09-20
Payer: COMMERCIAL

## 2023-09-20 DIAGNOSIS — F32.A DEPRESSION, UNSPECIFIED DEPRESSION TYPE: ICD-10-CM

## 2023-09-20 DIAGNOSIS — F41.1 GAD (GENERALIZED ANXIETY DISORDER): Primary | ICD-10-CM

## 2023-09-20 DIAGNOSIS — G47.00 INSOMNIA, UNSPECIFIED TYPE: ICD-10-CM

## 2023-09-20 PROCEDURE — 90834 PSYTX W PT 45 MINUTES: CPT | Mod: 95,,,

## 2023-09-20 PROCEDURE — 90834 PR PSYCHOTHERAPY W/PATIENT, 45 MIN: ICD-10-PCS | Mod: 95,,,

## 2023-09-20 NOTE — PROGRESS NOTES
The patient location is:  [place], [place]  The patient location [place] is: [place]  The patient phone number is: [phone]  Visit type: Virtual visit with synchronous audio and video  Each patient to whom he or she provides medical services by telemedicine is:  (1) informed of the relationship between the provider and patient and the respective role of any other health care provider with respect to management of the patient; and (2) notified that he or she may decline to receive medical services by telemedicine and may withdraw from such care at any time.    Crisis Disclaimer: Patient was informed that due to the virtual nature of the visit, that if a crisis develops, protocols will be implemented to ensure patient safety, including but not limited to: 1) Initiating a welfare check with local Law Enforcement, 2) Calling [facility]/National Crisis Hotline, and/or 3) Initiating PEC/CEC procedures.    Time (Face-to-face): 35 minutes    Time (Non-face-to-face): 15 minutes     PSYCHO-ONCOLOGY NOTE/ Individual Psychotherapy     Date: 9/20/2023   Site:  [place], [place]      Therapeutic Intervention: Met with patient.  Outpatient - Supportive psychotherapy 45 min - CPT Code 34372    This includes face to face time and non-face to face time preparing to see the patient, obtaining and/or reviewing separately obtained history, documenting clinical information in the electronic or other health record, independently interpreting results and communicating results to the patient/family/caregiver, or care coordinator.      Patient was last seen by me on 9/13/2023    Problem list  Patient Active Problem List   Diagnosis    Ductal carcinoma in situ of left breast    Malignant neoplasm of upper-outer quadrant of left breast in female, estrogen receptor negative    Decreased functional mobility    Dehydration    Anemia associated with chemotherapy    Nausea and vomiting    BRCA2 gene mutation positive    Scoliosis    Attention Deficit  Hyperactivity Disorder    Therapeutic Drug Monitoring    Postoperative Nausea And Vomiting    Macrocytic Anemia    Generalized Anxiety    Family H/O Diabetes Mellitus    Fatigue    Hyponatremia    Elevated troponin    Diarrhea    Intravascular volume depletion    Hypomagnesemia    Hypokalemia    Anterior T wave inversion    Elevated LFTs    Colitis, indeterminate    Encounter for immunotherapy    Diarrhea due to drug    Pneumonitis    s/p RA-TLH/BSO    JA (acute kidney injury)    Adrenal crisis    Sepsis    Chest pain    Adrenal insufficiency    Axillary web syndrome    Depressed    Constipation    Memory loss       Chief complaint/reason for encounter: depression, anxiety, and cognition   Met with patient to evaluate psychosocial adaptation to survivorship of BREAST CANCER    Current Medications  Current Outpatient Medications   Medication    dextroamphetamine-amphetamine (ADDERALL) 20 mg tablet    estradioL (IMVEXXY MAINTENANCE PACK) 10 mcg Inst    estradioL (VAGIFEM) 10 mcg Tab    hydrocortisone (CORTEF) 10 MG Tab    hydrocortisone (CORTEF) 5 MG Tab    LIDOCAINE VISCOUS 2 % solution    ondansetron (ZOFRAN ODT ORAL)    ondansetron (ZOFRAN-ODT) 8 MG TbDL    traZODone (DESYREL) 50 MG tablet    traZODone (DESYREL) 50 MG tablet    valACYclovir (VALTREX) 500 MG tablet    valACYclovir (VALTREX) 500 MG tablet     No current facility-administered medications for this visit.     Facility-Administered Medications Ordered in Other Visits   Medication Frequency    lactated ringers infusion Continuous    sodium chloride 0.9% flush 10 mL PRN       ONCOLOGY HISTORY  Oncology History   Malignant neoplasm of upper-outer quadrant of left breast in female, estrogen receptor negative   1/19/2021 Cancer Staged    Staging form: Breast, AJCC 8th Edition  - Clinical stage from 1/19/2021: Stage IIIB (cT2, cN1, cM0, G3, ER-, IN-, HER2-)     1/28/2021 Initial Diagnosis    Malignant neoplasm of upper-outer quadrant of left breast in female,  estrogen receptor negative     2/18/2021 - 2/18/2021 Chemotherapy    Treatment Summary   Plan Name: OP BREAST DOSE-DENSE AC - DOXORUBICIN CYCLOPHOSPHAMIDE Q2W  Treatment Goal: Curative  Status: Inactive  Start Date:   End Date:   Provider: Jhon Suazo MD  Chemotherapy: DOXOrubicin chemo injection 96 mg, 60 mg/m2, Intravenous, Clinic/[facility] 1 time, 0 of 4 cycles  cyclophosphamide (CYTOXAN) 600 mg/m2 = 965 mg in sodium chloride 0.9% 250 mL chemo infusion, 600 mg/m2, Intravenous, Clinic/[facility] 1 time, 0 of 4 cycles     2/25/2021 - 7/6/2021 Chemotherapy    Treatment Summary   Plan Name: OP BREAST PACLITAXEL WEEKLY + CARBOPLATIN (AUC 5) Q3W FOLLOWED BY AC WITH PEMBROLIZUMAB FOLLOWED BY PEMBROLIZUMAB   Treatment Goal: Curative  Status: Inactive  Start Date: 2/25/2021  End Date: 7/6/2021  Provider: Sandra Sotelo MD  Chemotherapy: DOXOrubicin chemo injection 112 mg, 60 mg/m2 = 112 mg (100 % of original dose 60 mg/m2), Intravenous, Once, 3 of 4 cycles  Dose modification: 60 mg/m2 (original dose 60 mg/m2, Cycle 5)  Administration: 112 mg (5/25/2021), 112 mg (6/15/2021), 110 mg (7/6/2021)  CARBOplatin (PARAPLATIN) 605 mg in sodium chloride 0.9% 250 mL chemo infusion, 605 mg (100 % of original dose 603.5 mg), Intravenous, Clinic/[facility] 1 time, 4 of 4 cycles  Dose modification:   (original dose 603.5 mg, Cycle 1)  Administration: 605 mg (2/25/2021), 630 mg (3/22/2021), 750 mg (4/13/2021), 750 mg (5/4/2021)  cyclophosphamide 600 mg/m2 = 1,100 mg in sodium chloride 0.9% 100 mL chemo infusion, 600 mg/m2 = 1,100 mg (100 % of original dose 600 mg/m2), Intravenous, Clinic/[facility] 1 time, 3 of 4 cycles  Dose modification: 600 mg/m2 (original dose 600 mg/m2, Cycle 5), 600 mg/m2 (Cycle 8)  Administration: 1,100 mg (5/25/2021), 1,100 mg (6/15/2021), 1,100 mg (7/6/2021)  PACLitaxeL (TAXOL) 80 mg/m2 = 132 mg in sodium chloride 0.9% 250 mL chemo infusion, 80 mg/m2 = 132 mg (100 % of original dose 80 mg/m2), Intravenous, Clinic/[facility] 1 time, 4 of 4  cycles  Dose modification: 80 mg/m2 (original dose 80 mg/m2, Cycle 1)  Administration: 132 mg (2/25/2021), 132 mg (3/4/2021), 132 mg (3/11/2021), 138 mg (3/22/2021), 138 mg (3/30/2021), 138 mg (4/6/2021), 138 mg (4/13/2021), 138 mg (4/20/2021), 144 mg (4/27/2021), 144 mg (5/4/2021), 144 mg (5/11/2021), 144 mg (5/18/2021)  pembrolizumab (KEYTRUDA) 200 mg in sodium chloride 0.9% 100 mL chemo infusion, 200 mg, Intravenous, Clinic/[facility] 1 time, 7 of 18 cycles  Administration: 200 mg (2/25/2021), 200 mg (5/25/2021), 200 mg (3/22/2021), 200 mg (4/13/2021), 200 mg (5/4/2021), 200 mg (6/15/2021), 200 mg (7/6/2021)     4/15/2021 - 4/15/2021 Chemotherapy    Treatment Summary   Plan Name: OP BREAST PEMBROLIZUMAB Q3W PACLITAXEL CARBOPLATIN WEEKLY FOLLOWED BY PEMBROLIZUMAB DOXORUBICIN CYCLOPHOSPHAMIDE Q3W   Treatment Goal: Curative  Status: Inactive  Start Date:   End Date:   Provider: Jhon Suazo MD  Chemotherapy: CARBOplatin (PARAPLATIN) in sodium chloride 0.9% 250 mL chemo infusion,  (original dose ), Intravenous, Clinic/[facility] 1 time, 0 of 3 cycles  Dose modification:   (Cycle 1)  cyclophosphamide in sodium chloride 0.9% chemo infusion, 600 mg/m2 = 965 mg (original dose ), Intravenous, Clinic/[facility] 1 time, 0 of 1 cycle  Dose modification: 600 mg/m2 (Cycle 2)  DOXOrubicin (ADRIAMYCIN) in sodium chloride 0.9% 250 mL chemo infusion, 60 mg/m2 (original dose ), Intravenous, Clinic/[facility] 1 time, 0 of 1 cycle  Dose modification: 60 mg/m2 (Cycle 2)  PACLitaxeL (TAXOL) in sodium chloride 0.9% 100 mL chemo infusion, 80 mg/m2 (original dose ), Intravenous, Clinic/[facility] 1 time, 0 of 1 cycle  Dose modification: 80 mg/m2 (Cycle 1)  PEMEtrexed (ALIMTA) in sodium chloride 0.9% chemo infusion, 500 mg/m2, Intravenous, Clinic/H[facility] 1 time, 0 of 33 cycles  pembrolizumab (KEYTRUDA) 200 mg in sodium chloride 0.9% 100 mL chemo infusion, 200 mg, Intravenous, Clinic/[facility] 1 time, 0 of 35 cycles     11/19/2021 - 1/4/2022 Radiation Therapy    Treating physician:  Maryann Perales  Total Dose: 50 Gy  Fractions: 25    DIAGNOSIS:  C50.412 - Malignant neoplasm of upper-outer quadrant of left female breast, Diagnosed 11/11/2021 (Active) Stage IIIB, T2, N1, M0, G3, HER2 Neg, ER Neg, MN Neg    This is a 41 y.o. y/o female with cT2,c N1 and M0 G3, (Stage IIIB), triple negative, Ki-67 79%, LEFT upper outer quadrant breast, BRCA2+, status post lumpectomy 1/19/21, with residual larisa disease, followed by  adjuvant chemo-immunotherapy with carbo-taxol and pembrolizumab, followed by AC with pembrolizumab, followed by adjuvant pemprolizumab complicated by ongoing, chronic colitis/diarrhea necessitating long-term prednisone.  Underwent bilateral mastectomy 9/29/21 with no residual carcinoma identified in breast of 14 sampled nodes (ypN0). She has completed adjuvant LEFT breast and regional larisa radiation therapy to a dose of 50Gy.    Treatment Summary  Course: C1 BREAST 2021    Treatment Site Ref. ID Energy Dose/Fx (Gy) #Fx Dose Correction (Gy) Total Dose (Gy) Start Date End Date Elapsed Days   3D Sclav_L rxsc 18X/6X 2 25 / 25 0 50 11/19/2021 1/4/2022 46   3D Breast L Breast_L 18X/6X 2 25 / 25 0 50 11/19/2021 1/4/2022 46     Tolerated well.  2 day treatment break at patient's request for patch of dry desquamation, and 5 day treatment break due to CoVID infection.  Otherwise, patient completed her course of therapy as prescribed.       PLAN: RTC 1 month for routine follow up. Continue current skin care/sun protection for now.  Follow up with other providers as directed.         Objective:  Alison Swann arrived promptly for the session. The patient was fully cooperative throughout the session.  Appearance: age appropriate, casually  dressed, adequately  groomed  Behavior/Cooperation: friendly and cooperative  Speech: normal in rate, volume, and tone  Mood: anxious  Affect: appropriate, full range  Thought Process: goal-directed, logical  Thought Content: normal,  No delusions or  paranoia; did not appear to be responding to internal stimuli during the session  Orientation: grossly intact  Memory: grossly intact  Attention Span/Concentration: Attends to session without distraction; reports no difficulty  Fund of Knowledge: average  Estimate of Intelligence: average from verbal skills and history  Cognition: grossly intact  Insight: patient has awareness of illness; good insight into own behavior and behavior of others  Judgment: the patient's behavior is adequate to circumstances    [facility]:       8/2/2023     6:58 AM 5/8/2023     2:03 PM 3/20/2023     9:57 AM 12/19/2022     8:53 AM 10/12/2022    10:59 AM 10/12/2022    10:56 AM 8/15/2022    11:31 AM   DISTRESS SCREENING   Distress Score 6 2 9 3 10 - Extreme Distress 0 - No Distress 0 - No Distress   Practical Problems None of these Work/School Insurance/Financial;Work/School Insurance/Financial Work/School None of these    Family Problems None of these Dealing with Children Family Health Issues None of these None of these None of these    Emotional Problems Sadness Worry Depression;Fears;Nervousness;Sadness;Worry;Loss of Interest Depression;Sadness;Worry Fears;Worry None of these    Spiritual / Synagogue Concerns No No No No No No    Physical Problems Tingling in hands or feet Pain Appearance;Bathing/Dressing;Memory/Concentration None of these None of these None of these    Other Problems Anxiety- burning fingers              Interval history and content of current session: Mrs. Swann  Discussed treatment and current adaptation to disease and treatment status. She continues to express concerns about her paperwork for SSDI. Notes she is unable to fully rehabilitate and heal without completing recommended treatments. Evaluated cognitive response, paying particular attention to negative intrusive thoughts of a persistent and detrimental nature. Thoughts of this type are in evidence with severe distress. Provided supportive  therapy to address practical concerns. Identified and evaluated psychosocial and environmental stressors secondary to diagnosis and treatment.  Examined proactive behaviors that may be implemented to minimize or ameliorate psychosocial stressors secondary to diagnosis and treatment.     Risk parameters:   Patient reports no suicidal ideation  Patient reports no homicidal ideation  Patient reports no self-injurious behavior  Patient reports no violent behavior   Safety needs:  None at this time      Verbal deficits: None     Patient's response to intervention:The patient's response to intervention is accepting.     Progress toward goals and other mental status changes:  The patient's progress toward goals is limited.      Progress to date:Revise Objectives (Goals)      Goals from last visit:  attempted        Patient Strengths: verbal, intelligent, successful, good social support, good insight, commitment to wellness      Treatment Plan:individual psychotherapy and consult psychiatrist for medication evaluation; referral placed for medication management.     Target symptoms: depression, mood swings, adjustment  Why chosen therapy is appropriate versus another modality: relevant to diagnosis, patient responds to this modality, evidence based practice  Outcome monitoring methods: self-report  Therapeutic intervention type: behavior modifying psychotherapy, supportive psychotherapy  Prognosis: Fair      Behavioral goals:    Exercise: continue as medically advised   Stress management: creating a system to organize day to day; utilize support system   Therapy: continue self-care     Return to clinic: as scheduled     Length of Service (minutes direct face-to-face contact):  35 mins face to face, 15mins of clinical work    Diagnosis:     ICD-10-CM ICD-9-CM   1. KIANA (generalized anxiety disorder)  F41.1 300.02   2. Depression, unspecified depression type  F32.A 311   3. Insomnia, unspecified type  G47.00 780.52                Julisa Dias PsyD

## 2023-09-21 ENCOUNTER — PATIENT MESSAGE (OUTPATIENT)
Dept: PHYSICAL MEDICINE AND REHAB | Facility: CLINIC | Age: 43
End: 2023-09-21
Payer: COMMERCIAL

## 2023-09-21 ENCOUNTER — OFFICE VISIT (OUTPATIENT)
Dept: HEMATOLOGY/ONCOLOGY | Facility: CLINIC | Age: 43
End: 2023-09-21
Payer: COMMERCIAL

## 2023-09-21 DIAGNOSIS — Z17.1 MALIGNANT NEOPLASM OF UPPER-OUTER QUADRANT OF LEFT BREAST IN FEMALE, ESTROGEN RECEPTOR NEGATIVE: Primary | ICD-10-CM

## 2023-09-21 DIAGNOSIS — Z15.09 BRCA2 GENE MUTATION POSITIVE: Chronic | ICD-10-CM

## 2023-09-21 DIAGNOSIS — F41.1 ANXIETY, GENERALIZED: ICD-10-CM

## 2023-09-21 DIAGNOSIS — R93.89 ABNORMAL MRI: ICD-10-CM

## 2023-09-21 DIAGNOSIS — C50.412 MALIGNANT NEOPLASM OF UPPER-OUTER QUADRANT OF LEFT BREAST IN FEMALE, ESTROGEN RECEPTOR NEGATIVE: Primary | ICD-10-CM

## 2023-09-21 DIAGNOSIS — F90.0 ADHD (ATTENTION DEFICIT HYPERACTIVITY DISORDER), INATTENTIVE TYPE: ICD-10-CM

## 2023-09-21 DIAGNOSIS — E27.40 ADRENAL INSUFFICIENCY: ICD-10-CM

## 2023-09-21 DIAGNOSIS — Z15.01 BRCA2 GENE MUTATION POSITIVE: Chronic | ICD-10-CM

## 2023-09-21 DIAGNOSIS — Z90.710 S/P LAPAROSCOPIC HYSTERECTOMY: ICD-10-CM

## 2023-09-21 PROBLEM — D05.12 DUCTAL CARCINOMA IN SITU OF LEFT BREAST: Status: RESOLVED | Noted: 2021-01-19 | Resolved: 2023-09-21

## 2023-09-21 PROCEDURE — 1160F RVW MEDS BY RX/DR IN RCRD: CPT | Mod: CPTII,95,, | Performed by: INTERNAL MEDICINE

## 2023-09-21 PROCEDURE — 1159F PR MEDICATION LIST DOCUMENTED IN MEDICAL RECORD: ICD-10-PCS | Mod: CPTII,95,, | Performed by: INTERNAL MEDICINE

## 2023-09-21 PROCEDURE — 99213 PR OFFICE/OUTPT VISIT, EST, LEVL III, 20-29 MIN: ICD-10-PCS | Mod: 95,,, | Performed by: INTERNAL MEDICINE

## 2023-09-21 PROCEDURE — 99213 OFFICE O/P EST LOW 20 MIN: CPT | Mod: 95,,, | Performed by: INTERNAL MEDICINE

## 2023-09-21 PROCEDURE — 1159F MED LIST DOCD IN RCRD: CPT | Mod: CPTII,95,, | Performed by: INTERNAL MEDICINE

## 2023-09-21 PROCEDURE — 1160F PR REVIEW ALL MEDS BY PRESCRIBER/CLIN PHARMACIST DOCUMENTED: ICD-10-PCS | Mod: CPTII,95,, | Performed by: INTERNAL MEDICINE

## 2023-09-21 NOTE — PROGRESS NOTES
"Subjective     Patient ID: Alison Swann is a 43 y.o. female.    Chief Complaint: No chief complaint on file.    HPI    The patient location is: home  The chief complaint leading to consultation is: follow up  Visit type: audiovisual  Face to Face time with patient: 15 minutes  20 minutes of total time spent on the encounter, which includes face to face time and non-face to face time preparing to see the patient (eg, review of tests), Obtaining and/or reviewing separately obtained history, Documenting clinical information in the electronic or other health record, Independently interpreting results (not separately reported) and communicating results to the patient/family/caregiver, or Care coordination (not separately reported).   Each patient to whom he or she provides medical services by telemedicine is:  (1) informed of the relationship between the physician and patient and the respective role of any other health care provider with respect to management of the patient; and (2) notified that he or she may decline to receive medical services by telemedicine and may withdraw from such care at any time.    Notes:     Recovering from surgery well  Healing well    Denies pain  Reports resting well with recovery- notes fatigue but attributes to being "bummed out"  She has short term disability for the month after surgery- challenged getting beyond this for all the other treatment complications  Social work      She has noted neuropathy  She was scheduled for a nerve conduction study for both hands and then is to follow up with orthopedics post- question of late onset neuropathy versus carpal tunnel    She did see neurology for memory issues  She was scheduled for a MRI (see below) and EEG is pending still as she has not yet scheduled and has plans for a comprehensive cognitive evaluation  She was unable to do blood work yet    - 8/28/2023 Brain MRI  FINDINGS:  Intracranial compartment:  There is artifact emanating " from the face, possibly related to braces or other dental hardware.  Also, there is some pulsation artifact in the posterior fossa.  There is, however, subtle abnormal FLAIR and T2 hyperintense signal in the lateral right cerebellum.  Although there is artifact, I am suspicious for very subtle leptomeningeal enhancement in the cerebellum.  There are no regions of restricted diffusion to suggest acute infarction.  There is no intracranial hemorrhage.  There is no mass. There is no hydrocephalus or midline shift.  There is no abnormal extra-axial fluid collection.  The basilar cisterns are open.  Flow voids indicating patency are present in the major vessels at the base of the brain.  The cerebellar tonsils remain in normal position.  Sellar structures are grossly normal.  Skull/extracranial contents:  Baseline marrow signal is normal.  There is mucosal thickening in several left ethmoid air cells.  There is no fluid in the paranasal sinuses but the maxillary sinuses are suboptimally evaluated due to artifact from the face.  Impression:  1. Somewhat limited evaluation as discussed above related to artifact emanating from the patient's face, possibly braces.  Also, there is some pulsation artifact through the posterior fossa.  There is, however, subtle abnormal signal in the lateral right cerebellum and suspected minimal leptomeningeal enhancement in the posterior fossa.  Findings may reflect early leptomeningeal disease/carcinomatosis.  CSF analysis may be warranted.  Alternatively, short-term follow-up MRI with out and with contrast may obtained is well to further evaluate suspected findings in the posterior fossa.  This report was flagged in Epic as abnormal.  We reviewed and discussed with [facility]iology at [facility] [place] as well- repeat MRI recommended  Patient desired to push back and is requesting a reschedule to latter October    In regards to adrenal insufficiency:  Dr. Mcconnell's last note assessment below: she states the  "cortef adjustments went well-- reports she did great  Adrenal Insufficiency- appears secondary. Has had a normal MRI pituitary. Possibly chemo induce. No chemo since July 2021.  Her symptoms appear stable.  Would continue current dose of hydrocortisone.  Scheduled for her last breast surgery. Gave steroid recommendations to do surgery team.  Advise letting us know if has any adrenal insufficiency symptoms post surgery.  Has not had to take any Zofran for her nausea.       Oncology History:  - 12/2019 thermography of breast revealed  lymphatic changes in the left axilla.  - 4/2020 self detected a palpable left breast mass  - 6/6/2020 Diagnostic mammogram  Impression:  Suspicious grouping of microcalcifications in the upper-outer left breast corresponding to area of palpable concern.  Biopsy is warranted.Questionable distortion of the breast architecture in the periareolar region of the right breast near the 3 o'clock position without sonographic correlate.  Short-term mammographic follow-up of the right breast at 6 month interval is recommended.  The above findings and recommendations were discussed with the patient at the time of the examination.  BI-RADS CATEGORY 4: SUSPICIOUS ABNORMALITY-BIOPSY SHOULD BE CONSIDERED  - 6/23/2020 imaging guided biopsy  Pathology: [facility] (The other calcifications were apparently not sampled)  - 1/19/2021 excision biopsy with Dr. Johnston  Pathology:  1.  BREAST, LEFT, FURTHER DESIGNATED "MASS," EXCISION:   --INVASIVE CARCINOMA OF NO SPECIAL TYPE (DUCTAL, NOT OTHERWISE    SPECIFIED), SPENSER HISTOLOGIC           GRADE 3 OUT OF 3.        --TUMOR MEASURES 42 MILLIMETERS IN MAXIMUM DIMENSION.        --RECEPTOR STUDIES ARE PENDING.   --SPECIMEN ANTERIOR MARGIN IS POSITIVE FOR INVASIVE CARCINOMA; ALL    REMAINING SPECIMEN MARGINS ARE           AT LEAST 2 MILLIMETERS AWAY.   --DUCTAL CARCINOMA IN SITU, HIGH NUCLEAR GRADE, COMPRISING LESS THAN 5%    OF TOTAL TUMOR TISSUE.        --ALL " "SPECIMEN MARGINS ARE NEGATIVE FOR DUCTAL CARCINOMA IN SITU.        --EXTENSIVE LYMPHOVASCULAR INVASION PRESENT.   --FIBROCYSTIC CHANGES INCLUDING STROMAL FIBROSIS, CYSTIC DILATATION OF    DUCTS, AND APOCRINE           METAPLASIA.        --COLUMNAR CELL CHANGE.   --MICROCALCIFICATIONS ASSOCIATED WITH TUMOR AND WITH BENIGN DUCTAL    PROFILES.   2.  BREAST, LEFT, FURTHER DESIGNATED "MEDIAL MARGIN," EXCISION:        --NO MORPHOLOGIC EVIDENCE OF MALIGNANCY.   --FIBROCYSTIC CHANGES INCLUDING STROMAL FIBROSIS, CYSTIC DILATATION OF    DUCTS, APOCRINE METAPLASIA,           AND USUAL DUCTAL EPITHELIAL HYPERPLASIA.        --COLUMNAR CELL CHANGE.        --FOCAL MICROCALCIFICATIONS ASSOCIATED WITH BENIGN DUCTAL PROFILES.   3.  LYMPH NODE, LEFT SENTINEL, NEEDLE CORE BIOPSY:   --METASTATIC CARCINOMA OF NO SPECIAL TYPE (DUCTAL, NOT OTHERWISE    SPECIFIED).   ER negative, NM negative, Yly8xep negative  Ki-67 79%     - 1/27/2021 Breast MRI:  Findings: The breasts have heterogeneous fibroglandular tissue. The background parenchymal enhancement is minimal.   Left  There are 6 similar 29 mm x 23 mm lymph nodes seen in the left axilla.   There is an 8 mm x 7 mm x 6 mm homogeneous, non-mass enhancement in a focal distribution seen in the left breast at 12 o'clock in the middle depth, 5.5 cm from the nipple. Delayed phase is persistent. This is immediately anterior to the pectoralis major muscle, at anterior aspect of implant.   There is a 24 mm x 11 mm x 9 mm clumped, non-mass enhancement in a focal distribution seen in the outer central region of the left breast in the posterior depth. This is along the margins of the resection cavity at is posterior aspect.   Right  There is no evidence of suspicious masses, abnormal enhancement, or other abnormal findings in the right breast.  Impression:  Left  Non-mass Enhancement: Left breast 8 mm x 7 mm x 6 mm non-mass enhancement at the middle 12 o'clock position. Assessment: 4 - Suspicious " finding. Biopsy is recommended.  Second look ultrasound followed by ultrasound or MRI-guided biopsy could be performed if it would .  Of note, MRI guided biopsy would carry risk of implant rupture. Non-mass Enhancement: Left breast 24 mm x 11 mm x 9 mm non-mass enhancement at the posterior aspect of the excision cavity in the lateral breast. Assessment: 4 - Suspicious finding.  This is suspicious for residual disease in this patient with history of recent excisional biopsy.   The area is likely not amenable to MRI guided biopsy.  Surgical consultation recommended. Left axillary adenopathy.  At least 6 abnormal appearing level I axillary nodes are present, suspicious for residual lymph node metastasis.  RightThere is no MR evidence of malignancy in the right breast.  BI-RADS Category:   Overall: 4 - Suspicious  Recommendation:  Surgical consultation recommended.  Left breast biopsy could be performed of focal NME at 12 o'clock in the left breast if clinically indicated.     - 2/2/2021 PET scan:  COMPARISON:  Breast MRI 01/27/2021  FINDINGS:  Quality of the study: Adequate.  In the head and neck, there are no hypermetabolic lesions worrisome for malignancy. There are no hypermetabolic mucosal lesions, and there are no pathologically enlarged or hypermetabolic lymph nodes.  In the chest, there is no definite hypermetabolic breast mass.  There are bilateral breast implants in place.  There is relatively mild diffuse uptake within dense fibroglandular tissue, and within the left breast there is a maximum SUV of 1.9 adjacent to biopsy clips on image 68.  There is also mildly hypermetabolic subcutaneous fat stranding elsewhere in the lateral aspect of the left breast on image 77 likely postprocedural in nature. There are at least half a dozen left level 1 axillary lymph nodes the largest of which are hypermetabolic including a 3 x 2.1 cm node on image 50 with an SUV of 8.6, a 1 cm node on image 55 with an  SUV of 4.7, and 2.3 x 1.2 cm node on image 61 with an SUV of 9.2.  There is also a non hypermetabolic 9 x 6 mm right level 2 lymph node.  There are no suspicious lymph nodes in the internal mammary chain.  There are no pulmonary nodules, and there are no pleural or pericardial effusions.  In the abdomen and pelvis, there is physiologic tracer distribution within the abdominal organs and excretion into the genitourinary system, including diffusely within the right ureter and focally within the left.In the bones, there are no hypermetabolic lesions worrisome for malignancy.  Impression:  1.  No discrete left breast mass identified.  2.  Metastatic left level 1 axillary lymph nodes.  Level 2 larisa metastasis is not excluded.  3.  No evidence of distant metastasis.     - 2/9/2021 ECHO:  The left ventricle is normal in size with normal systolic function. The estimated ejection fraction is 58%  Regimen:  Paclitaxel weekly + carboplatin with Pembrolizumab q 3 weeks followed by AC with Pembrolizumab q 3 weeks followed by Pembroluzimab.(Based on interim analysis of the phase III KEYNOTE-522 the addition of pembrolizumab to neoadjuvant chemotherapy and use of adjuvant pembrolizumab x 9 cycles resulted in improvements in pathologic complete response rate and event-free survival in women with early triple-negative breast cancer.)     BRCA2+     5/3/2021 Breast MRI follow up on treatment in the interval (results below)  Findings:  There are bilateral retropectoral silicone implants. There is a right sided port.  The breasts have extreme amounts of fibroglandular tissue. The background parenchymal enhancement is mild.  There are postsurgical changes in the left upper outer breast and axilla.  There has been significant interval decrease in size of the previously seen abnormal left axillary lymph nodes, now with the largest lymph node measuring 14 x 15 x 10 mm (previously measuring up to 29 mm on the 1/27/21 MRI).  The other left  axillary lymph nodes are smaller with mildly irregular shaggy margins, consistent with chemotherapy treatment.   No suspicious enhancement is seen in either breast. The previously seen left 12:00 mid depth focal non mass enhancement anterior to the pectoralis is not seen on the current exam.   No abnormal right axillary lymph nodes or internal mammary lymph nodes are seen.  Impression:  There has been significant interval decrease in size of the previously seen abnormal left axillary lymph nodes, now with the largest lymph node measuring 14 x 15 x 10 mm (previously measuring up to 29 mm on the 21 MRI).  The other left axillary lymph nodes are smaller with mildly irregular shaggy margins, consistent with chemotherapy treatment.   No suspicious enhancement is seen in either breast.   BI-RADS Category:   Overall: 6 - Known Biopsy-Proven Malignancy     -   Completed surgery with complete pathologic response     - completed XRT     Gyn Hx:  Menarche- 10  , age at 1st live birth 22  OCPs x 4 years  Endometrial ablation      FH:  Mother - diagnosed at age 50 with breast cancer  Mastectomy- bilateral  No chemotherapy or radiation   Remains on Tamoxifen  Treated in [place]  ? 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    Respiratory:  Negative for cough, shortness of breath and wheezing.    Cardiovascular:  Negative for chest pain, palpitations and leg swelling.   Gastrointestinal:  Negative for abdominal distention, abdominal pain, constipation, diarrhea, nausea and vomiting.   Genitourinary:  Negative for difficulty urinating, dysuria and frequency.   Musculoskeletal:  Negative for arthralgias, back pain, joint swelling  and joint deformity.   Integumentary:  Negative for rash and breast mass.   Neurological:  Positive for numbness. Negative for dizziness, weakness, light-headedness and headaches.   Psychiatric/Behavioral:  Positive for decreased concentration (memory issues).    Breast: Negative for mass         Objective     Physical Exam  Vitals and nursing note reviewed.   Constitutional:       Appearance: Normal appearance. She is normal weight.      Comments: Virtual visit  Very pleasant   Neurological:      Mental Status: She is alert.   Psychiatric:         Mood and Affect: Mood normal.         Behavior: Behavior normal.         Thought Content: Thought content normal.         Judgment: Judgment normal.     Labs- reviewed     Assessment and Plan     1. Malignant neoplasm of upper-outer quadrant of left breast in female, estrogen receptor negative    2. BRCA2 gene mutation positive    3. s/p RA-TLH/BSO    4. Adrenal insufficiency    5. Attention Deficit Hyperactivity Disorder    6. Generalized Anxiety    7. Abnormal MRI        BRCA + TNBC  Continue surveillance  Appropriate prophylactic surgeries completed  Continue annual dermatology exam    Adrenal insufficiency- on replacement  This occurred secondary to immunotherapy  Follows with endocrinology and reports doing well     Anxiety- managed    Abnormal MRI- see above  Move 10/2 MRI to later per patient request  Follow up post    SW consult re; disability options    Route Chart for Scheduling    Med Onc Chart Routing  Urgent    Follow up with physician .  MRI Brain back to later October; virtual post   Follow up with [facility]    Infusion scheduling note    Injection scheduling note    Labs    Imaging    Pharmacy appointment    Other referrals                Therapy Plan Information  PORT FLUSH  Flushes  heparin, porcine (PF) 100 unit/mL injection flush 500 Units  500 Units, Intravenous, Every visit  sodium chloride 0.9% flush 10 mL  10 mL, Intravenous, Every visit

## 2023-09-23 DIAGNOSIS — E27.49 SECONDARY ADRENAL INSUFFICIENCY: Primary | ICD-10-CM

## 2023-09-25 ENCOUNTER — PATIENT MESSAGE (OUTPATIENT)
Dept: FAMILY MEDICINE | Facility: CLINIC | Age: 43
End: 2023-09-25
Payer: COMMERCIAL

## 2023-09-25 DIAGNOSIS — F90.0 ADHD (ATTENTION DEFICIT HYPERACTIVITY DISORDER), INATTENTIVE TYPE: ICD-10-CM

## 2023-09-25 RX ORDER — HYDROCORTISONE 10 MG/1
TABLET ORAL
Qty: 120 TABLET | Refills: 11 | Status: SHIPPED | OUTPATIENT
Start: 2023-09-25 | End: 2023-10-23 | Stop reason: SDUPTHER

## 2023-09-26 ENCOUNTER — TELEPHONE (OUTPATIENT)
Dept: PSYCHIATRY | Facility: CLINIC | Age: 43
End: 2023-09-26
Payer: COMMERCIAL

## 2023-09-26 RX ORDER — DEXTROAMPHETAMINE SACCHARATE, AMPHETAMINE ASPARTATE, DEXTROAMPHETAMINE SULFATE AND AMPHETAMINE SULFATE 5; 5; 5; 5 MG/1; MG/1; MG/1; MG/1
1 TABLET ORAL 2 TIMES DAILY
Qty: 60 TABLET | Refills: 0 | Status: SHIPPED | OUTPATIENT
Start: 2023-09-26 | End: 2023-09-27

## 2023-09-27 ENCOUNTER — PATIENT MESSAGE (OUTPATIENT)
Dept: PSYCHIATRY | Facility: CLINIC | Age: 43
End: 2023-09-27
Payer: COMMERCIAL

## 2023-09-27 ENCOUNTER — OFFICE VISIT (OUTPATIENT)
Dept: PSYCHIATRY | Facility: CLINIC | Age: 43
End: 2023-09-27
Payer: COMMERCIAL

## 2023-09-27 DIAGNOSIS — F41.1 GAD (GENERALIZED ANXIETY DISORDER): Primary | ICD-10-CM

## 2023-09-27 PROCEDURE — 90837 PSYTX W PT 60 MINUTES: CPT | Mod: 95,,,

## 2023-09-27 PROCEDURE — 90837 PR PSYCHOTHERAPY W/PATIENT, 60 MIN: ICD-10-PCS | Mod: 95,,,

## 2023-09-27 RX ORDER — DEXTROAMPHETAMINE SACCHARATE, AMPHETAMINE ASPARTATE, DEXTROAMPHETAMINE SULFATE AND AMPHETAMINE SULFATE 2.5; 2.5; 2.5; 2.5 MG/1; MG/1; MG/1; MG/1
20 TABLET ORAL 2 TIMES DAILY
Qty: 120 TABLET | Refills: 0 | Status: SHIPPED | OUTPATIENT
Start: 2023-09-27 | End: 2023-10-27 | Stop reason: DRUGHIGH

## 2023-09-27 NOTE — PROGRESS NOTES
The patient location is:  [place], [place]  The patient location [place] is: [place]  The patient phone number is: [phone]  Visit type: Virtual visit with partial synchronous audio and video. Due to poor service/signal, audio only was used for 30mins of appointment  Each patient to whom he or she provides medical services by telemedicine is:  (1) informed of the relationship between the provider and patient and the respective role of any other health care provider with respect to management of the patient; and (2) notified that he or she may decline to receive medical services by telemedicine and may withdraw from such care at any time.    Crisis Disclaimer: Patient was informed that due to the virtual nature of the visit, that if a crisis develops, protocols will be implemented to ensure patient safety, including but not limited to: 1) Initiating a welfare check with local Law Enforcement, 2) Calling 1/National Crisis Hotline, and/or 3) Initiating PEC/CEC procedures.    Time (Face-to-face): 38 minutes    Time (Non-face-to-face): 15 minutes     PSYCHO-ONCOLOGY NOTE/ Individual Psychotherapy     Date: 9/27/2023   Site:  [place], [place]      Therapeutic Intervention: Met with patient.  Outpatient - Supportive psychotherapy 45 min - CPT Code 30862    This includes face to face time and non-face to face time preparing to see the patient, obtaining and/or reviewing separately obtained history, documenting clinical information in the electronic or other health record, independently interpreting results and communicating results to the patient/family/caregiver, or care coordinator.      Patient was last seen by me on 9/20/2023    Problem list  Patient Active Problem List   Diagnosis    Malignant neoplasm of upper-outer quadrant of left breast in female, estrogen receptor negative    Decreased functional mobility    Dehydration    Anemia associated with chemotherapy    Nausea and vomiting    BRCA2 gene mutation positive     Scoliosis    Attention Deficit Hyperactivity Disorder    Therapeutic Drug Monitoring    Postoperative Nausea And Vomiting    Macrocytic Anemia    Generalized Anxiety    Family H/O Diabetes Mellitus    Fatigue    Hyponatremia    Elevated troponin    Diarrhea    Intravascular volume depletion    Hypomagnesemia    Hypokalemia    Anterior T wave inversion    Elevated LFTs    Colitis, indeterminate    Encounter for immunotherapy    Diarrhea due to drug    Pneumonitis    s/p RA-TLH/BSO    JA (acute kidney injury)    Adrenal crisis    Sepsis    Chest pain    Adrenal insufficiency    Axillary web syndrome    Depressed    Constipation    Memory loss    Abnormal MRI       Chief complaint/reason for encounter: depression, anxiety, and cognition   Met with patient to evaluate psychosocial adaptation to survivorship of BREAST CANCER    Current Medications  Current Outpatient Medications   Medication    dextroamphetamine-amphetamine (ADDERALL) 10 mg Tab    estradioL (IMVEXXY MAINTENANCE PACK) 10 mcg Inst    estradioL (VAGIFEM) 10 mcg Tab    hydrocortisone (CORTEF) 10 MG Tab    hydrocortisone (CORTEF) 5 MG Tab    LIDOCAINE VISCOUS 2 % solution    ondansetron (ZOFRAN ODT ORAL)    ondansetron (ZOFRAN-ODT) 8 MG TbDL    traZODone (DESYREL) 50 MG tablet    traZODone (DESYREL) 50 MG tablet    valACYclovir (VALTREX) 500 MG tablet    valACYclovir (VALTREX) 500 MG tablet     No current facility-administered medications for this visit.     Facility-Administered Medications Ordered in Other Visits   Medication Frequency    lactated ringers infusion Continuous    sodium chloride 0.9% flush 10 mL PRN       ONCOLOGY HISTORY  Oncology History   Malignant neoplasm of upper-outer quadrant of left breast in female, estrogen receptor negative   1/19/2021 Cancer Staged    Staging form: Breast, AJCC 8th Edition  - Clinical stage from 1/19/2021: Stage IIIB (cT2, cN1, cM0, G3, ER-, IA-, HER2-)     1/28/2021 Initial Diagnosis    Malignant neoplasm of  upper-outer quadrant of left breast in female, estrogen receptor negative     2/18/2021 - 2/18/2021 Chemotherapy    Treatment Summary   Plan Name: OP BREAST DOSE-DENSE AC - DOXORUBICIN CYCLOPHOSPHAMIDE Q2W  Treatment Goal: Curative  Status: Inactive  Start Date:   End Date:   Provider: Jhon Suazo MD  Chemotherapy: DOXOrubicin chemo injection 96 mg, 60 mg/m2, Intravenous, Clinic/HOD 1 time, 0 of 4 cycles  cyclophosphamide (CYTOXAN) 600 mg/m2 = 965 mg in sodium chloride 0.9% 250 mL chemo infusion, 600 mg/m2, Intravenous, Clinic/HOD 1 time, 0 of 4 cycles     2/25/2021 - 7/6/2021 Chemotherapy    Treatment Summary   Plan Name: OP BREAST PACLITAXEL WEEKLY + CARBOPLATIN (AUC 5) Q3W FOLLOWED BY AC WITH PEMBROLIZUMAB FOLLOWED BY PEMBROLIZUMAB   Treatment Goal: Curative  Status: Inactive  Start Date: 2/25/2021  End Date: 7/6/2021  Provider: Sandra Sotelo MD  Chemotherapy: DOXOrubicin chemo injection 112 mg, 60 mg/m2 = 112 mg (100 % of original dose 60 mg/m2), Intravenous, Once, 3 of 4 cycles  Dose modification: 60 mg/m2 (original dose 60 mg/m2, Cycle 5)  Administration: 112 mg (5/25/2021), 112 mg (6/15/2021), 110 mg (7/6/2021)  CARBOplatin (PARAPLATIN) 605 mg in sodium chloride 0.9% 250 mL chemo infusion, 605 mg (100 % of original dose 603.5 mg), Intravenous, Clinic/HOD 1 time, 4 of 4 cycles  Dose modification:   (original dose 603.5 mg, Cycle 1)  Administration: 605 mg (2/25/2021), 630 mg (3/22/2021), 750 mg (4/13/2021), 750 mg (5/4/2021)  cyclophosphamide 600 mg/m2 = 1,100 mg in sodium chloride 0.9% 100 mL chemo infusion, 600 mg/m2 = 1,100 mg (100 % of original dose 600 mg/m2), Intravenous, Clinic/HOD 1 time, 3 of 4 cycles  Dose modification: 600 mg/m2 (original dose 600 mg/m2, Cycle 5), 600 mg/m2 (Cycle 8)  Administration: 1,100 mg (5/25/2021), 1,100 mg (6/15/2021), 1,100 mg (7/6/2021)  PACLitaxeL (TAXOL) 80 mg/m2 = 132 mg in sodium chloride 0.9% 250 mL chemo infusion, 80 mg/m2 = 132 mg (100 % of original dose 80  mg/m2), Intravenous, Clinic/HOD 1 time, 4 of 4 cycles  Dose modification: 80 mg/m2 (original dose 80 mg/m2, Cycle 1)  Administration: 132 mg (2/25/2021), 132 mg (3/4/2021), 132 mg (3/11/2021), 138 mg (3/22/2021), 138 mg (3/30/2021), 138 mg (4/6/2021), 138 mg (4/13/2021), 138 mg (4/20/2021), 144 mg (4/27/2021), 144 mg (5/4/2021), 144 mg (5/11/2021), 144 mg (5/18/2021)  pembrolizumab (KEYTRUDA) 200 mg in sodium chloride 0.9% 100 mL chemo infusion, 200 mg, Intravenous, Clinic/HOD 1 time, 7 of 18 cycles  Administration: 200 mg (2/25/2021), 200 mg (5/25/2021), 200 mg (3/22/2021), 200 mg (4/13/2021), 200 mg (5/4/2021), 200 mg (6/15/2021), 200 mg (7/6/2021)     4/15/2021 - 4/15/2021 Chemotherapy    Treatment Summary   Plan Name: OP BREAST PEMBROLIZUMAB Q3W PACLITAXEL CARBOPLATIN WEEKLY FOLLOWED BY PEMBROLIZUMAB DOXORUBICIN CYCLOPHOSPHAMIDE Q3W   Treatment Goal: Curative  Status: Inactive  Start Date:   End Date:   Provider: Jhon Suazo MD  Chemotherapy: CARBOplatin (PARAPLATIN) in sodium chloride 0.9% 250 mL chemo infusion,  (original dose ), Intravenous, Clinic/HOD 1 time, 0 of 3 cycles  Dose modification:   (Cycle 1)  cyclophosphamide in sodium chloride 0.9% chemo infusion, 600 mg/m2 = 965 mg (original dose ), Intravenous, Clinic/HOD 1 time, 0 of 1 cycle  Dose modification: 600 mg/m2 (Cycle 2)  DOXOrubicin (ADRIAMYCIN) in sodium chloride 0.9% 250 mL chemo infusion, 60 mg/m2 (original dose ), Intravenous, Clinic/HOD 1 time, 0 of 1 cycle  Dose modification: 60 mg/m2 (Cycle 2)  PACLitaxeL (TAXOL) in sodium chloride 0.9% 100 mL chemo infusion, 80 mg/m2 (original dose ), Intravenous, Clinic/HOD 1 time, 0 of 1 cycle  Dose modification: 80 mg/m2 (Cycle 1)  PEMEtrexed (ALIMTA) in sodium chloride 0.9% chemo infusion, 500 mg/m2, Intravenous, Clinic/HOD 1 time, 0 of 33 cycles  pembrolizumab (KEYTRUDA) 200 mg in sodium chloride 0.9% 100 mL chemo infusion, 200 mg, Intravenous, Clinic/HOD 1 time, 0 of 35 cycles     11/19/2021 -  1/4/2022 Radiation Therapy    Treating physician: Maryann Perales  Total Dose: 50 Gy  Fractions: 25    DIAGNOSIS:  C50.412 - Malignant neoplasm of upper-outer quadrant of left female breast, Diagnosed 11/11/2021 (Active) Stage IIIB, T2, N1, M0, G3, HER2 Neg, ER Neg, MD Neg    This is a 41 y.o. y/o female with cT2,c N1 and M0 G3, (Stage IIIB), triple negative, Ki-67 79%, LEFT upper outer quadrant breast, BRCA2+, status post lumpectomy 1/19/21, with residual larisa disease, followed by  adjuvant chemo-immunotherapy with carbo-taxol and pembrolizumab, followed by AC with pembrolizumab, followed by adjuvant pemprolizumab complicated by ongoing, chronic colitis/diarrhea necessitating long-term prednisone.  Underwent bilateral mastectomy 9/29/21 with no residual carcinoma identified in breast of 14 sampled nodes (ypN0). She has completed adjuvant LEFT breast and regional larisa radiation therapy to a dose of 50Gy.    Treatment Summary  Course: C1 BREAST 2021    Treatment Site Ref. ID Energy Dose/Fx (Gy) #Fx Dose Correction (Gy) Total Dose (Gy) Start Date End Date Elapsed Days   3D Sclav_L rxsc 18X/6X 2 25 / 25 0 50 11/19/2021 1/4/2022 46   3D Breast L Breast_L 18X/6X 2 25 / 25 0 50 11/19/2021 1/4/2022 46     Tolerated well.  2 day treatment break at patient's request for patch of dry desquamation, and 5 day treatment break due to CoVID infection.  Otherwise, patient completed her course of therapy as prescribed.       PLAN: RTC 1 month for routine follow up. Continue current skin care/sun protection for now.  Follow up with other providers as directed.         Objective:  Alison Swann arrived promptly for the session. The patient was fully cooperative throughout the session.  Appearance: age appropriate, casually  dressed, adequately  groomed  Behavior/Cooperation: friendly and cooperative  Speech: normal in rate, volume, and tone  Mood: happy  Affect: appropriate, full range  Thought Process: goal-directed,  logical  Thought Content: normal,  No delusions or paranoia; did not appear to be responding to internal stimuli during the session  Orientation: grossly intact  Memory: grossly intact  Attention Span/Concentration: Attends to session without distraction; reports no difficulty  Fund of Knowledge: average  Estimate of Intelligence: average from verbal skills and history  Cognition: grossly intact  Insight: patient has awareness of illness; good insight into own behavior and behavior of others  Judgment: the patient's behavior is adequate to circumstances    [employer] Distress thermometer:       9/21/2023     8:50 AM 8/2/2023     6:58 AM 5/8/2023     2:03 PM 3/20/2023     9:57 AM 12/19/2022     8:53 AM 10/12/2022    10:59 AM 10/12/2022    10:56 AM   DISTRESS SCREENING   Distress Score 9 6 2 9 3 10 - Extreme Distress 0 - No Distress   Practical Problems Insurance/Financial None of these Work/School Insurance/Financial;Work/School Insurance/Financial Work/School None of these   Family Problems None of these None of these Dealing with Children Family Health Issues None of these None of these None of these   Emotional Problems Depression;Fears;Nervousness;Sadness;Worry;Loss of Interest Sadness Worry Depression;Fears;Nervousness;Sadness;Worry;Loss of Interest Depression;Sadness;Worry Fears;Worry None of these   Spiritual / Taoist Concerns No No No No No No No   Physical Problems Appearance;Fatigue;Memory/Concentration;Sexual;Sleep;Tingling in hands or feet Tingling in hands or feet Pain Appearance;Bathing/Dressing;Memory/Concentration None of these None of these None of these   Other Problems  Anxiety- burning fingers             Interval history and content of current session: Mrs. Swann shared that since her last appointment, she has felt more observant/mindful, she has starting cooking more home meals, and has lightened her everyday stress load. Patient appears more hopeful in regards to being able to navigate her day to  "day. She endorses less "meltdowns" and more compassion for herself. She identified her hopes for therapy as being able to cognitively and emotionally manage her life in a way that allows her to function optimally in important areas of her life.      Risk parameters:   Patient reports no suicidal ideation  Patient reports no homicidal ideation  Patient reports no self-injurious behavior  Patient reports no violent behavior   Safety needs:  None at this time      Verbal deficits: None     Patient's response to intervention:The patient's response to intervention is accepting.     Progress toward goals and other mental status changes:  The patient's progress toward goals is fair .      Progress to date:Revise Objectives (Goals)      Goals from last visit:  attempted        Patient Strengths: verbal, intelligent, successful, good social support, good insight, commitment to wellness      Treatment Plan:individual psychotherapy and consult psychiatrist for medication evaluation; referral placed for medication management.     Target symptoms: depression, mood swings, adjustment  Why chosen therapy is appropriate versus another modality: relevant to diagnosis, patient responds to this modality, evidence based practice  Outcome monitoring methods: self-report  Therapeutic intervention type: behavior modifying psychotherapy, supportive psychotherapy  Prognosis: Fair      Behavioral goals:    Exercise: continue as medically advised   Stress management: creating a system to organize day to day; utilize support system   Therapy: continue self-care     Return to clinic: as scheduled     Length of Service (minutes direct face-to-face contact):  38 mins face to face, 15mins of clinical work    Diagnosis:     ICD-10-CM ICD-9-CM   1. KIANA (generalized anxiety disorder)  F41.1 300.02                 Julisa Dias PsyD     "

## 2023-10-06 ENCOUNTER — PATIENT MESSAGE (OUTPATIENT)
Dept: PSYCHIATRY | Facility: CLINIC | Age: 43
End: 2023-10-06
Payer: COMMERCIAL

## 2023-10-08 DIAGNOSIS — G47.00 INSOMNIA, UNSPECIFIED TYPE: ICD-10-CM

## 2023-10-09 RX ORDER — TRAZODONE HYDROCHLORIDE 50 MG/1
50 TABLET ORAL NIGHTLY
Qty: 30 TABLET | Refills: 11 | Status: SHIPPED | OUTPATIENT
Start: 2023-10-09 | End: 2023-10-11

## 2023-10-10 ENCOUNTER — PATIENT MESSAGE (OUTPATIENT)
Dept: ORTHOPEDICS | Facility: CLINIC | Age: 43
End: 2023-10-10
Payer: COMMERCIAL

## 2023-10-11 ENCOUNTER — PATIENT MESSAGE (OUTPATIENT)
Dept: HEMATOLOGY/ONCOLOGY | Facility: CLINIC | Age: 43
End: 2023-10-11
Payer: COMMERCIAL

## 2023-10-11 DIAGNOSIS — G47.00 INSOMNIA, UNSPECIFIED TYPE: ICD-10-CM

## 2023-10-11 RX ORDER — TRAZODONE HYDROCHLORIDE 50 MG/1
100 TABLET ORAL NIGHTLY
Qty: 30 TABLET | Refills: 3 | Status: SHIPPED | OUTPATIENT
Start: 2023-10-11 | End: 2023-10-17 | Stop reason: DRUGHIGH

## 2023-10-11 NOTE — PROGRESS NOTES
Outpatient Psychiatry Initial Visit  10/17/2023    ID:  42 yo F presenting for an initial evaluation. Met with patient.    Reason for encounter: Referral from Dr. Dias. Patient complains of depression/anxiety, insomnia.    History of Present Illness:  Pt. is a 42 yo F with a past psychiatric hx of Depression, unspecified depression type, KIANA (generalized anxiety disorder), Insomnia, unspecified type presenting to the clinic for an initial evaluation and treatment. Past medical history outlined below. She is currently taking traZODone (DESYREL), dextroamphetamine-amphetamine (ADDERALL), prescribed and managed by Dr. Sotelo/Dr. Amado. She reports that these medications have not addressed her depression/anxiety.       Pt currently endorses or denies the following symptoms:  Psych ROS  Depression: moderate  Anxiety: no panic attacks, no agoraphobia, no social anxiety  PTSD: no flashbacks, nightmares, or avoidance of stimuli  Ning/Psychosis: No manic episodes, no A/V hallucinations  SI - No SI - access to guns:  locked safe    Past Psychiatric History:  Past Psych Hx:    anxiety           First psych contact:  2019  Prior hospitalizations:    denies  Prior suicide attempts or self harm:  OD at 13  Prior diagnosis:  anxiety, ADHD  Prior meds:  Ativan, Melatonin, Zyprexa, Zoloft, Sonata  Current meds:  Trazodone, Adderall  Prior psychotherapy:  as a teen for a few years, currently      Past Medical Hx: Hx of TBI:    denies        Hx of seizures:  denies  Past Medical History:   Diagnosis Date    Attention Deficit Hyperactivity Disorder     Family H/O Diabetes Mellitus     Her Father    Generalized Anxiety     Left Breast Cancer S.P Lumpectomy In 01/2021     Dr. Dominga Johnston; Dr. Sandra Sotelo (Heme/Onc); 4/28/21 On CMT; Is Estrogen Receptor Negative; She Is BRCA2 Gene Mutation Positive, Andf Her Mother Also With A H/O Breast Cancer    Macrocytic Anemia     Associated With Her Chemotherapy    Postoperative Nausea And  Vomiting     Scopolamine Patches Work Well For Her For This    Scoliosis     Therapeutic Drug Monitoring     Wellness Visit 2021              Past Surgical Hx:  Past Surgical History:   Procedure Laterality Date    AUGMENTATION OF BREAST  2012    BREAST MASS EXCISION Left 2021    Procedure: EXCISION, MASS, BREAST- 2 oclock left breast with ultrasound guidance;  Surgeon: Rashmi Johnston MD;  Location: [facility];  Service: General;  Laterality: Left;    BREAST SURGERY       SECTION      COLONOSCOPY N/A 2021    Procedure: COLONOSCOPY;  Surgeon: Mason Quintero MD;  [facility]  Service: Endoscopy;  Laterality: N/A;    ESOPHAGOGASTRODUODENOSCOPY N/A 2021    Procedure: EGD (ESOPHAGOGASTRODUODENOSCOPY);  Surgeon: Mason Quintero MD;  [facility] (2ND FLR);  Service: Endoscopy;  Laterality: N/A;    HYSTERECTOMY      INSERTION OF TUNNELED CENTRAL VENOUS CATHETER (CVC) WITH SUBCUTANEOUS PORT N/A 2021    Procedure: TEVELAUCJ-VKEP-Z-CATH;  Surgeon: Griffin Becker MD;  Location: [facility];  Service: General;  Laterality: N/A;    OOPHORECTOMY      ROBOT-ASSISTED LAPAROSCOPIC ABDOMINAL HYSTERECTOMY USING DA NATALIIA XI N/A 2022    Procedure: XI ROBOTIC HYSTERECTOMY;  Surgeon: Carmella Galvez MD;  Location: [facility];  Service: OB/GYN;  Laterality: N/A;    ROBOT-ASSISTED LAPAROSCOPIC SALPINGO-OOPHORECTOMY USING DA NATALIIA XI Bilateral 2022    Procedure: XI ROBOTIC SALPINGO-OOPHORECTOMY;  Surgeon: Carmella Galvez MD;  Location: [facility];  Service: OB/GYN;  Laterality: Bilateral;    SENTINEL LYMPH NODE BIOPSY Left 2021    Procedure: CORE BIOPSY, LYMPH NODE, SENTINEL;  Surgeon: Rashmi Johnston MD;  Location: [facility];  Service: General;  Laterality: Left;    TUBAL LIGATION             Family Hx:   Paternal:  denies  Maternal:  depression            Social Hx:   Childhood:  parents had extreme expectations, very strict  Marital Status:  ,  "25 years, "fantastic"  Children:  1- 21 yo daughter  Resides:    Occupation:    Hobbies:  reading, research, dog, friends  Jainism:  Non-Advent  Education level:  Multiple degrees  :   denies  Legal:  15 years old, undisclosed arrest    Substance Hx:  Tobacco:  denies  Alcohol:  occasional  Drug use:  denies  Caffeine:  2 cups a week  Rehab:  denies  Prior/current AA:  denies    Review of Symptoms  GENERAL: no weight gain/loss  SKIN: no rashes or lacerations  HEAD: no headaches  EYES: no jaundice, blindness. No exophthalmos  EARS: no dizziness, tinnitus, or hearing loss  NOSE: no changes in smell  Mouth/throat: no dyskinetic movements or obvious goiter  CHEST: no SOB, hyperventilation or cough  CARDIO: no tachycardia, bradycardia, or chest pain  ABDOMEN: no nausea, vomiting, pain, constipation, or diarrhea  URINARY:  no frequency, dysuria, or sexual dysfunction  ENDOCRINE: No polydipsia, polyuria, no cold/hot intolerance  MUSCULOSKELETAL: no joint pain/stiffness  NEUROLOGIC: no weakness or sensory changes, no seizures, no confusion, memory loss, or forgetfulness, no tremor or abnormal movements    Current Evaluation:  Nutritional Screening:  Considering the patient's height and weight, medications, medical history and preferences, should a referral be made to the dietitian? No  Vitals: most recent vitals signs, dated greater than 90 days prior to this appointment, were reviewed  General: age appropriate, well nourished, casually dressed, neatly groomed  MSK: muscle strength/tone : no tremor or abnormal movements. Gait/Station: no ataxic, steady    Suicide Risk Assessment:  Protective factors: age, gender, no prior attempts, no prior hospitalizations, no ongoing substance abuse, no psychosis, , has children, denies SI/intent/plan, seeking treatment, access to treatment, future oriented, good primary support, locked firearms    Risks:     Patient is a low immediate and " "long-term risk considering risk factors    Psychiatric:  Speech: Normal rate, rhythm, volume. No latency, no pressured speech  Mood/Affect: euthymic, congruent and appropriate   Thought Process: organized, logical, linear  Thought Content: no suicidal or homicidal ideation, no A/V hallucinations, delusions or paranoia  Insight: Intact; aware of illness  Judgement: behavior is adequate to circumstances  Orientation: A&O x 4  Memory: Intact for content of interview, 3/3 immediate, 3/3 after 3 mins. Able to recall recent and remote events.  Language: Grossly intact, no aphasias   Concentration: Spells "world" correctly forward & backwards  Knowledge/Intelligence: appropriate to age and level of education.   Spouse/Partner: Supportive    ASSESSMENT - DIAGNOSIS - GOALS:  Impression:          Pt presents to OP Psychiatry for initial evaluation and medication management of anxiety, depression (r/t anxiety), insomnia, and ADHD. Pt reports difficulty getting medications d/t shortages and it affecting her stressors. She reports multiple doctor appointments weekly, multiple prescriptions to keep on top of, and when the pharmacies are out or she tries to get refills, she feels like she spends a big percentage of her day trying to straighten things out. She reports being overwhelmed on top of her cancer dx and appointments, medications just for that. She was first seen for mental health concerns as a teenager for a suicide attempt at 13 by OD. States her childhood was very stressful, and her parents had unattainable expectations of her. She reports moderate depression currently r/t anxiety caused by her demanding job in a  position and "I just wish I could catch a break. I try to keep on top of everything and even when I do everything to keep on top of my appointments and medications, one mistake and I spend an unbelievable amount of time just to get my medicine. It is not everyday, this intense but I just wish it " "would let up a little". Discussed adding Paxil or Effexor, pt apprehensive to try after having a severe reaction to Zoloft of nausea and vomiting, "I was on the floor and struggled for hours." Discussed Buspar for anxiety and pt stated, "I just don't know if I can handle the side effects right now with all of my medical issues." Pt receptive to try Propranolol PRN for anxiety and states that it may help her depression. Pt also stated if she didn't have to work so hard to get her medicine, that may relieve some of her anxiety/depression. Pt has a very supportive . Discussed sending long term medications through mail order (her  has his delivered) as she sometimes has to drive a half hour to find a pharmacy that has her medicine. Pt denies SI/HI/AVH, self-harm, plan, or intent. Pt verbalizes understanding of medication instructions through teach back, no new scheduled medications ordered, will reassess symptoms in 3 months or PRN if symptoms worsen.        Safe for outpatient tx and no acute safety concerns.  Diagnosis/Diagnoses:  - KIANA (generalized anxiety disorder)  - MDD (major depressive disorder), recurrent episode, moderate  - Insomnia, psychophysiological  - Attention Deficit Hyperactivity Disorder          Strengths/Liabilities: Patient accepts feedback & guidance. Patient is motivated for change.     Treatment Goals: Specify outcomes written in observable, behavioral terms  Anxiety: acquire relapse prevention skills, reduce physical symptoms of anxiety, reduce time spent worrying (>30 minutes/day)  Depression: Acquire relapse prevention skills, increasing energy, increasing interest in usual activities, increasing motivation, reducing excessive guilt and reducing fatigue.    Treatment Plan/Recommendations:   Medication Management: The risks and benefits of medication were discussed with the patient.   Meds:    1) Start propranoloL (INDERAL) 10 MG tablet - Take 1 tablet (10 mg total) by mouth 2 " (two) times daily as needed (anxiety). Pt to monitor blood pressure before taking.    2) Continue traZODone (DESYREL) 100 MG tablet - Take 1 tablet (100 mg total) by mouth nightly as needed for Insomnia.    3) Decrease dextroamphetamine-amphetamine (ADDERALL) 10 mg Tab - Take 1 tablet (10 mg total) by mouth 2 (two) times a day for ADHD. Discussed stimulant side effects including effects on sleep, appetite, tics, nervousness, anorexia, insomnia, tachycardia, palpitations, dizziness, BP changes, HR changes, visual disturbance, and headaches.               Labs:  none  Return to Clinic:  3 months  Counseling time:  35 mins  Total time:  60 mins    - Patient given contact # for psychotherapists at [facility] and also instructed they may check with insurance for a list of providers.   - Call to report any worsening of symptoms or problems associated with medication  - Pt instructed to go to ER if thoughts of harming self or others arise     - Spent 60min face to face with the pt; >50% time spent in counseling   - Supportive therapy and psycho education provided  - R/B/SE's of medications discussed with the pt who expresses understanding and chooses to take medications as prescribed.   - Pt instructed to call clinic, 911 or go to nearest emergency room if symptoms worsen or pt is in crisis. The pt expresses understanding.      Mehnaz Gaitan NP  Psychiatric-Mental Health Nurse Practitioner  Department of Psychiatry    [facility] [place] [address] [place] [address]  Phone:  [phone]  Fax: [phone]

## 2023-10-13 ENCOUNTER — OFFICE VISIT (OUTPATIENT)
Dept: PSYCHIATRY | Facility: CLINIC | Age: 43
End: 2023-10-13
Payer: COMMERCIAL

## 2023-10-13 ENCOUNTER — PATIENT MESSAGE (OUTPATIENT)
Dept: PSYCHIATRY | Facility: CLINIC | Age: 43
End: 2023-10-13
Payer: COMMERCIAL

## 2023-10-13 DIAGNOSIS — F41.1 GAD (GENERALIZED ANXIETY DISORDER): Primary | ICD-10-CM

## 2023-10-13 PROCEDURE — 90834 PSYTX W PT 45 MINUTES: CPT | Mod: 95,,,

## 2023-10-13 PROCEDURE — 90834 PR PSYCHOTHERAPY W/PATIENT, 45 MIN: ICD-10-PCS | Mod: 95,,,

## 2023-10-13 NOTE — PROGRESS NOTES
The patient location is:  [place] [address]  The patient phone number is: [phone]  Visit type: Virtual visit with synchronous audio and video  Each patient to whom he or she provides medical services by telemedicine is:  (1) informed of the relationship between the provider and patient and the respective role of any other health care provider with respect to management of the patient; and (2) notified that he or she may decline to receive medical services by telemedicine and may withdraw from such care at any time.    Crisis Disclaimer: Patient was informed that due to the virtual nature of the visit, that if a crisis develops, protocols will be implemented to ensure patient safety, including but not limited to: 1) Initiating a welfare check with local Law Enforcement, 2) Calling 1/National Crisis Hotline, and/or 3) Initiating PEC/CEC procedures.    Time (Face-to-face): 35 minutes    Time (Non-face-to-face): 10 minutes     SYCHO-ONCOLOGY NOTE/ Individual Psychotherapy     Date: 9/27/2023   Site:  [facility]      Therapeutic Intervention: Met with patient.  Outpatient - Supportive psychotherapy 45 min - CPT Code 73678    This includes face to face time and non-face to face time preparing to see the patient, obtaining and/or reviewing separately obtained history, documenting clinical information in the electronic or other health record, independently interpreting results and communicating results to the patient/family/caregiver, or care coordinator.      Patient was last seen by me on 9/27/2023    Problem list  Patient Active Problem List   Diagnosis    Malignant neoplasm of upper-outer quadrant of left breast in female, estrogen receptor negative    Decreased functional mobility    Dehydration    Anemia associated with chemotherapy    Nausea and vomiting    BRCA2 gene mutation positive    Scoliosis    Attention Deficit Hyperactivity Disorder    Therapeutic Drug Monitoring    Postoperative Nausea And Vomiting     Macrocytic Anemia    Generalized Anxiety    Family H/O Diabetes Mellitus    Fatigue    Hyponatremia    Elevated troponin    Diarrhea    Intravascular volume depletion    Hypomagnesemia    Hypokalemia    Anterior T wave inversion    Elevated LFTs    Colitis, indeterminate    Encounter for immunotherapy    Diarrhea due to drug    Pneumonitis    s/p RA-TLH/BSO    JA (acute kidney injury)    Adrenal crisis    Sepsis    Chest pain    Adrenal insufficiency    Axillary web syndrome    Depressed    Constipation    Memory loss    Abnormal MRI       Chief complaint/reason for encounter: depression, anxiety, and cognition   Met with patient to evaluate psychosocial adaptation to survivorship of BREAST CANCER        ONCOLOGY HISTORY  Oncology History   Malignant neoplasm of upper-outer quadrant of left breast in female, estrogen receptor negative   1/19/2021 Cancer Staged    Staging form: Breast, AJCC 8th Edition  - Clinical stage from 1/19/2021: Stage IIIB (cT2, cN1, cM0, G3, ER-, OK-, HER2-)     1/28/2021 Initial Diagnosis    Malignant neoplasm of upper-outer quadrant of left breast in female, estrogen receptor negative     2/18/2021 - 2/18/2021 Chemotherapy    Treatment Summary   Plan Name: OP BREAST DOSE-DENSE AC - DOXORUBICIN CYCLOPHOSPHAMIDE Q2W  Treatment Goal: Curative  Status: Inactive  Start Date:   End Date:   Provider: Jhon Suazo MD  Chemotherapy: DOXOrubicin chemo injection 96 mg, 60 mg/m2, Intravenous, Clinic/HOD 1 time, 0 of 4 cycles  cyclophosphamide (CYTOXAN) 600 mg/m2 = 965 mg in sodium chloride 0.9% 250 mL chemo infusion, 600 mg/m2, Intravenous, Clinic/HOD 1 time, 0 of 4 cycles     2/25/2021 - 7/6/2021 Chemotherapy    Treatment Summary   Plan Name: OP BREAST PACLITAXEL WEEKLY + CARBOPLATIN (AUC 5) Q3W FOLLOWED BY AC WITH PEMBROLIZUMAB FOLLOWED BY PEMBROLIZUMAB   Treatment Goal: Curative  Status: Inactive  Start Date: 2/25/2021  End Date: 7/6/2021  Provider: Sandra Sotelo MD  Chemotherapy: DOXOrubicin  chemo injection 112 mg, 60 mg/m2 = 112 mg (100 % of original dose 60 mg/m2), Intravenous, Once, 3 of 4 cycles  Dose modification: 60 mg/m2 (original dose 60 mg/m2, Cycle 5)  Administration: 112 mg (5/25/2021), 112 mg (6/15/2021), 110 mg (7/6/2021)  CARBOplatin (PARAPLATIN) 605 mg in sodium chloride 0.9% 250 mL chemo infusion, 605 mg (100 % of original dose 603.5 mg), Intravenous, Clinic/HOD 1 time, 4 of 4 cycles  Dose modification:   (original dose 603.5 mg, Cycle 1)  Administration: 605 mg (2/25/2021), 630 mg (3/22/2021), 750 mg (4/13/2021), 750 mg (5/4/2021)  cyclophosphamide 600 mg/m2 = 1,100 mg in sodium chloride 0.9% 100 mL chemo infusion, 600 mg/m2 = 1,100 mg (100 % of original dose 600 mg/m2), Intravenous, Clinic/HOD 1 time, 3 of 4 cycles  Dose modification: 600 mg/m2 (original dose 600 mg/m2, Cycle 5), 600 mg/m2 (Cycle 8)  Administration: 1,100 mg (5/25/2021), 1,100 mg (6/15/2021), 1,100 mg (7/6/2021)  PACLitaxeL (TAXOL) 80 mg/m2 = 132 mg in sodium chloride 0.9% 250 mL chemo infusion, 80 mg/m2 = 132 mg (100 % of original dose 80 mg/m2), Intravenous, Clinic/HOD 1 time, 4 of 4 cycles  Dose modification: 80 mg/m2 (original dose 80 mg/m2, Cycle 1)  Administration: 132 mg (2/25/2021), 132 mg (3/4/2021), 132 mg (3/11/2021), 138 mg (3/22/2021), 138 mg (3/30/2021), 138 mg (4/6/2021), 138 mg (4/13/2021), 138 mg (4/20/2021), 144 mg (4/27/2021), 144 mg (5/4/2021), 144 mg (5/11/2021), 144 mg (5/18/2021)  pembrolizumab (KEYTRUDA) 200 mg in sodium chloride 0.9% 100 mL chemo infusion, 200 mg, Intravenous, [facility] 1 time, 7 of 18 cycles  Administration: 200 mg (2/25/2021), 200 mg (5/25/2021), 200 mg (3/22/2021), 200 mg (4/13/2021), 200 mg (5/4/2021), 200 mg (6/15/2021), 200 mg (7/6/2021)     4/15/2021 - 4/15/2021 Chemotherapy    Treatment Summary   Plan Name: OP BREAST PEMBROLIZUMAB Q3W PACLITAXEL CARBOPLATIN WEEKLY FOLLOWED BY PEMBROLIZUMAB DOXORUBICIN CYCLOPHOSPHAMIDE Q3W   Treatment Goal: Curative  Status:  Inactive  Start Date:   End Date:   Provider: Jhon Suazo MD  Chemotherapy: CARBOplatin (PARAPLATIN) in sodium chloride 0.9% 250 mL chemo infusion,  (original dose ), Intravenous, Clinic/HOD 1 time, 0 of 3 cycles  Dose modification:   (Cycle 1)  cyclophosphamide in sodium chloride 0.9% chemo infusion, 600 mg/m2 = 965 mg (original dose ), Intravenous, Clinic/HOD 1 time, 0 of 1 cycle  Dose modification: 600 mg/m2 (Cycle 2)  DOXOrubicin (ADRIAMYCIN) in sodium chloride 0.9% 250 mL chemo infusion, 60 mg/m2 (original dose ), Intravenous, Clinic/HOD 1 time, 0 of 1 cycle  Dose modification: 60 mg/m2 (Cycle 2)  PACLitaxeL (TAXOL) in sodium chloride 0.9% 100 mL chemo infusion, 80 mg/m2 (original dose ), Intravenous, Clinic/HOD 1 time, 0 of 1 cycle  Dose modification: 80 mg/m2 (Cycle 1)  PEMEtrexed (ALIMTA) in sodium chloride 0.9% chemo infusion, 500 mg/m2, Intravenous, Clinic/HOD 1 time, 0 of 33 cycles  pembrolizumab (KEYTRUDA) 200 mg in sodium chloride 0.9% 100 mL chemo infusion, 200 mg, Intravenous, Clinic/HOD 1 time, 0 of 35 cycles     11/19/2021 - 1/4/2022 Radiation Therapy    Treating physician: Maryann Perales  Total Dose: 50 Gy  Fractions: 25    DIAGNOSIS:  C50.412 - Malignant neoplasm of upper-outer quadrant of left female breast, Diagnosed 11/11/2021 (Active) Stage IIIB, T2, N1, M0, G3, HER2 Neg, ER Neg, MA Neg    This is a 41 y.o. y/o female with cT2,c N1 and M0 G3, (Stage IIIB), triple negative, Ki-67 79%, LEFT upper outer quadrant breast, BRCA2+, status post lumpectomy 1/19/21, with residual larisa disease, followed by  adjuvant chemo-immunotherapy with carbo-taxol and pembrolizumab, followed by AC with pembrolizumab, followed by adjuvant pemprolizumab complicated by ongoing, chronic colitis/diarrhea necessitating long-term prednisone.  Underwent bilateral mastectomy 9/29/21 with no residual carcinoma identified in breast of 14 sampled nodes (ypN0). She has completed adjuvant LEFT breast and regional larisa  radiation therapy to a dose of 50Gy.    Treatment Summary  Course: C1 BREAST 2021    Treatment Site Ref. ID Energy Dose/Fx (Gy) #Fx Dose Correction (Gy) Total Dose (Gy) Start Date End Date Elapsed Days   3D Sclav_L rxsc 18X/6X 2 25 / 25 0 50 11/19/2021 1/4/2022 46   3D Breast L Breast_L 18X/6X 2 25 / 25 0 50 11/19/2021 1/4/2022 46     Tolerated well.  2 day treatment break at patient's request for patch of dry desquamation, and 5 day treatment break due to CoVID infection.  Otherwise, patient completed her course of therapy as prescribed.       PLAN: RTC 1 month for routine follow up. Continue current skin care/sun protection for now.  Follow up with other providers as directed.         Objective:  Alison Swann arrived promptly for the session. The patient was fully cooperative throughout the session.  Appearance: age appropriate, casually  dressed, adequately  groomed  Behavior/Cooperation: friendly and cooperative  Speech: normal in rate, volume, and tone  Mood: happy  Affect: appropriate, full range  Thought Process: goal-directed, logical  Thought Content: normal,  No delusions or paranoia; did not appear to be responding to internal stimuli during the session  Orientation: grossly intact  Memory: grossly intact  Attention Span/Concentration: Attends to session without distraction; reports no difficulty  Fund of Knowledge: average  Estimate of Intelligence: average from verbal skills and history  Cognition: grossly intact  Insight: patient has awareness of illness; good insight into own behavior and behavior of others  Judgment: the patient's behavior is adequate to circumstances    NCCN Distress thermometer:       9/21/2023     8:50 AM 8/2/2023     6:58 AM 5/8/2023     2:03 PM 3/20/2023     9:57 AM 12/19/2022     8:53 AM 10/12/2022    10:59 AM 10/12/2022    10:56 AM   DISTRESS SCREENING   Distress Score 9 6 2 9 3 10 - Extreme Distress 0 - No Distress   Practical Problems Insurance/Financial None of these  Work/School Insurance/Financial;Work/School Insurance/Financial Work/School None of these   Family Problems None of these None of these Dealing with Children Family Health Issues None of these None of these None of these   Emotional Problems Depression;Fears;Nervousness;Sadness;Worry;Loss of Interest Sadness Worry Depression;Fears;Nervousness;Sadness;Worry;Loss of Interest Depression;Sadness;Worry Fears;Worry None of these   Spiritual / Anabaptism Concerns No No No No No No No   Physical Problems Appearance;Fatigue;Memory/Concentration;Sexual;Sleep;Tingling in hands or feet Tingling in hands or feet Pain Appearance;Bathing/Dressing;Memory/Concentration None of these None of these None of these   Other Problems  Anxiety- burning fingers             Interval history and content of current session: Mrs. Swann shared she has started back going to PT. Notes feeling excited and hopeful. Shares her memory and concentration continue to improve. Expressed concerns about finances and upcoming MRI. Explored worry and fear about upcoming scan, encouraged to challenge her worry. Discussed thoughts about quitting her job and going to cancer support. Encouraged patient to identify long, medium, and short term goals.       Risk parameters:   Patient reports no suicidal ideation  Patient reports no homicidal ideation  Patient reports no self-injurious behavior  Patient reports no violent behavior   Safety needs:  None at this time      Verbal deficits: None     Patient's response to intervention:The patient's response to intervention is accepting.     Progress toward goals and other mental status changes:  The patient's progress toward goals is fair .      Progress to date:Revise Objectives (Goals)      Goals from last visit: attempted and met        Patient Strengths: verbal, intelligent, successful, good social support, good insight, commitment to wellness      Treatment Plan:individual psychotherapy and consult psychiatrist for  medication evaluation; referral placed for medication management.     Target symptoms: depression, mood swings, adjustment  Why chosen therapy is appropriate versus another modality: relevant to diagnosis, patient responds to this modality, evidence based practice  Outcome monitoring methods: self-report  Therapeutic intervention type: behavior modifying psychotherapy, supportive psychotherapy  Prognosis: Fair      Behavioral goals:    Exercise: continue as medically advised   Stress management: creating a system to organize day to day; utilize support system   Therapy: continue self-care     Return to clinic: as scheduled     Length of Service (minutes direct face-to-face contact): 35 mins face to face, 10mins of clinical work    Diagnosis:     ICD-10-CM ICD-9-CM   1. KIANA (generalized anxiety disorder)  F41.1 300.02                 Julisa Dias PsyD

## 2023-10-16 ENCOUNTER — PATIENT MESSAGE (OUTPATIENT)
Dept: NEUROLOGY | Facility: CLINIC | Age: 43
End: 2023-10-16
Payer: COMMERCIAL

## 2023-10-16 ENCOUNTER — INFUSION (OUTPATIENT)
Dept: INFUSION THERAPY | Facility: HOSPITAL | Age: 43
End: 2023-10-16
Attending: INTERNAL MEDICINE
Payer: COMMERCIAL

## 2023-10-16 VITALS
OXYGEN SATURATION: 98 % | RESPIRATION RATE: 18 BRPM | HEIGHT: 67 IN | WEIGHT: 118.38 LBS | HEART RATE: 105 BPM | BODY MASS INDEX: 18.58 KG/M2 | SYSTOLIC BLOOD PRESSURE: 114 MMHG | DIASTOLIC BLOOD PRESSURE: 80 MMHG | TEMPERATURE: 98 F

## 2023-10-16 DIAGNOSIS — C50.412 MALIGNANT NEOPLASM OF UPPER-OUTER QUADRANT OF LEFT BREAST IN FEMALE, ESTROGEN RECEPTOR NEGATIVE: Primary | ICD-10-CM

## 2023-10-16 DIAGNOSIS — Z17.1 MALIGNANT NEOPLASM OF UPPER-OUTER QUADRANT OF LEFT BREAST IN FEMALE, ESTROGEN RECEPTOR NEGATIVE: Primary | ICD-10-CM

## 2023-10-16 DIAGNOSIS — R41.3 MEMORY LOSS: ICD-10-CM

## 2023-10-16 LAB
ERYTHROCYTE [SEDIMENTATION RATE] IN BLOOD BY WESTERGREN METHOD: 5 MM/HR (ref 0–20)
FOLATE SERPL-MCNC: 9.1 NG/ML (ref 4–24)

## 2023-10-16 PROCEDURE — 82746 ASSAY OF FOLIC ACID SERUM: CPT | Performed by: NURSE PRACTITIONER

## 2023-10-16 PROCEDURE — 86592 SYPHILIS TEST NON-TREP QUAL: CPT | Performed by: NURSE PRACTITIONER

## 2023-10-16 PROCEDURE — 87389 HIV-1 AG W/HIV-1&-2 AB AG IA: CPT | Performed by: NURSE PRACTITIONER

## 2023-10-16 PROCEDURE — 86140 C-REACTIVE PROTEIN: CPT | Performed by: NURSE PRACTITIONER

## 2023-10-16 PROCEDURE — 85651 RBC SED RATE NONAUTOMATED: CPT | Performed by: NURSE PRACTITIONER

## 2023-10-16 PROCEDURE — 63600175 PHARM REV CODE 636 W HCPCS: Performed by: INTERNAL MEDICINE

## 2023-10-16 PROCEDURE — 25000003 PHARM REV CODE 250: Performed by: INTERNAL MEDICINE

## 2023-10-16 PROCEDURE — 83921 ORGANIC ACID SINGLE QUANT: CPT | Performed by: NURSE PRACTITIONER

## 2023-10-16 PROCEDURE — 36591 DRAW BLOOD OFF VENOUS DEVICE: CPT

## 2023-10-16 PROCEDURE — 84425 ASSAY OF VITAMIN B-1: CPT | Performed by: NURSE PRACTITIONER

## 2023-10-16 PROCEDURE — A4216 STERILE WATER/SALINE, 10 ML: HCPCS | Performed by: INTERNAL MEDICINE

## 2023-10-16 RX ORDER — HEPARIN 100 UNIT/ML
500 SYRINGE INTRAVENOUS
Status: CANCELLED | OUTPATIENT
Start: 2023-10-16

## 2023-10-16 RX ORDER — SODIUM CHLORIDE 0.9 % (FLUSH) 0.9 %
10 SYRINGE (ML) INJECTION
Status: CANCELLED | OUTPATIENT
Start: 2023-10-16

## 2023-10-16 RX ORDER — SODIUM CHLORIDE 0.9 % (FLUSH) 0.9 %
10 SYRINGE (ML) INJECTION
Status: DISCONTINUED | OUTPATIENT
Start: 2023-10-16 | End: 2023-10-16 | Stop reason: HOSPADM

## 2023-10-16 RX ORDER — HEPARIN 100 UNIT/ML
500 SYRINGE INTRAVENOUS
Status: DISCONTINUED | OUTPATIENT
Start: 2023-10-16 | End: 2023-10-16 | Stop reason: HOSPADM

## 2023-10-16 RX ADMIN — HEPARIN 500 UNITS: 100 SYRINGE at 02:10

## 2023-10-16 RX ADMIN — SODIUM CHLORIDE, PRESERVATIVE FREE 10 ML: 5 INJECTION INTRAVENOUS at 02:10

## 2023-10-17 ENCOUNTER — PATIENT MESSAGE (OUTPATIENT)
Dept: ENDOCRINOLOGY | Facility: CLINIC | Age: 43
End: 2023-10-17
Payer: COMMERCIAL

## 2023-10-17 ENCOUNTER — PATIENT MESSAGE (OUTPATIENT)
Dept: HEMATOLOGY/ONCOLOGY | Facility: CLINIC | Age: 43
End: 2023-10-17
Payer: COMMERCIAL

## 2023-10-17 ENCOUNTER — OFFICE VISIT (OUTPATIENT)
Dept: PSYCHIATRY | Facility: CLINIC | Age: 43
End: 2023-10-17
Payer: COMMERCIAL

## 2023-10-17 VITALS
BODY MASS INDEX: 18.63 KG/M2 | WEIGHT: 118.69 LBS | DIASTOLIC BLOOD PRESSURE: 74 MMHG | SYSTOLIC BLOOD PRESSURE: 126 MMHG | HEIGHT: 67 IN | HEART RATE: 82 BPM

## 2023-10-17 DIAGNOSIS — F41.1 GAD (GENERALIZED ANXIETY DISORDER): ICD-10-CM

## 2023-10-17 DIAGNOSIS — F33.1 MDD (MAJOR DEPRESSIVE DISORDER), RECURRENT EPISODE, MODERATE: ICD-10-CM

## 2023-10-17 DIAGNOSIS — F32.A DEPRESSION, UNSPECIFIED DEPRESSION TYPE: ICD-10-CM

## 2023-10-17 DIAGNOSIS — F41.9 ANXIETY: Primary | ICD-10-CM

## 2023-10-17 DIAGNOSIS — E27.49 SECONDARY ADRENAL INSUFFICIENCY: ICD-10-CM

## 2023-10-17 DIAGNOSIS — F51.04 INSOMNIA, PSYCHOPHYSIOLOGICAL: Primary | ICD-10-CM

## 2023-10-17 DIAGNOSIS — F90.0 ADHD (ATTENTION DEFICIT HYPERACTIVITY DISORDER), INATTENTIVE TYPE: ICD-10-CM

## 2023-10-17 LAB
CRP SERPL-MCNC: 1.7 MG/L (ref 0–8.2)
RPR SER QL: NORMAL

## 2023-10-17 PROCEDURE — 1160F RVW MEDS BY RX/DR IN RCRD: CPT | Mod: CPTII,S$GLB,,

## 2023-10-17 PROCEDURE — 3078F PR MOST RECENT DIASTOLIC BLOOD PRESSURE < 80 MM HG: ICD-10-PCS | Mod: CPTII,S$GLB,,

## 2023-10-17 PROCEDURE — 90792 PR PSYCHIATRIC DIAGNOSTIC EVALUATION W/MEDICAL SERVICES: ICD-10-PCS | Mod: S$GLB,,,

## 2023-10-17 PROCEDURE — 99999 PR PBB SHADOW E&M-EST. PATIENT-LVL V: ICD-10-PCS | Mod: PBBFAC,,,

## 2023-10-17 PROCEDURE — 1159F PR MEDICATION LIST DOCUMENTED IN MEDICAL RECORD: ICD-10-PCS | Mod: CPTII,S$GLB,,

## 2023-10-17 PROCEDURE — 3078F DIAST BP <80 MM HG: CPT | Mod: CPTII,S$GLB,,

## 2023-10-17 PROCEDURE — 99999 PR PBB SHADOW E&M-EST. PATIENT-LVL V: CPT | Mod: PBBFAC,,,

## 2023-10-17 PROCEDURE — 1159F MED LIST DOCD IN RCRD: CPT | Mod: CPTII,S$GLB,,

## 2023-10-17 PROCEDURE — 3074F PR MOST RECENT SYSTOLIC BLOOD PRESSURE < 130 MM HG: ICD-10-PCS | Mod: CPTII,S$GLB,,

## 2023-10-17 PROCEDURE — 3074F SYST BP LT 130 MM HG: CPT | Mod: CPTII,S$GLB,,

## 2023-10-17 PROCEDURE — 1160F PR REVIEW ALL MEDS BY PRESCRIBER/CLIN PHARMACIST DOCUMENTED: ICD-10-PCS | Mod: CPTII,S$GLB,,

## 2023-10-17 PROCEDURE — 90792 PSYCH DIAG EVAL W/MED SRVCS: CPT | Mod: S$GLB,,,

## 2023-10-17 RX ORDER — TRAZODONE HYDROCHLORIDE 100 MG/1
100 TABLET ORAL NIGHTLY PRN
Qty: 90 TABLET | Refills: 3 | Status: SHIPPED | OUTPATIENT
Start: 2023-10-17 | End: 2023-12-28 | Stop reason: SDUPTHER

## 2023-10-17 RX ORDER — PROPRANOLOL HYDROCHLORIDE 10 MG/1
10 TABLET ORAL 2 TIMES DAILY PRN
Qty: 60 TABLET | Refills: 1 | Status: SHIPPED | OUTPATIENT
Start: 2023-10-17 | End: 2024-03-12

## 2023-10-17 RX ORDER — DIAZEPAM 5 MG/1
5 TABLET ORAL EVERY 6 HOURS PRN
Qty: 2 TABLET | Refills: 0 | Status: SHIPPED | OUTPATIENT
Start: 2023-10-17 | End: 2023-11-16

## 2023-10-18 ENCOUNTER — OFFICE VISIT (OUTPATIENT)
Dept: PSYCHIATRY | Facility: CLINIC | Age: 43
End: 2023-10-18
Payer: COMMERCIAL

## 2023-10-18 DIAGNOSIS — F41.1 GAD (GENERALIZED ANXIETY DISORDER): Primary | ICD-10-CM

## 2023-10-18 LAB — HIV 1+2 AB+HIV1 P24 AG SERPL QL IA: NON REACTIVE

## 2023-10-18 PROCEDURE — 90834 PSYTX W PT 45 MINUTES: CPT | Mod: 95,,,

## 2023-10-18 PROCEDURE — 90834 PR PSYCHOTHERAPY W/PATIENT, 45 MIN: ICD-10-PCS | Mod: 95,,,

## 2023-10-18 NOTE — PROGRESS NOTES
The patient location is:  [place] [address]  The patient phone number is: [phone]  Visit type: Virtual visit with synchronous audio and video  Each patient to whom he or she provides medical services by telemedicine is:  (1) informed of the relationship between the provider and patient and the respective role of any other health care provider with respect to management of the patient; and (2) notified that he or she may decline to receive medical services by telemedicine and may withdraw from such care at any time.    Crisis Disclaimer: Patient was informed that due to the virtual nature of the visit, that if a crisis develops, protocols will be implemented to ensure patient safety, including but not limited to: 1) Initiating a welfare check with local Law Enforcement, 2) Calling 1/National Crisis Hotline, and/or 3) Initiating PEC/CEC procedures.    Time (Face-to-face): 35 minutes    Time (Non-face-to-face): 10 minutes     SYCHO-ONCOLOGY NOTE/ Individual Psychotherapy     Date: 10/18/2023   Site:  [facility]      Therapeutic Intervention: Met with patient.  Outpatient - Supportive psychotherapy 45 min - CPT Code 89685    This includes face to face time and non-face to face time preparing to see the patient, obtaining and/or reviewing separately obtained history, documenting clinical information in the electronic or other health record, independently interpreting results and communicating results to the patient/family/caregiver, or care coordinator.      Patient was last seen by me on 10/13/2023    Problem list  Patient Active Problem List   Diagnosis    Malignant neoplasm of upper-outer quadrant of left breast in female, estrogen receptor negative    Decreased functional mobility    Dehydration    Anemia associated with chemotherapy    Nausea and vomiting    BRCA2 gene mutation positive    Scoliosis    Attention Deficit Hyperactivity Disorder    Therapeutic Drug Monitoring    Postoperative Nausea And Vomiting     Macrocytic Anemia    Generalized Anxiety    Family H/O Diabetes Mellitus    Fatigue    Hyponatremia    Elevated troponin    Diarrhea    Intravascular volume depletion    Hypomagnesemia    Hypokalemia    Anterior T wave inversion    Elevated LFTs    Colitis, indeterminate    Encounter for immunotherapy    Diarrhea due to drug    Pneumonitis    s/p RA-TLH/BSO    JA (acute kidney injury)    Adrenal crisis    Sepsis    Chest pain    Adrenal insufficiency    Axillary web syndrome    Depressed    Constipation    Memory loss    Abnormal MRI       Chief complaint/reason for encounter: depression, anxiety, and cognition   Met with patient to evaluate psychosocial adaptation to survivorship of BREAST CANCER        ONCOLOGY HISTORY  Oncology History   Malignant neoplasm of upper-outer quadrant of left breast in female, estrogen receptor negative   1/19/2021 Cancer Staged    Staging form: Breast, AJCC 8th Edition  - Clinical stage from 1/19/2021: Stage IIIB (cT2, cN1, cM0, G3, ER-, NV-, HER2-)     1/28/2021 Initial Diagnosis    Malignant neoplasm of upper-outer quadrant of left breast in female, estrogen receptor negative     2/18/2021 - 2/18/2021 Chemotherapy    Treatment Summary   Plan Name: OP BREAST DOSE-DENSE AC - DOXORUBICIN CYCLOPHOSPHAMIDE Q2W  Treatment Goal: Curative  Status: Inactive  Start Date:   End Date:   Provider: Jhon Suazo MD  Chemotherapy: DOXOrubicin chemo injection 96 mg, 60 mg/m2, Intravenous, Clinic/[facility] 1 time, 0 of 4 cycles  cyclophosphamide (CYTOXAN) 600 mg/m2 = 965 mg in sodium chloride 0.9% 250 mL chemo infusion, 600 mg/m2, Intravenous, Clinic/[facility] 1 time, 0 of 4 cycles     2/25/2021 - 7/6/2021 Chemotherapy    Treatment Summary   Plan Name: OP BREAST PACLITAXEL WEEKLY + CARBOPLATIN (AUC 5) Q3W FOLLOWED BY AC WITH PEMBROLIZUMAB FOLLOWED BY PEMBROLIZUMAB   Treatment Goal: Curative  Status: Inactive  Start Date: 2/25/2021  End Date: 7/6/2021  Provider: Sandra oStelo MD  Chemotherapy: DOXOrubicin  chemo injection 112 mg, 60 mg/m2 = 112 mg (100 % of original dose 60 mg/m2), Intravenous, Once, 3 of 4 cycles  Dose modification: 60 mg/m2 (original dose 60 mg/m2, Cycle 5)  Administration: 112 mg (5/25/2021), 112 mg (6/15/2021), 110 mg (7/6/2021)  CARBOplatin (PARAPLATIN) 605 mg in sodium chloride 0.9% 250 mL chemo infusion, 605 mg (100 % of original dose 603.5 mg), Intravenous, Clinic/H[facility] 1 time, 4 of 4 cycles  Dose modification:   (original dose 603.5 mg, Cycle 1)  Administration: 605 mg (2/25/2021), 630 mg (3/22/2021), 750 mg (4/13/2021), 750 mg (5/4/2021)  cyclophosphamide 600 mg/m2 = 1,100 mg in sodium chloride 0.9% 100 mL chemo infusion, 600 mg/m2 = 1,100 mg (100 % of original dose 600 mg/m2), Intravenous, Clinic/HOD 1 time, 3 of 4 cycles  Dose modification: 600 mg/m2 (original dose 600 mg/m2, Cycle 5), 600 mg/m2 (Cycle 8)  Administration: 1,100 mg (5/25/2021), 1,100 mg (6/15/2021), 1,100 mg (7/6/2021)  PACLitaxeL (TAXOL) 80 mg/m2 = 132 mg in sodium chloride 0.9% 250 mL chemo infusion, 80 mg/m2 = 132 mg (100 % of original dose 80 mg/m2), Intravenous, Clinic/HOD 1 time, 4 of 4 cycles  Dose modification: 80 mg/m2 (original dose 80 mg/m2, Cycle 1)  Administration: 132 mg (2/25/2021), 132 mg (3/4/2021), 132 mg (3/11/2021), 138 mg (3/22/2021), 138 mg (3/30/2021), 138 mg (4/6/2021), 138 mg (4/13/2021), 138 mg (4/20/2021), 144 mg (4/27/2021), 144 mg (5/4/2021), 144 mg (5/11/2021), 144 mg (5/18/2021)  pembrolizumab (KEYTRUDA) 200 mg in sodium chloride 0.9% 100 mL chemo infusion, 200 mg, Intravenous, [facility] 1 time, 7 of 18 cycles  Administration: 200 mg (2/25/2021), 200 mg (5/25/2021), 200 mg (3/22/2021), 200 mg (4/13/2021), 200 mg (5/4/2021), 200 mg (6/15/2021), 200 mg (7/6/2021)     4/15/2021 - 4/15/2021 Chemotherapy    Treatment Summary   Plan Name: OP BREAST PEMBROLIZUMAB Q3W PACLITAXEL CARBOPLATIN WEEKLY FOLLOWED BY PEMBROLIZUMAB DOXORUBICIN CYCLOPHOSPHAMIDE Q3W   Treatment Goal: Curative  Status:  Inactive  Start Date:   End Date:   Provider: Jhon Suazo MD  Chemotherapy: CARBOplatin (PARAPLATIN) in sodium chloride 0.9% 250 mL chemo infusion,  (original dose ), Intravenous, Clinic/HOD 1 time, 0 of 3 cycles  Dose modification:   (Cycle 1)  cyclophosphamide in sodium chloride 0.9% chemo infusion, 600 mg/m2 = 965 mg (original dose ), Intravenous, Clinic/HOD 1 time, 0 of 1 cycle  Dose modification: 600 mg/m2 (Cycle 2)  DOXOrubicin (ADRIAMYCIN) in sodium chloride 0.9% 250 mL chemo infusion, 60 mg/m2 (original dose ), Intravenous, Clinic/HOD 1 time, 0 of 1 cycle  Dose modification: 60 mg/m2 (Cycle 2)  PACLitaxeL (TAXOL) in sodium chloride 0.9% 100 mL chemo infusion, 80 mg/m2 (original dose ), Intravenous, Clinic/HOD 1 time, 0 of 1 cycle  Dose modification: 80 mg/m2 (Cycle 1)  PEMEtrexed (ALIMTA) in sodium chloride 0.9% chemo infusion, 500 mg/m2, Intravenous, Clinic/HOD 1 time, 0 of 33 cycles  pembrolizumab (KEYTRUDA) 200 mg in sodium chloride 0.9% 100 mL chemo infusion, 200 mg, Intravenous, Clinic/HOD 1 time, 0 of 35 cycles     11/19/2021 - 1/4/2022 Radiation Therapy    Treating physician: Maryann Perales  Total Dose: 50 Gy  Fractions: 25    DIAGNOSIS:  C50.412 - Malignant neoplasm of upper-outer quadrant of left female breast, Diagnosed 11/11/2021 (Active) Stage IIIB, T2, N1, M0, G3, HER2 Neg, ER Neg, NH Neg    This is a 41 y.o. y/o female with cT2,c N1 and M0 G3, (Stage IIIB), triple negative, Ki-67 79%, LEFT upper outer quadrant breast, BRCA2+, status post lumpectomy 1/19/21, with residual larisa disease, followed by  adjuvant chemo-immunotherapy with carbo-taxol and pembrolizumab, followed by AC with pembrolizumab, followed by adjuvant pemprolizumab complicated by ongoing, chronic colitis/diarrhea necessitating long-term prednisone.  Underwent bilateral mastectomy 9/29/21 with no residual carcinoma identified in breast of 14 sampled nodes (ypN0). She has completed adjuvant LEFT breast and regional larisa  radiation therapy to a dose of 50Gy.    Treatment Summary  Course: C1 BREAST 2021    Treatment Site Ref. ID Energy Dose/Fx (Gy) #Fx Dose Correction (Gy) Total Dose (Gy) Start Date End Date Elapsed Days   3D Sclav_L rxsc 18X/6X 2 25 / 25 0 50 11/19/2021 1/4/2022 46   3D Breast L Breast_L 18X/6X 2 25 / 25 0 50 11/19/2021 1/4/2022 46     Tolerated well.  2 day treatment break at patient's request for patch of dry desquamation, and 5 day treatment break due to CoVID infection.  Otherwise, patient completed her course of therapy as prescribed.       PLAN: RTC 1 month for routine follow up. Continue current skin care/sun protection for now.  Follow up with other providers as directed.         Objective:  Alison Swann arrived promptly for the session. The patient was fully cooperative throughout the session.  Appearance: age appropriate, casually  dressed, adequately  groomed  Behavior/Cooperation: friendly and cooperative  Speech: normal in rate, volume, and tone  Mood: happy  Affect: appropriate, full range  Thought Process: goal-directed, logical  Thought Content: normal,  No delusions or paranoia; did not appear to be responding to internal stimuli during the session  Orientation: grossly intact  Memory: grossly intact  Attention Span/Concentration: Attends to session without distraction; reports no difficulty  Fund of Knowledge: average  Estimate of Intelligence: average from verbal skills and history  Cognition: grossly intact  Insight: patient has awareness of illness; good insight into own behavior and behavior of others  Judgment: the patient's behavior is adequate to circumstances    NCCN Distress thermometer:       9/21/2023     8:50 AM 8/2/2023     6:58 AM 5/8/2023     2:03 PM 3/20/2023     9:57 AM 12/19/2022     8:53 AM 10/12/2022    10:59 AM 10/12/2022    10:56 AM   DISTRESS SCREENING   Distress Score 9 6 2 9 3 10 - Extreme Distress 0 - No Distress   Practical Problems Insurance/Financial None of these  "Work/School Insurance/Financial;Work/School Insurance/Financial Work/School None of these   Family Problems None of these None of these Dealing with Children Family Health Issues None of these None of these None of these   Emotional Problems Depression;Fears;Nervousness;Sadness;Worry;Loss of Interest Sadness Worry Depression;Fears;Nervousness;Sadness;Worry;Loss of Interest Depression;Sadness;Worry Fears;Worry None of these   Spiritual / Scientology Concerns No No No No No No No   Physical Problems Appearance;Fatigue;Memory/Concentration;Sexual;Sleep;Tingling in hands or feet Tingling in hands or feet Pain Appearance;Bathing/Dressing;Memory/Concentration None of these None of these None of these   Other Problems  Anxiety- burning fingers             Interval history and content of current session: Mrs. Swann notes increased mood swings in the last week. She was able to see Mehnaz Gaitan NP for medication management and found her to be extremely helpful. Patient notes concerns about having to return to work in a couple of weeks. Notes completing her identifying goals assignment left her feeling "bittersweet" because she finds michel in helping other breast cancer patients and survivors but believes it will be difficult to transition into that space. Identified specific plans and steps to help achieve short term goals.      Risk parameters:   Patient reports no suicidal ideation  Patient reports no homicidal ideation  Patient reports no self-injurious behavior  Patient reports no violent behavior   Safety needs:  None at this time      Verbal deficits: None     Patient's response to intervention:The patient's response to intervention is accepting.     Progress toward goals and other mental status changes:  The patient's progress toward goals is fair .      Progress to date:Revise Objectives (Goals)      Goals from last visit: attempted and met        Patient Strengths: verbal, intelligent, successful, good social support, " good insight, commitment to wellness      Treatment Plan:individual psychotherapy and consult psychiatrist for medication evaluation; referral placed for medication management.     Target symptoms: depression, mood swings, adjustment  Why chosen therapy is appropriate versus another modality: relevant to diagnosis, patient responds to this modality, evidence based practice  Outcome monitoring methods: self-report  Therapeutic intervention type: behavior modifying psychotherapy, supportive psychotherapy  Prognosis: Fair      Behavioral goals:    Exercise: continue as medically advised, engage in PT as suggested   Stress management: creating a system to organize day to day; utilize support system   Therapy: engaging in value driven activity 10-15 minutes per day    Return to clinic: as scheduled     Length of Service (minutes direct face-to-face contact): 35 mins face to face, 10mins of clinical work    Diagnosis:     ICD-10-CM ICD-9-CM   1. KIANA (generalized anxiety disorder)  F41.1 300.02                 Julisa Dias PsyD

## 2023-10-19 ENCOUNTER — TELEPHONE (OUTPATIENT)
Dept: NEUROLOGY | Facility: CLINIC | Age: 43
End: 2023-10-19
Payer: COMMERCIAL

## 2023-10-20 ENCOUNTER — TELEPHONE (OUTPATIENT)
Dept: NEUROLOGY | Facility: CLINIC | Age: 43
End: 2023-10-20
Payer: COMMERCIAL

## 2023-10-20 ENCOUNTER — OFFICE VISIT (OUTPATIENT)
Dept: FAMILY MEDICINE | Facility: CLINIC | Age: 43
End: 2023-10-20
Payer: COMMERCIAL

## 2023-10-20 VITALS
RESPIRATION RATE: 18 BRPM | HEART RATE: 92 BPM | TEMPERATURE: 98 F | DIASTOLIC BLOOD PRESSURE: 72 MMHG | OXYGEN SATURATION: 100 % | SYSTOLIC BLOOD PRESSURE: 118 MMHG | HEIGHT: 67 IN | WEIGHT: 117 LBS | BODY MASS INDEX: 18.36 KG/M2

## 2023-10-20 DIAGNOSIS — E27.40 ADRENAL INSUFFICIENCY: ICD-10-CM

## 2023-10-20 DIAGNOSIS — E27.2 ADRENAL CRISIS: ICD-10-CM

## 2023-10-20 DIAGNOSIS — C50.412 MALIGNANT NEOPLASM OF UPPER-OUTER QUADRANT OF LEFT BREAST IN FEMALE, ESTROGEN RECEPTOR NEGATIVE: ICD-10-CM

## 2023-10-20 DIAGNOSIS — R41.3 MEMORY LOSS: ICD-10-CM

## 2023-10-20 DIAGNOSIS — F90.0 ADHD (ATTENTION DEFICIT HYPERACTIVITY DISORDER), INATTENTIVE TYPE: ICD-10-CM

## 2023-10-20 DIAGNOSIS — F33.1 MDD (MAJOR DEPRESSIVE DISORDER), RECURRENT EPISODE, MODERATE: ICD-10-CM

## 2023-10-20 DIAGNOSIS — Z17.1 MALIGNANT NEOPLASM OF UPPER-OUTER QUADRANT OF LEFT BREAST IN FEMALE, ESTROGEN RECEPTOR NEGATIVE: ICD-10-CM

## 2023-10-20 LAB — VIT B1 BLD-SCNC: 130.1 NMOL/L (ref 66.5–200)

## 2023-10-20 PROCEDURE — 99214 OFFICE O/P EST MOD 30 MIN: CPT | Mod: S$GLB,,, | Performed by: FAMILY MEDICINE

## 2023-10-20 PROCEDURE — 99214 PR OFFICE/OUTPT VISIT, EST, LEVL IV, 30-39 MIN: ICD-10-PCS | Mod: S$GLB,,, | Performed by: FAMILY MEDICINE

## 2023-10-20 PROCEDURE — 1159F MED LIST DOCD IN RCRD: CPT | Mod: CPTII,S$GLB,, | Performed by: FAMILY MEDICINE

## 2023-10-20 PROCEDURE — 99999 PR PBB SHADOW E&M-EST. PATIENT-LVL IV: CPT | Mod: PBBFAC,,, | Performed by: FAMILY MEDICINE

## 2023-10-20 PROCEDURE — 3078F DIAST BP <80 MM HG: CPT | Mod: CPTII,S$GLB,, | Performed by: FAMILY MEDICINE

## 2023-10-20 PROCEDURE — 3074F PR MOST RECENT SYSTOLIC BLOOD PRESSURE < 130 MM HG: ICD-10-PCS | Mod: CPTII,S$GLB,, | Performed by: FAMILY MEDICINE

## 2023-10-20 PROCEDURE — 1159F PR MEDICATION LIST DOCUMENTED IN MEDICAL RECORD: ICD-10-PCS | Mod: CPTII,S$GLB,, | Performed by: FAMILY MEDICINE

## 2023-10-20 PROCEDURE — 3008F BODY MASS INDEX DOCD: CPT | Mod: CPTII,S$GLB,, | Performed by: FAMILY MEDICINE

## 2023-10-20 PROCEDURE — 3078F PR MOST RECENT DIASTOLIC BLOOD PRESSURE < 80 MM HG: ICD-10-PCS | Mod: CPTII,S$GLB,, | Performed by: FAMILY MEDICINE

## 2023-10-20 PROCEDURE — 3074F SYST BP LT 130 MM HG: CPT | Mod: CPTII,S$GLB,, | Performed by: FAMILY MEDICINE

## 2023-10-20 PROCEDURE — 3008F PR BODY MASS INDEX (BMI) DOCUMENTED: ICD-10-PCS | Mod: CPTII,S$GLB,, | Performed by: FAMILY MEDICINE

## 2023-10-20 PROCEDURE — 99999 PR PBB SHADOW E&M-EST. PATIENT-LVL IV: ICD-10-PCS | Mod: PBBFAC,,, | Performed by: FAMILY MEDICINE

## 2023-10-20 NOTE — PROGRESS NOTES
[facility] Note    Subjective:    Chief Complaint:   Chief Complaint   Patient presents with    Anxiety    Depression    Insomnia       274}    43 y.o. female presents to [facility] care. She is a former pt of Dr Farah. Since , she has been diagnosed with Breast cancer, and is  s/p  Bilateral mastectomy, chemo, radiation and breast reconstruction x 3. She also had complications from the reconstructions which required additional surgical procedures. She has chemo induced neuropathy, and memory issues. She is scheduled for an MRI of the brain. She has anxiety and depression and is seeing a psychologist. She is on chronic steroids due to adrenal insufficiency       The HPI and pertinent ROS is included in the Diagnostic Impression Remarks section at the end of the note. Please see below for further details.     The following portions of the patient's history were reviewed and updated as appropriate: allergies, current medications, past family history, past medical history, past social history, past surgical history and problem list.    She  has a past medical history of Attention Deficit Hyperactivity Disorder, Family H/O Diabetes Mellitus, Generalized Anxiety, Left Breast Cancer S.P Lumpectomy In 2021, Macrocytic Anemia, Postoperative Nausea And Vomiting, Scoliosis, Therapeutic Drug Monitoring, and Wellness Visit 2021.  She  has a past surgical history that includes Augmentation of breast (); Tubal ligation;  section; Breast surgery; Breast mass excision (Left, 2021); [place] lymph node biopsy (Left, 2021); Insertion of tunneled central venous catheter (CVC) with subcutaneous port (N/A, 2021); Esophagogastroduodenoscopy (N/A, 2021); Colonoscopy (N/A, 2021); Robot-assisted laparoscopic abdominal hysterectomy using da Jeff Xi (N/A, 2022); Robot-assisted laparoscopic salpingo-oophorectomy using da Jeff Xi (Bilateral, 2022);  "Hysterectomy; and Oophorectomy.    She  reports that she has never smoked. She has never used smokeless tobacco. She reports current alcohol use. She reports that she does not use drugs.  She family history includes Anemia in her father; Breast cancer (age of onset: 52) in her mother; COPD in her maternal grandmother; Cancer in an other family member; Depression in her mother; Diabetes in her father; Hyperlipidemia in her father; Pancreatic cancer in an other family member; Stroke in her father.    Review of patient's allergies indicates:   Allergen Reactions    Penicillins Hives     As a child        Physical Examination  /72 (BP Location: Right arm, Patient Position: Sitting, BP Method: Medium (Manual))   Pulse 92   Temp 98.1 °F (36.7 °C)   Resp 18   Ht 5' 7" (1.702 m)   Wt 53.1 kg (117 lb)   LMP  (LMP Unknown) Comment: hysterectomy 3/4/22  SpO2 100%   BMI 18.32 kg/m²    274}  Wt Readings from Last 3 Encounters:   10/20/23 53.1 kg (117 lb)   10/17/23 53.8 kg (118 lb 11.5 oz)   10/16/23 53.7 kg (118 lb 6.4 oz)     BP Readings from Last 3 Encounters:   10/20/23 118/72   10/17/23 126/74   10/16/23 114/80                Estimated body mass index is 18.32 kg/m² as calculated from the following:    Height as of this encounter: 5' 7" (1.702 m).    Weight as of this encounter: 53.1 kg (117 lb).     Physical Exam  Vitals and nursing note reviewed.   Constitutional:       Appearance: Normal appearance. She is normal weight.   Eyes:      Extraocular Movements: Extraocular movements intact.      Pupils: Pupils are equal, round, and reactive to light.   Cardiovascular:      Rate and Rhythm: Normal rate and regular rhythm.      Pulses: Normal pulses.      Heart sounds: Normal heart sounds.   Pulmonary:      Effort: Pulmonary effort is normal. No respiratory distress.      Breath sounds: Normal breath sounds. No wheezing or rhonchi.      Comments: Right upper chest wall with port  Skin:     General: Skin is warm " and dry.   Neurological:      General: No focal deficit present.      Mental Status: She is alert and oriented to person, place, and time. Mental status is at baseline.   Psychiatric:         Mood and Affect: Mood normal.         Behavior: Behavior normal.        MRI Brain W WO Contrast  Narrative: EXAMINATION:  MRI BRAIN W WO CONTRAST    CLINICAL HISTORY:  Memory loss;hx of breast cancer;.  Other amnesia    TECHNIQUE:  Multiplanar multisequence MR imaging of the brain was performed before and after the administration of 5 mL Gadavist intravenous contrast.  Diffusion-weighted imaging was performed.  ADC map was generated.    COMPARISON:  Brain MRI dated 05/18/2022    FINDINGS:  Intracranial compartment:    There is artifact emanating from the face, possibly related to braces or other dental hardware.  Also, there is some pulsation artifact in the posterior fossa.  There is, however, subtle abnormal FLAIR and T2 hyperintense signal in the lateral right cerebellum.  Although there is artifact, I am suspicious for very subtle leptomeningeal enhancement in the cerebellum.  There are no regions of restricted diffusion to suggest acute infarction.  There is no intracranial hemorrhage.  There is no mass.  There is no hydrocephalus or midline shift.  There is no abnormal extra-axial fluid collection.  The basilar cisterns are open.  Flow voids indicating patency are present in the major vessels at the base of the brain.  The cerebellar tonsils remain in normal position.  Sellar structures are grossly normal.    Skull/extracranial contents:    Baseline marrow signal is normal.  There is mucosal thickening in several left ethmoid air cells.  There is no fluid in the paranasal sinuses but the maxillary sinuses are suboptimally evaluated due to artifact from the face.  Impression: 1. Somewhat limited evaluation as discussed above related to artifact emanating from the patient's face, possibly braces.  Also, there is some  pulsation artifact through the posterior fossa.  There is, however, subtle abnormal signal in the lateral right cerebellum and suspected minimal leptomeningeal enhancement in the posterior fossa.  Findings may reflect early leptomeningeal disease/carcinomatosis.  CSF analysis may be warranted.  Alternatively, short-term follow-up MRI with out and with contrast may obtained is well to further evaluate suspected findings in the posterior fossa.  This report was flagged in Epic as abnormal.    Electronically signed by: Sam Brantley MD  Date:    08/28/2023  Time:    13:32      Data reviewed 274}  Previous medical records reviewed and summarized in HPI.   Health Maintenance Due   Topic Date Due    COVID-19 Vaccine (1) Never done    Pneumococcal Vaccines (Age 0-64) (1 - PCV) Never done    TETANUS VACCINE  Never done    Influenza Vaccine (1) Never done         Laboratory  274}  I have reviewed old labs below:  Lab Results   Component Value Date    WBC 5.98 08/30/2023    HGB 12.5 08/30/2023    HCT 36.7 (L) 08/30/2023    MCV 89 08/30/2023     08/30/2023     08/30/2023    K 3.8 08/30/2023     08/30/2023    CALCIUM 9.1 08/30/2023    PHOS 4.5 09/02/2021    CO2 26 08/30/2023    GLU 93 08/30/2023    BUN 9 08/30/2023    CREATININE 0.5 08/30/2023    ANIONGAP 6 (L) 08/30/2023    ESTGFRAFRICA >60 04/29/2022    EGFRNONAA >60 04/29/2022    PROT 6.1 06/14/2023    ALBUMIN 3.9 06/14/2023    BILITOT 0.3 06/14/2023    ALKPHOS 74 06/14/2023    ALT 10 06/14/2023    AST 15 06/14/2023    INR 1.2 03/12/2022    CHOL 154 06/14/2023    TRIG 93 06/14/2023    HDL 60 06/14/2023    LDLCALC 75.4 06/14/2023    TSH 0.830 08/30/2023    HGBA1C 5.0 10/12/2022     Lab reviewed by me: Particular labs of significance that I will monitor, workup, or treat to improve are mentioned below in diagnostic impression remarks.  :[id number]}274}  Imaging/EKG: I have reviewed the pertinent results/findings and my personal findings are noted below in  "diagnostic impression remarks.  274}    Assessment/Plan  Alison Swann is a 43 y.o. female who presents to clinic with:    1. Malignant neoplasm of upper-outer quadrant of left breast in female, estrogen receptor negative    2. Attention Deficit Hyperactivity Disorder    3. Adrenal crisis    4. Adrenal insufficiency    5. MDD (major depressive disorder), recurrent episode, moderate    6. Memory loss        Diagnostic Impression Remarks + HPI     Documentation entered by me for this encounter may have been done in part using speech-recognition technology. Although I have made an effort to ensure accuracy, "sound like" errors may exist and should be interpreted in context.     1. Malignant neoplasm of upper-outer quadrant of left breast in female, estrogen receptor negative  Assessment & Plan:  Monitored by oncology q 3 months, MRI of brain scheduled      2. Attention Deficit Hyperactivity Disorder  Assessment & Plan:  Stable on adderall      3. Adrenal crisis  Assessment & Plan:  Stable on oral steroids      4. Adrenal insufficiency  Assessment & Plan:  Stable on oral steroids      5. MDD (major depressive disorder), recurrent episode, moderate  Assessment & Plan:  Is followed by psychology      6. Memory loss  Overview:  Onset 2021 post chemo/immunotherapy tx, worse over time     MMSE:  28/30 Aug 2023    Assessment & Plan:  MRI scheduled to assess for metastatic disease           This is the extent of the patient's complaints at this present time. She denies chest pain upon exertion, dyspnea, nausea, vomiting, diaphoresis, and syncope. No pleuritic chest pain, unilateral leg swelling, calf tenderness, or calf pain.     Alison will return to clinic in a few months for further workup and reassessment or sooner as needed. She was instructed to call the clinic or go to the emergency department if her symptoms do not improve, worsens, or if new symptoms develop. As we discussed that symptoms could worsen over the " "next 24 hours she was advised that if any increased swelling, pain, or numbness arise to go immediately to the ED. Patient knows to call any time if an emergency arises. Shared decision making occurred and she verbalized understanding in agreement with this plan.     274}    BMI Goal    Counseled patient on her ideal body weight, health consequences of being obese and current recommendations including weekly exercise and a heart healthy diet.  She is aware that ideal BMI < 25  Estimated body mass index is 18.32 kg/m² as calculated from the following:    Height as of this encounter: 5' 7" (1.702 m).    Weight as of this encounter: 53.1 kg (117 lb).     She was counseled about the importance of healthy dietary habits as well as routine physical activity and exercise for better health outcomes. I also discussed the importance of cancer screening.     Medication Monitoring    In today's visit, monitoring for drug toxicity was accomplished. Proper use of medications was also discussed.     I discussed imaging findings, diagnosis, possibilities, treatment options, medications, risks, and benefits. She had many questions regarding the options and long-term effects. All questions were answered. She expressed understanding after counseling regarding the diagnosis and recommendations. She was capable and demonstrated competence with understanding of these options. Shared decision making was performed resulting in her choosing the current treatment plan. Patient handout was given with instructions and recommendations. Advised the patient that if they become pregnant to alert us immediately to assess for medication changes.     I also discussed the importance of close follow up to discuss labs, change or modify her medications if needed, monitor side effects, and further evaluation of medical problems.     Additional workup planned: see labs ordered below.    See below for labs and meds ordered with associated diagnosis      1. " Malignant neoplasm of upper-outer quadrant of left breast in female, estrogen receptor negative    2. Attention Deficit Hyperactivity Disorder    3. Adrenal crisis    4. Adrenal insufficiency    5. MDD (major depressive disorder), recurrent episode, moderate    6. Memory loss    Medication List with Changes/Refills   Current Medications    DEXTROAMPHETAMINE-AMPHETAMINE (ADDERALL) 10 MG TAB    Take 2 tablets (20 mg total) by mouth 2 (two) times a day.    DIAZEPAM (VALIUM) 5 MG TABLET    Take 1 tablet (5 mg total) by mouth every 6 (six) hours as needed for Anxiety (take 1 prior to MRI).    ESTRADIOL (IMVEXXY MAINTENANCE PACK) 10 MCG INST    Place 10 mcg vaginally twice a week.    ESTRADIOL (VAGIFEM) 10 MCG TAB    Place vaginally.    HYDROCORTISONE (CORTEF) 10 MG TAB    20 mg in AM and 20 mg in early afternoon    HYDROCORTISONE (CORTEF) 5 MG TAB    Take 1 tablet every day by oral route in the evening.    LIDOCAINE VISCOUS 2 % SOLUTION    SMARTSIG:sparingly Topical PRN    ONDANSETRON (ZOFRAN ODT ORAL)        ONDANSETRON (ZOFRAN-ODT) 8 MG TBDL    DISSOLVE 1 TABLET ON THE TONGUE EVERY 6 TO 8 HOURS AS NEEDED FOR NAUSEA    PROPRANOLOL (INDERAL) 10 MG TABLET    Take 1 tablet (10 mg total) by mouth 2 (two) times daily as needed (anxiety).    TRAZODONE (DESYREL) 100 MG TABLET    Take 1 tablet (100 mg total) by mouth nightly as needed for Insomnia.    VALACYCLOVIR (VALTREX) 500 MG TABLET    Take 1 tablet (500 mg total) by mouth 2 (two) times daily.    VALACYCLOVIR (VALTREX) 500 MG TABLET    as needed.     Modified Medications    No medications on file       Marilia Suazo MD  274}    10/20/2023     If you are due for any health screening(s) below please notify me so we can arrange them to be ordered and scheduled to maintain your health.     Health Maintenance Due   Topic Date Due    COVID-19 Vaccine (1) Never done    Pneumococcal Vaccines (Age 0-64) (1 - PCV) Never done    TETANUS VACCINE  Never done    Influenza Vaccine  (1) Never done

## 2023-10-23 ENCOUNTER — PATIENT MESSAGE (OUTPATIENT)
Dept: PSYCHIATRY | Facility: CLINIC | Age: 43
End: 2023-10-23
Payer: COMMERCIAL

## 2023-10-23 ENCOUNTER — PATIENT MESSAGE (OUTPATIENT)
Dept: HEMATOLOGY/ONCOLOGY | Facility: CLINIC | Age: 43
End: 2023-10-23
Payer: COMMERCIAL

## 2023-10-23 ENCOUNTER — HOSPITAL ENCOUNTER (OUTPATIENT)
Dept: RADIOLOGY | Facility: HOSPITAL | Age: 43
Discharge: HOME | End: 2023-10-23
Attending: INTERNAL MEDICINE
Payer: COMMERCIAL

## 2023-10-23 ENCOUNTER — PATIENT MESSAGE (OUTPATIENT)
Dept: NEUROLOGY | Facility: CLINIC | Age: 43
End: 2023-10-23
Payer: COMMERCIAL

## 2023-10-23 DIAGNOSIS — R42 DIZZINESS AND GIDDINESS: ICD-10-CM

## 2023-10-23 PROCEDURE — 70553 MRI BRAIN W WO CONTRAST: ICD-10-PCS | Mod: 26,,, | Performed by: STUDENT IN AN ORGANIZED HEALTH CARE EDUCATION/TRAINING PROGRAM

## 2023-10-23 PROCEDURE — 70553 MRI BRAIN STEM W/O & W/DYE: CPT | Mod: TC

## 2023-10-23 PROCEDURE — 25500020 PHARM REV CODE 255: Performed by: INTERNAL MEDICINE

## 2023-10-23 PROCEDURE — A9585 GADOBUTROL INJECTION: HCPCS | Performed by: INTERNAL MEDICINE

## 2023-10-23 PROCEDURE — 70553 MRI BRAIN STEM W/O & W/DYE: CPT | Mod: 26,,, | Performed by: STUDENT IN AN ORGANIZED HEALTH CARE EDUCATION/TRAINING PROGRAM

## 2023-10-23 RX ORDER — GADOBUTROL 604.72 MG/ML
6 INJECTION INTRAVENOUS
Status: COMPLETED | OUTPATIENT
Start: 2023-10-23 | End: 2023-10-23

## 2023-10-23 RX ORDER — HYDROCORTISONE 10 MG/1
10 TABLET ORAL DAILY
Qty: 90 TABLET | Refills: 3 | Status: SHIPPED | OUTPATIENT
Start: 2023-10-23 | End: 2024-01-06 | Stop reason: SDUPTHER

## 2023-10-23 RX ADMIN — GADOBUTROL 6 ML: 604.72 INJECTION INTRAVENOUS at 08:10

## 2023-10-24 ENCOUNTER — PATIENT MESSAGE (OUTPATIENT)
Dept: NEUROLOGY | Facility: CLINIC | Age: 43
End: 2023-10-24
Payer: COMMERCIAL

## 2023-10-25 ENCOUNTER — OFFICE VISIT (OUTPATIENT)
Dept: PSYCHIATRY | Facility: CLINIC | Age: 43
End: 2023-10-25
Payer: COMMERCIAL

## 2023-10-25 ENCOUNTER — PATIENT MESSAGE (OUTPATIENT)
Dept: PSYCHIATRY | Facility: CLINIC | Age: 43
End: 2023-10-25
Payer: COMMERCIAL

## 2023-10-25 DIAGNOSIS — F32.A DEPRESSION, UNSPECIFIED DEPRESSION TYPE: ICD-10-CM

## 2023-10-25 DIAGNOSIS — F41.1 GAD (GENERALIZED ANXIETY DISORDER): Primary | ICD-10-CM

## 2023-10-25 DIAGNOSIS — F90.0 ADHD (ATTENTION DEFICIT HYPERACTIVITY DISORDER), INATTENTIVE TYPE: ICD-10-CM

## 2023-10-25 PROCEDURE — 90834 PSYTX W PT 45 MINUTES: CPT | Mod: 95,,,

## 2023-10-25 PROCEDURE — 90834 PR PSYCHOTHERAPY W/PATIENT, 45 MIN: ICD-10-PCS | Mod: 95,,,

## 2023-10-25 NOTE — PROGRESS NOTES
The patient location is:  [place], [place]  The patient phone number is: [phone]  Visit type: Virtual visit with synchronous audio and video  Each patient to whom he or she provides medical services by telemedicine is:  (1) informed of the relationship between the provider and patient and the respective role of any other health care provider with respect to management of the patient; and (2) notified that he or she may decline to receive medical services by telemedicine and may withdraw from such care at any time.    Crisis Disclaimer: Patient was informed that due to the virtual nature of the visit, that if a crisis develops, protocols will be implemented to ensure patient safety, including but not limited to: 1) Initiating a welfare check with local Law Enforcement, 2) Calling 1/National Crisis Hotline, and/or 3) Initiating PEC/CEC procedures.    Time (Face-to-face): 17 minutes    Time (Non-face-to-face): 25 minutes     PSYCHO-ONCOLOGY NOTE/ Individual Psychotherapy     Date: 10/25/2023   Site:  [facility]      Therapeutic Intervention: Met with patient.  Outpatient - Insight oriented psychotherapy 45 min - CPT code 65161 and Outpatient - Supportive psychotherapy 45 min - CPT Code 87327    This includes face to face time and non-face to face time preparing to see the patient, obtaining and/or reviewing separately obtained history, documenting clinical information in the electronic or other health record, independently interpreting results and communicating results to the patient/family/caregiver, or care coordinator.      Patient was last seen by me on 10/18/2023    Problem list  Patient Active Problem List   Diagnosis    Malignant neoplasm of upper-outer quadrant of left breast in female, estrogen receptor negative    Decreased functional mobility    Dehydration    Anemia associated with chemotherapy    Nausea and vomiting    BRCA2 gene mutation positive    Scoliosis    Attention Deficit Hyperactivity Disorder     Therapeutic Drug Monitoring    Postoperative Nausea And Vomiting    Macrocytic Anemia    KIANA (generalized anxiety disorder)    Family H/O Diabetes Mellitus    Fatigue    Hyponatremia    Elevated troponin    Diarrhea    Intravascular volume depletion    Hypomagnesemia    Hypokalemia    Anterior T wave inversion    Elevated LFTs    Colitis, indeterminate    Encounter for immunotherapy    Diarrhea due to drug    Pneumonitis    s/p RA-TLH/BSO    JA (acute kidney injury)    Adrenal crisis    Sepsis    Chest pain    Adrenal insufficiency    Axillary web syndrome    Depressed    Constipation    Memory loss    Abnormal MRI    Insomnia, psychophysiological    MDD (major depressive disorder), recurrent episode, moderate       Chief complaint/reason for encounter: depression and anxiety   Met with patient to evaluate psychosocial adaptation to diagnosis/treatment/survivorship of breast cancer    Current Medications  Current Outpatient Medications   Medication    dextroamphetamine-amphetamine (ADDERALL) 10 mg Tab    diazePAM (VALIUM) 5 MG tablet    estradioL (IMVEXXY MAINTENANCE PACK) 10 mcg Inst    estradioL (VAGIFEM) 10 mcg Tab    hydrocortisone (CORTEF) 10 MG Tab    hydrocortisone (CORTEF) 5 MG Tab    LIDOCAINE VISCOUS 2 % solution    ondansetron (ZOFRAN ODT ORAL)    ondansetron (ZOFRAN-ODT) 8 MG TbDL    propranoloL (INDERAL) 10 MG tablet    traZODone (DESYREL) 100 MG tablet    valACYclovir (VALTREX) 500 MG tablet    valACYclovir (VALTREX) 500 MG tablet     No current facility-administered medications for this visit.     Facility-Administered Medications Ordered in Other Visits   Medication Frequency    lactated ringers infusion Continuous    sodium chloride 0.9% flush 10 mL PRN       ONCOLOGY HISTORY  Oncology History   Malignant neoplasm of upper-outer quadrant of left breast in female, estrogen receptor negative   1/19/2021 Cancer Staged    Staging form: Breast, AJCC 8th Edition  - Clinical stage from 1/19/2021: Stage  IIIB (cT2, cN1, cM0, G3, ER-, ND-, HER2-)     1/28/2021 Initial Diagnosis    Malignant neoplasm of upper-outer quadrant of left breast in female, estrogen receptor negative     2/18/2021 - 2/18/2021 Chemotherapy    Treatment Summary   Plan Name: OP BREAST DOSE-DENSE AC - DOXORUBICIN CYCLOPHOSPHAMIDE Q2W  Treatment Goal: Curative  Status: Inactive  Start Date:   End Date:   Provider: Jhon Suazo MD  Chemotherapy: DOXOrubicin chemo injection 96 mg, 60 mg/m2, Intravenous, Clinic/[facility] 1 time, 0 of 4 cycles  cyclophosphamide (CYTOXAN) 600 mg/m2 = 965 mg in sodium chloride 0.9% 250 mL chemo infusion, 600 mg/m2, Intravenous, Clinic/[facility] 1 time, 0 of 4 cycles     2/25/2021 - 7/6/2021 Chemotherapy    Treatment Summary   Plan Name: OP BREAST PACLITAXEL WEEKLY + CARBOPLATIN (AUC 5) Q3W FOLLOWED BY AC WITH PEMBROLIZUMAB FOLLOWED BY PEMBROLIZUMAB   Treatment Goal: Curative  Status: Inactive  Start Date: 2/25/2021  End Date: 7/6/2021  Provider: Sandra Sotelo MD  Chemotherapy: DOXOrubicin chemo injection 112 mg, 60 mg/m2 = 112 mg (100 % of original dose 60 mg/m2), Intravenous, Once, 3 of 4 cycles  Dose modification: 60 mg/m2 (original dose 60 mg/m2, Cycle 5)  Administration: 112 mg (5/25/2021), 112 mg (6/15/2021), 110 mg (7/6/2021)  CARBOplatin (PARAPLATIN) 605 mg in sodium chloride 0.9% 250 mL chemo infusion, 605 mg (100 % of original dose 603.5 mg), Intravenous, Clinic/[facility] 1 time, 4 of 4 cycles  Dose modification:   (original dose 603.5 mg, Cycle 1)  Administration: 605 mg (2/25/2021), 630 mg (3/22/2021), 750 mg (4/13/2021), 750 mg (5/4/2021)  cyclophosphamide 600 mg/m2 = 1,100 mg in sodium chloride 0.9% 100 mL chemo infusion, 600 mg/m2 = 1,100 mg (100 % of original dose 600 mg/m2), Intravenous, Clinic/[facility] 1 time, 3 of 4 cycles  Dose modification: 600 mg/m2 (original dose 600 mg/m2, Cycle 5), 600 mg/m2 (Cycle 8)  Administration: 1,100 mg (5/25/2021), 1,100 mg (6/15/2021), 1,100 mg (7/6/2021)  PACLitaxeL (TAXOL) 80 mg/m2 = 132  mg in sodium chloride 0.9% 250 mL chemo infusion, 80 mg/m2 = 132 mg (100 % of original dose 80 mg/m2), Intravenous, Clinic/[facility] 1 time, 4 of 4 cycles  Dose modification: 80 mg/m2 (original dose 80 mg/m2, Cycle 1)  Administration: 132 mg (2/25/2021), 132 mg (3/4/2021), 132 mg (3/11/2021), 138 mg (3/22/2021), 138 mg (3/30/2021), 138 mg (4/6/2021), 138 mg (4/13/2021), 138 mg (4/20/2021), 144 mg (4/27/2021), 144 mg (5/4/2021), 144 mg (5/11/2021), 144 mg (5/18/2021)  pembrolizumab (KEYTRUDA) 200 mg in sodium chloride 0.9% 100 mL chemo infusion, 200 mg, Intravenous, Clinic/[facility] 1 time, 7 of 18 cycles  Administration: 200 mg (2/25/2021), 200 mg (5/25/2021), 200 mg (3/22/2021), 200 mg (4/13/2021), 200 mg (5/4/2021), 200 mg (6/15/2021), 200 mg (7/6/2021)     4/15/2021 - 4/15/2021 Chemotherapy    Treatment Summary   Plan Name: OP BREAST PEMBROLIZUMAB Q3W PACLITAXEL CARBOPLATIN WEEKLY FOLLOWED BY PEMBROLIZUMAB DOXORUBICIN CYCLOPHOSPHAMIDE Q3W   Treatment Goal: Curative  Status: Inactive  Start Date:   End Date:   Provider: Jhon Suazo MD  Chemotherapy: CARBOplatin (PARAPLATIN) in sodium chloride 0.9% 250 mL chemo infusion,  (original dose ), Intravenous, Clinic/[facility] 1 time, 0 of 3 cycles  Dose modification:   (Cycle 1)  cyclophosphamide in sodium chloride 0.9% chemo infusion, 600 mg/m2 = 965 mg (original dose ), Intravenous, Clinic/HOD 1 time, 0 of 1 cycle  Dose modification: 600 mg/m2 (Cycle 2)  DOXOrubicin (ADRIAMYCIN) in sodium chloride 0.9% 250 mL chemo infusion, 60 mg/m2 (original dose ), Intravenous, Clinic/HOD 1 time, 0 of 1 cycle  Dose modification: 60 mg/m2 (Cycle 2)  PACLitaxeL (TAXOL) in sodium chloride 0.9% 100 mL chemo infusion, 80 mg/m2 (original dose ), Intravenous, Clinic/HOD 1 time, 0 of 1 cycle  Dose modification: 80 mg/m2 (Cycle 1)  PEMEtrexed (ALIMTA) in sodium chloride 0.9% chemo infusion, 500 mg/m2, Intravenous, Clinic/HOD 1 time, 0 of 33 cycles  pembrolizumab (KEYTRUDA) 200 mg in sodium chloride 0.9% 100  mL chemo infusion, 200 mg, Intravenous, Clinic/[facility] 1 time, 0 of 35 cycles     11/19/2021 - 1/4/2022 Radiation Therapy    Treating physician: Maryann Perales  Total Dose: 50 Gy  Fractions: 25    DIAGNOSIS:  C50.412 - Malignant neoplasm of upper-outer quadrant of left female breast, Diagnosed 11/11/2021 (Active) Stage IIIB, T2, N1, M0, G3, HER2 Neg, ER Neg, WI Neg    This is a 41 y.o. y/o female with cT2,c N1 and M0 G3, (Stage IIIB), triple negative, Ki-67 79%, LEFT upper outer quadrant breast, BRCA2+, status post lumpectomy 1/19/21, with residual larisa disease, followed by  adjuvant chemo-immunotherapy with carbo-taxol and pembrolizumab, followed by AC with pembrolizumab, followed by adjuvant pemprolizumab complicated by ongoing, chronic colitis/diarrhea necessitating long-term prednisone.  Underwent bilateral mastectomy 9/29/21 with no residual carcinoma identified in breast of 14 sampled nodes (ypN0). She has completed adjuvant LEFT breast and regional larisa radiation therapy to a dose of 50Gy.    Treatment Summary  Course: C1 BREAST 2021    Treatment Site Ref. ID Energy Dose/Fx (Gy) #Fx Dose Correction (Gy) Total Dose (Gy) Start Date End Date Elapsed Days   3D Sclav_L rxsc 18X/6X 2 25 / 25 0 50 11/19/2021 1/4/2022 46   3D Breast L Breast_L 18X/6X 2 25 / 25 0 50 11/19/2021 1/4/2022 46     Tolerated well.  2 day treatment break at patient's request for patch of dry desquamation, and 5 day treatment break due to CoVID infection.  Otherwise, patient completed her course of therapy as prescribed.       PLAN: RTC 1 month for routine follow up. Continue current skin care/sun protection for now.  Follow up with other providers as directed.         Objective:  Alison MANTILLA Hue arrived  promptly for the session. The patient was fully cooperative throughout the session.  Appearance: age appropriate, casually  dressed, adequately  groomed  Behavior/Cooperation: friendly and cooperative  Speech: normal in rate, volume, and  tone  Mood: euthymic  Affect: appropriate  Thought Process: goal-directed, logical  Thought Content: normal,  No delusions or paranoia; did not appear to be responding to internal stimuli during the session  Orientation: grossly intact  Memory: grossly intact  Attention Span/Concentration: Attends to session without distraction; reports no difficulty  Fund of Knowledge: average  Estimate of Intelligence: average from verbal skills and history  Cognition: grossly intact  Insight: patient has awareness of illness; good insight into own behavior and behavior of others  Judgment: the patient's behavior is adequate to circumstances    NCCN Distress thermometer:       9/21/2023     8:50 AM 8/2/2023     6:58 AM 5/8/2023     2:03 PM 3/20/2023     9:57 AM 12/19/2022     8:53 AM 10/12/2022    10:59 AM 10/12/2022    10:56 AM   DISTRESS SCREENING   Distress Score 9 6 2 9 3 10 - Extreme Distress 0 - No Distress   Practical Problems Insurance/Financial None of these Work/School Insurance/Financial;Work/School Insurance/Financial Work/School None of these   Family Problems None of these None of these Dealing with Children Family Health Issues None of these None of these None of these   Emotional Problems Depression;Fears;Nervousness;Sadness;Worry;Loss of Interest Sadness Worry Depression;Fears;Nervousness;Sadness;Worry;Loss of Interest Depression;Sadness;Worry Fears;Worry None of these   Spiritual / Congregational Concerns No No No No No No No   Physical Problems Appearance;Fatigue;Memory/Concentration;Sexual;Sleep;Tingling in hands or feet Tingling in hands or feet Pain Appearance;Bathing/Dressing;Memory/Concentration None of these None of these None of these   Other Problems  Anxiety- burning fingers             Interval history and content of current session:  Weeks decreased mood swings in the last week. Improved stress management. She reports favorable results from MRI and blood work. She has been actively engaged in rest and  spending time engaging in value driven behaviors. Identified specific plans and steps to help achieve short term goals.      Risk parameters:   Patient reports no suicidal ideation  Patient reports no homicidal ideation  Patient reports no self-injurious behavior  Patient reports no violent behavior   Safety needs:  None at this time      Verbal deficits: None     Patient's response to intervention:The patient's response to intervention is accepting.     Progress toward goals and other mental status changes:  The patient's progress toward goals is good .      Progress to date:Revise Objectives (Goals)      Goals from last visit: attempted and met        Patient Strengths: verbal, intelligent, successful, good social support, good insight, commitment to wellness      Treatment Plan:individual psychotherapy and consult psychiatrist for medication evaluation; referral placed for medication management.     Target symptoms: depression, mood swings, adjustment  Why chosen therapy is appropriate versus another modality: relevant to diagnosis, patient responds to this modality, evidence based practice  Outcome monitoring methods: self-report  Therapeutic intervention type: behavior modifying psychotherapy, supportive psychotherapy  Prognosis: Fair      Behavioral goals:    Exercise: continue as medically advised, engage in PT as suggested   Stress management: creating a system to organize day to day; utilize support system   Therapy: engaging in value driven activity 10-15 minutes per day    Return to clinic: as scheduled      Time (Face-to-face): 17 minutes     Time (Non-face-to-face): 25 minutes     Diagnosis:     ICD-10-CM ICD-9-CM   1. KIANA (generalized anxiety disorder)  F41.1 300.02   2. Depression, unspecified depression type  F32.A 311   3. Attention Deficit Hyperactivity Disorder  F90.0 314.00                   Julisa Dias PsyD

## 2023-10-26 ENCOUNTER — PATIENT MESSAGE (OUTPATIENT)
Dept: FAMILY MEDICINE | Facility: CLINIC | Age: 43
End: 2023-10-26
Payer: COMMERCIAL

## 2023-10-27 DIAGNOSIS — F90.0 ADHD (ATTENTION DEFICIT HYPERACTIVITY DISORDER), INATTENTIVE TYPE: Primary | ICD-10-CM

## 2023-10-27 RX ORDER — DEXTROAMPHETAMINE SACCHARATE, AMPHETAMINE ASPARTATE, DEXTROAMPHETAMINE SULFATE AND AMPHETAMINE SULFATE 2.5; 2.5; 2.5; 2.5 MG/1; MG/1; MG/1; MG/1
10 TABLET ORAL 2 TIMES DAILY
Qty: 60 TABLET | Refills: 0 | Status: SHIPPED | OUTPATIENT
Start: 2023-10-27 | End: 2023-11-29 | Stop reason: SDUPTHER

## 2023-10-27 NOTE — PROGRESS NOTES
Written by Sandra Sotelo MD on 10/23/2023 12:26 PM CDT  Seen by patient Alisonjuany Swann on 10/23/2023  2:19 PM

## 2023-10-30 ENCOUNTER — PATIENT MESSAGE (OUTPATIENT)
Dept: HEMATOLOGY/ONCOLOGY | Facility: CLINIC | Age: 43
End: 2023-10-30
Payer: COMMERCIAL

## 2023-10-31 ENCOUNTER — PATIENT MESSAGE (OUTPATIENT)
Dept: PSYCHIATRY | Facility: CLINIC | Age: 43
End: 2023-10-31
Payer: COMMERCIAL

## 2023-10-31 ENCOUNTER — OFFICE VISIT (OUTPATIENT)
Dept: PSYCHIATRY | Facility: CLINIC | Age: 43
End: 2023-10-31
Payer: COMMERCIAL

## 2023-10-31 ENCOUNTER — INFUSION (OUTPATIENT)
Dept: INFUSION THERAPY | Facility: HOSPITAL | Age: 43
End: 2023-10-31
Attending: INTERNAL MEDICINE
Payer: COMMERCIAL

## 2023-10-31 ENCOUNTER — TELEPHONE (OUTPATIENT)
Dept: INFUSION THERAPY | Facility: HOSPITAL | Age: 43
End: 2023-10-31
Payer: COMMERCIAL

## 2023-10-31 VITALS
SYSTOLIC BLOOD PRESSURE: 110 MMHG | DIASTOLIC BLOOD PRESSURE: 67 MMHG | BODY MASS INDEX: 18.58 KG/M2 | OXYGEN SATURATION: 100 % | TEMPERATURE: 98 F | HEART RATE: 72 BPM | RESPIRATION RATE: 18 BRPM | WEIGHT: 118.38 LBS | HEIGHT: 67 IN

## 2023-10-31 DIAGNOSIS — L76.82 AXILLARY WEB SYNDROME: Primary | ICD-10-CM

## 2023-10-31 DIAGNOSIS — F41.1 GAD (GENERALIZED ANXIETY DISORDER): Primary | ICD-10-CM

## 2023-10-31 DIAGNOSIS — G47.00 INSOMNIA, UNSPECIFIED TYPE: ICD-10-CM

## 2023-10-31 DIAGNOSIS — L90.5 AXILLARY WEB SYNDROME: Primary | ICD-10-CM

## 2023-10-31 DIAGNOSIS — E86.0 DEHYDRATION: ICD-10-CM

## 2023-10-31 DIAGNOSIS — F90.0 ADHD (ATTENTION DEFICIT HYPERACTIVITY DISORDER), INATTENTIVE TYPE: ICD-10-CM

## 2023-10-31 PROCEDURE — 90832 PR PSYCHOTHERAPY W/PATIENT, 30 MIN: ICD-10-PCS | Mod: 95,,,

## 2023-10-31 PROCEDURE — 25000003 PHARM REV CODE 250: Performed by: INTERNAL MEDICINE

## 2023-10-31 PROCEDURE — 96361 HYDRATE IV INFUSION ADD-ON: CPT

## 2023-10-31 PROCEDURE — 90832 PSYTX W PT 30 MINUTES: CPT | Mod: 95,,,

## 2023-10-31 PROCEDURE — 96360 HYDRATION IV INFUSION INIT: CPT

## 2023-10-31 RX ORDER — SODIUM CHLORIDE 9 MG/ML
INJECTION, SOLUTION INTRAVENOUS CONTINUOUS
Status: CANCELLED
Start: 2023-10-31

## 2023-10-31 RX ORDER — HEPARIN 100 UNIT/ML
500 SYRINGE INTRAVENOUS
Status: CANCELLED | OUTPATIENT
Start: 2023-10-31

## 2023-10-31 RX ORDER — SODIUM CHLORIDE 0.9 % (FLUSH) 0.9 %
10 SYRINGE (ML) INJECTION
Status: DISCONTINUED | OUTPATIENT
Start: 2023-10-31 | End: 2023-10-31 | Stop reason: HOSPADM

## 2023-10-31 RX ORDER — SODIUM CHLORIDE 0.9 % (FLUSH) 0.9 %
10 SYRINGE (ML) INJECTION
Status: CANCELLED | OUTPATIENT
Start: 2023-10-31

## 2023-10-31 RX ORDER — HEPARIN 100 UNIT/ML
500 SYRINGE INTRAVENOUS
Status: DISCONTINUED | OUTPATIENT
Start: 2023-10-31 | End: 2023-10-31 | Stop reason: HOSPADM

## 2023-10-31 RX ORDER — SODIUM CHLORIDE 9 MG/ML
INJECTION, SOLUTION INTRAVENOUS CONTINUOUS
Status: DISCONTINUED | OUTPATIENT
Start: 2023-10-31 | End: 2023-10-31 | Stop reason: HOSPADM

## 2023-10-31 RX ADMIN — SODIUM CHLORIDE: 9 INJECTION, SOLUTION INTRAVENOUS at 01:10

## 2023-10-31 NOTE — PLAN OF CARE
Problem: Adult Inpatient Plan of Care  Goal: Optimal Comfort and Wellbeing  Outcome: Ongoing, Progressing  Intervention: Provide Person-Centered Care  Flowsheets (Taken 10/31/2023 [id number])  Trust Relationship/Rapport:   care explained   choices provided   emotional support provided   empathic listening provided   questions answered   reassurance provided   thoughts/feelings acknowledged   questions encouraged

## 2023-10-31 NOTE — NURSING
PT tolerated Normal Saline infusion well. No adverse reaction noted. Pt education reinforced on Normal Saline side effects, what to expect and when to call Dr. Sandra burgos. Pt verbalized understanding. PAC flushed with NS and de-accessed per protocol. Pt tolerated well.   amr

## 2023-11-01 NOTE — PROGRESS NOTES
The patient location is:  [place], [place]  The patient phone number is: [phone]  Visit type: Virtual visit with synchronous audio and video  Each patient to whom he or she provides medical services by telemedicine is:  (1) informed of the relationship between the provider and patient and the respective role of any other health care provider with respect to management of the patient; and (2) notified that he or she may decline to receive medical services by telemedicine and may withdraw from such care at any time.    Crisis Disclaimer: Patient was informed that due to the virtual nature of the visit, that if a crisis develops, protocols will be implemented to ensure patient safety, including but not limited to: 1) Initiating a welfare check with local Law Enforcement, 2) Calling 1/National Crisis Hotline, and/or 3) Initiating PEC/CEC procedures.    Time (Face-to-face): 16 minutes    Time (Non-face-to-face): 20 minutes     PSYCHO-ONCOLOGY NOTE/ Individual Psychotherapy     Date: 10/31/2023   Site:  [facility]      Therapeutic Intervention: Met with patient.  Outpatient - Insight oriented psychotherapy 30 min - CPT code 90920    This includes face to face time and non-face to face time preparing to see the patient, obtaining and/or reviewing separately obtained history, documenting clinical information in the electronic or other health record, independently interpreting results and communicating results to the patient/family/caregiver, or care coordinator.      Patient was last seen by me on 10/25/2023    Problem list  Patient Active Problem List   Diagnosis    Malignant neoplasm of upper-outer quadrant of left breast in female, estrogen receptor negative    Decreased functional mobility    Dehydration    Anemia associated with chemotherapy    Nausea and vomiting    BRCA2 gene mutation positive    Scoliosis    Attention Deficit Hyperactivity Disorder    Therapeutic Drug Monitoring    Postoperative Nausea And  Vomiting    Macrocytic Anemia    KIANA (generalized anxiety disorder)    Family H/O Diabetes Mellitus    Fatigue    Hyponatremia    Elevated troponin    Diarrhea    Intravascular volume depletion    Hypomagnesemia    Hypokalemia    Anterior T wave inversion    Elevated LFTs    Colitis, indeterminate    Encounter for immunotherapy    Diarrhea due to drug    Pneumonitis    s/p RA-TLH/BSO    JA (acute kidney injury)    Adrenal crisis    Sepsis    Chest pain    Adrenal insufficiency    Axillary web syndrome    Depressed    Constipation    Memory loss    Abnormal MRI    Insomnia, psychophysiological    MDD (major depressive disorder), recurrent episode, moderate       Chief complaint/reason for encounter: depression and anxiety   Met with patient to evaluate psychosocial adaptation to diagnosis/treatment/survivorship of breast cancer    Current Medications  Current Outpatient Medications   Medication    dextroamphetamine-amphetamine (ADDERALL) 10 mg Tab    diazePAM (VALIUM) 5 MG tablet    estradioL (IMVEXXY MAINTENANCE PACK) 10 mcg Inst    estradioL (VAGIFEM) 10 mcg Tab    hydrocortisone (CORTEF) 10 MG Tab    hydrocortisone (CORTEF) 5 MG Tab    LIDOCAINE VISCOUS 2 % solution    ondansetron (ZOFRAN ODT ORAL)    ondansetron (ZOFRAN-ODT) 8 MG TbDL    propranoloL (INDERAL) 10 MG tablet    traZODone (DESYREL) 100 MG tablet    valACYclovir (VALTREX) 500 MG tablet    valACYclovir (VALTREX) 500 MG tablet     No current facility-administered medications for this visit.     Facility-Administered Medications Ordered in Other Visits   Medication Frequency    lactated ringers infusion Continuous    sodium chloride 0.9% flush 10 mL PRN       ONCOLOGY HISTORY  Oncology History   Malignant neoplasm of upper-outer quadrant of left breast in female, estrogen receptor negative   1/19/2021 Cancer Staged    Staging form: Breast, AJCC 8th Edition  - Clinical stage from 1/19/2021: Stage IIIB (cT2, cN1, cM0, G3, ER-, SC-, HER2-)     1/28/2021  Initial Diagnosis    Malignant neoplasm of upper-outer quadrant of left breast in female, estrogen receptor negative     2/18/2021 - 2/18/2021 Chemotherapy    Treatment Summary   Plan Name: OP BREAST DOSE-DENSE AC - DOXORUBICIN CYCLOPHOSPHAMIDE Q2W  Treatment Goal: Curative  Status: Inactive  Start Date:   End Date:   Provider: Jhon Suazo MD  Chemotherapy: DOXOrubicin chemo injection 96 mg, 60 mg/m2, Intravenous, Clinic/HOD 1 time, 0 of 4 cycles  cyclophosphamide (CYTOXAN) 600 mg/m2 = 965 mg in sodium chloride 0.9% 250 mL chemo infusion, 600 mg/m2, Intravenous, Clinic/HOD 1 time, 0 of 4 cycles     2/25/2021 - 7/6/2021 Chemotherapy    Treatment Summary   Plan Name: OP BREAST PACLITAXEL WEEKLY + CARBOPLATIN (AUC 5) Q3W FOLLOWED BY AC WITH PEMBROLIZUMAB FOLLOWED BY PEMBROLIZUMAB   Treatment Goal: Curative  Status: Inactive  Start Date: 2/25/2021  End Date: 7/6/2021  Provider: Sandra Sotelo MD  Chemotherapy: DOXOrubicin chemo injection 112 mg, 60 mg/m2 = 112 mg (100 % of original dose 60 mg/m2), Intravenous, Once, 3 of 4 cycles  Dose modification: 60 mg/m2 (original dose 60 mg/m2, Cycle 5)  Administration: 112 mg (5/25/2021), 112 mg (6/15/2021), 110 mg (7/6/2021)  CARBOplatin (PARAPLATIN) 605 mg in sodium chloride 0.9% 250 mL chemo infusion, 605 mg (100 % of original dose 603.5 mg), Intravenous, Clinic/HOD 1 time, 4 of 4 cycles  Dose modification:   (original dose 603.5 mg, Cycle 1)  Administration: 605 mg (2/25/2021), 630 mg (3/22/2021), 750 mg (4/13/2021), 750 mg (5/4/2021)  cyclophosphamide 600 mg/m2 = 1,100 mg in sodium chloride 0.9% 100 mL chemo infusion, 600 mg/m2 = 1,100 mg (100 % of original dose 600 mg/m2), Intravenous, Clinic/HOD 1 time, 3 of 4 cycles  Dose modification: 600 mg/m2 (original dose 600 mg/m2, Cycle 5), 600 mg/m2 (Cycle 8)  Administration: 1,100 mg (5/25/2021), 1,100 mg (6/15/2021), 1,100 mg (7/6/2021)  PACLitaxeL (TAXOL) 80 mg/m2 = 132 mg in sodium chloride 0.9% 250 mL chemo infusion, 80  mg/m2 = 132 mg (100 % of original dose 80 mg/m2), Intravenous, Clinic/HOD 1 time, 4 of 4 cycles  Dose modification: 80 mg/m2 (original dose 80 mg/m2, Cycle 1)  Administration: 132 mg (2/25/2021), 132 mg (3/4/2021), 132 mg (3/11/2021), 138 mg (3/22/2021), 138 mg (3/30/2021), 138 mg (4/6/2021), 138 mg (4/13/2021), 138 mg (4/20/2021), 144 mg (4/27/2021), 144 mg (5/4/2021), 144 mg (5/11/2021), 144 mg (5/18/2021)  pembrolizumab (KEYTRUDA) 200 mg in sodium chloride 0.9% 100 mL chemo infusion, 200 mg, Intravenous, Clinic/HOD 1 time, 7 of 18 cycles  Administration: 200 mg (2/25/2021), 200 mg (5/25/2021), 200 mg (3/22/2021), 200 mg (4/13/2021), 200 mg (5/4/2021), 200 mg (6/15/2021), 200 mg (7/6/2021)     4/15/2021 - 4/15/2021 Chemotherapy    Treatment Summary   Plan Name: OP BREAST PEMBROLIZUMAB Q3W PACLITAXEL CARBOPLATIN WEEKLY FOLLOWED BY PEMBROLIZUMAB DOXORUBICIN CYCLOPHOSPHAMIDE Q3W   Treatment Goal: Curative  Status: Inactive  Start Date:   End Date:   Provider: Jhon Suazo MD  Chemotherapy: CARBOplatin (PARAPLATIN) in sodium chloride 0.9% 250 mL chemo infusion,  (original dose ), Intravenous, Clinic/HOD 1 time, 0 of 3 cycles  Dose modification:   (Cycle 1)  cyclophosphamide in sodium chloride 0.9% chemo infusion, 600 mg/m2 = 965 mg (original dose ), Intravenous, Clinic/HOD 1 time, 0 of 1 cycle  Dose modification: 600 mg/m2 (Cycle 2)  DOXOrubicin (ADRIAMYCIN) in sodium chloride 0.9% 250 mL chemo infusion, 60 mg/m2 (original dose ), Intravenous, Clinic/HOD 1 time, 0 of 1 cycle  Dose modification: 60 mg/m2 (Cycle 2)  PACLitaxeL (TAXOL) in sodium chloride 0.9% 100 mL chemo infusion, 80 mg/m2 (original dose ), Intravenous, Clinic/HOD 1 time, 0 of 1 cycle  Dose modification: 80 mg/m2 (Cycle 1)  PEMEtrexed (ALIMTA) in sodium chloride 0.9% chemo infusion, 500 mg/m2, Intravenous, Clinic/HOD 1 time, 0 of 33 cycles  pembrolizumab (KEYTRUDA) 200 mg in sodium chloride 0.9% 100 mL chemo infusion, 200 mg, Intravenous, Clinic/HOD 1  time, 0 of 35 cycles     11/19/2021 - 1/4/2022 Radiation Therapy    Treating physician: Maryann Perales  Total Dose: 50 Gy  Fractions: 25    DIAGNOSIS:  C50.412 - Malignant neoplasm of upper-outer quadrant of left female breast, Diagnosed 11/11/2021 (Active) Stage IIIB, T2, N1, M0, G3, HER2 Neg, ER Neg, NH Neg    This is a 41 y.o. y/o female with cT2,c N1 and M0 G3, (Stage IIIB), triple negative, Ki-67 79%, LEFT upper outer quadrant breast, BRCA2+, status post lumpectomy 1/19/21, with residual larisa disease, followed by  adjuvant chemo-immunotherapy with carbo-taxol and pembrolizumab, followed by AC with pembrolizumab, followed by adjuvant pemprolizumab complicated by ongoing, chronic colitis/diarrhea necessitating long-term prednisone.  Underwent bilateral mastectomy 9/29/21 with no residual carcinoma identified in breast of 14 sampled nodes (ypN0). She has completed adjuvant LEFT breast and regional larisa radiation therapy to a dose of 50Gy.    Treatment Summary  Course: C1 BREAST 2021    Treatment Site Ref. ID Energy Dose/Fx (Gy) #Fx Dose Correction (Gy) Total Dose (Gy) Start Date End Date Elapsed Days   3D Sclav_L rxsc 18X/6X 2 25 / 25 0 50 11/19/2021 1/4/2022 46   3D Breast L Breast_L 18X/6X 2 25 / 25 0 50 11/19/2021 1/4/2022 46     Tolerated well.  2 day treatment break at patient's request for patch of dry desquamation, and 5 day treatment break due to CoVID infection.  Otherwise, patient completed her course of therapy as prescribed.       PLAN: RTC 1 month for routine follow up. Continue current skin care/sun protection for now.  Follow up with other providers as directed.         Objective:  Alison JOSE RAFAEL Swann arrived  promptly for the session. The patient was fully cooperative throughout the session.  Appearance: age appropriate, casually  dressed, adequately  groomed  Behavior/Cooperation: friendly and cooperative  Speech: normal in rate, volume, and tone  Mood: euthymic  Affect: appropriate  Thought  Process: goal-directed, logical  Thought Content: normal,  No delusions or paranoia; did not appear to be responding to internal stimuli during the session  Orientation: grossly intact  Memory: grossly intact  Attention Span/Concentration: Attends to session without distraction; reports no difficulty  Fund of Knowledge: average  Estimate of Intelligence: average from verbal skills and history  Cognition: grossly intact  Insight: patient has awareness of illness; good insight into own behavior and behavior of others  Judgment: the patient's behavior is adequate to circumstances    NCCN Distress thermometer:       9/21/2023     8:50 AM 8/2/2023     6:58 AM 5/8/2023     2:03 PM 3/20/2023     9:57 AM 12/19/2022     8:53 AM 10/12/2022    10:59 AM 10/12/2022    10:56 AM   DISTRESS SCREENING   Distress Score 9 6 2 9 3 10 - Extreme Distress 0 - No Distress   Practical Problems Insurance/Financial None of these Work/School Insurance/Financial;Work/School Insurance/Financial Work/School None of these   Family Problems None of these None of these Dealing with Children Family Health Issues None of these None of these None of these   Emotional Problems Depression;Fears;Nervousness;Sadness;Worry;Loss of Interest Sadness Worry Depression;Fears;Nervousness;Sadness;Worry;Loss of Interest Depression;Sadness;Worry Fears;Worry None of these   Spiritual / Cheondoism Concerns No No No No No No No   Physical Problems Appearance;Fatigue;Memory/Concentration;Sexual;Sleep;Tingling in hands or feet Tingling in hands or feet Pain Appearance;Bathing/Dressing;Memory/Concentration None of these None of these None of these   Other Problems  Anxiety- burning fingers             Interval history and content of current session: Mrs. Swann notes overall stable mood. Improved stress management. She reports returning to the gym and feeling the benefits of being able to work out again. She has been actively engaged in rest and spending time engaging in  "value driven behaviors. Identified specific plans and steps to help achieve short term goals. Expressed concern about dark circles ("bags") under her eyes. She explained it was told her steroids may be contributing however she is wanting a second opinion from another specialist. Patient will be returning to work this month. Notes Discussed this writers departure from the cancer center and offered alternative services. Patient voiced understanding and explained she will reach out to a previous provider in [place].      Risk parameters:   Patient reports no suicidal ideation  Patient reports no homicidal ideation  Patient reports no self-injurious behavior  Patient reports no violent behavior   Safety needs:  None at this time      Verbal deficits: None     Patient's response to intervention:The patient's response to intervention is accepting.     Progress toward goals and other mental status changes:  The patient's progress toward goals is good .      Progress to date:Revise Objectives (Goals)      Goals from last visit: attempted and met        Patient Strengths: verbal, intelligent, successful, good social support, good insight, commitment to wellness      Treatment Plan:individual psychotherapy and consult psychiatrist for medication evaluation; referral placed for medication management.     Target symptoms: depression, mood swings, adjustment  Why chosen therapy is appropriate versus another modality: relevant to diagnosis, patient responds to this modality, evidence based practice  Outcome monitoring methods: self-report  Therapeutic intervention type: behavior modifying psychotherapy, supportive psychotherapy  Prognosis: Fair      Behavioral goals:    Exercise: continue as medically advised, engage in PT as recommended   Stress management: creating a system to organize day to day; utilize support system   Therapy: engaging in value driven activity 10-15 minutes per day    Return to clinic: as " scheduled      Time (Face-to-face): 16 minutes     Time (Non-face-to-face): 20 minutes     Diagnosis:     ICD-10-CM ICD-9-CM   1. KIANA (generalized anxiety disorder)  F41.1 300.02   2. ADHD (attention deficit hyperactivity disorder), inattentive type  F90.0 314.00   3. Insomnia, unspecified type  G47.00 780.52                     Julisa Dias PsyD

## 2023-11-06 ENCOUNTER — OFFICE VISIT (OUTPATIENT)
Dept: HEMATOLOGY/ONCOLOGY | Facility: CLINIC | Age: 43
End: 2023-11-06
Payer: COMMERCIAL

## 2023-11-06 DIAGNOSIS — Z17.1 MALIGNANT NEOPLASM OF UPPER-OUTER QUADRANT OF LEFT BREAST IN FEMALE, ESTROGEN RECEPTOR NEGATIVE: Primary | ICD-10-CM

## 2023-11-06 DIAGNOSIS — Z15.09 BRCA2 GENE MUTATION POSITIVE: Chronic | ICD-10-CM

## 2023-11-06 DIAGNOSIS — Z15.01 BRCA2 GENE MUTATION POSITIVE: Chronic | ICD-10-CM

## 2023-11-06 DIAGNOSIS — E27.40 ADRENAL INSUFFICIENCY: ICD-10-CM

## 2023-11-06 DIAGNOSIS — C50.412 MALIGNANT NEOPLASM OF UPPER-OUTER QUADRANT OF LEFT BREAST IN FEMALE, ESTROGEN RECEPTOR NEGATIVE: Primary | ICD-10-CM

## 2023-11-06 DIAGNOSIS — Z90.710 S/P LAPAROSCOPIC HYSTERECTOMY: ICD-10-CM

## 2023-11-06 PROCEDURE — 1159F MED LIST DOCD IN RCRD: CPT | Mod: CPTII,95,, | Performed by: INTERNAL MEDICINE

## 2023-11-06 PROCEDURE — 1160F RVW MEDS BY RX/DR IN RCRD: CPT | Mod: CPTII,95,, | Performed by: INTERNAL MEDICINE

## 2023-11-06 PROCEDURE — 1160F PR REVIEW ALL MEDS BY PRESCRIBER/CLIN PHARMACIST DOCUMENTED: ICD-10-PCS | Mod: CPTII,95,, | Performed by: INTERNAL MEDICINE

## 2023-11-06 PROCEDURE — 99213 OFFICE O/P EST LOW 20 MIN: CPT | Mod: 95,,, | Performed by: INTERNAL MEDICINE

## 2023-11-06 PROCEDURE — 99213 PR OFFICE/OUTPT VISIT, EST, LEVL III, 20-29 MIN: ICD-10-PCS | Mod: 95,,, | Performed by: INTERNAL MEDICINE

## 2023-11-06 PROCEDURE — 1159F PR MEDICATION LIST DOCUMENTED IN MEDICAL RECORD: ICD-10-PCS | Mod: CPTII,95,, | Performed by: INTERNAL MEDICINE

## 2023-11-06 NOTE — PROGRESS NOTES
Subjective     Patient ID: Alison Swann is a 43 y.o. female.    Chief Complaint: Breast Cancer    HPI    The patient location is: home   The chief complaint leading to consultation is: follow up  Visit type: audiovisual  Face to Face time with patient: 15 minutes  20 minutes of total time spent on the encounter, which includes face to face time and non-face to face time preparing to see the patient (eg, review of tests), Obtaining and/or reviewing separately obtained history, Documenting clinical information in the electronic or other health record, Independently interpreting results (not separately reported) and communicating results to the patient/family/caregiver, or Care coordination (not separately reported).   Each patient to whom he or she provides medical services by telemedicine is:  (1) informed of the relationship between the physician and patient and the respective role of any other health care provider with respect to management of the patient; and (2) notified that he or she may decline to receive medical services by telemedicine and may withdraw from such care at any time.    Notes:     Reports doing well  Still struggling with insomnia without medication  Feels better during the days  Weight stable- working out  + hot flashes    2 weeks ago likely had the flu- she rested and received IVFs and felt better    Reports limited ROM on left side   She is working with PT to address and this is helping- right side already back to normal  Massage has also helped and using a chiropractor    She did have a follow up MRI brain which was negative  She had to move her nerve conduction study for both hands and orthopedics follow up when she was sick- question of late onset neuropathy versus carpal tunnel  She will arrange this follow up     In regards to adrenal insufficiency:- on cortef  She is handling well but noted traveled alone recently and this was stressful as she had to have meds and alert  "indicators    Oncology History:  - 12/2019 thermography of breast revealed  lymphatic changes in the left axilla.  - 4/2020 self detected a palpable left breast mass  - 6/6/2020 Diagnostic mammogram  Impression:  Suspicious grouping of microcalcifications in the upper-outer left breast corresponding to area of palpable concern.  Biopsy is warranted.Questionable distortion of the breast architecture in the periareolar region of the right breast near the 3 o'clock position without sonographic correlate.  Short-term mammographic follow-up of the right breast at 6 month interval is recommended.  The above findings and recommendations were discussed with the patient at the time of the examination.  BI-RADS CATEGORY 4: SUSPICIOUS ABNORMALITY-BIOPSY SHOULD BE CONSIDERED  - 6/23/2020 imaging guided biopsy  Pathology: [facility] (The other calcifications were apparently not sampled)  - 1/19/2021 excision biopsy with Dr. Johnston  Pathology:  1.  BREAST, LEFT, FURTHER DESIGNATED "MASS," EXCISION:   --INVASIVE CARCINOMA OF NO SPECIAL TYPE (DUCTAL, NOT OTHERWISE    SPECIFIED), SPENSER HISTOLOGIC           GRADE 3 OUT OF 3.        --TUMOR MEASURES 42 MILLIMETERS IN MAXIMUM DIMENSION.        --RECEPTOR STUDIES ARE PENDING.   --SPECIMEN ANTERIOR MARGIN IS POSITIVE FOR INVASIVE CARCINOMA; ALL    REMAINING SPECIMEN MARGINS ARE           AT LEAST 2 MILLIMETERS AWAY.   --DUCTAL CARCINOMA IN SITU, HIGH NUCLEAR GRADE, COMPRISING LESS THAN 5%    OF TOTAL TUMOR TISSUE.        --ALL SPECIMEN MARGINS ARE NEGATIVE FOR DUCTAL CARCINOMA IN SITU.        --EXTENSIVE LYMPHOVASCULAR INVASION PRESENT.   --FIBROCYSTIC CHANGES INCLUDING STROMAL FIBROSIS, CYSTIC DILATATION OF    DUCTS, AND APOCRINE           METAPLASIA.        --COLUMNAR CELL CHANGE.   --MICROCALCIFICATIONS ASSOCIATED WITH TUMOR AND WITH BENIGN DUCTAL    PROFILES.   2.  BREAST, LEFT, FURTHER DESIGNATED "MEDIAL MARGIN," EXCISION:        --NO MORPHOLOGIC EVIDENCE OF MALIGNANCY. "   --FIBROCYSTIC CHANGES INCLUDING STROMAL FIBROSIS, CYSTIC DILATATION OF    DUCTS, APOCRINE METAPLASIA,           AND USUAL DUCTAL EPITHELIAL HYPERPLASIA.        --COLUMNAR CELL CHANGE.        --FOCAL MICROCALCIFICATIONS ASSOCIATED WITH BENIGN DUCTAL PROFILES.   3.  LYMPH NODE, LEFT SENTINEL, NEEDLE CORE BIOPSY:   --METASTATIC CARCINOMA OF NO SPECIAL TYPE (DUCTAL, NOT OTHERWISE    SPECIFIED).   ER negative, MI negative, [id number] negative  Ki-67 79%     - 1/27/2021 Breast MRI:  Findings: The breasts have heterogeneous fibroglandular tissue. The background parenchymal enhancement is minimal.   Left  There are 6 similar 29 mm x 23 mm lymph nodes seen in the left axilla.   There is an 8 mm x 7 mm x 6 mm homogeneous, non-mass enhancement in a focal distribution seen in the left breast at 12 o'clock in the middle depth, 5.5 cm from the nipple. Delayed phase is persistent. This is immediately anterior to the pectoralis major muscle, at anterior aspect of implant.   There is a 24 mm x 11 mm x 9 mm clumped, non-mass enhancement in a focal distribution seen in the outer central region of the left breast in the posterior depth. This is along the margins of the resection cavity at is posterior aspect.   Right  There is no evidence of suspicious masses, abnormal enhancement, or other abnormal findings in the right breast.  Impression:  Left  Non-mass Enhancement: Left breast 8 mm x 7 mm x 6 mm non-mass enhancement at the middle 12 o'clock position. Assessment: 4 - Suspicious finding. Biopsy is recommended.  Second look ultrasound followed by ultrasound or MRI-guided biopsy could be performed if it would .  Of note, MRI guided biopsy would carry risk of implant rupture. Non-mass Enhancement: Left breast 24 mm x 11 mm x 9 mm non-mass enhancement at the posterior aspect of the excision cavity in the lateral breast. Assessment: 4 - Suspicious finding.  This is suspicious for residual disease in this patient with  history of recent excisional biopsy.   The area is likely not amenable to MRI guided biopsy.  Surgical consultation recommended. Left axillary adenopathy.  At least 6 abnormal appearing level I axillary nodes are present, suspicious for residual lymph node metastasis.  RightThere is no MR evidence of malignancy in the right breast.  BI-RADS Category:   Overall: 4 - Suspicious  Recommendation:  Surgical consultation recommended.  Left breast biopsy could be performed of focal NME at 12 o'clock in the left breast if clinically indicated.     - 2/2/2021 PET scan:  COMPARISON:  Breast MRI 01/27/2021  FINDINGS:  Quality of the study: Adequate.  In the head and neck, there are no hypermetabolic lesions worrisome for malignancy. There are no hypermetabolic mucosal lesions, and there are no pathologically enlarged or hypermetabolic lymph nodes.  In the chest, there is no definite hypermetabolic breast mass.  There are bilateral breast implants in place.  There is relatively mild diffuse uptake within dense fibroglandular tissue, and within the left breast there is a maximum SUV of 1.9 adjacent to biopsy clips on image 68.  There is also mildly hypermetabolic subcutaneous fat stranding elsewhere in the lateral aspect of the left breast on image 77 likely postprocedural in nature. There are at least half a dozen left level 1 axillary lymph nodes the largest of which are hypermetabolic including a 3 x 2.1 cm node on image 50 with an SUV of 8.6, a 1 cm node on image 55 with an SUV of 4.7, and 2.3 x 1.2 cm node on image 61 with an SUV of 9.2.  There is also a non hypermetabolic 9 x 6 mm right level 2 lymph node.  There are no suspicious lymph nodes in the internal mammary chain.  There are no pulmonary nodules, and there are no pleural or pericardial effusions.  In the abdomen and pelvis, there is physiologic tracer distribution within the abdominal organs and excretion into the genitourinary system, including diffusely within  the right ureter and focally within the left.In the bones, there are no hypermetabolic lesions worrisome for malignancy.  Impression:  1.  No discrete left breast mass identified.  2.  Metastatic left level 1 axillary lymph nodes.  Level 2 larisa metastasis is not excluded.  3.  No evidence of distant metastasis.     - 2/9/2021 ECHO:  The left ventricle is normal in size with normal systolic function. The estimated ejection fraction is 58%  Regimen:  Paclitaxel weekly + carboplatin with Pembrolizumab q 3 weeks followed by AC with Pembrolizumab q 3 weeks followed by Pembroluzimab.(Based on interim analysis of the phase III KEYNOTE-522 the addition of pembrolizumab to neoadjuvant chemotherapy and use of adjuvant pembrolizumab x 9 cycles resulted in improvements in pathologic complete response rate and event-free survival in women with early triple-negative breast cancer.)     BRCA2+     5/3/2021 Breast MRI follow up on treatment in the interval (results below)  Findings:  There are bilateral retropectoral silicone implants. There is a right sided port.  The breasts have extreme amounts of fibroglandular tissue. The background parenchymal enhancement is mild.  There are postsurgical changes in the left upper outer breast and axilla.  There has been significant interval decrease in size of the previously seen abnormal left axillary lymph nodes, now with the largest lymph node measuring 14 x 15 x 10 mm (previously measuring up to 29 mm on the 1/27/21 MRI).  The other left axillary lymph nodes are smaller with mildly irregular shaggy margins, consistent with chemotherapy treatment.   No suspicious enhancement is seen in either breast. The previously seen left 12:00 mid depth focal non mass enhancement anterior to the pectoralis is not seen on the current exam.   No abnormal right axillary lymph nodes or internal mammary lymph nodes are seen.  Impression:  There has been significant interval decrease in size of the  previously seen abnormal left axillary lymph nodes, now with the largest lymph node measuring 14 x 15 x 10 mm (previously measuring up to 29 mm on the 21 MRI).  The other left axillary lymph nodes are smaller with mildly irregular shaggy margins, consistent with chemotherapy treatment.   No suspicious enhancement is seen in either breast.   BI-RADS Category:   Overall: 6 - Known Biopsy-Proven Malignancy     -   Completed surgery with complete pathologic response     - completed XRT     Gyn Hx:  Menarche- 10  , age at 1st live birth 22  OCPs x 4 years  Endometrial ablation      FH:  Mother - diagnosed at age 50 with breast cancer  Mastectomy- bilateral  No chemotherapy or radiation   Remains on Tamoxifen  Treated in [place]  ? 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   HENT:  Negative for mouth sores.    Eyes:  Negative for visual disturbance.   Respiratory:  Negative for cough and shortness of breath.    Cardiovascular:  Negative for chest pain.   Gastrointestinal:  Negative for abdominal pain and diarrhea.   Genitourinary:  Negative for frequency.   Musculoskeletal:  Positive for back pain.   Integumentary:  Negative for rash.   Neurological:  Negative for headaches.   Hematological:  Negative for adenopathy.   Psychiatric/Behavioral:  The patient is nervous/anxious.      See above     Objective     Physical Exam  Vitals and nursing note reviewed.   Constitutional:       Appearance: Normal appearance.   Neurological:      Mental Status: She is alert.     Virtual visit       Assessment and Plan     1. Malignant neoplasm of upper-outer quadrant of left breast in female, estrogen receptor negative    2. BRCA2 gene  mutation positive    3. s/p RA-TLH/BSO    4. Adrenal insufficiency        BRCA + TNBC  Continue surveillance  Appropriate prophylactic surgeries completed  Continue annual dermatology exam  RTC 6 months     Adrenal insufficiency- on replacement  This occurred secondary to immunotherapy  Follows with endocrinology and reports doing well     Route Chart for Scheduling    Med Onc Chart Routing      Follow up with physician 6 months. labs prior and EP   Follow up with [facility]    Infusion scheduling note    Injection scheduling note    Labs    Imaging    Pharmacy appointment    Other referrals                Supportive Plan Information  OP SHELL SUPPORTIVE   Sandra Sotelo MD   Upcoming Treatment Dates - OP SHELL SUPPORTIVE    No upcoming days in selected categories.    Therapy Plan Information  PORT FLUSH  Flushes  heparin, porcine (PF) 100 unit/mL injection flush 500 Units  500 Units, Intravenous, Every visit  sodium chloride 0.9% flush 10 mL  10 mL, Intravenous, Every visit

## 2023-11-13 ENCOUNTER — PATIENT MESSAGE (OUTPATIENT)
Dept: OBSTETRICS AND GYNECOLOGY | Facility: CLINIC | Age: 43
End: 2023-11-13
Payer: COMMERCIAL

## 2023-11-13 DIAGNOSIS — N95.1 MENOPAUSAL SYMPTOMS: Primary | ICD-10-CM

## 2023-11-14 ENCOUNTER — PATIENT MESSAGE (OUTPATIENT)
Dept: FAMILY MEDICINE | Facility: CLINIC | Age: 43
End: 2023-11-14
Payer: COMMERCIAL

## 2023-11-14 ENCOUNTER — PATIENT MESSAGE (OUTPATIENT)
Dept: NEUROLOGY | Facility: CLINIC | Age: 43
End: 2023-11-14
Payer: COMMERCIAL

## 2023-11-16 ENCOUNTER — PATIENT MESSAGE (OUTPATIENT)
Dept: OPTOMETRY | Facility: CLINIC | Age: 43
End: 2023-11-16
Payer: COMMERCIAL

## 2023-11-17 ENCOUNTER — PATIENT MESSAGE (OUTPATIENT)
Dept: PSYCHIATRY | Facility: CLINIC | Age: 43
End: 2023-11-17
Payer: COMMERCIAL

## 2023-11-17 ENCOUNTER — TELEPHONE (OUTPATIENT)
Dept: OPTOMETRY | Facility: CLINIC | Age: 43
End: 2023-11-17
Payer: COMMERCIAL

## 2023-11-17 NOTE — TELEPHONE ENCOUNTER
Called pt, no answer.   LVM for pt to call back to schedule eye appt in [facility].

## 2023-11-21 ENCOUNTER — PATIENT MESSAGE (OUTPATIENT)
Dept: PSYCHIATRY | Facility: CLINIC | Age: 43
End: 2023-11-21
Payer: COMMERCIAL

## 2023-11-21 NOTE — PROGRESS NOTES
NEUROPSYCHOLOGY CONSULT (TELEHEALTH)    Referral Information  Name: Alison Swann  MRN: [id number]  : 1980  Age: 43 y.o.  Race: White  Gender: female  Referring Provider: Candis Rodriguez NP   Billing: See below for details as coding/billing has changed   Telemedicine:   The patient location is: [place], [place]   The chief complaint leading to consultation is: Memory loss  Visit type: Virtual visit with audio only (telephone)  Total time spent with patient: 60 minutes  The reason for the audio only service rather than synchronous audio and video virtual visit was related to technical difficulties or patient preference/necessity.     Each patient to whom I provide medical services by telemedicine is:  (1) informed of the relationship between the physician and patient and the respective role of any other health care provider with respect to management of the patient; and (2) notified that they may decline to receive medical services by telemedicine and may withdraw from such care at any time. Patient verbally consented to receive this service via voice-only telephone call.   Consent/Emergency Plan: The patient expressed an understanding of the purpose of the evaluation and consented to all procedures, including providing consent for Sumit Guerrero, PhD, a clinical neuropsychology fellow under the supervision of Soniya Shannon PhD, [facility], to be involved in her care. I informed the patient of limits to confidentiality and discussed an emergency plan.    SUMMARY/TREATMENT PLAN   Results from the interview indicate the following diagnoses and treatment plan recommendations. The patient is likely able to follow a treatment plan without help from family.    Diagnoses/Plan:  Problem List Items Addressed This Visit          Neuro    Memory loss       Psychiatric    Attention Deficit Hyperactivity Disorder    KIANA (generalized anxiety disorder)    Mild episode of recurrent major depressive disorder      Summary/Recommendations:  Ms. Swann has had reported memory changes since 2021 following chemotherapy and radiation treatments for breast cancer. Additionally, she has several medical diagnoses that could be contributing to cognitive changes, namely adrenal crisis episodes, adrenal insufficiency, chronic steroid use, insomnia, macrocytic anemia, and a history of sepsis. Brain MRI in October 2023 was unremarkable. She remains independent in basic and instrumental activities of daily living, though she acknowledged some difficulty with completing tasks like she used to and accommodations she has made due to cognitive changes. Related to her work and career, she has had to make significant adjustments which have impacted the amount of work she is able to accomplish in a week, and had to downsize her company and sell one of her houses. She endorsed some anxiety and depression but feels they are well-managed with medication and coping strategies.     Ms. Swann will complete a neuropsychological evaluation on 12/11/2023 at 12:30pm.    Thank you for allowing us to participate in Ms. Swann' care.  If you have any questions, please contact us at [phone].     Sumit Guerrero, Ph.D.  Postdoctoral Fellow in Clinical Neuropsychology  [facility] - Department of Neurology    Soniya Shannon, Ph.D., ABPP  Board Certified in Clinical Neuropsychology  [facility] - Department of Neurology    HISTORY OF PRESENT ILLNESS AND CURRENT SYMPTOMS     Ms. Swann has active problems noted below. She was diagnosed with stage 3 breast cancer in January 2021 and had chemotherapy treatment from February 2021 to July 2021. Following chemotherapy, she underwent bilateral mastectomy in September 2021, followed by radiation therapy from November 2021 to January 2022. She reported experiencing memory changes during and following chemotherapy and radiation treatment in 2021. Of note, while undergoing treatment for breast cancer, all  treatments stopped in July 2021 due to suspected toxicity and adrenal crisis episodes. She was later diagnosed with adrenal insufficiency (possibly chemotherapy induced) and has been on steroids since. Additionally, she had sepsis in March 2022 following a surgery. She completed 30 neurofeedback sessions with a therapist and felt this was helpful to an extent. She was referred to neurology for memory concerns in August 2023. At her neurology appointment on 8/16/2023, she reported worsening memory issues over the last 6 months. Performance on a brief mental status measure was within normal limits (Mini Mental Status Exam [MMSE]=28/30). Physical exam was unremarkable. Brain MRI in August 2023 was abnormal due to possible artifact interference. Follow-up brain MRI in October 2023 was unremarkable with no evidence of intracranial metastatic disease.    During the current interview, Ms. 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 Following the breast cancer diagnosis and treatments, she has had to sell her company, dismiss employees, sell one of her homes due to a decrease in income, and stopped caring for her mother, sister, and nieces, and nephew (which resulted in them moving to [place]).     Cognitive Symptoms:  Type/Examples:   Attention: She gets distracted more despite being prescribed a higher dose of Adderall. She has to complete tasks one at a time now rather than juggling several tasks at once. She reported for Thanksgiving she needed to  chairs to bring over to a family member's house but thought she needed to bring tables. She stated when she went back through her text messages, she saw that chairs were requested but was adamant they needed tables until the messages were shown to her.    Mental Speed: She has noticed she is processing information slower.   Memory: She forgets information she has talked about in conversations as well as what she wants to say during conversations. She forgets to call people for work, even if there is a reminder on her calendar.   Language: She is reading slower because she has a harder time comprehending at times and will have to reread. She has noticed word-finding difficulty and that she will say words and they come out wrong.  Visuospatial/Perceptual: She will turn on the wrong burner occasionally.   Executive Functioning: She has more difficulty staying organized and managing her day-to-day activities.    Onset: She began experiencing cognitive changes in 2021 while undergoing chemotherapy and radiation treatment.  Course: She reported the cognitive changes have been stable over time.      Current Functional Status/Needs:  ADLs  Self-Care Eating Safety Other   Independent  Independent  She reported leaving the stove and oven on once every several weeks. She stated she is not home alone most times, so she is not concerned.      Instrumental IADLs:   Driving Medications/Health Household Finances   Independent  She manages her own medications and reports missing doses sometimes.  All her medications are on a nightstand next to her bed, and she takes them when she wakes in the morning.   She gets her groceries delivered now. She has a  that comes more frequently to help out (started coming more frequently a couple weeks ago). Independent. She reported missing a payment 3 to 4 times within the past year.     Psychiatric/Behavioral Symptoms:  Mood:  Depression/Dysphoria Anxiety/Fearfulness Irritability   Sometimes she gets depressed and will use coping strategies, with benefit. She was followed by psycho-oncology from November 2022 to October 2023 and was seeing Julisa Dias PsyD, for individual psychotherapy. They recently terminated services on  10/31/2023 due to the provider leaving the oncology center. She has not resumed psychotherapy but stated she is open to starting with a new provider.  She does get anxious but uses coping strategies she has learned, with benefit. She gets more aggravated with her . She reported that he is doing nothing different but that she is responding to him differently now (getting aggravated).      Behavior:  Agitation/Resistance Delusions/Paranoia Hallucinations   None reported None reported None reported     Apathy/Motivation Repetitive/Restlessness Other   None reported None reported      Neurovegetative:  Sleep/Nighttime  Appetite Energy   She has difficulty going to sleep. She sleeps about 8 hours per night. She is prescribed Trazodone for sleep as needed and uses a CBD gummy as well (frequency of Trazodone and CBD gummy is unknown). No snoring. No changes reported She reported her energy is normal but does feel low at times.     Suicidal/Homicidal Ideation: None reported     Physical Symptoms: No changes to vision or hearing. She reported having dark circles around her eyes now but is unsure what caused them. She works out most afternoons.      PERTINENT BACKGROUND INFORMATION   SOCIAL HISTORY    Family Status:  25 years; 1 daughter  Current Living Situation: She and her  live alone  Primary Source of Support:   Daily Activities: cooks, does activities around the house, exercises, manages a non-profit organization   Stressors: having to sell her company and one of her homes, daughter lives in [place] so they do not see each other or talk as often, financially is making less money than what she did prior to the cancer diagnosis  Other Factors:  Educational Level: She has an associate degree and bachelor's degree. No learning or attention difficulties in childhood. She was diagnosed with ADHD as an adult approximately 10 years ago.  Occupational Status and History: She works for a [employer]  company but has been on short-term disability since August due to cognitive changes and psychological distress. She is supposed to return to work on Monday and is worried because she does not feel she is ready to return. She is mostly concerned that she will not be able to complete the continuing education required for licensure renewal which would jeopardize her career. She is not actively applying for long-term disability.  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    Family History   Problem Relation Age of Onset    COPD Maternal Grandmother     Stroke Father         50s, multiple    Diabetes Father     Anemia Father     Hyperlipidemia Father     Breast cancer Mother 52    Depression Mother     Cancer Other         unk abdominal origin- dx 20s    Pancreatic cancer Other         suspected    Colon cancer Neg Hx     Esophageal cancer Neg Hx     Ovarian cancer Neg Hx      Family Neurologic History: Negative for heritable risk factors  Family Psychiatric History: Her mother had depression    MEDICAL STATUS  Patient Active Problem List   Diagnosis    Malignant neoplasm of upper-outer quadrant of left breast in female, estrogen receptor negative    Decreased functional mobility    Dehydration    Anemia associated with chemotherapy    Nausea and vomiting    BRCA2 gene mutation positive    Scoliosis    Attention Deficit Hyperactivity Disorder    Therapeutic Drug Monitoring    Postoperative Nausea And Vomiting    Macrocytic Anemia    KIANA (generalized anxiety disorder)    Family H/O Diabetes Mellitus    Fatigue    Hyponatremia    Elevated troponin    Diarrhea    Intravascular volume depletion    Hypomagnesemia    Hypokalemia    Anterior T wave inversion    Elevated LFTs    Colitis, indeterminate    Encounter for immunotherapy    Diarrhea due to drug    Pneumonitis    s/p RA-TLH/BSO    JA (acute kidney injury)    Adrenal crisis    Sepsis    Chest pain    Adrenal insufficiency     Axillary web syndrome    Depressed    Constipation    Memory loss    Abnormal MRI    Insomnia, psychophysiological    MDD (major depressive disorder), recurrent episode, moderate     Past Medical History:   Diagnosis Date    Attention Deficit Hyperactivity Disorder     Family H/O Diabetes Mellitus     Her Father    Generalized Anxiety     Left Breast Cancer S.P Lumpectomy In 2021     Dr. Dominga Johnston; Dr. Sandra Sotelo (Heme/Onc); 21 On CMT; Is Estrogen Receptor Negative; She Is BRCA2 Gene Mutation Positive, Andf Her Mother Also With A H/O Breast Cancer    Macrocytic Anemia     Associated With Her Chemotherapy    Postoperative Nausea And Vomiting     Scopolamine Patches Work Well For Her For This    Scoliosis     Therapeutic Drug Monitoring     Wellness Visit 2021      Past Surgical History:   Procedure Laterality Date    AUGMENTATION OF BREAST  2012    BREAST MASS EXCISION Left 2021    Procedure: EXCISION, MASS, BREAST- 2 oclock left breast with ultrasound guidance;  Surgeon: Rashmi Johnston MD;  Location: [facility] OR;  Service: General;  Laterality: Left;    BREAST SURGERY       SECTION      COLONOSCOPY N/A 2021    Procedure: COLONOSCOPY;  Surgeon: Mason Quintero MD;  Location: [facility] ([address] [place] [address]);  Service: Endoscopy;  Laterality: N/A;    ESOPHAGOGASTRODUODENOSCOPY N/A 2021    Procedure: EGD (ESOPHAGOGASTRODUODENOSCOPY);  Surgeon: Mason Quintero MD;  Location: [facility] ([address] [place] [address]);  Service: Endoscopy;  Laterality: N/A;    HYSTERECTOMY      INSERTION OF TUNNELED CENTRAL VENOUS CATHETER (CVC) WITH SUBCUTANEOUS PORT N/A 2021    Procedure: [id number];  Surgeon: Griffin Becker MD;  Location: [facility] OR;  Service: General;  Laterality: N/A;    OOPHORECTOMY      ROBOT-ASSISTED LAPAROSCOPIC ABDOMINAL HYSTERECTOMY USING DA NATALIIA XI N/A 2022    Procedure: XI ROBOTIC HYSTERECTOMY;  Surgeon: Carmella Galvez MD;  Location: [facility];  Service: OB/GYN;   Laterality: N/A;    ROBOT-ASSISTED LAPAROSCOPIC SALPINGO-OOPHORECTOMY USING DA NATALIIA XI Bilateral 03/04/2022    Procedure: XI ROBOTIC SALPINGO-OOPHORECTOMY;  Surgeon: Carmella Galvez MD;  Location: [facility] OR;  Service: OB/GYN;  Laterality: Bilateral;    SENTINEL LYMPH NODE BIOPSY Left 01/19/2021    Procedure: CORE BIOPSY, LYMPH NODE, SENTINEL;  Surgeon: Rashmi Johnston MD;  [email or web address] OR;  Service: General;  Laterality: Left;    TUBAL LIGATION       Updated/Relevant Neurologic History:  Falls: none   TBI: none  Seizures: none  Stroke: none  Movement Concerns: none  Prior Neuropsychological Testing: none  Referral Diagnosis: R41.3 (ICD-10-CM) - Memory loss     Recent Labs  Lab Results   Component Value Date    VOXTPLLJ02 1189 (H) 08/30/2023     Lab Results   Component Value Date    RPR Non-reactive 10/16/2023     Lab Results   Component Value Date    FOLATE 9.1 10/16/2023     Lab Results   Component Value Date    TSH 0.830 08/30/2023     Lab Results   Component Value Date    HGBA1C 5.0 10/12/2022     Lab Results   Component Value Date    [id number] Non Reactive 10/16/2023     Imaging    Results for orders placed or performed during the hospital encounter of 10/23/23   MRI Brain W WO Contrast    Narrative    EXAMINATION:  MRI BRAIN W WO CONTRAST    CLINICAL HISTORY:  Dizziness, non-specific;also, radiology requesting repeat as poor MRI;.  Dizziness and giddiness    TECHNIQUE:  Multiplanar multisequence MR imaging of the brain was performed before and after the administration of 6 mL Gadavist  intravenous contrast.    COMPARISON:  MRI brain 08/28/2023, MRI brain pituitary 05/18/2022    FINDINGS:  Mild susceptibility artifact from dental amalgam.    Intracranial compartment:    Ventricles and sulci are normal in size for age without evidence of hydrocephalus. No extra-axial blood or fluid collections.    The brain parenchyma appears with normal signal.  No mass lesion, acute hemorrhage, edema or acute infarct.  No abnormal enhancement.    Normal vascular flow voids are preserved.    Skull/extracranial contents (limited evaluation): Bone marrow signal intensity is normal.      Impression    Unremarkable MRI brain.  Prior posterior fossa questioned abnormality likely artifactual on prior exam.  No evidence for intracranial metastatic disease.    Electronically signed by resident: Jhon Xiao  Date:    10/23/2023  Time:    08:51    Electronically signed by: Walker Guzmán  Date:    10/23/2023  Time:    09:29     *Note: Due to a large number of results and/or encounters for the requested time period, some results have not been displayed. A complete set of results can be found in Results Review.     EE2023  Interpretation  This is a normal EEG during wakefulness and sleep. No potentially epileptiform activity was seen. Please be aware that a normal EEG does not exclude the possibility of an underlying seizure disorder.    Other Diagnostics: She was referred for a lumbar puncture in 2023 but it has not been completed yet.    Current Outpatient Medications:     dextroamphetamine-amphetamine (ADDERALL) 10 mg Tab, Take 1 tablet (10 mg total) by mouth 2 (two) times a day., Disp: 60 tablet, Rfl: 0    diazePAM (VALIUM) 5 MG tablet, Take 1 tablet (5 mg total) by mouth every 6 (six) hours as needed for Anxiety (take 1 prior to MRI)., Disp: 2 tablet, Rfl: 0    estradioL (IMVEXXY MAINTENANCE PACK) 10 mcg Inst, Place 10 mcg vaginally twice a week., Disp: 8 each, Rfl: 11    estradioL (VAGIFEM) 10 mcg Tab, Place vaginally., Disp: , Rfl:     hydrocortisone (CORTEF) 10 MG Tab, Take 1 tablet (10 mg total) by mouth once daily., Disp: 90 tablet, Rfl: 3    hydrocortisone (CORTEF) 5 MG Tab, Take 1 tablet every day by oral route in the evening., Disp: , Rfl:     LIDOCAINE VISCOUS 2 % solution, SMARTSIG:sparingly Topical PRN, Disp: , Rfl:     ondansetron (ZOFRAN ODT ORAL), , Disp: , Rfl:     ondansetron (ZOFRAN-ODT) 8 MG TbDL, DISSOLVE 1  "TABLET ON THE TONGUE EVERY 6 TO 8 HOURS AS NEEDED FOR NAUSEA, Disp: 60 tablet, Rfl: 1    propranoloL (INDERAL) 10 MG tablet, Take 1 tablet (10 mg total) by mouth 2 (two) times daily as needed (anxiety)., Disp: 60 tablet, Rfl: 1    traZODone (DESYREL) 100 MG tablet, Take 1 tablet (100 mg total) by mouth nightly as needed for Insomnia., Disp: 90 tablet, Rfl: 3    valACYclovir (VALTREX) 500 MG tablet, Take 1 tablet (500 mg total) by mouth 2 (two) times daily., Disp: 10 tablet, Rfl: 2    valACYclovir (VALTREX) 500 MG tablet, as needed., Disp: , Rfl:   No current facility-administered medications for this visit.    Facility-Administered Medications Ordered in Other Visits:     lactated ringers infusion, , Intravenous, Continuous, Melissa, Leon HENDRIX MD, Stopped at 01/19/21 1345    sodium chloride 0.9% flush 10 mL, 10 mL, Intravenous, PRN, VestSandra MD, 10 mL at 08/15/22 1330  She uses CBD gummies at nighttime for sleep.    Updated/Relevant Psychiatric History: She has a history of anxiety and ADHD for which she is prescribed Valium and Adderall, with reported benefit.     Substance Use: She drinks alcohol (wine, liquor) about 1-2 times per week. No tobacco or illicit substance use.    MENTAL STATUS AND OBSERVATIONS:  APPEARANCE: Not assessed  ALERTNESS/ORIENTATION: Attentive and alert. Oriented to place and mostly oriented to time. When asked the date of the month, she reported it was the 23rd when it was the 22nd.    GAIT: Not assessed  MOTOR MOVEMENTS/MANNERISMS: Not assessed  SPEECH/LANGUAGE: Normal in rate, rhythm, tone, and volume. No significant word finding difficulty noted. Expressive and receptive language was normal.  STATED MOOD/AFFECT: The patient's stated mood was "pretty good." Affect was congruent with stated mood.   INTERPERSONAL BEHAVIOR: Rapport was quickly and easily established   SUICIDALITY/HOMICIDALITY: Denied  HALLUCINATIONS/DELUSIONS: None evidenced or endorsed  THOUGHT PROCESSES/INSIGHT: " Thoughts seemed logical and goal-directed.     BILLING  Service Description CPT Code Minutes Units   Psychiatric diagnostic evaluation by physician [phone]   Neurobehavioral status exam by physician [id number]  0   Each additional hour by physician [id number]  0

## 2023-11-22 ENCOUNTER — OFFICE VISIT (OUTPATIENT)
Dept: NEUROLOGY | Facility: CLINIC | Age: 43
End: 2023-11-22
Payer: COMMERCIAL

## 2023-11-22 DIAGNOSIS — F33.0 MILD EPISODE OF RECURRENT MAJOR DEPRESSIVE DISORDER: ICD-10-CM

## 2023-11-22 DIAGNOSIS — F41.1 GAD (GENERALIZED ANXIETY DISORDER): ICD-10-CM

## 2023-11-22 DIAGNOSIS — R41.3 MEMORY LOSS: ICD-10-CM

## 2023-11-22 DIAGNOSIS — F90.0 ADHD (ATTENTION DEFICIT HYPERACTIVITY DISORDER), INATTENTIVE TYPE: ICD-10-CM

## 2023-11-22 PROCEDURE — 90791 PSYCH DIAGNOSTIC EVALUATION: CPT | Mod: 95,,, | Performed by: PSYCHIATRY & NEUROLOGY

## 2023-11-22 PROCEDURE — 99499 UNLISTED E&M SERVICE: CPT | Mod: 95,,, | Performed by: PSYCHIATRY & NEUROLOGY

## 2023-11-22 PROCEDURE — 99499 NO LOS: ICD-10-PCS | Mod: 95,,, | Performed by: PSYCHIATRY & NEUROLOGY

## 2023-11-22 PROCEDURE — 90791 PR PSYCHIATRIC DIAGNOSTIC EVALUATION: ICD-10-PCS | Mod: 95,,, | Performed by: PSYCHIATRY & NEUROLOGY

## 2023-11-29 DIAGNOSIS — F90.0 ADHD (ATTENTION DEFICIT HYPERACTIVITY DISORDER), INATTENTIVE TYPE: ICD-10-CM

## 2023-11-30 ENCOUNTER — TELEPHONE (OUTPATIENT)
Dept: INFUSION THERAPY | Facility: HOSPITAL | Age: 43
End: 2023-11-30
Payer: COMMERCIAL

## 2023-11-30 RX ORDER — DEXTROAMPHETAMINE SACCHARATE, AMPHETAMINE ASPARTATE, DEXTROAMPHETAMINE SULFATE AND AMPHETAMINE SULFATE 2.5; 2.5; 2.5; 2.5 MG/1; MG/1; MG/1; MG/1
10 TABLET ORAL 2 TIMES DAILY
Qty: 60 TABLET | Refills: 0 | Status: SHIPPED | OUTPATIENT
Start: 2023-11-30 | End: 2023-12-01 | Stop reason: RX

## 2023-11-30 NOTE — TELEPHONE ENCOUNTER
Patient called this morning at 0900 needing an appointment for port flush with labs, patient was not approved for [facility]. Message sent to Prior Authorization team, Site was approved. At 1200 this nurse called and LM for patient to come in at 1500, and was scheduled. 1208 patient called back tearful, stated she was having a mental melt down, and there was no way possible that she could make today for for labs with port flush. Appointment cancelled.

## 2023-12-01 ENCOUNTER — TELEPHONE (OUTPATIENT)
Dept: OBSTETRICS AND GYNECOLOGY | Facility: CLINIC | Age: 43
End: 2023-12-01

## 2023-12-01 ENCOUNTER — TELEPHONE (OUTPATIENT)
Dept: PSYCHIATRY | Facility: CLINIC | Age: 43
End: 2023-12-01
Payer: COMMERCIAL

## 2023-12-01 ENCOUNTER — OFFICE VISIT (OUTPATIENT)
Dept: PSYCHIATRY | Facility: CLINIC | Age: 43
End: 2023-12-01
Payer: COMMERCIAL

## 2023-12-01 DIAGNOSIS — F90.0 ADHD (ATTENTION DEFICIT HYPERACTIVITY DISORDER), INATTENTIVE TYPE: Primary | ICD-10-CM

## 2023-12-01 DIAGNOSIS — F41.1 GAD (GENERALIZED ANXIETY DISORDER): Primary | ICD-10-CM

## 2023-12-01 DIAGNOSIS — F32.A DEPRESSION, UNSPECIFIED DEPRESSION TYPE: ICD-10-CM

## 2023-12-01 DIAGNOSIS — N95.1 MENOPAUSAL SYMPTOMS: Primary | ICD-10-CM

## 2023-12-01 DIAGNOSIS — G47.00 INSOMNIA, UNSPECIFIED TYPE: ICD-10-CM

## 2023-12-01 PROCEDURE — 90833 PR PSYCHOTHERAPY W/PATIENT W/E&M, 30 MIN (ADD ON): ICD-10-PCS | Mod: S$GLB,,, | Performed by: PSYCHIATRY & NEUROLOGY

## 2023-12-01 PROCEDURE — 1160F PR REVIEW ALL MEDS BY PRESCRIBER/CLIN PHARMACIST DOCUMENTED: ICD-10-PCS | Mod: CPTII,S$GLB,, | Performed by: PSYCHIATRY & NEUROLOGY

## 2023-12-01 PROCEDURE — 99213 PR OFFICE/OUTPT VISIT, EST, LEVL III, 20-29 MIN: ICD-10-PCS | Mod: S$GLB,,, | Performed by: PSYCHIATRY & NEUROLOGY

## 2023-12-01 PROCEDURE — 1160F RVW MEDS BY RX/DR IN RCRD: CPT | Mod: CPTII,S$GLB,, | Performed by: PSYCHIATRY & NEUROLOGY

## 2023-12-01 PROCEDURE — 1159F PR MEDICATION LIST DOCUMENTED IN MEDICAL RECORD: ICD-10-PCS | Mod: CPTII,S$GLB,, | Performed by: PSYCHIATRY & NEUROLOGY

## 2023-12-01 PROCEDURE — 1159F MED LIST DOCD IN RCRD: CPT | Mod: CPTII,S$GLB,, | Performed by: PSYCHIATRY & NEUROLOGY

## 2023-12-01 PROCEDURE — 99213 OFFICE O/P EST LOW 20 MIN: CPT | Mod: S$GLB,,, | Performed by: PSYCHIATRY & NEUROLOGY

## 2023-12-01 PROCEDURE — 90833 PSYTX W PT W E/M 30 MIN: CPT | Mod: S$GLB,,, | Performed by: PSYCHIATRY & NEUROLOGY

## 2023-12-01 RX ORDER — LORAZEPAM 0.5 MG/1
0.5 TABLET ORAL EVERY 6 HOURS PRN
Qty: 120 TABLET | Refills: 2 | Status: SHIPPED | OUTPATIENT
Start: 2023-12-01 | End: 2023-12-28 | Stop reason: SDUPTHER

## 2023-12-01 RX ORDER — DEXTROAMPHETAMINE SACCHARATE, AMPHETAMINE ASPARTATE, DEXTROAMPHETAMINE SULFATE AND AMPHETAMINE SULFATE 2.5; 2.5; 2.5; 2.5 MG/1; MG/1; MG/1; MG/1
10 TABLET ORAL 2 TIMES DAILY
Qty: 60 TABLET | Refills: 0 | Status: SHIPPED | OUTPATIENT
Start: 2023-12-01 | End: 2023-12-08 | Stop reason: RX

## 2023-12-01 NOTE — TELEPHONE ENCOUNTER
----- Message from Vernell Montiel sent at 12/1/2023 10:27 AM CST -----  Pt need to speak with someone about getting her blood done  today she will be at the main campus on today she want to speak with someone in the office [phone]

## 2023-12-01 NOTE — TELEPHONE ENCOUNTER
I called Alison to see which pharmacy she wanted to try to get her Adderall 10mg tab. She wants it to be sent to the [facility] off of [address] in [place].

## 2023-12-01 NOTE — PROGRESS NOTES
Outpatient Psychiatry Follow-Up Visit (MD/NP)    12/1/2023    Clinical Status of Patient:  Outpatient (Ambulatory)      Chief Complaint:  Alison Swann is a 43 y.o. female who presents today for follow-up of depression and anxiety.  Met with patient.      Interval History and Content of Current Session:  Interim Events/Subjective Report/Content of Current Session:       History of Present Illness: Anxiety  Patient is here for evaluation of depression .       Patient complains of depression primarily . She complains of anhedonia, depressed mood, difficulty concentrating, fatigue, feelings of worthlessness/guilt, impaired memory, and insomnia. Onset was approximately 1 year  ago. Symptoms have been gradually worsening since that time. Current symptoms include:  see above  . Patient denies hypersomnia, psychomotor agitation, psychomotor retardation, suicidal attempt, and suicidal thoughts with specific plan. Family history significant for depression. Possible organic causes contributing are: endocrine/metabolic. Risk factors: negative life event cancer  . Previous treatment includes individual therapy. She complains of the following side effects from the treatment: none.        Over 3 years , owned business during covid. 1/21  dx with aggressive invasive breast ca. CHEMO , RADS PULS 3 SURGERIES SUBJECTIVE:/p double mastectomey .        Has only 1 sis working for her now .     One handicapped sis . Mom around.,     Mood swings :  Fine, then for example could not get into house post dinner , crying on carport .    One word triggers How are you ? 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 /p crises   ,now on steroids .  S/p 6 episodes of sever hypotension .      No yasmany .     Socially outgoing .   Just cancelled [birth year] birthday  2 hours prior . Not like her at all .       Stressors :  Business still poor - due to interest rate , misperception of her illness      PMH   S/p echo EKG said to be good - outside  doc   3/22 ecg normal QTc        Psych meds :  adderall 10 mg q day  Ativan 1 mg q hs  for insomnia      Fam Hx   Mo chronically depressed as did grmo, aunt  Dtr responds to zoloft      Past psych Hx :  Has therapist   Seen by Destinee flores      30 sessions of neuro feedback  finished 3 months ago - resolved brief flashing SI. Never planning         Social Hx  2 homes in [place] and [place]   Now selling primary home in [place]   Has dog   1 dtr ago 20 - red        Functioning in Relationships:  Spouse/partner:  28-29 years - in construction , owner   Peers: has support   Employers: real estate     Updated hx 4-10-23   Overall improved but still some lability   Onset of s/s 1 year ago  Seen 3-31-23   Has not started zoloft yet   In between 2 houses and moving   Feeling better - house under contract  - less chaos  S/p recent and last  surgery complicated by never resting   Biggest challenge is sleeping ; past was deep sleeper   Dtr and   hus to move to [place]  2 year - pt and very close   Hus may have job [employer]  4 nights per week - stressor but $$$    Updated hx 12-1-23 in person    seen on urgent basis   Last plan was to  start zoloft for mood and lability ; added zaleplon for sleep   In March 2024, hus will be out of town 4 days per week   Dtr has moved to [place]  No sustained depression   Anxiety at time as gets overwhelmed   On cortisol replacement   [facility] lost blood vial twice then 3rd stick   Can see  3 docs in a week in diff cities to coord care  Forgot work password  Work is very stressful   Now lives in [facility]   Seemingly isolated by close  8 friend / couples group   Sold business to current owner of her company  - needs to be there 3 years on 24/7 basis   Has used FMLA /short term but short term not totally granted   Has been advised by admin to seek long term disability but not really wanting LTD   Despite knowing my location , could not find large parking garage entrance  "       Adderall 10 bid  from Pcp  Valium 5 mg q 6 prn for recent MRI   Trazodone 100 mg qhs prn   Zaleplon never filled   Vit drip q 3 weeks     Past med s;  Zoloft made her GI pain x 1 week "on fire"   Valium 5 mg - potent     Current Outpatient Medications   Medication Instructions    dextroamphetamine-amphetamine (ADDERALL) 10 mg Tab 10 mg, Oral, 2 times daily    diazePAM (VALIUM) 5 mg, Oral, Every 6 hours PRN    estradioL (VAGIFEM) 10 mcg Tab Vaginal    hydrocortisone (CORTEF) 5 MG Tab Take 1 tablet every day by oral route in the evening.    hydrocortisone (CORTEF) 10 mg, Oral, Daily    IMVEXXY MAINTENANCE PACK 10 mcg, Vaginal, Twice weekly    LIDOCAINE VISCOUS 2 % solution SMARTSIG:sparingly Topical PRN    ondansetron (ZOFRAN ODT ORAL) No dose, route, or frequency recorded.    ondansetron (ZOFRAN-ODT) 8 MG TbDL DISSOLVE 1 TABLET ON THE TONGUE EVERY 6 TO 8 HOURS AS NEEDED FOR NAUSEA    propranoloL (INDERAL) 10 mg, Oral, 2 times daily PRN    traZODone (DESYREL) 100 mg, Oral, Nightly PRN    valACYclovir (VALTREX) 500 MG tablet As needed (PRN)    valACYclovir (VALTREX) 500 mg, Oral, 2 times daily         Psychiatric Review Of Systems - Is patient experiencing or having changes in:  sleep: insomnia  helped by trazodone 100 mg and Mag  appetite:down   weight: no    base 120 - chemo caused 150 ;  April , 2023 122  ; Dec 2023- 117#   energy/anergy: poor   interest/pleasure/anhedonia: would like  to do more   somatic symptoms: yes  libido: no, no climax since 1 year of cervical w surgery   anxiety/panic: worse   guilty/hopelessness:  self esteem lower   concentration: poor   memory : worse   S.I.B.s/risky behavior: no  Irritability:  worse   Racing thoughts: no  Impulsive behaviors: no  Paranoia: no  AVH: no          Psychotherapy:  Target symptoms: depression, anxiety   Why chosen therapy is appropriate versus another modality: relevant to diagnosis, patient responds to this modality, evidence based practice  Outcome " monitoring methods: self-report, observation  Therapeutic intervention type: insight oriented psychotherapy, supportive psychotherapy  Topics discussed/themes: building skills sets for symptom management  The patient's response to the intervention is accepting. The patient's progress toward treatment goals is fair.   Duration of intervention: 20  minutes.    Review of Systems   Prob Pert. 1 sys, Ext. psych +2 add., Comp. 10-14 sys  PSYCHIATRIC: Pertinant items are noted in the narrative.  CONSTITUTIONAL: weight gain / loss. 5 # range in past 6 months   MUSCULOSKELETAL: L shoulder  pain to Left arm ; episodic paresthesias particularly post extensive computer   NEUROLOGIC: No weakness, seizures,. Positive episodic confusion and memory loss   ENDOCRINE:  on cortisol replacement   INTEGUMENTARY: No rashes or lacerations. Fingernails are brittle , thin and splitting down the middle   EYES: No exophthalmos, jaundice or blindness.  ENT: No dizziness, tinnitus or hearing loss.  RESPIRATORY: No shortness of breath.  CARDIOVASCULAR: No tachycardia or chest pain.  GASTROINTESTINAL:  3 weeks of constipation depite supplements ; No nausea, vomiting, pain, or diarrhea.  GENITOURINARY: No frequency, dysuria   HEMATOLOGIC/LYMPHATIC: No excessive bleeding, prolonged or excessive bleeding after dental extraction/injury.  ALLERGIC/IMMUNOLOGIC: No allergic response to materials, foods or animals at this time.    Past Medical, Family and Social History: The patient's past medical, family and social history have been reviewed and updated as appropriate within the electronic medical record - see encounter notes.    Compliance: yes    Side effects: None    Risk Parameters:  Patient reports no suicidal ideation  Patient reports no homicidal ideation  Patient reports no self-injurious behavior  Patient reports no violent behavior    Exam (detailed: at least 9 elements; comprehensive: all 15 elements)   Constitutional  Vitals:  Most recent  vital signs, dated greater than 90 days prior to this appointment, were not reviewed.   There were no vitals filed for this visit.     General:  unremarkable, age appropriate, neatly groomed     Musculoskeletal  Muscle Strength/Tone:  no dyskinesia, no dystonia, no tremor   Gait & Station:  non-ataxic     Psychiatric  Speech:  no latency; no press   Mood & Affect:  anxious, sad  congruent and appropriate   Thought Process:  normal and logical, goal-directed   Associations:  intact   Thought Content:  normal, no suicidality, no homicidality, delusions, or paranoia   Insight:  has awareness of illness   Judgement: behavior is adequate to circumstances   Orientation:  grossly intact   Memory: intact for content of interview   Language: grossly intact   Attention Span & Concentration:  able to focus   Fund of Knowledge:  intact and appropriate to age and level of education     Assessment and Diagnosis   Status/Progress: Based on the examination today, the patient's problem(s) is/are inadequately controlled.  New problems have not been presented today.   Co-morbidities are complicating management of the primary condition.  There are no active rule-out diagnoses for this patient at this time.     General Impression:       ICD-10-CM ICD-9-CM   1. KIANA (generalized anxiety disorder)  F41.1 300.02   2. Insomnia, unspecified type  G47.00 780.52   3. Depression, unspecified depression type  F32.A 311         Intervention/Counseling/Treatment Plan   Medication Management:  zaleplon for sleep The risks and benefits of medication were discussed with the patient.   Patient was told not to drive nor operate machinery until effects of this medication are known.Sleep Aid Initiation:  Patient advised of potential side effects of medication including sleep walking or other complex behaviors.  Patient advised not to mix medication with alcohol, to go to bed immediately after taking, and to stop at first sign of any unusual behaviors      Counseling provided with patient as follows: importance of compliance with chosen treatment options was emphasized, risks and benefits of treatment options, including medications, were discussed with the patient      Return to Clinic: 1 month

## 2023-12-05 PROBLEM — F32.4 MAJOR DEPRESSIVE DISORDER IN PARTIAL REMISSION: Status: ACTIVE | Noted: 2023-10-17

## 2023-12-05 PROBLEM — F33.0 MILD EPISODE OF RECURRENT MAJOR DEPRESSIVE DISORDER: Status: ACTIVE | Noted: 2023-10-17

## 2023-12-05 LAB
METHLYMALONIC ACID: 170 NMOL/L (ref 0–378)
MMA DISCLAIMER: NORMAL

## 2023-12-07 ENCOUNTER — PATIENT MESSAGE (OUTPATIENT)
Dept: PSYCHIATRY | Facility: CLINIC | Age: 43
End: 2023-12-07
Payer: COMMERCIAL

## 2023-12-07 DIAGNOSIS — F90.0 ADHD (ATTENTION DEFICIT HYPERACTIVITY DISORDER), INATTENTIVE TYPE: Primary | ICD-10-CM

## 2023-12-08 ENCOUNTER — PATIENT MESSAGE (OUTPATIENT)
Dept: HEMATOLOGY/ONCOLOGY | Facility: CLINIC | Age: 43
End: 2023-12-08
Payer: COMMERCIAL

## 2023-12-08 ENCOUNTER — PATIENT MESSAGE (OUTPATIENT)
Dept: PSYCHIATRY | Facility: CLINIC | Age: 43
End: 2023-12-08
Payer: COMMERCIAL

## 2023-12-08 RX ORDER — DEXTROAMPHETAMINE SACCHARATE, AMPHETAMINE ASPARTATE, DEXTROAMPHETAMINE SULFATE AND AMPHETAMINE SULFATE 2.5; 2.5; 2.5; 2.5 MG/1; MG/1; MG/1; MG/1
10 TABLET ORAL 2 TIMES DAILY
Qty: 60 TABLET | Refills: 0 | Status: SHIPPED | OUTPATIENT
Start: 2023-12-08 | End: 2023-12-21 | Stop reason: DRUGHIGH

## 2023-12-09 NOTE — TELEPHONE ENCOUNTER
Medication is unavailable at local pharmacies, pt spoke to [facility] mail order pharmacy and they discussed a shipment next week.

## 2023-12-11 ENCOUNTER — PATIENT MESSAGE (OUTPATIENT)
Dept: PSYCHIATRY | Facility: CLINIC | Age: 43
End: 2023-12-11
Payer: COMMERCIAL

## 2023-12-11 ENCOUNTER — OFFICE VISIT (OUTPATIENT)
Dept: NEUROLOGY | Facility: CLINIC | Age: 43
End: 2023-12-11
Payer: COMMERCIAL

## 2023-12-11 DIAGNOSIS — C50.412 MALIGNANT NEOPLASM OF UPPER-OUTER QUADRANT OF LEFT BREAST IN FEMALE, ESTROGEN RECEPTOR NEGATIVE: Primary | ICD-10-CM

## 2023-12-11 DIAGNOSIS — R41.3 MEMORY LOSS: ICD-10-CM

## 2023-12-11 DIAGNOSIS — F41.1 GAD (GENERALIZED ANXIETY DISORDER): ICD-10-CM

## 2023-12-11 DIAGNOSIS — R41.9 COGNITIVE COMPLAINTS: ICD-10-CM

## 2023-12-11 DIAGNOSIS — Z17.1 MALIGNANT NEOPLASM OF UPPER-OUTER QUADRANT OF LEFT BREAST IN FEMALE, ESTROGEN RECEPTOR NEGATIVE: Primary | ICD-10-CM

## 2023-12-11 DIAGNOSIS — F33.0 MILD EPISODE OF RECURRENT MAJOR DEPRESSIVE DISORDER: ICD-10-CM

## 2023-12-11 DIAGNOSIS — F32.A DEPRESSION, UNSPECIFIED DEPRESSION TYPE: ICD-10-CM

## 2023-12-11 DIAGNOSIS — F90.0 ADHD (ATTENTION DEFICIT HYPERACTIVITY DISORDER), INATTENTIVE TYPE: ICD-10-CM

## 2023-12-11 PROCEDURE — 99999 PR PBB SHADOW E&M-EST. PATIENT-LVL II: ICD-10-PCS | Mod: PBBFAC,,, | Performed by: PSYCHIATRY & NEUROLOGY

## 2023-12-11 PROCEDURE — 96139 PSYCL/NRPSYC TST TECH EA: CPT | Mod: S$GLB,,, | Performed by: PSYCHIATRY & NEUROLOGY

## 2023-12-11 PROCEDURE — 96132 NRPSYC TST EVAL PHYS/QHP 1ST: CPT | Mod: S$GLB,,, | Performed by: PSYCHIATRY & NEUROLOGY

## 2023-12-11 PROCEDURE — 96133 PR NEUROPSYCHOLOGIC TEST EVAL SVCS, EA ADDTL HR: ICD-10-PCS | Mod: S$GLB,,, | Performed by: PSYCHIATRY & NEUROLOGY

## 2023-12-11 PROCEDURE — 96139 PR PSYCH/NEUROPSYCH TEST ADMIN/SCORING, BY TECH, 2+ TESTS, EA ADDTL 30 MIN: ICD-10-PCS | Mod: S$GLB,,, | Performed by: PSYCHIATRY & NEUROLOGY

## 2023-12-11 PROCEDURE — 96133 NRPSYC TST EVAL PHYS/QHP EA: CPT | Mod: S$GLB,,, | Performed by: PSYCHIATRY & NEUROLOGY

## 2023-12-11 PROCEDURE — 96132 PR NEUROPSYCHOLOGIC TEST EVAL SVCS, 1ST HR: ICD-10-PCS | Mod: S$GLB,,, | Performed by: PSYCHIATRY & NEUROLOGY

## 2023-12-11 PROCEDURE — 99499 NO LOS: ICD-10-PCS | Mod: S$GLB,,, | Performed by: PSYCHIATRY & NEUROLOGY

## 2023-12-11 PROCEDURE — 99999 PR PBB SHADOW E&M-EST. PATIENT-LVL II: CPT | Mod: PBBFAC,,, | Performed by: PSYCHIATRY & NEUROLOGY

## 2023-12-11 PROCEDURE — 99499 UNLISTED E&M SERVICE: CPT | Mod: S$GLB,,, | Performed by: PSYCHIATRY & NEUROLOGY

## 2023-12-11 PROCEDURE — 96138 PSYCL/NRPSYC TECH 1ST: CPT | Mod: S$GLB,,, | Performed by: PSYCHIATRY & NEUROLOGY

## 2023-12-11 PROCEDURE — 96138 PR PSYCH/NEUROPSYCH TEST ADMIN/SCORING, BY TECH, 2+ TESTS, 1ST 30 MIN: ICD-10-PCS | Mod: S$GLB,,, | Performed by: PSYCHIATRY & NEUROLOGY

## 2023-12-12 ENCOUNTER — PATIENT MESSAGE (OUTPATIENT)
Dept: NEUROLOGY | Facility: CLINIC | Age: 43
End: 2023-12-12
Payer: COMMERCIAL

## 2023-12-15 ENCOUNTER — PATIENT MESSAGE (OUTPATIENT)
Dept: OBSTETRICS AND GYNECOLOGY | Facility: CLINIC | Age: 43
End: 2023-12-15
Payer: COMMERCIAL

## 2023-12-15 DIAGNOSIS — Z17.1 MALIGNANT NEOPLASM OF UPPER-OUTER QUADRANT OF LEFT BREAST IN FEMALE, ESTROGEN RECEPTOR NEGATIVE: ICD-10-CM

## 2023-12-15 DIAGNOSIS — C50.412 MALIGNANT NEOPLASM OF UPPER-OUTER QUADRANT OF LEFT BREAST IN FEMALE, ESTROGEN RECEPTOR NEGATIVE: ICD-10-CM

## 2023-12-15 RX ORDER — ONDANSETRON 8 MG/1
TABLET, ORALLY DISINTEGRATING ORAL
Qty: 60 TABLET | Refills: 1 | Status: SHIPPED | OUTPATIENT
Start: 2023-12-15 | End: 2023-12-21 | Stop reason: SDUPTHER

## 2023-12-17 ENCOUNTER — PATIENT MESSAGE (OUTPATIENT)
Dept: ENDOCRINOLOGY | Facility: CLINIC | Age: 43
End: 2023-12-17
Payer: COMMERCIAL

## 2023-12-17 DIAGNOSIS — E27.49 SECONDARY ADRENAL INSUFFICIENCY: Primary | ICD-10-CM

## 2023-12-18 ENCOUNTER — PATIENT MESSAGE (OUTPATIENT)
Dept: OBSTETRICS AND GYNECOLOGY | Facility: CLINIC | Age: 43
End: 2023-12-18

## 2023-12-18 ENCOUNTER — OFFICE VISIT (OUTPATIENT)
Dept: OBSTETRICS AND GYNECOLOGY | Facility: CLINIC | Age: 43
End: 2023-12-18
Payer: COMMERCIAL

## 2023-12-18 ENCOUNTER — TELEPHONE (OUTPATIENT)
Dept: OBSTETRICS AND GYNECOLOGY | Facility: CLINIC | Age: 43
End: 2023-12-18
Payer: COMMERCIAL

## 2023-12-18 VITALS
WEIGHT: 120.63 LBS | DIASTOLIC BLOOD PRESSURE: 80 MMHG | HEART RATE: 88 BPM | BODY MASS INDEX: 23.68 KG/M2 | HEIGHT: 60 IN | SYSTOLIC BLOOD PRESSURE: 113 MMHG

## 2023-12-18 DIAGNOSIS — C50.412 MALIGNANT NEOPLASM OF UPPER-OUTER QUADRANT OF LEFT BREAST IN FEMALE, ESTROGEN RECEPTOR POSITIVE: Primary | ICD-10-CM

## 2023-12-18 DIAGNOSIS — Z17.0 MALIGNANT NEOPLASM OF UPPER-OUTER QUADRANT OF LEFT BREAST IN FEMALE, ESTROGEN RECEPTOR POSITIVE: Primary | ICD-10-CM

## 2023-12-18 DIAGNOSIS — Z13.820 SCREENING FOR OSTEOPOROSIS: ICD-10-CM

## 2023-12-18 DIAGNOSIS — Z15.01 BRCA2 GENE MUTATION POSITIVE: ICD-10-CM

## 2023-12-18 DIAGNOSIS — N95.2 VAGINAL ATROPHY: ICD-10-CM

## 2023-12-18 DIAGNOSIS — Z15.09 BRCA2 GENE MUTATION POSITIVE: ICD-10-CM

## 2023-12-18 DIAGNOSIS — N95.1 MENOPAUSAL SYMPTOMS: ICD-10-CM

## 2023-12-18 DIAGNOSIS — N94.10 DYSPAREUNIA IN FEMALE: ICD-10-CM

## 2023-12-18 PROCEDURE — 99999 PR PBB SHADOW E&M-EST. PATIENT-LVL IV: ICD-10-PCS | Mod: PBBFAC,,, | Performed by: PHYSICIAN ASSISTANT

## 2023-12-18 PROCEDURE — 99999 PR PBB SHADOW E&M-EST. PATIENT-LVL IV: CPT | Mod: PBBFAC,,, | Performed by: PHYSICIAN ASSISTANT

## 2023-12-18 PROCEDURE — 3008F BODY MASS INDEX DOCD: CPT | Mod: CPTII,S$GLB,, | Performed by: PHYSICIAN ASSISTANT

## 2023-12-18 PROCEDURE — 3008F PR BODY MASS INDEX (BMI) DOCUMENTED: ICD-10-PCS | Mod: CPTII,S$GLB,, | Performed by: PHYSICIAN ASSISTANT

## 2023-12-18 PROCEDURE — 1159F MED LIST DOCD IN RCRD: CPT | Mod: CPTII,S$GLB,, | Performed by: PHYSICIAN ASSISTANT

## 2023-12-18 PROCEDURE — 3079F DIAST BP 80-89 MM HG: CPT | Mod: CPTII,S$GLB,, | Performed by: PHYSICIAN ASSISTANT

## 2023-12-18 PROCEDURE — 99214 OFFICE O/P EST MOD 30 MIN: CPT | Mod: S$GLB,,, | Performed by: PHYSICIAN ASSISTANT

## 2023-12-18 PROCEDURE — 1160F PR REVIEW ALL MEDS BY PRESCRIBER/CLIN PHARMACIST DOCUMENTED: ICD-10-PCS | Mod: CPTII,S$GLB,, | Performed by: PHYSICIAN ASSISTANT

## 2023-12-18 PROCEDURE — 3074F PR MOST RECENT SYSTOLIC BLOOD PRESSURE < 130 MM HG: ICD-10-PCS | Mod: CPTII,S$GLB,, | Performed by: PHYSICIAN ASSISTANT

## 2023-12-18 PROCEDURE — 99214 PR OFFICE/OUTPT VISIT, EST, LEVL IV, 30-39 MIN: ICD-10-PCS | Mod: S$GLB,,, | Performed by: PHYSICIAN ASSISTANT

## 2023-12-18 PROCEDURE — 3079F PR MOST RECENT DIASTOLIC BLOOD PRESSURE 80-89 MM HG: ICD-10-PCS | Mod: CPTII,S$GLB,, | Performed by: PHYSICIAN ASSISTANT

## 2023-12-18 PROCEDURE — 3074F SYST BP LT 130 MM HG: CPT | Mod: CPTII,S$GLB,, | Performed by: PHYSICIAN ASSISTANT

## 2023-12-18 PROCEDURE — 1160F RVW MEDS BY RX/DR IN RCRD: CPT | Mod: CPTII,S$GLB,, | Performed by: PHYSICIAN ASSISTANT

## 2023-12-18 PROCEDURE — 1159F PR MEDICATION LIST DOCUMENTED IN MEDICAL RECORD: ICD-10-PCS | Mod: CPTII,S$GLB,, | Performed by: PHYSICIAN ASSISTANT

## 2023-12-18 RX ORDER — PRASTERONE 6.5 MG/1
6.5 INSERT VAGINAL NIGHTLY
Qty: 28 EACH | Refills: 11 | Status: SHIPPED | OUTPATIENT
Start: 2023-12-18 | End: 2024-01-04 | Stop reason: SDUPTHER

## 2023-12-18 NOTE — TELEPHONE ENCOUNTER
Spoke with patient and scheduled an urgent appointment on today.Patient has no further questions or complaints.

## 2023-12-18 NOTE — PROGRESS NOTES
Subjective:      Alison Swann is a 43 y.o. female who presents for Survivorship follow up. History of triple negative breast cancer. She is status post lumpectomy 1/19/21. Then completed adjuvant chemo-immunotherapy.  Underwent bilateral mastectomy 9/29/21 with no residual carcinoma identified in breast of 14 sampled nodes (ypN0). She completed radiation therapy on 1/4/22. She is BRCA2+ and is now s/p hyst/BSO with Dr. Galvez on 3/4/2022.   Requested labs to be drawn to due multiple symptoms that were ultimately ordered under her PCP. Hormones are low since she is in surgical menopause. She reports brain fog, decreased cognition, vaginal dryness, dyspareunia, difficulty climaxing, hot flashes and moments of anxiety when can not acccomplish something that she would like. Seeing psychiatry and starting to feel better with low doses of ativan PRN. She is also using vagifem and did galindo justine laser x 2.  Hesitant to start more medications. Tired of always being at the doctor.  She is exercising reguarly and eating well. Decreased muscle mass.   Adrenal insufficiency and on prednisone 10mg daily. This is managed by endocrinology.  She has also recently had work up with neuropsych for memory loss. Scans are negative but results are still pending.    PCP: Colt Muir MD    Routine labs: 6/14/2023  WWE:   Pap smear: s/p hyst/BSO  Mammogram: s/p bilateral mastectomy  DEXA: Never  Colonoscopy: 8/16/2021 repeat in 10 years    Lab Visit on 12/01/2023   Component Date Value Ref Range Status    Estradiol 12/01/2023 <10 (A)  See Text pg/mL Final    Testosterone, Total 12/01/2023 <4 (L)  5 - 73 ng/dL Final    Cortisol, 8 AM 12/01/2023 2.50 (L)  4.30 - 22.40 ug/dL Final    ACTH 12/01/2023 <5  0 - 46 pg/mL Final    DHEA-SO4 12/01/2023 6.5 (L)  56.2 - 282.9 ug/dL Final    Progesterone 12/01/2023 0.2  See Text ng/mL Final    Thyroperoxidase Antibodies 12/01/2023 <6.0  <6.0 IU/mL Final    TSH 12/01/2023 0.692  0.400 - 4.000 uIU/mL  Final    T3, Free 12/01/2023 3.7  2.3 - 4.2 pg/mL Final    Free T4 12/01/2023 1.06  0.71 - 1.51 ng/dL Final    Prolactin 12/01/2023 9.4  5.2 - 26.5 ng/mL Final    Somatomedin (IGF-I) 12/01/2023 193  49 - 240 ng/mL Final    Z Score 12/01/2023 1.28  -2.0 - 2.0 SD Final    Sodium 12/01/2023 139  136 - 145 mmol/L Final    Potassium 12/01/2023 3.9  3.5 - 5.1 mmol/L Final    Chloride 12/01/2023 108  95 - 110 mmol/L Final    CO2 12/01/2023 18 (L)  23 - 29 mmol/L Final    Glucose 12/01/2023 88  70 - 110 mg/dL Final    BUN 12/01/2023 9  6 - 20 mg/dL Final    Creatinine 12/01/2023 0.7  0.5 - 1.4 mg/dL Final    Calcium 12/01/2023 9.8  8.7 - 10.5 mg/dL Final    Total Protein 12/01/2023 7.1  6.0 - 8.4 g/dL Final    Albumin 12/01/2023 4.2  3.5 - 5.2 g/dL Final    Total Bilirubin 12/01/2023 0.4  0.1 - 1.0 mg/dL Final    Alkaline Phosphatase 12/01/2023 94  55 - 135 U/L Final    AST 12/01/2023 17  10 - 40 U/L Final    ALT 12/01/2023 7 (L)  10 - 44 U/L Final    eGFR 12/01/2023 >60.0  >60 mL/min/1.73 m^2 Final    Anion Gap 12/01/2023 13  8 - 16 mmol/L Final   Lab Visit on 10/27/2023   Component Date Value Ref Range Status    Thyroperoxidase Antibodies 10/27/2023 0.3  0.0 - 9.0 IU/mL Final   Infusion on 10/16/2023   Component Date Value Ref Range Status    Folate 10/16/2023 9.1  4.0 - 24.0 ng/mL Final    Methlymalonic Acid 10/16/2023 170  0 - 378 nmol/L Final    MMA Disclaimer 10/16/2023 SEE SCANNED REPORT   Final    RPR 10/16/2023 Non-reactive  Non-reactive Final    CRP 10/16/2023 1.7  0.0 - 8.2 mg/L Final    Sed Rate 10/16/2023 5  0 - 20 mm/Hr Final    HIV Screen 4th Generation wRfx 10/16/2023 Non Reactive  Non Reactive Final    Thiamine 10/16/2023 130.1  66.5 - 200.0 nmol/L Final       Past Medical History:   Diagnosis Date    Attention Deficit Hyperactivity Disorder     Family H/O Diabetes Mellitus     Her Father    Generalized Anxiety     Left Breast Cancer S.P Lumpectomy In 01/2021     Dr. Dominga Johnston; Dr. Sandra Sotelo  (Heme/Onc); 21 On CMT; Is Estrogen Receptor Negative; She Is BRCA2 Gene Mutation Positive, Andf Her Mother Also With A H/O Breast Cancer    Macrocytic Anemia     Associated With Her Chemotherapy    Postoperative Nausea And Vomiting     Scopolamine Patches Work Well For Her For This    Scoliosis     Therapeutic Drug Monitoring     Wellness Visit 2021      Past Surgical History:   Procedure Laterality Date    AUGMENTATION OF BREAST  2012    BREAST MASS EXCISION Left 2021    Procedure: EXCISION, MASS, BREAST- 2 oclock left breast with ultrasound guidance;  Surgeon: aRshmi Johnston MD;  Location: [facility] OR;  Service: General;  Laterality: Left;    BREAST SURGERY       SECTION      COLONOSCOPY N/A 2021    Procedure: COLONOSCOPY;  Surgeon: Mason Quintero MD;  Location: [facility] ENDO (2ND FLR);  Service: Endoscopy;  Laterality: N/A;    ESOPHAGOGASTRODUODENOSCOPY N/A 2021    Procedure: EGD (ESOPHAGOGASTRODUODENOSCOPY);  Surgeon: Mason Quintero MD;  Location: [facility] ENDO (2ND FLR);  Service: Endoscopy;  Laterality: N/A;    HYSTERECTOMY      INSERTION OF TUNNELED CENTRAL VENOUS CATHETER (CVC) WITH SUBCUTANEOUS PORT N/A 2021    Procedure: [id number];  Surgeon: Griffin Becker MD;  Location: [facility] OR;  Service: General;  Laterality: N/A;    OOPHORECTOMY      ROBOT-ASSISTED LAPAROSCOPIC ABDOMINAL HYSTERECTOMY USING DA NATALIIA XI N/A 2022    Procedure: XI ROBOTIC HYSTERECTOMY;  Surgeon: Carmella Galvez MD;  Location: [facility];  Service: OB/GYN;  Laterality: N/A;    ROBOT-ASSISTED LAPAROSCOPIC SALPINGO-OOPHORECTOMY USING DA NATALIIA XI Bilateral 2022    Procedure: XI ROBOTIC SALPINGO-OOPHORECTOMY;  Surgeon: Carmella Galvez MD;  Location: [facility] OR;  Service: OB/GYN;  Laterality: Bilateral;    SENTINEL LYMPH NODE BIOPSY Left 2021    Procedure: CORE BIOPSY, LYMPH NODE, SENTINEL;  Surgeon: Rashmi Johnston MD;  Location: [facility] OR;  Service: General;   Laterality: Left;    TUBAL LIGATION       Social History     Tobacco Use    Smoking status: Never    Smokeless tobacco: Never   Substance Use Topics    Alcohol use: Yes     Comment: occasionally    Drug use: Never     Family History   Problem Relation Age of Onset    COPD Maternal Grandmother     Stroke Father         50s, multiple    Diabetes Father     Anemia Father     Hyperlipidemia Father     Breast cancer Mother 52    Depression Mother     Cancer Other         unk abdominal origin- dx 20s    Pancreatic cancer Other         suspected    Colon cancer Neg Hx     Esophageal cancer Neg Hx     Ovarian cancer Neg Hx      OB History    Para Term  AB Living   1 1 1         SAB IAB Ectopic Multiple Live Births                  # Outcome Date GA Lbr Gustavo/2nd Weight Sex Delivery Anes PTL Lv   1 Term      CS-[id number]          Current Outpatient Medications:     dextroamphetamine-amphetamine (ADDERALL) 10 mg Tab, Take 1 tablet (10 mg total) by mouth 2 (two) times a day., Disp: 60 tablet, Rfl: 0    diazePAM (VALIUM) 5 MG tablet, Take 1 tablet (5 mg total) by mouth every 6 (six) hours as needed for Anxiety (take 1 prior to MRI)., Disp: 2 tablet, Rfl: 0    hydrocortisone (CORTEF) 10 MG Tab, Take 1 tablet (10 mg total) by mouth once daily., Disp: 90 tablet, Rfl: 3    hydrocortisone (CORTEF) 5 MG Tab, Take 1 tablet every day by oral route in the evening., Disp: , Rfl:     LIDOCAINE VISCOUS 2 % solution, SMARTSIG:sparingly Topical PRN, Disp: , Rfl:     LORazepam (ATIVAN) 0.5 MG tablet, Take 1 tablet (0.5 mg total) by mouth every 6 (six) hours as needed for Anxiety., Disp: 120 tablet, Rfl: 2    ondansetron (ZOFRAN ODT ORAL), , Disp: , Rfl:     ondansetron (ZOFRAN-ODT) 8 MG TbDL, DISSOLVE 1 TABLET ON THE TONGUE EVERY 6 TO 8 HOURS AS NEEDED FOR NAUSEA, Disp: 60 tablet, Rfl: 1    prasterone, dhea, (INTRAROSA) 6.5 mg Inst, Place 6.5 mg vaginally nightly., Disp: 28 each, Rfl: 11    propranoloL (INDERAL) 10 MG tablet, Take 1  tablet (10 mg total) by mouth 2 (two) times daily as needed (anxiety)., Disp: 60 tablet, Rfl: 1    traZODone (DESYREL) 100 MG tablet, Take 1 tablet (100 mg total) by mouth nightly as needed for Insomnia., Disp: 90 tablet, Rfl: 3    valACYclovir (VALTREX) 500 MG tablet, Take 1 tablet (500 mg total) by mouth 2 (two) times daily., Disp: 10 tablet, Rfl: 2    valACYclovir (VALTREX) 500 MG tablet, as needed., Disp: , Rfl:   No current facility-administered medications for this visit.    Facility-Administered Medications Ordered in Other Visits:     lactated ringers infusion, , Intravenous, Continuous, Leon Torres MD, Stopped at 01/19/21 1345    sodium chloride 0.9% flush 10 mL, 10 mL, Intravenous, PRN, Sandra Sotelo MD, 10 mL at 08/15/22 1330    The 10-year ASCVD risk score (Andrew DK, et al., 2019) is: 0.4%    Values used to calculate the score:      Age: 43 years      Sex: Female      Is Non- : No      Diabetic: No      Tobacco smoker: No      Systolic Blood Pressure: 113 mmHg      Is BP treated: No      HDL Cholesterol: 60 mg/dL      Total Cholesterol: 154 mg/dL    Review of Systems:  General: No fever, chills, or weight loss.  Chest: No chest pain, shortness of breath, or palpitations.  Breast: No pain, masses, or nipple discharge.  Vulva: No pain, lesions, or itching.  Vagina: No relaxation, itching, discharge, or lesions.  Abdomen: No pain, nausea, vomiting, diarrhea, or constipation.  Urinary: No incontinence, nocturia, frequency, or dysuria.  Extremities:  No leg cramps, edema, or calf pain.  Neurologic: No headaches, dizziness, or visual changes.    Objective:     Vitals:    12/18/23 1316   BP: 113/80   Pulse: 88   Weight: 54.7 kg (120 lb 9.6 oz)   Height: 5' (1.524 m)   PainSc: 0-No pain     Body mass index is 23.55 kg/m².    PHYSICAL EXAM:  APPEARANCE: Well nourished, well developed, in no acute distress.  AFFECT: WNL, alert and oriented x 3  CHEST: Good respiratory  effect  ABDOMEN: Soft.  No tenderness or masses.  No hepatosplenomegaly.  No hernias.  PELVIC: Normal external genitalia without lesions. +atrophy Normal hair distribution.  Adequate perineal body, normal urethral meatus.  Vagina moist and well rugated without lesions or discharge. Cervix and uterus are surgically absent.  Adnexa without masses or tenderness.    EXTREMITIES: No edema.    Assessment:    Malignant neoplasm of upper-outer quadrant of left breast in female, estrogen receptor positive    BRCA2 gene mutation positive    Dyspareunia in female  -     Ambulatory referral/consult to Physical/Occupational Therapy; Future; Expected date: 12/25/2023    Menopausal symptoms    Vaginal atrophy  -     prasterone, dhea, (INTRAROSA) 6.5 mg Inst; Place 6.5 mg vaginally nightly.  Dispense: 28 each; Refill: 11    Screening for osteoporosis  -     DXA Bone Density Axial Skeleton 1 or more sites; Future; Expected date: 12/18/2023        Plan:   D/c Vagifem  Start Intrarosa nightly, can reduce to 2-3x per night  Samples and coupon provided  Continue Lola York as recommended  Referral to pelvic floor PT  List of outside therapists provided- she will let me know if she needs an external referral.  Reviewed options for menopausal symptoms, including Veozah for hot flashes, testosterone and effexor  Risks and benefits of all were reviewed.   She declines at this time.  Start [place] [place] for cognition  Continue regular exercise and well balanced diet.  Follow up in 3 months.    Instructed patient to call if she experiences any side effects or has any questions.    I spent a total of 35 minutes on the day of the visit.This includes face to face time and non-face to face time preparing to see the patient (eg, review of tests), obtaining and/or reviewing separately obtained history, documenting clinical information in the electronic or other health record, independently interpreting results and communicating results to the  patient/family/caregiver, or care coordinator.

## 2023-12-20 ENCOUNTER — TELEPHONE (OUTPATIENT)
Dept: PSYCHIATRY | Facility: CLINIC | Age: 43
End: 2023-12-20
Payer: COMMERCIAL

## 2023-12-20 ENCOUNTER — PATIENT MESSAGE (OUTPATIENT)
Dept: PSYCHIATRY | Facility: CLINIC | Age: 43
End: 2023-12-20
Payer: COMMERCIAL

## 2023-12-20 NOTE — TELEPHONE ENCOUNTER
Not usually an issue with the type of Adrenal Insufficiency she has  Can check DHEAS if she would like  Not sure all those symptoms would be rel;ated

## 2023-12-20 NOTE — TELEPHONE ENCOUNTER
Ms. Rosas sent a few messages requesting to resched her appt. While communicating about the appointment she stated that she thought leonie sent her refill back to us. So I called the pharmacy to see what was going on. I spoke with Cooper who informed me that the adderall is on back order. So she is wondering if you can send a prescription out for 7.5mg instead of the 10mg just so she has meds to cover.

## 2023-12-21 ENCOUNTER — PATIENT MESSAGE (OUTPATIENT)
Dept: OBSTETRICS AND GYNECOLOGY | Facility: CLINIC | Age: 43
End: 2023-12-21
Payer: COMMERCIAL

## 2023-12-21 ENCOUNTER — PATIENT MESSAGE (OUTPATIENT)
Dept: HEMATOLOGY/ONCOLOGY | Facility: CLINIC | Age: 43
End: 2023-12-21
Payer: COMMERCIAL

## 2023-12-21 ENCOUNTER — TELEPHONE (OUTPATIENT)
Dept: OBSTETRICS AND GYNECOLOGY | Facility: CLINIC | Age: 43
End: 2023-12-21
Payer: COMMERCIAL

## 2023-12-21 ENCOUNTER — PATIENT MESSAGE (OUTPATIENT)
Dept: PSYCHIATRY | Facility: CLINIC | Age: 43
End: 2023-12-21
Payer: COMMERCIAL

## 2023-12-21 DIAGNOSIS — Z17.1 MALIGNANT NEOPLASM OF UPPER-OUTER QUADRANT OF LEFT BREAST IN FEMALE, ESTROGEN RECEPTOR NEGATIVE: ICD-10-CM

## 2023-12-21 DIAGNOSIS — C50.412 MALIGNANT NEOPLASM OF UPPER-OUTER QUADRANT OF LEFT BREAST IN FEMALE, ESTROGEN RECEPTOR NEGATIVE: ICD-10-CM

## 2023-12-21 RX ORDER — ONDANSETRON 8 MG/1
TABLET, ORALLY DISINTEGRATING ORAL
Qty: 60 TABLET | Refills: 1 | Status: SHIPPED | OUTPATIENT
Start: 2023-12-21

## 2023-12-21 RX ORDER — DEXTROAMPHETAMINE SACCHARATE, AMPHETAMINE ASPARTATE, DEXTROAMPHETAMINE SULFATE AND AMPHETAMINE SULFATE 1.875; 1.875; 1.875; 1.875 MG/1; MG/1; MG/1; MG/1
7.5 TABLET ORAL 2 TIMES DAILY
Qty: 60 TABLET | Refills: 0 | Status: SHIPPED | OUTPATIENT
Start: 2023-12-21 | End: 2024-01-08 | Stop reason: SDUPTHER

## 2023-12-21 NOTE — TELEPHONE ENCOUNTER
I am not sure if related. May be best to discuss    May also want to touch base with PCP to see if related to something else    Do we have anything on one of the days I booked back in

## 2023-12-21 NOTE — PROGRESS NOTES
NEUROPSYCHOLOGY CONSULT    Referral Information  Name: Alison Swann  MRN: [id number]  : 1980  Age: 43 y.o.  Race: White  Gender: female  Dominant Hand: Right  Referring Provider: Candis Rodriguez Np  [address]  [address] [place] [address]  Billing: See below for details as coding/billing has changed   Referral Reason/Medical Necessity: Neuropsychological evaluation to assess current cognitive functioning, aid in differential diagnosis, and provide treatment recommendations in the context of memory loss.  Consent/Emergency Plan: The patient expressed an understanding of the purpose of the evaluation and consented to all procedures. I informed the patient of limits to confidentiality and discussed an emergency plan.    SUMMARY/TREATMENT PLAN   Results from the interview indicate the following diagnoses and treatment plan recommendations. The patient is able to follow a treatment plan without help from family.     Diagnoses/Plan:  Problem List Items Addressed This Visit          Neuro    Cognitive complaints       Psychiatric    Attention Deficit Hyperactivity Disorder    KIANA (generalized anxiety disorder)    Mild episode of recurrent major depressive disorder     Summary/Conclusions:  Ms. Swann has had reported memory changes since  following chemotherapy and radiation treatments for breast cancer. Additionally, she has several medical diagnoses that could be contributing to cognitive changes, namely adrenal crisis episodes, adrenal insufficiency, chronic steroid use, insomnia, macrocytic anemia, surgical menopause, and a history of sepsis. Brain MRI in 2023 was unremarkable. She remains independent in basic and instrumental activities of daily living, though she acknowledged some difficulty with completing tasks like she used to and accommodations she has made due to cognitive changes. 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 She has a history of attention difficulty in adulthood and endorsed some anxiety and depression but feels they are well-managed with medication and coping strategies.     Neuropsychological assessment results were interpreted in the context of average premorbid ability. Ms. Swann' performance on testing was generally intact. Attention, working memory, processing speed, language, visuospatial skills, executive functioning, and learning and memory were all consistent with premorbid estimates. She demonstrated isolated difficulty with perseveration on a problem-solving task; it is not uncommon in the general population to have isolated test scores that are outside of expectation. On a self-report measure of personality and emotional function, she endorsed concern about her physical health and difficulty with memory and attention/concentration.     In sum, Ms. Swann' performance on this neuropsychological evaluation does not meet criteria for a neurocognitive disorder diagnosis. She had a diagnosis of breast cancer in January 2021 followed by chemotherapy and radiation treatments that resulted in a diagnosis of adrenal insufficiency (possibly chemotherapy induced), multiple surgeries, and other complications. During this time, Ms. wSann reported she did not take much time away from her work and career. Despite continued devotion to her work and personal life, she reported difficulty being able to keep up with her responsibilities and had to scale back tremendously on her day-to-day commitments. She acknowledged experiencing some anxiety and depression during the interview, of which she feels is well-controlled. She was followed by psycho-oncology but recently terminated in October 2023 due to her provider leaving. Taken together, the above factors (physical health, psychological distress, and pre-existing  attention difficulties) are certainly impacting her experience of cognitive difficulty in daily life, despite intact performance on neuropsychological assessment. It is possible that with treatment of the above factors, she will experience a return to her cognitive baseline. Suspicion that Ms. Swann has an emerging neurodegenerative condition is very low.    Recommendations:   Neuropsychology Follow-up: Follow-up assessment is not necessary at a specified interval. Ms. Swann is welcome to return for re-evaluation if thinking changes persist or increase despite treatment of contributing factors. She is welcome to return for a check-in at any time with questions or to discuss symptom management.     Medical Follow-Up: Continue usual follow up for current active medical issues.     Behavioral Health: Referral to Behavioral Health is recommended for individual therapy to continue managing anxiety and depression symptoms and adjustment to life following cancer diagnosis and treatment. Ms. Swann may call the [facility] office at [phone], for additional information or to schedule an appointment.    Recommendations for Ms. Swann: The following recommendations are made based on areas of reported difficulty; performance on testing in these areas was largely within normal limits.  Attention: Remember that inattention and lack of focus are major culprits to forgetting information so be sure and practice paying attention for adequate learning of information. If you rely on passive attention to remembering something (e.g., yeisela, ron-huh approach), you'll find you cannot recall it later. I recommend the following to improve attention, which may aid in later recall:   Reduce distractions in the area as much as possible.  Look at the person as they are speaking to you.   Paraphrase as they are speaking  Write down important pieces of information   Ask them to repeat if you zone out.   Have them simplify and  reduce information that you need to attend to during conversation.   Have visual cues to remind you if you need to do something later.  Processing Speed:   Using multiple modalities (e.g., listening, writing notes, asking questions, recording) to learn new information is likely to allow additional time for processing, thus improving memory for the material.   Allowing sufficient time to complete tasks will reduce frustration and help to ensure completion.  Language: Strategies to help with Word Finding. Not being able to find a word when you need it is a common and very annoying problem. It is not strictly a memory issue, but more a filing and retrieval issue. You know the word or name you want, but it cannot be found in your brain file. It is like having all the files in your file cabinet emptied on the floor. Every piece of information is there but finding it can be a challenge. One strategy that may help is working with a speech pathologist/therapist to learn techniques to decrease word finding problems. Some of those strategies/techniques include the following:  Use circumlocutions. Describe the object you're trying to name instead of naming it.  Recite you're A, B, Cs. Go through the alphabet to see if a letter triggers finding the word.  Picture it. Try to visualize the spelling or writing of the word.  Relax. The harder you try to force yourself to come up with the word, the more frustrated you get and the worse you function.   Write it down. In situations where using correct words is critical, such as communicating on the radio while flying, write down the key words so that they are in front of you if needed.  Use word association. For words or names you continually misfile and can't find, try to come up with a word association, something that reminds you of the word or name.  Write a script. Try scripting something important that you want to say. Either write it out or practice it beforehand, so that  you get it right.  Play word games. Doing crossword puzzles and playing word finding games may help your brain become more efficient at filing and retrieving words.  Executive Functioning:  Don't attempt to multi-task.  Separate tasks so that each can be completed one at a time.  Consider using a calendar/day planner, as that may be effective to help you plan and stay on track.  Color-coding specific tasks by importance may add additional benefit to your planner.  Break down large projects into smaller tasks and write down the steps to completing the task.  Taking notes while reading can help with recall.  Storing Information: Use the below strategies to help you further enhance how information is stored.  Rehearse - Immediately after seeing/hearing something, try to recall it.  Wait a few minutes, then check again.  Gradually lengthen the intervals between rehearsals.  Repetition of learned material is critical to ensure storage of information to be learned. Self-test at home to ensure learning.  Write down important information to improve your attention and focus and to have something to look back on when you need to recall it.  Make sure the person doesn't rattle off, but presents in a clear, logical, and unhurried manner.   Recalling Information:  Jog your memory - Lose something?  Think back to when you last had it.  What did you do next?  And after that?  Mentally walk yourself through each activity that followed.  Prodding your memory this way may enable you to recall the location of the missing item.  Use a cue - Symbolic reminders (the proverbial string around the finger) are helpful.  So too are memos, timers, calendar notes, etc.--keep them in visible, appropriate places.  Get organized - Have fixed locations for all important papers, key phone numbers, medications, keys, wallet, glasses, tools, etc.  Develop routines - Routines can anchor memories so they do not drift away.    Sleep Hygiene: Poor sleep  has a negative effect on cognition. Several strategies have been shown to improve sleep:   Caffeine intake in the afternoon and evening, as well as stuffing oneself at supper, can decrease the quality of restful sleep throughout the night.   Bedtime and wake-up times should be consistent every night and morning so the body becomes used to a single routine, even on the weekends.  Engage in daily physical activity, but not 2-3 hours before bedtime.   No technology use (television, computer, iPad) 1-2 hours before bed.   Have a wind down routine (e.g., soft lights in the house, bath before bed, reduced fluid intake, songs, reading, less noise) to promote sleep readiness.   Visit the www.sleepfoundation.org for more strategies.     Practice good cognitive/brain health hygiene:  Engage in regular exercise, which increases alertness and arousal and can improve attention and focus. Continue your regular exercise routine.  Get a good night's sleep, as this can enhance alertness and cognition.  Eat healthy foods and balanced meals. It is notable that research indicates certain nutrients may aid in brain function, such as B vitamins (especially B6, B12, and folic acid), antioxidants (such as vitamins C and E, and beta carotene), and Omega-3 fatty acids. Talk with your physician or nutritionist about what's right for you.   Keep your brain active. Find activities to stay mentally active, such as reading, games (cards, checkers), puzzles (crosswords, Sudoku, jig saw), crafts (models, woodworking), gardening, or participating in activities in the community.  Stay socially engaged. Continue staying active with your family and friends.    Recommendations: Consider stress management techniques to reduce anxiety and respond to anxiety as it arises. Here's a simple three-minute exercise you can use no matter where you are:  1. Sit or stand up nice and tall. Let your eyes relax. Relax your jaw. Let your shoulders relax down your  back and start to focus your attention on your breath.  2. Breathe all the way in through your nose, filling your belly with your breath. Then, breathe all the way out through your nose. It's that simple.  3. Start to breathe into a count of three. Inhale for one, two, three. Then, exhale into a count of six: six, five, four, three, two and one.  4. Repeat. Inhale: one, two, three. Exhale: six, five, four, three, two and one.  5. Continue just like this for three minutes, or as long as you'd like. You can set a timer on your phone at the beginning of your practice.    Should your mind wander, simply notice, without judgment. Observe your thought. And let it go. Return your breath to your attention as many times as it takes, training your mind in the same way we train our bodies.    https://blog.[facility].org/articles/3-minute-mindful-breathing-exercise-for-anxiety-relief    https://blog.[facility].org/articles/shake-it-off-in-the-new-year-simple-stress-relief-techniques     Thank you for allowing us to participate in Ms. Swann' care.  If you have any questions, please contact Dr. Shannon at [phone].    Sumit Guerrero, Ph.D.  Postdoctoral Fellow in Clinical Neuropsychology  [facility] - Department of Neurology    Soniya Shannon, Ph.D., ABPP  Board Certified in Clinical Neuropsychology  [facility] - Department of Neurology    HISTORY OF PRESENT ILLNESS AND CURRENT SYMPTOMS     Ms. Swann has active problems noted below. She was diagnosed with stage 3 breast cancer in January 2021 and had chemotherapy treatment from February 2021 to July 2021. Following chemotherapy, she underwent bilateral mastectomy in September 2021, followed by radiation therapy from November 2021 to January 2022. She reported experiencing memory changes during and following chemotherapy and radiation treatment in 2021. Of note, while undergoing treatment for breast cancer, all treatments stopped in July 2021 due to suspected toxicity  and adrenal crisis episodes. She was later diagnosed with adrenal insufficiency (possibly chemotherapy induced) and has been on steroids since. Additionally, she had sepsis in March 2022 following a surgery. She completed 30 neurofeedback sessions with a therapist and felt this was helpful to an extent. She was referred to neurology for memory concerns in August 2023. At her neurology appointment on 8/16/2023, she reported worsening memory issues over the last 6 months. Performance on a brief mental status measure was within normal limits (Mini Mental Status Exam [MMSE]=28/30). Physical exam was unremarkable. Brain MRI in August 2023 was abnormal due to possible artifact interference. Follow-up brain MRI in October 2023 was unremarkable with no evidence of intracranial metastatic disease.     During the current interview, Ms. 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 Following the breast cancer diagnosis and treatments, she has had to sell her company, dismiss employees, sell one of her homes due to a decrease in income, and stopped caring for her mother, sister, and nieces, and nephew (which resulted in them moving to [place]).      Cognitive Symptoms:  Type/Examples:   Attention: She gets distracted more despite being prescribed a higher dose of Adderall. She has to complete tasks one at a time now rather than juggling several tasks at once. She reported for Thanksgiving she needed to  chairs to bring over to a family member's house but thought she needed to bring tables. She stated when she went back through her text messages, she saw that chairs were requested but was adamant they needed tables until the messages were shown to her.   Mental Speed: She has noticed she is processing  information slower.   Memory: She forgets information she has talked about in conversations as well as what she wants to say during conversations. She forgets to call people for work, even if there is a reminder on her calendar.   Language: She is reading slower because she has a harder time comprehending at times and will have to reread. She has noticed word-finding difficulty and that she will say words and they come out wrong.  Visuospatial/Perceptual: She will turn on the wrong burner occasionally.   Executive Functioning: She has more difficulty staying organized and managing her day-to-day activities.    Onset: She began experiencing cognitive changes in 2021 while undergoing chemotherapy and radiation treatment.  Course: She reported the cognitive changes have been stable over time.    Current Functional Status/Needs:  ADLs  Self-Care Eating Safety Other   Independent  Independent  She reported leaving the stove and oven on once every several weeks. She stated she is not home alone most times, so she is not concerned.        Instrumental IADLs:   Driving Medications/Health Household Finances   Independent  She manages her own medications and reports missing doses sometimes.  All her medications are on a nightstand next to her bed, and she takes them when she wakes in the morning.   She gets her groceries delivered now. She has a  that comes more frequently to help out (started coming more frequently a couple weeks ago). Independent. She reported missing a payment 3 to 4 times within the past year.      Psychiatric/Behavioral Symptoms:  Mood:  Depression/Dysphoria Anxiety/Fearfulness Irritability   Sometimes she gets depressed and will use coping strategies, with benefit. She was followed by psycho-oncology from November 2022 to October 2023 and was seeing Julisa Dias PsyD, for individual psychotherapy. They recently terminated services on 10/31/2023 due to the provider leaving the oncology  [place]. She has not resumed psychotherapy but stated she is open to starting with a new provider.  She does get anxious but uses coping strategies she has learned, with benefit. She gets more aggravated with her . She reported that he is doing nothing different but that she is responding to him differently now (getting aggravated).       Behavior:  Agitation/Resistance Delusions/Paranoia Hallucinations   None reported None reported None reported      Apathy/Motivation Repetitive/Restlessness Other   None reported None reported        Neurovegetative:  Sleep/Nighttime  Appetite Energy   She has difficulty going to sleep. She sleeps about 8 hours per night. She is prescribed Trazodone for sleep as needed and uses a CBD gummy as well (frequency of Trazodone and CBD gummy is unknown). No snoring. No changes reported She reported her energy is normal but does feel low at times.      Suicidal/Homicidal Ideation: None reported      Physical Symptoms: No changes to vision or hearing. She reported having dark circles around her eyes now but is unsure what caused them. She works out most afternoons.      PERTINENT BACKGROUND INFORMATION   SOCIAL HISTORY    Family Status:  25 years; 1 daughter  Current Living Situation: She and her  live alone  Primary Source of Support:   Daily Activities: cooks, does activities around the house, exercises, manages a non-profit organization   Stressors: having to sell her company and one of her homes, daughter lives in [place] so they do not see each other or talk as often, financially is making less money than what she did prior to the cancer diagnosis  Other Factors:  Educational Level: She has an associate degree and bachelor's degree. No learning or attention difficulties in childhood. She was diagnosed with ADHD as an adult approximately 10 years ago.  Occupational Status and History: She works for a [employer] company but has been on short-term disability since  August due to cognitive changes and psychological distress. She is supposed to return to work on Monday and is worried because she does not feel she is ready to return. She is mostly concerned that she will not be able to complete the continuing education required for licensure renewal which would jeopardize her career. She is not actively applying for long-term disability.  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    Family History   Problem Relation Age of Onset    COPD Maternal Grandmother     Stroke Father         50s, multiple    Diabetes Father     Anemia Father     Hyperlipidemia Father     Breast cancer Mother 52    Depression Mother     Cancer Other         unk abdominal origin- dx 20s    Pancreatic cancer Other         suspected    Colon cancer Neg Hx     Esophageal cancer Neg Hx     Ovarian cancer Neg Hx      Family Neurologic History: Negative for heritable risk factors  Family Psychiatric History: Her mother had depression    MEDICAL STATUS  Patient Active Problem List   Diagnosis    Malignant neoplasm of upper-outer quadrant of left breast in female, estrogen receptor negative    Decreased functional mobility    Dehydration    Anemia associated with chemotherapy    Nausea and vomiting    BRCA2 gene mutation positive    Scoliosis    Attention Deficit Hyperactivity Disorder    Therapeutic Drug Monitoring    Postoperative Nausea And Vomiting    Macrocytic Anemia    KIANA (generalized anxiety disorder)    Family H/O Diabetes Mellitus    Fatigue    Hyponatremia    Elevated troponin    Diarrhea    Intravascular volume depletion    Hypomagnesemia    Hypokalemia    Anterior T wave inversion    Elevated LFTs    Colitis, indeterminate    Encounter for immunotherapy    Diarrhea due to drug    Pneumonitis    s/p RA-TLH/BSO    JA (acute kidney injury)    Adrenal crisis    Sepsis    Chest pain    Adrenal insufficiency    Axillary web syndrome    Depressed    Constipation     Memory loss    Abnormal MRI    Insomnia, psychophysiological    Mild episode of recurrent major depressive disorder     Past Medical History:   Diagnosis Date    Attention Deficit Hyperactivity Disorder     Family H/O Diabetes Mellitus     Her Father    Generalized Anxiety     Left Breast Cancer S.P Lumpectomy In 2021     Dr. Dominga Johnston; Dr. Sandra Soteol (Heme/Onc); 21 On CMT; Is Estrogen Receptor Negative; She Is BRCA2 Gene Mutation Positive, Andf Her Mother Also With A H/O Breast Cancer    Macrocytic Anemia     Associated With Her Chemotherapy    Postoperative Nausea And Vomiting     Scopolamine Patches Work Well For Her For This    Scoliosis     Therapeutic Drug Monitoring     Wellness Visit 2021      Past Surgical History:   Procedure Laterality Date    AUGMENTATION OF BREAST  2012    BREAST MASS EXCISION Left 2021    Procedure: EXCISION, MASS, BREAST- 2 oclock left breast with ultrasound guidance;  Surgeon: Rashmi Johnston MD;  Location: [facility] OR;  Service: General;  Laterality: Left;    BREAST SURGERY       SECTION      COLONOSCOPY N/A 2021    Procedure: COLONOSCOPY;  Surgeon: Mason Quintero MD;  [facility]  Service: Endoscopy;  Laterality: N/A;    ESOPHAGOGASTRODUODENOSCOPY N/A 2021    Procedure: EGD (ESOPHAGOGASTRODUODENOSCOPY);  Surgeon: Mason Quintero MD;  [facility]  Service: Endoscopy;  Laterality: N/A;    HYSTERECTOMY      INSERTION OF TUNNELED CENTRAL VENOUS CATHETER (CVC) WITH SUBCUTANEOUS PORT N/A 2021    Procedure: [id number];  Surgeon: Griffin Becker MD;  Location: [facility] OR;  Service: General;  Laterality: N/A;    OOPHORECTOMY      ROBOT-ASSISTED LAPAROSCOPIC ABDOMINAL HYSTERECTOMY USING DA NATALIIA XI N/A 2022    Procedure: XI ROBOTIC HYSTERECTOMY;  Surgeon: Carmella Galvez MD;  Location: [facility] OR;  Service: OB/GYN;  Laterality: N/A;    ROBOT-ASSISTED LAPAROSCOPIC  SALPINGO-OOPHORECTOMY USING DA NATALIIA XI Bilateral 03/04/2022    Procedure: XI ROBOTIC SALPINGO-OOPHORECTOMY;  Surgeon: Carmella Galvez MD;  Location: [facility] OR;  Service: OB/GYN;  Laterality: Bilateral;    SENTINEL LYMPH NODE BIOPSY Left 01/19/2021    Procedure: CORE BIOPSY, LYMPH NODE, SENTINEL;  Surgeon: Rashmi Johnston MD;  Location: [facility] OR;  Service: General;  Laterality: Left;    TUBAL LIGATION       Updated/Relevant Neurologic History:  Falls: none   TBI: none  Seizures: none  Stroke: none  Movement Concerns: none  Prior Neuropsychological Testing: none  Referral Diagnosis: R41.3 (ICD-10-CM) - Memory loss     Recent Labs  Lab Results   Component Value Date    [id number] 1189 (H) 08/30/2023     Lab Results   Component Value Date    RPR Non-reactive 10/16/2023     Lab Results   Component Value Date    FOLATE 9.1 10/16/2023     Lab Results   Component Value Date    TSH 0.692 12/01/2023     Lab Results   Component Value Date    HGBA1C 5.0 10/12/2022     Lab Results   Component Value Date    [id number] Non Reactive 10/16/2023     Imaging    Results for orders placed or performed during the hospital encounter of 10/23/23   MRI Brain W WO Contrast    Narrative    EXAMINATION:  MRI BRAIN W WO CONTRAST    CLINICAL HISTORY:  Dizziness, non-specific;also, radiology requesting repeat as poor MRI;.  Dizziness and giddiness    TECHNIQUE:  Multiplanar multisequence MR imaging of the brain was performed before and after the administration of 6 mL Gadavist  intravenous contrast.    COMPARISON:  MRI brain 08/28/2023, MRI brain pituitary 05/18/2022    FINDINGS:  Mild susceptibility artifact from dental amalgam.    Intracranial compartment:    Ventricles and sulci are normal in size for age without evidence of hydrocephalus. No extra-axial blood or fluid collections.    The brain parenchyma appears with normal signal.  No mass lesion, acute hemorrhage, edema or acute infarct. No abnormal enhancement.    Normal vascular flow  voids are preserved.    Skull/extracranial contents (limited evaluation): Bone marrow signal intensity is normal.      Impression    Unremarkable MRI brain.  Prior posterior fossa questioned abnormality likely artifactual on prior exam.  No evidence for intracranial metastatic disease.    Electronically signed by resident: Jhon Xiao  Date:    10/23/2023  Time:    08:51    Electronically signed by: Walker Guzmán  Date:    10/23/2023  Time:    09:29     *Note: Due to a large number of results and/or encounters for the requested time period, some results have not been displayed. A complete set of results can be found in Results Review.     EE2023  Interpretation  This is a normal EEG during wakefulness and sleep. No potentially epileptiform activity was seen. Please be aware that a normal EEG does not exclude the possibility of an underlying seizure disorder.    Other Diagnostics: She was referred for a lumbar puncture in 2023 but it has not been completed yet.     Current Outpatient Medications:     dextroamphetamine-amphetamine (ADDERALL) 7.5 mg Tab, Take 7.5 mg by mouth 2 (two) times a day., Disp: 60 tablet, Rfl: 0    diazePAM (VALIUM) 5 MG tablet, Take 1 tablet (5 mg total) by mouth every 6 (six) hours as needed for Anxiety (take 1 prior to MRI)., Disp: 2 tablet, Rfl: 0    hydrocortisone (CORTEF) 10 MG Tab, Take 1 tablet (10 mg total) by mouth once daily., Disp: 90 tablet, Rfl: 3    hydrocortisone (CORTEF) 5 MG Tab, Take 1 tablet every day by oral route in the evening., Disp: , Rfl:     LIDOCAINE VISCOUS 2 % solution, SMARTSIG:sparingly Topical PRN, Disp: , Rfl:     LORazepam (ATIVAN) 0.5 MG tablet, Take 1 tablet (0.5 mg total) by mouth every 6 (six) hours as needed for Anxiety., Disp: 120 tablet, Rfl: 2    ondansetron (ZOFRAN ODT ORAL), , Disp: , Rfl:     ondansetron (ZOFRAN-ODT) 8 MG TbDL, DISSOLVE 1 TABLET ON THE TONGUE EVERY 6 TO 8 HOURS AS NEEDED FOR NAUSEA, Disp: 60 tablet, Rfl: 1     "prasterone, dhea, (INTRAROSA) 6.5 mg Inst, Place 6.5 mg vaginally nightly., Disp: 28 each, Rfl: 11    propranoloL (INDERAL) 10 MG tablet, Take 1 tablet (10 mg total) by mouth 2 (two) times daily as needed (anxiety)., Disp: 60 tablet, Rfl: 1    traZODone (DESYREL) 100 MG tablet, Take 1 tablet (100 mg total) by mouth nightly as needed for Insomnia., Disp: 90 tablet, Rfl: 3    valACYclovir (VALTREX) 500 MG tablet, Take 1 tablet (500 mg total) by mouth 2 (two) times daily., Disp: 10 tablet, Rfl: 2    valACYclovir (VALTREX) 500 MG tablet, as needed., Disp: , Rfl:   No current facility-administered medications for this visit.    Facility-Administered Medications Ordered in Other Visits:     lactated ringers infusion, , Intravenous, Continuous, Melissa, Leon HENRDIX MD, Stopped at 01/19/21 1345    sodium chloride 0.9% flush 10 mL, 10 mL, Intravenous, PRN, [place] MD, 10 mL at 08/15/22 1330    Updated/Relevant Psychiatric History: She has a history of anxiety and ADHD for which she is prescribed Valium and Adderall, with reported benefit.      Substance Use: She drinks alcohol (wine, liquor) about 1-2 times per week. No tobacco or illicit substance use.    MENTAL STATUS AND OBSERVATIONS:  APPEARANCE: Casually dressed and adequate grooming/hygiene.   ALERTNESS/ORIENTATION: Attentive and alert. Fully oriented (x5) to time and place. When asked the current season, she responded it was winter when it is currently fall.   GAIT: Unremarkable  MOTOR MOVEMENTS/MANNERISMS: Unremarkable  SPEECH/LANGUAGE: Normal in rate, rhythm, tone, and volume. No significant word finding difficulty noted. She produced some neologisms and paraphasias for phonetically similar words during testing. Receptive language was normal.  STATED MOOD/AFFECT: The patient's stated mood was "anxious." Affect was congruent with stated mood.   INTERPERSONAL BEHAVIOR: Rapport was quickly and easily established   SUICIDALITY/HOMICIDALITY: " Denied  HALLUCINATIONS/DELUSIONS: None evidenced or endorsed  THOUGHT PROCESSES/INSIGHT: Thoughts seemed logical and goal-directed.     TEST TAKING BEHAVIOR and VALIDITY: Ms. Swann was cooperative with test instruction and did not require repetition or clarification of instructions. She persisted on all tasks. She expressed feeling frustrated on more challenging tasks. At times, she was impulsive and started on tasks too soon or responded too quickly. Scores on stand-alone and embedded performance validity measures were within normal limits. The current results, therefore, are likely an accurate reflection of the patient's current functioning.     PROCEDURES/TESTS ADMINISTERED:  In addition to performing a review of pertinent medical records, reviewing limits to confidentiality, conducting a clinical interview, and explaining procedures, the following measures were administered: MSVT; Test of Premorbid Functioning; Word Choice; DCT; Wechsler Adult Intelligence Scale, Fourth Edition (WAIS-IV) selected subtests; Wechsler Memory Scale, Fourth Edition (WMS-IV), selected subtests; Neuropsychological Assessment Battery (NAB), selected subtests; Verbal fluency tests (FAS & animal naming; Susan et al., 2004 norms); Hackett Verbal Learning Test, Revised (HVLT-R; Form 1); [place] Making Test, parts A and B (Susan et al., 2004 norms); [place] Card Sorting Test-64 (WCST-64); Keshav Complex Figure Test (RCFT, copy); [place] Multiphasic Personality Inventory, Third Edition (MMPI-3). Manual norms were used unless otherwise indicated.      TEST RESULTS    PREMORBID FUNCTIONING Raw Score Standardized Score Percentile/CP Descriptor   TOPF simple dem. eFSIQ - 109 73 Average   INTELLECTUAL FUNCTIONING Raw Score Standardized Score Percentile/CP Descriptor   WAIS-IV        VCI - 98 45 Average   WMI - 105 63 Average   PSI - 114 82 High Average   COGNITIVE SCREENING Raw Score Standardized Score Percentile/CP Descriptor   Orientation -  Place 5/5 - - -   Orientation - Date 4/5 - - -   LANGUAGE FUNCTIONING Raw Score Standardized Score Percentile/CP Descriptor   [facility] Word Reading 39 97 42 Average   NAB Naming 30 52 58 Average   NAB Naming Percent Correct After Semantic Cuing 0 - 38 WNL   NAB Naming Percent Correct After Phonemic Cuing 0 - 20 BNL   FAS 32 37 9 Low Average   Animal Naming 23 51 54 Average   VISUOSPATIAL FUNCTIONING Raw Score Standardized Score Percentile/CP Descriptor   RCFT Copy 34 - >16 WNL   RCFT Time to Copy 174 - >16 WNL   VR Copy 43 - >75 High Average   LEARNING & MEMORY Raw Score Standardized Score Percentile/CP Descriptor   HVLT-R        Total Immediate 28 49 46 Average   Delayed Recall 10 49 46 Average   Retention % 91 49 46 Average   Hits 11 - - -   False Positives 0 - - -   Discrimination  11 49 46 Average   WMS-IV Subtests        LM I 23 9 37 Average   LM II 16 8 25 Average   LM Recognition 27 - >75 High Average   VR I 30 7 16 Low Average   VR II 24 10 50 Average   VR II Recognition 6 - 51-75 Average   MSVT         - - -    - - -   Cons 100 - - -    - - -   FR 90 - - -   ATTENTION/WORKING MEMORY Raw Score Standardized Score Percentile/CP Descriptor   WAIS-IV Digit Span 33 13 84 High Average         DS Forward 15 16 98 Exceptionally High         DS Backward 11 13 84 High Average         DS Sequence 7 8 25 Average         Longest Digit Forward 9 - - -         Longest Digit Backward 6 - - -         Longest Digit Sequence 5 - - -         RDS 15 - - -   WAIS-IV Arithmetic 13 9 37 Average   MENTAL PROCESSING SPEED Raw Score Standardized Score Percentile/CP Descriptor   WAIS-IV Symbol Search 44 16 98 Exceptionally High   WAIS-IV Coding 59 9 37 Average   TMT A  19 60 84 High Average   TMT A errors 0 - - -   DCT 7 - - -   EXECUTIVE FUNCTIONING Raw Score Standardized Score Percentile/CP Descriptor   TMT B 71 42 21 Low Average   TMT B errors 0 - - -   WCST-64 N/A       Total Correct 44 - - -   Total Errors 20 86 18  Low Average   Perseverative Resp. 12 80 9 Low Average   Perseverative Err. 11 79 8 Below Average   Nonperseverative Err. 9 86 18 Low Average   Concept. Level Response 40 83 13 Low Average   Categories Completed 3 - >16 WNL   FMS 1 - N/A N/A   Learning to Learn 8.93 - >16 WNL   WAIS-IV Similarities 31 13 84 High Average   WAIS-IV Matrix Reasoning 14 8 25 Average   MOOD & PERSONALITY Raw Score Standardized Score Percentile/CP Descriptor   MMPI-3   - -   CRIN 3 42 - -   VRIN 2 43 - -   KIMBERLY 14 54 - -   F 3 50 - -   Fp 0 41 - -   Fs 1 47 - -   TBS 17 73 - -   RBS 7 54 - -   L 7 65 - -   K 8 56 - -   NYASIA 5 44 - -   THD 1 44 - -   BXD 7 50 - -   RCd 2 45 - -   RC1 6 57 - -   RC2 0 36 - -   RC4 5 53 - -   RC6 0 40 - -   RC7 0 34 - -   RC8 1 44 - -   RC9 7 51 - -     TESTING SUMMARY: Ms. Buchanan premorbid intellectual abilities were estimated to be in the average range based on demographic variables. Basic auditory attention and working memory were intact when repeating, reversing, and sequencing digits, with intact working memory when solving mental arithmetic problems. Visuomotor processing speed was intact on a basic task, on a more complex task requiring her to match symbols and numbers, and on a task matching symbols. On measures of executive functioning, she demonstrated intact set-shifting, planning, and problem-solving, with elevated perseverative errors. Verbal and nonverbal abstract reasoning were intact. Phonemic and semantic fluency were intact, with at expectation performance on a word reading task and confrontation naming. Visuospatial performances were also consistent with premorbid estimates across tasks requiring her to copy simple line drawings and a complex figure. Learning and recall of a word list was intact, with intact recognition. Learning, recall, and recognition of verbally presented stories was intact. Learning, recall, and recognition of simple to moderately complex figures was also intact. On a  self-report measure of personality and emotional functioning, she demonstrated a tendency to under-report negative experiences that are common to most people. With that said, her scores indicated concern about her physical health and difficulty with memory and attention/concentration. People who respond similarly tend to have physical responses to external stressors rather than an emotional response.    BILLING  Service Description CPT Code Minutes Units   Test Evaluation Services --  --   Neuropsychological testing evaluation services by physician [phone]   Each additional hour by physician [id number]  1   Test Administration and Scoring --  --   Psychological or neuropsychological test administration and scoring by physician [id number]  0   Each additional 30 minutes by physician [id number]  0   Psychological or neuropsychological test administration and scoring by technician [phone]   Each additional 30 minutes by technician [id number]  7

## 2023-12-21 NOTE — TELEPHONE ENCOUNTER
Spoke with with patient in regards to intrarosa prescription. After speaking with the pharmacy intrarose is indeed filled and ready for . Patient was confused and actually asked for a nothing medication (Zofran) which was prescribed by another physician. Pt will reach out to that provider for refills. Patient has no further questions or complaints.

## 2023-12-22 PROBLEM — R41.9 COGNITIVE COMPLAINTS: Status: ACTIVE | Noted: 2023-08-16

## 2023-12-28 DIAGNOSIS — F51.04 INSOMNIA, PSYCHOPHYSIOLOGICAL: ICD-10-CM

## 2023-12-29 RX ORDER — TRAZODONE HYDROCHLORIDE 100 MG/1
100 TABLET ORAL NIGHTLY PRN
Qty: 90 TABLET | Refills: 1 | Status: SHIPPED | OUTPATIENT
Start: 2023-12-29 | End: 2024-06-26

## 2023-12-29 RX ORDER — LORAZEPAM 0.5 MG/1
0.5 TABLET ORAL EVERY 6 HOURS PRN
Qty: 120 TABLET | Refills: 2 | Status: SHIPPED | OUTPATIENT
Start: 2023-12-29 | End: 2024-02-05 | Stop reason: SDUPTHER

## 2023-12-30 ENCOUNTER — PATIENT MESSAGE (OUTPATIENT)
Dept: PSYCHIATRY | Facility: CLINIC | Age: 43
End: 2023-12-30
Payer: COMMERCIAL

## 2024-01-03 ENCOUNTER — OFFICE VISIT (OUTPATIENT)
Dept: NEUROLOGY | Facility: CLINIC | Age: 44
End: 2024-01-03
Payer: COMMERCIAL

## 2024-01-03 ENCOUNTER — HOSPITAL ENCOUNTER (OUTPATIENT)
Dept: RADIOLOGY | Facility: HOSPITAL | Age: 44
Discharge: HOME | End: 2024-01-03
Attending: PHYSICIAN ASSISTANT
Payer: COMMERCIAL

## 2024-01-03 DIAGNOSIS — F33.0 MILD EPISODE OF RECURRENT MAJOR DEPRESSIVE DISORDER: ICD-10-CM

## 2024-01-03 DIAGNOSIS — R41.9 COGNITIVE COMPLAINTS: ICD-10-CM

## 2024-01-03 DIAGNOSIS — Z13.820 SCREENING FOR OSTEOPOROSIS: ICD-10-CM

## 2024-01-03 DIAGNOSIS — F90.0 ADHD (ATTENTION DEFICIT HYPERACTIVITY DISORDER), INATTENTIVE TYPE: ICD-10-CM

## 2024-01-03 DIAGNOSIS — F41.1 GAD (GENERALIZED ANXIETY DISORDER): ICD-10-CM

## 2024-01-03 PROCEDURE — 77080 DXA BONE DENSITY AXIAL: CPT | Mod: TC,PO

## 2024-01-03 PROCEDURE — 99499 UNLISTED E&M SERVICE: CPT | Mod: ,,, | Performed by: PSYCHIATRY & NEUROLOGY

## 2024-01-03 PROCEDURE — 77080 DXA BONE DENSITY AXIAL: CPT | Mod: 26,,, | Performed by: RADIOLOGY

## 2024-01-03 PROCEDURE — 96132 NRPSYC TST EVAL PHYS/QHP 1ST: CPT | Mod: GT,,, | Performed by: PSYCHIATRY & NEUROLOGY

## 2024-01-03 PROCEDURE — 99999 PR PBB SHADOW E&M-EST. PATIENT-LVL I: CPT | Mod: PBBFAC,,, | Performed by: PSYCHIATRY & NEUROLOGY

## 2024-01-03 RX ORDER — ALPRAZOLAM 0.5 MG/1
0.5 TABLET ORAL 2 TIMES DAILY PRN
Qty: 60 TABLET | Refills: 0 | Status: SHIPPED | OUTPATIENT
Start: 2024-01-03

## 2024-01-03 NOTE — PROGRESS NOTES
NEUROPSYCHOLOGY FEEDBACK (TELEHEALTH)    Referral Information  Name: Alison Swann  MRN: [id number]  : 1980  Age: 43 y.o.  Race: White  Gender: female  Referring Provider: Candis Rodriguez Np   Billing: See below for details as coding/billing has changed   Telemedicine:   The patient location is: [place]  The provider location is: [place], [place]  The chief complaint leading to consultation/medical necessity is: Feedback from neuropsychological evaluation.  Visit type: Virtual visit with synchronous audio only (telephone)   Total time spent with patient: 34 minutes; [id number]  Each patient to whom he or she provides medical services by telemedicine is:  (1) informed of the relationship between the physician and patient and the respective role of any other health care provider with respect to management of the patient; and (2) notified that he or she may decline to receive medical services by telemedicine and may withdraw from such care at any time.    The reason for the audio only service rather than synchronous audio and video virtual visit was related to technical difficulties or patient preference/necessity.     Each patient to whom I provide medical services by telemedicine is:  (1) informed of the relationship between the physician and patient and the respective role of any other health care provider with respect to management of the patient; and (2) notified that they may decline to receive medical services by telemedicine and may withdraw from such care at any time. Patient verbally consented to receive this service via voice-only telephone call.    This service was not originating from a related E/M service provided within the previous 7 days nor will  to an E/M service or procedure within the next 24 hours or my soonest available appointment.  Prevailing standard of care was able to be met in this audio-only visit.    Consent/Emergency Plan: The patient expressed an understanding of the purpose of  the evaluation and consented to all procedures, including providing consent for Sumit Guerrero, PhD, a clinical neuropsychology fellow under the supervision of Soniya Shannon, PhD, ABPP, to be involved in her care. I informed the patient of limits to confidentiality and discussed an emergency plan.     NOTE   Alison Swann attended a feedback session today. Her appointment was scheduled to be in-person but she was unable to make it, and we switched to a phone appointment. We discussed the results of the neuropsychological evaluation. We provided Ms. Swann with a copy of the evaluation report via [facility] and gave time to discuss questions and concerns.    Sumit Guerrero, Ph.D.  Postdoctoral Fellow in Clinical Neuropsychology  [facility] - Department of Neurology    Soniya Shannon, Ph.D., ABPP  Board Certified in Clinical Neuropsychology  [facility] - Department of Neurology

## 2024-01-04 ENCOUNTER — TELEPHONE (OUTPATIENT)
Dept: ORTHOPEDICS | Facility: CLINIC | Age: 44
End: 2024-01-04
Payer: COMMERCIAL

## 2024-01-04 ENCOUNTER — E-CONSULT (OUTPATIENT)
Dept: ENDOCRINOLOGY | Facility: CLINIC | Age: 44
End: 2024-01-04
Payer: COMMERCIAL

## 2024-01-04 DIAGNOSIS — N95.2 VAGINAL ATROPHY: ICD-10-CM

## 2024-01-04 DIAGNOSIS — M81.0 OSTEOPOROSIS, UNSPECIFIED OSTEOPOROSIS TYPE, UNSPECIFIED PATHOLOGICAL FRACTURE PRESENCE: Primary | ICD-10-CM

## 2024-01-04 PROCEDURE — 99499 UNLISTED E&M SERVICE: CPT | Mod: S$GLB,,, | Performed by: INTERNAL MEDICINE

## 2024-01-04 NOTE — CONSULTS
"[facility]  Response for E-Consult     Patient Name: Alison Swann  MRN: [id number]  Primary Care Provider: Colt Muir MD   Requesting Provider: Ceci Jacinto PA-C  E-Consult to Endocrinology  Consult performed by: Carly Downs MD  Consult ordered by: Ceci Jacinto PA-C          Unfortunately, this eConsult has been declined due to: Other    Other:  This eConsult submission cannot be completed at this time due to This is not for management, it is to ask a question.    Hello,   Your eConsult was declined as it appears that you are requesting a transition of care for this patient to our service to manage their problem.  An eConsult should be used as an inter professional service between clinicians to answer a question, much like a "curbside consult", that is documented to help you manage your patient in your practice.  It is often used regarding abnormal labs, imaging, treatment recommendations or to ask if the patient needs to be seen in the specialty department or not.  With an eConsult, the chart is reviewed by the specialist but the patient is not contacted.  If it is determined during that review that the patient should be seen by a specialist, you would then need to place an official referral.      Detailed information concerning the use of eConsults can be viewed at this link: E-Consults Knowledge Home Page on [facility]      Thank you for this eConsult referral.     MD [facility]      "

## 2024-01-05 RX ORDER — PRASTERONE 6.5 MG/1
6.5 INSERT VAGINAL NIGHTLY
Qty: 28 EACH | Refills: 11 | Status: SHIPPED | OUTPATIENT
Start: 2024-01-05 | End: 2024-01-22 | Stop reason: SDUPTHER

## 2024-01-05 NOTE — PROGRESS NOTES
Outpatient Psychiatry Follow-Up Visit    Clinical Status of Patient: Outpatient (Ambulatory)  01/08/2024    The patient location is:  [place], [place] (car)  The patient phone number is: [phone]   Visit type: Virtual visit with synchronous audio and video  Each patient to whom he or she provides medical services by telemedicine is:  (1) informed of the relationship between the physician and patient and the respective role of any other health care provider with respect to management of the patient; and (2) notified that he or she may decline to receive medical services by telemedicine and may withdraw from such care at any time.     Chief Complaint: Pt is a 42 yo F who presents today for a follow-up. Met with patient.       Interval History and Content of Current Session:  Interim Events/Subjective Report/Content of Current Session:  follow up appointment.    Pt is a 42 yo F with past psychiatric hx of KIANA (generalized anxiety disorder), MDD (major depressive disorder), recurrent episode, moderate, Insomnia, psychophysiological, Attention Deficit Hyperactivity Disorder who presents for follow up treatment.     Past Psychiatric hx:   Pt. is a 42 yo F with a past psychiatric hx of Depression, unspecified depression type, KIANA (generalized anxiety disorder), Insomnia, unspecified type presenting to the clinic for an initial evaluation and treatment. Past medical history outlined below. She is currently taking traZODone (DESYREL), dextroamphetamine-amphetamine (ADDERALL), prescribed and managed by Dr. Sotelo/Dr. Amado. She reports that these medications have not addressed her depression/anxiety.     Pt presents to OP Psychiatry for initial evaluation and medication management of anxiety, depression (r/t anxiety), insomnia, and ADHD. Pt reports difficulty getting medications d/t shortages and it affecting her stressors. She reports multiple doctor appointments weekly, multiple prescriptions to keep on top of, and when  "the pharmacies are out or she tries to get refills, she feels like she spends a big percentage of her day trying to straighten things out. She reports being overwhelmed on top of her cancer dx and appointments, medications just for that. She was first seen for mental health concerns as a teenager for a suicide attempt at 13 by OD. States her childhood was very stressful, and her parents had unattainable expectations of her. She reports moderate depression currently r/t anxiety caused by her demanding job in a  position and "I just wish I could catch a break. I try to keep on top of everything and even when I do everything to keep on top of my appointments and medications, one mistake and I spend an unbelievable amount of time just to get my medicine. It is not everyday, this intense but I just wish it would let up a little". Discussed adding Paxil or Effexor, pt apprehensive to try after having a severe reaction to Zoloft of nausea and vomiting, "I was on the floor and struggled for hours." Discussed Buspar for anxiety and pt stated, "I just don't know if I can handle the side effects right now with all of my medical issues." Pt receptive to try Propranolol PRN for anxiety and states that it may help her depression. Pt also stated if she didn't have to work so hard to get her medicine, that may relieve some of her anxiety/depression. Pt has a very supportive . Discussed sending long term medications through mail order (her  has his delivered) as she sometimes has to drive a half hour to find a pharmacy that has her medicine. Pt denies SI/HI/AVH, self-harm, plan, or intent. Pt verbalizes understanding of medication instructions through teach back, no new scheduled medications ordered, will reassess symptoms in 3 months or PRN if symptoms worsen.     Past Medical hx:   Past Medical History:   Diagnosis Date    Attention Deficit Hyperactivity Disorder     Family H/O Diabetes Mellitus     " Her Father    Generalized Anxiety     Left Breast Cancer S.P Lumpectomy In 01/2021     Dr. Dominga Johnston; Dr. Sandra Sotelo (Heme/Onc); 4/28/21 On CMT; Is Estrogen Receptor Negative; She Is BRCA2 Gene Mutation Positive, Andf Her Mother Also With A H/O Breast Cancer    Macrocytic Anemia     Associated With Her Chemotherapy    Postoperative Nausea And Vomiting     Scopolamine Patches Work Well For Her For This    Scoliosis     Therapeutic Drug Monitoring     Wellness Visit 4/28/2021         Interim hx:  Medication changes last visit:     1) Start propranoloL (INDERAL) 10 MG tablet - Take 1 tablet (10 mg total) by mouth 2 (two) times daily as needed (anxiety). Pt to monitor blood pressure before taking.  2) Continue traZODone (DESYREL) 100 MG tablet - Take 1 tablet (100 mg total) by mouth nightly as needed for Insomnia.  4) D/t availability Adderall changed to:  dextroamphetamine-amphetamine (ADDERALL) 7.5 mg Tab - Take 7.5 mg by mouth 2 (two) times a day for ADHD. Discussed stimulant side effects including effects on sleep, appetite, tics, nervousness, anorexia, insomnia, tachycardia, palpitations, dizziness, BP changes, HR changes, visual disturbance, and headaches.       Anxiety: Improved  Depression: Improved  Sleep: Very good  Appetite: Decreased but getting nutrition     Denies suicidal/homicidal ideations.  Denies hopelessness/worthlessness.    Denies auditory/visual hallucinations      Tobacco:  denies  Alcohol:  occasional  Drug use:  denies  Caffeine:  2 cups a week      Review of Systems   PSYCHIATRIC: Pertinent items are noted in the narrative.        CONSTITUTIONAL: weight stable        M/S: no pain today         ENT: no allergies noted today        ABD: no n/v/d     Past Medical, Family and Social History: The patient's past medical, family and social history have been reviewed and updated as appropriate within the electronic medical record. See encounter notes.     Medication Compliance: yes      Side  effects: tolerates     Risk Parameters:  Patient reports no suicidal ideation  Patient reports no homicidal ideation  Patient reports no self-injurious behavior  Patient reports no violent behavior     Exam (detailed: at least 9 elements; comprehensive: all 15 elements)   Constitutional  Vitals:  Most recent vital signs, dated less than 90 days prior to this appointment, were reviewed.     General:  unremarkable, age appropriate, casual attire, good eye contact, good rapport       Musculoskeletal  Muscle Strength/Tone:  no flaccidity, no tremor    Gait & Station:  normal      Psychiatric                       Speech:  normal tone, normal rate, rhythm, and volume   Mood & Affect:   Euthymic, congruent, appropriate         Thought Process:   Goal directed; Linear    Associations:   intact   Thought Content:   No SI/HI, delusions, or paranoia, no AV/VH   Insight & Judgement:   Good, adequate to circumstances   Orientation:   grossly intact; alert and oriented x 4    Memory:  intact for content of interview    Language:  grossly intact, can repeat    Attention Span  : Grossly intact for content of interview   Fund of Knowledge:   intact and appropriate to age and level of education        Assessment and Diagnosis   Status/Progress: Based on the examination today, the patient's problem(s) is/are under fair control.  New problems have not been presented today. Comorbidities are not currently complicating management of the primary condition.      Impression:  Pt presents to OP Psychiatry for routine follow-up for treatment/medication management of KIANA, MDD, Insomnia, ADHD. Pt reports lower dose of Adderall is manageable although her symptoms are not adequately addressed. Would like to stay at the lower dose as she does not think the 10 mg will be available, and she states she spent an extended period of time calling pharmacies to find availability. Pt is frustrated about the amount of time she is spending on taking care of  "her physical health on top of her mental health, and it is exhausting. Pt reports sleep has improved, appetite has decreased and she struggles to "eat clean which I have done my whole life". Pt reports getting enough nutrition daily. Reports taking Ativan PRN (prescribed and managed by Dr. Plaza)in the past month for anxiety and it has helped. New Rx of Xanax PRN also prescribed and managed by Dr. Plaza. Reports no other medication changes at this time. Pt denies SI/HI/AVH, self-harm, plan, or intent. Pt verbalizes understanding of medication instructions through teach back. No other issues, concerns, or problems, will reassess symptoms and medications in 3 months or PRN if symptoms worsen.      Diagnosis:   - KIANA (generalized anxiety disorder)  - MDD (major depressive disorder), recurrent episode, moderate  - Insomnia, psychophysiological  - Attention Deficit Hyperactivity Disorder      Intervention/Counseling/Treatment Plan   Medication Management:      Continue propranoloL (INDERAL) 10 MG tablet - Take 1 tablet (10 mg total) by mouth 2 (two) times daily as needed (anxiety). Pt to monitor blood pressure before taking.    2.   Continue traZODone (DESYREL) 100 MG tablet - Take 1 tablet (100 mg total) by mouth nightly as needed for Insomnia.  3.   Continue dextroamphetamine-amphetamine (ADDERALL) 7.5 mg Tab - Take 7.5 mg by mouth 2 (two) times a day for ADHD. Discussed stimulant side effects including effects on sleep, appetite, tics, nervousness, anorexia, insomnia, tachycardia, palpitations, dizziness, BP changes, HR changes, visual disturbance, and headaches.     4.   Continue ALPRAZolam (XANAX) 0.5 MG tablet - Take 1 tablet (0.5 mg total) by mouth 2 (two) times daily as needed for Anxiety (prescribed and managed by Dr. Plaza). Discussed risk of decreased RT, sedation, addictive potential, and not to mix with alcohol.      5.   Call to report any worsening of symptoms or problems with the medication. Pt " instructed to go to ER with thoughts of harming self, others     6.   Patient given contact # for psychotherapists at [facility] and also instructed she may check with insurance for list of providers.          Labs: none         Return to clinic:      3 months or PRN if symptoms worsen    -Spent 30min face to face with the pt; >50% time spent in counseling   -Supportive therapy and psychoeducation provided  -R/B/SE's of medications discussed with the pt who expresses understanding and chooses to take medications as prescribed.   -Pt instructed to call clinic, 911 or go to nearest emergency room if sxs worsen or pt is in   crisis. The pt expresses understanding.      Mehnaz Gaitan NP  Psychiatric-Mental Health Nurse Practitioner  Department of Psychiatry    [facility] - [address] [address]  Phone:  [phone]  Fax: [phone]

## 2024-01-06 DIAGNOSIS — E27.49 SECONDARY ADRENAL INSUFFICIENCY: ICD-10-CM

## 2024-01-08 ENCOUNTER — OFFICE VISIT (OUTPATIENT)
Dept: PSYCHIATRY | Facility: CLINIC | Age: 44
End: 2024-01-08
Payer: COMMERCIAL

## 2024-01-08 ENCOUNTER — PATIENT MESSAGE (OUTPATIENT)
Dept: PSYCHIATRY | Facility: CLINIC | Age: 44
End: 2024-01-08
Payer: COMMERCIAL

## 2024-01-08 DIAGNOSIS — F41.1 GAD (GENERALIZED ANXIETY DISORDER): Primary | ICD-10-CM

## 2024-01-08 DIAGNOSIS — F90.0 ADHD (ATTENTION DEFICIT HYPERACTIVITY DISORDER), INATTENTIVE TYPE: ICD-10-CM

## 2024-01-08 DIAGNOSIS — F33.0 MILD EPISODE OF RECURRENT MAJOR DEPRESSIVE DISORDER: ICD-10-CM

## 2024-01-08 DIAGNOSIS — F51.04 INSOMNIA, PSYCHOPHYSIOLOGICAL: ICD-10-CM

## 2024-01-08 PROCEDURE — 99214 OFFICE O/P EST MOD 30 MIN: CPT | Mod: 95,,,

## 2024-01-08 RX ORDER — DEXTROAMPHETAMINE SACCHARATE, AMPHETAMINE ASPARTATE, DEXTROAMPHETAMINE SULFATE AND AMPHETAMINE SULFATE 1.875; 1.875; 1.875; 1.875 MG/1; MG/1; MG/1; MG/1
7.5 TABLET ORAL 2 TIMES DAILY
Qty: 60 TABLET | Refills: 0 | Status: SHIPPED | OUTPATIENT
Start: 2024-01-26 | End: 2024-01-23 | Stop reason: RX

## 2024-01-08 RX ORDER — HYDROCORTISONE 10 MG/1
10 TABLET ORAL DAILY
Qty: 90 TABLET | Refills: 3 | Status: SHIPPED | OUTPATIENT
Start: 2024-01-08

## 2024-01-09 ENCOUNTER — PATIENT MESSAGE (OUTPATIENT)
Dept: HEMATOLOGY/ONCOLOGY | Facility: CLINIC | Age: 44
End: 2024-01-09
Payer: COMMERCIAL

## 2024-01-09 ENCOUNTER — INFUSION (OUTPATIENT)
Dept: INFUSION THERAPY | Facility: HOSPITAL | Age: 44
End: 2024-01-09
Attending: INTERNAL MEDICINE
Payer: COMMERCIAL

## 2024-01-09 ENCOUNTER — TELEPHONE (OUTPATIENT)
Dept: INFUSION THERAPY | Facility: HOSPITAL | Age: 44
End: 2024-01-09

## 2024-01-09 VITALS
RESPIRATION RATE: 18 BRPM | BODY MASS INDEX: 18.7 KG/M2 | TEMPERATURE: 98 F | SYSTOLIC BLOOD PRESSURE: 108 MMHG | HEART RATE: 90 BPM | DIASTOLIC BLOOD PRESSURE: 77 MMHG | WEIGHT: 119.13 LBS | HEIGHT: 67 IN | OXYGEN SATURATION: 99 %

## 2024-01-09 DIAGNOSIS — Z78.0 MENOPAUSE: Primary | ICD-10-CM

## 2024-01-09 DIAGNOSIS — Z17.1 MALIGNANT NEOPLASM OF UPPER-OUTER QUADRANT OF LEFT BREAST IN FEMALE, ESTROGEN RECEPTOR NEGATIVE: ICD-10-CM

## 2024-01-09 DIAGNOSIS — C50.412 MALIGNANT NEOPLASM OF UPPER-OUTER QUADRANT OF LEFT BREAST IN FEMALE, ESTROGEN RECEPTOR NEGATIVE: ICD-10-CM

## 2024-01-09 PROCEDURE — 63600175 PHARM REV CODE 636 W HCPCS: Performed by: INTERNAL MEDICINE

## 2024-01-09 PROCEDURE — A4216 STERILE WATER/SALINE, 10 ML: HCPCS | Performed by: INTERNAL MEDICINE

## 2024-01-09 PROCEDURE — 96523 IRRIG DRUG DELIVERY DEVICE: CPT

## 2024-01-09 PROCEDURE — 25000003 PHARM REV CODE 250: Performed by: INTERNAL MEDICINE

## 2024-01-09 RX ORDER — HEPARIN 100 UNIT/ML
500 SYRINGE INTRAVENOUS
Status: CANCELLED | OUTPATIENT
Start: 2024-01-09

## 2024-01-09 RX ORDER — SODIUM CHLORIDE 0.9 % (FLUSH) 0.9 %
10 SYRINGE (ML) INJECTION
Status: CANCELLED | OUTPATIENT
Start: 2024-01-09

## 2024-01-09 RX ORDER — SODIUM CHLORIDE 0.9 % (FLUSH) 0.9 %
10 SYRINGE (ML) INJECTION
Status: DISCONTINUED | OUTPATIENT
Start: 2024-01-09 | End: 2024-01-10 | Stop reason: HOSPADM

## 2024-01-09 RX ORDER — HEPARIN 100 UNIT/ML
500 SYRINGE INTRAVENOUS
Status: DISCONTINUED | OUTPATIENT
Start: 2024-01-09 | End: 2024-01-10 | Stop reason: HOSPADM

## 2024-01-09 RX ADMIN — SODIUM CHLORIDE, PRESERVATIVE FREE 10 ML: 5 INJECTION INTRAVENOUS at 10:01

## 2024-01-09 RX ADMIN — HEPARIN 500 UNITS: 100 SYRINGE at 10:01

## 2024-01-09 NOTE — PLAN OF CARE
Problem: Fatigue  Goal: Improved Activity Tolerance  Outcome: Ongoing, Progressing  Intervention: Promote Improved Energy  Flowsheets (Taken 1/9/2024 1103)  Fatigue Management: activity schedule adjusted  Sleep/Rest Enhancement: noise level reduced  Activity Management: Up in chair - L3

## 2024-01-09 NOTE — TELEPHONE ENCOUNTER
Patient asked to speak with me regarding her labs that were drawn earlier for Dr. Chiu. Informed the patient that I had spoke with the MD office regarding not being able to run the labs since Dr. Chiu is not in our system and unable to place orders under her name and also that I was informed the labs were not to be drawn until Jan 25th as written on the order. Patient voiced understanding but not sure what to do.I encouraged her to reach out to another of her physicians that are with [facility] or [facility] to see if they will place these orders in [facility] or she could have them drawn at [facility] or [facility] as per MD office instructions. She stated she will find someone to ok signing the orders. Jarde Villatoro RN informed and aware of situation

## 2024-01-18 ENCOUNTER — PATIENT MESSAGE (OUTPATIENT)
Dept: PSYCHIATRY | Facility: CLINIC | Age: 44
End: 2024-01-18
Payer: COMMERCIAL

## 2024-01-18 RX ORDER — PROMETHAZINE HYDROCHLORIDE 25 MG/1
25 TABLET ORAL EVERY 6 HOURS PRN
Qty: 30 TABLET | Refills: 0 | Status: SHIPPED | OUTPATIENT
Start: 2024-01-18

## 2024-01-19 ENCOUNTER — PATIENT MESSAGE (OUTPATIENT)
Dept: HEMATOLOGY/ONCOLOGY | Facility: CLINIC | Age: 44
End: 2024-01-19
Payer: COMMERCIAL

## 2024-01-19 ENCOUNTER — TELEPHONE (OUTPATIENT)
Dept: HEMATOLOGY/ONCOLOGY | Facility: CLINIC | Age: 44
End: 2024-01-19
Payer: COMMERCIAL

## 2024-01-19 NOTE — TELEPHONE ENCOUNTER
"Spoke with pt. She has outside labs that need to be drawn jan 31st.   Asked pt to send us a copy of the order so I can get it set up for her here in the infusion center for port draw labs.     She also has an order for genetics that Dr. Sotelo placed. Will draw that at the same time.     Pt thankful and will send orders on [employer].    ----- Message from Tiffany Smith sent at 1/19/2024 12:17 PM CST -----  Regarding: Pt advice  Contact: Pt     Pt requesting call back in regards to getting blood order through port in [place]  Please call and adv @       Confirmed contact below:   Contact Name: Alison MANTILLA Hue  Phone Number:[phone]               Additional Notes:  "Thank you for all that you do for our patients"                                           "

## 2024-01-22 ENCOUNTER — PATIENT MESSAGE (OUTPATIENT)
Dept: PSYCHIATRY | Facility: CLINIC | Age: 44
End: 2024-01-22
Payer: COMMERCIAL

## 2024-01-22 ENCOUNTER — PATIENT MESSAGE (OUTPATIENT)
Dept: OBSTETRICS AND GYNECOLOGY | Facility: CLINIC | Age: 44
End: 2024-01-22
Payer: COMMERCIAL

## 2024-01-22 DIAGNOSIS — N95.2 VAGINAL ATROPHY: ICD-10-CM

## 2024-01-22 DIAGNOSIS — C50.412 MALIGNANT NEOPLASM OF UPPER-OUTER QUADRANT OF LEFT BREAST IN FEMALE, ESTROGEN RECEPTOR NEGATIVE: Primary | ICD-10-CM

## 2024-01-22 DIAGNOSIS — Z17.1 MALIGNANT NEOPLASM OF UPPER-OUTER QUADRANT OF LEFT BREAST IN FEMALE, ESTROGEN RECEPTOR NEGATIVE: Primary | ICD-10-CM

## 2024-01-22 RX ORDER — PRASTERONE 6.5 MG/1
6.5 INSERT VAGINAL NIGHTLY
Qty: 28 EACH | Refills: 11 | Status: SHIPPED | OUTPATIENT
Start: 2024-01-22

## 2024-01-23 ENCOUNTER — PATIENT MESSAGE (OUTPATIENT)
Dept: PSYCHIATRY | Facility: CLINIC | Age: 44
End: 2024-01-23
Payer: COMMERCIAL

## 2024-01-23 DIAGNOSIS — F90.0 ADHD (ATTENTION DEFICIT HYPERACTIVITY DISORDER), INATTENTIVE TYPE: ICD-10-CM

## 2024-01-23 DIAGNOSIS — F90.0 ADHD (ATTENTION DEFICIT HYPERACTIVITY DISORDER), INATTENTIVE TYPE: Primary | ICD-10-CM

## 2024-01-23 RX ORDER — DEXTROAMPHETAMINE SACCHARATE, AMPHETAMINE ASPARTATE MONOHYDRATE, DEXTROAMPHETAMINE SULFATE AND AMPHETAMINE SULFATE 5; 5; 5; 5 MG/1; MG/1; MG/1; MG/1
20 CAPSULE, EXTENDED RELEASE ORAL EVERY MORNING
Qty: 30 CAPSULE | Refills: 0 | Status: SHIPPED | OUTPATIENT
Start: 2024-01-23 | End: 2024-02-19 | Stop reason: DRUGHIGH

## 2024-01-23 RX ORDER — DEXTROAMPHETAMINE SACCHARATE, AMPHETAMINE ASPARTATE, DEXTROAMPHETAMINE SULFATE AND AMPHETAMINE SULFATE 1.875; 1.875; 1.875; 1.875 MG/1; MG/1; MG/1; MG/1
7.5 TABLET ORAL 2 TIMES DAILY
Qty: 60 TABLET | Refills: 0 | Status: SHIPPED | OUTPATIENT
Start: 2024-01-26 | End: 2024-01-23 | Stop reason: RX

## 2024-01-28 ENCOUNTER — PATIENT MESSAGE (OUTPATIENT)
Dept: ENDOCRINOLOGY | Facility: CLINIC | Age: 44
End: 2024-01-28
Payer: COMMERCIAL

## 2024-01-30 RX ORDER — DEXAMETHASONE SODIUM PHOSPHATE 4 MG/ML
4 INJECTION, SOLUTION INTRA-ARTICULAR; INTRALESIONAL; INTRAMUSCULAR; INTRAVENOUS; SOFT TISSUE ONCE
Qty: 2 ML | Refills: 3 | Status: SHIPPED | OUTPATIENT
Start: 2024-01-30 | End: 2024-01-30

## 2024-01-30 NOTE — TELEPHONE ENCOUNTER
Pt needs a refill on dexamethasone injection prn adrenal crisis.  She's asking for 2 because she will be out of town often.  It's no longer on her med list.  Can you please send to [facility]?

## 2024-01-31 ENCOUNTER — INFUSION (OUTPATIENT)
Dept: INFUSION THERAPY | Facility: HOSPITAL | Age: 44
End: 2024-01-31
Payer: COMMERCIAL

## 2024-01-31 DIAGNOSIS — C50.412 MALIGNANT NEOPLASM OF UPPER-OUTER QUADRANT OF LEFT BREAST IN FEMALE, ESTROGEN RECEPTOR NEGATIVE: Primary | ICD-10-CM

## 2024-01-31 DIAGNOSIS — N95.1 MENOPAUSAL SYMPTOMS: ICD-10-CM

## 2024-01-31 DIAGNOSIS — Z17.1 MALIGNANT NEOPLASM OF UPPER-OUTER QUADRANT OF LEFT BREAST IN FEMALE, ESTROGEN RECEPTOR NEGATIVE: Primary | ICD-10-CM

## 2024-01-31 DIAGNOSIS — F32.A DEPRESSION, UNSPECIFIED DEPRESSION TYPE: ICD-10-CM

## 2024-01-31 LAB
ALBUMIN SERPL BCP-MCNC: 3.7 G/DL (ref 3.5–5.2)
ALP SERPL-CCNC: 108 U/L (ref 55–135)
ALT SERPL W/O P-5'-P-CCNC: 12 U/L (ref 10–44)
ANION GAP SERPL CALC-SCNC: 7 MMOL/L (ref 8–16)
AST SERPL-CCNC: 14 U/L (ref 10–40)
BILIRUB SERPL-MCNC: 0.2 MG/DL (ref 0.1–1)
BUN SERPL-MCNC: 12 MG/DL (ref 6–20)
CALCIUM SERPL-MCNC: 9.5 MG/DL (ref 8.7–10.5)
CHLORIDE SERPL-SCNC: 105 MMOL/L (ref 95–110)
CO2 SERPL-SCNC: 26 MMOL/L (ref 23–29)
CREAT SERPL-MCNC: 0.7 MG/DL (ref 0.5–1.4)
EST. GFR  (NO RACE VARIABLE): >60 ML/MIN/1.73 M^2
ESTRADIOL SERPL-MCNC: <10 PG/ML
FSH SERPL-ACNC: 96.56 MIU/ML
GLUCOSE SERPL-MCNC: 99 MG/DL (ref 70–110)
POTASSIUM SERPL-SCNC: 3.8 MMOL/L (ref 3.5–5.1)
PROT SERPL-MCNC: 6.9 G/DL (ref 6–8.4)
SODIUM SERPL-SCNC: 138 MMOL/L (ref 136–145)
TESTOST SERPL-MCNC: 142 NG/DL (ref 5–73)

## 2024-01-31 PROCEDURE — 36415 COLL VENOUS BLD VENIPUNCTURE: CPT | Performed by: INTERNAL MEDICINE

## 2024-01-31 PROCEDURE — 25000003 PHARM REV CODE 250: Performed by: INTERNAL MEDICINE

## 2024-01-31 PROCEDURE — 63600175 PHARM REV CODE 636 W HCPCS: Performed by: INTERNAL MEDICINE

## 2024-01-31 PROCEDURE — 36591 DRAW BLOOD OFF VENOUS DEVICE: CPT

## 2024-01-31 PROCEDURE — 80053 COMPREHEN METABOLIC PANEL: CPT | Performed by: PHYSICIAN ASSISTANT

## 2024-01-31 PROCEDURE — 82670 ASSAY OF TOTAL ESTRADIOL: CPT | Performed by: PHYSICIAN ASSISTANT

## 2024-01-31 PROCEDURE — A4216 STERILE WATER/SALINE, 10 ML: HCPCS | Performed by: INTERNAL MEDICINE

## 2024-01-31 PROCEDURE — 84403 ASSAY OF TOTAL TESTOSTERONE: CPT | Performed by: PHYSICIAN ASSISTANT

## 2024-01-31 PROCEDURE — 83001 ASSAY OF GONADOTROPIN (FSH): CPT | Performed by: INTERNAL MEDICINE

## 2024-01-31 RX ORDER — SODIUM CHLORIDE 0.9 % (FLUSH) 0.9 %
10 SYRINGE (ML) INJECTION
OUTPATIENT
Start: 2024-01-31

## 2024-01-31 RX ORDER — SODIUM CHLORIDE 0.9 % (FLUSH) 0.9 %
10 SYRINGE (ML) INJECTION
Status: DISCONTINUED | OUTPATIENT
Start: 2024-01-31 | End: 2024-01-31 | Stop reason: HOSPADM

## 2024-01-31 RX ORDER — HEPARIN 100 UNIT/ML
500 SYRINGE INTRAVENOUS
Status: DISCONTINUED | OUTPATIENT
Start: 2024-01-31 | End: 2024-01-31 | Stop reason: HOSPADM

## 2024-01-31 RX ORDER — HEPARIN 100 UNIT/ML
500 SYRINGE INTRAVENOUS
OUTPATIENT
Start: 2024-01-31

## 2024-01-31 RX ADMIN — HEPARIN 500 UNITS: 100 SYRINGE at 02:01

## 2024-01-31 RX ADMIN — SODIUM CHLORIDE, PRESERVATIVE FREE 10 ML: 5 INJECTION INTRAVENOUS at 02:01

## 2024-02-01 RX ORDER — DEXAMETHASONE SODIUM PHOSPHATE 4 MG/ML
4 INJECTION, SOLUTION INTRA-ARTICULAR; INTRALESIONAL; INTRAMUSCULAR; INTRAVENOUS; SOFT TISSUE ONCE
Qty: 2 ML | Refills: 1 | Status: SHIPPED | OUTPATIENT
Start: 2024-02-01 | End: 2024-02-03

## 2024-02-05 RX ORDER — LORAZEPAM 0.5 MG/1
0.5 TABLET ORAL EVERY 6 HOURS PRN
Qty: 120 TABLET | Refills: 2 | Status: SHIPPED | OUTPATIENT
Start: 2024-02-05

## 2024-02-06 ENCOUNTER — OFFICE VISIT (OUTPATIENT)
Dept: OPTOMETRY | Facility: CLINIC | Age: 44
End: 2024-02-06
Payer: COMMERCIAL

## 2024-02-06 DIAGNOSIS — H52.203 MYOPIA WITH ASTIGMATISM, BILATERAL: ICD-10-CM

## 2024-02-06 DIAGNOSIS — Z17.1 MALIGNANT NEOPLASM OF UPPER-OUTER QUADRANT OF LEFT BREAST IN FEMALE, ESTROGEN RECEPTOR NEGATIVE: Primary | ICD-10-CM

## 2024-02-06 DIAGNOSIS — C50.412 MALIGNANT NEOPLASM OF UPPER-OUTER QUADRANT OF LEFT BREAST IN FEMALE, ESTROGEN RECEPTOR NEGATIVE: Primary | ICD-10-CM

## 2024-02-06 DIAGNOSIS — Z98.890 HISTORY OF LASER REFRACTIVE SURGERY: ICD-10-CM

## 2024-02-06 DIAGNOSIS — H52.13 MYOPIA WITH ASTIGMATISM, BILATERAL: ICD-10-CM

## 2024-02-06 PROCEDURE — 92014 COMPRE OPH EXAM EST PT 1/>: CPT | Mod: S$GLB,,,

## 2024-02-06 PROCEDURE — 99999 PR PBB SHADOW E&M-EST. PATIENT-LVL III: CPT | Mod: PBBFAC,,,

## 2024-02-06 PROCEDURE — 1159F MED LIST DOCD IN RCRD: CPT | Mod: CPTII,S$GLB,,

## 2024-02-06 NOTE — PROGRESS NOTES
HPI    Pt here for annual eye exam. Last exam- 09/2022    Pt sts VA OU stable since Lasik. Pt declines refraction, VA doing well. Pt   denies flashes/floaters/pain. Pt using Systane PRN.   Last edited by Nasreen Suarez on 2/6/2024  4:06 PM.            Assessment /Plan     For exam results, see Encounter Report.    Malignant neoplasm of upper-outer quadrant of left breast in female, estrogen receptor negative    History of laser refractive surgery    Myopia with astigmatism, bilateral      No ocular manifestations OD, OS. No tamoxifen use. Ed pt on all findings and on importance of monitoring yearly for changes, sooner prn.  History of LASIK OU. Stable, excellent uncorrected DVA. Monitor yearly for changes.  Excellent uncorrected DVA and NVA. Residual myopia preventing presbyopia. No refraction or spec rx given. Monitor yearly for changes.    RTC: 1 year for comprehensive exam or sooner prn.

## 2024-02-09 ENCOUNTER — OFFICE VISIT (OUTPATIENT)
Dept: URGENT CARE | Facility: CLINIC | Age: 44
End: 2024-02-09
Payer: COMMERCIAL

## 2024-02-09 VITALS
DIASTOLIC BLOOD PRESSURE: 85 MMHG | BODY MASS INDEX: 18.83 KG/M2 | SYSTOLIC BLOOD PRESSURE: 123 MMHG | HEART RATE: 102 BPM | HEIGHT: 67 IN | TEMPERATURE: 99 F | RESPIRATION RATE: 18 BRPM | OXYGEN SATURATION: 96 % | WEIGHT: 120 LBS

## 2024-02-09 DIAGNOSIS — H66.001 NON-RECURRENT ACUTE SUPPURATIVE OTITIS MEDIA OF RIGHT EAR WITHOUT SPONTANEOUS RUPTURE OF TYMPANIC MEMBRANE: Primary | ICD-10-CM

## 2024-02-09 LAB
ONEOME COMMENT: NORMAL
ONEOME METHOD: NORMAL

## 2024-02-09 PROCEDURE — 99213 OFFICE O/P EST LOW 20 MIN: CPT | Mod: S$GLB,,, | Performed by: NURSE PRACTITIONER

## 2024-02-09 RX ORDER — CEFDINIR 300 MG/1
300 CAPSULE ORAL 2 TIMES DAILY
Qty: 20 CAPSULE | Refills: 0 | Status: SHIPPED | OUTPATIENT
Start: 2024-02-09 | End: 2024-02-09

## 2024-02-09 RX ORDER — CEFDINIR 300 MG/1
300 CAPSULE ORAL 2 TIMES DAILY
Qty: 20 CAPSULE | Refills: 0 | Status: SHIPPED | OUTPATIENT
Start: 2024-02-09 | End: 2024-02-19

## 2024-02-09 NOTE — PROGRESS NOTES
"Subjective:       Patient ID: Alison Swann is a 43 y.o. female.    Vitals:  height is 5' 7" (1.702 m) and weight is 54.4 kg (120 lb). Her oral temperature is 98.5 °F (36.9 °C). Her blood pressure is 123/85 and her pulse is 102. Her respiration is 18 and oxygen saturation is 96%.     Chief Complaint: Cough (Cough for over 3 weeks, now feels like there's fluid in her right ear.  States she has an adrenal insufficiency.  During this she has up dosed her steroids and even used her emergency injection.  States her cough is non productive.  Denies swollen glands, sore throat, fever, hasn't felt bad.)    This is a 43 y.o. female who presents today with a chief complaint of sore throat, right ear ache with full feeling, nasal congestion and mild cough over the past two weeks that has worsened over the past few days with no relieff with OTC medication or steroid by mouth and injection that she self administered at home. States she has an adrenal insufficiency so she takes prednisone 10mg daily and she has increased her steroids and even used her emergency injection with no improvement.  States her cough is non productive.        Patient presents with:  Cough: Cough for over 3 weeks, now feels like there's fluid in her right ear.  States she has an adrenal insufficiency.  During this she has up dosed her steroids and even used her emergency injection.  States her cough is non productive.         Cough  This is a new problem. The current episode started 1 to 4 weeks ago. The problem has been unchanged. The cough is Non-productive. Associated symptoms include ear congestion, ear pain and a sore throat. Pertinent negatives include no shortness of breath or wheezing. Treatments tried: delsym, mucinex, dayquil and nyquil. The treatment provided no relief.       Constitution: Negative.   HENT:  Positive for ear pain, congestion and sore throat.    Respiratory:  Positive for cough. Negative for shortness of breath and wheezing. "            Objective:      Physical Exam   Constitutional: She is oriented to person, place, and time. She appears well-developed. She is cooperative.  Non-toxic appearance. She does not appear ill. No distress.   HENT:   Head: Normocephalic and atraumatic.   Ears:   Right Ear: Hearing, external ear and ear canal normal. Tympanic membrane is bulging. Tympanic membrane is not erythematous (dusky colored). A middle ear effusion is present.   Left Ear: Hearing, tympanic membrane, external ear and ear canal normal.   Nose: Nose normal. No mucosal edema, rhinorrhea or nasal deformity. No epistaxis. Right sinus exhibits no maxillary sinus tenderness and no frontal sinus tenderness. Left sinus exhibits no maxillary sinus tenderness and no frontal sinus tenderness.   Mouth/Throat: Uvula is midline and mucous membranes are normal. No trismus in the jaw. Normal dentition. No uvula swelling. Posterior oropharyngeal erythema present. No oropharyngeal exudate or posterior oropharyngeal edema. Tonsils are 1+ on the right. Tonsils are 1+ on the left.   Eyes: Conjunctivae and lids are normal. No scleral icterus.   Neck: Trachea normal and phonation normal. Neck supple. No edema present. No erythema present. No neck rigidity present.   Cardiovascular: Normal rate, regular rhythm, normal heart sounds and normal pulses.   Pulmonary/Chest: Effort normal and breath sounds normal. No respiratory distress. She has no decreased breath sounds. She has no rhonchi.   Abdominal: Normal appearance.   Musculoskeletal: Normal range of motion.         General: No deformity. Normal range of motion.   Neurological: She is alert and oriented to person, place, and time. She exhibits normal muscle tone. Coordination normal.   Skin: Skin is warm, dry, intact, not diaphoretic and not pale.   Psychiatric: Her speech is normal and behavior is normal. Judgment and thought content normal.   Nursing note and vitals reviewed.        Past medical history and  current medications reviewed.       Assessment:           1. Non-recurrent acute suppurative otitis media of right ear without spontaneous rupture of tympanic membrane              Plan:         Non-recurrent acute suppurative otitis media of right ear without spontaneous rupture of tympanic membrane  -     cefdinir (OMNICEF) 300 MG capsule; Take 1 capsule (300 mg total) by mouth 2 (two) times daily. for 10 days  Dispense: 20 capsule; Refill: 0             Patient Instructions   Please return here or go to the Emergency Department for any concerns or worsening of condition.  Please drink plenty of fluids.  Please get plenty of rest.  If you were prescribed antibiotics, please take them to completion.  If you do not have Hypertension or any history of palpitations, it is ok to take over the counter Sudafed or Mucinex D or Allegra-D or Claritin-D or Zyrtec-D.  If you do take one of the above, it is ok to combine that with plain over the counter Mucinex or Allegra or Claritin or Zyrtec.  If for example you are taking Zyrtec -D, you can combine that with Mucinex, but not Mucinex-D.  If you are taking Mucinex-D, you can combine that with plain Allegra or Claritin or Zyrtec.   If you do have Hypertension or palpitations, it is safe to take Coricidin HBP for relief of sinus symptoms.  If not allergic, please take over the counter Tylenol (Acetaminophen) and/or Motrin (Ibuprofen) as directed for control of pain and/or fever.  Please follow up with your primary care doctor or specialist as needed.    If you  smoke, please stop smoking.          DONALD Harper

## 2024-02-11 ENCOUNTER — PATIENT MESSAGE (OUTPATIENT)
Dept: HEMATOLOGY/ONCOLOGY | Facility: CLINIC | Age: 44
End: 2024-02-11
Payer: COMMERCIAL

## 2024-02-12 ENCOUNTER — DOCUMENTATION ONLY (OUTPATIENT)
Dept: HEMATOLOGY/ONCOLOGY | Facility: CLINIC | Age: 44
End: 2024-02-12
Payer: COMMERCIAL

## 2024-02-12 DIAGNOSIS — R97.8 ELEVATED CA 19-9 LEVEL: Primary | ICD-10-CM

## 2024-02-12 DIAGNOSIS — C50.412 MALIGNANT NEOPLASM OF UPPER-OUTER QUADRANT OF LEFT BREAST IN FEMALE, ESTROGEN RECEPTOR NEGATIVE: ICD-10-CM

## 2024-02-12 DIAGNOSIS — Z17.1 MALIGNANT NEOPLASM OF UPPER-OUTER QUADRANT OF LEFT BREAST IN FEMALE, ESTROGEN RECEPTOR NEGATIVE: ICD-10-CM

## 2024-02-12 NOTE — PROGRESS NOTES
PGx Consult Note    Clinical Recommendations based on PGx Results  No medication changes recommended at this time per PGx Profile    Past intolerance/inefficacies and allergies   Not applicable    Future Medication Considerations based on PGx Results  Patient has 3 actionable phenotypes that may impact future medication selection. See below for all PGx recommendations.    CY Intermediate Metabolizer:  Tacrolimus: Increase starting dose 1.5-2 times recommended starting dose due to lower trough concentrations and decreased chance of achieving target levels. Total starting dose should not exceed 0.3 mg/kg/day.     CYP2D6 Intermediate Metabolizer:  Tricyclic Antidepressants: Consider 25% reduction of recommended starting dose due to reduced metabolism and increased probability of side effects.   Tramadol/Codeine: Use tramadol or codeine label recommended dosing. If no response and opioid use is warranted, consider alternative to codeine and tramadol due to reduced active metabolite formation.   Hydrocodone: Use hydrocodone label recommended dosing. If no response and opioid use is warranted, consider non-codeine or non-tramadol opioid.   Metoprolol: If a gradual reduction in HR is desired, or in the event of symptomatic bradycardia: use smaller steps in dose titration and/or prescribe no more than 50% of the standard dose. All other cases: no action required.   Paroxetine: Consider a lower starting dose and slower titration schedule due to reduced metabolism to less active compounds.   Atomoxetine: Due to significantly decreased metabolism resulting in higher concentrations and increased risk of SEs, initiate with a dose of 40 mg/day and if no clinical response and in the absence of adverse events after 2 weeks increase dose to 80 mg/day. If response is inadequate after 2 weeks, consider obtaining a plasma concentration 2-4 hours after dosing. If concentration is < 200 ng/mL, consider a proportional dose increase to  achieve a concentration to approach 400 ng/mL.   Tamoxifen: Consider hormonal therapy such as an aromatase inhibitor for postmenopausal women or aromatase inhibitor along with ovarian function suppression in premenopausal women. If aromatase inhibitor use is contraindicated, consideration should be given to use a higher but FDA approved tamoxifen dose (40 mg/day). Avoid CYP2D6 strong to weak inhibitors (i.e., paroxetine, fluoxetine).      CYP2B6 Intermediate Metabolizer:  Efavirenz: Consider initiating efavirenz with decreased dose of 400 mg/day due to increased risk of CNS adverse effects.  Sertraline: Initiate therapy with the recommended starting dose. Consider a slower titration schedule and lower maintenance dose due to reduced metabolism to less active compounds.     Bev Graham, PharmD  Clinical Pharmacogenomics Pharmacist

## 2024-02-14 ENCOUNTER — HOSPITAL ENCOUNTER (OUTPATIENT)
Dept: RADIOLOGY | Facility: HOSPITAL | Age: 44
Discharge: HOME | End: 2024-02-14
Attending: INTERNAL MEDICINE
Payer: COMMERCIAL

## 2024-02-14 ENCOUNTER — TELEPHONE (OUTPATIENT)
Dept: HEMATOLOGY/ONCOLOGY | Facility: CLINIC | Age: 44
End: 2024-02-14
Payer: COMMERCIAL

## 2024-02-14 DIAGNOSIS — Z17.1 MALIGNANT NEOPLASM OF UPPER-OUTER QUADRANT OF LEFT BREAST IN FEMALE, ESTROGEN RECEPTOR NEGATIVE: ICD-10-CM

## 2024-02-14 DIAGNOSIS — C50.412 MALIGNANT NEOPLASM OF UPPER-OUTER QUADRANT OF LEFT BREAST IN FEMALE, ESTROGEN RECEPTOR NEGATIVE: ICD-10-CM

## 2024-02-14 DIAGNOSIS — R97.8 ELEVATED CA 19-9 LEVEL: ICD-10-CM

## 2024-02-14 PROCEDURE — 25500020 PHARM REV CODE 255: Performed by: INTERNAL MEDICINE

## 2024-02-14 PROCEDURE — 74183 MRI ABD W/O CNTR FLWD CNTR: CPT | Mod: TC

## 2024-02-14 PROCEDURE — 74183 MRI ABD W/O CNTR FLWD CNTR: CPT | Mod: 26,,, | Performed by: RADIOLOGY

## 2024-02-14 PROCEDURE — A9585 GADOBUTROL INJECTION: HCPCS | Performed by: INTERNAL MEDICINE

## 2024-02-14 RX ORDER — GADOBUTROL 604.72 MG/ML
10 INJECTION INTRAVENOUS
Status: COMPLETED | OUTPATIENT
Start: 2024-02-14 | End: 2024-02-14

## 2024-02-14 RX ADMIN — GADOBUTROL 10 ML: 604.72 INJECTION INTRAVENOUS at 03:02

## 2024-02-15 ENCOUNTER — OFFICE VISIT (OUTPATIENT)
Dept: OTOLARYNGOLOGY | Facility: CLINIC | Age: 44
End: 2024-02-15
Payer: COMMERCIAL

## 2024-02-15 ENCOUNTER — PATIENT MESSAGE (OUTPATIENT)
Dept: HEMATOLOGY/ONCOLOGY | Facility: CLINIC | Age: 44
End: 2024-02-15
Payer: COMMERCIAL

## 2024-02-15 VITALS — WEIGHT: 124.31 LBS | BODY MASS INDEX: 19.51 KG/M2 | HEIGHT: 67 IN

## 2024-02-15 DIAGNOSIS — H65.01 NON-RECURRENT ACUTE SEROUS OTITIS MEDIA OF RIGHT EAR: Primary | ICD-10-CM

## 2024-02-15 PROCEDURE — 3044F HG A1C LEVEL LT 7.0%: CPT | Mod: S$GLB,,, | Performed by: OTOLARYNGOLOGY

## 2024-02-15 PROCEDURE — 99203 OFFICE O/P NEW LOW 30 MIN: CPT | Mod: S$GLB,,, | Performed by: OTOLARYNGOLOGY

## 2024-02-15 PROCEDURE — 1160F RVW MEDS BY RX/DR IN RCRD: CPT | Mod: S$GLB,,, | Performed by: OTOLARYNGOLOGY

## 2024-02-15 PROCEDURE — 99999 PR PBB SHADOW E&M-EST. PATIENT-LVL III: CPT | Mod: PBBFAC,,, | Performed by: OTOLARYNGOLOGY

## 2024-02-15 PROCEDURE — 1159F MED LIST DOCD IN RCRD: CPT | Mod: S$GLB,,, | Performed by: OTOLARYNGOLOGY

## 2024-02-15 PROCEDURE — 3008F BODY MASS INDEX DOCD: CPT | Mod: S$GLB,,, | Performed by: OTOLARYNGOLOGY

## 2024-02-15 RX ORDER — FLUTICASONE PROPIONATE 50 MCG
2 SPRAY, SUSPENSION (ML) NASAL NIGHTLY
Qty: 15.8 ML | Refills: 11 | Status: SHIPPED | OUTPATIENT
Start: 2024-02-15 | End: 2025-02-14

## 2024-02-15 RX ORDER — PSEUDOEPHEDRINE HCL 120 MG/1
120 TABLET, FILM COATED, EXTENDED RELEASE ORAL EVERY MORNING
Qty: 30 TABLET | Refills: 0 | Status: SHIPPED | OUTPATIENT
Start: 2024-02-15 | End: 2024-03-16

## 2024-02-15 NOTE — PROGRESS NOTES
Subjective:       Patient ID: Alison Swann is a 43 y.o. female.    Chief Complaint: Other (Pt c/o fluid in right ear and trouble hearing. Pt was seen at urgent care a week ago but pt has being have trouble with ears for two weeks. )      Alison's generally healthy 43-year-old who had a pretty rough upper respiratory infection of some sort that lead to a cough that lasted almost a month she tells me that is 95% better but her right ear is stuffy.  She recently went on vacation and did some snorkeling on the surface and has a long plane flight in that ear has never really hurt it did not bother her on the plane flight but she has a reduced hearing on the right.  She was given antibiotics about a week ago and she has not completed those yet.          Objective:      ENT Physical Exam    Her nasal exam looks good and she denies that she would much in the way of sinus or nasal complaints through any of this month long coughing illness     Her left ear looks fine     Her right ear has a bubbly clear effusion there was no redness at all and the middle ear is almost a foamy soaked bubble type appearance with as much air is liquid at least.  She was not able to do the modified Valsalva maneuver and does not seem to be particularly familiar with that maneuver.        Assessment:       1. Non-recurrent acute serous otitis media of right ear         Plan:          So she has a right bubbly serous effusion I have prescribed decongestants and Flonase described and demonstrated the modified Valsalva maneuver and if this has not cleared and a week or two I have asked her to contact me but I suspect it will

## 2024-02-19 ENCOUNTER — PATIENT MESSAGE (OUTPATIENT)
Dept: PSYCHIATRY | Facility: CLINIC | Age: 44
End: 2024-02-19
Payer: COMMERCIAL

## 2024-02-19 DIAGNOSIS — F90.0 ADHD (ATTENTION DEFICIT HYPERACTIVITY DISORDER), INATTENTIVE TYPE: ICD-10-CM

## 2024-02-19 RX ORDER — DEXTROAMPHETAMINE SACCHARATE, AMPHETAMINE ASPARTATE, DEXTROAMPHETAMINE SULFATE AND AMPHETAMINE SULFATE 2.5; 2.5; 2.5; 2.5 MG/1; MG/1; MG/1; MG/1
10 TABLET ORAL 2 TIMES DAILY
Qty: 60 TABLET | Refills: 0 | Status: SHIPPED | OUTPATIENT
Start: 2024-02-19 | End: 2024-03-20 | Stop reason: SDUPTHER

## 2024-02-20 ENCOUNTER — PATIENT MESSAGE (OUTPATIENT)
Dept: PSYCHIATRY | Facility: CLINIC | Age: 44
End: 2024-02-20
Payer: COMMERCIAL

## 2024-02-25 ENCOUNTER — PATIENT MESSAGE (OUTPATIENT)
Dept: OBSTETRICS AND GYNECOLOGY | Facility: CLINIC | Age: 44
End: 2024-02-25
Payer: COMMERCIAL

## 2024-03-03 DIAGNOSIS — E27.49 SECONDARY ADRENAL INSUFFICIENCY: ICD-10-CM

## 2024-03-04 RX ORDER — HYDROCORTISONE 10 MG/1
10 TABLET ORAL DAILY
Qty: 90 TABLET | Refills: 3 | Status: SHIPPED | OUTPATIENT
Start: 2024-03-04

## 2024-03-12 ENCOUNTER — OFFICE VISIT (OUTPATIENT)
Dept: NEUROLOGY | Facility: CLINIC | Age: 44
End: 2024-03-12
Payer: COMMERCIAL

## 2024-03-12 DIAGNOSIS — R41.9 COGNITIVE COMPLAINTS: Primary | ICD-10-CM

## 2024-03-12 PROBLEM — R93.89 ABNORMAL MRI: Status: RESOLVED | Noted: 2023-09-21 | Resolved: 2024-03-12

## 2024-03-12 PROCEDURE — 3044F HG A1C LEVEL LT 7.0%: CPT | Mod: CPTII,95,, | Performed by: NURSE PRACTITIONER

## 2024-03-12 PROCEDURE — 99214 OFFICE O/P EST MOD 30 MIN: CPT | Mod: 95,,, | Performed by: NURSE PRACTITIONER

## 2024-03-12 NOTE — PROGRESS NOTES
NEUROLOGY  Outpatient Visit     [facility]  [address], [place] [address]  [phone] (office) / [phone] (fax)    Patient Name:  Alison Swann  :  1980  MR #:  [id number]  Acct #:  [id number]    Date of  Visit: 2024    Other Physicians:  Colt Muir MD (Primary Care Physician)      CHIEF COMPLAINT: No chief complaint on file.    The patient location is: [place]  The chief complaint leading to consultation is: result review    Visit type: audiovisual    Each patient to whom he or she provides medical services by telemedicine is:  (1) informed of the relationship between the physician and patient and the respective role of any other health care provider with respect to management of the patient; and (2) notified that he or she may decline to receive medical services by telemedicine and may withdraw from such care at any time.      Interval history:  3/12/24:  Alison Swann is a 43 y.o. R-handed female seen in f/u for memory loss     Medical history is significant for ADHD, anxiety, L breast cancer s/p lumpectomy, anemia, scoliosis, adrenal insufficiency, insomnia    NP testing done in January, normal in the context of premorbid ability.     Still doesn't feel like she is functioning in her normal capacity. Describes persistent executive dysfunction, ST memory loss. For example, yesterday needed to order 3 grocery items, but forgot 1 of them by the time she got upstairs to the computer.     Hands are feeling better.     Feels that mental health is stable, anxiety / depressive symptoms controlled.     Seeing functional med and taking several new supplements. Interested in the relationship that her adrenal insufficiency and hormonal changes may be impacting her cognition.       HISTORY OF PRESENT ILLNESS:  Alison Swann is a 43 y.o. R-handed female seen in consultation for memory loss per No ref. provider found    Medical history is significant for ADHD,  anxiety, L breast cancer s/p lumpectomy, anemia, scoliosis, adrenal insufficiency, insomnia    Notes memory change since chemotherapy treatment, but noted worsening over last 6 months. Can't recall chemo regimen, but knows that she was treated with Keytruda. All treatment stopped 7/2021 due to concern for toxicity. History is limited in that regard. Diagnosed with adrenal insufficiency afterwards.    Notes both short and long term issues, worse ST. Loosing train of thought in conversation. Forgets why she went into a room. Forgets dates. Significant executive dysfunction.     Also noted mood swings, depression, even had SI. Completed neuro feedback sessions with provider in [place], which helped mood symptoms greatly. Denies current SI or depression.     Has college education. 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 Having trouble keeping up with tasks, affecting her work and income. Also having difficulty in home life,  and kids notice. Independent with ADLs and iADLs. Having trouble using her air fryer, forgetting to press start. Paying bills twice at times.     Treated by PCP for ADHD, long standing diagnosis pre dating these issues. Takes Adderall 10 mg daily. Feels no longer helpful.     Used to sleep well. Now, taking trazodone for insomnia over the last year. Helpful. Was on Ativan in the past but became resistant.     No hallucinations, major personality change. No concern for sz.     Denies pertinent FH.     At most 5 drinks ETOH per week. No other drug use. Non smoker.     Allergies:  Review of patient's allergies indicates:   Allergen Reactions    Penicillins Hives     As a child        Current Medications:  Current Outpatient Medications   Medication Sig Dispense Refill    ALPRAZolam (XANAX) 0.5 MG tablet Take 1 tablet (0.5 mg total) by mouth 2 (two) times daily as needed for Anxiety. 60 tablet 0     dextroamphetamine-amphetamine (ADDERALL) 10 mg Tab Take 1 tablet (10 mg total) by mouth 2 (two) times daily. 60 tablet 0    diazePAM (VALIUM) 5 MG tablet Take 1 tablet (5 mg total) by mouth every 6 (six) hours as needed for Anxiety (take 1 prior to MRI). 2 tablet 0    fluticasone propionate (FLONASE) 50 mcg/actuation nasal spray 2 sprays (100 mcg total) by Each Nostril route every evening. 2 sprays into each nostril before bed every night 15.8 mL 11    hydrocortisone (CORTEF) 10 MG Tab Take 1 tablet (10 mg total) by mouth once daily. 90 tablet 3    hydrocortisone (CORTEF) 5 MG Tab Take 1 tablet every day by oral route in the evening.      LIDOCAINE VISCOUS 2 % solution SMARTSIG:sparingly Topical PRN      LORazepam (ATIVAN) 0.5 MG tablet Take 1 tablet (0.5 mg total) by mouth every 6 (six) hours as needed for Anxiety. 120 tablet 2    ondansetron (ZOFRAN ODT ORAL)       ondansetron (ZOFRAN-ODT) 8 MG TbDL DISSOLVE 1 TABLET ON THE TONGUE EVERY 6 TO 8 HOURS AS NEEDED FOR NAUSEA 60 tablet 1    prasterone, dhea, (INTRAROSA) 6.5 mg Inst Place 6.5 mg vaginally nightly. 28 each 11    promethazine (PHENERGAN) 25 MG tablet Take 1 tablet (25 mg total) by mouth every 6 (six) hours as needed for Nausea. 30 tablet 0    propranoloL (INDERAL) 10 MG tablet Take 1 tablet (10 mg total) by mouth 2 (two) times daily as needed (anxiety). 60 tablet 1    pseudoephedrine (SUDAFED) 120 mg 12 hr tablet Take 1 tablet (120 mg total) by mouth every morning. 30 tablet 0    traZODone (DESYREL) 100 MG tablet Take 1 tablet (100 mg total) by mouth nightly as needed for Insomnia. 90 tablet 1    valACYclovir (VALTREX) 500 MG tablet Take 1 tablet (500 mg total) by mouth 2 (two) times daily. 10 tablet 2    valACYclovir (VALTREX) 500 MG tablet as needed.       No current facility-administered medications for this visit.     Facility-Administered Medications Ordered in Other Visits   Medication Dose Route Frequency Provider Last Rate Last Admin    lactated  ringers infusion   Intravenous Continuous Melissa, Leon HENDRIX MD   Stopped at 21 1345    sodium chloride 0.9% flush 10 mL  10 mL Intravenous PRN Sandra Sotelo MD   10 mL at 08/15/22 1330       Past Medical History:  Past Medical History:   Diagnosis Date    Adrenal insufficiency     Attention Deficit Hyperactivity Disorder     Family H/O Diabetes Mellitus     Her Father    Generalized Anxiety     Left Breast Cancer S.P Lumpectomy In 2021     Dr. Dominga Johnston; Dr. Sandra Sotelo (Heme/Onc); 21 On CMT; Is Estrogen Receptor Negative; She Is BRCA2 Gene Mutation Positive, Andf Her Mother Also With A H/O Breast Cancer    Macrocytic Anemia     Associated With Her Chemotherapy    Postoperative Nausea And Vomiting     Scopolamine Patches Work Well For Her For This    Scoliosis     Therapeutic Drug Monitoring     Wellness Visit 2021        Past Surgical History:  Past Surgical History:   Procedure Laterality Date    AUGMENTATION OF BREAST  2012    BREAST MASS EXCISION Left 2021    Procedure: EXCISION, MASS, BREAST- 2 oclock left breast with ultrasound guidance;  Surgeon: Rashmi Johnston MD;  Location: [facility] OR;  Service: General;  Laterality: Left;    BREAST SURGERY       SECTION      COLONOSCOPY N/A 2021    Procedure: COLONOSCOPY;  Surgeon: Mason Quintero MD;  [facility]  Service: Endoscopy;  Laterality: N/A;    ESOPHAGOGASTRODUODENOSCOPY N/A 2021    Procedure: EGD (ESOPHAGOGASTRODUODENOSCOPY);  Surgeon: Mason Quintero MD;  [facility]  Service: Endoscopy;  Laterality: N/A;    HYSTERECTOMY      INSERTION OF TUNNELED CENTRAL VENOUS CATHETER (CVC) WITH SUBCUTANEOUS PORT N/A 2021    Procedure: [id number]-CATH;  Surgeon: Griffin Becker MD;  Location: [facility] OR;  Service: General;  Laterality: N/A;    LASIK Bilateral     2001    OOPHORECTOMY      ROBOT-ASSISTED LAPAROSCOPIC ABDOMINAL HYSTERECTOMY USING DA NATALIIA XI N/A  03/04/2022    Procedure: XI ROBOTIC HYSTERECTOMY;  Surgeon: Carmella Galvez MD;  Location: [facility] OR;  Service: OB/GYN;  Laterality: N/A;    ROBOT-ASSISTED LAPAROSCOPIC SALPINGO-OOPHORECTOMY USING DA NATALIIA XI Bilateral 03/04/2022    Procedure: XI ROBOTIC SALPINGO-OOPHORECTOMY;  Surgeon: Carmella Galvez MD;  Location: [facility] OR;  Service: OB/GYN;  Laterality: Bilateral;    SENTINEL LYMPH NODE BIOPSY Left 01/19/2021    Procedure: CORE BIOPSY, LYMPH NODE, SENTINEL;  Surgeon: Rashmi Johnston MD;  Location: [facility] OR;  Service: General;  Laterality: Left;    TUBAL LIGATION         Family History:  family history includes Anemia in her father; Breast cancer (age of onset: 52) in her mother; COPD in her maternal grandmother; Cancer in an other family member; Depression in her mother; Diabetes in her father; Hyperlipidemia in her father; Pancreatic cancer in an other family member; Stroke in her father.    Social History:   reports that she has never smoked. She has never used smokeless tobacco. She reports current alcohol use. She reports that she does not use drugs.      REVIEW OF SYSTEMS:  As per HPI    PHYSICAL EXAM:  LMP  (LMP Unknown) Comment: hysterectomy 3/4/22    General: Well groomed. No acute distress.  Pulmonary: Normal effort and rate.     Neurological exam:  Mental status: Awake and alert.  Oriented to person, place, time and situation. Fund of knowledge normal.  Speech/Language: Fluent and appropriate. No dysarthria or aphasia on conversation.   Cranial nerves (II-XII): Face symmetric.   Motor: Moves BUE fluidly during exam.   Sensation: intact to LT reported  DTR: LARRY  Coordination: No tremor or ataxia noted  Gait: LARRY    DIAGNOSTIC DATA:  I have personally reviewed provider notes, labs and imaging made available to me today.     Imaging:  MRI brain w wo 10/23/23:  Impression:     Unremarkable MRI brain.  Prior posterior fossa questioned abnormality likely artifactual on prior exam.  No evidence for  intracranial metastatic disease.     MRI brain w wo 8/28/23:  Impression:     1. Somewhat limited evaluation as discussed above related to artifact emanating from the patient's face, possibly braces.  Also, there is some pulsation artifact through the posterior fossa.  There is, however, subtle abnormal signal in the lateral right cerebellum and suspected minimal leptomeningeal enhancement in the posterior fossa.  Findings may reflect early leptomeningeal disease/carcinomatosis.  CSF analysis may be warranted.  Alternatively, short-term follow-up MRI with out and with contrast may obtained is well to further evaluate suspected findings in the posterior fossa.    MRI pituitary w wo 5/2022:  FINDINGS:  Intracranial compartment:     There is no acute intracranial abnormality.  Brain volume, ventricular size and position are normal.  There is no hemorrhage or mass/mass effect.  There are no definite regions of abnormal signal intensity in the brain.  There are no regions of restricted diffusion to suggest acute infarction.  There is no pathologic enhancement.  The basilar cisterns are open.  There is no abnormal extra-axial fluid collection.  Flow voids indicating patency are present in the major vessels at the base of the brain.  The cerebellar tonsils are just at the level of the foramen magnum in normal position.  The orbits are normal.     The pituitary gland is normal and homogeneous in signal intensity and enhancement without relative region of decreased enhancement to suggest site of possible adenoma.  The suprasellar cistern is normal.  The infundibulum is normal in size and position.  The optic chiasm is normal.  The cavernous sinuses are normal.  There is a normal posterior pituitary bright spot.     Skull/extracranial contents: Marrow signal intensity in the clivus and calvarium is grossly normal.  There is trace mucosal thickening in the posterior ethmoid air cells.  Otherwise, the included paranasal sinuses  "and mastoid air cells are clear.  The included facial soft tissues and scalp are normal.     Impression:  1.  Normal imaging of the brain.  There is no acute abnormality.  There is no hemorrhage, mass/mass effect, acute infarction.  There is no pathologic enhancement.  No pituitary mass is identified     NP testing 1/2024:  "Neuropsychological assessment results were interpreted in the context of average premorbid ability. Ms. Swann' performance on testing was generally intact. Attention, working memory, processing speed, language, visuospatial skills, executive functioning, and learning and memory were all consistent with premorbid estimates. She demonstrated isolated difficulty with perseveration on a problem-solving task; it is not uncommon in the general population to have isolated test scores that are outside of expectation. On a self-report measure of personality and emotional function, she endorsed concern about her physical health and difficulty with memory and attention/concentration.      In sum, Ms. Swann' performance on this neuropsychological evaluation does not meet criteria for a neurocognitive disorder diagnosis. She had a diagnosis of breast cancer in January 2021 followed by chemotherapy and radiation treatments that resulted in a diagnosis of adrenal insufficiency (possibly chemotherapy induced), multiple surgeries, and other complications. During this time, Ms. Swann reported she did not take much time away from her work and career. Despite continued devotion to her work and personal life, she reported difficulty being able to keep up with her responsibilities and had to scale back tremendously on her day-to-day commitments. She acknowledged experiencing some anxiety and depression during the interview, of which she feels is well-controlled. She was followed by psycho-oncology but recently terminated in October 2023 due to her provider leaving. Taken together, the above factors (physical health, " "psychological distress, and pre-existing attention difficulties) are certainly impacting her experience of cognitive difficulty in daily life, despite intact performance on neuropsychological assessment. It is possible that with treatment of the above factors, she will experience a return to her cognitive baseline. Suspicion that Ms. Swann has an emerging neurodegenerative condition is very low."    Labs:  Lab Results   Component Value Date    WBC 7.73 02/06/2024    HGB 12.5 02/06/2024    HCT 38.7 02/06/2024     02/06/2024    MCV 93 02/06/2024    RDW 12.7 02/06/2024     Lab Results   Component Value Date     02/06/2024    K 3.8 02/06/2024     02/06/2024    CO2 28 02/06/2024    BUN 8 02/06/2024    CREATININE 0.59 02/06/2024    GLU 68 (L) 02/06/2024    CALCIUM 9.2 02/06/2024    MG 1.5 (L) 03/11/2022    PHOS 4.5 09/02/2021     Lab Results   Component Value Date    PROT 6.5 02/06/2024    ALBUMIN 4.0 02/06/2024    BILITOT 0.3 02/06/2024    AST 25 02/06/2024    ALKPHOS 93 02/06/2024    ALT 19 02/06/2024     Lab Results   Component Value Date    INR 1.2 03/12/2022     Lab Results   Component Value Date    CHOL 154 06/14/2023    HDL 60 06/14/2023    LDLCALC 75.4 06/14/2023    TRIG 93 06/14/2023    CHOLHDL 39.0 06/14/2023     Lab Results   Component Value Date    HGBA1C 5.2 02/06/2024      Lab Results   Component Value Date    ZMJGVLAP26 1189 (H) 08/30/2023     Lab Results   Component Value Date    FOLATE 9.1 10/16/2023     Lab Results   Component Value Date    TSH 0.594 02/06/2024     Component      Latest Ref Rng 10/16/2023   Methlymalonic Acid      0 - 378 nmol/L 170    MMA Disclaimer SEE SCANNED REPORT    RPR      Non-reactive  Non-reactive    CRP      0.0 - 8.2 mg/L 1.7    Sed Rate      0 - 20 mm/Hr 5    HIV Screen 4th Generation wRfx      Non Reactive  Non Reactive    Thiamine      66.5 - 200.0 nmol/L 130.1        ASSESSMENT & PLAN:  Alison Swann is a 43 y.o. R-handed female seen in f/u for " memory loss.     Problem List Items Addressed This Visit          Neuro    Cognitive complaints - Primary    Overview     Onset 2021 post chemo/immunotherapy tx, worse over time     MMSE:  28/30 Aug 2023    NP testing 1/2024 - normal performance, c/w premorbid estimates         Current Assessment & Plan     Diagnostic testing to date reviewed with pt  - no e/o metastatic CNS involvement, normal serologies, NP testing as above   Discussed potential coping mechanisms with regards to perceived deficits              Follow up: PRN    I spent a total of 30 minutes on the day of the visit.    This includes face to face time with the patient, as well as non-face to face time preparing for and completing the visit (review of prior diagnostic testing and clinical notes, obtaining or reviewing history, documenting clinical information in the EMR, independently interpreting and communicating results to the patient/family and coordinating ongoing care).       I appreciate the opportunity to participate in the care of this patient. Please feel free to contact me with any concerns or questions.       Candis Rodriguez, ACNPC-AG  [facility]  [address]  [place] [address]

## 2024-03-12 NOTE — ASSESSMENT & PLAN NOTE
Diagnostic testing to date reviewed with pt  - no e/o metastatic CNS involvement, normal serologies, NP testing as above   Discussed potential coping mechanisms with regards to perceived deficits

## 2024-03-18 ENCOUNTER — PATIENT MESSAGE (OUTPATIENT)
Dept: ENDOCRINOLOGY | Facility: CLINIC | Age: 44
End: 2024-03-18
Payer: COMMERCIAL

## 2024-03-18 ENCOUNTER — PATIENT MESSAGE (OUTPATIENT)
Dept: HEMATOLOGY/ONCOLOGY | Facility: CLINIC | Age: 44
End: 2024-03-18
Payer: COMMERCIAL

## 2024-03-20 ENCOUNTER — INFUSION (OUTPATIENT)
Dept: INFUSION THERAPY | Facility: HOSPITAL | Age: 44
End: 2024-03-20
Payer: COMMERCIAL

## 2024-03-20 ENCOUNTER — PATIENT MESSAGE (OUTPATIENT)
Dept: OPHTHALMOLOGY | Facility: CLINIC | Age: 44
End: 2024-03-20
Payer: COMMERCIAL

## 2024-03-20 VITALS
DIASTOLIC BLOOD PRESSURE: 62 MMHG | WEIGHT: 125.44 LBS | TEMPERATURE: 98 F | OXYGEN SATURATION: 100 % | SYSTOLIC BLOOD PRESSURE: 113 MMHG | BODY MASS INDEX: 19.69 KG/M2 | HEART RATE: 80 BPM | HEIGHT: 67 IN | RESPIRATION RATE: 18 BRPM

## 2024-03-20 DIAGNOSIS — F90.0 ADHD (ATTENTION DEFICIT HYPERACTIVITY DISORDER), INATTENTIVE TYPE: ICD-10-CM

## 2024-03-20 DIAGNOSIS — E86.0 DEHYDRATION: Primary | ICD-10-CM

## 2024-03-20 PROCEDURE — 96361 HYDRATE IV INFUSION ADD-ON: CPT | Mod: PN

## 2024-03-20 PROCEDURE — 96374 THER/PROPH/DIAG INJ IV PUSH: CPT | Mod: PN

## 2024-03-20 PROCEDURE — 63600175 PHARM REV CODE 636 W HCPCS: Mod: PN | Performed by: INTERNAL MEDICINE

## 2024-03-20 PROCEDURE — A4216 STERILE WATER/SALINE, 10 ML: HCPCS | Mod: PN | Performed by: INTERNAL MEDICINE

## 2024-03-20 PROCEDURE — 25000003 PHARM REV CODE 250: Mod: PN | Performed by: INTERNAL MEDICINE

## 2024-03-20 RX ORDER — HEPARIN 100 UNIT/ML
500 SYRINGE INTRAVENOUS
Status: CANCELLED | OUTPATIENT
Start: 2024-03-20

## 2024-03-20 RX ORDER — ONDANSETRON HYDROCHLORIDE 2 MG/ML
8 INJECTION, SOLUTION INTRAVENOUS ONCE
Status: COMPLETED | OUTPATIENT
Start: 2024-03-20 | End: 2024-03-20

## 2024-03-20 RX ORDER — SODIUM CHLORIDE 0.9 % (FLUSH) 0.9 %
10 SYRINGE (ML) INJECTION
Status: CANCELLED | OUTPATIENT
Start: 2024-03-20

## 2024-03-20 RX ORDER — HEPARIN 100 UNIT/ML
500 SYRINGE INTRAVENOUS
Status: DISCONTINUED | OUTPATIENT
Start: 2024-03-20 | End: 2024-03-20 | Stop reason: HOSPADM

## 2024-03-20 RX ORDER — SODIUM CHLORIDE 0.9 % (FLUSH) 0.9 %
10 SYRINGE (ML) INJECTION
Status: DISCONTINUED | OUTPATIENT
Start: 2024-03-20 | End: 2024-03-20 | Stop reason: HOSPADM

## 2024-03-20 RX ADMIN — ONDANSETRON 8 MG: 2 INJECTION INTRAMUSCULAR; INTRAVENOUS at 02:03

## 2024-03-20 RX ADMIN — SODIUM CHLORIDE 1000 ML: 9 INJECTION, SOLUTION INTRAVENOUS at 01:03

## 2024-03-20 RX ADMIN — Medication 10 ML: at 02:03

## 2024-03-20 NOTE — PLAN OF CARE
1500 Patient tolerated 1L NS given over 1 hour. Zofran IV was given for nausea. Port flushed with saline and deaccessed. Bandaid to port site. Instructed to call MD for any concerns. Patient ambulated out with no issues.

## 2024-03-20 NOTE — PLAN OF CARE
1400 Patient here for 1L NS. Labs, meds and hx reviewed. Patient complains of feeling weak and tired as well as nauseated. Left eye red but not painful, patient just concerned about. Explained that this can happen from coughing or vomiting. She stated she had vomited x5 today. Will reach out to MD for IV zofran. Port accessed and NS started. [place] and snack provided to patient.

## 2024-03-21 ENCOUNTER — TELEPHONE (OUTPATIENT)
Dept: HEMATOLOGY/ONCOLOGY | Facility: CLINIC | Age: 44
End: 2024-03-21
Payer: COMMERCIAL

## 2024-03-21 RX ORDER — DEXTROAMPHETAMINE SACCHARATE, AMPHETAMINE ASPARTATE, DEXTROAMPHETAMINE SULFATE AND AMPHETAMINE SULFATE 2.5; 2.5; 2.5; 2.5 MG/1; MG/1; MG/1; MG/1
10 TABLET ORAL 2 TIMES DAILY
Qty: 60 TABLET | Refills: 0 | Status: SHIPPED | OUTPATIENT
Start: 2024-03-21 | End: 2024-04-25 | Stop reason: SDUPTHER

## 2024-03-22 ENCOUNTER — OFFICE VISIT (OUTPATIENT)
Dept: ENDOCRINOLOGY | Facility: CLINIC | Age: 44
End: 2024-03-22
Payer: COMMERCIAL

## 2024-03-22 ENCOUNTER — PATIENT MESSAGE (OUTPATIENT)
Dept: ENDOCRINOLOGY | Facility: CLINIC | Age: 44
End: 2024-03-22
Payer: COMMERCIAL

## 2024-03-22 DIAGNOSIS — E27.49 SECONDARY ADRENAL INSUFFICIENCY: Primary | ICD-10-CM

## 2024-03-22 DIAGNOSIS — G47.00 INSOMNIA, UNSPECIFIED TYPE: ICD-10-CM

## 2024-03-22 DIAGNOSIS — C50.412 MALIGNANT NEOPLASM OF UPPER-OUTER QUADRANT OF LEFT BREAST IN FEMALE, ESTROGEN RECEPTOR NEGATIVE: ICD-10-CM

## 2024-03-22 DIAGNOSIS — M81.0 OSTEOPOROSIS, UNSPECIFIED OSTEOPOROSIS TYPE, UNSPECIFIED PATHOLOGICAL FRACTURE PRESENCE: ICD-10-CM

## 2024-03-22 DIAGNOSIS — Z17.1 MALIGNANT NEOPLASM OF UPPER-OUTER QUADRANT OF LEFT BREAST IN FEMALE, ESTROGEN RECEPTOR NEGATIVE: ICD-10-CM

## 2024-03-22 PROCEDURE — 3044F HG A1C LEVEL LT 7.0%: CPT | Mod: CPTII,95,, | Performed by: INTERNAL MEDICINE

## 2024-03-22 PROCEDURE — 1159F MED LIST DOCD IN RCRD: CPT | Mod: CPTII,95,, | Performed by: INTERNAL MEDICINE

## 2024-03-22 PROCEDURE — 1160F RVW MEDS BY RX/DR IN RCRD: CPT | Mod: CPTII,95,, | Performed by: INTERNAL MEDICINE

## 2024-03-22 PROCEDURE — 99214 OFFICE O/P EST MOD 30 MIN: CPT | Mod: 95,,, | Performed by: INTERNAL MEDICINE

## 2024-03-22 NOTE — PATIENT INSTRUCTIONS
Osteoporosis Treatment    Regardless if you are on a prescription medication for osteoporosis or osteopenia, one should still do all of the following. All of these things combined help to reduce your fracture risk. The goal of all these treatments is to reduce your risk of fracture.     Take Calcium and Vitamin D- It is important to get a minimum amount of 1200 mg of calcium daily. That can be in the diet or in the form of a supplement. Also a minimum of 800 IU of Vitamin D should be taken a day. Taking a prescription medication does not take the place of taking Calcium plus vitamin D. Some trials show a reduced fracture risk with taking calcium and vitamin D.   Weight bearing exercise- this is extremely important to reduce fracture risk. Trials have shown that weight bearing exercise can reduce the risk of a hip fracture. It may also help with your bone density. At minimum one should do 30 minutes of weight bearing exercise 3 times a week. Yoga and Bib Chi can often also help with balance.   Fall Precautions- the 1st goal in preventing a fracture is not falling. Always wear good shoes and avoid heals. Make sure you check your house to make sure there is nothing that could be a fall risk (janee, cords). Make sure if you get up at night that there is some lighting. Be mindful with pets as they can be common reasons for a fall. We often times feel safe in our own home, but that is a common area that one can have a fracture. If you usually use a walker or a cane, make sure you use it in the home as well.     Bone Density- once a prescription medication is started, we check your bone density every 2 years. It usually does not need to be checked more than that as your bone changes very slowly. Always keep in mind the goal of therapy is to reduce your fracture risk and not normalize your bone density. Sometimes you may see a slight improvement in your bone density with treatment or no change at all. That is ok. One of the  main reasons to do a bone density is to make sure there is not a decrease in your bone density    Drug Holidays- this does not apply to Prolia. We only do drug holidays for Fosamax, Reclast, Actonel and Boniva. Stopping or delaying Prolia can cause a rebound loss of bone density and in rare cases a compression fracture.     Risks of Medications for Osteoporosis  Most patients tolerate these medications without any problems. The following do not include all the side effects of these medications  Rarely patients can develop pains with these medications. They usually will go away. However, if they are severe you should let us know immediately  Osteonecrosis of the Jaw (ONJ)- this is a rare complication of many bone medications. It is diagnosed by a dentist or oral surgeon. If you develop pain in your jaw let us know and we have you see your dentist for an evaluation. Most cases are in patients with cancer who get much larger doses of these medications. The overall risk is 1 in 10,000 to 1 in 100,000 patient years. It is always important to get regular dental cleanings. If you are planning an invasive dental procedure we may ask that you complete that prior to starting treatment.   Atypical Femur Fractures- This is also a rare side effect of these medications. The risk increases the longer you are on these medications. This is one reason we sometimes do drug holidays. This can usually present with thigh or groin pain. The overall risk is 3.2 to 50 cases per 100,000 patient years

## 2024-03-22 NOTE — PROGRESS NOTES
The patient location is: [place]    Visit type: audiovisual    Face to Face time with patient: 18  20 minutes of total time spent on the encounter, which includes face to face time and non-face to face time preparing to see the patient (eg, review of tests), Obtaining and/or reviewing separately obtained history, Documenting clinical information in the electronic or other health record, Independently interpreting results (not separately reported) and communicating results to the patient/family/caregiver, or Care coordination (not separately reported).         Each patient to whom he or she provides medical services by telemedicine is:  (1) informed of the relationship between the physician and patient and the respective role of any other health care provider with respect to management of the patient; and (2) notified that he or she may decline to receive medical services by telemedicine and may withdraw from such care at any time.    Notes:      CHIEF COMPLAINT: Adrenal Insufficiency   44 y.o. old being seen as a f/u. Has BRCA2 mutation Breast CaOn prednisone 10 mg daily. Had robotic NICOLETTE on 3/4/22. On 3/11/22 went to ED at [facility]/V, weakness and CP. She was hypotensive in ED @ 75/38. Given VF and 125 mg Solucortef on 3/11/22. Cortisol of 19.2 done on 3/12. States started on steroids when got Chemo. Was on Keytruda. Last year was thought to have toxicity due to chemo approx July 2021.       Admitted Aug 13/21- was given 4 mg Dex IV on 8/14 and 8/15. Then converted to daily oral dex 4 mg daily. Then discharged on oral dex taper.     On 9/2/21 was given prednisone @ 60 mg daily and tapered over a month.       4/29/22 failed cosyntropin stim test. Now on Hydrocortisone 10 daily. Has difficulty tolerating higher doses.  Has completed final breast reconstruction procedure. States has had some nausea. Not lightheaded when standing. Has some constipation. Having to take zofran. Doing electrolyte drinks. Still having  insomnia but somewhat better.     She is doing weight bearing exercise. She is taking Ca +D. No Falls. No fractures. No kidney stones. Surgical menopause 3/2022.             PAST MEDICAL HISTORY/PAST SURGICAL HISTORY:  Reviewed in [facility]    SOCIAL HISTORY: Reviewed in [facility]    FAMILY HISTORY:  No known thyroid disease. Father with DM 2. No known adrenal disorders.     MEDICATIONS/ALLERGIES: The patient's MedCard has been updated and reviewed.        PE:      LABS/Radiology    DXA BONE DENSITY AXIAL SKELETON 1 OR MORE SITES     CLINICAL HISTORY:  Encounter for screening for osteoporosis     TECHNIQUE:  DXA scanning was performed over the left hip and lumbar spine.  Review of the images confirms satisfactory positioning and technique.     COMPARISON:  None     FINDINGS:  The L1 to L4 vertebral bone mineral density is equal to 0.676 g/cm squared with a T score of -3.4.     The left femoral neck bone mineral density is equal to 0.608 g/cm squared with a T score of -2.2.     The total hip bone mineral density is equal to 0.657 g/cm squared with a T score of -2.3.     There is a 5.8% risk of a major osteoporotic fracture and a 1.3% risk of hip fracture in the next 10 years (FRAX).     Impression:     Osteoporosis with A T-score of -3.4 if this patient is in fact hormonally postmenopausal.  Please clinically correlate      ASSESSMENT/PLAN:  1. Adrenal Insufficiency- appears secondary. Has had a normal MRI pituitary. Possibly chemo induce. No chemo since July 2021.  She is only on hydrocortisone 10 mg daily.  Has had difficulty tolerating higher doses in the past.  Currently with nausea and vomiting.  Unsure if had a gastroenteritis.  However, probably needs more steroids.  Ask patient to give stress dose steroids.  Would probably start out with 20 mg in the morning and 10 mg in the afternoon, which would be double of a normal dose of hydrocortisone.  Then would go to 10 mg in the morning and 5 mg in the early afternoon.    2.  Breast Ca- Was on keytruda.  Status post reconstructive breast surgery.    3. Insomnia- appears somewhat better.  Monitor for worsening insomnia with afternoon dosing of hydrocortisone.    4. Osteoporosis- recently had a bone density showing osteoporosis.  No fractures.  Discussed importance of taking calcium and vitamin-D as well as weight-bearing exercise.  Gave information in AVS.  I think she would benefit from a bisphosphonate.  She is postmenopausal after hysterectomy.  Will do a workup 1st to rule out any secondary causes.  If normal, will start Fosamax.  Reviewed how to take the medication.  Discussed side effects including osteonecrosis of the jaw and atypical femur fractures.  Gave information in AVS.    FOLLOWUP  Needs Renal Panel, PTH, 24 hr urine Ca/Cr- [facility]  F/U 6 months with CMP

## 2024-03-25 ENCOUNTER — OFFICE VISIT (OUTPATIENT)
Dept: RADIATION ONCOLOGY | Facility: CLINIC | Age: 44
End: 2024-03-25
Payer: COMMERCIAL

## 2024-03-25 ENCOUNTER — INFUSION (OUTPATIENT)
Dept: INFUSION THERAPY | Facility: HOSPITAL | Age: 44
End: 2024-03-25
Payer: COMMERCIAL

## 2024-03-25 VITALS
WEIGHT: 121.94 LBS | BODY MASS INDEX: 19.14 KG/M2 | HEIGHT: 67 IN | DIASTOLIC BLOOD PRESSURE: 59 MMHG | HEART RATE: 75 BPM | RESPIRATION RATE: 16 BRPM | TEMPERATURE: 98 F | SYSTOLIC BLOOD PRESSURE: 120 MMHG | OXYGEN SATURATION: 99 %

## 2024-03-25 VITALS
TEMPERATURE: 98 F | HEART RATE: 88 BPM | DIASTOLIC BLOOD PRESSURE: 73 MMHG | OXYGEN SATURATION: 99 % | WEIGHT: 121.94 LBS | HEIGHT: 67 IN | RESPIRATION RATE: 16 BRPM | SYSTOLIC BLOOD PRESSURE: 119 MMHG | BODY MASS INDEX: 19.14 KG/M2

## 2024-03-25 DIAGNOSIS — Z15.01 BRCA2 GENE MUTATION POSITIVE: Chronic | ICD-10-CM

## 2024-03-25 DIAGNOSIS — C50.412 MALIGNANT NEOPLASM OF UPPER-OUTER QUADRANT OF LEFT BREAST IN FEMALE, ESTROGEN RECEPTOR NEGATIVE: Primary | ICD-10-CM

## 2024-03-25 DIAGNOSIS — Z17.1 MALIGNANT NEOPLASM OF UPPER-OUTER QUADRANT OF LEFT BREAST IN FEMALE, ESTROGEN RECEPTOR NEGATIVE: ICD-10-CM

## 2024-03-25 DIAGNOSIS — C50.412 MALIGNANT NEOPLASM OF UPPER-OUTER QUADRANT OF LEFT BREAST IN FEMALE, ESTROGEN RECEPTOR NEGATIVE: ICD-10-CM

## 2024-03-25 DIAGNOSIS — Z17.1 MALIGNANT NEOPLASM OF UPPER-OUTER QUADRANT OF LEFT BREAST IN FEMALE, ESTROGEN RECEPTOR NEGATIVE: Primary | ICD-10-CM

## 2024-03-25 DIAGNOSIS — Z15.09 BRCA2 GENE MUTATION POSITIVE: Chronic | ICD-10-CM

## 2024-03-25 DIAGNOSIS — E86.0 DEHYDRATION: Primary | ICD-10-CM

## 2024-03-25 DIAGNOSIS — M81.0 OSTEOPOROSIS, UNSPECIFIED OSTEOPOROSIS TYPE, UNSPECIFIED PATHOLOGICAL FRACTURE PRESENCE: ICD-10-CM

## 2024-03-25 DIAGNOSIS — E27.49 SECONDARY ADRENAL INSUFFICIENCY: ICD-10-CM

## 2024-03-25 LAB
ALBUMIN SERPL BCP-MCNC: 3.9 G/DL (ref 3.5–5.2)
ANION GAP SERPL CALC-SCNC: 8 MMOL/L (ref 8–16)
BUN SERPL-MCNC: 12 MG/DL (ref 6–20)
CALCIUM SERPL-MCNC: 9.5 MG/DL (ref 8.7–10.5)
CHLORIDE SERPL-SCNC: 104 MMOL/L (ref 95–110)
CO2 SERPL-SCNC: 28 MMOL/L (ref 23–29)
CREAT SERPL-MCNC: 0.7 MG/DL (ref 0.5–1.4)
EST. GFR  (NO RACE VARIABLE): >60 ML/MIN/1.73 M^2
GLUCOSE SERPL-MCNC: 99 MG/DL (ref 70–110)
PHOSPHATE SERPL-MCNC: 3.5 MG/DL (ref 2.7–4.5)
POTASSIUM SERPL-SCNC: 3.8 MMOL/L (ref 3.5–5.1)
PTH-INTACT SERPL-MCNC: 44.8 PG/ML (ref 9–77)
SODIUM SERPL-SCNC: 140 MMOL/L (ref 136–145)

## 2024-03-25 PROCEDURE — 3044F HG A1C LEVEL LT 7.0%: CPT | Mod: CPTII,S$GLB,, | Performed by: RADIOLOGY

## 2024-03-25 PROCEDURE — 3008F BODY MASS INDEX DOCD: CPT | Mod: CPTII,S$GLB,, | Performed by: RADIOLOGY

## 2024-03-25 PROCEDURE — 99999 PR PBB SHADOW E&M-EST. PATIENT-LVL IV: CPT | Mod: PBBFAC,,, | Performed by: RADIOLOGY

## 2024-03-25 PROCEDURE — 80069 RENAL FUNCTION PANEL: CPT | Mod: PN | Performed by: INTERNAL MEDICINE

## 2024-03-25 PROCEDURE — 3074F SYST BP LT 130 MM HG: CPT | Mod: CPTII,S$GLB,, | Performed by: RADIOLOGY

## 2024-03-25 PROCEDURE — 25000003 PHARM REV CODE 250: Mod: PN | Performed by: INTERNAL MEDICINE

## 2024-03-25 PROCEDURE — 96360 HYDRATION IV INFUSION INIT: CPT | Mod: PN

## 2024-03-25 PROCEDURE — 99213 OFFICE O/P EST LOW 20 MIN: CPT | Mod: S$GLB,,, | Performed by: RADIOLOGY

## 2024-03-25 PROCEDURE — 3078F DIAST BP <80 MM HG: CPT | Mod: CPTII,S$GLB,, | Performed by: RADIOLOGY

## 2024-03-25 PROCEDURE — 1159F MED LIST DOCD IN RCRD: CPT | Mod: CPTII,S$GLB,, | Performed by: RADIOLOGY

## 2024-03-25 PROCEDURE — 83970 ASSAY OF PARATHORMONE: CPT | Performed by: INTERNAL MEDICINE

## 2024-03-25 RX ORDER — SODIUM CHLORIDE 9 MG/ML
1000 INJECTION, SOLUTION INTRAVENOUS
Status: DISCONTINUED | OUTPATIENT
Start: 2024-03-25 | End: 2024-03-25 | Stop reason: HOSPADM

## 2024-03-25 RX ADMIN — SODIUM CHLORIDE 1000 ML: 9 INJECTION, SOLUTION INTRAVENOUS at 03:03

## 2024-03-25 NOTE — PLAN OF CARE
Problem: Fatigue  Goal: Improved Activity Tolerance  Outcome: Ongoing, Progressing  Intervention: Promote Improved Energy  Flowsheets (Taken 3/25/2024 1516)  Fatigue Management: frequent rest breaks encouraged  Sleep/Rest Enhancement: regular sleep/rest pattern promoted  Activity Management: Ambulated -L4     Problem: Adult Inpatient Plan of Care  Goal: Patient-Specific Goal (Individualized)  Outcome: Ongoing, Progressing  Flowsheets (Taken 3/25/2024 1516)  Anxieties, Fears or Concerns: none  Individualized Care Needs: none

## 2024-03-25 NOTE — PLAN OF CARE
Problem: Adult Inpatient Plan of Care  Goal: Plan of Care Review  Outcome: Ongoing, Progressing  Flowsheets (Taken 3/25/2024 1605)  Plan of Care Reviewed With: patient   Pt tolerated labs through port and hydration infusion well.  No adverse reaction noted.   PAD flushed with NS and de-accessed per protocol.  Patient left clinic in no acute distress.

## 2024-03-25 NOTE — PROGRESS NOTES
[facility] Department of Radiation Oncology  Follow Up Visit Note    Diagnosis:  Alison Swann is a 44 y.o. female with a(n) IIIB, triple negative invasive ductal carcinoma fo the LEFT breast, status post neoadjuvant chemotherapy, bilateral mastectomy, and adjuvant radiation therapy. BRCA2+    Oncologic History:  Oncology History   Malignant neoplasm of upper-outer quadrant of left breast in female, estrogen receptor negative   1/19/2021 Cancer Staged    Staging form: Breast, AJCC 8th Edition  - Clinical stage from 1/19/2021: Stage IIIB (cT2, cN1, cM0, G3, ER-, PA-, HER2-)     1/28/2021 Initial Diagnosis    Malignant neoplasm of upper-outer quadrant of left breast in female, estrogen receptor negative     2/18/2021 - 2/18/2021 Chemotherapy    Treatment Summary   Plan Name: OP BREAST DOSE-DENSE AC - DOXORUBICIN CYCLOPHOSPHAMIDE Q2W  Treatment Goal: Curative  Status: Inactive  Start Date:   End Date:   Provider: Jhon Suazo MD  Chemotherapy: DOXOrubicin chemo injection 96 mg, 60 mg/m2, Intravenous, Clinic/HOD 1 time, 0 of 4 cycles  cyclophosphamide (CYTOXAN) 600 mg/m2 = 965 mg in sodium chloride 0.9% 250 mL chemo infusion, 600 mg/m2, Intravenous, Clinic/HOD 1 time, 0 of 4 cycles     2/25/2021 - 7/6/2021 Chemotherapy    Treatment Summary   Plan Name: OP BREAST PACLITAXEL WEEKLY + CARBOPLATIN (AUC 5) Q3W FOLLOWED BY AC WITH PEMBROLIZUMAB FOLLOWED BY PEMBROLIZUMAB   Treatment Goal: Curative  Status: Inactive  Start Date: 2/25/2021  End Date: 7/6/2021  Provider: Sandra Sotelo MD  Chemotherapy: DOXOrubicin chemo injection 112 mg, 60 mg/m2 = 112 mg (100 % of original dose 60 mg/m2), Intravenous, Once, 3 of 4 cycles  Dose modification: 60 mg/m2 (original dose 60 mg/m2, Cycle 5)  Administration: 112 mg (5/25/2021), 112 mg (6/15/2021), 110 mg (7/6/2021)  CARBOplatin (PARAPLATIN) 605 mg in sodium chloride 0.9% 250 mL chemo infusion, 605 mg (100 % of original dose 603.5 mg), Intravenous, Clinic/HOD 1  time, 4 of 4 cycles  Dose modification:   (original dose 603.5 mg, Cycle 1)  Administration: 605 mg (2/25/2021), 630 mg (3/22/2021), 750 mg (4/13/2021), 750 mg (5/4/2021)  cyclophosphamide 600 mg/m2 = 1,100 mg in sodium chloride 0.9% 100 mL chemo infusion, 600 mg/m2 = 1,100 mg (100 % of original dose 600 mg/m2), Intravenous, Clinic/HOD 1 time, 3 of 4 cycles  Dose modification: 600 mg/m2 (original dose 600 mg/m2, Cycle 5), 600 mg/m2 (Cycle 8)  Administration: 1,100 mg (5/25/2021), 1,100 mg (6/15/2021), 1,100 mg (7/6/2021)  PACLitaxeL (TAXOL) 80 mg/m2 = 132 mg in sodium chloride 0.9% 250 mL chemo infusion, 80 mg/m2 = 132 mg (100 % of original dose 80 mg/m2), Intravenous, Clinic/HOD 1 time, 4 of 4 cycles  Dose modification: 80 mg/m2 (original dose 80 mg/m2, Cycle 1)  Administration: 132 mg (2/25/2021), 132 mg (3/4/2021), 132 mg (3/11/2021), 138 mg (3/22/2021), 138 mg (3/30/2021), 138 mg (4/6/2021), 138 mg (4/13/2021), 138 mg (4/20/2021), 144 mg (4/27/2021), 144 mg (5/4/2021), 144 mg (5/11/2021), 144 mg (5/18/2021)  pembrolizumab (KEYTRUDA) 200 mg in sodium chloride 0.9% 100 mL chemo infusion, 200 mg, Intravenous, Clinic/HOD 1 time, 7 of 18 cycles  Administration: 200 mg (2/25/2021), 200 mg (5/25/2021), 200 mg (3/22/2021), 200 mg (4/13/2021), 200 mg (5/4/2021), 200 mg (6/15/2021), 200 mg (7/6/2021)     4/15/2021 - 4/15/2021 Chemotherapy    Treatment Summary   Plan Name: OP BREAST PEMBROLIZUMAB Q3W PACLITAXEL CARBOPLATIN WEEKLY FOLLOWED BY PEMBROLIZUMAB DOXORUBICIN CYCLOPHOSPHAMIDE Q3W   Treatment Goal: Curative  Status: Inactive  Start Date:   End Date:   Provider: Jhon Suazo MD  Chemotherapy: CARBOplatin (PARAPLATIN) in sodium chloride 0.9% 250 mL chemo infusion,  (original dose ), Intravenous, Clinic/HOD 1 time, 0 of 3 cycles  Dose modification:   (Cycle 1)  cyclophosphamide in sodium chloride 0.9% chemo infusion, 600 mg/m2 = 965 mg (original dose ), Intravenous, Clinic/HOD 1 time, 0 of 1 cycle  Dose modification:  600 mg/m2 (Cycle 2)  DOXOrubicin (ADRIAMYCIN) in sodium chloride 0.9% 250 mL chemo infusion, 60 mg/m2 (original dose ), Intravenous, Clinic/HOD 1 time, 0 of 1 cycle  Dose modification: 60 mg/m2 (Cycle 2)  PACLitaxeL (TAXOL) in sodium chloride 0.9% 100 mL chemo infusion, 80 mg/m2 (original dose ), Intravenous, Clinic/HOD 1 time, 0 of 1 cycle  Dose modification: 80 mg/m2 (Cycle 1)  PEMEtrexed (ALIMTA) in sodium chloride 0.9% chemo infusion, 500 mg/m2, Intravenous, Clinic/HOD 1 time, 0 of 33 cycles  pembrolizumab (KEYTRUDA) 200 mg in sodium chloride 0.9% 100 mL chemo infusion, 200 mg, Intravenous, Clinic/HOD 1 time, 0 of 35 cycles     11/19/2021 - 1/4/2022 Radiation Therapy    Treating physician: Maryann Perales  Total Dose: 50 Gy  Fractions: 25    DIAGNOSIS:  C50.412 - Malignant neoplasm of upper-outer quadrant of left female breast, Diagnosed 11/11/2021 (Active) Stage IIIB, T2, N1, M0, G3, HER2 Neg, ER Neg, TN Neg    This is a 41 y.o. y/o female with cT2,c N1 and M0 G3, (Stage IIIB), triple negative, Ki-67 79%, LEFT upper outer quadrant breast, BRCA2+, status post lumpectomy 1/19/21, with residual larisa disease, followed by  adjuvant chemo-immunotherapy with carbo-taxol and pembrolizumab, followed by AC with pembrolizumab, followed by adjuvant pemprolizumab complicated by ongoing, chronic colitis/diarrhea necessitating long-term prednisone.  Underwent bilateral mastectomy 9/29/21 with no residual carcinoma identified in breast of 14 sampled nodes (ypN0). She has completed adjuvant LEFT breast and regional larisa radiation therapy to a dose of 50Gy.    Treatment Summary  Course: C1 BREAST 2021    Treatment Site Ref. ID Energy Dose/Fx (Gy) #Fx Dose Correction (Gy) Total Dose (Gy) Start Date End Date Elapsed Days   3D Sclav_L rxsc 18X/6X 2 25 / 25 0 50 11/19/2021 1/4/2022 46   3D Breast L Breast_L 18X/6X 2 25 / 25 0 50 11/19/2021 1/4/2022 44     Tolerated well.  2 day treatment break at patient's request for patch of  dry desquamation, and 5 day treatment break due to CoVID infection.  Otherwise, patient completed her course of therapy as prescribed.       PLAN: RTC 1 month for routine follow up. Continue current skin care/sun protection for now.  Follow up with other providers as directed.          Interval History  The patient presents today for a regularly scheduled follow up visit.  She was last seen in our clinic on 5/8/23.   Since that time, she is maintained on hydrocortisone daily for immunotherapy-induced adrenal insufficiency.  Had surgery and revision (most recently 9/2023). Followed closely by Endocrinology- some ongoing nausea.  Follows with Neuro for possible memory loss.   Currently no cough/SOB.      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 Tressa Chiu, [place]).    10/25/22 bilateral ultrasound for irregular PE noted regions most consistent with fat necrosis.      Underwent surgical revision 11/1/2022, again in 3/2023; one more being considered.     Follows regularly with Dr. Sotelo and Dr. Mcconnell. Reports tightness/pain in the left axilla since she has started exercising again. Did not complete full course of PT due to significant difficulty in scheduling    She is status post robotic NICOLETTE-BSO 3/4/22 complicated by post-op sepsis. Also noted to have diffuse left lung inflammatory changes at the time of hospitalization.  Follow ujp CT chest 10/25/22 with interval clearing of pleural/parenchymal changes bilaterally except for some subpleural scarring in left anterior hemithorax likely 2/2 radiation therapy.      Review of Systems   Review of Systems   Constitutional: Negative.    HENT: Negative.     Eyes: Negative.    Respiratory: Negative.     Cardiovascular: Negative.   "  Gastrointestinal: Negative.    Genitourinary: Negative.    Musculoskeletal: Negative.    Skin: Negative.    Neurological: Negative.    Endo/Heme/Allergies: Negative.    Psychiatric/Behavioral: Negative.         Social History:  Social History     Tobacco Use    Smoking status: Never    Smokeless tobacco: Never   Substance Use Topics    Alcohol use: Yes     Comment: occasionally    Drug use: Never       Family History:  Cancer-related family history includes Breast cancer (age of onset: 52) in her mother; Cancer in an other family member; Pancreatic cancer in an other family member. There is no history of Esophageal cancer or Ovarian cancer.    Exam:  Vitals:    03/25/24 1301   BP: 119/73   Pulse: 88   Resp: 16   Temp: 98.2 °F (36.8 °C)   SpO2: 99%   Weight: 55.3 kg (121 lb 14.6 oz)   Height: 5' 7" (1.702 m)         Constitutional: Pleasant 44 y.o. female in no acute distress.  Well nourished. Well groomed.   Physical Exam  Vitals reviewed.   Constitutional:       General: She is not in acute distress.     Appearance: Normal appearance. She is not ill-appearing or toxic-appearing.   HENT:      Head: Normocephalic and atraumatic.      Nose: Nose normal.   Eyes:      Extraocular Movements: Extraocular movements intact.      Conjunctiva/sclera: Conjunctivae normal.      Pupils: Pupils are equal, round, and reactive to light.   Pulmonary:      Effort: Pulmonary effort is normal.   Chest:   Breasts:     Breasts are asymmetrical (minimal).       Musculoskeletal:         General: Normal range of motion.      Cervical back: Normal range of motion.   Lymphadenopathy:      Upper Body:      Right upper body: No supraclavicular, axillary or pectoral adenopathy.      Left upper body: No supraclavicular, axillary or pectoral adenopathy.   Skin:     General: Skin is warm.   Neurological:      General: No focal deficit present.      Mental Status: She is alert and oriented to person, place, and time.      Cranial Nerves: No " cranial nerve deficit.      Gait: Gait normal.   Psychiatric:         Mood and Affect: Mood normal.         Behavior: Behavior normal.         Thought Content: Thought content normal.         Judgment: Judgment normal.          Interval Imaging:  As above    Assessment:  No evidence of disease recurrence  ECOG: (0) Fully active, able to carry on all predisease performance without restriction    Plan:  Follow up in 5/2025 (alternate q6 months w/Dr Sotelo, sched in 11/2024)  Recommend annual ultrasound given extensive bilateral breast fat necrosis which could potentially mask early recurrence (had one in 9/2023 at [facility])   Bilateral ultrasound 9/2024  Follow up with Endocrinology as directed  Skincare/sun protection reviewed  She was given our contact information, and she was told that she could call our clinic at anytime if she has any questions or concerns.  Follow up with other providers as directed

## 2024-03-27 ENCOUNTER — TELEPHONE (OUTPATIENT)
Dept: FAMILY MEDICINE | Facility: CLINIC | Age: 44
End: 2024-03-27
Payer: COMMERCIAL

## 2024-03-27 NOTE — TELEPHONE ENCOUNTER
Returned call to patient. No answer. [facility]   24 urine cup available at  lab

## 2024-03-27 NOTE — TELEPHONE ENCOUNTER
----- Message from Cindy Chu sent at 3/27/2024 12:56 PM CDT -----  Type: Needs Medical Advice  Who Called:  patient  Best Call Back Number: [phone] (work)  Additional Information: patient trying to see if the [facility] lab has the 24 hour urine cup she can

## 2024-03-28 ENCOUNTER — PATIENT MESSAGE (OUTPATIENT)
Dept: PSYCHIATRY | Facility: CLINIC | Age: 44
End: 2024-03-28
Payer: COMMERCIAL

## 2024-03-28 ENCOUNTER — TELEPHONE (OUTPATIENT)
Dept: FAMILY MEDICINE | Facility: CLINIC | Age: 44
End: 2024-03-28
Payer: COMMERCIAL

## 2024-03-28 NOTE — TELEPHONE ENCOUNTER
"Spoke with pt. Pt states, " I paid someone to  a 24 urine cup from [facility] and they would not give it to the friend because I have not been seen in [facility]. My endo ordered this from [place] and I cannot drive over there to get the urine cup. I need yall to now  Mail to [phone] [place] [id number]" please review as I am unsure who friend spoke with for this  "

## 2024-03-28 NOTE — TELEPHONE ENCOUNTER
----- Message from Florencio Gustafson sent at 3/28/2024 10:30 AM CDT -----  Type: Needs Medical Advice  Who Called:  pt    Best Call Back Number: [phone]  Additional Information: pt stated she would like to be advised in regards to pts urine sample and the issues the lab caused with sample cup please call back to advise asap thanks!

## 2024-04-01 ENCOUNTER — OFFICE VISIT (OUTPATIENT)
Dept: PSYCHIATRY | Facility: CLINIC | Age: 44
End: 2024-04-01
Payer: COMMERCIAL

## 2024-04-01 DIAGNOSIS — F41.1 GAD (GENERALIZED ANXIETY DISORDER): Primary | ICD-10-CM

## 2024-04-01 DIAGNOSIS — F90.0 ADHD (ATTENTION DEFICIT HYPERACTIVITY DISORDER), INATTENTIVE TYPE: ICD-10-CM

## 2024-04-01 DIAGNOSIS — F33.0 MILD EPISODE OF RECURRENT MAJOR DEPRESSIVE DISORDER: ICD-10-CM

## 2024-04-01 DIAGNOSIS — F51.04 INSOMNIA, PSYCHOPHYSIOLOGICAL: ICD-10-CM

## 2024-04-01 PROCEDURE — 99214 OFFICE O/P EST MOD 30 MIN: CPT | Mod: 95,,,

## 2024-04-01 PROCEDURE — 1160F RVW MEDS BY RX/DR IN RCRD: CPT | Mod: CPTII,95,,

## 2024-04-01 PROCEDURE — 3044F HG A1C LEVEL LT 7.0%: CPT | Mod: CPTII,95,,

## 2024-04-01 PROCEDURE — 1159F MED LIST DOCD IN RCRD: CPT | Mod: CPTII,95,,

## 2024-04-01 NOTE — PROGRESS NOTES
Outpatient Psychiatry Follow-Up Visit    Clinical Status of Patient: Outpatient (Ambulatory)  04/01/2024    The patient location is:  [place], [place] (car)  The patient phone number is: [phone]   Visit type: Virtual visit with synchronous audio and video  Each patient to whom he or she provides medical services by telemedicine is:  (1) informed of the relationship between the physician and patient and the respective role of any other health care provider with respect to management of the patient; and (2) notified that he or she may decline to receive medical services by telemedicine and may withdraw from such care at any time.     Chief Complaint: Pt is a 44 y.o. female who presents today for a follow-up. Met with patient.       Interval History and Content of Current Session:  Interim Events/Subjective Report/Content of Current Session:  follow up appointment.    Pt is a 44 y.o. female with past psychiatric hx of KIANA (generalized anxiety disorder), MDD (major depressive disorder), recurrent episode, moderate, Insomnia, psychophysiological, Attention Deficit Hyperactivity Disorder who presents for follow up treatment.     Past Psychiatric hx:   Pt. is a 44 yo F with a past psychiatric hx of Depression, unspecified depression type, KIANA (generalized anxiety disorder), Insomnia, unspecified type presenting to the clinic for an initial evaluation and treatment. Past medical history outlined below. She is currently taking traZODone (DESYREL), dextroamphetamine-amphetamine (ADDERALL), prescribed and managed by Dr. Sotelo/Dr. Amado. She reports that these medications have not addressed her depression/anxiety.     Pt presents to OP Psychiatry for routine follow-up for treatment/medication management of KIANA, MDD, Insomnia, ADHD. Pt reports lower dose of Adderall is manageable although her symptoms are not adequately addressed. Would like to stay at the lower dose as she does not think the 10 mg will be available, and  "she states she spent an extended period of time calling pharmacies to find availability. Pt is frustrated about the amount of time she is spending on taking care of her physical health on top of her mental health, and it is exhausting. Pt reports sleep has improved, appetite has decreased and she struggles to "eat clean which I have done my whole life". Pt reports getting enough nutrition daily. Reports taking Ativan PRN (prescribed and managed by Dr. Plaza)in the past month for anxiety and it has helped. New Rx of Xanax PRN also prescribed and managed by Dr. Plaza. Reports no other medication changes at this time. Pt denies SI/HI/AVH, self-harm, plan, or intent. Pt verbalizes understanding of medication instructions through teach back. No other issues, concerns, or problems, will reassess symptoms and medications in 3 months or PRN if symptoms worsen.     Past Medical hx:   Past Medical History:   Diagnosis Date    Adrenal insufficiency 2022    Attention Deficit Hyperactivity Disorder     Family H/O Diabetes Mellitus     Her Father    Generalized Anxiety     Left Breast Cancer S.P Lumpectomy In 01/2021     Dr. Dominga Johnston; Dr. Sandra Sotelo (Heme/Onc); 4/28/21 On CMT; Is Estrogen Receptor Negative; She Is BRCA2 Gene Mutation Positive, Andf Her Mother Also With A H/O Breast Cancer    Macrocytic Anemia     Associated With Her Chemotherapy    Postoperative Nausea And Vomiting     Scopolamine Patches Work Well For Her For This    Scoliosis     Therapeutic Drug Monitoring     Wellness Visit 4/28/2021         Interim hx:  Medication changes last visit:     1.  Continue propranoloL (INDERAL) 10 MG tablet - Take 1 tablet (10 mg total) by mouth 2 (two) times daily as needed (anxiety). Pt to monitor blood pressure before taking.  2.  Continue traZODone (DESYREL) 100 MG tablet - Take 1 tablet (100 mg total) by mouth nightly as needed for Insomnia.  4.  Continue ALPRAZolam (XANAX) 0.5 MG tablet - Take 1 tablet (0.5 mg " total) by mouth 2 (two) times daily as needed for Anxiety (prescribed and managed by Dr. Plaza). Discussed risk of decreased RT, sedation, addictive potential, and not to mix with alcohol.   5.  D/t availability from shortage:  Start dextroamphetamine-amphetamine (ADDERALL) 10 mg Tab - Take 10 mg by mouth 2 (two) times a day for ADHD. Discussed stimulant side effects including effects on sleep, appetite, tics, nervousness, anorexia, insomnia, tachycardia, palpitations, dizziness, BP changes, HR changes, visual disturbance, and headaches.      Anxiety: Manageable  Depression: Manageable  Sleep: Good, no issues  Appetite: Same, no issues     Denies suicidal/homicidal ideations.  Denies hopelessness/worthlessness.    Denies auditory/visual hallucinations.      Tobacco:  denies  Alcohol:  occasional  Drug use:  denies  Caffeine:  2 cups a week      Review of Systems   PSYCHIATRIC: Pertinent items are noted in the narrative.        CONSTITUTIONAL: weight stable        M/S: no pain today         ENT: no allergies noted today        ABD: no n/v/d     Past Medical, Family and Social History: The patient's past medical, family and social history have been reviewed and updated as appropriate within the electronic medical record. See encounter notes.     Medication Compliance: yes      Side effects: tolerates     Risk Parameters:  Patient reports no suicidal ideation  Patient reports no homicidal ideation  Patient reports no self-injurious behavior  Patient reports no violent behavior     Exam (detailed: at least 9 elements; comprehensive: all 15 elements)   Constitutional  Vitals:  Most recent vital signs, dated less than 90 days prior to this appointment, were reviewed.     General:  unremarkable, age appropriate, casual attire, good eye contact, good rapport       Musculoskeletal  Muscle Strength/Tone:  no flaccidity, no tremor    Gait & Station:  normal      Psychiatric                       Speech:  normal tone, normal  rate, rhythm, and volume   Mood & Affect:   Euthymic, congruent, appropriate         Thought Process:   Goal directed; Linear    Associations:   intact   Thought Content:   No SI/HI, delusions, or paranoia, no AV/VH   Insight & Judgement:   Good, adequate to circumstances   Orientation:   grossly intact; alert and oriented x 4    Memory:  intact for content of interview    Language:  grossly intact, can repeat    Attention Span  : Grossly intact for content of interview   Fund of Knowledge:   intact and appropriate to age and level of education        Assessment and Diagnosis   Status/Progress: Based on the examination today, the patient's problem(s) is/are under fair control.  New problems have not been presented today. Comorbidities are not currently complicating management of the primary condition.      Impression:  Pt presents to OP Psychiatry for routine follow-up for treatment/medication management of KIANA, MDD, Insomnia, ADHD. Pt reports all medications are effectively treating symptoms, states she has been able to get her medications better from [facility]. Anxiety has been manageable, denies depression, sleep and appetite have been good w/o issues. Pt denies SI/HI/AVH, self-harm, plan, or intent. Pt verbalizes understanding of medication instructions through teach back. No other issues, concerns, or problems, will reassess symptoms and medications in 3 months or PRN if symptoms worsen.      Diagnosis:   - KIANA (generalized anxiety disorder)  - MDD (major depressive disorder), recurrent episode, moderate  - Insomnia, psychophysiological  - Attention Deficit Hyperactivity Disorder      Intervention/Counseling/Treatment Plan   Medication Management:      1.  Continue propranoloL (INDERAL) 10 MG tablet - Take 1 tablet (10 mg total) by mouth 2 (two) times daily as needed (anxiety). Pt to monitor blood pressure before taking.     2.  Continue traZODone (DESYREL) 100 MG tablet - Take 1 tablet (100 mg total) by  mouth nightly as needed for Insomnia.     3.  Continue dextroamphetamine-amphetamine (ADDERALL) 10 mg Tab - Take 10 mg by mouth 2 (two) times a day for ADHD. Discussed stimulant side effects including effects on sleep, appetite, tics, nervousness, anorexia, insomnia, tachycardia, palpitations, dizziness, BP changes, HR changes, visual disturbance, and headaches.      4.  Continue LORazepam (ATIVAN) 0.5 MG tablet - Take 1 tablet (0.5 mg total) by mouth every 6 (six) hours as needed for Anxiety (prescribed and managed by Dr. Plaza). Discussed risk of decreased RT, sedation, addictive potential, and not to mix with alcohol.      5.  Call to report any worsening of symptoms or problems with the medication. Pt instructed to go to ER with thoughts of harming self, others     6.  Patient given contact # for psychotherapists at [facility] and also instructed she may check with insurance for list of providers.          Labs: none         Return to clinic:    3 months or PRN if symptoms worsen      -Spent 30min face to face with the pt; >50% time spent in counseling   -Supportive therapy and psychoeducation provided  -R/B/SE's of medications discussed with the pt who expresses understanding and chooses to take medications as prescribed.   -Pt instructed to call clinic, 911 or go to nearest emergency room if sxs worsen or pt is in   crisis. The pt expresses understanding.      Mehnaz Gaitan NP  Psychiatric-Mental Health Nurse Practitioner  Department of Psychiatry    [facility] [place] [address] [place] [address]  Phone:  [phone]  Fax: [phone]

## 2024-04-02 ENCOUNTER — OFFICE VISIT (OUTPATIENT)
Dept: ORTHOPEDICS | Facility: CLINIC | Age: 44
End: 2024-04-02
Payer: COMMERCIAL

## 2024-04-02 ENCOUNTER — PATIENT MESSAGE (OUTPATIENT)
Dept: RADIATION ONCOLOGY | Facility: CLINIC | Age: 44
End: 2024-04-02
Payer: COMMERCIAL

## 2024-04-02 VITALS — WEIGHT: 121.94 LBS | RESPIRATION RATE: 20 BRPM | HEIGHT: 67 IN | BODY MASS INDEX: 19.14 KG/M2

## 2024-04-02 DIAGNOSIS — R26.89 DECREASED FUNCTIONAL MOBILITY: ICD-10-CM

## 2024-04-02 DIAGNOSIS — M75.42 CORACOID IMPINGEMENT OF LEFT SHOULDER: Primary | ICD-10-CM

## 2024-04-02 DIAGNOSIS — Z17.1 MALIGNANT NEOPLASM OF UPPER-OUTER QUADRANT OF LEFT BREAST IN FEMALE, ESTROGEN RECEPTOR NEGATIVE: ICD-10-CM

## 2024-04-02 DIAGNOSIS — G89.29 CHRONIC LEFT SHOULDER PAIN: ICD-10-CM

## 2024-04-02 DIAGNOSIS — M25.512 CHRONIC LEFT SHOULDER PAIN: ICD-10-CM

## 2024-04-02 DIAGNOSIS — C50.412 MALIGNANT NEOPLASM OF UPPER-OUTER QUADRANT OF LEFT BREAST IN FEMALE, ESTROGEN RECEPTOR NEGATIVE: ICD-10-CM

## 2024-04-02 PROCEDURE — 99999 PR PBB SHADOW E&M-EST. PATIENT-LVL III: CPT | Mod: PBBFAC,,, | Performed by: FAMILY MEDICINE

## 2024-04-02 PROCEDURE — 99205 OFFICE O/P NEW HI 60 MIN: CPT | Mod: 25,S$GLB,, | Performed by: FAMILY MEDICINE

## 2024-04-02 PROCEDURE — 1159F MED LIST DOCD IN RCRD: CPT | Mod: CPTII,S$GLB,, | Performed by: FAMILY MEDICINE

## 2024-04-02 PROCEDURE — 3008F BODY MASS INDEX DOCD: CPT | Mod: CPTII,S$GLB,, | Performed by: FAMILY MEDICINE

## 2024-04-02 PROCEDURE — 76882 US LMTD JT/FCL EVL NVASC XTR: CPT | Mod: S$GLB,,, | Performed by: FAMILY MEDICINE

## 2024-04-02 PROCEDURE — 3044F HG A1C LEVEL LT 7.0%: CPT | Mod: CPTII,S$GLB,, | Performed by: FAMILY MEDICINE

## 2024-04-02 NOTE — PROGRESS NOTES
Subjective     Patient ID: Alison Swann is a 44 y.o. female.    Chief Complaint: Pain of the Left Shoulder (Ultrasound please of Coracobrachialis Pec minor Short head bicep Something coming by the coracoid. To anterior humerus./)    44-year-old female here today with complaints of left shoulder tightness in the sensation of dysfunction.  Significant past hinged story of a double mastectomy done in 2021 do diagnosis of breast cancer.  She has also had chemotherapy.  She has had multiple surgeries on her chest area including at least two reconstructive surgeries and 12 lymph nodes removed on the left side..  Does not have breast implants.  She is done significant rehabilitation and physical therapy to try to regain full strength and range of motion in both of her shoulders however she does not feel like she has complete function her left shoulder.  She is lost some range of motion her left shoulder and she can feel a tightness with a certain pulling sensation with certain movements of the left shoulder.  All the dysfunction is felt in the anterior portion of her shoulder.  She has been going to physical therapy and the therapist told her it is in the area of the coracoid process.  She is tried multiple exercise modalities as well as stretching to relieve this but has not noticed any relief.  Requesting an ultrasound exam of the area to try to identify the dysfunctional part.    Pain  Pertinent negatives include no chest pain, chills, congestion, coughing, headaches, rash or sore throat.       Review of Systems   Constitutional: Negative for chills and decreased appetite.   HENT:  Negative for congestion and sore throat.    Eyes:  Negative for blurred vision.   Cardiovascular:  Negative for chest pain, dyspnea on exertion and palpitations.   Respiratory:  Negative for cough and shortness of breath.    Skin:  Negative for rash.   Neurological:  Negative for difficulty with concentration, disturbances in  coordination and headaches.   Psychiatric/Behavioral:  Negative for altered mental status, depression, hallucinations, memory loss and suicidal ideas.           Objective     General    Nursing note and vitals reviewed.  Constitutional: She is oriented to person, place, and time. She appears well-developed and well-nourished.   HENT:   Nose: Nose normal.   Eyes: EOM are normal. Pupils are equal, round, and reactive to light.   Neck: Neck supple.   Cardiovascular:  Normal rate.            Pulmonary/Chest: Effort normal.   Abdominal: Soft.   Neurological: She is alert and oriented to person, place, and time. She has normal reflexes.   Psychiatric: She has a normal mood and affect. Her behavior is normal. Judgment and thought content normal.         Right Shoulder Exam   Right shoulder exam is normal.    Inspection/Observation   Swelling: absent  Bruising: absent  Scars: absent  Deformity: absent  Scapular Winging: absent  Scapular Dyskinesia: negative    Range of Motion   Active abduction:  normal   Passive abduction:  normal   Extension:  normal   Forward Flexion:  normal   Forward Elevation: normal  Adduction: normal    Tests & Signs   Rg test: negative  Impingement: negative  Active Compression Test ([place]'s Sign): negative  Speed's Test: negative  Anterior Drawer Test: 0   Posterior Drawer Test: 0    Other   Sensation: normal    Left Shoulder Exam     Inspection/Observation   Swelling: absent  Bruising: absent  Scars: absent  Deformity: absent  Scapular Winging: absent  Scapular Dyskinesia: negative    Tenderness   Left shoulder tenderness location: coracoid.    Range of Motion   Active abduction:  150   Passive abduction:  160   Forward Flexion:  160     Tests & Signs   Rg test: negative  Impingement: positive  Lift Off Sign: negative  Belly Press: negative  Active Compression test ([place]'s Sign): negative  Yergasons's Test: negative  Speed's Test: negative  Anterior Drawer Test: 0  Posterior Drawer  Test: 0    Other   Sensation: normal       Muscle Strength   Right Upper Extremity   Shoulder Abduction: 5/5   Shoulder Internal Rotation: 5/5   Shoulder External Rotation: 5/5   Supraspinatus: 5/5   Subscapularis: 5/5   Biceps: 5/5   Left Upper Extremity  Shoulder Abduction: 5/5   Shoulder Internal Rotation: 5/5   Shoulder External Rotation: 5/5   Supraspinatus: 5/5   Subscapularis: 5/5   Biceps: 5/5     Vascular Exam     Right Pulses      Radial:                    2+      Left Pulses      Radial:                    2+      Physical Exam  Vitals and nursing note reviewed.   Constitutional:       Appearance: She is well-developed and well-nourished.   HENT:      Nose: Nose normal.   Eyes:      Extraocular Movements: EOM normal.      Pupils: Pupils are equal, round, and reactive to light.   Cardiovascular:      Rate and Rhythm: Normal rate.      Pulses:           Radial pulses are 2+ on the right side and 2+ on the left side.   Pulmonary:      Effort: Pulmonary effort is normal.   Abdominal:      Palpations: Abdomen is soft.   Musculoskeletal:      Right shoulder: No swelling or deformity.      Left shoulder: No swelling or deformity.      Cervical back: Normal range of motion and neck supple.   Neurological:      Mental Status: She is alert and oriented to person, place, and time.      Deep Tendon Reflexes: Reflexes are normal and symmetric.   Psychiatric:         Mood and Affect: Mood and affect normal.         Behavior: Behavior normal.         Thought Content: Thought content normal.         Judgment: Judgment normal.       I conducted a diagnostic ultrasound exam of her left shoulder region today with special emphasis on the coracoid process which is the area of her symptoms.  Early landmarks were identified including the AC joint, the biceps tendon in the bicipital groove, and the supraspinatus tendon without significant pathology.  There was some possible tendinosis in the supraspinatus tendon but this is an  incidental finding.  Identification of the coracoid process did not show any significant edema or swelling in the area.  Dynamic testing did not show any signs of impingement with short head of the biceps or subscapularis.  Dynamic testing in the area of the Will minor did show some possible dysfunction with an abnormal contraction with shoulder abduction.  Patient did note a reproduction of symptoms during this motion.  Images of these findings were saved and stored in uploaded to her chart.       Assessment and Plan     Encounter Diagnoses   Name Primary?    Chronic left shoulder pain     Malignant neoplasm of upper-outer quadrant of left breast in female, estrogen receptor negative     Decreased functional mobility     Coracoid impingement of left shoulder Yes         Alison was seen today for pain.    Diagnoses and all orders for this visit:    Coracoid impingement of left shoulder    Chronic left shoulder pain    Malignant neoplasm of upper-outer quadrant of left breast in female, estrogen receptor negative    Decreased functional mobility    I discussed all the findings on ultrasound exam with her in real time.  I did offer a guided cortisone injection into the subcoracoid space as a initial treatment option.  I did stress that this would likely be a temporary fixed given that there seems to be some muscular dysfunction.  She has not interested in that procedure.  We then discussed the possibility of PRP injection into the pack minor tendon over the coracoid.  She is interested in this as she has had CT F therapy for cosmetic reasons then has responded well to it.  We will go ahead and schedule her for that.  I gave her a handout on guidelines for PRP.  She requests the blood draw be made through her port that she has in place for chemotherapy that she had leave in.  States that she has had difficulty getting blood drawn ever since cancer treatment.  I do not see any issue with using her port for blood  draws on his his done with standard port protocol.    I spent a total of 60 minutes on the day of the visit.This includes face to face time and non-face to face time preparing to see the patient (eg, review of tests), obtaining and/or reviewing separately obtained history, documenting clinical information in the electronic or other health record, independently interpreting results and communicating results to the patient/family/caregiver, or care coordinator.

## 2024-04-03 ENCOUNTER — TELEPHONE (OUTPATIENT)
Dept: LAB | Facility: HOSPITAL | Age: 44
End: 2024-04-03
Payer: COMMERCIAL

## 2024-04-03 NOTE — TELEPHONE ENCOUNTER
Called pt to inquire the address in which she is picking up 24 hr urine container.  Pt phone went to , contact alternate contact () gave call back number to call for instructions.  VU        Pt returned call, I gave her the correct address, as well as educated her on the collection process for a 24 hr urine specimen. Royal GASPAR

## 2024-04-11 ENCOUNTER — LAB VISIT (OUTPATIENT)
Dept: LAB | Facility: HOSPITAL | Age: 44
End: 2024-04-11
Attending: INTERNAL MEDICINE
Payer: COMMERCIAL

## 2024-04-11 DIAGNOSIS — Z17.1 MALIGNANT NEOPLASM OF UPPER-OUTER QUADRANT OF LEFT BREAST IN FEMALE, ESTROGEN RECEPTOR NEGATIVE: ICD-10-CM

## 2024-04-11 DIAGNOSIS — M81.0 OSTEOPOROSIS, UNSPECIFIED OSTEOPOROSIS TYPE, UNSPECIFIED PATHOLOGICAL FRACTURE PRESENCE: ICD-10-CM

## 2024-04-11 DIAGNOSIS — C50.412 MALIGNANT NEOPLASM OF UPPER-OUTER QUADRANT OF LEFT BREAST IN FEMALE, ESTROGEN RECEPTOR NEGATIVE: ICD-10-CM

## 2024-04-11 DIAGNOSIS — E27.49 SECONDARY ADRENAL INSUFFICIENCY: ICD-10-CM

## 2024-04-11 LAB
CALCIUM 24H UR-MRATE: 7 MG/HR (ref 4–12)
CALCIUM UR-MCNC: 7.3 MG/DL (ref 0–15)
CALCIUM URINE (MG/SPEC): 179 MG/SPEC
CREAT 24H UR-MRATE: 27.8 MG/HR (ref 40–75)
CREAT UR-MCNC: 27.2 MG/DL (ref 15–325)
CREATININE, URINE (MG/SPEC): 666.4 MG/SPEC
URINE COLLECTION DURATION: 24 HR
URINE COLLECTION DURATION: 24 HR
URINE VOLUME: 2450 ML
URINE VOLUME: 2450 ML

## 2024-04-11 PROCEDURE — 82570 ASSAY OF URINE CREATININE: CPT | Performed by: INTERNAL MEDICINE

## 2024-04-11 PROCEDURE — 82340 ASSAY OF CALCIUM IN URINE: CPT | Performed by: INTERNAL MEDICINE

## 2024-04-12 ENCOUNTER — PATIENT MESSAGE (OUTPATIENT)
Dept: ENDOCRINOLOGY | Facility: CLINIC | Age: 44
End: 2024-04-12
Payer: COMMERCIAL

## 2024-04-25 DIAGNOSIS — F90.0 ADHD (ATTENTION DEFICIT HYPERACTIVITY DISORDER), INATTENTIVE TYPE: ICD-10-CM

## 2024-04-25 RX ORDER — DEXTROAMPHETAMINE SACCHARATE, AMPHETAMINE ASPARTATE, DEXTROAMPHETAMINE SULFATE AND AMPHETAMINE SULFATE 2.5; 2.5; 2.5; 2.5 MG/1; MG/1; MG/1; MG/1
10 TABLET ORAL 2 TIMES DAILY
Qty: 60 TABLET | Refills: 0 | Status: SHIPPED | OUTPATIENT
Start: 2024-04-25 | End: 2024-05-21 | Stop reason: SDUPTHER

## 2024-04-25 RX ORDER — DEXTROAMPHETAMINE SACCHARATE, AMPHETAMINE ASPARTATE, DEXTROAMPHETAMINE SULFATE AND AMPHETAMINE SULFATE 2.5; 2.5; 2.5; 2.5 MG/1; MG/1; MG/1; MG/1
10 TABLET ORAL 2 TIMES DAILY
Qty: 60 TABLET | Refills: 0 | Status: CANCELLED | OUTPATIENT
Start: 2024-04-25 | End: 2024-05-25

## 2024-04-26 DIAGNOSIS — F51.04 INSOMNIA, PSYCHOPHYSIOLOGICAL: ICD-10-CM

## 2024-04-26 RX ORDER — TRAZODONE HYDROCHLORIDE 100 MG/1
100 TABLET ORAL NIGHTLY PRN
Qty: 90 TABLET | Refills: 1 | Status: SHIPPED | OUTPATIENT
Start: 2024-04-26 | End: 2024-10-23

## 2024-04-30 ENCOUNTER — PATIENT MESSAGE (OUTPATIENT)
Dept: ENDOCRINOLOGY | Facility: CLINIC | Age: 44
End: 2024-04-30
Payer: COMMERCIAL

## 2024-05-05 DIAGNOSIS — Z17.1 MALIGNANT NEOPLASM OF UPPER-OUTER QUADRANT OF LEFT BREAST IN FEMALE, ESTROGEN RECEPTOR NEGATIVE: ICD-10-CM

## 2024-05-05 DIAGNOSIS — C50.412 MALIGNANT NEOPLASM OF UPPER-OUTER QUADRANT OF LEFT BREAST IN FEMALE, ESTROGEN RECEPTOR NEGATIVE: ICD-10-CM

## 2024-05-06 ENCOUNTER — PATIENT MESSAGE (OUTPATIENT)
Dept: PSYCHIATRY | Facility: CLINIC | Age: 44
End: 2024-05-06
Payer: COMMERCIAL

## 2024-05-06 RX ORDER — ONDANSETRON 8 MG/1
TABLET, ORALLY DISINTEGRATING ORAL
Qty: 60 TABLET | Refills: 1 | Status: SHIPPED | OUTPATIENT
Start: 2024-05-06

## 2024-05-21 DIAGNOSIS — F90.0 ADHD (ATTENTION DEFICIT HYPERACTIVITY DISORDER), INATTENTIVE TYPE: ICD-10-CM

## 2024-05-22 RX ORDER — DEXTROAMPHETAMINE SACCHARATE, AMPHETAMINE ASPARTATE, DEXTROAMPHETAMINE SULFATE AND AMPHETAMINE SULFATE 2.5; 2.5; 2.5; 2.5 MG/1; MG/1; MG/1; MG/1
10 TABLET ORAL 2 TIMES DAILY
Qty: 60 TABLET | Refills: 0 | Status: SHIPPED | OUTPATIENT
Start: 2024-05-22 | End: 2024-06-23

## 2024-05-30 ENCOUNTER — PATIENT MESSAGE (OUTPATIENT)
Dept: RADIATION ONCOLOGY | Facility: CLINIC | Age: 44
End: 2024-05-30
Payer: COMMERCIAL

## 2024-06-20 DIAGNOSIS — F90.0 ADHD (ATTENTION DEFICIT HYPERACTIVITY DISORDER), INATTENTIVE TYPE: ICD-10-CM

## 2024-06-20 RX ORDER — DEXTROAMPHETAMINE SACCHARATE, AMPHETAMINE ASPARTATE, DEXTROAMPHETAMINE SULFATE AND AMPHETAMINE SULFATE 2.5; 2.5; 2.5; 2.5 MG/1; MG/1; MG/1; MG/1
10 TABLET ORAL 2 TIMES DAILY
Qty: 60 TABLET | Refills: 0 | Status: SHIPPED | OUTPATIENT
Start: 2024-06-21 | End: 2024-07-21

## 2024-06-28 NOTE — PROGRESS NOTES
Outpatient Psychiatry Follow-Up Visit    Clinical Status of Patient: Outpatient (Ambulatory)  07/02/2024    The patient location is:  [place], [place]   The patient phone number is: [phone]   Visit type: Virtual visit with synchronous audio and video  Each patient to whom he or she provides medical services by telemedicine is:  (1) informed of the relationship between the physician and patient and the respective role of any other health care provider with respect to management of the patient; and (2) notified that he or she may decline to receive medical services by telemedicine and may withdraw from such care at any time.     Chief Complaint: Pt is a 44 y.o. female who presents today for a follow-up. Met with patient.       Interval History and Content of Current Session:  Interim Events/Subjective Report/Content of Current Session:  follow up appointment.    Pt is a 44 y.o. female with past psychiatric hx of KIANA (generalized anxiety disorder), MDD (major depressive disorder), recurrent episode, moderate, Insomnia, psychophysiological, Attention Deficit Hyperactivity Disorder who presents for follow up treatment.     Past Psychiatric hx:   Pt. is a 42 yo F with a past psychiatric hx of Depression, unspecified depression type, KIANA (generalized anxiety disorder), Insomnia, unspecified type presenting to the clinic for an initial evaluation and treatment. Past medical history outlined below. She is currently taking traZODone (DESYREL), dextroamphetamine-amphetamine (ADDERALL), prescribed and managed by Dr. Sotelo/Dr. Amado. She reports that these medications have not addressed her depression/anxiety.     4/1/24: Pt presents to OP Psychiatry for routine follow-up for treatment/medication management of KIANA, MDD, Insomnia, ADHD. Pt reports all medications are effectively treating symptoms, states she has been able to get her medications better from [facility]. Anxiety has been manageable, denies depression,  sleep and appetite have been good w/o issues. Pt denies SI/HI/AVH, self-harm, plan, or intent. Pt verbalizes understanding of medication instructions through teach back. No other issues, concerns, or problems, will reassess symptoms and medications in 3 months or PRN if symptoms worsen.     Past Medical hx:   Past Medical History:   Diagnosis Date    Adrenal insufficiency 2022    Attention Deficit Hyperactivity Disorder     Family H/O Diabetes Mellitus     Her Father    Generalized Anxiety     Left Breast Cancer S.P Lumpectomy In 01/2021     Dr. Dominga Johnston; Dr. Sandra Sotelo (Heme/Onc); 4/28/21 On CMT; Is Estrogen Receptor Negative; She Is BRCA2 Gene Mutation Positive, Andf Her Mother Also With A H/O Breast Cancer    Macrocytic Anemia     Associated With Her Chemotherapy    Postoperative Nausea And Vomiting     Scopolamine Patches Work Well For Her For This    Scoliosis     Therapeutic Drug Monitoring     Wellness Visit 4/28/2021         Interim hx:  Medication changes last visit:     1.  Continue propranoloL (INDERAL) 10 MG tablet - Take 1 tablet (10 mg total) by mouth 2 (two) times daily as needed (anxiety). Pt to monitor blood pressure before taking.  2.  Continue traZODone (DESYREL) 100 MG tablet - Take 1 tablet (100 mg total) by mouth nightly as needed for Insomnia.  3.  Continue dextroamphetamine-amphetamine (ADDERALL) 10 mg Tab - Take 10 mg by mouth 2 (two) times a day for ADHD. Discussed stimulant side effects including effects on sleep, appetite, tics, nervousness, anorexia, insomnia, tachycardia, palpitations, dizziness, BP changes, HR changes, visual disturbance, and headaches.   4.  Continue LORazepam (ATIVAN) 0.5 MG tablet - Take 1 tablet (0.5 mg total) by mouth every 6 (six) hours as needed for Anxiety (prescribed and managed by Dr. Plaza). Discussed risk of decreased RT, sedation, addictive potential, and not to mix with alcohol.        Anxiety: Good, manageable  Depression: Good, no  issues  Sleep: Good, manageable  Appetite: Same, no issues     Denies suicidal/homicidal ideations.  Denies hopelessness/worthlessness.    Denies auditory/visual hallucinations.      Tobacco:  denies  Alcohol:  occasional  Drug use:  denies  Caffeine:  2 cups a week      Review of Systems   PSYCHIATRIC: Pertinent items are noted in the narrative.        CONSTITUTIONAL: weight stable        M/S: no pain today         ENT: no allergies noted today        ABD: no n/v/d     Past Medical, Family and Social History: The patient's past medical, family and social history have been reviewed and updated as appropriate within the electronic medical record. See encounter notes.     Medication Compliance: yes      Side effects: tolerates     Risk Parameters:  Patient reports no suicidal ideation  Patient reports no homicidal ideation  Patient reports no self-injurious behavior  Patient reports no violent behavior     Exam (detailed: at least 9 elements; comprehensive: all 15 elements)   Constitutional  Vitals:  Most recent vital signs, dated less than 90 days prior to this appointment, were reviewed.     General:  unremarkable, age appropriate, casual attire, good eye contact, good rapport       Musculoskeletal  Muscle Strength/Tone:  no flaccidity, no tremor    Gait & Station:  normal      Psychiatric                       Speech:  normal tone, normal rate, rhythm, and volume   Mood & Affect:   Euthymic, congruent, appropriate         Thought Process:   Goal directed; Linear    Associations:   intact   Thought Content:   No SI/HI, delusions, or paranoia, no AV/VH   Insight & Judgement:   Good, adequate to circumstances   Orientation:   grossly intact; alert and oriented x 4    Memory:  intact for content of interview    Language:  grossly intact, can repeat    Attention Span  : Grossly intact for content of interview   Fund of Knowledge:   intact and appropriate to age and level of education        Assessment and Diagnosis  "  Status/Progress: Based on the examination today, the patient's problem(s) is/are under fair control.  New problems have not been presented today. Comorbidities are not currently complicating management of the primary condition.      Impression:  Pt presents to OP Psychiatry for routine follow-up for treatment/medication management of KIANA, MDD, Insomnia, ADHD. Pt reports all medicines have been effectively treating her symptoms, denies any issues or SE, and reports improvement in stress levels due to improved organization of her medications, dates of refill, and when to take throughout the day, "It has been so much less stressful on me". Pt denies SI/HI/AVH, self-harm, plan, or intent. Pt verbalizes understanding of medication instructions through teach back. No other issues, concerns, or problems, will reassess symptoms and medications in 3 months or PRN if symptoms worsen.      Diagnosis:   - KIANA (generalized anxiety disorder)  - MDD (major depressive disorder), recurrent episode, moderate  - Insomnia, psychophysiological  - Attention Deficit Hyperactivity Disorder      Intervention/Counseling/Treatment Plan   Medication Management:      1.  Continue propranoloL (INDERAL) 10 MG tablet - Take 1 tablet (10 mg total) by mouth 2 (two) times daily as needed (anxiety). Pt to monitor blood pressure before taking.     2.  Continue traZODone (DESYREL) 100 MG tablet - Take 1 tablet (100 mg total) by mouth nightly as needed for Insomnia.     3.  Continue dextroamphetamine-amphetamine (ADDERALL) 10 mg Tab - Take 10 mg by mouth 2 (two) times a day for ADHD. Discussed stimulant side effects including effects on sleep, appetite, tics, nervousness, anorexia, insomnia, tachycardia, palpitations, dizziness, BP changes, HR changes, visual disturbance, and headaches.      4.  Continue LORazepam (ATIVAN) 0.5 MG tablet - Take 1 tablet (0.5 mg total) by mouth every 6 (six) hours as needed for Anxiety (prescribed and managed by  " Bola). Discussed risk of decreased RT, sedation, addictive potential, and not to mix with alcohol.      5.  Call to report any worsening of symptoms or problems with the medication. Pt instructed to go to ER with thoughts of harming self, others     6.  Patient given contact # for psychotherapists at [facility] and also instructed she may check with insurance for list of providers.          Labs: none         Return to clinic:      3 months or PRN if symptoms worsen    -Spent 30min face to face with the pt; >50% time spent in counseling   -Supportive therapy and psychoeducation provided  -R/B/SE's of medications discussed with the pt who expresses understanding and chooses to take medications as prescribed.   -Pt instructed to call clinic, 911 or go to nearest emergency room if sxs worsen or pt is in   crisis. The pt expresses understanding.      Mehnaz Gaitan NP  Psychiatric-Mental Health Nurse Practitioner  Department of Psychiatry    [facility] [place] [address] [place] [address]  Phone:  [phone]  Fax: [phone]

## 2024-07-02 ENCOUNTER — OFFICE VISIT (OUTPATIENT)
Dept: PSYCHIATRY | Facility: CLINIC | Age: 44
End: 2024-07-02
Payer: COMMERCIAL

## 2024-07-02 DIAGNOSIS — F33.0 MILD EPISODE OF RECURRENT MAJOR DEPRESSIVE DISORDER: ICD-10-CM

## 2024-07-02 DIAGNOSIS — F41.1 GAD (GENERALIZED ANXIETY DISORDER): Primary | ICD-10-CM

## 2024-07-02 DIAGNOSIS — F90.0 ADHD (ATTENTION DEFICIT HYPERACTIVITY DISORDER), INATTENTIVE TYPE: ICD-10-CM

## 2024-07-02 DIAGNOSIS — F51.04 INSOMNIA, PSYCHOPHYSIOLOGICAL: ICD-10-CM

## 2024-07-02 PROCEDURE — 3044F HG A1C LEVEL LT 7.0%: CPT | Mod: CPTII,95,,

## 2024-07-02 PROCEDURE — 99214 OFFICE O/P EST MOD 30 MIN: CPT | Mod: 95,,,

## 2024-07-02 PROCEDURE — 1160F RVW MEDS BY RX/DR IN RCRD: CPT | Mod: CPTII,95,,

## 2024-07-02 PROCEDURE — 1159F MED LIST DOCD IN RCRD: CPT | Mod: CPTII,95,,

## 2024-07-08 ENCOUNTER — TELEPHONE (OUTPATIENT)
Dept: ORTHOPEDICS | Facility: CLINIC | Age: 44
End: 2024-07-08
Payer: COMMERCIAL

## 2024-07-08 DIAGNOSIS — M25.512 CHRONIC LEFT SHOULDER PAIN: Primary | ICD-10-CM

## 2024-07-08 DIAGNOSIS — G89.29 CHRONIC LEFT SHOULDER PAIN: Primary | ICD-10-CM

## 2024-07-08 NOTE — TELEPHONE ENCOUNTER
This nurse made 2 attempts to contact patient to clarify appointment notes.  will see patient for her shoulder pain but will be unable to perform an ultrasound of her abdomen.

## 2024-07-09 ENCOUNTER — OFFICE VISIT (OUTPATIENT)
Dept: ORTHOPEDICS | Facility: CLINIC | Age: 44
End: 2024-07-09
Payer: COMMERCIAL

## 2024-07-09 ENCOUNTER — HOSPITAL ENCOUNTER (OUTPATIENT)
Dept: RADIOLOGY | Facility: HOSPITAL | Age: 44
Discharge: HOME | End: 2024-07-09
Attending: FAMILY MEDICINE
Payer: COMMERCIAL

## 2024-07-09 VITALS — HEIGHT: 67 IN | WEIGHT: 121.94 LBS | BODY MASS INDEX: 19.14 KG/M2

## 2024-07-09 DIAGNOSIS — M25.512 CHRONIC LEFT SHOULDER PAIN: ICD-10-CM

## 2024-07-09 DIAGNOSIS — M75.42 CORACOID IMPINGEMENT OF LEFT SHOULDER: ICD-10-CM

## 2024-07-09 DIAGNOSIS — G89.29 CHRONIC LEFT SHOULDER PAIN: ICD-10-CM

## 2024-07-09 DIAGNOSIS — M25.612 DECREASED RANGE OF MOTION OF LEFT SHOULDER: Primary | ICD-10-CM

## 2024-07-09 DIAGNOSIS — R19.03 RIGHT LOWER QUADRANT ABDOMINAL SWELLING, MASS AND LUMP: ICD-10-CM

## 2024-07-09 PROCEDURE — 99999 PR PBB SHADOW E&M-EST. PATIENT-LVL III: CPT | Mod: PBBFAC,,, | Performed by: FAMILY MEDICINE

## 2024-07-09 PROCEDURE — 73030 X-RAY EXAM OF SHOULDER: CPT | Mod: TC,PO,LT

## 2024-07-09 PROCEDURE — 73030 X-RAY EXAM OF SHOULDER: CPT | Mod: 26,LT,, | Performed by: RADIOLOGY

## 2024-07-09 NOTE — PROGRESS NOTES
"Subjective     Patient ID: Alison Swann is a 44 y.o. female.    Chief Complaint: Pain of the Left Shoulder (Pt c/o L) shoulder pain. Pt rates pain a 6 at rest and a 7 w/ activity. Denies any recent injury. )    Patient returns today with continued issues with her left shoulder.  Reports that after last visit she decided to cancel her appointment for PRP as she tried dry needling in the area of her Will minor and noticed almost full improvement in the pain she was feeling in that area.  However she still complains of her shoulder feeling slightly out of place.  She describes it as "tilted".  With rotation of the shoulder she states she can hear cracking and popping in there.  Does not associated with pain in any way.  She also notes that she has an increased level of tightness in the left shoulder and thinks her range of motion is limited.  She does not notice any strength limitations or any radicular like symptoms in the left side.    Brings up a new concern of a slight protrusion in her right lower abdominal quadrant when doing abdominal exercises were straightening.  States it was not happen all the time however she notices a small protrusion near the area of one of her surgical scars.  It was not cause any discomfort in his not cause any pain.  Whenever it brings her some concerns of some possible pathology.    Pain  Pertinent negatives include no chest pain, chills, congestion, coughing, headaches, rash or sore throat.       Review of Systems   Constitutional: Negative for chills and decreased appetite.   HENT:  Negative for congestion and sore throat.    Eyes:  Negative for blurred vision.   Cardiovascular:  Negative for chest pain, dyspnea on exertion and palpitations.   Respiratory:  Negative for cough and shortness of breath.    Skin:  Negative for rash.   Neurological:  Negative for difficulty with concentration, disturbances in coordination and headaches.   Psychiatric/Behavioral:  Negative for " altered mental status, depression, hallucinations, memory loss and suicidal ideas.           Objective     General    Nursing note and vitals reviewed.  Constitutional: She is oriented to person, place, and time. She appears well-developed and well-nourished.   HENT:   Nose: Nose normal.   Eyes: EOM are normal. Pupils are equal, round, and reactive to light.   Neck: Neck supple.   Cardiovascular:  Normal rate.            Pulmonary/Chest: Effort normal.   Abdominal: Soft. A surgical scar is present.   Neurological: She is alert and oriented to person, place, and time. She has normal reflexes.   Psychiatric: She has a normal mood and affect. Her behavior is normal. Judgment and thought content normal.         Right Shoulder Exam   Right shoulder exam is normal.    Inspection/Observation   Swelling: absent  Bruising: absent  Scars: absent  Deformity: absent  Scapular Winging: absent  Scapular Dyskinesia: negative    Range of Motion   Active abduction:  normal   Passive abduction:  normal   Extension:  normal   Forward Flexion:  normal   Forward Elevation: normal  Adduction: normal    Tests & Signs   Rg test: negative  Impingement: negative  Active Compression Test (Leelanau's Sign): negative  Speed's Test: negative  Anterior Drawer Test: 0   Posterior Drawer Test: 0    Other   Sensation: normal    Left Shoulder Exam     Inspection/Observation   Swelling: absent  Bruising: absent  Scars: absent  Deformity: absent  Scapular Winging: absent  Scapular Dyskinesia: negative    Tenderness   The patient is experiencing no tenderness.     Range of Motion   Active abduction:  160   Passive abduction:  170   Forward Flexion:  170     Tests & Signs   Rg test: negative  Impingement: negative  Lift Off Sign: negative  Belly Press: negative  Active Compression test (Leelanau's Sign): negative  Yergasons's Test: negative  Speed's Test: negative  Anterior Drawer Test: 0  Posterior Drawer Test: 0    Other   Sensation: normal        Muscle Strength   Right Upper Extremity   Shoulder Abduction: 5/5   Shoulder Internal Rotation: 5/5   Shoulder External Rotation: 5/5   Supraspinatus: 5/5   Subscapularis: 5/5   Biceps: 5/5   Left Upper Extremity  Shoulder Abduction: 5/5   Shoulder Internal Rotation: 5/5   Shoulder External Rotation: 5/5   Supraspinatus: 5/5   Subscapularis: 5/5   Biceps: 5/5     Vascular Exam     Right Pulses      Radial:                    2+      Left Pulses      Radial:                    2+        Physical Exam  Vitals and nursing note reviewed.   Constitutional:       Appearance: She is well-developed and well-nourished.   HENT:      Nose: Nose normal.   Eyes:      Extraocular Movements: EOM normal.      Pupils: Pupils are equal, round, and reactive to light.   Cardiovascular:      Rate and Rhythm: Normal rate.      Pulses:           Radial pulses are 2+ on the right side and 2+ on the left side.   Pulmonary:      Effort: Pulmonary effort is normal.   Abdominal:      General: A surgical scar is present.      Palpations: Abdomen is soft.       Musculoskeletal:      Right shoulder: No swelling or deformity.      Left shoulder: No swelling or deformity.      Cervical back: Neck supple.   Neurological:      Mental Status: She is alert and oriented to person, place, and time.      Deep Tendon Reflexes: Reflexes are normal and symmetric.   Psychiatric:         Mood and Affect: Mood and affect normal.         Behavior: Behavior normal.         Thought Content: Thought content normal.         Judgment: Judgment normal.        X-ray images ordered obtained interpreted by me.  They show well-preserved joint spacing with no obvious bony abnormalities and no evidence of any degenerative changes.  Surgical clips noted in the left axillary region and a Port-A-Cath located in the right chest.    Diagnostic ultrasound exam of her right lower abdominal region reveals intact bowel with no obvious sign of any possible hernia.  Scanning of  the abdominal musculature does not show any notable dysfunction.  There was a slight area hypoechoic he near the separation of the internal and external obliques which could be consistent with a area of dysfunction and possible scar tissue.  This was explained to the patient in real time.       Assessment and Plan     Encounter Diagnoses   Name Primary?    Chronic left shoulder pain     Coracoid impingement of left shoulder     Decreased range of motion of left shoulder Yes         Alison was seen today for pain.    Diagnoses and all orders for this visit:    Decreased range of motion of left shoulder    Chronic left shoulder pain    Coracoid impingement of left shoulder    No overly concerning findings today on physical exam that order acquire any invasive intervention.  No obvious pathology on her right lower abdominal quadrant although can not rule out a transient ventral hernia at this point.  She does still have some range of motion limitation in the left shoulder when compared to the right and some increased tightness of the rotator cuff muscles on physical exam.  I discussed shoulder mobility exercises as well as band strengthening exercises for rotator cuff that she may work into her exercise routine.  She may follow up here as needed.

## 2024-07-18 ENCOUNTER — PATIENT MESSAGE (OUTPATIENT)
Dept: HEMATOLOGY/ONCOLOGY | Facility: CLINIC | Age: 44
End: 2024-07-18

## 2024-07-18 ENCOUNTER — LAB VISIT (OUTPATIENT)
Dept: LAB | Facility: HOSPITAL | Age: 44
End: 2024-07-18
Attending: INTERNAL MEDICINE
Payer: COMMERCIAL

## 2024-07-18 ENCOUNTER — OFFICE VISIT (OUTPATIENT)
Dept: HEMATOLOGY/ONCOLOGY | Facility: CLINIC | Age: 44
End: 2024-07-18
Payer: COMMERCIAL

## 2024-07-18 VITALS
RESPIRATION RATE: 17 BRPM | TEMPERATURE: 97 F | DIASTOLIC BLOOD PRESSURE: 72 MMHG | SYSTOLIC BLOOD PRESSURE: 111 MMHG | HEART RATE: 84 BPM | HEIGHT: 67 IN | BODY MASS INDEX: 19.07 KG/M2 | WEIGHT: 121.5 LBS | OXYGEN SATURATION: 100 %

## 2024-07-18 DIAGNOSIS — Z15.01 BRCA2 GENE MUTATION POSITIVE: Chronic | ICD-10-CM

## 2024-07-18 DIAGNOSIS — Z17.1 MALIGNANT NEOPLASM OF UPPER-OUTER QUADRANT OF LEFT BREAST IN FEMALE, ESTROGEN RECEPTOR NEGATIVE: ICD-10-CM

## 2024-07-18 DIAGNOSIS — Z90.710 S/P LAPAROSCOPIC HYSTERECTOMY: ICD-10-CM

## 2024-07-18 DIAGNOSIS — C50.412 MALIGNANT NEOPLASM OF UPPER-OUTER QUADRANT OF LEFT BREAST IN FEMALE, ESTROGEN RECEPTOR NEGATIVE: ICD-10-CM

## 2024-07-18 DIAGNOSIS — Z15.09 BRCA2 GENE MUTATION POSITIVE: Chronic | ICD-10-CM

## 2024-07-18 DIAGNOSIS — C50.412 MALIGNANT NEOPLASM OF UPPER-OUTER QUADRANT OF LEFT BREAST IN FEMALE, ESTROGEN RECEPTOR NEGATIVE: Primary | ICD-10-CM

## 2024-07-18 DIAGNOSIS — Z17.1 MALIGNANT NEOPLASM OF UPPER-OUTER QUADRANT OF LEFT BREAST IN FEMALE, ESTROGEN RECEPTOR NEGATIVE: Primary | ICD-10-CM

## 2024-07-18 DIAGNOSIS — E27.40 ADRENAL INSUFFICIENCY: ICD-10-CM

## 2024-07-18 LAB
ALBUMIN SERPL BCP-MCNC: 4 G/DL (ref 3.5–5.2)
ALP SERPL-CCNC: 83 U/L (ref 55–135)
ALT SERPL W/O P-5'-P-CCNC: 10 U/L (ref 10–44)
ANION GAP SERPL CALC-SCNC: 7 MMOL/L (ref 8–16)
AST SERPL-CCNC: 15 U/L (ref 10–40)
BASOPHILS # BLD AUTO: 0.04 K/UL (ref 0–0.2)
BASOPHILS NFR BLD: 0.6 % (ref 0–1.9)
BILIRUB SERPL-MCNC: 0.2 MG/DL (ref 0.1–1)
BUN SERPL-MCNC: 13 MG/DL (ref 6–20)
CALCIUM SERPL-MCNC: 9.8 MG/DL (ref 8.7–10.5)
CHLORIDE SERPL-SCNC: 102 MMOL/L (ref 95–110)
CO2 SERPL-SCNC: 26 MMOL/L (ref 23–29)
CREAT SERPL-MCNC: 0.7 MG/DL (ref 0.5–1.4)
DIFFERENTIAL METHOD BLD: ABNORMAL
EOSINOPHIL # BLD AUTO: 0.1 K/UL (ref 0–0.5)
EOSINOPHIL NFR BLD: 1 % (ref 0–8)
ERYTHROCYTE [DISTWIDTH] IN BLOOD BY AUTOMATED COUNT: 12.9 % (ref 11.5–14.5)
EST. GFR  (NO RACE VARIABLE): >60 ML/MIN/1.73 M^2
GLUCOSE SERPL-MCNC: 86 MG/DL (ref 70–110)
HCT VFR BLD AUTO: 39.9 % (ref 37–48.5)
HGB BLD-MCNC: 13.4 G/DL (ref 12–16)
IMM GRANULOCYTES # BLD AUTO: 0.01 K/UL (ref 0–0.04)
IMM GRANULOCYTES NFR BLD AUTO: 0.1 % (ref 0–0.5)
LYMPHOCYTES # BLD AUTO: 2.6 K/UL (ref 1–4.8)
LYMPHOCYTES NFR BLD: 38 % (ref 18–48)
MCH RBC QN AUTO: 30.9 PG (ref 27–31)
MCHC RBC AUTO-ENTMCNC: 33.6 G/DL (ref 32–36)
MCV RBC AUTO: 92 FL (ref 82–98)
MONOCYTES # BLD AUTO: 0.4 K/UL (ref 0.3–1)
MONOCYTES NFR BLD: 6 % (ref 4–15)
NEUTROPHILS # BLD AUTO: 3.6 K/UL (ref 1.8–7.7)
NEUTROPHILS NFR BLD: 54.3 % (ref 38–73)
NRBC BLD-RTO: 0 /100 WBC
PLATELET # BLD AUTO: 243 K/UL (ref 150–450)
PMV BLD AUTO: 8.8 FL (ref 9.2–12.9)
POTASSIUM SERPL-SCNC: 3.8 MMOL/L (ref 3.5–5.1)
PROT SERPL-MCNC: 7 G/DL (ref 6–8.4)
RBC # BLD AUTO: 4.33 M/UL (ref 4–5.4)
SODIUM SERPL-SCNC: 135 MMOL/L (ref 136–145)
WBC # BLD AUTO: 6.71 K/UL (ref 3.9–12.7)

## 2024-07-18 PROCEDURE — 36415 COLL VENOUS BLD VENIPUNCTURE: CPT | Performed by: INTERNAL MEDICINE

## 2024-07-18 PROCEDURE — 3008F BODY MASS INDEX DOCD: CPT | Mod: CPTII,S$GLB,, | Performed by: INTERNAL MEDICINE

## 2024-07-18 PROCEDURE — 1159F MED LIST DOCD IN RCRD: CPT | Mod: CPTII,S$GLB,, | Performed by: INTERNAL MEDICINE

## 2024-07-18 PROCEDURE — 3074F SYST BP LT 130 MM HG: CPT | Mod: CPTII,S$GLB,, | Performed by: INTERNAL MEDICINE

## 2024-07-18 PROCEDURE — 3078F DIAST BP <80 MM HG: CPT | Mod: CPTII,S$GLB,, | Performed by: INTERNAL MEDICINE

## 2024-07-18 PROCEDURE — 85025 COMPLETE CBC W/AUTO DIFF WBC: CPT | Performed by: INTERNAL MEDICINE

## 2024-07-18 PROCEDURE — G2211 COMPLEX E/M VISIT ADD ON: HCPCS | Mod: S$GLB,,, | Performed by: INTERNAL MEDICINE

## 2024-07-18 PROCEDURE — 99999 PR PBB SHADOW E&M-EST. PATIENT-LVL IV: CPT | Mod: PBBFAC,,, | Performed by: INTERNAL MEDICINE

## 2024-07-18 PROCEDURE — 99214 OFFICE O/P EST MOD 30 MIN: CPT | Mod: S$GLB,,, | Performed by: INTERNAL MEDICINE

## 2024-07-18 PROCEDURE — 80053 COMPREHEN METABOLIC PANEL: CPT | Performed by: INTERNAL MEDICINE

## 2024-07-18 PROCEDURE — 3044F HG A1C LEVEL LT 7.0%: CPT | Mod: CPTII,S$GLB,, | Performed by: INTERNAL MEDICINE

## 2024-07-18 PROCEDURE — 1160F RVW MEDS BY RX/DR IN RCRD: CPT | Mod: CPTII,S$GLB,, | Performed by: INTERNAL MEDICINE

## 2024-07-18 NOTE — PROGRESS NOTES
"Subjective     Patient ID: Alison Swann is a 44 y.o. female.    Chief Complaint: Malignant neoplasm of upper-outer quadrant of left breast i    HPI    Reports doing well  Now working on a You Tube and social media site to educate and inspire    Managed with endocrine for adrenal insufficiency  Travel stressors create issues with levels or side effects from increased steroid dosing    States feeling great  Weight now back to her baseline- diet, hydration, supplements and exercise  No hot flashes- now on testosterone pellets     Oncology History:  - 12/2019 thermography of breast revealed  lymphatic changes in the left axilla.  - 4/2020 self detected a palpable left breast mass  - 6/6/2020 Diagnostic mammogram  Impression:  Suspicious grouping of microcalcifications in the upper-outer left breast corresponding to area of palpable concern.  Biopsy is warranted.Questionable distortion of the breast architecture in the periareolar region of the right breast near the 3 o'clock position without sonographic correlate.  Short-term mammographic follow-up of the right breast at 6 month interval is recommended.  The above findings and recommendations were discussed with the patient at the time of the examination.  BI-RADS CATEGORY 4: SUSPICIOUS ABNORMALITY-BIOPSY SHOULD BE CONSIDERED  - 6/23/2020 imaging guided biopsy  Pathology: [facility] (The other calcifications were apparently not sampled)  - 1/19/2021 excision biopsy with Dr. Johnston  Pathology:  1.  BREAST, LEFT, FURTHER DESIGNATED "MASS," EXCISION:   --INVASIVE CARCINOMA OF NO SPECIAL TYPE (DUCTAL, NOT OTHERWISE    SPECIFIED), SPENSER HISTOLOGIC           GRADE 3 OUT OF 3.        --TUMOR MEASURES 42 MILLIMETERS IN MAXIMUM DIMENSION.        --RECEPTOR STUDIES ARE PENDING.   --SPECIMEN ANTERIOR MARGIN IS POSITIVE FOR INVASIVE CARCINOMA; ALL    REMAINING SPECIMEN MARGINS ARE           AT LEAST 2 MILLIMETERS AWAY.   --DUCTAL CARCINOMA IN SITU, HIGH NUCLEAR GRADE, " "COMPRISING LESS THAN 5%    OF TOTAL TUMOR TISSUE.        --ALL SPECIMEN MARGINS ARE NEGATIVE FOR DUCTAL CARCINOMA IN SITU.        --EXTENSIVE LYMPHOVASCULAR INVASION PRESENT.   --FIBROCYSTIC CHANGES INCLUDING STROMAL FIBROSIS, CYSTIC DILATATION OF    DUCTS, AND APOCRINE           METAPLASIA.        --COLUMNAR CELL CHANGE.   --MICROCALCIFICATIONS ASSOCIATED WITH TUMOR AND WITH BENIGN DUCTAL    PROFILES.   2.  BREAST, LEFT, FURTHER DESIGNATED "MEDIAL MARGIN," EXCISION:        --NO MORPHOLOGIC EVIDENCE OF MALIGNANCY.   --FIBROCYSTIC CHANGES INCLUDING STROMAL FIBROSIS, CYSTIC DILATATION OF    DUCTS, APOCRINE METAPLASIA,           AND USUAL DUCTAL EPITHELIAL HYPERPLASIA.        --COLUMNAR CELL CHANGE.        --FOCAL MICROCALCIFICATIONS ASSOCIATED WITH BENIGN DUCTAL PROFILES.   3.  LYMPH NODE, LEFT SENTINEL, NEEDLE CORE BIOPSY:   --METASTATIC CARCINOMA OF NO SPECIAL TYPE (DUCTAL, NOT OTHERWISE    SPECIFIED).   ER negative, CT negative, Snq3alq negative  Ki-67 79%     - 1/27/2021 Breast MRI:  Findings: The breasts have heterogeneous fibroglandular tissue. The background parenchymal enhancement is minimal.   Left  There are 6 similar 29 mm x 23 mm lymph nodes seen in the left axilla.   There is an 8 mm x 7 mm x 6 mm homogeneous, non-mass enhancement in a focal distribution seen in the left breast at 12 o'clock in the middle depth, 5.5 cm from the nipple. Delayed phase is persistent. This is immediately anterior to the pectoralis major muscle, at anterior aspect of implant.   There is a 24 mm x 11 mm x 9 mm clumped, non-mass enhancement in a focal distribution seen in the outer central region of the left breast in the posterior depth. This is along the margins of the resection cavity at is posterior aspect.   Right  There is no evidence of suspicious masses, abnormal enhancement, or other abnormal findings in the right breast.  Impression:  Left  Non-mass Enhancement: Left breast 8 mm x 7 mm x 6 mm non-mass enhancement at " the middle 12 o'clock position. Assessment: 4 - Suspicious finding. Biopsy is recommended.  Second look ultrasound followed by ultrasound or MRI-guided biopsy could be performed if it would .  Of note, MRI guided biopsy would carry risk of implant rupture. Non-mass Enhancement: Left breast 24 mm x 11 mm x 9 mm non-mass enhancement at the posterior aspect of the excision cavity in the lateral breast. Assessment: 4 - Suspicious finding.  This is suspicious for residual disease in this patient with history of recent excisional biopsy.   The area is likely not amenable to MRI guided biopsy.  Surgical consultation recommended. Left axillary adenopathy.  At least 6 abnormal appearing level I axillary nodes are present, suspicious for residual lymph node metastasis.  RightThere is no MR evidence of malignancy in the right breast.  BI-RADS Category:   Overall: 4 - Suspicious  Recommendation:  Surgical consultation recommended.  Left breast biopsy could be performed of focal NME at 12 o'clock in the left breast if clinically indicated.     - 2/2/2021 PET scan:  COMPARISON:  Breast MRI 01/27/2021  FINDINGS:  Quality of the study: Adequate.  In the head and neck, there are no hypermetabolic lesions worrisome for malignancy. There are no hypermetabolic mucosal lesions, and there are no pathologically enlarged or hypermetabolic lymph nodes.  In the chest, there is no definite hypermetabolic breast mass.  There are bilateral breast implants in place.  There is relatively mild diffuse uptake within dense fibroglandular tissue, and within the left breast there is a maximum SUV of 1.9 adjacent to biopsy clips on image 68.  There is also mildly hypermetabolic subcutaneous fat stranding elsewhere in the lateral aspect of the left breast on image 77 likely postprocedural in nature. There are at least half a dozen left level 1 axillary lymph nodes the largest of which are hypermetabolic including a 3 x 2.1 cm node on  image 50 with an SUV of 8.6, a 1 cm node on image 55 with an SUV of 4.7, and 2.3 x 1.2 cm node on image 61 with an SUV of 9.2.  There is also a non hypermetabolic 9 x 6 mm right level 2 lymph node.  There are no suspicious lymph nodes in the internal mammary chain.  There are no pulmonary nodules, and there are no pleural or pericardial effusions.  In the abdomen and pelvis, there is physiologic tracer distribution within the abdominal organs and excretion into the genitourinary system, including diffusely within the right ureter and focally within the left.In the bones, there are no hypermetabolic lesions worrisome for malignancy.  Impression:  1.  No discrete left breast mass identified.  2.  Metastatic left level 1 axillary lymph nodes.  Level 2 larisa metastasis is not excluded.  3.  No evidence of distant metastasis.     - 2/9/2021 ECHO:  The left ventricle is normal in size with normal systolic function. The estimated ejection fraction is 58%  Regimen:  Paclitaxel weekly + carboplatin with Pembrolizumab q 3 weeks followed by AC with Pembrolizumab q 3 weeks followed by Pembroluzimab.(Based on interim analysis of the phase III KEYNOTE-522 the addition of pembrolizumab to neoadjuvant chemotherapy and use of adjuvant pembrolizumab x 9 cycles resulted in improvements in pathologic complete response rate and event-free survival in women with early triple-negative breast cancer.)     BRCA2+     5/3/2021 Breast MRI follow up on treatment in the interval (results below)  Findings:  There are bilateral retropectoral silicone implants. There is a right sided port.  The breasts have extreme amounts of fibroglandular tissue. The background parenchymal enhancement is mild.  There are postsurgical changes in the left upper outer breast and axilla.  There has been significant interval decrease in size of the previously seen abnormal left axillary lymph nodes, now with the largest lymph node measuring 14 x 15 x 10 mm  (previously measuring up to 29 mm on the 21 MRI).  The other left axillary lymph nodes are smaller with mildly irregular shaggy margins, consistent with chemotherapy treatment.   No suspicious enhancement is seen in either breast. The previously seen left 12:00 mid depth focal non mass enhancement anterior to the pectoralis is not seen on the current exam.   No abnormal right axillary lymph nodes or internal mammary lymph nodes are seen.  Impression:  There has been significant interval decrease in size of the previously seen abnormal left axillary lymph nodes, now with the largest lymph node measuring 14 x 15 x 10 mm (previously measuring up to 29 mm on the 21 MRI).  The other left axillary lymph nodes are smaller with mildly irregular shaggy margins, consistent with chemotherapy treatment.   No suspicious enhancement is seen in either breast.   BI-RADS Category:   Overall: 6 - Known Biopsy-Proven Malignancy     -   Completed surgery with complete pathologic response     - completed XRT     Gyn Hx:  Menarche- 10  , age at 1st live birth 22  OCPs x 4 years  Endometrial ablation      FH:  Mother - diagnosed at age 50 with breast cancer  Mastectomy- bilateral  No chemotherapy or radiation   Remains on Tamoxifen  Treated in [place]  ? Genetics  Axel Talamantes (Mirtha Beck 57)     Maternal uncle-  in his 20s of gastric cancer  Maternal great grandmother - colon cancer  Paternal side unknown  3 sisters- healthy     SH:  , daughter  [employer]    Review of Systems   Constitutional:  Negative for activity change, appetite change, chills, fatigue, fever and unexpected weight change.        See above   HENT:  Negative for trouble swallowing.    Eyes:  Negative for visual disturbance.   Respiratory:  Negative for cough, shortness of breath and wheezing.    Cardiovascular:  Negative for chest pain, palpitations and leg swelling.   Gastrointestinal:  Negative for abdominal  distention, abdominal pain, change in bowel habit, constipation, diarrhea, nausea, vomiting and reflux.   Genitourinary:  Negative for bladder incontinence, decreased urine volume, difficulty urinating, dysuria, frequency and urgency.   Musculoskeletal:  Negative for arthralgias, back pain, joint swelling, myalgias and joint deformity.   Integumentary:  Negative for color change, pallor, rash, breast mass and breast tenderness.   Neurological:  Negative for dizziness, weakness, light-headedness, numbness and headaches.   Hematological:  Negative for adenopathy. Does not bruise/bleed easily.   Psychiatric/Behavioral:  Negative for dysphoric mood and sleep disturbance. The patient is not nervous/anxious.    Breast: Negative for mass and tenderness         Objective     Physical Exam  Vitals and nursing note reviewed.          Assessment and Plan     1. Malignant neoplasm of upper-outer quadrant of left breast in female, estrogen receptor negative    2. BRCA2 gene mutation positive    3. Adrenal insufficiency    4. s/p RA-TLH/BSO        BRCA + TNBC  Continue surveillance  Appropriate prophylactic surgeries completed  Continue annual dermatology exam  RTC 6 months     Adrenal insufficiency- on replacement  This occurred secondary to immunotherapy  Follows with endocrinology and reports doing well    Route Chart for Scheduling    Med Onc Chart Routing      Follow up with physician 6 months.   Follow up with [facility]    Infusion scheduling note    Injection scheduling note    Labs    Imaging    Pharmacy appointment    Other referrals              Supportive Plan Information  OP SHELL SUPPORTIVE   Sandra Sotelo MD   Upcoming Treatment Dates - OP SHELL SUPPORTIVE    No upcoming days in selected categories.    Therapy Plan Information  PORT FLUSH  Flushes  heparin, porcine (PF) 100 unit/mL injection flush 500 Units  500 Units, Intravenous, Every visit  sodium chloride 0.9% flush 10 mL  10 mL, Intravenous, Every visit

## 2024-07-25 DIAGNOSIS — F90.0 ADHD (ATTENTION DEFICIT HYPERACTIVITY DISORDER), INATTENTIVE TYPE: ICD-10-CM

## 2024-07-25 RX ORDER — DEXTROAMPHETAMINE SACCHARATE, AMPHETAMINE ASPARTATE, DEXTROAMPHETAMINE SULFATE AND AMPHETAMINE SULFATE 2.5; 2.5; 2.5; 2.5 MG/1; MG/1; MG/1; MG/1
10 TABLET ORAL 2 TIMES DAILY
Qty: 60 TABLET | Refills: 0 | Status: SHIPPED | OUTPATIENT
Start: 2024-07-25 | End: 2024-08-24

## 2024-07-30 ENCOUNTER — PATIENT MESSAGE (OUTPATIENT)
Dept: PSYCHIATRY | Facility: CLINIC | Age: 44
End: 2024-07-30
Payer: COMMERCIAL

## 2024-08-12 ENCOUNTER — PATIENT MESSAGE (OUTPATIENT)
Dept: RADIATION ONCOLOGY | Facility: CLINIC | Age: 44
End: 2024-08-12
Payer: COMMERCIAL

## 2024-08-12 ENCOUNTER — OFFICE VISIT (OUTPATIENT)
Facility: CLINIC | Age: 44
End: 2024-08-12
Payer: COMMERCIAL

## 2024-08-12 DIAGNOSIS — Z15.09 BRCA2 GENE MUTATION POSITIVE: Chronic | ICD-10-CM

## 2024-08-12 DIAGNOSIS — D18.01 CHERRY ANGIOMA: ICD-10-CM

## 2024-08-12 DIAGNOSIS — Z15.01 BRCA2 GENE MUTATION POSITIVE: Chronic | ICD-10-CM

## 2024-08-12 DIAGNOSIS — L82.1 SK (SEBORRHEIC KERATOSIS): ICD-10-CM

## 2024-08-12 DIAGNOSIS — L81.4 LENTIGINES: ICD-10-CM

## 2024-08-12 DIAGNOSIS — D22.9 MULTIPLE BENIGN NEVI: Primary | ICD-10-CM

## 2024-08-12 DIAGNOSIS — L81.3 CAFE AU LAIT SPOTS: ICD-10-CM

## 2024-08-12 DIAGNOSIS — Z12.83 SCREENING FOR SKIN CANCER: ICD-10-CM

## 2024-08-12 PROCEDURE — 3044F HG A1C LEVEL LT 7.0%: CPT | Mod: CPTII,S$GLB,, | Performed by: DERMATOLOGY

## 2024-08-12 PROCEDURE — 99203 OFFICE O/P NEW LOW 30 MIN: CPT | Mod: S$GLB,,, | Performed by: DERMATOLOGY

## 2024-08-12 PROCEDURE — 99999 PR PBB SHADOW E&M-EST. PATIENT-LVL III: CPT | Mod: PBBFAC,,, | Performed by: DERMATOLOGY

## 2024-08-12 PROCEDURE — G2211 COMPLEX E/M VISIT ADD ON: HCPCS | Mod: S$GLB,,, | Performed by: DERMATOLOGY

## 2024-08-12 PROCEDURE — 1159F MED LIST DOCD IN RCRD: CPT | Mod: CPTII,S$GLB,, | Performed by: DERMATOLOGY

## 2024-08-12 NOTE — PROGRESS NOTES
Subjective:      Patient ID:  Alison Swann is a 44 y.o. female who presents for   Chief Complaint   Patient presents with    Skin Check     HPI    Established patient.  Here today for total body skin exam.   No personal or known family hx skin cancer.      PMH: breast cancer s/p chemo, surgery, XRT; BRCA2 positive prompting total hysterectomy      Review of Systems    Objective:   Physical Exam   Constitutional: She appears well-developed and well-nourished. She is cooperative. No distress.   HENT:   Head: Normocephalic and atraumatic.   Eyes: Lids are normal. Lids are normal.  Right conjunctiva is not injected. Left conjunctiva is not injected. No conjunctival no injection.   Pulmonary/Chest: No respiratory distress.   Musculoskeletal:      Right lower leg: No edema.      Left lower leg: No edema.   Neurological: She is alert and oriented to person, place, and time. She is not disoriented.   Psychiatric: She has a normal mood and affect. Her speech is normal and behavior is normal. Mood, affect, judgment and thought content normal.   Skin:   Areas Examined (abnormalities noted in diagram):   Scalp / Hair Palpated and Inspected  Head / Face Inspection Performed  Neck Inspection Performed  Chest / Axilla Inspection Performed  Abdomen Inspection Performed  Genitals / Buttocks / Groin Inspection Performed  Back Inspection Performed  RUE Inspected  LUE Inspection Performed  RLE Inspected  LLE Inspection Performed  Nails and Digits Inspection Performed                     Diagram Legend     Erythematous scaling macule/papule c/w actinic keratosis       Vascular papule c/w angioma      Pigmented verrucoid papule/plaque c/w seborrheic keratosis      Yellow umbilicated papule c/w sebaceous hyperplasia      Irregularly shaped tan macule c/w lentigo     1-2 mm smooth white papules consistent with Milia      Movable subcutaneous cyst with punctum c/w epidermal inclusion cyst      Subcutaneous movable cyst c/w pilar cyst       Firm pink to brown papule c/w dermatofibroma      Pedunculated fleshy papule(s) c/w skin tag(s)      Evenly pigmented macule c/w junctional nevus     Mildly variegated pigmented, slightly irregular-bordered macule c/w mildly atypical nevus      Flesh colored to evenly pigmented papule c/w intradermal nevus       Pink pearly papule/plaque c/w basal cell carcinoma      Erythematous hyperkeratotic cursted plaque c/w SCC      Surgical scar with no sign of skin cancer recurrence      Open and closed comedones      Inflammatory papules and pustules      Verrucoid papule consistent consistent with wart     Erythematous eczematous patches and plaques     Dystrophic onycholytic nail with subungual debris c/w onychomycosis     Umbilicated papule    Erythematous-base heme-crusted tan verrucoid plaque consistent with inflamed seborrheic keratosis     Erythematous Silvery Scaling Plaque c/w Psoriasis     See annotation      Assessment / Plan:        Multiple benign nevi  Lentigines  - Discussed diagnosis, etiology, and benign-nature of condition.  - Reassured; no lesions suspicious for malignancy noted on exam today.   - Recommended routine self examination of skin. Discussed the ABCDEs of melanoma and ugly duckling sign.   - Recommended daily sun protection, including the use of OTC broad-spectrum sunscreen (SPF 30 or greater) and sun-protective clothing.      SK (seborrheic keratosis)  Cherry angioma  Cafe au lait spots  - Benign; reassured treatment not necessary.      Screening for skin cancer  - Total body skin examination performed today.  - Findings listed above.   - Recommended routine self examination of skin.    - Recommended daily sun protection, including the use of OTC broad-spectrum sunscreen (SPF 30 or greater) and sun-protective clothing.               Follow up for annual skin checks.  Sooner prn

## 2024-08-14 NOTE — PATIENT INSTRUCTIONS
What Are the Symptoms of Skin Cancer?  A change in your skin is the most common sign of skin cancer. This could be a new growth, a sore that doesnt heal, or a change in a mole. Not all skin cancers look the same.    For melanoma specifically, a simple way to remember the warning signs is to remember the A-B-C-D-Es of melanoma--    A stands for asymmetrical. Does the mole or spot have an irregular shape with two parts that look very different?  B stands for border. Is the border irregular or jagged?  C is for color. Is the color uneven?  D is for diameter. Is the mole or spot larger than the size of a pea?  E is for evolving. Has the mole or spot changed during the past few weeks or months?    Talk to your doctor if you notice changes in your skin such as a new growth, a sore that doesnt heal, a change in an old growth, or any of the A-B-C-D-Es of melanoma    What Can I Do to Reduce My Risk of Skin Cancer?  Protection from ultraviolet (UV) radiation is important all year, not just during the summer or at the beach. UV rays from the sun can reach you on cloudy and hazy days, not just on bright and jerardo days. UV rays also reflect off of surfaces like water, cement, sand, and snow. Indoor tanning (using a tanning bed, lim, or sunlamp to get tan) exposes users to UV radiation.    The hours between 10 a.m. and 4 p.m. Daylight Saving Time (9 a.m. to 3 p.m. standard time) are the most hazardous for UV exposure outdoors in the continental [place]. UV rays from sunlight are the greatest during the late spring and early summer in [place].    CDC recommends easy options for protection from UV radiation--    Stay in the shade or indoors, especially during midday hours.  Wear clothing that covers your arms and legs.  Wear a hat with a wide brim to shade your face, head, ears, and neck.  Wear sunglasses that wrap around and block both UVA and UVB rays.  Use sunscreen with a sun protection factor (SPF) of  30 or higher, and both UVA and UVB (broad spectrum) protection.  Avoid indoor tanning.    Adapted from https://www.cdc.gov/cancer/skin/basic_info/

## 2024-08-16 ENCOUNTER — PATIENT MESSAGE (OUTPATIENT)
Dept: ENDOCRINOLOGY | Facility: CLINIC | Age: 44
End: 2024-08-16
Payer: COMMERCIAL

## 2024-08-23 DIAGNOSIS — F90.0 ADHD (ATTENTION DEFICIT HYPERACTIVITY DISORDER), INATTENTIVE TYPE: ICD-10-CM

## 2024-08-23 RX ORDER — DEXTROAMPHETAMINE SACCHARATE, AMPHETAMINE ASPARTATE, DEXTROAMPHETAMINE SULFATE AND AMPHETAMINE SULFATE 2.5; 2.5; 2.5; 2.5 MG/1; MG/1; MG/1; MG/1
10 TABLET ORAL 2 TIMES DAILY
Qty: 60 TABLET | Refills: 0 | Status: SHIPPED | OUTPATIENT
Start: 2024-08-26 | End: 2024-09-25

## 2024-08-26 ENCOUNTER — PATIENT MESSAGE (OUTPATIENT)
Dept: HEMATOLOGY/ONCOLOGY | Facility: CLINIC | Age: 44
End: 2024-08-26
Payer: COMMERCIAL

## 2024-08-27 ENCOUNTER — PATIENT MESSAGE (OUTPATIENT)
Dept: RADIATION ONCOLOGY | Facility: CLINIC | Age: 44
End: 2024-08-27
Payer: COMMERCIAL

## 2024-08-27 DIAGNOSIS — N63.0 MASS OF BREAST, UNSPECIFIED LATERALITY: Primary | ICD-10-CM

## 2024-08-28 ENCOUNTER — HOSPITAL ENCOUNTER (OUTPATIENT)
Dept: RADIOLOGY | Facility: HOSPITAL | Age: 44
Discharge: HOME | End: 2024-08-28
Attending: INTERNAL MEDICINE
Payer: COMMERCIAL

## 2024-08-28 ENCOUNTER — TELEPHONE (OUTPATIENT)
Dept: RADIOLOGY | Facility: HOSPITAL | Age: 44
End: 2024-08-28
Payer: COMMERCIAL

## 2024-08-28 DIAGNOSIS — N63.0 MASS OF BREAST, UNSPECIFIED LATERALITY: ICD-10-CM

## 2024-08-28 PROCEDURE — 77061 BREAST TOMOSYNTHESIS UNI: CPT | Mod: TC,LT

## 2024-09-01 ENCOUNTER — PATIENT MESSAGE (OUTPATIENT)
Dept: ENDOCRINOLOGY | Facility: CLINIC | Age: 44
End: 2024-09-01
Payer: COMMERCIAL

## 2024-09-01 DIAGNOSIS — E27.49 SECONDARY ADRENAL INSUFFICIENCY: ICD-10-CM

## 2024-09-03 RX ORDER — HYDROCORTISONE 10 MG/1
10 TABLET ORAL DAILY
Qty: 90 TABLET | Refills: 3 | Status: CANCELLED | OUTPATIENT
Start: 2024-09-03

## 2024-09-04 RX ORDER — HYDROCORTISONE 10 MG/1
TABLET ORAL
Qty: 165 TABLET | Refills: 3 | Status: SHIPPED | OUTPATIENT
Start: 2024-09-04

## 2024-09-04 NOTE — TELEPHONE ENCOUNTER
What pharmacy? There are multiple listed. Assuming if needs ASAP she needs it at a house where she is currently?

## 2024-09-13 ENCOUNTER — PATIENT MESSAGE (OUTPATIENT)
Dept: HEMATOLOGY/ONCOLOGY | Facility: CLINIC | Age: 44
End: 2024-09-13
Payer: COMMERCIAL

## 2024-09-13 ENCOUNTER — PATIENT MESSAGE (OUTPATIENT)
Dept: ENDOCRINOLOGY | Facility: CLINIC | Age: 44
End: 2024-09-13
Payer: COMMERCIAL

## 2024-09-25 ENCOUNTER — OFFICE VISIT (OUTPATIENT)
Dept: OTOLARYNGOLOGY | Facility: CLINIC | Age: 44
End: 2024-09-25
Payer: COMMERCIAL

## 2024-09-25 VITALS — HEIGHT: 67 IN | BODY MASS INDEX: 19.21 KG/M2 | WEIGHT: 122.38 LBS

## 2024-09-25 DIAGNOSIS — J35.01 CHRONIC TONSILLITIS: Primary | ICD-10-CM

## 2024-09-25 PROCEDURE — 1160F RVW MEDS BY RX/DR IN RCRD: CPT | Mod: S$GLB,,, | Performed by: OTOLARYNGOLOGY

## 2024-09-25 PROCEDURE — 99999 PR PBB SHADOW E&M-EST. PATIENT-LVL II: CPT | Mod: PBBFAC,,, | Performed by: OTOLARYNGOLOGY

## 2024-09-25 PROCEDURE — 3008F BODY MASS INDEX DOCD: CPT | Mod: S$GLB,,, | Performed by: OTOLARYNGOLOGY

## 2024-09-25 PROCEDURE — 1159F MED LIST DOCD IN RCRD: CPT | Mod: S$GLB,,, | Performed by: OTOLARYNGOLOGY

## 2024-09-25 PROCEDURE — 99213 OFFICE O/P EST LOW 20 MIN: CPT | Mod: S$GLB,,, | Performed by: OTOLARYNGOLOGY

## 2024-09-25 PROCEDURE — 3044F HG A1C LEVEL LT 7.0%: CPT | Mod: S$GLB,,, | Performed by: OTOLARYNGOLOGY

## 2024-09-25 NOTE — PROGRESS NOTES
Subjective:       Patient ID: Alison Swann is a 44 y.o. female.    Chief Complaint: Sore Throat (Pt c/o constant tonsil stones for years )      This 44-year-old patient who has in in the throes dealing with breast cancer and surgery and chemotherapy for that diagnosis in the past year or more has been having problems with tonsil stones on the right.  She has had them very rarely historically but that is been persisting on the right she digs around get some material out at least weekly and that is extremely bothersome to her.  She is allergic to penicillin.  She has had quite a few surgeries for her breast cancer diagnosis and is still anticipating removal of the port.          Objective:      ENT Physical Exam    She has moderate size tonsils there does appear to be a dominant pouch and a little redness around the right tonsil superior pole.  I do not see any active tonsil stones that are visible on the surface today    Her neck is remarkable for a possibly a slightly prominent right carotid bulb or perhaps a small node over the right carotid bulb region, that is not terribly asymmetric and maybe a normal variant but is noted and pointed out to the patient.        1. Chronic tonsillitis         Plan:          She is allergic to penicillin we have discussed the possibility of using a Waterpik to try to debride the debris out of that right tonsil pouch which is the one location that it bothers her she is going to give this a try

## 2024-10-01 ENCOUNTER — PATIENT MESSAGE (OUTPATIENT)
Dept: PSYCHIATRY | Facility: CLINIC | Age: 44
End: 2024-10-01
Payer: COMMERCIAL

## 2024-10-03 ENCOUNTER — OFFICE VISIT (OUTPATIENT)
Dept: PSYCHIATRY | Facility: CLINIC | Age: 44
End: 2024-10-03
Payer: COMMERCIAL

## 2024-10-03 DIAGNOSIS — F41.1 GAD (GENERALIZED ANXIETY DISORDER): ICD-10-CM

## 2024-10-03 DIAGNOSIS — F33.0 MILD EPISODE OF RECURRENT MAJOR DEPRESSIVE DISORDER: ICD-10-CM

## 2024-10-03 DIAGNOSIS — F51.04 INSOMNIA, PSYCHOPHYSIOLOGICAL: ICD-10-CM

## 2024-10-03 DIAGNOSIS — F90.0 ADHD (ATTENTION DEFICIT HYPERACTIVITY DISORDER), INATTENTIVE TYPE: Primary | ICD-10-CM

## 2024-10-03 PROCEDURE — 1159F MED LIST DOCD IN RCRD: CPT | Mod: CPTII,95,,

## 2024-10-03 PROCEDURE — 99214 OFFICE O/P EST MOD 30 MIN: CPT | Mod: 95,,,

## 2024-10-03 PROCEDURE — 1160F RVW MEDS BY RX/DR IN RCRD: CPT | Mod: CPTII,95,,

## 2024-10-03 PROCEDURE — 3044F HG A1C LEVEL LT 7.0%: CPT | Mod: CPTII,95,,

## 2024-10-03 NOTE — PROGRESS NOTES
Outpatient Psychiatry Follow-Up Visit    Clinical Status of Patient: Outpatient (Ambulatory)  10/03/2024    The patient location is: [facility], [facility]  The patient phone number is: [phone]   Visit type: Virtual visit with synchronous audio and video  Each patient to whom he or she provides medical services by telemedicine is:  (1) informed of the relationship between the physician and patient and the respective role of any other health care provider with respect to management of the patient; and (2) notified that he or she may decline to receive medical services by telemedicine and may withdraw from such care at any time.     Chief Complaint: Pt is a 44 y.o. female who presents today for a follow-up. Met with patient.       Interval History and Content of Current Session:  Interim Events/Subjective Report/Content of Current Session:  follow up appointment.    Pt is a 44 y.o. female with past psychiatric hx of KIANA (generalized anxiety disorder), MDD (major depressive disorder), recurrent episode, moderate, Insomnia, psychophysiological, Attention Deficit Hyperactivity Disorder who presents for follow up treatment.     Past Psychiatric hx:   Pt. is a 44 yo F with a past psychiatric hx of Depression, unspecified depression type, KIANA (generalized anxiety disorder), Insomnia, unspecified type presenting to the clinic for an initial evaluation and treatment. Past medical history outlined below. She is currently taking traZODone (DESYREL), dextroamphetamine-amphetamine (ADDERALL), prescribed and managed by Dr. Sotelo/Dr. Amado. She reports that these medications have not addressed her depression/anxiety.     7/2/24: Pt presents to OP Psychiatry for routine follow-up for treatment/medication management of KIANA, MDD, Insomnia, ADHD. Pt reports all medicines have been effectively treating her symptoms, denies any issues or SE, and reports improvement in stress levels due to improved organization of her  "medications, dates of refill, and when to take throughout the day, "It has been so much less stressful on me". Pt denies SI/HI/AVH, self-harm, plan, or intent. Pt verbalizes understanding of medication instructions through teach back. No other issues, concerns, or problems, will reassess symptoms and medications in 3 months or PRN if symptoms worsen.     Past Medical hx:   Past Medical History:   Diagnosis Date    Adrenal insufficiency 2022    Attention Deficit Hyperactivity Disorder     Family H/O Diabetes Mellitus     Her Father    Generalized Anxiety     Left Breast Cancer S.P Lumpectomy In 01/2021     Dr. Dominga Johnston; Dr. Sandra Sotelo (Heme/Onc); 4/28/21 On CMT; Is Estrogen Receptor Negative; She Is BRCA2 Gene Mutation Positive, Andf Her Mother Also With A H/O Breast Cancer    Macrocytic Anemia     Associated With Her Chemotherapy    Postoperative Nausea And Vomiting     Scopolamine Patches Work Well For Her For This    Scoliosis     Therapeutic Drug Monitoring     Wellness Visit 4/28/2021         Interim hx:  Medication changes last visit:     1.  Continue propranoloL (INDERAL) 10 MG tablet - Take 1 tablet (10 mg total) by mouth 2 (two) times daily as needed (anxiety). Pt to monitor blood pressure before taking.  2.  Continue traZODone (DESYREL) 100 MG tablet - Take 1 tablet (100 mg total) by mouth nightly as needed for Insomnia.  3.  Continue dextroamphetamine-amphetamine (ADDERALL) 10 mg Tab - Take 10 mg by mouth 2 (two) times a day for ADHD. Discussed stimulant side effects including effects on sleep, appetite, tics, nervousness, anorexia, insomnia, tachycardia, palpitations, dizziness, BP changes, HR changes, visual disturbance, and headaches.   4.  Continue LORazepam (ATIVAN) 0.5 MG tablet - Take 1 tablet (0.5 mg total) by mouth every 6 (six) hours as needed for Anxiety (prescribed and managed by Dr. Plaza). Discussed risk of decreased RT, sedation, addictive potential, and not to mix with " alcohol.    Anxiety: Improved  Depression: Improved  Sleep: Improved  Appetite: Same, no issues     Denies suicidal/homicidal ideations.  Denies hopelessness/worthlessness.    Denies auditory/visual hallucinations.      Tobacco:  denies  Alcohol:  occasional  Drug use:  denies  Caffeine:  2 cups a week      Review of Systems   PSYCHIATRIC: Pertinent items are noted in the narrative.        CONSTITUTIONAL: weight stable        M/S: no pain today         ENT: no allergies noted today        ABD: no n/v/d     Past Medical, Family and Social History: The patient's past medical, family and social history have been reviewed and updated as appropriate within the electronic medical record. See encounter notes.     Medication Compliance: yes      Side effects: tolerates     Risk Parameters:  Patient reports no suicidal ideation  Patient reports no homicidal ideation  Patient reports no self-injurious behavior  Patient reports no violent behavior     Exam (detailed: at least 9 elements; comprehensive: all 15 elements)   Constitutional  Vitals:  Most recent vital signs, dated less than 90 days prior to this appointment, were reviewed.     General:  unremarkable, age appropriate, casual attire, good eye contact, good rapport       Musculoskeletal  Muscle Strength/Tone:  no flaccidity, no tremor    Gait & Station:  normal      Psychiatric                       Speech:  normal tone, normal rate, rhythm, and volume   Mood & Affect:   Euthymic, congruent, appropriate         Thought Process:   Goal directed; Linear    Associations:   intact   Thought Content:   No SI/HI, delusions, or paranoia, no AV/VH   Insight & Judgement:   Good, adequate to circumstances   Orientation:   grossly intact; alert and oriented x 4    Memory:  intact for content of interview    Language:  grossly intact, can repeat    Attention Span  : Grossly intact for content of interview   Fund of Knowledge:   intact and appropriate to age and level of education         Assessment and Diagnosis   Status/Progress: Based on the examination today, the patient's problem(s) is/are under fair control.  New problems have not been presented today. Comorbidities are not currently complicating management of the primary condition.      Impression:  Pt presents to OP Psychiatry for routine follow-up for treatment/medication management of KIANA, MDD, Insomnia, ADHD. Pt reports all medications effectively treating her symptoms, denies need for any medication changes at this time. States that she has not needed to use raulito PRNS and that the Adderall has been working great.Pt denies SI/HI/AVH, self-harm, plan, or intent. Pt verbalizes understanding of medication instructions through teach back. No other issues, concerns, or problems, will reassess symptoms and medications in 3 months or PRN if symptoms worsen.      Diagnosis:   - KIANA (generalized anxiety disorder)  - MDD (major depressive disorder), recurrent episode, moderate  - Insomnia, psychophysiological  - Attention Deficit Hyperactivity Disorder      Intervention/Counseling/Treatment Plan   Medication Management:      1.  Continue propranoloL (INDERAL) 10 MG tablet - Take 1 tablet (10 mg total) by mouth 2 (two) times daily as needed (anxiety). Pt to monitor blood pressure before taking.     2.  Continue traZODone (DESYREL) 100 MG tablet - Take 1 tablet (100 mg total) by mouth nightly as needed for Insomnia.     3.  Continue dextroamphetamine-amphetamine (ADDERALL) 10 mg Tab - Take 10 mg by mouth 2 (two) times a day for ADHD. Discussed stimulant side effects including effects on sleep, appetite, tics, nervousness, anorexia, insomnia, tachycardia, palpitations, dizziness, BP changes, HR changes, visual disturbance, and headaches.      4.  Continue LORazepam (ATIVAN) 0.5 MG tablet - Take 1 tablet (0.5 mg total) by mouth every 6 (six) hours as needed for Anxiety (prescribed and managed by Dr. Plaza). Discussed risk of decreased RT,  sedation, addictive potential, and not to mix with alcohol.      5.  Call to report any worsening of symptoms or problems with the medication. Pt instructed to go to ER with thoughts of harming self, others     6.  Patient given contact # for psychotherapists at [facility] and also instructed she may check with insurance for list of providers.          Labs: none         Return to clinic:      3 months or PRN if symptoms worsen    -Spent 30min face to face with the pt; >50% time spent in counseling   -Supportive therapy and psychoeducation provided  -R/B/SE's of medications discussed with the pt who expresses understanding and chooses to take medications as prescribed.   -Pt instructed to call clinic, 911 or go to nearest emergency room if sxs worsen or pt is in   crisis. The pt expresses understanding.      Mehnaz Gaitan NP  Psychiatric-Mental Health Nurse Practitioner  Department of Psychiatry    [facility] [place] [address] [place] [address]  Phone:  [phone]  Fax: [phone]

## 2024-10-22 DIAGNOSIS — F90.0 ADHD (ATTENTION DEFICIT HYPERACTIVITY DISORDER), INATTENTIVE TYPE: ICD-10-CM

## 2024-10-22 RX ORDER — DEXTROAMPHETAMINE SACCHARATE, AMPHETAMINE ASPARTATE, DEXTROAMPHETAMINE SULFATE AND AMPHETAMINE SULFATE 2.5; 2.5; 2.5; 2.5 MG/1; MG/1; MG/1; MG/1
10 TABLET ORAL 2 TIMES DAILY
Qty: 60 TABLET | Refills: 0 | Status: CANCELLED | OUTPATIENT
Start: 2024-10-22 | End: 2024-11-21

## 2024-10-23 RX ORDER — DEXTROAMPHETAMINE SACCHARATE, AMPHETAMINE ASPARTATE, DEXTROAMPHETAMINE SULFATE AND AMPHETAMINE SULFATE 2.5; 2.5; 2.5; 2.5 MG/1; MG/1; MG/1; MG/1
10 TABLET ORAL 2 TIMES DAILY
Qty: 60 TABLET | Refills: 0 | Status: SHIPPED | OUTPATIENT
Start: 2024-10-23 | End: 2024-11-24

## 2024-10-23 NOTE — TELEPHONE ENCOUNTER
Last ordered: 2 months ago (8/23/2024) by Mehnaz Gaitan NP     Last dispensed: 9/23/2024       Mehnaz Gaitan Patient   Nov 1/2/25

## 2024-10-24 ENCOUNTER — PATIENT MESSAGE (OUTPATIENT)
Dept: HEMATOLOGY/ONCOLOGY | Facility: CLINIC | Age: 44
End: 2024-10-24
Payer: COMMERCIAL

## 2024-10-29 ENCOUNTER — TELEPHONE (OUTPATIENT)
Dept: HEMATOLOGY/ONCOLOGY | Facility: CLINIC | Age: 44
End: 2024-10-29
Payer: COMMERCIAL

## 2024-10-30 ENCOUNTER — TELEPHONE (OUTPATIENT)
Dept: HEMATOLOGY/ONCOLOGY | Facility: CLINIC | Age: 44
End: 2024-10-30
Payer: COMMERCIAL

## 2024-11-01 ENCOUNTER — INFUSION (OUTPATIENT)
Dept: INFUSION THERAPY | Facility: HOSPITAL | Age: 44
End: 2024-11-01
Attending: INTERNAL MEDICINE
Payer: COMMERCIAL

## 2024-11-01 DIAGNOSIS — Z17.1 MALIGNANT NEOPLASM OF UPPER-OUTER QUADRANT OF LEFT BREAST IN FEMALE, ESTROGEN RECEPTOR NEGATIVE: Primary | ICD-10-CM

## 2024-11-01 DIAGNOSIS — C50.412 MALIGNANT NEOPLASM OF UPPER-OUTER QUADRANT OF LEFT BREAST IN FEMALE, ESTROGEN RECEPTOR NEGATIVE: Primary | ICD-10-CM

## 2024-11-01 PROCEDURE — 63600175 PHARM REV CODE 636 W HCPCS: Mod: PN | Performed by: INTERNAL MEDICINE

## 2024-11-01 PROCEDURE — 25000003 PHARM REV CODE 250: Mod: PN | Performed by: INTERNAL MEDICINE

## 2024-11-01 PROCEDURE — A4216 STERILE WATER/SALINE, 10 ML: HCPCS | Mod: PN | Performed by: INTERNAL MEDICINE

## 2024-11-01 PROCEDURE — 96523 IRRIG DRUG DELIVERY DEVICE: CPT | Mod: PN

## 2024-11-01 RX ORDER — SODIUM CHLORIDE 0.9 % (FLUSH) 0.9 %
10 SYRINGE (ML) INJECTION
Status: DISCONTINUED | OUTPATIENT
Start: 2024-11-01 | End: 2024-11-01 | Stop reason: HOSPADM

## 2024-11-01 RX ORDER — HEPARIN 100 UNIT/ML
500 SYRINGE INTRAVENOUS
Status: DISCONTINUED | OUTPATIENT
Start: 2024-11-01 | End: 2024-11-01 | Stop reason: HOSPADM

## 2024-11-01 RX ORDER — SODIUM CHLORIDE 0.9 % (FLUSH) 0.9 %
10 SYRINGE (ML) INJECTION
OUTPATIENT
Start: 2024-11-01

## 2024-11-01 RX ORDER — HEPARIN 100 UNIT/ML
500 SYRINGE INTRAVENOUS
OUTPATIENT
Start: 2024-11-01

## 2024-11-01 RX ADMIN — Medication 500 UNITS: at 11:11

## 2024-11-01 RX ADMIN — SODIUM CHLORIDE, PRESERVATIVE FREE 10 ML: 5 INJECTION INTRAVENOUS at 11:11

## 2024-11-06 PROBLEM — J98.4 PNEUMONITIS: Status: RESOLVED | Noted: 2021-09-13 | Resolved: 2024-11-06

## 2024-11-06 PROBLEM — R11.2 NAUSEA AND VOMITING: Status: RESOLVED | Noted: 2021-03-11 | Resolved: 2024-11-06

## 2024-11-06 PROBLEM — A41.9 SEPSIS: Status: RESOLVED | Noted: 2022-03-11 | Resolved: 2024-11-06

## 2024-11-06 PROBLEM — K52.3 COLITIS, INDETERMINATE: Status: RESOLVED | Noted: 2021-08-14 | Resolved: 2024-11-06

## 2024-11-06 PROBLEM — Z15.09 BRCA2 GENE MUTATION POSITIVE: Chronic | Status: RESOLVED | Noted: 2021-03-15 | Resolved: 2024-11-06

## 2024-11-06 PROBLEM — K52.1 DIARRHEA DUE TO DRUG: Status: RESOLVED | Noted: 2021-09-13 | Resolved: 2024-11-06

## 2024-11-06 PROBLEM — F41.9 ANXIETY AND DEPRESSION: Status: ACTIVE | Noted: 2024-11-06

## 2024-11-06 PROBLEM — N17.9 AKI (ACUTE KIDNEY INJURY): Status: RESOLVED | Noted: 2022-03-11 | Resolved: 2024-11-06

## 2024-11-06 PROBLEM — F33.0 MILD EPISODE OF RECURRENT MAJOR DEPRESSIVE DISORDER: Status: RESOLVED | Noted: 2023-10-17 | Resolved: 2024-11-06

## 2024-11-06 PROBLEM — E83.42 HYPOMAGNESEMIA: Status: RESOLVED | Noted: 2021-08-14 | Resolved: 2024-11-06

## 2024-11-06 PROBLEM — F32.A ANXIETY AND DEPRESSION: Status: ACTIVE | Noted: 2024-11-06

## 2024-11-06 PROBLEM — R94.31: Status: RESOLVED | Noted: 2021-08-14 | Resolved: 2024-11-06

## 2024-11-06 PROBLEM — R26.89 DECREASED FUNCTIONAL MOBILITY: Status: RESOLVED | Noted: 2021-02-08 | Resolved: 2024-11-06

## 2024-11-06 PROBLEM — K59.00 CONSTIPATION: Status: RESOLVED | Noted: 2023-06-19 | Resolved: 2024-11-06

## 2024-11-06 PROBLEM — E87.6 HYPOKALEMIA: Status: RESOLVED | Noted: 2021-08-14 | Resolved: 2024-11-06

## 2024-11-06 PROBLEM — E86.1 INTRAVASCULAR VOLUME DEPLETION: Status: RESOLVED | Noted: 2021-07-21 | Resolved: 2024-11-06

## 2024-11-06 PROBLEM — E87.1 HYPONATREMIA: Status: RESOLVED | Noted: 2021-07-21 | Resolved: 2024-11-06

## 2024-11-06 PROBLEM — R79.89 ELEVATED LFTS: Status: RESOLVED | Noted: 2021-08-14 | Resolved: 2024-11-06

## 2024-11-06 PROBLEM — R53.83 FATIGUE: Status: RESOLVED | Noted: 2021-06-23 | Resolved: 2024-11-06

## 2024-11-06 PROBLEM — Z00.01 ABNORMAL WELLNESS EXAM: Status: ACTIVE | Noted: 2024-11-06

## 2024-11-06 PROBLEM — R19.7 DIARRHEA: Status: RESOLVED | Noted: 2021-07-21 | Resolved: 2024-11-06

## 2024-11-06 PROBLEM — R41.9 COGNITIVE COMPLAINTS: Status: RESOLVED | Noted: 2023-08-16 | Resolved: 2024-11-06

## 2024-11-06 PROBLEM — Z29.89 ENCOUNTER FOR IMMUNOTHERAPY: Status: RESOLVED | Noted: 2021-09-13 | Resolved: 2024-11-06

## 2024-11-06 PROBLEM — R07.9 CHEST PAIN: Status: RESOLVED | Noted: 2022-03-12 | Resolved: 2024-11-06

## 2024-11-06 PROBLEM — Z15.01 BRCA2 GENE MUTATION POSITIVE: Chronic | Status: RESOLVED | Noted: 2021-03-15 | Resolved: 2024-11-06

## 2024-11-06 PROBLEM — Z90.710 S/P LAPAROSCOPIC HYSTERECTOMY: Status: RESOLVED | Noted: 2022-03-04 | Resolved: 2024-11-06

## 2024-11-06 PROBLEM — F32.A DEPRESSED: Status: RESOLVED | Noted: 2023-03-20 | Resolved: 2024-11-06

## 2024-12-12 DIAGNOSIS — F90.0 ADHD (ATTENTION DEFICIT HYPERACTIVITY DISORDER), INATTENTIVE TYPE: ICD-10-CM

## 2024-12-12 RX ORDER — DEXTROAMPHETAMINE SACCHARATE, AMPHETAMINE ASPARTATE, DEXTROAMPHETAMINE SULFATE AND AMPHETAMINE SULFATE 2.5; 2.5; 2.5; 2.5 MG/1; MG/1; MG/1; MG/1
10 TABLET ORAL 2 TIMES DAILY
Qty: 60 TABLET | Refills: 0 | Status: SHIPPED | OUTPATIENT
Start: 2024-12-12 | End: 2025-01-11

## 2025-01-02 ENCOUNTER — HOSPITAL ENCOUNTER (EMERGENCY)
Facility: HOSPITAL | Age: 45
Discharge: HOME | End: 2025-01-02
Attending: EMERGENCY MEDICINE
Payer: COMMERCIAL

## 2025-01-02 VITALS
WEIGHT: 120 LBS | HEART RATE: 84 BPM | RESPIRATION RATE: 20 BRPM | DIASTOLIC BLOOD PRESSURE: 54 MMHG | OXYGEN SATURATION: 100 % | BODY MASS INDEX: 18.83 KG/M2 | HEIGHT: 67 IN | SYSTOLIC BLOOD PRESSURE: 99 MMHG | TEMPERATURE: 99 F

## 2025-01-02 DIAGNOSIS — E83.42 HYPOMAGNESEMIA: ICD-10-CM

## 2025-01-02 DIAGNOSIS — U07.1 COVID-19 VIRUS INFECTION: Primary | ICD-10-CM

## 2025-01-02 DIAGNOSIS — R05.9 COUGH: ICD-10-CM

## 2025-01-02 DIAGNOSIS — E86.0 DEHYDRATION: ICD-10-CM

## 2025-01-02 DIAGNOSIS — Z13.6 SCREENING FOR CARDIOVASCULAR CONDITION: ICD-10-CM

## 2025-01-02 LAB
ALBUMIN SERPL BCP-MCNC: 4.1 G/DL (ref 3.5–5.2)
ALP SERPL-CCNC: 80 U/L (ref 40–150)
ALT SERPL W/O P-5'-P-CCNC: 12 U/L (ref 10–44)
ANION GAP SERPL CALC-SCNC: 14 MMOL/L (ref 8–16)
AST SERPL-CCNC: 26 U/L (ref 10–40)
BASOPHILS # BLD AUTO: 0.03 K/UL (ref 0–0.2)
BASOPHILS NFR BLD: 0.4 % (ref 0–1.9)
BILIRUB SERPL-MCNC: 0.3 MG/DL (ref 0.1–1)
BUN SERPL-MCNC: 10 MG/DL (ref 6–20)
CALCIUM SERPL-MCNC: 9.4 MG/DL (ref 8.7–10.5)
CHLORIDE SERPL-SCNC: 100 MMOL/L (ref 95–110)
CO2 SERPL-SCNC: 19 MMOL/L (ref 23–29)
CORTIS SERPL-MCNC: 1.2 UG/DL
CREAT SERPL-MCNC: 0.8 MG/DL (ref 0.5–1.4)
DIFFERENTIAL METHOD BLD: ABNORMAL
EOSINOPHIL # BLD AUTO: 0 K/UL (ref 0–0.5)
EOSINOPHIL NFR BLD: 0.2 % (ref 0–8)
ERYTHROCYTE [DISTWIDTH] IN BLOOD BY AUTOMATED COUNT: 12.6 % (ref 11.5–14.5)
EST. GFR  (NO RACE VARIABLE): >60 ML/MIN/1.73 M^2
GLUCOSE SERPL-MCNC: 95 MG/DL (ref 70–110)
HCT VFR BLD AUTO: 36.6 % (ref 37–48.5)
HGB BLD-MCNC: 13 G/DL (ref 12–16)
IMM GRANULOCYTES # BLD AUTO: 0.06 K/UL (ref 0–0.04)
IMM GRANULOCYTES NFR BLD AUTO: 0.7 % (ref 0–0.5)
INFLUENZA A, MOLECULAR: NEGATIVE
INFLUENZA B, MOLECULAR: NEGATIVE
LACTATE SERPL-SCNC: 0.8 MMOL/L (ref 0.5–2.2)
LYMPHOCYTES # BLD AUTO: 1 K/UL (ref 1–4.8)
LYMPHOCYTES NFR BLD: 11.9 % (ref 18–48)
MAGNESIUM SERPL-MCNC: 1.5 MG/DL (ref 1.6–2.6)
MCH RBC QN AUTO: 31 PG (ref 27–31)
MCHC RBC AUTO-ENTMCNC: 35.5 G/DL (ref 32–36)
MCV RBC AUTO: 87 FL (ref 82–98)
MONOCYTES # BLD AUTO: 0.8 K/UL (ref 0.3–1)
MONOCYTES NFR BLD: 10 % (ref 4–15)
NEUTROPHILS # BLD AUTO: 6.5 K/UL (ref 1.8–7.7)
NEUTROPHILS NFR BLD: 76.8 % (ref 38–73)
NRBC BLD-RTO: 0 /100 WBC
PHOSPHATE SERPL-MCNC: 2.7 MG/DL (ref 2.7–4.5)
PLATELET # BLD AUTO: 204 K/UL (ref 150–450)
PMV BLD AUTO: 8.5 FL (ref 9.2–12.9)
POCT GLUCOSE: 99 MG/DL (ref 70–110)
POTASSIUM SERPL-SCNC: 3.8 MMOL/L (ref 3.5–5.1)
PROCALCITONIN SERPL IA-MCNC: 0.02 NG/ML
PROT SERPL-MCNC: 7.1 G/DL (ref 6–8.4)
RBC # BLD AUTO: 4.2 M/UL (ref 4–5.4)
SARS-COV-2 RDRP RESP QL NAA+PROBE: POSITIVE
SODIUM SERPL-SCNC: 133 MMOL/L (ref 136–145)
SPECIMEN SOURCE: NORMAL
TROPONIN I SERPL DL<=0.01 NG/ML-MCNC: <0.006 NG/ML (ref 0–0.03)
TSH SERPL DL<=0.005 MIU/L-ACNC: 0.76 UIU/ML (ref 0.4–4)
WBC # BLD AUTO: 8.43 K/UL (ref 3.9–12.7)

## 2025-01-02 PROCEDURE — 84145 PROCALCITONIN (PCT): CPT | Performed by: EMERGENCY MEDICINE

## 2025-01-02 PROCEDURE — 36415 COLL VENOUS BLD VENIPUNCTURE: CPT | Performed by: EMERGENCY MEDICINE

## 2025-01-02 PROCEDURE — 99285 EMERGENCY DEPT VISIT HI MDM: CPT | Mod: 25

## 2025-01-02 PROCEDURE — 82962 GLUCOSE BLOOD TEST: CPT

## 2025-01-02 PROCEDURE — 84244 ASSAY OF RENIN: CPT | Performed by: EMERGENCY MEDICINE

## 2025-01-02 PROCEDURE — 84100 ASSAY OF PHOSPHORUS: CPT | Performed by: EMERGENCY MEDICINE

## 2025-01-02 PROCEDURE — 87040 BLOOD CULTURE FOR BACTERIA: CPT | Mod: 59 | Performed by: EMERGENCY MEDICINE

## 2025-01-02 PROCEDURE — 71045 X-RAY EXAM CHEST 1 VIEW: CPT | Mod: TC

## 2025-01-02 PROCEDURE — 25000003 PHARM REV CODE 250: Performed by: EMERGENCY MEDICINE

## 2025-01-02 PROCEDURE — 83605 ASSAY OF LACTIC ACID: CPT | Performed by: EMERGENCY MEDICINE

## 2025-01-02 PROCEDURE — 83735 ASSAY OF MAGNESIUM: CPT | Performed by: EMERGENCY MEDICINE

## 2025-01-02 PROCEDURE — 87502 INFLUENZA DNA AMP PROBE: CPT | Performed by: EMERGENCY MEDICINE

## 2025-01-02 PROCEDURE — 85025 COMPLETE CBC W/AUTO DIFF WBC: CPT | Performed by: EMERGENCY MEDICINE

## 2025-01-02 PROCEDURE — 84484 ASSAY OF TROPONIN QUANT: CPT | Performed by: EMERGENCY MEDICINE

## 2025-01-02 PROCEDURE — 71045 X-RAY EXAM CHEST 1 VIEW: CPT | Mod: 26,,, | Performed by: RADIOLOGY

## 2025-01-02 PROCEDURE — 82533 TOTAL CORTISOL: CPT | Performed by: EMERGENCY MEDICINE

## 2025-01-02 PROCEDURE — 84443 ASSAY THYROID STIM HORMONE: CPT | Performed by: EMERGENCY MEDICINE

## 2025-01-02 PROCEDURE — 93010 ELECTROCARDIOGRAM REPORT: CPT | Mod: ,,, | Performed by: INTERNAL MEDICINE

## 2025-01-02 PROCEDURE — 93005 ELECTROCARDIOGRAM TRACING: CPT

## 2025-01-02 PROCEDURE — 87635 SARS-COV-2 COVID-19 AMP PRB: CPT | Performed by: EMERGENCY MEDICINE

## 2025-01-02 PROCEDURE — 63600175 PHARM REV CODE 636 W HCPCS: Performed by: EMERGENCY MEDICINE

## 2025-01-02 PROCEDURE — 94761 N-INVAS EAR/PLS OXIMETRY MLT: CPT

## 2025-01-02 PROCEDURE — 82024 ASSAY OF ACTH: CPT | Performed by: EMERGENCY MEDICINE

## 2025-01-02 PROCEDURE — 80053 COMPREHEN METABOLIC PANEL: CPT | Performed by: EMERGENCY MEDICINE

## 2025-01-02 RX ORDER — LANOLIN ALCOHOL/MO/W.PET/CERES
400 CREAM (GRAM) TOPICAL
Status: COMPLETED | OUTPATIENT
Start: 2025-01-02 | End: 2025-01-02

## 2025-01-02 RX ADMIN — HYDROCORTISONE SODIUM SUCCINATE 100 MG: 100 INJECTION, POWDER, FOR SOLUTION INTRAMUSCULAR; INTRAVENOUS at 03:01

## 2025-01-02 RX ADMIN — SODIUM CHLORIDE 1000 ML: 9 INJECTION, SOLUTION INTRAVENOUS at 03:01

## 2025-01-02 RX ADMIN — MAGNESIUM OXIDE 400 MG: 400 TABLET ORAL at 05:01

## 2025-01-02 NOTE — ED TRIAGE NOTES
Pt presents to ED with c/o of being in adrenal crisis. Reports that symptoms started yesterday. Reports N/V, decreased appetite and fatigue. Tmax at 100.2. Also self administered 1mg hydrocortisone IM at 1300.

## 2025-01-02 NOTE — ED PROVIDER NOTES
History     Chief Complaint   Patient presents with    Fatigue     HPI:  Alison Swann is a 44 y.o. female with PMH as below including secondary adrenal insufficiency (s/t immunotherapy for breast cancer, sees [facility] endocrinology) who presents to the [facility] emergency department for evaluation of severe fatigue at home over the past 1-2 days with associated fever, congestion, malaise.     PCP: Colt Muir MD    Review of patient's allergies indicates:   Allergen Reactions    Penicillins Hives     As a child       Past Medical History:   Diagnosis Date    Adrenal insufficiency     Attention Deficit Hyperactivity Disorder     Family H/O Diabetes Mellitus     Her Father    Generalized Anxiety     Left Breast Cancer S.P Lumpectomy In 2021     Dr. Dominga Johnston; Dr. Sandra Sotelo (Heme/Onc); 21 On CMT; Is Estrogen Receptor Negative; She Is BRCA2 Gene Mutation Positive, Andf Her Mother Also With A H/O Breast Cancer    Macrocytic Anemia     Associated With Her Chemotherapy    Postoperative Nausea And Vomiting     Scopolamine Patches Work Well For Her For This    Scoliosis     Therapeutic Drug Monitoring     Wellness Visit 2021      Past Surgical History:   Procedure Laterality Date    AUGMENTATION OF BREAST  2012    BREAST MASS EXCISION Left 2021    Procedure: EXCISION, MASS, BREAST- 2 oclock left breast with ultrasound guidance;  Surgeon: Rashmi Johnston MD;  Location: [facility];  Service: General;  Laterality: Left;    BREAST SURGERY       SECTION      COLONOSCOPY N/A 2021    Procedure: COLONOSCOPY;  Surgeon: Mason Quintero MD;  Location: [address]);  Service: Endoscopy;  Laterality: N/A;    ESOPHAGOGASTRODUODENOSCOPY N/A 2021    Procedure: EGD (ESOPHAGOGASTRODUODENOSCOPY);  Surgeon: Mason Quintero MD;  Location: [facility] ([address] [place] [address]);  Service: Endoscopy;  Laterality: N/A;    HYSTERECTOMY      INSERTION OF TUNNELED CENTRAL VENOUS  CATHETER (CVC) WITH SUBCUTANEOUS PORT N/A 02/11/2021    Procedure: [id number];  Surgeon: Griffin Becker MD;  Location: [facility] OR;  Service: General;  Laterality: N/A;    LASIK Bilateral     2001    OOPHORECTOMY      ROBOT-ASSISTED LAPAROSCOPIC ABDOMINAL HYSTERECTOMY USING DA NATALIIA XI N/A 03/04/2022    Procedure: XI ROBOTIC HYSTERECTOMY;  Surgeon: Carmella Galvez MD;  Location: [facility] OR;  Service: OB/GYN;  Laterality: N/A;    ROBOT-ASSISTED LAPAROSCOPIC SALPINGO-OOPHORECTOMY USING DA NATALIIA XI Bilateral 03/04/2022    Procedure: XI ROBOTIC SALPINGO-OOPHORECTOMY;  Surgeon: Carmella Galvez MD;  Location: [facility] OR;  Service: OB/GYN;  Laterality: Bilateral;    SENTINEL LYMPH NODE BIOPSY Left 01/19/2021    Procedure: CORE BIOPSY, LYMPH NODE, SENTINEL;  Surgeon: Rashmi Johnston MD;  Location: [facility] OR;  Service: General;  Laterality: Left;    TUBAL LIGATION         Family History   Problem Relation Name Age of Onset    Breast cancer Mother Mirtha 52    Depression Mother Mirtha     Stroke Father Daryn         50s, multiple    Diabetes Father Daryn     Anemia Father Daryn     Hyperlipidemia Father Daryn     COPD Maternal Grandmother MGM     Cancer Other mat uncle         unk abdominal origin- dx 20s    Pancreatic cancer Other MGF's rel         suspected    Colon cancer Neg Hx      Esophageal cancer Neg Hx      Ovarian cancer Neg Hx      Glaucoma Neg Hx      Macular degeneration Neg Hx       Social History     Tobacco Use    Smoking status: Never    Smokeless tobacco: Never   Substance and Sexual Activity    Alcohol use: Yes     Comment: occasionally    Drug use: Never    Sexual activity: Yes     Birth control/protection: See Surgical Hx      Review of Systems     Review of Systems   Constitutional:  Positive for fever.   HENT: Negative.     Eyes: Negative.    Respiratory: Negative.     Cardiovascular: Negative.    Gastrointestinal: Negative.    Endocrine: Negative.    Genitourinary: Negative.   "  Musculoskeletal: Negative.    Skin: Negative.    Allergic/Immunologic: Negative.    Neurological: Negative.    Hematological: Negative.    Psychiatric/Behavioral: Negative.     All other systems reviewed and are negative.       Physical Exam     Initial Vitals [01/02/25 1430]   BP Pulse Resp Temp SpO2   (!) 90/57 107 20 99.4 °F (37.4 °C) 100 %      MAP       --          Nursing notes and vital signs reviewed.  Constitutional: Patient is in no distress. Appears fatigued.   Head: Normocephalic. Atraumatic.   Eyes:  Conjunctivae are not pale. No scleral icterus.   ENT: Mucous membranes moist.   Neck: Supple.   Cardiovascular: Regular rate. Regular rhythm. No murmurs, rubs, or gallops   Pulmonary: No respiratory distress. Clear to auscultation bilaterally   Abdominal: Non-distended.   Musculoskeletal: Moves all extremities. No obvious deformities.   Skin: Warm and dry.   Neurological:  Alert, awake, and appropriate. Normal speech. No acute lateralizing neurologic deficits appreciated.   Psychiatric: Normal affect.       ED Course   Procedures  Vitals:    01/02/25 1430 01/02/25 1554 01/02/25 1612   BP: (!) 90/57     Pulse: 107 90 86   Resp: 20 (!) 31 (!) 28   Temp: 99.4 °F (37.4 °C)     SpO2: 100% 100% 100%   Weight: 54.4 kg (120 lb)     Height: 5' 7" (1.702 m)       Lab Results Interpreted as Abnormal:  Labs Reviewed   CBC W/ AUTO DIFFERENTIAL - Abnormal       Result Value    WBC 8.43      RBC 4.20      Hemoglobin 13.0      Hematocrit 36.6 (*)     MCV 87      MCH 31.0      MCHC 35.5      RDW 12.6      Platelets 204      MPV 8.5 (*)     Immature Granulocytes 0.7 (*)     Gran # (ANC) 6.5      Immature Grans (Abs) 0.06 (*)     Lymph # 1.0      Mono # 0.8      Eos # 0.0      Baso # 0.03      nRBC 0      Gran % 76.8 (*)     Lymph % 11.9 (*)     Mono % 10.0      Eosinophil % 0.2      Basophil % 0.4      Differential Method Automated     COMPREHENSIVE METABOLIC PANEL - Abnormal    Sodium 133 (*)     Potassium 3.8      " Chloride 100      CO2 19 (*)     Glucose 95      BUN 10      Creatinine 0.8      Calcium 9.4      Total Protein 7.1      Albumin 4.1      Total Bilirubin 0.3      Alkaline Phosphatase 80      AST 26      ALT 12      eGFR >60.0      Anion Gap 14     SARS-COV-2 RNA AMPLIFICATION, QUAL - Abnormal    SARS-CoV-2 RNA, Amplification, Qual Positive (*)    MAGNESIUM - Abnormal    Magnesium 1.5 (*)    INFLUENZA A & B BY MOLECULAR    Influenza A, Molecular Negative      Influenza B, Molecular Negative      Flu A & B Source Nasal Swab     CULTURE, BLOOD   CULTURE, BLOOD   TSH    TSH 0.763     LACTIC ACID, PLASMA    Lactate (Lactic Acid) 0.8     PHOSPHORUS    Phosphorus 2.7     TROPONIN I    Troponin I <0.006     CORTISOL, RANDOM   RENIN   ACTH   URINALYSIS, REFLEX TO URINE CULTURE   PROCALCITONIN   LACTIC ACID, PLASMA   POCT GLUCOSE    POCT Glucose 99        All Lab Results:  Results for orders placed or performed during the hospital encounter of 01/02/25   CBC auto differential    Collection Time: 01/02/25  3:00 PM   Result Value Ref Range    WBC 8.43 3.90 - 12.70 K/uL    RBC 4.20 4.00 - 5.40 M/uL    Hemoglobin 13.0 12.0 - 16.0 g/dL    Hematocrit 36.6 (L) 37.0 - 48.5 %    MCV 87 82 - 98 fL    MCH 31.0 27.0 - 31.0 pg    MCHC 35.5 32.0 - 36.0 g/dL    RDW 12.6 11.5 - 14.5 %    Platelets 204 150 - 450 K/uL    MPV 8.5 (L) 9.2 - 12.9 fL    Immature Granulocytes 0.7 (H) 0.0 - 0.5 %    Gran # (ANC) 6.5 1.8 - 7.7 K/uL    Immature Grans (Abs) 0.06 (H) 0.00 - 0.04 K/uL    Lymph # 1.0 1.0 - 4.8 K/uL    Mono # 0.8 0.3 - 1.0 K/uL    Eos # 0.0 0.0 - 0.5 K/uL    Baso # 0.03 0.00 - 0.20 K/uL    nRBC 0 0 /100 WBC    Gran % 76.8 (H) 38.0 - 73.0 %    Lymph % 11.9 (L) 18.0 - 48.0 %    Mono % 10.0 4.0 - 15.0 %    Eosinophil % 0.2 0.0 - 8.0 %    Basophil % 0.4 0.0 - 1.9 %    Differential Method Automated    Comprehensive metabolic panel    Collection Time: 01/02/25  3:00 PM   Result Value Ref Range    Sodium 133 (L) 136 - 145 mmol/L    Potassium 3.8  3.5 - 5.1 mmol/L    Chloride 100 95 - 110 mmol/L    CO2 19 (L) 23 - 29 mmol/L    Glucose 95 70 - 110 mg/dL    BUN 10 6 - 20 mg/dL    Creatinine 0.8 0.5 - 1.4 mg/dL    Calcium 9.4 8.7 - 10.5 mg/dL    Total Protein 7.1 6.0 - 8.4 g/dL    Albumin 4.1 3.5 - 5.2 g/dL    Total Bilirubin 0.3 0.1 - 1.0 mg/dL    Alkaline Phosphatase 80 40 - 150 U/L    AST 26 10 - 40 U/L    ALT 12 10 - 44 U/L    eGFR >60.0 >60 mL/min/1.73 m^2    Anion Gap 14 8 - 16 mmol/L   TSH    Collection Time: 01/02/25  3:00 PM   Result Value Ref Range    TSH 0.763 0.400 - 4.000 uIU/mL   POCT glucose    Collection Time: 01/02/25  3:12 PM   Result Value Ref Range    POCT Glucose 99 70 - 110 mg/dL   Lactic acid, plasma #1    Collection Time: 01/02/25  3:17 PM   Result Value Ref Range    Lactate (Lactic Acid) 0.8 0.5 - 2.2 mmol/L   Magnesium    Collection Time: 01/02/25  3:17 PM   Result Value Ref Range    Magnesium 1.5 (L) 1.6 - 2.6 mg/dL   Phosphorus    Collection Time: 01/02/25  3:17 PM   Result Value Ref Range    Phosphorus 2.7 2.7 - 4.5 mg/dL   Troponin I    Collection Time: 01/02/25  3:17 PM   Result Value Ref Range    Troponin I <0.006 0.000 - 0.026 ng/mL   Influenza A & B by Molecular    Collection Time: 01/02/25  3:23 PM    Specimen: Nasopharyngeal Swab   Result Value Ref Range    Influenza A, Molecular Negative Negative    Influenza B, Molecular Negative Negative    Flu A & B Source Nasal Swab    COVID-19 Rapid Screening    Collection Time: 01/02/25  3:23 PM   Result Value Ref Range    SARS-CoV-2 RNA, Amplification, Qual Positive (A) Negative     *Note: Due to a large number of results and/or encounters for the requested time period, some results have not been displayed. A complete set of results can be found in Results Review.     Imaging Results              X-Ray Chest AP Portable (Final result)  Result time 01/02/25 15:37:46      Final result by Amol [facility]ancock MD (01/02/25 15:37:46)                   Impression:      No definite acute  cardiopulmonary disease      Electronically signed by: Amol Hancock MD  Date:    01/02/2025  Time:    15:37               Narrative:    EXAMINATION:  XR CHEST AP PORTABLE    CLINICAL HISTORY:  Cough, unspecified    TECHNIQUE:  Single frontal view of the chest was performed.    COMPARISON:  October 25, 2022    FINDINGS:  A right subclavian infusion catheter is in place.  There is evidence of prior bilateral breast surgery.  The heart is not enlarged.  No confluent infiltrates are identified.                                     The emergency physician reviewed the vital signs and test results, which are outlined above.     ED Discussion      Patient's evaluation in the ED does not suggest any emergent or life-threatening medical conditions requiring immediate intervention beyond what was provided in the ED, and I believe patient is safe for discharge. Regardless, an unremarkable evaluation in the ED does not preclude the development or presence of a serious or life-threatening condition. As such, patient was given return instructions for any change or worsening of symptoms.       ED Medication(s) Administered:  Medications   magnesium oxide tablet 400 mg (has no administration in time range)   hydrocortisone sodium succinate injection 100 mg (100 mg Intravenous Given 1/2/25 3957)   sodium chloride 0.9% bolus 1,000 mL 1,000 mL (1,000 mLs Intravenous New Bag 1/2/25 1540)       Prescription Management: I performed a review of the patient's current Rx medication list as input by nursing staff.    Patient's Medications   New Prescriptions    MOLNUPIRAVIR 200 MG CAPSULE (EUA)    Take 4 capsules (800 mg total) by mouth every 12 (twelve) hours. for 5 days   Previous Medications    ALPRAZOLAM (XANAX) 0.5 MG TABLET    Take 1 tablet (0.5 mg total) by mouth 2 (two) times daily as needed for Anxiety.    DEXTROAMPHETAMINE-AMPHETAMINE (ADDERALL) 10 MG TAB    Take 1 tablet (10 mg total) by mouth 2 (two) times daily.    FLUTICASONE  PROPIONATE (FLONASE) 50 MCG/ACTUATION NASAL SPRAY    2 sprays (100 mcg total) by Each Nostril route every evening. 2 sprays into each nostril before bed every night    ONDANSETRON (ZOFRAN-ODT) 8 MG TBDL    DISSOLVE 1 TABLET ON THE TONGUE EVERY 6 TO 8 HOURS AS NEEDED FOR NAUSEA    PRASTERONE, DHEA, (INTRAROSA) 6.5 MG INST    Place 6.5 mg (1 capsule) vaginally nightly.    PREDNISONE (DELTASONE) 1 MG TABLET    Take 1 mg by mouth once daily.    PREDNISONE (DELTASONE) 2.5 MG TABLET    Take 2.5 mg by mouth once daily.    PROMETHAZINE (PHENERGAN) 25 MG TABLET    Take 1 tablet (25 mg total) by mouth every 6 (six) hours as needed for Nausea.    TRAZODONE (DESYREL) 100 MG TABLET    Take 1 tablet (100 mg total) by mouth nightly as needed for Insomnia.    VALACYCLOVIR (VALTREX) 500 MG TABLET    as needed.   Modified Medications    No medications on file   Discontinued Medications    No medications on file         Follow-up Information       Colt Muir MD.    Specialty: Internal Medicine  Contact information:  [address] [facility] [address]  [facility] [address]  [phone]               [facility] Emergency Dept.    Specialty: Emergency Medicine  Why: As needed, If symptoms worsen  Contact information:  [address] [place] [id number] [phone]                          Clinical Impression       ICD-10-CM ICD-9-CM   1. COVID-19 virus infection  U07.1 079.89   2. Cough  R05.9 786.2   3. Screening for cardiovascular condition  Z13.6 V81.2   4. Dehydration  E86.0 276.51   5. Hypomagnesemia  E83.42 275.2      ED Disposition Condition    Discharge Stable             Hipolito Fernandez MD  01/02/25 3424

## 2025-01-03 LAB
ACTH PLAS-MCNC: <5 PG/ML (ref 0–46)
OHS QRS DURATION: 82 MS
OHS QTC CALCULATION: 467 MS

## 2025-01-06 ENCOUNTER — OFFICE VISIT (OUTPATIENT)
Dept: PSYCHIATRY | Facility: CLINIC | Age: 45
End: 2025-01-06
Payer: COMMERCIAL

## 2025-01-06 DIAGNOSIS — F32.A ANXIETY AND DEPRESSION: ICD-10-CM

## 2025-01-06 DIAGNOSIS — F41.9 ANXIETY AND DEPRESSION: ICD-10-CM

## 2025-01-06 DIAGNOSIS — F90.0 ADHD (ATTENTION DEFICIT HYPERACTIVITY DISORDER), INATTENTIVE TYPE: Primary | ICD-10-CM

## 2025-01-06 DIAGNOSIS — F51.04 INSOMNIA, PSYCHOPHYSIOLOGICAL: ICD-10-CM

## 2025-01-06 LAB — RENIN PLAS-CCNC: 1.8 NG/ML/H

## 2025-01-06 RX ORDER — DEXTROAMPHETAMINE SACCHARATE, AMPHETAMINE ASPARTATE, DEXTROAMPHETAMINE SULFATE AND AMPHETAMINE SULFATE 2.5; 2.5; 2.5; 2.5 MG/1; MG/1; MG/1; MG/1
10 TABLET ORAL 2 TIMES DAILY
Qty: 60 TABLET | Refills: 0 | Status: SHIPPED | OUTPATIENT
Start: 2025-01-09 | End: 2025-02-08

## 2025-01-06 RX ORDER — PROPRANOLOL HYDROCHLORIDE 10 MG/1
10 TABLET ORAL DAILY PRN
COMMUNITY

## 2025-01-06 NOTE — PROGRESS NOTES
Outpatient Psychiatry Follow-Up Visit    Clinical Status of Patient: Outpatient (Ambulatory)  01/06/2025    The patient location is:  [facility] [facility], [place]   The patient phone number is: [phone]   Visit type: Virtual visit with synchronous audio and video  Each patient to whom he or she provides medical services by telemedicine is:  (1) informed of the relationship between the physician and patient and the respective role of any other health care provider with respect to management of the patient; and (2) notified that he or she may decline to receive medical services by telemedicine and may withdraw from such care at any time.     Chief Complaint: Pt is a 44 y.o. female who presents today for a follow-up. Met with patient.       Interval History and Content of Current Session:  Interim Events/Subjective Report/Content of Current Session:  follow up appointment.    Pt is a 44 y.o. female with past psychiatric hx of KIANA (generalized anxiety disorder), MDD (major depressive disorder), recurrent episode, moderate, Insomnia, psychophysiological, Attention Deficit Hyperactivity Disorder who presents for follow up treatment.     Past Psychiatric hx:   Pt. is a 42 yo F with a past psychiatric hx of Depression, unspecified depression type, KIANA (generalized anxiety disorder), Insomnia, unspecified type presenting to the clinic for an initial evaluation and treatment. Past medical history outlined below. She is currently taking traZODone (DESYREL), dextroamphetamine-amphetamine (ADDERALL), prescribed and managed by Dr. Sotelo/Dr. Amado. She reports that these medications have not addressed her depression/anxiety.     10/3/24: Pt presents to OP Psychiatry for routine follow-up for treatment/medication management of KIANA, MDD, Insomnia, ADHD. Pt reports all medications effectively treating her symptoms, denies need for any medication changes at this time. States that she has not needed to use raulito PRNS and that the  Adderall has been working great.Pt denies SI/HI/AVH, self-harm, plan, or intent. Pt verbalizes understanding of medication instructions through teach back. No other issues, concerns, or problems, will reassess symptoms and medications in 3 months or PRN if symptoms worsen.     Past Medical hx:   Past Medical History:   Diagnosis Date    Adrenal insufficiency 2022    Attention Deficit Hyperactivity Disorder     Family H/O Diabetes Mellitus     Her Father    Generalized Anxiety     Left Breast Cancer S.P Lumpectomy In 01/2021     Dr. Dominga Johnston; Dr. Sandra Sotelo (Heme/Onc); 4/28/21 On CMT; Is Estrogen Receptor Negative; She Is BRCA2 Gene Mutation Positive, Andf Her Mother Also With A H/O Breast Cancer    Macrocytic Anemia     Associated With Her Chemotherapy    Postoperative Nausea And Vomiting     Scopolamine Patches Work Well For Her For This    Scoliosis     Therapeutic Drug Monitoring     Wellness Visit 4/28/2021         Interim hx:  Medication changes last visit:     1.  Continue propranoloL (INDERAL) 10 MG tablet - Take 1 tablet (10 mg total) by mouth 2 (two) times daily as needed (anxiety). Pt to monitor blood pressure before taking.  2.  Continue traZODone (DESYREL) 100 MG tablet - Take 1 tablet (100 mg total) by mouth nightly as needed for Insomnia.  3.  Continue dextroamphetamine-amphetamine (ADDERALL) 10 mg Tab - Take 10 mg by mouth 2 (two) times a day for ADHD. Discussed stimulant side effects including effects on sleep, appetite, tics, nervousness, anorexia, insomnia, tachycardia, palpitations, dizziness, BP changes, HR changes, visual disturbance, and headaches.   4.  Continue LORazepam (ATIVAN) 0.5 MG tablet - Take 1 tablet (0.5 mg total) by mouth every 6 (six) hours as needed for Anxiety (prescribed and managed by Dr. Plaza). Discussed risk of decreased RT, sedation, addictive potential, and not to mix with alcohol.     Anxiety: Good, no issues  Depression: Denies  Sleep: Good, no  issues  Appetite: Same, no issues     Denies suicidal/homicidal ideations.  Denies hopelessness/worthlessness.    Denies auditory/visual hallucinations.      Tobacco:  denies  Alcohol:  occasional  Drug use:  denies  Caffeine:  2 cups a week      Review of Systems   PSYCHIATRIC: Pertinent items are noted in the narrative.        CONSTITUTIONAL: weight stable        M/S: no pain today         ENT: no allergies noted today        ABD: no n/v/d     Past Medical, Family and Social History: The patient's past medical, family and social history have been reviewed and updated as appropriate within the electronic medical record. See encounter notes.     Medication Compliance: yes      Side effects: tolerates     Risk Parameters:  Patient reports no suicidal ideation  Patient reports no homicidal ideation  Patient reports no self-injurious behavior  Patient reports no violent behavior     Exam (detailed: at least 9 elements; comprehensive: all 15 elements)   Constitutional  Vitals:  Most recent vital signs, dated less than 90 days prior to this appointment, were reviewed.    General:  unremarkable, age appropriate, casual attire, good eye contact, good rapport       Musculoskeletal  Muscle Strength/Tone:  no flaccidity, no tremor    Gait & Station:  normal      Psychiatric                       Speech:  normal tone, normal rate, rhythm, and volume   Mood & Affect:   Euthymic, congruent, appropriate         Thought Process:   Goal directed; Linear    Associations:   intact   Thought Content:   No SI/HI, delusions, or paranoia, no AV/VH   Insight & Judgement:   Good, adequate to circumstances   Orientation:   grossly intact; alert and oriented x 4    Memory:  intact for content of interview    Language:  grossly intact, can repeat    Attention Span  : Grossly intact for content of interview   Fund of Knowledge:   intact and appropriate to age and level of education        Assessment and Diagnosis   Status/Progress: Based on  the examination today, the patient's problem(s) is/are under fair control.  New problems have not been presented today. Comorbidities are not currently complicating management of the primary condition.      Impression:  Pt presents to OP Psychiatry for routine follow-up for treatment/medication management of KIANA, MDD, Insomnia, ADHD. Pt reports all medications are effectively treating her symptoms, denies need for any medication changes at this time. Takes her anxiety and sleep PRNS very sparingly, and sleep and appetite are good w/no issues. Unable to make in office visit d/t COVID dx on 1/2, is at her sister's in [place], scheduled next appt in person. Pt denies SI/HI/AVH, self-harm, plan, or intent. Pt verbalizes understanding of medication instructions through teach back. No other issues, concerns, or problems, will reassess symptoms and medications in 3 months or PRN if symptoms worsen.      Diagnosis:   - Anxiety and depression  - Insomnia, psychophysiological  - Attention Deficit Hyperactivity Disorder      Intervention/Counseling/Treatment Plan   Medication Management:      1.  Continue propranoloL (INDERAL) 10 MG tablet - Take 1 tablet (10 mg total) by mouth daily as needed (anxiety). Pt to monitor blood pressure before taking.     2.  Continue traZODone (DESYREL) 100 MG tablet - Take 1 tablet (100 mg total) by mouth nightly as needed for Insomnia.     3.  Continue dextroamphetamine-amphetamine (ADDERALL) 10 mg Tab - Take 10 mg by mouth 2 (two) times a day for ADHD. Discussed stimulant side effects including effects on sleep, appetite, tics, nervousness, anorexia, insomnia, tachycardia, palpitations, dizziness, BP changes, HR changes, visual disturbance, and headaches.      4.  Continue LORazepam (ATIVAN) 0.5 MG tablet - Take 1 tablet (0.5 mg total) by mouth daily as needed for Anxiety (prescribed and managed by Dr. Plaza). Discussed risk of decreased RT, sedation, addictive potential, and not to mix with  alcohol.      5.  Call to report any worsening of symptoms or problems with the medication. Pt instructed to go to ER with thoughts of harming self, others     6.  Patient given contact # for psychotherapists at [facility], [place] and also instructed she may check with insurance for list of providers.          Labs: none         Return to clinic:      3 months or PRN if symptoms worsen    -Spent 30min face to face with the pt; >50% time spent in counseling   -Supportive therapy and psychoeducation provided  -R/B/SE's of medications discussed with the pt who expresses understanding and chooses to take medications as prescribed.   -Pt instructed to call clinic, 911 or go to nearest emergency room if sxs worsen or pt is in   crisis. The pt expresses understanding.      Mehnaz aGitan NP  Psychiatric-Mental Health Nurse Practitioner  Department of Psychiatry    [facility] [place] [address] [place] [address]  Phone:  [phone]  Fax: [phone]

## 2025-01-08 LAB
BACTERIA BLD CULT: NORMAL
BACTERIA BLD CULT: NORMAL

## 2025-01-10 ENCOUNTER — PATIENT MESSAGE (OUTPATIENT)
Dept: HEMATOLOGY/ONCOLOGY | Facility: CLINIC | Age: 45
End: 2025-01-10
Payer: COMMERCIAL

## 2025-01-16 ENCOUNTER — TELEPHONE (OUTPATIENT)
Dept: HEMATOLOGY/ONCOLOGY | Facility: CLINIC | Age: 45
End: 2025-01-16
Payer: COMMERCIAL

## 2025-01-18 ENCOUNTER — PATIENT MESSAGE (OUTPATIENT)
Dept: HEMATOLOGY/ONCOLOGY | Facility: CLINIC | Age: 45
End: 2025-01-18
Payer: COMMERCIAL

## 2025-01-19 ENCOUNTER — PATIENT MESSAGE (OUTPATIENT)
Dept: HEMATOLOGY/ONCOLOGY | Facility: CLINIC | Age: 45
End: 2025-01-19
Payer: COMMERCIAL

## 2025-01-20 ENCOUNTER — TELEPHONE (OUTPATIENT)
Dept: HEMATOLOGY/ONCOLOGY | Facility: CLINIC | Age: 45
End: 2025-01-20
Payer: COMMERCIAL

## 2025-01-20 NOTE — PROGRESS NOTES
Subjective     Patient ID: Alison Swann is a 44 y.o. female.    Chief Complaint: No chief complaint on file.    HPI    The patient location is: home  The chief complaint leading to consultation is: follow up   Visit type: audiovisual  Face to Face time with patient: 16 minutes  25 minutes of total time spent on the encounter, which includes face to face time and non-face to face time preparing to see the patient (eg, review of tests), Obtaining and/or reviewing separately obtained history, Documenting clinical information in the electronic or other health record, Independently interpreting results (not separately reported) and communicating results to the patient/family/caregiver, or Care coordination (not separately reported).   Each patient to whom he or she provides medical services by telemedicine is:  (1) informed of the relationship between the physician and patient and the respective role of any other health care provider with respect to management of the patient; and (2) notified that he or she may decline to receive medical services by telemedicine and may withdraw from such care at any time.    Notes:   Reports the following:  Hot flashes had resolved- last few weeks back nut less intense  Hot then cold, wakes up at night-  Has bed jet to help  No medication changes other than hydrocortisone -> prednisone by endocrinology  Adrenals still not producing cortisol- feels better on prednisone 75% better    Reports pain as follows:  Pain trapezius-> scapula  She has seen chiropractor, massage therapy and done dry needling   Started since surgery but exacerbated recently for unclear reasons  Constant, No alleviating factors  Reports on both sides  Left > right  No imaging other than ortho office [place]-- negative xray this summer    Covid + recently- did lead to adrenal issues- did emergency injection- but reports no other symptoms  No weight changes     Oncology History:  - 12/2019 thermography of breast  "revealed  lymphatic changes in the left axilla.  - 4/2020 self detected a palpable left breast mass  - 6/6/2020 Diagnostic mammogram  Impression:  Suspicious grouping of microcalcifications in the upper-outer left breast corresponding to area of palpable concern.  Biopsy is warranted.Questionable distortion of the breast architecture in the periareolar region of the right breast near the 3 o'clock position without sonographic correlate.  Short-term mammographic follow-up of the right breast at 6 month interval is recommended.  The above findings and recommendations were discussed with the patient at the time of the examination.  BI-RADS CATEGORY 4: SUSPICIOUS ABNORMALITY-BIOPSY SHOULD BE CONSIDERED  - 6/23/2020 imaging guided biopsy  Pathology: ADH (The other calcifications were apparently not sampled)  - 1/19/2021 excision biopsy with Dr. Johnston  Pathology:  1.  BREAST, LEFT, FURTHER DESIGNATED "MASS," EXCISION:   --INVASIVE CARCINOMA OF NO SPECIAL TYPE (DUCTAL, NOT OTHERWISE    SPECIFIED), SPENSER HISTOLOGIC           GRADE 3 OUT OF 3.        --TUMOR MEASURES 42 MILLIMETERS IN MAXIMUM DIMENSION.        --RECEPTOR STUDIES ARE PENDING.   --SPECIMEN ANTERIOR MARGIN IS POSITIVE FOR INVASIVE CARCINOMA; ALL    REMAINING SPECIMEN MARGINS ARE           AT LEAST 2 MILLIMETERS AWAY.   --DUCTAL CARCINOMA IN SITU, HIGH NUCLEAR GRADE, COMPRISING LESS THAN 5%    OF TOTAL TUMOR TISSUE.        --ALL SPECIMEN MARGINS ARE NEGATIVE FOR DUCTAL CARCINOMA IN SITU.        --EXTENSIVE LYMPHOVASCULAR INVASION PRESENT.   --FIBROCYSTIC CHANGES INCLUDING STROMAL FIBROSIS, CYSTIC DILATATION OF    DUCTS, AND APOCRINE           METAPLASIA.        --COLUMNAR CELL CHANGE.   --MICROCALCIFICATIONS ASSOCIATED WITH TUMOR AND WITH BENIGN DUCTAL    PROFILES.   2.  BREAST, LEFT, FURTHER DESIGNATED "MEDIAL MARGIN," EXCISION:        --NO MORPHOLOGIC EVIDENCE OF MALIGNANCY.   --FIBROCYSTIC CHANGES INCLUDING STROMAL FIBROSIS, CYSTIC DILATATION OF    " DUCTS, APOCRINE METAPLASIA,           AND USUAL DUCTAL EPITHELIAL HYPERPLASIA.        --COLUMNAR CELL CHANGE.        --FOCAL MICROCALCIFICATIONS ASSOCIATED WITH BENIGN DUCTAL PROFILES.   3.  LYMPH NODE, LEFT SENTINEL, NEEDLE CORE BIOPSY:   --METASTATIC CARCINOMA OF NO SPECIAL TYPE (DUCTAL, NOT OTHERWISE    SPECIFIED).   ER negative, AK negative, [id number] negative  Ki-67 79%     - 1/27/2021 Breast MRI:  Findings: The breasts have heterogeneous fibroglandular tissue. The background parenchymal enhancement is minimal.   Left  There are 6 similar 29 mm x 23 mm lymph nodes seen in the left axilla.   There is an 8 mm x 7 mm x 6 mm homogeneous, non-mass enhancement in a focal distribution seen in the left breast at 12 o'clock in the middle depth, 5.5 cm from the nipple. Delayed phase is persistent. This is immediately anterior to the pectoralis major muscle, at anterior aspect of implant.   There is a 24 mm x 11 mm x 9 mm clumped, non-mass enhancement in a focal distribution seen in the outer central region of the left breast in the posterior depth. This is along the margins of the resection cavity at is posterior aspect.   Right  There is no evidence of suspicious masses, abnormal enhancement, or other abnormal findings in the right breast.  Impression:  Left  Non-mass Enhancement: Left breast 8 mm x 7 mm x 6 mm non-mass enhancement at the middle 12 o'clock position. Assessment: 4 - Suspicious finding. Biopsy is recommended.  Second look ultrasound followed by ultrasound or MRI-guided biopsy could be performed if it would .  Of note, MRI guided biopsy would carry risk of implant rupture. Non-mass Enhancement: Left breast 24 mm x 11 mm x 9 mm non-mass enhancement at the posterior aspect of the excision cavity in the lateral breast. Assessment: 4 - Suspicious finding.  This is suspicious for residual disease in this patient with history of recent excisional biopsy.   The area is likely not amenable to MRI  guided biopsy.  Surgical consultation recommended. Left axillary adenopathy.  At least 6 abnormal appearing level I axillary nodes are present, suspicious for residual lymph node metastasis.  RightThere is no MR evidence of malignancy in the right breast.  BI-RADS Category:   Overall: 4 - Suspicious  Recommendation:  Surgical consultation recommended.  Left breast biopsy could be performed of focal NME at 12 o'clock in the left breast if clinically indicated.     - 2/2/2021 PET scan:  COMPARISON:  Breast MRI 01/27/2021  FINDINGS:  Quality of the study: Adequate.  In the head and neck, there are no hypermetabolic lesions worrisome for malignancy. There are no hypermetabolic mucosal lesions, and there are no pathologically enlarged or hypermetabolic lymph nodes.  In the chest, there is no definite hypermetabolic breast mass.  There are bilateral breast implants in place.  There is relatively mild diffuse uptake within dense fibroglandular tissue, and within the left breast there is a maximum SUV of 1.9 adjacent to biopsy clips on image 68.  There is also mildly hypermetabolic subcutaneous fat stranding elsewhere in the lateral aspect of the left breast on image 77 likely postprocedural in nature. There are at least half a dozen left level 1 axillary lymph nodes the largest of which are hypermetabolic including a 3 x 2.1 cm node on image 50 with an SUV of 8.6, a 1 cm node on image 55 with an SUV of 4.7, and 2.3 x 1.2 cm node on image 61 with an SUV of 9.2.  There is also a non hypermetabolic 9 x 6 mm right level 2 lymph node.  There are no suspicious lymph nodes in the internal mammary chain.  There are no pulmonary nodules, and there are no pleural or pericardial effusions.  In the abdomen and pelvis, there is physiologic tracer distribution within the abdominal organs and excretion into the genitourinary system, including diffusely within the right ureter and focally within the left.In the bones, there are no  hypermetabolic lesions worrisome for malignancy.  Impression:  1.  No discrete left breast mass identified.  2.  Metastatic left level 1 axillary lymph nodes.  Level 2 larisa metastasis is not excluded.  3.  No evidence of distant metastasis.     - 2/9/2021 ECHO:  The left ventricle is normal in size with normal systolic function. The estimated ejection fraction is 58%  Regimen:  Paclitaxel weekly + carboplatin with Pembrolizumab q 3 weeks followed by AC with Pembrolizumab q 3 weeks followed by Pembroluzimab.(Based on interim analysis of the phase III KEYNOTE-522 the addition of pembrolizumab to neoadjuvant chemotherapy and use of adjuvant pembrolizumab x 9 cycles resulted in improvements in pathologic complete response rate and event-free survival in women with early triple-negative breast cancer.)     BRCA2+     5/3/2021 Breast MRI follow up on treatment in the interval (results below)  Findings:  There are bilateral retropectoral silicone implants. There is a right sided port.  The breasts have extreme amounts of fibroglandular tissue. The background parenchymal enhancement is mild.  There are postsurgical changes in the left upper outer breast and axilla.  There has been significant interval decrease in size of the previously seen abnormal left axillary lymph nodes, now with the largest lymph node measuring 14 x 15 x 10 mm (previously measuring up to 29 mm on the 1/27/21 MRI).  The other left axillary lymph nodes are smaller with mildly irregular shaggy margins, consistent with chemotherapy treatment.   No suspicious enhancement is seen in either breast. The previously seen left 12:00 mid depth focal non mass enhancement anterior to the pectoralis is not seen on the current exam.   No abnormal right axillary lymph nodes or internal mammary lymph nodes are seen.  Impression:  There has been significant interval decrease in size of the previously seen abnormal left axillary lymph nodes, now with the largest lymph  node measuring 14 x 15 x 10 mm (previously measuring up to 29 mm on the 21 MRI).  The other left axillary lymph nodes are smaller with mildly irregular shaggy margins, consistent with chemotherapy treatment.   No suspicious enhancement is seen in either breast.   BI-RADS Category:   Overall: 6 - Known Biopsy-Proven Malignancy     -   Completed surgery with complete pathologic response     - completed XRT     Gyn Hx:  Menarche- 10  , age at 1st live birth 22  OCPs x 4 years  Endometrial ablation      FH:  Mother - diagnosed at age 50 with breast cancer  Mastectomy- bilateral  No chemotherapy or radiation   Remains on Tamoxifen  Treated in [place]  ? 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        See above   HENT:  Negative for trouble swallowing.    Eyes:  Negative for visual disturbance.   Respiratory:  Negative for cough, shortness of breath and wheezing.    Cardiovascular:  Negative for chest pain, palpitations and leg swelling.   Gastrointestinal:  Negative for abdominal distention, abdominal pain, change in bowel habit, constipation, diarrhea, nausea, vomiting and reflux.   Genitourinary:  Positive for hot flashes. Negative for bladder incontinence, decreased urine volume, difficulty urinating, dysuria, frequency and urgency.   Musculoskeletal:  Positive for arthralgias and myalgias. Negative for back pain, joint swelling and joint deformity.   Integumentary:  Negative for color change, pallor, rash, breast mass and breast tenderness.   Neurological:  Negative for dizziness, weakness, light-headedness, numbness and headaches.   Hematological:  Negative for adenopathy.  Does not bruise/bleed easily.   Psychiatric/Behavioral:  Negative for dysphoric mood and sleep disturbance. The patient is not nervous/anxious.    Breast: Negative for mass and tenderness         Objective     Physical Exam  Virtual visit     Assessment and Plan     1. Malignant neoplasm of upper-outer quadrant of left breast in female, estrogen receptor negative    2. Adrenal insufficiency  Overview:  11/25/24  -currently on prednisone 3.5 mg daily.  Doing well.  Currently followed by Dr. Simeon      3. Osteoporosis, unspecified osteoporosis type, unspecified pathological fracture presence    4. Acute pain of both shoulders      BRCA +, TNBC  Continue surveillance  Appropriate prophylactic surgeries completed  Continue annual dermatology exam  RTC 6 months     Adrenal insufficiency- on replacement  This occurred secondary to immunotherapy  Follows with endocrinology     Osteoporosis- monitored    Acute pain - discuss imaging   Route Chart for Scheduling    Med Onc Chart Routing      Follow up with physician . Imaging and virtual post in next 2 weeks   Follow up with [facility]    Infusion scheduling note    Injection scheduling note    Labs    Imaging    Pharmacy appointment    Other referrals                    Supportive Plan Information  OP SHELL SUPPORTIVE Sandra Sotelo MD   Associated Diagnosis: Dehydration   noted on 3/4/2021   Line of treatment: Supportive Care   Treatment goal: Supportive     Upcoming Treatment Dates - OP SHELL SUPPORTIVE    No upcoming days in selected categories.    Therapy Plan Information  PORT FLUSH for Malignant neoplasm of upper-outer quadrant of left breast in female, estrogen receptor negative, noted on 1/28/2021  Flushes  heparin, porcine (PF) 100 unit/mL injection flush 500 Units  500 Units, Intravenous, Every visit  sodium chloride 0.9% flush 10 mL  10 mL, Intravenous, Every visit      No therapy plan of the specified type found.    No therapy plan of the specified type found.

## 2025-01-21 ENCOUNTER — OFFICE VISIT (OUTPATIENT)
Dept: HEMATOLOGY/ONCOLOGY | Facility: CLINIC | Age: 45
End: 2025-01-21
Payer: COMMERCIAL

## 2025-01-21 DIAGNOSIS — M25.512 ACUTE PAIN OF BOTH SHOULDERS: ICD-10-CM

## 2025-01-21 DIAGNOSIS — M81.0 OSTEOPOROSIS, UNSPECIFIED OSTEOPOROSIS TYPE, UNSPECIFIED PATHOLOGICAL FRACTURE PRESENCE: ICD-10-CM

## 2025-01-21 DIAGNOSIS — C50.412 MALIGNANT NEOPLASM OF UPPER-OUTER QUADRANT OF LEFT BREAST IN FEMALE, ESTROGEN RECEPTOR NEGATIVE: Primary | ICD-10-CM

## 2025-01-21 DIAGNOSIS — E27.40 ADRENAL INSUFFICIENCY: ICD-10-CM

## 2025-01-21 DIAGNOSIS — Z17.1 MALIGNANT NEOPLASM OF UPPER-OUTER QUADRANT OF LEFT BREAST IN FEMALE, ESTROGEN RECEPTOR NEGATIVE: Primary | ICD-10-CM

## 2025-01-21 DIAGNOSIS — M25.511 ACUTE PAIN OF BOTH SHOULDERS: ICD-10-CM

## 2025-01-21 DIAGNOSIS — M25.519 SHOULDER PAIN, UNSPECIFIED CHRONICITY, UNSPECIFIED LATERALITY: ICD-10-CM

## 2025-01-21 PROCEDURE — 98006 SYNCH AUDIO-VIDEO EST MOD 30: CPT | Mod: 95,,, | Performed by: INTERNAL MEDICINE

## 2025-01-22 ENCOUNTER — PATIENT MESSAGE (OUTPATIENT)
Dept: HEMATOLOGY/ONCOLOGY | Facility: CLINIC | Age: 45
End: 2025-01-22
Payer: COMMERCIAL

## 2025-01-31 ENCOUNTER — PATIENT MESSAGE (OUTPATIENT)
Dept: HEMATOLOGY/ONCOLOGY | Facility: CLINIC | Age: 45
End: 2025-01-31
Payer: COMMERCIAL

## 2025-02-03 ENCOUNTER — TELEPHONE (OUTPATIENT)
Dept: HEMATOLOGY/ONCOLOGY | Facility: CLINIC | Age: 45
End: 2025-02-03
Payer: COMMERCIAL

## 2025-02-03 DIAGNOSIS — C50.412 MALIGNANT NEOPLASM OF UPPER-OUTER QUADRANT OF LEFT BREAST IN FEMALE, ESTROGEN RECEPTOR NEGATIVE: Primary | ICD-10-CM

## 2025-02-03 DIAGNOSIS — D05.12 DUCTAL CARCINOMA IN SITU OF LEFT BREAST: ICD-10-CM

## 2025-02-03 DIAGNOSIS — Z17.1 MALIGNANT NEOPLASM OF UPPER-OUTER QUADRANT OF LEFT BREAST IN FEMALE, ESTROGEN RECEPTOR NEGATIVE: Primary | ICD-10-CM

## 2025-02-12 DIAGNOSIS — L29.9 ITCHING: Primary | ICD-10-CM

## 2025-02-12 RX ORDER — HYDROXYZINE HYDROCHLORIDE 25 MG/1
25 TABLET, FILM COATED ORAL 3 TIMES DAILY
Qty: 30 TABLET | Refills: 0 | Status: SHIPPED | OUTPATIENT
Start: 2025-02-12

## 2025-02-14 ENCOUNTER — OFFICE VISIT (OUTPATIENT)
Dept: ORTHOPEDICS | Facility: CLINIC | Age: 45
End: 2025-02-14
Payer: COMMERCIAL

## 2025-02-14 ENCOUNTER — PATIENT MESSAGE (OUTPATIENT)
Dept: HEMATOLOGY/ONCOLOGY | Facility: CLINIC | Age: 45
End: 2025-02-14
Payer: COMMERCIAL

## 2025-02-14 VITALS — BODY MASS INDEX: 18.82 KG/M2 | HEIGHT: 67 IN | WEIGHT: 119.94 LBS

## 2025-02-14 DIAGNOSIS — M95.8 WINGED SCAPULA, LEFT: ICD-10-CM

## 2025-02-14 DIAGNOSIS — G25.89 SCAPULAR DYSKINESIS: Primary | ICD-10-CM

## 2025-02-14 PROCEDURE — 99999 PR PBB SHADOW E&M-EST. PATIENT-LVL IV: CPT | Mod: PBBFAC,,, | Performed by: FAMILY MEDICINE

## 2025-02-14 NOTE — PROGRESS NOTES
Subjective     Patient ID: Alison Swann is a 44 y.o. female.    Chief Complaint: Pain of the Left Shoulder    Patient known to me from previous issues here today with complaints of a left-sided shoulder dysfunction.  Does not experience any pain in the area.  States that over the last several months she has noticed the strange crepitus in the area of her posterior shoulder.  She is not able to pinpoint in his she can not see it.  Whenever when she rolls her shoulders back and retract her scapula aid she can hear an audible pop every time.  Has not noticed any weakness or loss of range of motion.  She is mainly concerned about the pop thinking that there is something that could lead to further issues.    Pain  Pertinent negatives include no chest pain, chills, congestion, coughing, headaches, rash or sore throat.       Review of Systems   Constitutional: Negative for chills and decreased appetite.   HENT:  Negative for congestion and sore throat.    Eyes:  Negative for blurred vision.   Cardiovascular:  Negative for chest pain, dyspnea on exertion and palpitations.   Respiratory:  Negative for cough and shortness of breath.    Skin:  Negative for rash.   Neurological:  Negative for difficulty with concentration, disturbances in coordination and headaches.   Psychiatric/Behavioral:  Negative for altered mental status, depression, hallucinations, memory loss and suicidal ideas.           Objective     General    Nursing note and vitals reviewed.  Constitutional: She is oriented to person, place, and time. She appears well-developed and well-nourished.   HENT:   Nose: Nose normal.   Eyes: EOM are normal. Pupils are equal, round, and reactive to light.   Neck: Neck supple.   Cardiovascular:  Normal rate.            Pulmonary/Chest: Effort normal.   Abdominal: Soft.   Neurological: She is alert and oriented to person, place, and time. She has normal reflexes.   Psychiatric: She has a normal mood and affect. Her  behavior is normal. Judgment and thought content normal.         Right Shoulder Exam   Right shoulder exam is normal.    Inspection/Observation   Swelling: absent  Bruising: absent  Scars: absent  Deformity: absent  Scapular Winging: absent  Scapular Dyskinesia: negative    Range of Motion   Active abduction:  normal   Passive abduction:  normal   Extension:  normal   Forward Flexion:  normal   Forward Elevation: normal  Adduction: normal    Tests & Signs   Rg test: negative  Impingement: negative  Active Compression Test (Olmsted's Sign): negative  Speed's Test: negative  Anterior Drawer Test: 0   Posterior Drawer Test: 0    Other   Sensation: normal    Left Shoulder Exam     Inspection/Observation   Swelling: absent  Bruising: absent  Scars: absent  Deformity: absent  Scapular Winging: absent  Scapular Dyskinesia: negative    Crepitus   The patient has crepitus of the scapulothoracic (Mild winging of the left scapula noted on physical exam.  Crepitus is reproduced with scapular retraction.)    Range of Motion   Active abduction:  170   Forward Flexion:  180   Adduction: 90     Tests & Signs   Rg test: negative  Impingement: negative  Active Compression test (Olmsted's Sign): negative  Speed's Test: negative  Anterior Drawer Test: 0  Posterior Drawer Test: 0    Other   Sensation: normal       Muscle Strength   Right Upper Extremity   Shoulder Abduction: 5/5   Shoulder Internal Rotation: 5/5   Shoulder External Rotation: 5/5   Supraspinatus: 5/5   Subscapularis: 5/5   Biceps: 5/5   Left Upper Extremity  Shoulder Abduction: 5/5   Shoulder Internal Rotation: 5/5   Shoulder External Rotation: 5/5   Supraspinatus: 5/5   Subscapularis: 5/5   Biceps: 5/5     Vascular Exam     Right Pulses      Radial:                    2+      Left Pulses      Radial:                    2+        Physical Exam  Vitals and nursing note reviewed.   Constitutional:       Appearance: She is well-developed and well-nourished.    HENT:      Nose: Nose normal.   Eyes:      Extraocular Movements: EOM normal.      Pupils: Pupils are equal, round, and reactive to light.   Cardiovascular:      Rate and Rhythm: Normal rate.      Pulses:           Radial pulses are 2+ on the right side and 2+ on the left side.   Pulmonary:      Effort: Pulmonary effort is normal.   Abdominal:      Palpations: Abdomen is soft.   Musculoskeletal:      Right shoulder: No swelling or deformity.      Left shoulder: No swelling or deformity.      Cervical back: Normal range of motion and neck supple.   Neurological:      Mental Status: She is alert and oriented to person, place, and time.      Deep Tendon Reflexes: Reflexes are normal and symmetric.   Psychiatric:         Mood and Affect: Mood and affect normal.         Behavior: Behavior normal.         Thought Content: Thought content normal.         Judgment: Judgment normal.             Assessment and Plan     Encounter Diagnoses   Name Primary?    Scapular dyskinesis Yes    Winged scapula, left          Alison was seen today for pain.    Diagnoses and all orders for this visit:    Scapular dyskinesis  -     Ambulatory Referral/Consult to Physical Therapy/Occupational Therapy; Future    Winged scapula, left  -     Ambulatory Referral/Consult to Physical Therapy/Occupational Therapy; Future    She does have a mildly wing scapula on the left side on physical exam.  Does appear to be some scapular dysfunction when bringing the scapula through its full range of motion.  Discussed this with her in detail.  Stressed that there was no reason for concern of possible long-term issues with this however she could develop trigger points are myositis in the region should it go on treated.  Believe she would do well with physical therapy for scapular stabilization exercises.  Place that referral for her today.  We will also give her a home exercise program with some simple strengthening exercises of the rhomboid she can do on  her own.

## 2025-02-20 ENCOUNTER — PATIENT MESSAGE (OUTPATIENT)
Dept: HEMATOLOGY/ONCOLOGY | Facility: CLINIC | Age: 45
End: 2025-02-20
Payer: COMMERCIAL

## 2025-02-28 ENCOUNTER — OFFICE VISIT (OUTPATIENT)
Dept: HEMATOLOGY/ONCOLOGY | Facility: CLINIC | Age: 45
End: 2025-02-28
Payer: COMMERCIAL

## 2025-02-28 ENCOUNTER — TELEPHONE (OUTPATIENT)
Dept: HEMATOLOGY/ONCOLOGY | Facility: CLINIC | Age: 45
End: 2025-02-28
Payer: COMMERCIAL

## 2025-02-28 DIAGNOSIS — M81.8 OTHER OSTEOPOROSIS, UNSPECIFIED PATHOLOGICAL FRACTURE PRESENCE: ICD-10-CM

## 2025-02-28 DIAGNOSIS — Z17.1 MALIGNANT NEOPLASM OF UPPER-OUTER QUADRANT OF LEFT BREAST IN FEMALE, ESTROGEN RECEPTOR NEGATIVE: Primary | ICD-10-CM

## 2025-02-28 DIAGNOSIS — C50.412 MALIGNANT NEOPLASM OF UPPER-OUTER QUADRANT OF LEFT BREAST IN FEMALE, ESTROGEN RECEPTOR NEGATIVE: Primary | ICD-10-CM

## 2025-02-28 NOTE — PROGRESS NOTES
"   The patient location is: [facility]     The chief complaint leading to consultation is: Vitamins/Supplements     Visit type: audiovisual     Face to Face time with patient:   45 minutes of total time spent on the encounter, which includes face to face time and non-face to face time preparing to see the patient (eg, review of tests), Obtaining and/or reviewing separately obtained history, Documenting clinical information in the electronic or other health record, Independently interpreting results (not separately reported) and communicating results to the patient/family/caregiver, or Care coordination (not separately reported).       Each patient to whom he or she provides medical services by telemedicine is:  (1) informed of the relationship between the physician and patient and the respective role of any other health care provider with respect to management of the patient; and (2) notified that he or she may decline to receive medical services by telemedicine and may withdraw from such care at any time.              [facility] Follow up Visit    Mrs. Alison Swann presents virtually to [facility] for recommendations pertaining to vitamins and supplements. Currently post-treatment and on active surveillance for breast cancer under the care of Sharon Moeller NP and Dr. Sandra Sotelo.     Prior history of Triple Negative Left Breast Cancer and BRCA 2+ mutation. She received Keynote 522 regimen utilizing Pembrolizumab w/CarboTaxol and Pembrolizumab with AC in the neoadjuvant setting prior to adjuvant maintainence Pembrolizumab, bilateral mastectomy and left chest radiation 11/19/21-1/4/22. NICOLETTE/BSO performed 3/4/22 by Dr. Carmella Galvez of [facility].      Pertinent history of Osteoporosis, Adrenal Insufficiency, Anemia and Chronic Insomnia  Taking supplements for "sleep, anxiety, hydration and muscle"  Her entire supplement list is not available to me.  Today she has questions about " certain supplements form [employer]- Fit, Cognifocus Power blend, Solar Sport and another with Ashwagandha, Lemon Balm,  ROLANDO, L- Theanine     PCP: Colt Muir MD  Dexa: 1/3/24 + osteoporosis  Echo: 3/12/22  Endocrinology: Dr. Mcconnell 3/22/24  Labs: 1/2/25  C-Scope: 8/16/21; 5 years      Cancer/Stage/TNM:   Cancer Staging   Malignant neoplasm of upper-outer quadrant of left breast in female, estrogen receptor negative  Staging form: Breast, AJCC 8th Edition  - Clinical stage from 1/19/2021: Stage IIIB (cT2, cN1, cM0, G3, ER-, WV-, HER2-) - Signed by Jhon Suazo MD on 2/3/2021       Oncology History   Malignant neoplasm of upper-outer quadrant of left breast in female, estrogen receptor negative   1/19/2021 Cancer Staged    Staging form: Breast, AJCC 8th Edition  - Clinical stage from 1/19/2021: Stage IIIB (cT2, cN1, cM0, G3, ER-, WV-, HER2-)     1/28/2021 Initial Diagnosis    Malignant neoplasm of upper-outer quadrant of left breast in female, estrogen receptor negative     2/18/2021 - 2/18/2021 Chemotherapy    Treatment Summary   Plan Name: OP BREAST DOSE-DENSE AC - DOXORUBICIN CYCLOPHOSPHAMIDE Q2W  Treatment Goal: Curative  Status: Inactive  Start Date:   End Date:   Provider: Jhon Suazo MD  Chemotherapy: DOXOrubicin chemo injection 96 mg, 60 mg/m2, Intravenous, Clinic/HOD 1 time, 0 of 4 cycles  cyclophosphamide (CYTOXAN) 600 mg/m2 = 965 mg in sodium chloride 0.9% 250 mL chemo infusion, 600 mg/m2, Intravenous, Clinic/HOD 1 time, 0 of 4 cycles     2/25/2021 - 7/6/2021 Chemotherapy    Treatment Summary   Plan Name: OP BREAST PACLITAXEL WEEKLY + CARBOPLATIN (AUC 5) Q3W FOLLOWED BY AC WITH PEMBROLIZUMAB FOLLOWED BY PEMBROLIZUMAB   Treatment Goal: Curative  Status: Inactive  Start Date: 2/25/2021  End Date: 7/6/2021  Provider: Sandra Sotelo MD  Chemotherapy: DOXOrubicin chemo injection 112 mg, 60 mg/m2 = 112 mg (100 % of original dose 60 mg/m2), Intravenous, Once, 3 of 4 cycles  Dose modification: 60 mg/m2  (original dose 60 mg/m2, Cycle 5)  Administration: 112 mg (5/25/2021), 112 mg (6/15/2021), 110 mg (7/6/2021)  CARBOplatin (PARAPLATIN) 605 mg in sodium chloride 0.9% 250 mL chemo infusion, 605 mg (100 % of original dose 603.5 mg), Intravenous, Clinic/HOD 1 time, 4 of 4 cycles  Dose modification:   (original dose 603.5 mg, Cycle 1)  Administration: 605 mg (2/25/2021), 630 mg (3/22/2021), 750 mg (4/13/2021), 750 mg (5/4/2021)  cyclophosphamide 600 mg/m2 = 1,100 mg in sodium chloride 0.9% 100 mL chemo infusion, 600 mg/m2 = 1,100 mg (100 % of original dose 600 mg/m2), Intravenous, Clinic/HOD 1 time, 3 of 4 cycles  Dose modification: 600 mg/m2 (original dose 600 mg/m2, Cycle 5), 600 mg/m2 (Cycle 8)  Administration: 1,100 mg (5/25/2021), 1,100 mg (6/15/2021), 1,100 mg (7/6/2021)  PACLitaxeL (TAXOL) 80 mg/m2 = 132 mg in sodium chloride 0.9% 250 mL chemo infusion, 80 mg/m2 = 132 mg (100 % of original dose 80 mg/m2), Intravenous, Clinic/HOD 1 time, 4 of 4 cycles  Dose modification: 80 mg/m2 (original dose 80 mg/m2, Cycle 1)  Administration: 132 mg (2/25/2021), 132 mg (3/4/2021), 132 mg (3/11/2021), 138 mg (3/22/2021), 138 mg (3/30/2021), 138 mg (4/6/2021), 138 mg (4/13/2021), 138 mg (4/20/2021), 144 mg (4/27/2021), 144 mg (5/4/2021), 144 mg (5/11/2021), 144 mg (5/18/2021)  pembrolizumab (KEYTRUDA) 200 mg in sodium chloride 0.9% 100 mL chemo infusion, 200 mg, Intravenous, Clinic/[facility] 1 time, 7 of 18 cycles  Administration: 200 mg (2/25/2021), 200 mg (5/25/2021), 200 mg (3/22/2021), 200 mg (4/13/2021), 200 mg (5/4/2021), 200 mg (6/15/2021), 200 mg (7/6/2021)     4/15/2021 - 4/15/2021 Chemotherapy    Treatment Summary   Plan Name: OP BREAST PEMBROLIZUMAB Q3W PACLITAXEL CARBOPLATIN WEEKLY FOLLOWED BY PEMBROLIZUMAB DOXORUBICIN CYCLOPHOSPHAMIDE Q3W   Treatment Goal: Curative  Status: Inactive  Start Date:   End Date:   Provider: Jhon Suazo MD  Chemotherapy: CARBOplatin (PARAPLATIN) in sodium chloride 0.9% 250 mL chemo infusion,   (original dose ), Intravenous, Clinic/HOD 1 time, 0 of 3 cycles  Dose modification:   (Cycle 1)  cyclophosphamide in sodium chloride 0.9% chemo infusion, 600 mg/m2 = 965 mg (original dose ), Intravenous, Clinic/HOD 1 time, 0 of 1 cycle  Dose modification: 600 mg/m2 (Cycle 2)  DOXOrubicin (ADRIAMYCIN) in sodium chloride 0.9% 250 mL chemo infusion, 60 mg/m2 (original dose ), Intravenous, Clinic/HOD 1 time, 0 of 1 cycle  Dose modification: 60 mg/m2 (Cycle 2)  PACLitaxeL (TAXOL) in sodium chloride 0.9% 100 mL chemo infusion, 80 mg/m2 (original dose ), Intravenous, Clinic/HOD 1 time, 0 of 1 cycle  Dose modification: 80 mg/m2 (Cycle 1)  PEMEtrexed (ALIMTA) in sodium chloride 0.9% chemo infusion, 500 mg/m2, Intravenous, Clinic/HOD 1 time, 0 of 33 cycles  pembrolizumab (KEYTRUDA) 200 mg in sodium chloride 0.9% 100 mL chemo infusion, 200 mg, Intravenous, Clinic/HOD 1 time, 0 of 35 cycles     11/19/2021 - 1/4/2022 Radiation Therapy    Treating physician: Maryann Perales  Total Dose: 50 Gy  Fractions: 25    DIAGNOSIS:  C50.412 - Malignant neoplasm of upper-outer quadrant of left female breast, Diagnosed 11/11/2021 (Active) Stage IIIB, T2, N1, M0, G3, HER2 Neg, ER Neg, WI Neg    This is a 41 y.o. y/o female with cT2,c N1 and M0 G3, (Stage IIIB), triple negative, Ki-67 79%, LEFT upper outer quadrant breast, BRCA2+, status post lumpectomy 1/19/21, with residual larisa disease, followed by  adjuvant chemo-immunotherapy with carbo-taxol and pembrolizumab, followed by AC with pembrolizumab, followed by adjuvant pemprolizumab complicated by ongoing, chronic colitis/diarrhea necessitating long-term prednisone.  Underwent bilateral mastectomy 9/29/21 with no residual carcinoma identified in breast of 14 sampled nodes (ypN0). She has completed adjuvant LEFT breast and regional larisa radiation therapy to a dose of 50Gy.    Treatment Summary  Course: C1 BREAST 2021    Treatment Site Ref. ID Energy Dose/Fx (Gy) #Fx Dose Correction (Gy)  Total Dose (Gy) Start Date End Date Elapsed Days   3D Sclav_L rxsc 18X/6X 2 25 / 25 0 50 11/19/2021 1/4/2022 46   3D Breast L Breast_L 18X/6X 2 25 / 25 0 50 11/19/2021 1/4/2022 46       Tolerated well.  2 day treatment break at patient's request for patch of dry desquamation, and 5 day treatment break due to CoVID infection.  Otherwise, patient completed her course of therapy as prescribed.       PLAN: RTC 1 month for routine follow up. Continue current skin care/sun protection for now.  Follow up with other providers as directed.           Past Medical History:   Diagnosis Date    Adrenal insufficiency 2022    Anemia, unspecified     Attention Deficit Hyperactivity Disorder     Family H/O Diabetes Mellitus     Her Father    Generalized Anxiety     Left Breast Cancer S.P Lumpectomy In 01/2021     Dr. Dominga Johnston; Dr. Sandra Sotelo (Heme/Onc); 4/28/21 On CMT; Is Estrogen Receptor Negative; She Is BRCA2 Gene Mutation Positive, Andf Her Mother Also With A H/O Breast Cancer    Macrocytic Anemia     Associated With Her Chemotherapy    Osteoporosis     Postoperative Nausea And Vomiting     Scopolamine Patches Work Well For Her For This    Scoliosis     Therapeutic Drug Monitoring     Wellness Visit 4/28/2021         Current Outpatient Medications   Medication Instructions    ALPRAZolam (XANAX) 0.5 mg, Oral, 2 times daily PRN    dexAMETHasone (DECADRON) 4 mg/mL injection Inject 4 mg (1 ml) into the muscle for adrenal crisis    dextroamphetamine-amphetamine (ADDERALL) 10 mg Tab 10 mg, Oral, 2 times daily    hydrOXYzine HCL (ATARAX) 25 mg, Oral, 3 times daily    ondansetron (ZOFRAN-ODT) 8 MG TbDL DISSOLVE 1 TABLET ON THE TONGUE EVERY 6 TO 8 HOURS AS NEEDED FOR NAUSEA    prasterone, dhea, (INTRAROSA) 6.5 mg Inst Place 6.5 mg (1 capsule) vaginally nightly.    predniSONE (DELTASONE) 2.5 mg, Daily    predniSONE (DELTASONE) 1 mg, Daily    promethazine (PHENERGAN) 25 mg, Oral, Every 6 hours PRN    propranoloL (INDERAL) 10 mg,  Daily PRN    traZODone (DESYREL) 100 mg, Oral, Nightly PRN    valACYclovir (VALTREX) 500 MG tablet As needed (PRN)        Past Surgical History:   Procedure Laterality Date    AUGMENTATION OF BREAST  2012    BREAST MASS EXCISION Left 2021    Procedure: EXCISION, MASS, BREAST- 2 oclock left breast with ultrasound guidance;  Surgeon: Rashmi Johsnton MD;  Location: [facility] OR;  Service: General;  Laterality: Left;    BREAST SURGERY       SECTION      COLONOSCOPY N/A 2021    Procedure: COLONOSCOPY;  Surgeon: Mason Quintero MD;  Location: [facility] ENDO (2ND FLR);  Service: Endoscopy;  Laterality: N/A;    ESOPHAGOGASTRODUODENOSCOPY N/A 2021    Procedure: EGD (ESOPHAGOGASTRODUODENOSCOPY);  Surgeon: Mason Quintero MD;  Location: [facility] ENDO (2ND FLR);  Service: Endoscopy;  Laterality: N/A;    HYSTERECTOMY      INSERTION OF TUNNELED CENTRAL VENOUS CATHETER (CVC) WITH SUBCUTANEOUS PORT N/A 2021    Procedure: [id number];  Surgeon: Griffin Becker MD;  Location: [facility] OR;  Service: General;  Laterality: N/A;    LASIK Bilateral     2001    OOPHORECTOMY      ROBOT-ASSISTED LAPAROSCOPIC ABDOMINAL HYSTERECTOMY USING DA NATALIIA XI N/A 2022    Procedure: XI ROBOTIC HYSTERECTOMY;  Surgeon: Carmella Galvez MD;  Location: [facility];  Service: OB/GYN;  Laterality: N/A;    ROBOT-ASSISTED LAPAROSCOPIC SALPINGO-OOPHORECTOMY USING DA NATALIIA XI Bilateral 2022    Procedure: XI ROBOTIC SALPINGO-OOPHORECTOMY;  Surgeon: Carmella Galvez MD;  Location: [facility];  Service: OB/GYN;  Laterality: Bilateral;    SENTINEL LYMPH NODE BIOPSY Left 2021    Procedure: CORE BIOPSY, LYMPH NODE, SENTINEL;  Surgeon: Rashmi Johnston MD;  Location: [facility] OR;  Service: General;  Laterality: Left;    TUBAL LIGATION                  Physical Exam   LMP  (LMP Unknown) Comment: hysterectomy 3/4/22   Wt Readings from Last 3 Encounters:   25 54.4 kg (119 lb 14.9 oz)   25 54.4 kg (120 lb)    11/25/24 54.9 kg (121 lb)     Temp Readings from Last 3 Encounters:   01/02/25 98.8 °F (37.1 °C) (Oral)   11/25/24 98.3 °F (36.8 °C) (Temporal)   07/18/24 97 °F (36.1 °C)     BP Readings from Last 3 Encounters:   01/02/25 (!) 99/54   11/25/24 118/79   07/18/24 111/72     Pulse Readings from Last 3 Encounters:   01/02/25 84   11/25/24 78   07/18/24 84      Body mass index is 18.78. Physical Exam deferred for virtual visit today. Prior history of VS reviewed.     Review of Systems:   Cardiac:           No SOB, chest pain with exertion,edema, orthopnea  Distress:          No excessive sadness, no hopelessness, no anhedonia, no excessive worry or nervousness  Cognitive:        No trouble with memory, no difficulty paying attention, no brain fog, no trouble functioning with work or home life  Fatigue:           Energy level adequate, performing ADL's, no morning fatigue                           Fatigue  1  / 10  ( Scale 0 - 10)   Hormonal:       No hot flashes, no night sweats  Pain:                Has no pain,  location:                          Pain 0   / 10 (Scale 0 - 10)    Neuropathy:    No numbness, no tingling, no paresthesia   Sleep:              No difficulty falling asleep, no waking up in night, no daytime sleepiness, no snoring  Altered function:          No problems with money management,  no problems with daily organization & planning  Weight:           No concerns with weight, wants to lose a little and maintain healthy        Labs:   Lab Results   Component Value Date    WBC 8.43 01/02/2025    HGB 13.0 01/02/2025    HCT 36.6 (L) 01/02/2025    MCV 87 01/02/2025     01/02/2025           Hemoglobin A1C   Date Value Ref Range Status   02/06/2024 5.2 0.0 - 5.6 % Final     Comment:     Reference Interval:  5.0 - 5.6 Normal   5.7 - 6.4 High Risk   > 6.5 Diabetic      Hgb A1c results are standardized based on the (NGSP) National   Glycohemoglobin Standardization Program.      Hemoglobin A1C levels are  related to mean serum/plasma glucose   during the preceding 2-3 months.        10/12/2022 5.0 4.0 - 5.6 % Final     Comment:     ADA Screening Guidelines:  5.7-6.4%  Consistent with prediabetes  >or=6.5%  Consistent with diabetes    High levels of fetal hemoglobin interfere with the HbA1C  assay. Heterozygous hemoglobin variants (HbS, HgC, etc)do  not significantly interfere with this assay.   However, presence of multiple variants may affect accuracy.     03/30/2021 5.1 4.0 - 5.6 % Final     Comment:     ADA Screening Guidelines:  5.7-6.4%  Consistent with prediabetes  >or=6.5%  Consistent with diabetes    High levels of fetal hemoglobin interfere with the HbA1C  assay. Heterozygous hemoglobin variants (HbS, HgC, etc)do  not significantly interfere with this assay.   However, presence of multiple variants may affect accuracy.       Estradiol <15 on 8/27/24  Testosterone 216 on 8/27/24      Assessment:       Plan   Nutrition: Continue to encourage plant based diet; given risk factors of Cardiac Disease discussed anti-inflammatory diet as well as foods to decrease effects of diabetes and prediabetes.   Sleep: Recommend 6-8 hours of restful sleep nightly; recommend strong sleep hygiene routine one hour prior to bed. Discussed relaxation, Magnesium Glycinate 400mg nightly and guided imagery prior to bed.     Continue PT for scapular dyskinesis  Counseled her that I do not recommend any supplements with propietary blends  Counseled her that my counseling today applies to her and that we cannot extrapolate that to her followers because their medical history is unknown to her  Counseled her that I would recommend Ashwagandha by itself from [facility]  Counseled her that [employer] supplements are not on [employer] so I cannot speak to their purity  Supplements are not FDA regulated. It is better to use supplements that are reviewed by [employer] or something similar to check for purity, potency and no harmful  ingredients  Continue Trazodone for sleep  Lions juan is appropriate for her   Counseled her on use of [place] Ginseng  Counseled her on use of Creatine- sourced from [employer]  Patient to bring in labs and full list of supplements at next Oncology appointment. We can schedule a follow up in the future

## 2025-02-28 NOTE — NURSING
Confirmed appt with Dr. Damon 2/26/25 for appt 2/28/25 virtual for 9am. RN Billy conducted chart review for clinic preparations including intake, barriers to care and opportunities for greater evaluation/recommendations by provider, Debi Briggs, JACKIE.

## 2025-03-10 ENCOUNTER — PATIENT MESSAGE (OUTPATIENT)
Dept: HEMATOLOGY/ONCOLOGY | Facility: CLINIC | Age: 45
End: 2025-03-10
Payer: COMMERCIAL

## 2025-03-12 ENCOUNTER — HOSPITAL ENCOUNTER (EMERGENCY)
Facility: HOSPITAL | Age: 45
Discharge: HOME | End: 2025-03-12
Attending: STUDENT IN AN ORGANIZED HEALTH CARE EDUCATION/TRAINING PROGRAM
Payer: COMMERCIAL

## 2025-03-12 VITALS
DIASTOLIC BLOOD PRESSURE: 55 MMHG | SYSTOLIC BLOOD PRESSURE: 92 MMHG | HEART RATE: 82 BPM | RESPIRATION RATE: 16 BRPM | BODY MASS INDEX: 18.83 KG/M2 | TEMPERATURE: 99 F | HEIGHT: 67 IN | OXYGEN SATURATION: 100 % | WEIGHT: 120 LBS

## 2025-03-12 DIAGNOSIS — J11.1 INFLUENZA: Primary | ICD-10-CM

## 2025-03-12 DIAGNOSIS — R50.9 FEVER: ICD-10-CM

## 2025-03-12 DIAGNOSIS — E87.1 HYPONATREMIA: ICD-10-CM

## 2025-03-12 LAB
ALBUMIN SERPL BCP-MCNC: 3.7 G/DL (ref 3.5–5.2)
ALP SERPL-CCNC: 71 U/L (ref 40–150)
ALT SERPL W/O P-5'-P-CCNC: 11 U/L (ref 10–44)
ANION GAP SERPL CALC-SCNC: 9 MMOL/L (ref 8–16)
AST SERPL-CCNC: 19 U/L (ref 10–40)
B-HCG UR QL: NEGATIVE
BACTERIA #/AREA URNS HPF: ABNORMAL /HPF
BASOPHILS # BLD AUTO: 0.01 K/UL (ref 0–0.2)
BASOPHILS NFR BLD: 0.3 % (ref 0–1.9)
BILIRUB SERPL-MCNC: 0.2 MG/DL (ref 0.1–1)
BILIRUB UR QL STRIP: NEGATIVE
BUN SERPL-MCNC: 10 MG/DL (ref 6–20)
CALCIUM SERPL-MCNC: 8.4 MG/DL (ref 8.7–10.5)
CHLORIDE SERPL-SCNC: 96 MMOL/L (ref 95–110)
CLARITY UR: CLEAR
CO2 SERPL-SCNC: 23 MMOL/L (ref 23–29)
COLOR UR: YELLOW
CREAT SERPL-MCNC: 0.8 MG/DL (ref 0.5–1.4)
DIFFERENTIAL METHOD BLD: ABNORMAL
EOSINOPHIL # BLD AUTO: 0 K/UL (ref 0–0.5)
EOSINOPHIL NFR BLD: 0 % (ref 0–8)
ERYTHROCYTE [DISTWIDTH] IN BLOOD BY AUTOMATED COUNT: 11.9 % (ref 11.5–14.5)
EST. GFR  (NO RACE VARIABLE): >60 ML/MIN/1.73 M^2
GLUCOSE SERPL-MCNC: 98 MG/DL (ref 70–110)
GLUCOSE UR QL STRIP: NEGATIVE
HCT VFR BLD AUTO: 32.6 % (ref 37–48.5)
HGB BLD-MCNC: 11.4 G/DL (ref 12–16)
HGB UR QL STRIP: ABNORMAL
IMM GRANULOCYTES # BLD AUTO: 0.01 K/UL (ref 0–0.04)
IMM GRANULOCYTES NFR BLD AUTO: 0.3 % (ref 0–0.5)
INFLUENZA A, MOLECULAR: POSITIVE
INFLUENZA B, MOLECULAR: NEGATIVE
KETONES UR QL STRIP: ABNORMAL
LACTATE SERPL-SCNC: 0.6 MMOL/L (ref 0.5–2.2)
LEUKOCYTE ESTERASE UR QL STRIP: ABNORMAL
LYMPHOCYTES # BLD AUTO: 0.4 K/UL (ref 1–4.8)
LYMPHOCYTES NFR BLD: 11.5 % (ref 18–48)
MCH RBC QN AUTO: 31 PG (ref 27–31)
MCHC RBC AUTO-ENTMCNC: 35 G/DL (ref 32–36)
MCV RBC AUTO: 89 FL (ref 82–98)
MICROSCOPIC COMMENT: ABNORMAL
MONOCYTES # BLD AUTO: 0.4 K/UL (ref 0.3–1)
MONOCYTES NFR BLD: 10.1 % (ref 4–15)
NEUTROPHILS # BLD AUTO: 2.8 K/UL (ref 1.8–7.7)
NEUTROPHILS NFR BLD: 77.8 % (ref 38–73)
NITRITE UR QL STRIP: NEGATIVE
NRBC BLD-RTO: 0 /100 WBC
PH UR STRIP: 6 [PH] (ref 5–8)
PLATELET # BLD AUTO: 152 K/UL (ref 150–450)
PMV BLD AUTO: 8.6 FL (ref 9.2–12.9)
POTASSIUM SERPL-SCNC: 3.9 MMOL/L (ref 3.5–5.1)
PROT SERPL-MCNC: 6.4 G/DL (ref 6–8.4)
PROT UR QL STRIP: NEGATIVE
RBC # BLD AUTO: 3.68 M/UL (ref 4–5.4)
RBC #/AREA URNS HPF: 1 /HPF (ref 0–4)
SARS-COV-2 RDRP RESP QL NAA+PROBE: NEGATIVE
SODIUM SERPL-SCNC: 128 MMOL/L (ref 136–145)
SP GR UR STRIP: 1.02 (ref 1–1.03)
SPECIMEN SOURCE: ABNORMAL
SQUAMOUS #/AREA URNS HPF: ABNORMAL /HPF
URN SPEC COLLECT METH UR: ABNORMAL
UROBILINOGEN UR STRIP-ACNC: NEGATIVE EU/DL
WBC # BLD AUTO: 3.57 K/UL (ref 3.9–12.7)
WBC #/AREA URNS HPF: 7 /HPF (ref 0–5)

## 2025-03-12 PROCEDURE — 63600175 PHARM REV CODE 636 W HCPCS: Performed by: STUDENT IN AN ORGANIZED HEALTH CARE EDUCATION/TRAINING PROGRAM

## 2025-03-12 PROCEDURE — 87635 SARS-COV-2 COVID-19 AMP PRB: CPT | Performed by: STUDENT IN AN ORGANIZED HEALTH CARE EDUCATION/TRAINING PROGRAM

## 2025-03-12 PROCEDURE — 81025 URINE PREGNANCY TEST: CPT | Performed by: STUDENT IN AN ORGANIZED HEALTH CARE EDUCATION/TRAINING PROGRAM

## 2025-03-12 PROCEDURE — 96375 TX/PRO/DX INJ NEW DRUG ADDON: CPT

## 2025-03-12 PROCEDURE — 25000003 PHARM REV CODE 250: Performed by: STUDENT IN AN ORGANIZED HEALTH CARE EDUCATION/TRAINING PROGRAM

## 2025-03-12 PROCEDURE — 96361 HYDRATE IV INFUSION ADD-ON: CPT

## 2025-03-12 PROCEDURE — 36415 COLL VENOUS BLD VENIPUNCTURE: CPT | Performed by: STUDENT IN AN ORGANIZED HEALTH CARE EDUCATION/TRAINING PROGRAM

## 2025-03-12 PROCEDURE — 71045 X-RAY EXAM CHEST 1 VIEW: CPT | Mod: TC

## 2025-03-12 PROCEDURE — 83605 ASSAY OF LACTIC ACID: CPT | Performed by: STUDENT IN AN ORGANIZED HEALTH CARE EDUCATION/TRAINING PROGRAM

## 2025-03-12 PROCEDURE — 71045 X-RAY EXAM CHEST 1 VIEW: CPT | Mod: 26,,, | Performed by: RADIOLOGY

## 2025-03-12 PROCEDURE — 87502 INFLUENZA DNA AMP PROBE: CPT | Performed by: STUDENT IN AN ORGANIZED HEALTH CARE EDUCATION/TRAINING PROGRAM

## 2025-03-12 PROCEDURE — 96374 THER/PROPH/DIAG INJ IV PUSH: CPT

## 2025-03-12 PROCEDURE — 85025 COMPLETE CBC W/AUTO DIFF WBC: CPT | Performed by: STUDENT IN AN ORGANIZED HEALTH CARE EDUCATION/TRAINING PROGRAM

## 2025-03-12 PROCEDURE — 81000 URINALYSIS NONAUTO W/SCOPE: CPT | Performed by: STUDENT IN AN ORGANIZED HEALTH CARE EDUCATION/TRAINING PROGRAM

## 2025-03-12 PROCEDURE — 80053 COMPREHEN METABOLIC PANEL: CPT | Performed by: STUDENT IN AN ORGANIZED HEALTH CARE EDUCATION/TRAINING PROGRAM

## 2025-03-12 PROCEDURE — 99284 EMERGENCY DEPT VISIT MOD MDM: CPT | Mod: 25

## 2025-03-12 RX ORDER — OSELTAMIVIR PHOSPHATE 75 MG/1
75 CAPSULE ORAL 2 TIMES DAILY
Qty: 10 CAPSULE | Refills: 0 | Status: SHIPPED | OUTPATIENT
Start: 2025-03-12 | End: 2025-03-17

## 2025-03-12 RX ORDER — HEPARIN SODIUM,PORCINE/PF 10 UNIT/ML
10 SYRINGE (ML) INTRAVENOUS ONCE
Status: DISCONTINUED | OUTPATIENT
Start: 2025-03-12 | End: 2025-03-12

## 2025-03-12 RX ORDER — ONDANSETRON HYDROCHLORIDE 2 MG/ML
4 INJECTION, SOLUTION INTRAVENOUS
Status: COMPLETED | OUTPATIENT
Start: 2025-03-12 | End: 2025-03-12

## 2025-03-12 RX ORDER — KETOROLAC TROMETHAMINE 30 MG/ML
15 INJECTION, SOLUTION INTRAMUSCULAR; INTRAVENOUS
Status: COMPLETED | OUTPATIENT
Start: 2025-03-12 | End: 2025-03-12

## 2025-03-12 RX ORDER — HEPARIN SODIUM (PORCINE) LOCK FLUSH IV SOLN 100 UNIT/ML 100 UNIT/ML
100 SOLUTION INTRAVENOUS ONCE
Status: DISCONTINUED | OUTPATIENT
Start: 2025-03-12 | End: 2025-03-12 | Stop reason: CLARIF

## 2025-03-12 RX ORDER — HEPARIN SODIUM,PORCINE/PF 10 UNIT/ML
10 SYRINGE (ML) INTRAVENOUS ONCE
Status: DISCONTINUED | OUTPATIENT
Start: 2025-03-12 | End: 2025-03-12 | Stop reason: SDUPTHER

## 2025-03-12 RX ORDER — HEPARIN 100 UNIT/ML
100 SYRINGE INTRAVENOUS ONCE
Status: COMPLETED | OUTPATIENT
Start: 2025-03-12 | End: 2025-03-12

## 2025-03-12 RX ADMIN — SODIUM CHLORIDE 1000 ML: 9 INJECTION, SOLUTION INTRAVENOUS at 12:03

## 2025-03-12 RX ADMIN — SODIUM CHLORIDE 1000 ML: 9 INJECTION, SOLUTION INTRAVENOUS at 09:03

## 2025-03-12 RX ADMIN — HYDROCORTISONE SODIUM SUCCINATE 100 MG: 100 INJECTION INTRAMUSCULAR; INTRAVENOUS at 10:03

## 2025-03-12 RX ADMIN — HEPARIN 100 UNITS: 100 SYRINGE at 02:03

## 2025-03-12 RX ADMIN — KETOROLAC TROMETHAMINE 15 MG: 30 INJECTION, SOLUTION INTRAMUSCULAR; INTRAVENOUS at 09:03

## 2025-03-12 RX ADMIN — ONDANSETRON 4 MG: 2 INJECTION INTRAMUSCULAR; INTRAVENOUS at 09:03

## 2025-03-12 NOTE — ED PROVIDER NOTES
Encounter Date: 3/12/2025       History     Chief Complaint   Patient presents with    General Illness     Patient states no appetite, vomiting, general weakness, cough, fever that started 3 days ago.       The history is provided by the patient. No  was used.   General Illness   The current episode started several days ago. The problem occurs continuously. The problem has been gradually worsening. Nothing relieves the symptoms. Nothing aggravates the symptoms. Associated symptoms include a fever, nausea, vomiting, congestion, sore throat and cough. Pertinent negatives include no abdominal pain, no diarrhea, no headaches, no shortness of breath and no rash. Services received include medications given. She has received no recent medical care.     Review of patient's allergies indicates:   Allergen Reactions    Penicillins Hives     As a child      Past Medical History:   Diagnosis Date    Adrenal insufficiency     Anemia, unspecified     Attention Deficit Hyperactivity Disorder     Family H/O Diabetes Mellitus     Her Father    Generalized Anxiety     Left Breast Cancer S.P Lumpectomy In 2021     Dr. Dominga Johnston; Dr. Sandra Sotelo (Heme/Onc); 21 On CMT; Is Estrogen Receptor Negative; She Is BRCA2 Gene Mutation Positive, Andf Her Mother Also With A H/O Breast Cancer    Macrocytic Anemia     Associated With Her Chemotherapy    Osteoporosis     Postoperative Nausea And Vomiting     Scopolamine Patches Work Well For Her For This    Scoliosis     Therapeutic Drug Monitoring     Wellness Visit 2021      Past Surgical History:   Procedure Laterality Date    AUGMENTATION OF BREAST  2012    BREAST MASS EXCISION Left 2021    Procedure: EXCISION, MASS, BREAST- 2 oclock left breast with ultrasound guidance;  Surgeon: Rashmi Johnston MD;  Location: [facility] OR;  Service: General;  Laterality: Left;    BREAST SURGERY       SECTION      COLONOSCOPY N/A 2021    Procedure:  COLONOSCOPY;  Surgeon: Mason Quintero MD;  Location: [facility] (2ND FLR);  Service: Endoscopy;  Laterality: N/A;    ESOPHAGOGASTRODUODENOSCOPY N/A 08/16/2021    Procedure: EGD (ESOPHAGOGASTRODUODENOSCOPY);  Surgeon: Mason Quintero MD;  Location: [facility] ENDO (2ND FLR);  Service: Endoscopy;  Laterality: N/A;    HYSTERECTOMY      INSERTION OF TUNNELED CENTRAL VENOUS CATHETER (CVC) WITH SUBCUTANEOUS PORT N/A 02/11/2021    Procedure: [id number];  Surgeon: Griffin Becker MD;  Location: [facility];  Service: General;  Laterality: N/A;    LASIK Bilateral     2001    OOPHORECTOMY      ROBOT-ASSISTED LAPAROSCOPIC ABDOMINAL HYSTERECTOMY USING DA NATALIIA XI N/A 03/04/2022    Procedure: XI ROBOTIC HYSTERECTOMY;  Surgeon: Carmella Galvez MD;  Location: [facility];  Service: OB/GYN;  Laterality: N/A;    ROBOT-ASSISTED LAPAROSCOPIC SALPINGO-OOPHORECTOMY USING DA NATALIIA XI Bilateral 03/04/2022    Procedure: XI ROBOTIC SALPINGO-OOPHORECTOMY;  Surgeon: Carmella Galvez MD;  Location: [facility];  Service: OB/GYN;  Laterality: Bilateral;    SENTINEL LYMPH NODE BIOPSY Left 01/19/2021    Procedure: CORE BIOPSY, LYMPH NODE, SENTINEL;  Surgeon: Rashmi Johnston MD;  Location: [facility];  Service: General;  Laterality: Left;    TUBAL LIGATION       Family History   Problem Relation Name Age of Onset    Breast cancer Mother Mirtha 52    Depression Mother Mirtha     Stroke Father aDryn         50s, multiple    Diabetes Father Daryn     Anemia Father Daryn     Hyperlipidemia Father Daryn     COPD Maternal Grandmother MGM     Cancer Other mat uncle         unk abdominal origin- dx 20s    Pancreatic cancer Other MGF's rel         suspected    Colon cancer Neg Hx      Esophageal cancer Neg Hx      Ovarian cancer Neg Hx      Glaucoma Neg Hx      Macular degeneration Neg Hx       Social History[1]  Review of Systems   Constitutional:  Positive for activity change, appetite change, chills, fatigue and fever.   HENT:  Positive for  congestion and sore throat.    Respiratory:  Positive for cough. Negative for shortness of breath.    Cardiovascular:  Negative for chest pain and palpitations.   Gastrointestinal:  Positive for nausea and vomiting. Negative for abdominal pain and diarrhea.   Genitourinary:  Negative for dysuria.   Musculoskeletal:  Negative for back pain.   Skin:  Negative for rash.   Neurological:  Positive for weakness. Negative for dizziness and headaches.   Hematological:  Does not bruise/bleed easily.       Physical Exam     Initial Vitals [03/12/25 0849]   BP Pulse Resp Temp SpO2   112/60 105 16 (!) 100.8 °F (38.2 °C) (!) 94 %      MAP       --         Physical Exam    Constitutional: She appears well-developed and well-nourished. She is not diaphoretic. She appears ill. No distress.   HENT:   Head: Normocephalic and atraumatic.   Right Ear: External ear normal.   Left Ear: External ear normal.   Eyes: EOM are normal. Pupils are equal, round, and reactive to light. No scleral icterus.   Neck: Neck supple.   Normal range of motion.  Cardiovascular:  Normal rate, regular rhythm and normal heart sounds.           Pulmonary/Chest: Breath sounds normal. No stridor. No respiratory distress. She has no wheezes. She has no rales.   Abdominal: Abdomen is soft. She exhibits no distension. There is no abdominal tenderness.   Musculoskeletal:         General: No tenderness or edema. Normal range of motion.      Cervical back: Normal range of motion and neck supple.     Neurological: She is alert and oriented to person, place, and time. She has normal strength. No cranial nerve deficit. GCS score is 15. GCS eye subscore is 4. GCS verbal subscore is 5. GCS motor subscore is 6.   Skin: Skin is warm and dry. Capillary refill takes less than 2 seconds. No pallor.   Psychiatric: She has a normal mood and affect.         ED Course   Procedures  Labs Reviewed   INFLUENZA A & B BY MOLECULAR - Abnormal       Result Value    Influenza A, Molecular  Positive (*)     Influenza B, Molecular Negative      Flu A & B Source Nasal Swab     CBC W/ AUTO DIFFERENTIAL - Abnormal    WBC 3.57 (*)     RBC 3.68 (*)     Hemoglobin 11.4 (*)     Hematocrit 32.6 (*)     MCV 89      MCH 31.0      MCHC 35.0      RDW 11.9      Platelets 152      MPV 8.6 (*)     Immature Granulocytes 0.3      Gran # (ANC) 2.8      Immature Grans (Abs) 0.01      Lymph # 0.4 (*)     Mono # 0.4      Eos # 0.0      Baso # 0.01      nRBC 0      Gran % 77.8 (*)     Lymph % 11.5 (*)     Mono % 10.1      Eosinophil % 0.0      Basophil % 0.3      Differential Method Automated     COMPREHENSIVE METABOLIC PANEL - Abnormal    Sodium 128 (*)     Potassium 3.9      Chloride 96      CO2 23      Glucose 98      BUN 10      Creatinine 0.8      Calcium 8.4 (*)     Total Protein 6.4      Albumin 3.7      Total Bilirubin 0.2      Alkaline Phosphatase 71      AST 19      ALT 11      eGFR >60.0      Anion Gap 9     URINALYSIS, REFLEX TO URINE CULTURE - Abnormal    Specimen UA Urine, Unspecified      Color, UA Yellow      Appearance, UA Clear      pH, UA 6.0      Specific Gravity, UA 1.020      Protein, UA Negative      Glucose, UA Negative      Ketones, UA 1+ (*)     Bilirubin (UA) Negative      Occult Blood UA Trace (*)     Nitrite, UA Negative      Urobilinogen, UA Negative      Leukocytes, UA Trace (*)     Narrative:     Preferred Collection Type->Urine, Clean Catch  Specimen Source->Urine   URINALYSIS MICROSCOPIC - Abnormal    RBC, UA 1      WBC, UA 7 (*)     Bacteria Rare      Squam Epithel, UA moderate      Microscopic Comment SEE COMMENT      Narrative:     Preferred Collection Type->Urine, Clean Catch  Specimen Source->Urine   LACTIC ACID, PLASMA    Lactate (Lactic Acid) 0.6     PREGNANCY TEST, URINE RAPID    Preg Test, Ur Negative      Narrative:     Specimen Source->Urine   SARS-COV-2 RNA AMPLIFICATION, QUAL    SARS-CoV-2 RNA, Amplification, Qual Negative            Imaging Results              X-Ray Chest AP  Portable (Final result)  Result time 03/12/25 10:08:48      Final result by Tressa Sequeira MD (03/12/25 10:08:48)                   Impression:      Question slight pulmonary vascular congestion versus artifact from lower lung volumes.  Otherwise, unchanged.      Electronically signed by: Tressa Sequeira  Date:    03/12/2025  Time:    10:08               Narrative:    EXAMINATION:  XR CHEST AP PORTABLE    CLINICAL HISTORY:  Fever, unspecified    TECHNIQUE:  Single frontal view of the chest was performed.    COMPARISON:  01/02/2025    FINDINGS:  Cardiomediastinal silhouette is stable.  Right-sided medication port remains in place.  There is slightly increased conspicuity of the pulmonary vascular markings which may be an artifact from a lesser degree of inspiration.  There is no focal consolidation.  No lung nodules are noted.  No pleural effusion is seen.  There are postsurgical changes in the chest wall.                                       Medications   sodium chloride 0.9% bolus 1,000 mL 1,000 mL (0 mLs Intravenous Stopped 3/12/25 1039)   ketorolac injection 15 mg (15 mg Intravenous Given 3/12/25 0946)   ondansetron injection 4 mg (4 mg Intravenous Given 3/12/25 0945)   hydrocortisone sodium succinate injection 100 mg (100 mg Intravenous Given 3/12/25 1040)   sodium chloride 0.9% bolus 1,000 mL 1,000 mL (0 mLs Intravenous Stopped 3/12/25 1332)   heparin, porcine (PF) 100 unit/mL injection flush 100 Units (100 Units Intravenous Given 3/12/25 1411)     Medical Decision Making  44-year-old with a past medical history of adrenal insufficiency secondary to breast cancer treatment is presenting with fever, chills, body aches, nausea, vomiting, and generalized weakness.  It has been gradually worsening over 3 days.  She has had poor appetite and states she has had difficulty eating and drinking.  Patient and her spouse state that she frequently becomes very weak with illnesses and requires stress dose  steroids.  They gave her a dose of her stress dose steroids at home and brought a paper from her endocrinologist that recommended IV fluids and IV hydrocortisone.    On arrival she is febrile and tachycardic with stable blood pressure.  Appears somewhat ill but is alert and oriented and answering questions.  We will give IV cortisone per recommendations from her endocrinologist and IV fluids.  Based on the description of her symptoms feel it is likely a viral illness we will get labs for further evaluation.  Feel no indication for blood cultures at this time is low suspicion for sepsis.    Labs returned and showed a mild hyponatremia.  Treated with IV fluids and hydrocortisone.  Patient positive for flu.  She felt much better after treatment.  Her blood pressure remained somewhat low but stable.  Discussed all findings with the patient and her .  Patient states that she feels much better and would like to go home.  Feel her symptoms are likely secondary to flu.  Did recommend Tamiflu given that she is immunocompromised.  She was given her strict return precautions and discharged home.    Amount and/or Complexity of Data Reviewed  Labs: ordered. Decision-making details documented in ED Course.  Radiology: ordered and independent interpretation performed. Decision-making details documented in ED Course.    Risk  Prescription drug management.                                      Clinical Impression:  Final diagnoses:  [R50.9] Fever  [J11.1] Influenza (Primary)  [E87.1] Hyponatremia          ED Disposition Condition    Discharge Stable          ED Prescriptions       Medication Sig Dispense Start Date End Date Auth. Provider    oseltamivir (TAMIFLU) 75 MG capsule Take 1 capsule (75 mg total) by mouth 2 (two) times daily. for 5 days 10 capsule 3/12/2025 3/17/2025 Leon Bartlett MD          Follow-up Information       Follow up With Specialties Details Why Contact Info    Colt Muir MD Internal Medicine  Schedule an appointment as soon as possible for a visit in 1 week  [address]  [facility]  [facility] [address]  [phone]                   [1]   Social History  Tobacco Use    Smoking status: Never    Smokeless tobacco: Never   Substance Use Topics    Alcohol use: Yes     Comment: occasionally    Drug use: Never        Leon Bartlett MD  03/14/25 [id number]

## 2025-03-13 DIAGNOSIS — F90.0 ADHD (ATTENTION DEFICIT HYPERACTIVITY DISORDER), INATTENTIVE TYPE: ICD-10-CM

## 2025-03-13 RX ORDER — DEXTROAMPHETAMINE SACCHARATE, AMPHETAMINE ASPARTATE, DEXTROAMPHETAMINE SULFATE AND AMPHETAMINE SULFATE 2.5; 2.5; 2.5; 2.5 MG/1; MG/1; MG/1; MG/1
10 TABLET ORAL 2 TIMES DAILY
Qty: 60 TABLET | Refills: 0 | Status: SHIPPED | OUTPATIENT
Start: 2025-03-13 | End: 2025-04-12

## 2025-03-16 ENCOUNTER — PATIENT MESSAGE (OUTPATIENT)
Dept: HEMATOLOGY/ONCOLOGY | Facility: CLINIC | Age: 45
End: 2025-03-16
Payer: COMMERCIAL

## 2025-03-19 ENCOUNTER — TELEPHONE (OUTPATIENT)
Dept: HEMATOLOGY/ONCOLOGY | Facility: CLINIC | Age: 45
End: 2025-03-19
Payer: COMMERCIAL

## 2025-03-19 NOTE — TELEPHONE ENCOUNTER
Called pt per Dr. Sotelo.   Pt was unable to connect via my chart virtual visit.     Dr. Sotelo would like to see the pt in person in 3 months for follow up. Tentatively scheduled for June 19th at 11am.     Pt did not answer. Left detailed message regarding scheduling and asked that pt reach out via [employer] or phone to move appt if it does not work.

## 2025-03-24 ENCOUNTER — TELEPHONE (OUTPATIENT)
Dept: OPTOMETRY | Facility: CLINIC | Age: 45
End: 2025-03-24
Payer: COMMERCIAL

## 2025-03-26 ENCOUNTER — TELEPHONE (OUTPATIENT)
Dept: OPTOMETRY | Facility: CLINIC | Age: 45
End: 2025-03-26
Payer: COMMERCIAL

## 2025-03-31 ENCOUNTER — CLINICAL SUPPORT (OUTPATIENT)
Dept: REHABILITATION | Facility: HOSPITAL | Age: 45
End: 2025-03-31
Payer: COMMERCIAL

## 2025-03-31 DIAGNOSIS — M95.8 WINGED SCAPULA, LEFT: ICD-10-CM

## 2025-03-31 DIAGNOSIS — G25.89 SCAPULAR DYSKINESIS: ICD-10-CM

## 2025-03-31 PROCEDURE — 97162 PT EVAL MOD COMPLEX 30 MIN: CPT | Mod: PN

## 2025-03-31 PROCEDURE — 97530 THERAPEUTIC ACTIVITIES: CPT | Mod: PN

## 2025-03-31 NOTE — PROGRESS NOTES
Outpatient Rehab    Physical Therapy Evaluation    Patient Name: Alison Swann  MRN: [id number]  YOB: 1980  Encounter Date: 3/31/2025    Therapy Diagnosis:   Encounter Diagnoses   Name Primary?    Scapular dyskinesis     Winged scapula, left      Physician: Dalton Haley MD    Physician Orders: Eval and Treat  Medical Diagnosis: Scapular dyskinesis  Winged scapula, left    Visit # / Visits Authorized:    Insurance Authorization Period: 2025 to 2025  Date of Evaluation: 3/31/2025  Plan of Care Certification: 3/31/2025 to 2025     Time In:     Time Out:    Total Time:     Total Billable Time:      Intake Outcome Measure for FOTO Survey    Therapist reviewed FOTO scores for Alison Swann on 3/31/2025.   FOTO report - see Media section or FOTO account episode details.     Intake Score:  %         Subjective           Past Medical History/Physical Systems Review:   Alison Swann  has a past medical history of Adrenal insufficiency, Anemia, unspecified, Attention Deficit Hyperactivity Disorder, Family H/O Diabetes Mellitus, Generalized Anxiety, Left Breast Cancer S.P Lumpectomy In 2021, Macrocytic Anemia, Osteoporosis, Postoperative Nausea And Vomiting, Scoliosis, Therapeutic Drug Monitoring, and Wellness Visit 2021.    Alison Swann  has a past surgical history that includes Augmentation of breast (); Tubal ligation;  section; Breast surgery; Breast mass excision (Left, 2021); [place] lymph node biopsy (Left, 2021); Insertion of tunneled central venous catheter (CVC) with subcutaneous port (N/A, 2021); Esophagogastroduodenoscopy (N/A, 2021); Colonoscopy (N/A, 2021); Robot-assisted laparoscopic abdominal hysterectomy using da Jeff Xi (N/A, 2022); Robot-assisted laparoscopic salpingo-oophorectomy using da Jeff Xi (Bilateral, 2022); Hysterectomy; Oophorectomy; and LASIK (Bilateral).    Alison has a current  medication list which includes the following prescription(s): alprazolam, dexamethasone, dexamethasone, dextroamphetamine-amphetamine, hydroxyzine hcl, ondansetron, intrarosa, prednisone, prednisone, promethazine, propranolol, trazodone, and valacyclovir, and the following Facility-Administered Medications: lactated ringers and sodium chloride 0.9%.    Review of patient's allergies indicates:   Allergen Reactions    Penicillins Hives     As a child         Objective            Treatment:       Time Entry(in minutes):       Assessment & Plan        Patient's spiritual, cultural, and educational needs considered and patient agreeable to plan of care and goals.           Goals:     Willem Lees, PT, DPT

## 2025-04-01 NOTE — PROGRESS NOTES
Called patient to request records for NP appointment with . Called to confirm if this will be first time patient is seeing a cardiologist. No answer, left voicemail to call back.     [facility] - Clinic Note    Subjective      Ms. Swann is a 43 y.o. female who presents to clinic to establish care.     Chronic conditions and medications discussed.   She is followed by endo for adrenal insuff.    [facility] Alison has a past medical history of Attention Deficit Hyperactivity Disorder, Family H/O Diabetes Mellitus, Generalized Anxiety, Left Breast Cancer S.P Lumpectomy In 2021, Macrocytic Anemia, Postoperative Nausea And Vomiting, Scoliosis, Therapeutic Drug Monitoring, and Wellness Visit 2021.   PSX[facility] Alison has a past surgical history that includes Augmentation of breast (); Tubal ligation;  section; Breast surgery; Breast mass excision (Left, 2021); [place] lymph node biopsy (Left, 2021); Insertion of tunneled central venous catheter (CVC) with subcutaneous port (N/A, 2021); Esophagogastroduodenoscopy (N/A, 2021); Colonoscopy (N/A, 2021); Robot-assisted laparoscopic abdominal hysterectomy using da Jeff Xi (N/A, 2022); Robot-assisted laparoscopic salpingo-oophorectomy using da Jeff Xi (Bilateral, 2022); Hysterectomy; and Oophorectomy.    Alison's family history includes Anemia in her father; Breast cancer (age of onset: 52) in her mother; COPD in her maternal grandmother; Cancer in an other family member; Depression in her mother; Diabetes in her father; Hyperlipidemia in her father; Pancreatic cancer in an other family member; Stroke in her father.    Alison reports that she has never smoked. She has never used smokeless tobacco. She reports current alcohol use. She reports that she does not use drugs.   ALG Alison is allergic to penicillins.   MED Alison has a current medication list which includes the following prescription(s): dextroamphetamine-amphetamine, imvexxy maintenance pack, hydrocortisone, trazodone, valacyclovir, lorazepam, and ondansetron, and the following Facility-Administered Medications:  "lactated ringers and sodium chloride 0.9%.     Review of Systems   Constitutional:  Negative for activity change, appetite change, chills, fatigue and fever.   Eyes:  Negative for visual disturbance.   Respiratory:  Negative for cough and shortness of breath.    Cardiovascular:  Negative for chest pain, palpitations and leg swelling.   Gastrointestinal:  Negative for abdominal pain, nausea and vomiting.   Skin:  Negative for wound.   Neurological:  Negative for dizziness and headaches.   Psychiatric/Behavioral:  Negative for confusion.    Objective     /83 (BP Location: Right arm, Patient Position: Sitting, BP Method: Medium (Manual))   Pulse 84   Resp 14   Ht 5' 7" (1.702 m)   Wt 56 kg (123 lb 8 oz)   LMP  (LMP Unknown) Comment: hysterectomy 3/4/22  SpO2 100%   BMI 19.34 kg/m²     Physical Exam   Constitutional: normal appearance. She appears well-developed and well-nourished.  Non-toxic appearance. No distress. She does not appear ill.   HENT:   Head: Normocephalic and atraumatic.   Eyes: Right eye exhibits no discharge. Left eye exhibits no discharge.   Cardiovascular: Normal rate, regular rhythm, normal heart sounds and normal pulses. Exam reveals no gallop and no friction rub.   No murmur heard.Pulmonary:      Effort: Pulmonary effort is normal. No respiratory distress.      Breath sounds: Normal breath sounds. No wheezing, rhonchi or rales.     Abdominal: Normal appearance.   Musculoskeletal:      Cervical back: Neck supple.   Lymphadenopathy:     She has no cervical adenopathy.   Neurological: She is alert.   Skin: Skin is warm and dry. Capillary refill takes less than 2 seconds. She is not diaphoretic.   Psychiatric: Her behavior is normal. Mood, judgment and thought content normal.   Vitals reviewed.   Assessment/Plan     Alison was seen today for establish care.    Diagnoses and all orders for this visit:  -New patient and new problem to me    Vaginal atrophy  -     Follicle stimulating " hormone; Future  -     Testosterone; Future  -     Luteinizing hormone; Future  -     Estrogens, total; Future  -     Progesterone; Future  -     TSH; Future    Encounter for lipid screening for cardiovascular disease  -     Lipid panel; Future    Encounter to establish care with new doctor    Attention Deficit Hyperactivity Disorder    Generalized Anxiety        Future Appointments   Date Time Provider Department Center   8/23/2023  1:30 PM Hilario Mcconnell DO [facility] ENDOCRN [place]   8/30/2023  4:40 PM Bola Amado MD [facility]   9/21/2023  8:00 AM LAB, HEMON CANCER [facility] LAB  [facility] Mooney   9/21/2023  9:00 AM Sandra Sotelo MD [facility] HEMONC3 Gumaro Amado MD  Family Medicine  [facility]

## 2025-04-07 ENCOUNTER — CLINICAL SUPPORT (OUTPATIENT)
Dept: REHABILITATION | Facility: HOSPITAL | Age: 45
End: 2025-04-07
Payer: COMMERCIAL

## 2025-04-07 DIAGNOSIS — R29.898 UPPER EXTREMITY WEAKNESS: Primary | ICD-10-CM

## 2025-04-07 PROCEDURE — 97140 MANUAL THERAPY 1/> REGIONS: CPT | Mod: PN,CQ

## 2025-04-07 PROCEDURE — 97112 NEUROMUSCULAR REEDUCATION: CPT | Mod: PN,CQ

## 2025-04-07 PROCEDURE — 97530 THERAPEUTIC ACTIVITIES: CPT | Mod: PN,CQ

## 2025-04-07 PROCEDURE — 97110 THERAPEUTIC EXERCISES: CPT | Mod: PN,CQ

## 2025-04-07 NOTE — PROGRESS NOTES
Outpatient Rehab    Physical Therapy Visit    Patient Name: Alison Swann  MRN: [id number]  YOB: 1980  Encounter Date: 4/7/2025    Therapy Diagnosis:   Encounter Diagnosis   Name Primary?    Upper extremity weakness Yes     Physician: Dalton Haley MD    Physician Orders: Eval and Treat  Medical Diagnosis: Scapular dyskinesis  Winged scapula, left    Visit # / Visits Authorized:  1 / 20  Insurance Authorization Period: 4/1/2025 to 12/31/2025  Date of Evaluation: 3/31/2025  Plan of Care Certification: 3/31/2025 to 05/30/2025     PT/PTA:     Number of PTA visits since last PT visit:   Time In: 1200   Time Out: 1300  Total Time: 60   Total Billable Time: 54      Physical Therapy technician assisted with treatment under direct supervision of treating therapist as needed.     FOTO:  Intake Score:  %  Survey Score 1:  %  Survey Score 2:  %         Subjective   No issues with HEP. No changes in symptoms. Also reports she has been working out but noticing her muscles in her legs aren't coming back would like to incorporate some lower body streghtening as well if able..  Pain reported as 1/10.      Objective            Treatment:  Therapeutic Exercise  TE 1: Open books x 20 reps  TE 2: Seated thoracic extension x 20 reps  Manual Therapy  MT 1: Mid thoracic PA mobs grade III  Balance/Neuromuscular Re-Education  NMR 1: Prone low trap and mid trap level 1, 3 x 10 reps  NMR 2: ER and IR walk outs 3 x 10 reps RTB  Therapeutic Activity  TA 1: wall slides with RTB 3 x 10 reps  TA 2: Lower trap lift off with RTB 3 x 10 reps  TA 3: Land mines 8 lbs 3 x 10 reps  TA 4: Wall ball circles CW and CCW 3 x 30 reps    Time Entry(in minutes):  Manual Therapy Time Entry: 8  Neuromuscular Re-Education Time Entry: 15  Therapeutic Activity Time Entry: 23  Therapeutic Exercise Time Entry: 10    Assessment & Plan   Assessment: Alison noted with good tolerance to tx. Treatment this date focused on improving upwards rotation of  her left scapula to improve scapulohumeral rhythm. She required cueing at times as expected with improvement noted. Alison will continue to benefit from skilled PT to maximzie strengh gains as able.       Patient will continue to benefit from skilled outpatient physical therapy to address the deficits listed in the problem list box on initial evaluation, provide pt/family education and to maximize pt's level of independence in the home and community environment.     Patient's spiritual, cultural, and educational needs considered and patient agreeable to plan of care and goals.           Plan: Progress as per POC.    Goals:   Active       Long Term Goals        In 6-8 weeks  (Progressing)       Start:  04/07/25    Expected End:  05/30/25       Pt will improve FOTO score by >/= 10 pts to demonstrate improved functional mobility.  Pt will be IND with final HEP to maintain/improve strength and mobility gained in PT.   Pt will report shoulder clicking/popping improved by >/= 90% with exercise to demonstrate improved condition.   Pt will improve MMT of upper extremity strength deficits to >/= 4+/ 5 to improve tolerance for lifting/carrying activities.   Pt goal: Pt will report confidence in managing her condition upon discharge from PT.              Short Term Goals         In 4 weeks  (Progressing)       Start:  04/07/25    Expected End:  04/30/25         Pt will be IND with initial HEP to manage symptoms outside of PT.   Pt will report shoulder clicking/popping improved by >/= 50% with upper extremity exercise to demonstrate improved condition.   Pt will improve MMT of upper extremity strength deficits by >/= 1/5 to improve tolerance for lifting/carrying activities.   Pt will improve Left shoulder flexion ROM by >= 10 degrees to improve tolerance for overhead activities.                 Hemalatha Deshpande, PTA

## 2025-04-12 ENCOUNTER — PATIENT MESSAGE (OUTPATIENT)
Dept: HEMATOLOGY/ONCOLOGY | Facility: CLINIC | Age: 45
End: 2025-04-12
Payer: COMMERCIAL

## 2025-04-16 ENCOUNTER — PATIENT MESSAGE (OUTPATIENT)
Dept: HEMATOLOGY/ONCOLOGY | Facility: CLINIC | Age: 45
End: 2025-04-16
Payer: COMMERCIAL

## 2025-05-02 ENCOUNTER — DOCUMENTATION ONLY (OUTPATIENT)
Dept: HEMATOLOGY/ONCOLOGY | Facility: CLINIC | Age: 45
End: 2025-05-02
Payer: COMMERCIAL

## 2025-05-02 ENCOUNTER — PATIENT MESSAGE (OUTPATIENT)
Dept: HEMATOLOGY/ONCOLOGY | Facility: CLINIC | Age: 45
End: 2025-05-02

## 2025-05-02 ENCOUNTER — PATIENT MESSAGE (OUTPATIENT)
Dept: PSYCHIATRY | Facility: CLINIC | Age: 45
End: 2025-05-02
Payer: COMMERCIAL

## 2025-05-02 ENCOUNTER — OFFICE VISIT (OUTPATIENT)
Dept: HEMATOLOGY/ONCOLOGY | Facility: CLINIC | Age: 45
End: 2025-05-02
Payer: COMMERCIAL

## 2025-05-02 DIAGNOSIS — Z15.01 BRCA2 GENE MUTATION POSITIVE IN FEMALE: ICD-10-CM

## 2025-05-02 DIAGNOSIS — Z17.1 MALIGNANT NEOPLASM OF UPPER-OUTER QUADRANT OF LEFT BREAST IN FEMALE, ESTROGEN RECEPTOR NEGATIVE: Primary | ICD-10-CM

## 2025-05-02 DIAGNOSIS — Z15.09 BRCA2 GENE MUTATION POSITIVE IN FEMALE: ICD-10-CM

## 2025-05-02 DIAGNOSIS — Z15.02 BRCA2 GENE MUTATION POSITIVE IN FEMALE: ICD-10-CM

## 2025-05-02 DIAGNOSIS — C50.412 MALIGNANT NEOPLASM OF UPPER-OUTER QUADRANT OF LEFT BREAST IN FEMALE, ESTROGEN RECEPTOR NEGATIVE: ICD-10-CM

## 2025-05-02 DIAGNOSIS — Z17.1 MALIGNANT NEOPLASM OF UPPER-OUTER QUADRANT OF LEFT BREAST IN FEMALE, ESTROGEN RECEPTOR NEGATIVE: ICD-10-CM

## 2025-05-02 DIAGNOSIS — M81.8 OTHER OSTEOPOROSIS WITHOUT CURRENT PATHOLOGICAL FRACTURE: ICD-10-CM

## 2025-05-02 DIAGNOSIS — C50.412 MALIGNANT NEOPLASM OF UPPER-OUTER QUADRANT OF LEFT BREAST IN FEMALE, ESTROGEN RECEPTOR NEGATIVE: Primary | ICD-10-CM

## 2025-05-02 RX ORDER — ONDANSETRON 8 MG/1
TABLET, ORALLY DISINTEGRATING ORAL
Qty: 60 TABLET | Refills: 1 | Status: CANCELLED | OUTPATIENT
Start: 2025-05-02

## 2025-05-02 RX ORDER — ONDANSETRON 8 MG/1
TABLET, ORALLY DISINTEGRATING ORAL
Qty: 60 TABLET | Refills: 1 | Status: SHIPPED | OUTPATIENT
Start: 2025-05-02

## 2025-05-02 RX ORDER — IVERMECTIN 3 MG/1
18 TABLET ORAL DAILY PRN
COMMUNITY

## 2025-05-02 NOTE — NURSING
Confirmed virtual appt, med rec notes supplements reconciled. RN Billy conducted chart review for clinic preparations including intake, barriers to care and opportunities for greater evaluation/recommendations by provider, Debi Briggs, JACKIE.

## 2025-05-02 NOTE — PROGRESS NOTES
The patient location is: [facility]     The chief complaint leading to consultation is: Vitamins/Supplements     Visit type: Audiovisual     Face to Face time with patient:   35 minutes of total time spent on the encounter, which includes face to face time and non-face to face time preparing to see the patient (eg, review of tests), Obtaining and/or reviewing separately obtained history, Documenting clinical information in the electronic or other health record, Independently interpreting results (not separately reported) and communicating results to the patient/family/caregiver, or Care coordination (not separately reported).       Each patient to whom he or she provides medical services by telemedicine is:  (1) informed of the relationship between the physician and patient and the respective role of any other health care provider with respect to management of the patient; and (2) notified that he or she may decline to receive medical services by telemedicine and may withdraw from such care at any time.               [facility] Follow up Visit     Mrs. Alison Swann presents virtually to [facility] for recommendations pertaining to vitamins and supplements. Currently post-treatment and on active surveillance for breast cancer under the care of Sharon Moeller NP and Dr. Sandra Sotelo.      Prior history of Triple Negative Left Breast Cancer and BRCA 2+ mutation. She received Keynote 522 regimen utilizing Pembrolizumab w/CarboTaxol and Pembrolizumab with AC in the neoadjuvant setting prior to adjuvant maintainence Pembrolizumab, bilateral mastectomy and left chest radiation 11/19/21-1/4/22. NICOLETTE/BSO performed 3/4/22 by Dr. Carmella Galvez of [facility].    When she asked Dr. Sotelo her oncologist about supplements she was referred to me for review of what she is taking.      Pertinent history of Osteoporosis, Adrenal Insufficiency, Anemia and Chronic Insomnia  Taking supplements for  ""sleep, anxiety, hydration and muscle" and "cancer protocol". She sees Dr. Sharri Mclean and an endocrinologist, adrian Phillip in private practice.   Her supplement list and latest labs were sent by her and will be scanned in her chart.   She is on Adderall 10 mg twice daily, 4 mg prednisone daily, trazodone p.r.n. Zofran p.r.n. and Valtrex p.r.n..  She received bio identical testosterone pellets from Dr. Tressa Cain on the [place].  PCP: Colt Muir MD- last visit 11/2024    Dexa: 1/3/24 + osteoporosis  Echo: 3/12/22  Endocrinology: Dr. Mcconnell 3/22/24  Labs: 1/27/25  C-Scope: 8/16/21; 5 years    Cancer/Stage/TNM:   Cancer Staging   Malignant neoplasm of upper-outer quadrant of left breast in female, estrogen receptor negative  Staging form: Breast, AJCC 8th Edition  - Clinical stage from 1/19/2021: Stage IIIB (cT2, cN1, cM0, G3, ER-, SC-, HER2-) - Signed by Jhon Suazo MD on 2/3/2021       Oncology History   Malignant neoplasm of upper-outer quadrant of left breast in female, estrogen receptor negative   1/19/2021 Cancer Staged    Staging form: Breast, AJCC 8th Edition  - Clinical stage from 1/19/2021: Stage IIIB (cT2, cN1, cM0, G3, ER-, SC-, HER2-)     1/28/2021 Initial Diagnosis    Malignant neoplasm of upper-outer quadrant of left breast in female, estrogen receptor negative     2/18/2021 - 2/18/2021 Chemotherapy    Treatment Summary   Plan Name: OP BREAST DOSE-DENSE AC - DOXORUBICIN CYCLOPHOSPHAMIDE Q2W  Treatment Goal: Curative  Status: Inactive  Start Date:   End Date:   Provider: Jhon Suazo MD  Chemotherapy: DOXOrubicin chemo injection 96 mg, 60 mg/m2, Intravenous, Clinic/HOD 1 time, 0 of 4 cycles  cyclophosphamide (CYTOXAN) 600 mg/m2 = 965 mg in sodium chloride 0.9% 250 mL chemo infusion, 600 mg/m2, Intravenous, Clinic/HOD 1 time, 0 of 4 cycles     2/25/2021 - 7/6/2021 Chemotherapy    Treatment Summary   Plan Name: OP BREAST PACLITAXEL WEEKLY + CARBOPLATIN (AUC 5) Q3W FOLLOWED BY AC WITH PEMBROLIZUMAB " FOLLOWED BY PEMBROLIZUMAB   Treatment Goal: Curative  Status: Inactive  Start Date: 2/25/2021  End Date: 7/6/2021  Provider: Sandra Sotelo MD  Chemotherapy: DOXOrubicin chemo injection 112 mg, 60 mg/m2 = 112 mg (100 % of original dose 60 mg/m2), Intravenous, Once, 3 of 4 cycles  Dose modification: 60 mg/m2 (original dose 60 mg/m2, Cycle 5)  Administration: 112 mg (5/25/2021), 112 mg (6/15/2021), 110 mg (7/6/2021)  CARBOplatin (PARAPLATIN) 605 mg in sodium chloride 0.9% 250 mL chemo infusion, 605 mg (100 % of original dose 603.5 mg), Intravenous, Clinic/HOD 1 time, 4 of 4 cycles  Dose modification:   (original dose 603.5 mg, Cycle 1)  Administration: 605 mg (2/25/2021), 630 mg (3/22/2021), 750 mg (4/13/2021), 750 mg (5/4/2021)  cyclophosphamide 600 mg/m2 = 1,100 mg in sodium chloride 0.9% 100 mL chemo infusion, 600 mg/m2 = 1,100 mg (100 % of original dose 600 mg/m2), Intravenous, Clinic/HOD 1 time, 3 of 4 cycles  Dose modification: 600 mg/m2 (original dose 600 mg/m2, Cycle 5), 600 mg/m2 (Cycle 8)  Administration: 1,100 mg (5/25/2021), 1,100 mg (6/15/2021), 1,100 mg (7/6/2021)  PACLitaxeL (TAXOL) 80 mg/m2 = 132 mg in sodium chloride 0.9% 250 mL chemo infusion, 80 mg/m2 = 132 mg (100 % of original dose 80 mg/m2), Intravenous, Clinic/HOD 1 time, 4 of 4 cycles  Dose modification: 80 mg/m2 (original dose 80 mg/m2, Cycle 1)  Administration: 132 mg (2/25/2021), 132 mg (3/4/2021), 132 mg (3/11/2021), 138 mg (3/22/2021), 138 mg (3/30/2021), 138 mg (4/6/2021), 138 mg (4/13/2021), 138 mg (4/20/2021), 144 mg (4/27/2021), 144 mg (5/4/2021), 144 mg (5/11/2021), 144 mg (5/18/2021)  pembrolizumab (KEYTRUDA) 200 mg in sodium chloride 0.9% 100 mL chemo infusion, 200 mg, Intravenous, [facility] [place] 1 time, 7 of 18 cycles  Administration: 200 mg (2/25/2021), 200 mg (5/25/2021), 200 mg (3/22/2021), 200 mg (4/13/2021), 200 mg (5/4/2021), 200 mg (6/15/2021), 200 mg (7/6/2021)     4/15/2021 - 4/15/2021 Chemotherapy    Treatment Summary   Plan  Name: OP BREAST PEMBROLIZUMAB Q3W PACLITAXEL CARBOPLATIN WEEKLY FOLLOWED BY PEMBROLIZUMAB DOXORUBICIN CYCLOPHOSPHAMIDE Q3W   Treatment Goal: Curative  Status: Inactive  Start Date:   End Date:   Provider: Jhon Suazo MD  Chemotherapy: CARBOplatin (PARAPLATIN) in sodium chloride 0.9% 250 mL chemo infusion,  (original dose ), Intravenous, Clinic/HOD 1 time, 0 of 3 cycles  Dose modification:   (Cycle 1)  cyclophosphamide in sodium chloride 0.9% chemo infusion, 600 mg/m2 = 965 mg (original dose ), Intravenous, Clinic/HOD 1 time, 0 of 1 cycle  Dose modification: 600 mg/m2 (Cycle 2)  DOXOrubicin (ADRIAMYCIN) in sodium chloride 0.9% 250 mL chemo infusion, 60 mg/m2 (original dose ), Intravenous, Clinic/HOD 1 time, 0 of 1 cycle  Dose modification: 60 mg/m2 (Cycle 2)  PACLitaxeL (TAXOL) in sodium chloride 0.9% 100 mL chemo infusion, 80 mg/m2 (original dose ), Intravenous, Clinic/HOD 1 time, 0 of 1 cycle  Dose modification: 80 mg/m2 (Cycle 1)  PEMEtrexed (ALIMTA) in sodium chloride 0.9% chemo infusion, 500 mg/m2, Intravenous, Clinic/HOD 1 time, 0 of 33 cycles  pembrolizumab (KEYTRUDA) 200 mg in sodium chloride 0.9% 100 mL chemo infusion, 200 mg, Intravenous, Clinic/HOD 1 time, 0 of 35 cycles     11/19/2021 - 1/4/2022 Radiation Therapy    Treating physician: Maryann Perales  Total Dose: 50 Gy  Fractions: 25    DIAGNOSIS:  C50.412 - Malignant neoplasm of upper-outer quadrant of left female breast, Diagnosed 11/11/2021 (Active) Stage IIIB, T2, N1, M0, G3, HER2 Neg, ER Neg, WA Neg    This is a 41 y.o. y/o female with cT2,c N1 and M0 G3, (Stage IIIB), triple negative, Ki-67 79%, LEFT upper outer quadrant breast, BRCA2+, status post lumpectomy 1/19/21, with residual larisa disease, followed by  adjuvant chemo-immunotherapy with carbo-taxol and pembrolizumab, followed by AC with pembrolizumab, followed by adjuvant pemprolizumab complicated by ongoing, chronic colitis/diarrhea necessitating long-term prednisone.  Underwent  bilateral mastectomy 9/29/21 with no residual carcinoma identified in breast of 14 sampled nodes (ypN0). She has completed adjuvant LEFT breast and regional larisa radiation therapy to a dose of 50Gy.    Treatment Summary  Course: C1 BREAST 2021    Treatment Site Ref. ID Energy Dose/Fx (Gy) #Fx Dose Correction (Gy) Total Dose (Gy) Start Date End Date Elapsed Days   3D Sclav_L rxsc 18X/6X 2 25 / 25 0 50 11/19/2021 1/4/2022 46   3D Breast L Breast_L 18X/6X 2 25 / 25 0 50 11/19/2021 1/4/2022 46       Tolerated well.  2 day treatment break at patient's request for patch of dry desquamation, and 5 day treatment break due to CoVID infection.  Otherwise, patient completed her course of therapy as prescribed.       PLAN: RTC 1 month for routine follow up. Continue current skin care/sun protection for now.  Follow up with other providers as directed.           Past Medical History:   Diagnosis Date    Adrenal insufficiency 2022    Anemia, unspecified     Attention Deficit Hyperactivity Disorder     Family H/O Diabetes Mellitus     Her Father    Generalized Anxiety     Left Breast Cancer S.P Lumpectomy In 01/2021     Dr. Dominga Johnston; Dr. Sandra Sotelo (Heme/Onc); 4/28/21 On CMT; Is Estrogen Receptor Negative; She Is BRCA2 Gene Mutation Positive, Andf Her Mother Also With A H/O Breast Cancer    Macrocytic Anemia     Associated With Her Chemotherapy    Osteoporosis     Postoperative Nausea And Vomiting     Scopolamine Patches Work Well For Her For This    Scoliosis     Therapeutic Drug Monitoring     Wellness Visit 4/28/2021         Current Outpatient Medications   Medication Instructions    ALPRAZolam (XANAX) 0.5 mg, Oral, 2 times daily PRN    cyanocobalamin, vitamin B-12, (B-12 COMPLIANCE INJ) Inject as directed.    dexAMETHasone (DECADRON) 4 mg/mL injection Inject 4 mg (1 ml) into the muscle for adrenal crisis    dexAMETHasone 4 mg/mL injection Inject 1 ml or 4 mg intramuscularly for adrenal crisis     dextroamphetamine-amphetamine (ADDERALL) 10 mg Tab 10 mg, Oral, 2 times daily    hydrOXYzine HCL (ATARAX) 25 mg, Oral, 3 times daily    ivermectin (STROMECTOL) 18 mg, Daily PRN    ondansetron (ZOFRAN-ODT) 8 MG TbDL DISSOLVE 1 TABLET ON THE TONGUE EVERY 6 TO 8 HOURS AS NEEDED FOR NAUSEA    prasterone, dhea, (INTRAROSA) 6.5 mg Inst Place 6.5 mg (1 capsule) vaginally nightly.    predniSONE (DELTASONE) 2.5 mg, Daily    predniSONE (DELTASONE) 1 mg, Daily    promethazine (PHENERGAN) 25 mg, Oral, Every 6 hours PRN    propranoloL (INDERAL) 10 mg, Daily PRN    traZODone (DESYREL) 100 mg, Oral, Nightly PRN    UNABLE TO FIND medication name: NAC    UNABLE TO FIND medication name: NAD    UNABLE TO FIND medication name: DIM    UNABLE TO FIND medication name: ADK    UNABLE TO FIND medication name: Complete Minerals    UNABLE TO FIND medication name: GSE Tabs    UNABLE TO FIND medication name: Annatte-E 300mg    UNABLE TO FIND medication name: BSO 1250mg    UNABLE TO FIND medication name: Spectrazyme    UNABLE TO FIND medication name: [place] [place]    UNABLE TO FIND medication name: Methyl Complex    UNABLE TO FIND medication name: FenBenBio    UNABLE TO FIND medication name: Vessel Care    UNABLE TO FIND medication name: Probiotic    valACYclovir (VALTREX) 500 MG tablet As needed (PRN)        Past Surgical History:   Procedure Laterality Date    AUGMENTATION OF BREAST  2012    BREAST MASS EXCISION Left 2021    Procedure: EXCISION, MASS, BREAST- 2 oclock left breast with ultrasound guidance;  Surgeon: Rashmi Johnston MD;  [facility];  Service: General;  Laterality: Left;    BREAST SURGERY       SECTION      COLONOSCOPY N/A 2021    Procedure: COLONOSCOPY;  Surgeon: Mason Quintero MD;  Location: [facility] ([address] [place] [address]);  Service: Endoscopy;  Laterality: N/A;    ESOPHAGOGASTRODUODENOSCOPY N/A 2021    Procedure: EGD (ESOPHAGOGASTRODUODENOSCOPY);  Surgeon: Mason Quintero MD;  Location: [facility]  (2ND FLR);  Service: Endoscopy;  Laterality: N/A;    HYSTERECTOMY      INSERTION OF TUNNELED CENTRAL VENOUS CATHETER (CVC) WITH SUBCUTANEOUS PORT N/A 02/11/2021    Procedure: [id number];  Surgeon: Griffin Becker MD;  Location: [facility];  Service: General;  Laterality: N/A;    LASIK Bilateral     2001    OOPHORECTOMY      ROBOT-ASSISTED LAPAROSCOPIC ABDOMINAL HYSTERECTOMY USING DA NATALIIA XI N/A 03/04/2022    Procedure: XI ROBOTIC HYSTERECTOMY;  Surgeon: Carmella Galvez MD;  Location: [facility];  Service: OB/GYN;  Laterality: N/A;    ROBOT-ASSISTED LAPAROSCOPIC SALPINGO-OOPHORECTOMY USING DA NATALIIA XI Bilateral 03/04/2022    Procedure: XI ROBOTIC SALPINGO-OOPHORECTOMY;  Surgeon: aCrmella Galvez MD;  Location: [facility];  Service: OB/GYN;  Laterality: Bilateral;    SENTINEL LYMPH NODE BIOPSY Left 01/19/2021    Procedure: CORE BIOPSY, LYMPH NODE, SENTINEL;  Surgeon: Rashmi Johnston MD;  Location: [facility];  Service: General;  Laterality: Left;    TUBAL LIGATION                Physical Exam   LMP  (LMP Unknown) Comment: hysterectomy 3/4/22   Wt Readings from Last 3 Encounters:   03/12/25 54.4 kg (120 lb)   02/14/25 54.4 kg (119 lb 14.9 oz)   01/02/25 54.4 kg (120 lb)     Temp Readings from Last 3 Encounters:   03/12/25 99 °F (37.2 °C) (Oral)   01/02/25 98.8 °F (37.1 °C) (Oral)   11/25/24 98.3 °F (36.8 °C) (Temporal)     BP Readings from Last 3 Encounters:   03/12/25 (!) 92/55   01/02/25 (!) 99/54   11/25/24 118/79     Pulse Readings from Last 3 Encounters:   03/12/25 82   01/02/25 84   11/25/24 78      Body mass index is 18.8    Physical Exam deferred for virtual visit today. Prior history of vital signs reviewed.     Review of Systems:   Cardiac:           No SOB, chest pain with exertion,edema, orthopnea  Distress:          No excessive sadness, no hopelessness, no anhedonia, no excessive worry or nervousness  Cognitive:        No trouble with memory, no difficulty paying attention, no brain fog, no  trouble functioning with work or home life  Fatigue:           Energy level adequate, performing ADL's, no morning fatigue                             Hormonal:       No hot flashes, no night sweats  Pain:                Has no pain,  location:                          Pain 0   / 10 (Scale 0 - 10)    Neuropathy:    No numbness, no tingling, no paresthesia   Sleep:              No difficulty falling asleep, no waking up in night, no daytime sleepiness, no snoring  Altered function:          No problems with money management,  no problems with daily organization & planning  Weight:           No concerns with weight, wants to lose a little and maintain healthy        Labs:   Lab Results   Component Value Date    WBC 3.57 (L) 03/12/2025    HGB 11.4 (L) 03/12/2025    HCT 32.6 (L) 03/12/2025    MCV 89 03/12/2025     03/12/2025           Hemoglobin A1C   Date Value Ref Range Status   02/06/2024 5.2 0.0 - 5.6 % Final     Comment:     Reference Interval:  5.0 - 5.6 Normal   5.7 - 6.4 High Risk   > 6.5 Diabetic      Hgb A1c results are standardized based on the (NGSP) National   Glycohemoglobin Standardization Program.      Hemoglobin A1C levels are related to mean serum/plasma glucose   during the preceding 2-3 months.        10/12/2022 5.0 4.0 - 5.6 % Final     Comment:     ADA Screening Guidelines:  5.7-6.4%  Consistent with prediabetes  >or=6.5%  Consistent with diabetes    High levels of fetal hemoglobin interfere with the HbA1C  assay. Heterozygous hemoglobin variants (HbS, HgC, etc)do  not significantly interfere with this assay.   However, presence of multiple variants may affect accuracy.     03/30/2021 5.1 4.0 - 5.6 % Final     Comment:     ADA Screening Guidelines:  5.7-6.4%  Consistent with prediabetes  >or=6.5%  Consistent with diabetes    High levels of fetal hemoglobin interfere with the HbA1C  assay. Heterozygous hemoglobin variants (HbS, HgC, etc)do  not significantly interfere with this assay.    However, presence of multiple variants may affect accuracy.        T3,T4,T7 and TSH   Vitamin d level  Vitamin b-12       Assessment:     1. Malignant neoplasm of upper-outer quadrant of left breast in female, estrogen receptor negative        2. Other osteoporosis without current pathological fracture        3. BRCA2 gene mutation positive in female            Plan   I reviewed her supplement list and outside labs. Counseled her that I do not recommend that she take DIM. She reports that she no longer takes this.  I also counseled her that I do not recommend Iivermectin.  There is not enough data to support the use of this in cancer patients in my opinion.  I do not near the dosing of her testosterone pellets as I do not have any of these records.  It sounds like she is followed with labs which is the important aspect of being on pellets.  Counseled her to let her endocrinologist know about her DEXA scan findings on January 3, 2024 that revealed osteoporosis.  She is on chronic steroids which she needs to be on but Endocrinology should be made aware of her osteoporosis and she will need a repeat bone scan in the future  Since she is not on active treatment a do feel that the other supplements are okay to use.  However if she ends up on any medications in the future she will need to be sure that there is no cross reactivity with the supplements that she is taking  Message sent to patient regarding Dr. Radha Traylor book [facility]   Follow up prn  Total time 25 minutes - face to face, review of medical record and arranging follow up

## 2025-05-05 DIAGNOSIS — F41.1 GAD (GENERALIZED ANXIETY DISORDER): Primary | ICD-10-CM

## 2025-05-05 RX ORDER — ALPRAZOLAM 0.5 MG/1
0.5 TABLET ORAL DAILY PRN
Qty: 30 TABLET | Refills: 0 | Status: SHIPPED | OUTPATIENT
Start: 2025-05-05 | End: 2025-05-09 | Stop reason: ALTCHOICE

## 2025-05-08 ENCOUNTER — PATIENT MESSAGE (OUTPATIENT)
Dept: PSYCHIATRY | Facility: CLINIC | Age: 45
End: 2025-05-08
Payer: COMMERCIAL

## 2025-05-08 DIAGNOSIS — F51.04 INSOMNIA, PSYCHOPHYSIOLOGICAL: ICD-10-CM

## 2025-05-09 ENCOUNTER — OFFICE VISIT (OUTPATIENT)
Dept: PSYCHIATRY | Facility: CLINIC | Age: 45
End: 2025-05-09
Payer: COMMERCIAL

## 2025-05-09 VITALS
SYSTOLIC BLOOD PRESSURE: 112 MMHG | HEART RATE: 73 BPM | HEIGHT: 67 IN | DIASTOLIC BLOOD PRESSURE: 72 MMHG | WEIGHT: 120.94 LBS | BODY MASS INDEX: 18.98 KG/M2

## 2025-05-09 DIAGNOSIS — F41.1 GAD (GENERALIZED ANXIETY DISORDER): Primary | ICD-10-CM

## 2025-05-09 DIAGNOSIS — F51.04 INSOMNIA, PSYCHOPHYSIOLOGICAL: ICD-10-CM

## 2025-05-09 DIAGNOSIS — F90.0 ADHD (ATTENTION DEFICIT HYPERACTIVITY DISORDER), INATTENTIVE TYPE: ICD-10-CM

## 2025-05-09 PROCEDURE — 99999 PR PBB SHADOW E&M-EST. PATIENT-LVL V: CPT | Mod: PBBFAC,,,

## 2025-05-09 RX ORDER — LORAZEPAM 1 MG/1
1 TABLET ORAL DAILY PRN
Qty: 30 TABLET | Refills: 0 | Status: SHIPPED | OUTPATIENT
Start: 2025-06-02 | End: 2025-07-02

## 2025-05-09 RX ORDER — TRAZODONE HYDROCHLORIDE 100 MG/1
100 TABLET ORAL NIGHTLY PRN
Qty: 90 TABLET | Refills: 1 | Status: SHIPPED | OUTPATIENT
Start: 2025-05-09 | End: 2025-11-05

## 2025-05-09 NOTE — PROGRESS NOTES
Outpatient Psychiatry Follow-Up Visit    Clinical Status of Patient: Outpatient (Ambulatory)  05/09/2025     Chief Complaint: Pt is a 45 y.o. female who presents today for a follow-up. Met with patient.       Interval History and Content of Current Session:  Interim Events/Subjective Report/Content of Current Session:  follow up appointment.    Pt is a 45 y.o. female with past psychiatric hx of Anxiety and depression, Insomnia, psychophysiological, Attention Deficit Hyperactivity Disorder who presents for follow up treatment.     Past Psychiatric hx:   Pt. is a 44 yo F with a past psychiatric hx of Depression, unspecified depression type, KIANA (generalized anxiety disorder), Insomnia, unspecified type presenting to the clinic for an initial evaluation and treatment. Past medical history outlined below. She is currently taking traZODone (DESYREL), dextroamphetamine-amphetamine (ADDERALL), prescribed and managed by Dr. Sotelo/Dr. Amado. She reports that these medications have not addressed her depression/anxiety.     1/6/25: Pt presents to OP Psychiatry for routine follow-up for treatment/medication management of KIANA, MDD, Insomnia, ADHD. Pt reports all medications are effectively treating her symptoms, denies need for any medication changes at this time. Takes her anxiety and sleep PRNS very sparingly, and sleep and appetite are good w/no issues. Unable to make in office visit d/t COVID dx on 1/2, is at her sister's in [place], scheduled next appt in person. Pt denies SI/HI/AVH, self-harm, plan, or intent. Pt verbalizes understanding of medication instructions through teach back. No other issues, concerns, or problems, will reassess symptoms and medications in 3 months or PRN if symptoms worsen.     Past Medical hx:   Past Medical History:   Diagnosis Date    Adrenal insufficiency 2022    Anemia, unspecified     Attention Deficit Hyperactivity Disorder     Family H/O Diabetes Mellitus     Her Father    Generalized  Anxiety     Left Breast Cancer S.P Lumpectomy In 01/2021     Dr. Dominga Johnston; Dr. Sandra Sotelo (Heme/Onc); 4/28/21 On CMT; Is Estrogen Receptor Negative; She Is BRCA2 Gene Mutation Positive, Andf Her Mother Also With A H/O Breast Cancer    Macrocytic Anemia     Associated With Her Chemotherapy    Osteoporosis     Postoperative Nausea And Vomiting     Scopolamine Patches Work Well For Her For This    Scoliosis     Therapeutic Drug Monitoring     Wellness Visit 4/28/2021         Interim hx:  Medication changes last visit:     1. 2.  Continue traZODone (DESYREL) 100 MG tablet - Take 1 tablet (100 mg total) by mouth nightly as needed for Insomnia.  3.  Continue dextroamphetamine-amphetamine (ADDERALL) 10 mg Tab - Take 10 mg by mouth 2 (two) times a day for ADHD. Discussed stimulant side effects including effects on sleep, appetite, tics, nervousness, anorexia, insomnia, tachycardia, palpitations, dizziness, BP changes, HR changes, visual disturbance, and headaches.   4.  Continue LORazepam (ATIVAN) 0.5 MG tablet - Take 1 tablet (0.5 mg total) by mouth daily as needed for Anxiety. Discussed risk of decreased RT, sedation, addictive potential, and not to mix with alcohol.     Anxiety: Manageable  Depression: Denies  Sleep: Good, manageable  Appetite: Good, no issues     Denies suicidal/homicidal ideations.  Denies hopelessness/worthlessness.    Denies auditory/visual hallucinations.      Tobacco:  denies  Alcohol:  occasional  Drug use:  denies  Caffeine:  2 cups a week      Review of Systems   PSYCHIATRIC: Pertinent items are noted in the narrative.        CONSTITUTIONAL: weight stable        M/S: no pain today         ENT: no allergies noted today        ABD: no n/v/d     Past Medical, Family and Social History: The patient's past medical, family and social history have been reviewed and updated as appropriate within the electronic medical record. See encounter notes.     Medication Compliance: yes      Side effects:  tolerates     Risk Parameters:  Patient reports no suicidal ideation  Patient reports no homicidal ideation  Patient reports no self-injurious behavior  Patient reports no violent behavior     Exam (detailed: at least 9 elements; comprehensive: all 15 elements)   Constitutional  Vitals:  Most recent vital signs, dated less than 90 days prior to this appointment, were reviewed. Pulse:  [73]   BP: (112)/(72)     General:  unremarkable, age appropriate, casual attire, good eye contact, good rapport       Musculoskeletal  Muscle Strength/Tone:  no flaccidity, no tremor    Gait & Station:  normal      Psychiatric                       Speech:  normal tone, normal rate, rhythm, and volume   Mood & Affect:   Euthymic, congruent, appropriate         Thought Process:   Goal directed; Linear    Associations:   intact   Thought Content:   No SI/HI, delusions, or paranoia, no AV/VH   Insight & Judgement:   Good, adequate to circumstances   Orientation:   grossly intact; alert and oriented x 4    Memory:  intact for content of interview    Language:  grossly intact, can repeat    Attention Span  : Grossly intact for content of interview   Fund of Knowledge:   intact and appropriate to age and level of education        Assessment and Diagnosis   Status/Progress: Based on the examination today, the patient's problem(s) is/are under fair control.  New problems have not been presented today. Comorbidities are not currently complicating management of the primary condition.      Impression:  Pt presents to OP Psychiatry for routine follow-up for treatment/medication management of Anxiety and depression, Insomnia, ADHD. Pt reports all medications effectively treating symptoms, pt prefers Ativan over Xanax, discussed sending 30 tabs for next month. Pt denies SI/HI/AVH, self-harm, plan, or intent. Pt verbalizes understanding of medication instructions through teach back. No other issues, concerns, or problems, will reassess symptoms and  medications in 3 months or PRN if symptoms worsen.      Diagnosis:   - Anxiety and depression  - Insomnia, psychophysiological  - Attention Deficit Hyperactivity Disorder      Intervention/Counseling/Treatment Plan   Medication Management:      1.  Continue LORazepam (ATIVAN) 0.5 MG tablet - Take 1 tablet (0.5 mg total) by mouth daily as needed for Anxiety. Discussed risk of decreased RT, sedation, addictive potential, and not to mix with alcohol.      2.  Continue traZODone (DESYREL) 100 MG tablet - Take 1 tablet (100 mg total) by mouth nightly as needed for Insomnia.     3.  Continue dextroamphetamine-amphetamine (ADDERALL) 10 mg Tab - Take 10 mg by mouth 2 (two) times a day for ADHD. Discussed stimulant side effects including effects on sleep, appetite, tics, nervousness, anorexia, insomnia, tachycardia, palpitations, dizziness, BP changes, HR changes, visual disturbance, and headaches.      4.  Patient given contact # for psychotherapists at [facility] and also instructed she may check with insurance for list of providers.     5.  Call to report any worsening of symptoms or problems with the medication. Pt instructed to go to ER with thoughts of harming self, others               Labs: none         Return to clinic:      3 months or PRN if symptoms worsen    -Spent 30min face to face with the pt; >50% time spent in counseling   -Supportive therapy and psychoeducation provided  -R/B/SE's of medications discussed with the pt who expresses understanding and chooses to take medications as prescribed.   -Pt instructed to call clinic, 911 or go to nearest emergency room if sxs worsen or pt is in   crisis. The pt expresses understanding.      Mehnaz Gaitan NP  Psychiatric-Mental Health Nurse Practitioner  Department of Psychiatry    [facility] [place] [address] [place] [address]  Phone:  [phone]  Fax: [phone]

## 2025-05-19 ENCOUNTER — OFFICE VISIT (OUTPATIENT)
Dept: OPTOMETRY | Facility: CLINIC | Age: 45
End: 2025-05-19
Payer: COMMERCIAL

## 2025-05-19 ENCOUNTER — INFUSION (OUTPATIENT)
Dept: INFUSION THERAPY | Facility: HOSPITAL | Age: 45
End: 2025-05-19
Attending: NURSE PRACTITIONER
Payer: COMMERCIAL

## 2025-05-19 DIAGNOSIS — Z17.1 MALIGNANT NEOPLASM OF UPPER-OUTER QUADRANT OF LEFT BREAST IN FEMALE, ESTROGEN RECEPTOR NEGATIVE: Primary | ICD-10-CM

## 2025-05-19 DIAGNOSIS — H52.13 MYOPIA WITH ASTIGMATISM, BILATERAL: ICD-10-CM

## 2025-05-19 DIAGNOSIS — H52.203 MYOPIA WITH ASTIGMATISM, BILATERAL: ICD-10-CM

## 2025-05-19 DIAGNOSIS — C50.412 MALIGNANT NEOPLASM OF UPPER-OUTER QUADRANT OF LEFT BREAST IN FEMALE, ESTROGEN RECEPTOR NEGATIVE: Primary | ICD-10-CM

## 2025-05-19 DIAGNOSIS — Z98.890 HISTORY OF LASER REFRACTIVE SURGERY: ICD-10-CM

## 2025-05-19 PROCEDURE — 1160F RVW MEDS BY RX/DR IN RCRD: CPT | Mod: CPTII,S$GLB,, | Performed by: OPTOMETRIST

## 2025-05-19 PROCEDURE — 25000003 PHARM REV CODE 250: Mod: PN | Performed by: INTERNAL MEDICINE

## 2025-05-19 PROCEDURE — 99999 PR PBB SHADOW E&M-EST. PATIENT-LVL II: CPT | Mod: PBBFAC,,, | Performed by: OPTOMETRIST

## 2025-05-19 PROCEDURE — 92015 DETERMINE REFRACTIVE STATE: CPT | Mod: S$GLB,,, | Performed by: OPTOMETRIST

## 2025-05-19 PROCEDURE — 96523 IRRIG DRUG DELIVERY DEVICE: CPT | Mod: PN

## 2025-05-19 PROCEDURE — 92014 COMPRE OPH EXAM EST PT 1/>: CPT | Mod: S$GLB,,, | Performed by: OPTOMETRIST

## 2025-05-19 PROCEDURE — A4216 STERILE WATER/SALINE, 10 ML: HCPCS | Mod: PN | Performed by: INTERNAL MEDICINE

## 2025-05-19 PROCEDURE — 1159F MED LIST DOCD IN RCRD: CPT | Mod: CPTII,S$GLB,, | Performed by: OPTOMETRIST

## 2025-05-19 RX ORDER — SODIUM CHLORIDE 0.9 % (FLUSH) 0.9 %
10 SYRINGE (ML) INJECTION
Status: DISCONTINUED | OUTPATIENT
Start: 2025-05-19 | End: 2025-05-19 | Stop reason: HOSPADM

## 2025-05-19 RX ORDER — HEPARIN 100 UNIT/ML
500 SYRINGE INTRAVENOUS
Status: DISCONTINUED | OUTPATIENT
Start: 2025-05-19 | End: 2025-05-19 | Stop reason: HOSPADM

## 2025-05-19 RX ORDER — SODIUM CHLORIDE 0.9 % (FLUSH) 0.9 %
10 SYRINGE (ML) INJECTION
Status: CANCELLED | OUTPATIENT
Start: 2025-05-19

## 2025-05-19 RX ORDER — SODIUM CHLORIDE 0.9 % (FLUSH) 0.9 %
10 SYRINGE (ML) INJECTION
OUTPATIENT
Start: 2025-05-19

## 2025-05-19 RX ORDER — HEPARIN 100 UNIT/ML
500 SYRINGE INTRAVENOUS
Status: CANCELLED | OUTPATIENT
Start: 2025-05-19

## 2025-05-19 RX ORDER — HEPARIN 100 UNIT/ML
500 SYRINGE INTRAVENOUS
OUTPATIENT
Start: 2025-05-19

## 2025-05-19 RX ADMIN — SODIUM CHLORIDE, PRESERVATIVE FREE 10 ML: 5 INJECTION INTRAVENOUS at 02:05

## 2025-05-19 NOTE — PROGRESS NOTES
HPI     Annual Exam            Comments: Ldle 07/2024  Lasik           Comments    Pt states : feels vision has changed , blurrier at distance , does not   wear otc readers does not think she needs them   Uses [employer] prn  Denies F/F             Last edited by Tashia Carrero on 5/19/2025  3:01 PM.            Assessment /Plan     For exam results, see Encounter Report.    Malignant neoplasm of upper-outer quadrant of left breast in female, estrogen receptor negative    History of laser refractive surgery    Myopia with astigmatism, bilateral      No ocular manifestations OD, OS. No tamoxifen use. Ed pt on all findings and on importance of monitoring yearly for changes, sooner prn.  2. S/P lasik, Monitor condition. Patient to report any changes. RTC 1 year recheck.  3. New Spectacle Rx given, discussed different options for glasses. RTC 1 year routine eye exam.

## 2025-05-21 ENCOUNTER — PATIENT MESSAGE (OUTPATIENT)
Dept: HEMATOLOGY/ONCOLOGY | Facility: CLINIC | Age: 45
End: 2025-05-21
Payer: COMMERCIAL

## 2025-06-12 DIAGNOSIS — F90.0 ADHD (ATTENTION DEFICIT HYPERACTIVITY DISORDER), INATTENTIVE TYPE: ICD-10-CM

## 2025-06-12 RX ORDER — DEXTROAMPHETAMINE SACCHARATE, AMPHETAMINE ASPARTATE, DEXTROAMPHETAMINE SULFATE AND AMPHETAMINE SULFATE 2.5; 2.5; 2.5; 2.5 MG/1; MG/1; MG/1; MG/1
10 TABLET ORAL 2 TIMES DAILY
Qty: 60 TABLET | Refills: 0 | Status: SHIPPED | OUTPATIENT
Start: 2025-06-12 | End: 2025-07-13

## 2025-06-13 ENCOUNTER — TELEPHONE (OUTPATIENT)
Dept: HEMATOLOGY/ONCOLOGY | Facility: CLINIC | Age: 45
End: 2025-06-13
Payer: COMMERCIAL

## 2025-06-17 ENCOUNTER — TELEPHONE (OUTPATIENT)
Dept: HEMATOLOGY/ONCOLOGY | Facility: CLINIC | Age: 45
End: 2025-06-17
Payer: COMMERCIAL

## 2025-06-17 NOTE — TELEPHONE ENCOUNTER
I called the pt back from a CRM message to change her appt with Dr Sotelo. No answer. I left a message to let her know Dr Sotelo 1st available is 08-18-25 @ 10:30 am. That I was going to make the appt and for her to call me back to let me know if she can make this appt.

## 2025-06-18 ENCOUNTER — PATIENT MESSAGE (OUTPATIENT)
Dept: HEMATOLOGY/ONCOLOGY | Facility: CLINIC | Age: 45
End: 2025-06-18
Payer: COMMERCIAL

## 2025-06-30 ENCOUNTER — OFFICE VISIT (OUTPATIENT)
Dept: RADIATION ONCOLOGY | Facility: CLINIC | Age: 45
End: 2025-06-30
Payer: COMMERCIAL

## 2025-06-30 VITALS
WEIGHT: 122.56 LBS | BODY MASS INDEX: 19.2 KG/M2 | TEMPERATURE: 98 F | DIASTOLIC BLOOD PRESSURE: 75 MMHG | SYSTOLIC BLOOD PRESSURE: 121 MMHG | RESPIRATION RATE: 16 BRPM | HEART RATE: 71 BPM | OXYGEN SATURATION: 100 %

## 2025-06-30 DIAGNOSIS — C50.412 MALIGNANT NEOPLASM OF UPPER-OUTER QUADRANT OF LEFT BREAST IN FEMALE, ESTROGEN RECEPTOR NEGATIVE: Primary | ICD-10-CM

## 2025-06-30 DIAGNOSIS — Z17.1 MALIGNANT NEOPLASM OF UPPER-OUTER QUADRANT OF LEFT BREAST IN FEMALE, ESTROGEN RECEPTOR NEGATIVE: Primary | ICD-10-CM

## 2025-06-30 PROCEDURE — 3078F DIAST BP <80 MM HG: CPT | Mod: CPTII,S$GLB,, | Performed by: RADIOLOGY

## 2025-06-30 PROCEDURE — 3008F BODY MASS INDEX DOCD: CPT | Mod: CPTII,S$GLB,, | Performed by: RADIOLOGY

## 2025-06-30 PROCEDURE — 1159F MED LIST DOCD IN RCRD: CPT | Mod: CPTII,S$GLB,, | Performed by: RADIOLOGY

## 2025-06-30 PROCEDURE — 99213 OFFICE O/P EST LOW 20 MIN: CPT | Mod: S$GLB,,, | Performed by: RADIOLOGY

## 2025-06-30 PROCEDURE — 3074F SYST BP LT 130 MM HG: CPT | Mod: CPTII,S$GLB,, | Performed by: RADIOLOGY

## 2025-06-30 PROCEDURE — 99999 PR PBB SHADOW E&M-EST. PATIENT-LVL III: CPT | Mod: PBBFAC,,, | Performed by: RADIOLOGY

## 2025-06-30 NOTE — PROGRESS NOTES
[facility] Department of Radiation Oncology  Follow Up Visit Note    Diagnosis:  Alison Swann is a 45 y.o. female with a(n) IIIB, triple negative invasive ductal carcinoma fo the LEFT breast, status post neoadjuvant chemotherapy, bilateral mastectomy, and adjuvant radiation therapy. BRCA2+    Oncologic History:  Oncology History   Malignant neoplasm of upper-outer quadrant of left breast in female, estrogen receptor negative   1/19/2021 Cancer Staged    Staging form: Breast, AJCC 8th Edition  - Clinical stage from 1/19/2021: Stage IIIB (cT2, cN1, cM0, G3, ER-, NV-, HER2-)     1/28/2021 Initial Diagnosis    Malignant neoplasm of upper-outer quadrant of left breast in female, estrogen receptor negative     2/18/2021 - 2/18/2021 Chemotherapy    Treatment Summary   Plan Name: OP BREAST DOSE-DENSE AC - DOXORUBICIN CYCLOPHOSPHAMIDE Q2W  Treatment Goal: Curative  Status: Inactive  Start Date:   End Date:   Provider: Jhon Suazo MD  Chemotherapy: DOXOrubicin chemo injection 96 mg, 60 mg/m2, Intravenous, Clinic/HOD 1 time, 0 of 4 cycles  cyclophosphamide (CYTOXAN) 600 mg/m2 = 965 mg in sodium chloride 0.9% 250 mL chemo infusion, 600 mg/m2, Intravenous, Clinic/HOD 1 time, 0 of 4 cycles     2/25/2021 - 7/6/2021 Chemotherapy    Treatment Summary   Plan Name: OP BREAST PACLITAXEL WEEKLY + CARBOPLATIN (AUC 5) Q3W FOLLOWED BY AC WITH PEMBROLIZUMAB FOLLOWED BY PEMBROLIZUMAB   Treatment Goal: Curative  Status: Inactive  Start Date: 2/25/2021  End Date: 7/6/2021  Provider: Sandra Sotelo MD  Chemotherapy: DOXOrubicin chemo injection 112 mg, 60 mg/m2 = 112 mg (100 % of original dose 60 mg/m2), Intravenous, Once, 3 of 4 cycles  Dose modification: 60 mg/m2 (original dose 60 mg/m2, Cycle 5)  Administration: 112 mg (5/25/2021), 112 mg (6/15/2021), 110 mg (7/6/2021)  CARBOplatin (PARAPLATIN) 605 mg in sodium chloride 0.9% 250 mL chemo infusion, 605 mg (100 % of original dose 603.5 mg), Intravenous, Clinic/HOD 1  time, 4 of 4 cycles  Dose modification:   (original dose 603.5 mg, Cycle 1)  Administration: 605 mg (2/25/2021), 630 mg (3/22/2021), 750 mg (4/13/2021), 750 mg (5/4/2021)  cyclophosphamide 600 mg/m2 = 1,100 mg in sodium chloride 0.9% 100 mL chemo infusion, 600 mg/m2 = 1,100 mg (100 % of original dose 600 mg/m2), Intravenous, Clinic/HOD 1 time, 3 of 4 cycles  Dose modification: 600 mg/m2 (original dose 600 mg/m2, Cycle 5), 600 mg/m2 (Cycle 8)  Administration: 1,100 mg (5/25/2021), 1,100 mg (6/15/2021), 1,100 mg (7/6/2021)  PACLitaxeL (TAXOL) 80 mg/m2 = 132 mg in sodium chloride 0.9% 250 mL chemo infusion, 80 mg/m2 = 132 mg (100 % of original dose 80 mg/m2), Intravenous, Clinic/HOD 1 time, 4 of 4 cycles  Dose modification: 80 mg/m2 (original dose 80 mg/m2, Cycle 1)  Administration: 132 mg (2/25/2021), 132 mg (3/4/2021), 132 mg (3/11/2021), 138 mg (3/22/2021), 138 mg (3/30/2021), 138 mg (4/6/2021), 138 mg (4/13/2021), 138 mg (4/20/2021), 144 mg (4/27/2021), 144 mg (5/4/2021), 144 mg (5/11/2021), 144 mg (5/18/2021)  pembrolizumab (KEYTRUDA) 200 mg in sodium chloride 0.9% 100 mL chemo infusion, 200 mg, Intravenous, Clinic/HOD 1 time, 7 of 18 cycles  Administration: 200 mg (2/25/2021), 200 mg (5/25/2021), 200 mg (3/22/2021), 200 mg (4/13/2021), 200 mg (5/4/2021), 200 mg (6/15/2021), 200 mg (7/6/2021)     4/15/2021 - 4/15/2021 Chemotherapy    Treatment Summary   Plan Name: OP BREAST PEMBROLIZUMAB Q3W PACLITAXEL CARBOPLATIN WEEKLY FOLLOWED BY PEMBROLIZUMAB DOXORUBICIN CYCLOPHOSPHAMIDE Q3W   Treatment Goal: Curative  Status: Inactive  Start Date:   End Date:   Provider: Jhon Suazo MD  Chemotherapy: CARBOplatin (PARAPLATIN) in sodium chloride 0.9% 250 mL chemo infusion,  (original dose ), Intravenous, Clinic/HOD 1 time, 0 of 3 cycles  Dose modification:   (Cycle 1)  cyclophosphamide in sodium chloride 0.9% chemo infusion, 600 mg/m2 = 965 mg (original dose ), Intravenous, Clinic/HOD 1 time, 0 of 1 cycle  Dose modification:  600 mg/m2 (Cycle 2)  DOXOrubicin (ADRIAMYCIN) in sodium chloride 0.9% 250 mL chemo infusion, 60 mg/m2 (original dose ), Intravenous, Clinic/HOD 1 time, 0 of 1 cycle  Dose modification: 60 mg/m2 (Cycle 2)  PACLitaxeL (TAXOL) in sodium chloride 0.9% 100 mL chemo infusion, 80 mg/m2 (original dose ), Intravenous, Clinic/HOD 1 time, 0 of 1 cycle  Dose modification: 80 mg/m2 (Cycle 1)  PEMEtrexed (ALIMTA) in sodium chloride 0.9% chemo infusion, 500 mg/m2, Intravenous, Clinic/HOD 1 time, 0 of 33 cycles  pembrolizumab (KEYTRUDA) 200 mg in sodium chloride 0.9% 100 mL chemo infusion, 200 mg, Intravenous, Clinic/HOD 1 time, 0 of 35 cycles     11/19/2021 - 1/4/2022 Radiation Therapy    Treating physician: Maryann Perales  Total Dose: 50 Gy  Fractions: 25    DIAGNOSIS:  C50.412 - Malignant neoplasm of upper-outer quadrant of left female breast, Diagnosed 11/11/2021 (Active) Stage IIIB, T2, N1, M0, G3, HER2 Neg, ER Neg, [place] Neg    This is a 41 y.o. y/o female with cT2,c N1 and M0 G3, (Stage IIIB), triple negative, Ki-67 79%, LEFT upper outer quadrant breast, BRCA2+, status post lumpectomy 1/19/21, with residual larisa disease, followed by  adjuvant chemo-immunotherapy with carbo-taxol and pembrolizumab, followed by AC with pembrolizumab, followed by adjuvant pemprolizumab complicated by ongoing, chronic colitis/diarrhea necessitating long-term prednisone.  Underwent bilateral mastectomy 9/29/21 with no residual carcinoma identified in breast of 14 sampled nodes (ypN0). She has completed adjuvant LEFT breast and regional larisa radiation therapy to a dose of 50Gy.    Treatment Summary  Course: C1 BREAST 2021    Treatment Site Ref. ID Energy Dose/Fx (Gy) #Fx Dose Correction (Gy) Total Dose (Gy) Start Date End Date Elapsed Days   3D Sclav_L rxsc 18X/6X 2 25 / 25 0 50 11/19/2021 1/4/2022 46   3D Breast L Breast_L 18X/6X 2 25 / 25 0 50 11/19/2021 1/4/2022 46       Tolerated well.  2 day treatment break at patient's request for patch of  "dry desquamation, and 5 day treatment break due to CoVID infection.  Otherwise, patient completed her course of therapy as prescribed.       PLAN: RTC 1 month for routine follow up. Continue current skin care/sun protection for now.  Follow up with other providers as directed.          Interval History  The patient presents today for a regularly scheduled follow up visit.  She was last seen in our clinic on 3/25/24.   Since that time, she is maintained on prednisone.     8/28/24 Left mammogram for new L lower inner quadrant fullness:  BI-RADS 2      HPI: The patient is a 41 year old female who self-palpated a mass in the left breast in 4/2020.  Diagnostic mammogram 6/6/20 was BI-RADS 4, with a suspicious grouping of microcalcification in the upper outer quadrant corresponding to the palpable mass.  Image guided biopsy (outside) 6/2020 reportedly demonstrated ADH, but other calcifications were not biopsied, per chart review).  On 1/19/21, the patient underwent excidional biopsy with Dr. Johnston, with findings of 4.2cm invasive ductal carcinoma, poorly differentiated, initial anterior margin + for tumor, +LVI, high grade ductal carcinoma in-situ, solid with expansive central necrosis; core needle biopsy of sentinel node + for metastatic deposit; additional "medial" margin taken, with no evidence of invasive or in situ disease.  She subsequently underwent adjuvant chemo-immunotherapy with carbo-taxol and pembrolizumab, followed by AC with pembrolizumab, followed by adjuvant pemprolizumab with excellent clinical response noted on interval imaging.     Subsequent completion bilateral mastectomies with left axillary dissection found no residual carcinoma in the left breast, 0/14 lymph nodes.  She has been recovering well from surgery.  Her only complaint is inability to wean completely from steroids due to ongoing immunotherapy toxicity.      completing radiation therapy with minimal skin toxicity noted.  She continues " with regular skincare, and has started slow steroid taper for immunotherapy-induced chronic diarrhea.  She reports some increased fatigue since starting taper.  Plan for NICOLETTE/BSO 3/2022.        maintained on hydrocortisone daily for immunotherapy-induced adrenal insufficiency.  Had surgery and revision (most recently 9/2023). Followed closely by Endocrinology- some ongoing nausea.  Follows with Neuro for possible memory loss.   Currently no cough/SOB.      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 Tressa Chiu, [place]).    10/25/22 bilateral ultrasound for irregular PE noted regions most consistent with fat necrosis.      Underwent surgical revision 11/1/2022, again in 3/2023; one more being considered.     Follows regularly with Dr. Sotelo and Dr. Mcconnell. Reports tightness/pain in the left axilla since she has started exercising again. Did not complete full course of PT due to significant difficulty in scheduling    She is status post robotic NICOLETTE-BSO 3/4/22 complicated by post-op sepsis. Also noted to have diffuse left lung inflammatory changes at the time of hospitalization.  Follow ujp CT chest 10/25/22 with interval clearing of pleural/parenchymal changes bilaterally except for some subpleural scarring in left anterior hemithorax likely 2/2 radiation therapy.      Review of Systems   Review of Systems   Constitutional: Negative.  Negative for chills, fever and weight loss.   HENT: Negative.     Eyes: Negative.  Negative for double vision.   Respiratory: Negative.  Negative for cough and shortness of breath.    Cardiovascular: Negative.  Negative for chest pain.   Gastrointestinal: Negative.  Negative for nausea and vomiting.   Genitourinary: Negative.    Musculoskeletal:  Negative.    Skin: Negative.    Neurological: Negative.  Negative for dizziness and headaches.   Endo/Heme/Allergies: Negative.    Psychiatric/Behavioral: Negative.         Social History:  Social History     Tobacco Use    Smoking status: Never    Smokeless tobacco: Never   Substance Use Topics    Alcohol use: Yes     Comment: occasionally    Drug use: Never       Family History:  Cancer-related family history includes Breast cancer (age of onset: 52) in her mother; Cancer in an other family member; Pancreatic cancer in an other family member. There is no history of Esophageal cancer or Ovarian cancer.    Exam:  Vitals:    06/30/25 1426   BP: 121/75   Pulse: 71   Resp: 16   Temp: 98.2 °F (36.8 °C)   SpO2: 100%   Weight: 55.6 kg (122 lb 9.2 oz)           Constitutional: Pleasant 45 y.o. female in no acute distress.  Well nourished. Well groomed.   Physical Exam  Vitals reviewed.   Constitutional:       General: She is not in acute distress.     Appearance: Normal appearance. She is not ill-appearing or toxic-appearing.   HENT:      Head: Normocephalic and atraumatic.      Nose: Nose normal.   Eyes:      Extraocular Movements: Extraocular movements intact.      Conjunctiva/sclera: Conjunctivae normal.      Pupils: Pupils are equal, round, and reactive to light.   Pulmonary:      Effort: Pulmonary effort is normal.   Chest:   Breasts:     Breasts are asymmetrical (minimal).       Musculoskeletal:         General: Normal range of motion.      Cervical back: Normal range of motion.   Lymphadenopathy:      Upper Body:      Right upper body: No supraclavicular, axillary or pectoral adenopathy.      Left upper body: No supraclavicular, axillary or pectoral adenopathy.   Skin:     General: Skin is warm.   Neurological:      General: No focal deficit present.      Mental Status: She is alert and oriented to person, place, and time.      Cranial Nerves: No cranial nerve deficit.      Gait: Gait normal.   Psychiatric:          Mood and Affect: Mood normal.         Behavior: Behavior normal.         Thought Content: Thought content normal.         Judgment: Judgment normal.          Interval Imaging:  As above    Assessment:  No evidence of disease recurrence  ECOG: (0) Fully active, able to carry on all predisease performance without restriction    Plan:  Follow up w/tomasz Cardenas in 8/18/25  Consider annual ultrasound given extensive bilateral breast fat necrosis which could potentially mask early recurrence   Follow up with Endocrinology as directed  Skincare/sun protection reviewed  She was given our contact information, and she was told that she could call our clinic at anytime if she has any questions or concerns.  Follow up with other providers as directed

## 2025-07-11 DIAGNOSIS — F90.0 ADHD (ATTENTION DEFICIT HYPERACTIVITY DISORDER), INATTENTIVE TYPE: ICD-10-CM

## 2025-07-11 RX ORDER — DEXTROAMPHETAMINE SACCHARATE, AMPHETAMINE ASPARTATE, DEXTROAMPHETAMINE SULFATE AND AMPHETAMINE SULFATE 2.5; 2.5; 2.5; 2.5 MG/1; MG/1; MG/1; MG/1
10 TABLET ORAL 2 TIMES DAILY
Qty: 60 TABLET | Refills: 0 | Status: CANCELLED | OUTPATIENT
Start: 2025-07-11 | End: 2025-08-10

## 2025-07-11 RX ORDER — DEXTROAMPHETAMINE SACCHARATE, AMPHETAMINE ASPARTATE, DEXTROAMPHETAMINE SULFATE AND AMPHETAMINE SULFATE 2.5; 2.5; 2.5; 2.5 MG/1; MG/1; MG/1; MG/1
10 TABLET ORAL 2 TIMES DAILY
Qty: 60 TABLET | Refills: 0 | Status: SHIPPED | OUTPATIENT
Start: 2025-07-11 | End: 2025-08-10

## 2025-08-08 ENCOUNTER — TELEPHONE (OUTPATIENT)
Dept: PSYCHIATRY | Facility: CLINIC | Age: 45
End: 2025-08-08
Payer: COMMERCIAL

## 2025-08-11 ENCOUNTER — OFFICE VISIT (OUTPATIENT)
Dept: PSYCHIATRY | Facility: CLINIC | Age: 45
End: 2025-08-11
Payer: COMMERCIAL

## 2025-08-11 DIAGNOSIS — F90.0 ADHD (ATTENTION DEFICIT HYPERACTIVITY DISORDER), INATTENTIVE TYPE: ICD-10-CM

## 2025-08-11 DIAGNOSIS — F51.04 INSOMNIA, PSYCHOPHYSIOLOGICAL: ICD-10-CM

## 2025-08-11 DIAGNOSIS — F41.1 GAD (GENERALIZED ANXIETY DISORDER): Primary | ICD-10-CM

## 2025-08-11 PROCEDURE — 1159F MED LIST DOCD IN RCRD: CPT | Mod: CPTII,95,,

## 2025-08-11 PROCEDURE — 98006 SYNCH AUDIO-VIDEO EST MOD 30: CPT | Mod: 95,,,

## 2025-08-11 PROCEDURE — 1160F RVW MEDS BY RX/DR IN RCRD: CPT | Mod: CPTII,95,,

## 2025-08-11 RX ORDER — DEXTROAMPHETAMINE SACCHARATE, AMPHETAMINE ASPARTATE, DEXTROAMPHETAMINE SULFATE AND AMPHETAMINE SULFATE 2.5; 2.5; 2.5; 2.5 MG/1; MG/1; MG/1; MG/1
10 TABLET ORAL 2 TIMES DAILY
Qty: 60 TABLET | Refills: 0 | Status: SHIPPED | OUTPATIENT
Start: 2025-08-11 | End: 2025-09-11

## 2025-08-17 ENCOUNTER — PATIENT MESSAGE (OUTPATIENT)
Dept: HEMATOLOGY/ONCOLOGY | Facility: CLINIC | Age: 45
End: 2025-08-17
Payer: COMMERCIAL

## 2025-08-18 ENCOUNTER — OFFICE VISIT (OUTPATIENT)
Dept: HEMATOLOGY/ONCOLOGY | Facility: CLINIC | Age: 45
End: 2025-08-18
Payer: COMMERCIAL

## 2025-08-18 VITALS
SYSTOLIC BLOOD PRESSURE: 133 MMHG | DIASTOLIC BLOOD PRESSURE: 78 MMHG | OXYGEN SATURATION: 100 % | HEIGHT: 67 IN | RESPIRATION RATE: 17 BRPM | HEART RATE: 85 BPM | TEMPERATURE: 97 F | BODY MASS INDEX: 18.27 KG/M2 | WEIGHT: 116.38 LBS

## 2025-08-18 DIAGNOSIS — K59.09 OTHER CONSTIPATION: ICD-10-CM

## 2025-08-18 DIAGNOSIS — E27.40 ADRENAL INSUFFICIENCY: ICD-10-CM

## 2025-08-18 DIAGNOSIS — Z17.1 MALIGNANT NEOPLASM OF UPPER-OUTER QUADRANT OF LEFT BREAST IN FEMALE, ESTROGEN RECEPTOR NEGATIVE: Primary | ICD-10-CM

## 2025-08-18 DIAGNOSIS — C50.412 MALIGNANT NEOPLASM OF UPPER-OUTER QUADRANT OF LEFT BREAST IN FEMALE, ESTROGEN RECEPTOR NEGATIVE: Primary | ICD-10-CM

## 2025-08-18 PROCEDURE — 99999 PR PBB SHADOW E&M-EST. PATIENT-LVL V: CPT | Mod: PBBFAC,,, | Performed by: INTERNAL MEDICINE

## 2025-08-18 PROCEDURE — 3075F SYST BP GE 130 - 139MM HG: CPT | Mod: CPTII,S$GLB,, | Performed by: INTERNAL MEDICINE

## 2025-08-18 PROCEDURE — G2211 COMPLEX E/M VISIT ADD ON: HCPCS | Mod: S$GLB,,, | Performed by: INTERNAL MEDICINE

## 2025-08-18 PROCEDURE — 3008F BODY MASS INDEX DOCD: CPT | Mod: CPTII,S$GLB,, | Performed by: INTERNAL MEDICINE

## 2025-08-18 PROCEDURE — 3078F DIAST BP <80 MM HG: CPT | Mod: CPTII,S$GLB,, | Performed by: INTERNAL MEDICINE

## 2025-08-18 PROCEDURE — 1159F MED LIST DOCD IN RCRD: CPT | Mod: CPTII,S$GLB,, | Performed by: INTERNAL MEDICINE

## 2025-08-18 PROCEDURE — 99215 OFFICE O/P EST HI 40 MIN: CPT | Mod: S$GLB,,, | Performed by: INTERNAL MEDICINE

## 2025-08-20 ENCOUNTER — OFFICE VISIT (OUTPATIENT)
Dept: GASTROENTEROLOGY | Facility: CLINIC | Age: 45
End: 2025-08-20
Payer: COMMERCIAL

## 2025-08-20 DIAGNOSIS — K59.00 CONSTIPATION, UNSPECIFIED CONSTIPATION TYPE: Primary | ICD-10-CM

## 2025-08-20 PROCEDURE — 1160F RVW MEDS BY RX/DR IN RCRD: CPT | Mod: CPTII,95,,

## 2025-08-20 PROCEDURE — 1159F MED LIST DOCD IN RCRD: CPT | Mod: CPTII,95,,

## 2025-08-20 PROCEDURE — 98001 SYNCH AUDIO-VIDEO NEW LOW 30: CPT | Mod: 95,,,

## 2025-09-03 ENCOUNTER — PATIENT MESSAGE (OUTPATIENT)
Dept: PSYCHIATRY | Facility: CLINIC | Age: 45
End: 2025-09-03
Payer: COMMERCIAL

## (undated) DEVICE — SYR 10CC LUER LOCK

## (undated) DEVICE — CONTAINER SPECIMEN OR STER 4OZ

## (undated) DEVICE — SYS SEE SHARP SCOPE ANTIFOG

## (undated) DEVICE — DEVICE ANC SW STAT FOLEY 6-24

## (undated) DEVICE — SET TRI-LUMEN FILTERED TUBE

## (undated) DEVICE — JELLY SURGILUBE 5GR

## (undated) DEVICE — SOL BETADINE 5%

## (undated) DEVICE — SUT PROLENE 0 CT1 30IN BLUE

## (undated) DEVICE — NDL INSUFFLATION VERRES 120MM

## (undated) DEVICE — SOL NS 1000CC

## (undated) DEVICE — GLOVE BIOGEL SKINSENSE PI 6.5

## (undated) DEVICE — IRRIGATOR ENDOSCOPY DISP.

## (undated) DEVICE — SEE MEDLINE ITEM 157110

## (undated) DEVICE — SOL ELECTROLUBE ANTI-STIC

## (undated) DEVICE — PORT ACCESS 8MM W/120MM LOW

## (undated) DEVICE — SOL PVP-I SCRUB 7.5% 4OZ

## (undated) DEVICE — ADHESIVE DERMABOND ADVANCED

## (undated) DEVICE — OBTURATOR BLADELESS 8MM XI CLR

## (undated) DEVICE — DRAPE COLUMN DAVINCI XI

## (undated) DEVICE — SUT MCRYL PLUS 4-0 PS2 27IN

## (undated) DEVICE — SEE MEDLINE ITEM 156923

## (undated) DEVICE — MANIPULATOR VCARE PLUS 32MM SM

## (undated) DEVICE — COVER TIP CURVED SCISSORS XI

## (undated) DEVICE — ELECTRODE REM PLYHSV RETURN 9

## (undated) DEVICE — DRAPE ARM DAVINCI XI

## (undated) DEVICE — SUT V-LOC ABSRB WOUND CLSR

## (undated) DEVICE — INSERT CUSHIONPRONE VIEW LARGE

## (undated) DEVICE — SOL WATER STRL IRR 1000ML

## (undated) DEVICE — SEAL UNIVERSAL 5MM-8MM XI

## (undated) DEVICE — KIT WING PAD POSITIONING